# Patient Record
Sex: MALE | Race: WHITE | NOT HISPANIC OR LATINO | URBAN - METROPOLITAN AREA
[De-identification: names, ages, dates, MRNs, and addresses within clinical notes are randomized per-mention and may not be internally consistent; named-entity substitution may affect disease eponyms.]

---

## 2024-01-01 ENCOUNTER — INPATIENT (INPATIENT)
Facility: HOSPITAL | Age: 69
LOS: 22 days | DRG: 443 | End: 2024-09-04
Attending: TRANSPLANT SURGERY | Admitting: STUDENT IN AN ORGANIZED HEALTH CARE EDUCATION/TRAINING PROGRAM
Payer: MEDICARE

## 2024-01-01 VITALS
HEART RATE: 73 BPM | OXYGEN SATURATION: 98 % | SYSTOLIC BLOOD PRESSURE: 103 MMHG | RESPIRATION RATE: 18 BRPM | TEMPERATURE: 98 F | DIASTOLIC BLOOD PRESSURE: 65 MMHG

## 2024-01-01 VITALS — OXYGEN SATURATION: 77 %

## 2024-01-01 DIAGNOSIS — F10.10 ALCOHOL ABUSE, UNCOMPLICATED: ICD-10-CM

## 2024-01-01 DIAGNOSIS — K72.00 ACUTE AND SUBACUTE HEPATIC FAILURE WITHOUT COMA: ICD-10-CM

## 2024-01-01 DIAGNOSIS — K92.2 GASTROINTESTINAL HEMORRHAGE, UNSPECIFIED: ICD-10-CM

## 2024-01-01 DIAGNOSIS — N17.9 ACUTE KIDNEY FAILURE, UNSPECIFIED: ICD-10-CM

## 2024-01-01 DIAGNOSIS — Z01.818 ENCOUNTER FOR OTHER PREPROCEDURAL EXAMINATION: ICD-10-CM

## 2024-01-01 DIAGNOSIS — K74.60 UNSPECIFIED CIRRHOSIS OF LIVER: ICD-10-CM

## 2024-01-01 DIAGNOSIS — D69.6 THROMBOCYTOPENIA, UNSPECIFIED: ICD-10-CM

## 2024-01-01 DIAGNOSIS — E46 UNSPECIFIED PROTEIN-CALORIE MALNUTRITION: ICD-10-CM

## 2024-01-01 DIAGNOSIS — E87.8 OTHER DISORDERS OF ELECTROLYTE AND FLUID BALANCE, NOT ELSEWHERE CLASSIFIED: ICD-10-CM

## 2024-01-01 LAB
% ALBUMIN: 59.1 % — SIGNIFICANT CHANGE UP
% ALPHA 1: 2.2 % — SIGNIFICANT CHANGE UP
% ALPHA 2: 3.9 % — SIGNIFICANT CHANGE UP
% BETA: 19.1 % — SIGNIFICANT CHANGE UP
% GAMMA: 15.7 % — SIGNIFICANT CHANGE UP
% M SPIKE: SIGNIFICANT CHANGE UP
-  AMPICILLIN: SIGNIFICANT CHANGE UP
-  GENTAMICIN SYNERGY: SIGNIFICANT CHANGE UP
-  STAPHYLOCOCCUS EPIDERMIDIS, METHICILLIN RESISTANT: SIGNIFICANT CHANGE UP
-  STREPTOMYCIN SYNERGY: SIGNIFICANT CHANGE UP
-  VANCOMYCIN: SIGNIFICANT CHANGE UP
A PHAGOCYTOPH IGG TITR SER IF: SIGNIFICANT CHANGE UP
A1C WITH ESTIMATED AVERAGE GLUCOSE RESULT: 5 % — SIGNIFICANT CHANGE UP (ref 4–5.6)
A1C WITH ESTIMATED AVERAGE GLUCOSE RESULT: 5.1 % — SIGNIFICANT CHANGE UP (ref 4–5.6)
ADD ON TEST-SPECIMEN IN LAB: SIGNIFICANT CHANGE UP
ALBUMIN FLD-MCNC: 0.6 G/DL — SIGNIFICANT CHANGE UP
ALBUMIN FLD-MCNC: 0.9 G/DL — SIGNIFICANT CHANGE UP
ALBUMIN FLD-MCNC: 1.6 G/DL — SIGNIFICANT CHANGE UP
ALBUMIN SERPL ELPH-MCNC: 2.7 G/DL — LOW (ref 3.3–5)
ALBUMIN SERPL ELPH-MCNC: 2.8 G/DL — LOW (ref 3.3–5)
ALBUMIN SERPL ELPH-MCNC: 2.9 G/DL — LOW (ref 3.3–5)
ALBUMIN SERPL ELPH-MCNC: 3 G/DL — LOW (ref 3.3–5)
ALBUMIN SERPL ELPH-MCNC: 3.1 G/DL — LOW (ref 3.3–5)
ALBUMIN SERPL ELPH-MCNC: 3.2 G/DL — LOW (ref 3.3–5)
ALBUMIN SERPL ELPH-MCNC: 3.3 G/DL — SIGNIFICANT CHANGE UP (ref 3.3–5)
ALBUMIN SERPL ELPH-MCNC: 3.4 G/DL — LOW (ref 3.6–5.5)
ALBUMIN SERPL ELPH-MCNC: 3.4 G/DL — SIGNIFICANT CHANGE UP (ref 3.3–5)
ALBUMIN SERPL ELPH-MCNC: 3.5 G/DL — SIGNIFICANT CHANGE UP (ref 3.3–5)
ALBUMIN SERPL ELPH-MCNC: 3.6 G/DL — SIGNIFICANT CHANGE UP (ref 3.3–5)
ALBUMIN SERPL ELPH-MCNC: 3.7 G/DL — SIGNIFICANT CHANGE UP (ref 3.3–5)
ALBUMIN SERPL ELPH-MCNC: 3.8 G/DL — SIGNIFICANT CHANGE UP (ref 3.3–5)
ALBUMIN SERPL ELPH-MCNC: 3.9 G/DL — SIGNIFICANT CHANGE UP (ref 3.3–5)
ALBUMIN/GLOB SERPL ELPH: 1.4 RATIO — SIGNIFICANT CHANGE UP
ALP SERPL-CCNC: 104 U/L — SIGNIFICANT CHANGE UP (ref 40–120)
ALP SERPL-CCNC: 105 U/L — SIGNIFICANT CHANGE UP (ref 40–120)
ALP SERPL-CCNC: 106 U/L — SIGNIFICANT CHANGE UP (ref 40–120)
ALP SERPL-CCNC: 107 U/L — SIGNIFICANT CHANGE UP (ref 40–120)
ALP SERPL-CCNC: 109 U/L — SIGNIFICANT CHANGE UP (ref 40–120)
ALP SERPL-CCNC: 109 U/L — SIGNIFICANT CHANGE UP (ref 40–120)
ALP SERPL-CCNC: 113 U/L — SIGNIFICANT CHANGE UP (ref 40–120)
ALP SERPL-CCNC: 114 U/L — SIGNIFICANT CHANGE UP (ref 40–120)
ALP SERPL-CCNC: 116 U/L — SIGNIFICANT CHANGE UP (ref 40–120)
ALP SERPL-CCNC: 118 U/L — SIGNIFICANT CHANGE UP (ref 40–120)
ALP SERPL-CCNC: 118 U/L — SIGNIFICANT CHANGE UP (ref 40–120)
ALP SERPL-CCNC: 119 U/L — SIGNIFICANT CHANGE UP (ref 40–120)
ALP SERPL-CCNC: 123 U/L — HIGH (ref 40–120)
ALP SERPL-CCNC: 124 U/L — HIGH (ref 40–120)
ALP SERPL-CCNC: 135 U/L — HIGH (ref 40–120)
ALP SERPL-CCNC: 137 U/L — HIGH (ref 40–120)
ALP SERPL-CCNC: 141 U/L — HIGH (ref 40–120)
ALP SERPL-CCNC: 147 U/L — HIGH (ref 40–120)
ALP SERPL-CCNC: 151 U/L — HIGH (ref 40–120)
ALP SERPL-CCNC: 154 U/L — HIGH (ref 40–120)
ALP SERPL-CCNC: 155 U/L — HIGH (ref 40–120)
ALP SERPL-CCNC: 158 U/L — HIGH (ref 40–120)
ALP SERPL-CCNC: 159 U/L — HIGH (ref 40–120)
ALP SERPL-CCNC: 159 U/L — HIGH (ref 40–120)
ALP SERPL-CCNC: 160 U/L — HIGH (ref 40–120)
ALP SERPL-CCNC: 162 U/L — HIGH (ref 40–120)
ALP SERPL-CCNC: 163 U/L — HIGH (ref 40–120)
ALP SERPL-CCNC: 165 U/L — HIGH (ref 40–120)
ALP SERPL-CCNC: 165 U/L — HIGH (ref 40–120)
ALP SERPL-CCNC: 166 U/L — HIGH (ref 40–120)
ALP SERPL-CCNC: 167 U/L — HIGH (ref 40–120)
ALP SERPL-CCNC: 169 U/L — HIGH (ref 40–120)
ALP SERPL-CCNC: 169 U/L — HIGH (ref 40–120)
ALP SERPL-CCNC: 170 U/L — HIGH (ref 40–120)
ALP SERPL-CCNC: 171 U/L — HIGH (ref 40–120)
ALP SERPL-CCNC: 172 U/L — HIGH (ref 40–120)
ALP SERPL-CCNC: 173 U/L — HIGH (ref 40–120)
ALP SERPL-CCNC: 175 U/L — HIGH (ref 40–120)
ALP SERPL-CCNC: 175 U/L — HIGH (ref 40–120)
ALP SERPL-CCNC: 179 U/L — HIGH (ref 40–120)
ALP SERPL-CCNC: 180 U/L — HIGH (ref 40–120)
ALP SERPL-CCNC: 182 U/L — HIGH (ref 40–120)
ALP SERPL-CCNC: 182 U/L — HIGH (ref 40–120)
ALP SERPL-CCNC: 186 U/L — HIGH (ref 40–120)
ALP SERPL-CCNC: 186 U/L — HIGH (ref 40–120)
ALP SERPL-CCNC: 187 U/L — HIGH (ref 40–120)
ALP SERPL-CCNC: 191 U/L — HIGH (ref 40–120)
ALP SERPL-CCNC: 193 U/L — HIGH (ref 40–120)
ALP SERPL-CCNC: 193 U/L — HIGH (ref 40–120)
ALP SERPL-CCNC: 198 U/L — HIGH (ref 40–120)
ALP SERPL-CCNC: 203 U/L — HIGH (ref 40–120)
ALP SERPL-CCNC: 203 U/L — HIGH (ref 40–120)
ALP SERPL-CCNC: 205 U/L — HIGH (ref 40–120)
ALP SERPL-CCNC: 207 U/L — HIGH (ref 40–120)
ALP SERPL-CCNC: 211 U/L — HIGH (ref 40–120)
ALP SERPL-CCNC: 212 U/L — HIGH (ref 40–120)
ALP SERPL-CCNC: 212 U/L — HIGH (ref 40–120)
ALP SERPL-CCNC: 215 U/L — HIGH (ref 40–120)
ALP SERPL-CCNC: 218 U/L — HIGH (ref 40–120)
ALP SERPL-CCNC: 222 U/L — HIGH (ref 40–120)
ALP SERPL-CCNC: 222 U/L — HIGH (ref 40–120)
ALP SERPL-CCNC: 223 U/L — HIGH (ref 40–120)
ALP SERPL-CCNC: 223 U/L — HIGH (ref 40–120)
ALP SERPL-CCNC: 228 U/L — HIGH (ref 40–120)
ALP SERPL-CCNC: 231 U/L — HIGH (ref 40–120)
ALP SERPL-CCNC: 234 U/L — HIGH (ref 40–120)
ALP SERPL-CCNC: 236 U/L — HIGH (ref 40–120)
ALP SERPL-CCNC: 240 U/L — HIGH (ref 40–120)
ALP SERPL-CCNC: 243 U/L — HIGH (ref 40–120)
ALP SERPL-CCNC: 245 U/L — HIGH (ref 40–120)
ALP SERPL-CCNC: 245 U/L — HIGH (ref 40–120)
ALP SERPL-CCNC: 246 U/L — HIGH (ref 40–120)
ALP SERPL-CCNC: 248 U/L — HIGH (ref 40–120)
ALP SERPL-CCNC: 250 U/L — HIGH (ref 40–120)
ALP SERPL-CCNC: 252 U/L — HIGH (ref 40–120)
ALP SERPL-CCNC: 253 U/L — HIGH (ref 40–120)
ALP SERPL-CCNC: 253 U/L — HIGH (ref 40–120)
ALP SERPL-CCNC: 255 U/L — HIGH (ref 40–120)
ALP SERPL-CCNC: 259 U/L — HIGH (ref 40–120)
ALPHA1 GLOB SERPL ELPH-MCNC: 0.1 G/DL — SIGNIFICANT CHANGE UP (ref 0.1–0.4)
ALPHA2 GLOB SERPL ELPH-MCNC: 0.2 G/DL — LOW (ref 0.5–1)
ALT FLD-CCNC: 15 U/L — SIGNIFICANT CHANGE UP (ref 10–45)
ALT FLD-CCNC: 16 U/L — SIGNIFICANT CHANGE UP (ref 10–45)
ALT FLD-CCNC: 16 U/L — SIGNIFICANT CHANGE UP (ref 10–45)
ALT FLD-CCNC: 17 U/L — SIGNIFICANT CHANGE UP (ref 10–45)
ALT FLD-CCNC: 18 U/L — SIGNIFICANT CHANGE UP (ref 10–45)
ALT FLD-CCNC: 18 U/L — SIGNIFICANT CHANGE UP (ref 10–45)
ALT FLD-CCNC: 19 U/L — SIGNIFICANT CHANGE UP (ref 10–45)
ALT FLD-CCNC: 21 U/L — SIGNIFICANT CHANGE UP (ref 10–45)
ALT FLD-CCNC: 22 U/L — SIGNIFICANT CHANGE UP (ref 10–45)
ALT FLD-CCNC: 23 U/L — SIGNIFICANT CHANGE UP (ref 10–45)
ALT FLD-CCNC: 23 U/L — SIGNIFICANT CHANGE UP (ref 10–45)
ALT FLD-CCNC: 25 U/L — SIGNIFICANT CHANGE UP (ref 10–45)
ALT FLD-CCNC: 25 U/L — SIGNIFICANT CHANGE UP (ref 10–45)
ALT FLD-CCNC: 27 U/L — SIGNIFICANT CHANGE UP (ref 10–45)
ALT FLD-CCNC: 28 U/L — SIGNIFICANT CHANGE UP (ref 10–45)
ALT FLD-CCNC: 28 U/L — SIGNIFICANT CHANGE UP (ref 10–45)
ALT FLD-CCNC: 29 U/L — SIGNIFICANT CHANGE UP (ref 10–45)
ALT FLD-CCNC: 30 U/L — SIGNIFICANT CHANGE UP (ref 10–45)
ALT FLD-CCNC: 31 U/L — SIGNIFICANT CHANGE UP (ref 10–45)
ALT FLD-CCNC: 32 U/L — SIGNIFICANT CHANGE UP (ref 10–45)
ALT FLD-CCNC: 33 U/L — SIGNIFICANT CHANGE UP (ref 10–45)
ALT FLD-CCNC: 34 U/L — SIGNIFICANT CHANGE UP (ref 10–45)
ALT FLD-CCNC: 35 U/L — SIGNIFICANT CHANGE UP (ref 10–45)
ALT FLD-CCNC: 36 U/L — SIGNIFICANT CHANGE UP (ref 10–45)
ALT FLD-CCNC: 37 U/L — SIGNIFICANT CHANGE UP (ref 10–45)
ALT FLD-CCNC: 38 U/L — SIGNIFICANT CHANGE UP (ref 10–45)
ALT FLD-CCNC: 38 U/L — SIGNIFICANT CHANGE UP (ref 10–45)
ALT FLD-CCNC: 39 U/L — SIGNIFICANT CHANGE UP (ref 10–45)
ALT FLD-CCNC: 40 U/L — SIGNIFICANT CHANGE UP (ref 10–45)
ALT FLD-CCNC: 40 U/L — SIGNIFICANT CHANGE UP (ref 10–45)
ALT FLD-CCNC: 42 U/L — SIGNIFICANT CHANGE UP (ref 10–45)
AMMONIA BLD-MCNC: 107 UMOL/L — HIGH (ref 11–55)
AMMONIA BLD-MCNC: 115 UMOL/L — HIGH (ref 11–55)
AMMONIA BLD-MCNC: 160 UMOL/L — HIGH (ref 11–55)
AMMONIA BLD-MCNC: 34 UMOL/L — SIGNIFICANT CHANGE UP (ref 11–55)
AMMONIA BLD-MCNC: 38 UMOL/L — SIGNIFICANT CHANGE UP (ref 11–55)
AMMONIA BLD-MCNC: 46 UMOL/L — SIGNIFICANT CHANGE UP (ref 11–55)
AMMONIA BLD-MCNC: 48 UMOL/L — SIGNIFICANT CHANGE UP (ref 11–55)
AMMONIA BLD-MCNC: 49 UMOL/L — SIGNIFICANT CHANGE UP (ref 11–55)
AMMONIA BLD-MCNC: 52 UMOL/L — SIGNIFICANT CHANGE UP (ref 11–55)
AMMONIA BLD-MCNC: 57 UMOL/L — HIGH (ref 11–55)
AMMONIA BLD-MCNC: 57 UMOL/L — HIGH (ref 11–55)
AMMONIA BLD-MCNC: 59 UMOL/L — HIGH (ref 11–55)
AMMONIA BLD-MCNC: 60 UMOL/L — HIGH (ref 11–55)
AMMONIA BLD-MCNC: 60 UMOL/L — HIGH (ref 11–55)
AMMONIA BLD-MCNC: 61 UMOL/L — HIGH (ref 11–55)
AMMONIA BLD-MCNC: 63 UMOL/L — HIGH (ref 11–55)
AMMONIA BLD-MCNC: 65 UMOL/L — HIGH (ref 11–55)
AMMONIA BLD-MCNC: 66 UMOL/L — HIGH (ref 11–55)
AMMONIA BLD-MCNC: 66 UMOL/L — HIGH (ref 11–55)
AMMONIA BLD-MCNC: 74 UMOL/L — HIGH (ref 11–55)
AMMONIA BLD-MCNC: 79 UMOL/L — HIGH (ref 11–55)
AMMONIA BLD-MCNC: 81 UMOL/L — HIGH (ref 11–55)
AMMONIA BLD-MCNC: 94 UMOL/L — HIGH (ref 11–55)
ANA TITR SER: NEGATIVE — SIGNIFICANT CHANGE UP
ANION GAP SERPL CALC-SCNC: 10 MMOL/L — SIGNIFICANT CHANGE UP (ref 5–17)
ANION GAP SERPL CALC-SCNC: 11 MMOL/L — SIGNIFICANT CHANGE UP (ref 5–17)
ANION GAP SERPL CALC-SCNC: 12 MMOL/L — SIGNIFICANT CHANGE UP (ref 5–17)
ANION GAP SERPL CALC-SCNC: 13 MMOL/L — SIGNIFICANT CHANGE UP (ref 5–17)
ANION GAP SERPL CALC-SCNC: 14 MMOL/L — SIGNIFICANT CHANGE UP (ref 5–17)
ANION GAP SERPL CALC-SCNC: 15 MMOL/L — SIGNIFICANT CHANGE UP (ref 5–17)
ANION GAP SERPL CALC-SCNC: 17 MMOL/L — SIGNIFICANT CHANGE UP (ref 5–17)
ANION GAP SERPL CALC-SCNC: 25 MMOL/L — HIGH (ref 5–17)
ANION GAP SERPL CALC-SCNC: 9 MMOL/L — SIGNIFICANT CHANGE UP (ref 5–17)
ANISOCYTOSIS BLD QL: SIGNIFICANT CHANGE UP
APTT BLD: 129.3 SEC — CRITICAL HIGH (ref 24.5–35.6)
APTT BLD: 42.8 SEC — HIGH (ref 24.5–35.6)
APTT BLD: 43.6 SEC — HIGH (ref 24.5–35.6)
APTT BLD: 44.2 SEC — HIGH (ref 24.5–35.6)
APTT BLD: 44.4 SEC — HIGH (ref 24.5–35.6)
APTT BLD: 44.6 SEC — HIGH (ref 24.5–35.6)
APTT BLD: 45 SEC — HIGH (ref 24.5–35.6)
APTT BLD: 45.2 SEC — HIGH (ref 24.5–35.6)
APTT BLD: 45.7 SEC — HIGH (ref 24.5–35.6)
APTT BLD: 45.8 SEC — HIGH (ref 24.5–35.6)
APTT BLD: 46 SEC — HIGH (ref 24.5–35.6)
APTT BLD: 46.2 SEC — HIGH (ref 24.5–35.6)
APTT BLD: 46.5 SEC — HIGH (ref 24.5–35.6)
APTT BLD: 46.7 SEC — HIGH (ref 24.5–35.6)
APTT BLD: 46.8 SEC — HIGH (ref 24.5–35.6)
APTT BLD: 46.9 SEC — HIGH (ref 24.5–35.6)
APTT BLD: 46.9 SEC — HIGH (ref 24.5–35.6)
APTT BLD: 47.1 SEC — HIGH (ref 24.5–35.6)
APTT BLD: 47.3 SEC — HIGH (ref 24.5–35.6)
APTT BLD: 47.4 SEC — HIGH (ref 24.5–35.6)
APTT BLD: 47.5 SEC — HIGH (ref 24.5–35.6)
APTT BLD: 47.7 SEC — HIGH (ref 24.5–35.6)
APTT BLD: 47.7 SEC — HIGH (ref 24.5–35.6)
APTT BLD: 48 SEC — HIGH (ref 24.5–35.6)
APTT BLD: 48.3 SEC — HIGH (ref 24.5–35.6)
APTT BLD: 48.4 SEC — HIGH (ref 24.5–35.6)
APTT BLD: 48.5 SEC — HIGH (ref 24.5–35.6)
APTT BLD: 48.7 SEC — HIGH (ref 24.5–35.6)
APTT BLD: 48.7 SEC — HIGH (ref 24.5–35.6)
APTT BLD: 49 SEC — HIGH (ref 24.5–35.6)
APTT BLD: 49 SEC — HIGH (ref 24.5–35.6)
APTT BLD: 49.2 SEC — HIGH (ref 24.5–35.6)
APTT BLD: 49.2 SEC — HIGH (ref 24.5–35.6)
APTT BLD: 49.4 SEC — HIGH (ref 24.5–35.6)
APTT BLD: 49.7 SEC — HIGH (ref 24.5–35.6)
APTT BLD: 49.9 SEC — HIGH (ref 24.5–35.6)
APTT BLD: 50 SEC — HIGH (ref 24.5–35.6)
APTT BLD: 50.4 SEC — HIGH (ref 24.5–35.6)
APTT BLD: 50.4 SEC — HIGH (ref 24.5–35.6)
APTT BLD: 50.5 SEC — HIGH (ref 24.5–35.6)
APTT BLD: 51 SEC — HIGH (ref 24.5–35.6)
APTT BLD: 51.3 SEC — HIGH (ref 24.5–35.6)
APTT BLD: 51.6 SEC — HIGH (ref 24.5–35.6)
APTT BLD: 52.4 SEC — HIGH (ref 24.5–35.6)
APTT BLD: 52.7 SEC — HIGH (ref 24.5–35.6)
APTT BLD: 53.7 SEC — HIGH (ref 24.5–35.6)
APTT BLD: 54.7 SEC — HIGH (ref 24.5–35.6)
APTT BLD: 56.2 SEC — HIGH (ref 24.5–35.6)
APTT BLD: 56.9 SEC — HIGH (ref 24.5–35.6)
APTT BLD: 57.6 SEC — HIGH (ref 24.5–35.6)
APTT BLD: 57.7 SEC — HIGH (ref 24.5–35.6)
APTT BLD: 58.5 SEC — HIGH (ref 24.5–35.6)
APTT BLD: 60.7 SEC — HIGH (ref 24.5–35.6)
APTT BLD: 61.4 SEC — HIGH (ref 24.5–35.6)
APTT BLD: 63.3 SEC — HIGH (ref 24.5–35.6)
APTT BLD: 70.5 SEC — HIGH (ref 24.5–35.6)
APTT BLD: 71.9 SEC — HIGH (ref 24.5–35.6)
APTT BLD: 73.2 SEC — HIGH (ref 24.5–35.6)
APTT BLD: 73.7 SEC — HIGH (ref 24.5–35.6)
APTT BLD: 75.7 SEC — HIGH (ref 24.5–35.6)
APTT BLD: 76.1 SEC — HIGH (ref 24.5–35.6)
APTT BLD: 76.4 SEC — HIGH (ref 24.5–35.6)
APTT BLD: 83.5 SEC — HIGH (ref 24.5–35.6)
APTT BLD: 85.1 SEC — HIGH (ref 24.5–35.6)
APTT BLD: 85.8 SEC — HIGH (ref 24.5–35.6)
APTT BLD: 91 SEC — HIGH (ref 24.5–35.6)
AST SERPL-CCNC: 34 U/L — SIGNIFICANT CHANGE UP (ref 10–40)
AST SERPL-CCNC: 36 U/L — SIGNIFICANT CHANGE UP (ref 10–40)
AST SERPL-CCNC: 36 U/L — SIGNIFICANT CHANGE UP (ref 10–40)
AST SERPL-CCNC: 37 U/L — SIGNIFICANT CHANGE UP (ref 10–40)
AST SERPL-CCNC: 38 U/L — SIGNIFICANT CHANGE UP (ref 10–40)
AST SERPL-CCNC: 39 U/L — SIGNIFICANT CHANGE UP (ref 10–40)
AST SERPL-CCNC: 39 U/L — SIGNIFICANT CHANGE UP (ref 10–40)
AST SERPL-CCNC: 41 U/L — HIGH (ref 10–40)
AST SERPL-CCNC: 42 U/L — HIGH (ref 10–40)
AST SERPL-CCNC: 43 U/L — HIGH (ref 10–40)
AST SERPL-CCNC: 45 U/L — HIGH (ref 10–40)
AST SERPL-CCNC: 46 U/L — HIGH (ref 10–40)
AST SERPL-CCNC: 47 U/L — HIGH (ref 10–40)
AST SERPL-CCNC: 48 U/L — HIGH (ref 10–40)
AST SERPL-CCNC: 49 U/L — HIGH (ref 10–40)
AST SERPL-CCNC: 50 U/L — HIGH (ref 10–40)
AST SERPL-CCNC: 51 U/L — HIGH (ref 10–40)
AST SERPL-CCNC: 52 U/L — HIGH (ref 10–40)
AST SERPL-CCNC: 53 U/L — HIGH (ref 10–40)
AST SERPL-CCNC: 53 U/L — HIGH (ref 10–40)
AST SERPL-CCNC: 54 U/L — HIGH (ref 10–40)
AST SERPL-CCNC: 55 U/L — HIGH (ref 10–40)
AST SERPL-CCNC: 57 U/L — HIGH (ref 10–40)
AST SERPL-CCNC: 58 U/L — HIGH (ref 10–40)
AST SERPL-CCNC: 59 U/L — HIGH (ref 10–40)
AST SERPL-CCNC: 59 U/L — HIGH (ref 10–40)
AST SERPL-CCNC: 60 U/L — HIGH (ref 10–40)
AST SERPL-CCNC: 60 U/L — HIGH (ref 10–40)
AST SERPL-CCNC: 62 U/L — HIGH (ref 10–40)
AST SERPL-CCNC: 62 U/L — HIGH (ref 10–40)
AST SERPL-CCNC: 63 U/L — HIGH (ref 10–40)
AST SERPL-CCNC: 64 U/L — HIGH (ref 10–40)
AST SERPL-CCNC: 67 U/L — HIGH (ref 10–40)
AST SERPL-CCNC: 68 U/L — HIGH (ref 10–40)
AST SERPL-CCNC: 69 U/L — HIGH (ref 10–40)
AST SERPL-CCNC: 69 U/L — HIGH (ref 10–40)
AST SERPL-CCNC: 74 U/L — HIGH (ref 10–40)
AST SERPL-CCNC: 74 U/L — HIGH (ref 10–40)
AST SERPL-CCNC: 76 U/L — HIGH (ref 10–40)
AST SERPL-CCNC: 76 U/L — HIGH (ref 10–40)
AST SERPL-CCNC: 77 U/L — HIGH (ref 10–40)
AST SERPL-CCNC: 77 U/L — HIGH (ref 10–40)
AST SERPL-CCNC: 78 U/L — HIGH (ref 10–40)
AST SERPL-CCNC: 78 U/L — HIGH (ref 10–40)
AST SERPL-CCNC: 80 U/L — HIGH (ref 10–40)
AST SERPL-CCNC: 80 U/L — HIGH (ref 10–40)
AST SERPL-CCNC: 81 U/L — HIGH (ref 10–40)
AST SERPL-CCNC: 81 U/L — HIGH (ref 10–40)
AST SERPL-CCNC: 82 U/L — HIGH (ref 10–40)
AST SERPL-CCNC: 85 U/L — HIGH (ref 10–40)
AST SERPL-CCNC: 85 U/L — HIGH (ref 10–40)
AST SERPL-CCNC: 88 U/L — HIGH (ref 10–40)
AST SERPL-CCNC: 91 U/L — HIGH (ref 10–40)
AST SERPL-CCNC: 92 U/L — HIGH (ref 10–40)
AST SERPL-CCNC: 93 U/L — HIGH (ref 10–40)
B BURGDOR AB SER QL IA: 1.39 IV — HIGH
B BURGDOR IGG SER QL IB: NEGATIVE — SIGNIFICANT CHANGE UP
B BURGDOR IGM SER QL IB: NEGATIVE — SIGNIFICANT CHANGE UP
B MICROTI DNA BLD QL NAA+PROBE: SIGNIFICANT CHANGE UP
B MICROTI IGG TITR SER: SIGNIFICANT CHANGE UP
B PERT IGG+IGM PNL SER: ABNORMAL
B-GLOBULIN SERPL ELPH-MCNC: 1.1 G/DL — HIGH (ref 0.5–1)
BABESIA 18S RRNA BLD QL NAA+PROBE: SIGNIFICANT CHANGE UP
BASE EXCESS BLDV CALC-SCNC: -2.2 MMOL/L — LOW (ref -2–3)
BASE EXCESS BLDV CALC-SCNC: -2.5 MMOL/L — LOW (ref -2–3)
BASE EXCESS BLDV CALC-SCNC: 0.4 MMOL/L — SIGNIFICANT CHANGE UP (ref -2–3)
BASE EXCESS BLDV CALC-SCNC: 0.5 MMOL/L — SIGNIFICANT CHANGE UP (ref -2–3)
BASOPHILS # BLD AUTO: 0.22 K/UL — HIGH (ref 0–0.2)
BASOPHILS NFR BLD AUTO: 1.7 % — SIGNIFICANT CHANGE UP (ref 0–2)
BILIRUB DIRECT SERPL-MCNC: 8.6 MG/DL — HIGH (ref 0–0.3)
BILIRUB SERPL-MCNC: 10 MG/DL — HIGH (ref 0.2–1.2)
BILIRUB SERPL-MCNC: 10 MG/DL — HIGH (ref 0.2–1.2)
BILIRUB SERPL-MCNC: 10.5 MG/DL — HIGH (ref 0.2–1.2)
BILIRUB SERPL-MCNC: 10.6 MG/DL — HIGH (ref 0.2–1.2)
BILIRUB SERPL-MCNC: 10.6 MG/DL — HIGH (ref 0.2–1.2)
BILIRUB SERPL-MCNC: 10.7 MG/DL — HIGH (ref 0.2–1.2)
BILIRUB SERPL-MCNC: 10.9 MG/DL — HIGH (ref 0.2–1.2)
BILIRUB SERPL-MCNC: 11 MG/DL — HIGH (ref 0.2–1.2)
BILIRUB SERPL-MCNC: 11 MG/DL — HIGH (ref 0.2–1.2)
BILIRUB SERPL-MCNC: 11.3 MG/DL — HIGH (ref 0.2–1.2)
BILIRUB SERPL-MCNC: 11.3 MG/DL — HIGH (ref 0.2–1.2)
BILIRUB SERPL-MCNC: 11.4 MG/DL — HIGH (ref 0.2–1.2)
BILIRUB SERPL-MCNC: 11.6 MG/DL — HIGH (ref 0.2–1.2)
BILIRUB SERPL-MCNC: 11.7 MG/DL — HIGH (ref 0.2–1.2)
BILIRUB SERPL-MCNC: 11.9 MG/DL — HIGH (ref 0.2–1.2)
BILIRUB SERPL-MCNC: 12.1 MG/DL — HIGH (ref 0.2–1.2)
BILIRUB SERPL-MCNC: 12.3 MG/DL — HIGH (ref 0.2–1.2)
BILIRUB SERPL-MCNC: 12.6 MG/DL — HIGH (ref 0.2–1.2)
BILIRUB SERPL-MCNC: 12.9 MG/DL — HIGH (ref 0.2–1.2)
BILIRUB SERPL-MCNC: 12.9 MG/DL — HIGH (ref 0.2–1.2)
BILIRUB SERPL-MCNC: 13 MG/DL — HIGH (ref 0.2–1.2)
BILIRUB SERPL-MCNC: 13.2 MG/DL — HIGH (ref 0.2–1.2)
BILIRUB SERPL-MCNC: 13.2 MG/DL — HIGH (ref 0.2–1.2)
BILIRUB SERPL-MCNC: 13.9 MG/DL — HIGH (ref 0.2–1.2)
BILIRUB SERPL-MCNC: 14 MG/DL — HIGH (ref 0.2–1.2)
BILIRUB SERPL-MCNC: 14.2 MG/DL — HIGH (ref 0.2–1.2)
BILIRUB SERPL-MCNC: 14.2 MG/DL — HIGH (ref 0.2–1.2)
BILIRUB SERPL-MCNC: 14.4 MG/DL — HIGH (ref 0.2–1.2)
BILIRUB SERPL-MCNC: 14.8 MG/DL — HIGH (ref 0.2–1.2)
BILIRUB SERPL-MCNC: 14.8 MG/DL — HIGH (ref 0.2–1.2)
BILIRUB SERPL-MCNC: 14.9 MG/DL — HIGH (ref 0.2–1.2)
BILIRUB SERPL-MCNC: 15 MG/DL — HIGH (ref 0.2–1.2)
BILIRUB SERPL-MCNC: 15.1 MG/DL — HIGH (ref 0.2–1.2)
BILIRUB SERPL-MCNC: 15.1 MG/DL — HIGH (ref 0.2–1.2)
BILIRUB SERPL-MCNC: 15.2 MG/DL — HIGH (ref 0.2–1.2)
BILIRUB SERPL-MCNC: 15.3 MG/DL — HIGH (ref 0.2–1.2)
BILIRUB SERPL-MCNC: 15.5 MG/DL — HIGH (ref 0.2–1.2)
BILIRUB SERPL-MCNC: 15.8 MG/DL — HIGH (ref 0.2–1.2)
BILIRUB SERPL-MCNC: 15.9 MG/DL — HIGH (ref 0.2–1.2)
BILIRUB SERPL-MCNC: 15.9 MG/DL — HIGH (ref 0.2–1.2)
BILIRUB SERPL-MCNC: 16.4 MG/DL — HIGH (ref 0.2–1.2)
BILIRUB SERPL-MCNC: 16.4 MG/DL — HIGH (ref 0.2–1.2)
BILIRUB SERPL-MCNC: 16.7 MG/DL — HIGH (ref 0.2–1.2)
BILIRUB SERPL-MCNC: 16.9 MG/DL — HIGH (ref 0.2–1.2)
BILIRUB SERPL-MCNC: 17 MG/DL — HIGH (ref 0.2–1.2)
BILIRUB SERPL-MCNC: 17 MG/DL — HIGH (ref 0.2–1.2)
BILIRUB SERPL-MCNC: 17.1 MG/DL — HIGH (ref 0.2–1.2)
BILIRUB SERPL-MCNC: 17.4 MG/DL — HIGH (ref 0.2–1.2)
BILIRUB SERPL-MCNC: 17.5 MG/DL — HIGH (ref 0.2–1.2)
BILIRUB SERPL-MCNC: 17.5 MG/DL — HIGH (ref 0.2–1.2)
BILIRUB SERPL-MCNC: 17.6 MG/DL — HIGH (ref 0.2–1.2)
BILIRUB SERPL-MCNC: 17.6 MG/DL — HIGH (ref 0.2–1.2)
BILIRUB SERPL-MCNC: 17.8 MG/DL — HIGH (ref 0.2–1.2)
BILIRUB SERPL-MCNC: 18.5 MG/DL — HIGH (ref 0.2–1.2)
BILIRUB SERPL-MCNC: 8.8 MG/DL — HIGH (ref 0.2–1.2)
BILIRUB SERPL-MCNC: 8.9 MG/DL — HIGH (ref 0.2–1.2)
BILIRUB SERPL-MCNC: 9 MG/DL — HIGH (ref 0.2–1.2)
BILIRUB SERPL-MCNC: 9 MG/DL — HIGH (ref 0.2–1.2)
BILIRUB SERPL-MCNC: 9.1 MG/DL — HIGH (ref 0.2–1.2)
BILIRUB SERPL-MCNC: 9.2 MG/DL — HIGH (ref 0.2–1.2)
BILIRUB SERPL-MCNC: 9.3 MG/DL — HIGH (ref 0.2–1.2)
BILIRUB SERPL-MCNC: 9.4 MG/DL — HIGH (ref 0.2–1.2)
BILIRUB SERPL-MCNC: 9.7 MG/DL — HIGH (ref 0.2–1.2)
BILIRUB SERPL-MCNC: 9.8 MG/DL — HIGH (ref 0.2–1.2)
BILIRUB SERPL-MCNC: 9.8 MG/DL — HIGH (ref 0.2–1.2)
BILIRUB SERPL-MCNC: 9.9 MG/DL — HIGH (ref 0.2–1.2)
BLD GP AB SCN SERPL QL: NEGATIVE — SIGNIFICANT CHANGE UP
BUN SERPL-MCNC: 10 MG/DL — SIGNIFICANT CHANGE UP (ref 7–23)
BUN SERPL-MCNC: 11 MG/DL — SIGNIFICANT CHANGE UP (ref 7–23)
BUN SERPL-MCNC: 12 MG/DL — SIGNIFICANT CHANGE UP (ref 7–23)
BUN SERPL-MCNC: 13 MG/DL — SIGNIFICANT CHANGE UP (ref 7–23)
BUN SERPL-MCNC: 14 MG/DL — SIGNIFICANT CHANGE UP (ref 7–23)
BUN SERPL-MCNC: 15 MG/DL — SIGNIFICANT CHANGE UP (ref 7–23)
BUN SERPL-MCNC: 15 MG/DL — SIGNIFICANT CHANGE UP (ref 7–23)
BUN SERPL-MCNC: 16 MG/DL — SIGNIFICANT CHANGE UP (ref 7–23)
BUN SERPL-MCNC: 16 MG/DL — SIGNIFICANT CHANGE UP (ref 7–23)
BUN SERPL-MCNC: 17 MG/DL — SIGNIFICANT CHANGE UP (ref 7–23)
BUN SERPL-MCNC: 18 MG/DL — SIGNIFICANT CHANGE UP (ref 7–23)
BUN SERPL-MCNC: 19 MG/DL — SIGNIFICANT CHANGE UP (ref 7–23)
BUN SERPL-MCNC: 19 MG/DL — SIGNIFICANT CHANGE UP (ref 7–23)
BUN SERPL-MCNC: 22 MG/DL — SIGNIFICANT CHANGE UP (ref 7–23)
BUN SERPL-MCNC: 26 MG/DL — HIGH (ref 7–23)
BUN SERPL-MCNC: 32 MG/DL — HIGH (ref 7–23)
BUN SERPL-MCNC: 38 MG/DL — HIGH (ref 7–23)
BUN SERPL-MCNC: 39 MG/DL — HIGH (ref 7–23)
BUN SERPL-MCNC: 41 MG/DL — HIGH (ref 7–23)
BUN SERPL-MCNC: 45 MG/DL — HIGH (ref 7–23)
BUN SERPL-MCNC: 6 MG/DL — LOW (ref 7–23)
BUN SERPL-MCNC: 6 MG/DL — LOW (ref 7–23)
BUN SERPL-MCNC: 7 MG/DL — SIGNIFICANT CHANGE UP (ref 7–23)
BUN SERPL-MCNC: 8 MG/DL — SIGNIFICANT CHANGE UP (ref 7–23)
BUN SERPL-MCNC: 9 MG/DL — SIGNIFICANT CHANGE UP (ref 7–23)
BURR CELLS BLD QL SMEAR: PRESENT — SIGNIFICANT CHANGE UP
BURR CELLS BLD QL SMEAR: SIGNIFICANT CHANGE UP
CA-I SERPL-SCNC: 1.2 MMOL/L — SIGNIFICANT CHANGE UP (ref 1.15–1.33)
CA-I SERPL-SCNC: 1.2 MMOL/L — SIGNIFICANT CHANGE UP (ref 1.15–1.33)
CA-I SERPL-SCNC: 1.22 MMOL/L — SIGNIFICANT CHANGE UP (ref 1.15–1.33)
CA-I SERPL-SCNC: 1.26 MMOL/L — SIGNIFICANT CHANGE UP (ref 1.15–1.33)
CALCIUM SERPL-MCNC: 7.8 MG/DL — LOW (ref 8.4–10.5)
CALCIUM SERPL-MCNC: 7.9 MG/DL — LOW (ref 8.4–10.5)
CALCIUM SERPL-MCNC: 8 MG/DL — LOW (ref 8.4–10.5)
CALCIUM SERPL-MCNC: 8.1 MG/DL — LOW (ref 8.4–10.5)
CALCIUM SERPL-MCNC: 8.2 MG/DL — LOW (ref 8.4–10.5)
CALCIUM SERPL-MCNC: 8.3 MG/DL — LOW (ref 8.4–10.5)
CALCIUM SERPL-MCNC: 8.4 MG/DL — SIGNIFICANT CHANGE UP (ref 8.4–10.5)
CALCIUM SERPL-MCNC: 8.5 MG/DL — SIGNIFICANT CHANGE UP (ref 8.4–10.5)
CALCIUM SERPL-MCNC: 8.6 MG/DL — SIGNIFICANT CHANGE UP (ref 8.4–10.5)
CALCIUM SERPL-MCNC: 8.7 MG/DL — SIGNIFICANT CHANGE UP (ref 8.4–10.5)
CALCIUM SERPL-MCNC: 8.9 MG/DL — SIGNIFICANT CHANGE UP (ref 8.4–10.5)
CALCIUM SERPL-MCNC: 9 MG/DL — SIGNIFICANT CHANGE UP (ref 8.4–10.5)
CALCIUM SERPL-MCNC: 9.1 MG/DL — SIGNIFICANT CHANGE UP (ref 8.4–10.5)
CALCIUM SERPL-MCNC: 9.2 MG/DL — SIGNIFICANT CHANGE UP (ref 8.4–10.5)
CALCIUM SERPL-MCNC: 9.3 MG/DL — SIGNIFICANT CHANGE UP (ref 8.4–10.5)
CALCIUM SERPL-MCNC: 9.4 MG/DL — SIGNIFICANT CHANGE UP (ref 8.4–10.5)
CERULOPLASMIN SERPL-MCNC: 7 MG/DL — LOW (ref 15–30)
CHLORIDE BLDV-SCNC: 100 MMOL/L — SIGNIFICANT CHANGE UP (ref 96–108)
CHLORIDE BLDV-SCNC: 94 MMOL/L — LOW (ref 96–108)
CHLORIDE BLDV-SCNC: 96 MMOL/L — SIGNIFICANT CHANGE UP (ref 96–108)
CHLORIDE BLDV-SCNC: 99 MMOL/L — SIGNIFICANT CHANGE UP (ref 96–108)
CHLORIDE SERPL-SCNC: 100 MMOL/L — SIGNIFICANT CHANGE UP (ref 96–108)
CHLORIDE SERPL-SCNC: 101 MMOL/L — SIGNIFICANT CHANGE UP (ref 96–108)
CHLORIDE SERPL-SCNC: 102 MMOL/L — SIGNIFICANT CHANGE UP (ref 96–108)
CHLORIDE SERPL-SCNC: 103 MMOL/L — SIGNIFICANT CHANGE UP (ref 96–108)
CHLORIDE SERPL-SCNC: 104 MMOL/L — SIGNIFICANT CHANGE UP (ref 96–108)
CHLORIDE SERPL-SCNC: 105 MMOL/L — SIGNIFICANT CHANGE UP (ref 96–108)
CHLORIDE SERPL-SCNC: 106 MMOL/L — SIGNIFICANT CHANGE UP (ref 96–108)
CHLORIDE SERPL-SCNC: 94 MMOL/L — LOW (ref 96–108)
CHLORIDE SERPL-SCNC: 94 MMOL/L — LOW (ref 96–108)
CHLORIDE SERPL-SCNC: 95 MMOL/L — LOW (ref 96–108)
CHLORIDE SERPL-SCNC: 96 MMOL/L — SIGNIFICANT CHANGE UP (ref 96–108)
CHLORIDE SERPL-SCNC: 97 MMOL/L — SIGNIFICANT CHANGE UP (ref 96–108)
CHLORIDE SERPL-SCNC: 97 MMOL/L — SIGNIFICANT CHANGE UP (ref 96–108)
CHLORIDE SERPL-SCNC: 98 MMOL/L — SIGNIFICANT CHANGE UP (ref 96–108)
CHLORIDE SERPL-SCNC: 99 MMOL/L — SIGNIFICANT CHANGE UP (ref 96–108)
CHOLEST SERPL-MCNC: 49 MG/DL — SIGNIFICANT CHANGE UP
CMV DNA CSF QL NAA+PROBE: 1020 IU/ML — HIGH
CMV DNA CSF QL NAA+PROBE: 174 IU/ML — HIGH
CMV DNA CSF QL NAA+PROBE: 4730 IU/ML — HIGH
CMV DNA CSF QL NAA+PROBE: 4890 IU/ML — HIGH
CMV DNA CSF QL NAA+PROBE: 867 IU/ML — HIGH
CMV DNA SPEC NAA+PROBE-LOG#: 2.24 LOG10IU/ML — HIGH
CMV DNA SPEC NAA+PROBE-LOG#: 2.94 LOG10IU/ML — HIGH
CMV DNA SPEC NAA+PROBE-LOG#: 3.01 LOG10IU/ML — HIGH
CMV DNA SPEC NAA+PROBE-LOG#: 3.67 LOG10IU/ML — HIGH
CMV DNA SPEC NAA+PROBE-LOG#: 3.69 LOG10IU/ML — HIGH
CMV IGG FLD QL: >10 U/ML — HIGH
CMV IGG FLD QL: >10 U/ML — HIGH
CMV IGG SERPL-IMP: POSITIVE
CMV IGG SERPL-IMP: POSITIVE
CO2 BLDV-SCNC: 23 MMOL/L — SIGNIFICANT CHANGE UP (ref 22–26)
CO2 BLDV-SCNC: 24 MMOL/L — SIGNIFICANT CHANGE UP (ref 22–26)
CO2 BLDV-SCNC: 25 MMOL/L — SIGNIFICANT CHANGE UP (ref 22–26)
CO2 BLDV-SCNC: 26 MMOL/L — SIGNIFICANT CHANGE UP (ref 22–26)
CO2 SERPL-SCNC: 10 MMOL/L — CRITICAL LOW (ref 22–31)
CO2 SERPL-SCNC: 18 MMOL/L — LOW (ref 22–31)
CO2 SERPL-SCNC: 18 MMOL/L — LOW (ref 22–31)
CO2 SERPL-SCNC: 19 MMOL/L — LOW (ref 22–31)
CO2 SERPL-SCNC: 20 MMOL/L — LOW (ref 22–31)
CO2 SERPL-SCNC: 21 MMOL/L — LOW (ref 22–31)
CO2 SERPL-SCNC: 22 MMOL/L — SIGNIFICANT CHANGE UP (ref 22–31)
CO2 SERPL-SCNC: 23 MMOL/L — SIGNIFICANT CHANGE UP (ref 22–31)
COLOR FLD: ABNORMAL
CORTIS AM PEAK SERPL-MCNC: 41.1 UG/DL — HIGH (ref 6–18.4)
CREAT SERPL-MCNC: 0.81 MG/DL — SIGNIFICANT CHANGE UP (ref 0.5–1.3)
CREAT SERPL-MCNC: 0.81 MG/DL — SIGNIFICANT CHANGE UP (ref 0.5–1.3)
CREAT SERPL-MCNC: 0.83 MG/DL — SIGNIFICANT CHANGE UP (ref 0.5–1.3)
CREAT SERPL-MCNC: 0.86 MG/DL — SIGNIFICANT CHANGE UP (ref 0.5–1.3)
CREAT SERPL-MCNC: 0.87 MG/DL — SIGNIFICANT CHANGE UP (ref 0.5–1.3)
CREAT SERPL-MCNC: 0.88 MG/DL — SIGNIFICANT CHANGE UP (ref 0.5–1.3)
CREAT SERPL-MCNC: 0.89 MG/DL — SIGNIFICANT CHANGE UP (ref 0.5–1.3)
CREAT SERPL-MCNC: 0.89 MG/DL — SIGNIFICANT CHANGE UP (ref 0.5–1.3)
CREAT SERPL-MCNC: 0.9 MG/DL — SIGNIFICANT CHANGE UP (ref 0.5–1.3)
CREAT SERPL-MCNC: 0.92 MG/DL — SIGNIFICANT CHANGE UP (ref 0.5–1.3)
CREAT SERPL-MCNC: 0.93 MG/DL — SIGNIFICANT CHANGE UP (ref 0.5–1.3)
CREAT SERPL-MCNC: 0.94 MG/DL — SIGNIFICANT CHANGE UP (ref 0.5–1.3)
CREAT SERPL-MCNC: 0.95 MG/DL — SIGNIFICANT CHANGE UP (ref 0.5–1.3)
CREAT SERPL-MCNC: 0.96 MG/DL — SIGNIFICANT CHANGE UP (ref 0.5–1.3)
CREAT SERPL-MCNC: 0.96 MG/DL — SIGNIFICANT CHANGE UP (ref 0.5–1.3)
CREAT SERPL-MCNC: 0.97 MG/DL — SIGNIFICANT CHANGE UP (ref 0.5–1.3)
CREAT SERPL-MCNC: 0.98 MG/DL — SIGNIFICANT CHANGE UP (ref 0.5–1.3)
CREAT SERPL-MCNC: 0.98 MG/DL — SIGNIFICANT CHANGE UP (ref 0.5–1.3)
CREAT SERPL-MCNC: 0.99 MG/DL — SIGNIFICANT CHANGE UP (ref 0.5–1.3)
CREAT SERPL-MCNC: 1.01 MG/DL — SIGNIFICANT CHANGE UP (ref 0.5–1.3)
CREAT SERPL-MCNC: 1.01 MG/DL — SIGNIFICANT CHANGE UP (ref 0.5–1.3)
CREAT SERPL-MCNC: 1.02 MG/DL — SIGNIFICANT CHANGE UP (ref 0.5–1.3)
CREAT SERPL-MCNC: 1.03 MG/DL — SIGNIFICANT CHANGE UP (ref 0.5–1.3)
CREAT SERPL-MCNC: 1.04 MG/DL — SIGNIFICANT CHANGE UP (ref 0.5–1.3)
CREAT SERPL-MCNC: 1.05 MG/DL — SIGNIFICANT CHANGE UP (ref 0.5–1.3)
CREAT SERPL-MCNC: 1.06 MG/DL — SIGNIFICANT CHANGE UP (ref 0.5–1.3)
CREAT SERPL-MCNC: 1.06 MG/DL — SIGNIFICANT CHANGE UP (ref 0.5–1.3)
CREAT SERPL-MCNC: 1.08 MG/DL — SIGNIFICANT CHANGE UP (ref 0.5–1.3)
CREAT SERPL-MCNC: 1.09 MG/DL — SIGNIFICANT CHANGE UP (ref 0.5–1.3)
CREAT SERPL-MCNC: 1.1 MG/DL — SIGNIFICANT CHANGE UP (ref 0.5–1.3)
CREAT SERPL-MCNC: 1.12 MG/DL — SIGNIFICANT CHANGE UP (ref 0.5–1.3)
CREAT SERPL-MCNC: 1.12 MG/DL — SIGNIFICANT CHANGE UP (ref 0.5–1.3)
CREAT SERPL-MCNC: 1.13 MG/DL — SIGNIFICANT CHANGE UP (ref 0.5–1.3)
CREAT SERPL-MCNC: 1.14 MG/DL — SIGNIFICANT CHANGE UP (ref 0.5–1.3)
CREAT SERPL-MCNC: 1.15 MG/DL — SIGNIFICANT CHANGE UP (ref 0.5–1.3)
CREAT SERPL-MCNC: 1.16 MG/DL — SIGNIFICANT CHANGE UP (ref 0.5–1.3)
CREAT SERPL-MCNC: 1.17 MG/DL — SIGNIFICANT CHANGE UP (ref 0.5–1.3)
CREAT SERPL-MCNC: 1.18 MG/DL — SIGNIFICANT CHANGE UP (ref 0.5–1.3)
CREAT SERPL-MCNC: 1.19 MG/DL — SIGNIFICANT CHANGE UP (ref 0.5–1.3)
CREAT SERPL-MCNC: 1.2 MG/DL — SIGNIFICANT CHANGE UP (ref 0.5–1.3)
CREAT SERPL-MCNC: 1.21 MG/DL — SIGNIFICANT CHANGE UP (ref 0.5–1.3)
CREAT SERPL-MCNC: 1.21 MG/DL — SIGNIFICANT CHANGE UP (ref 0.5–1.3)
CREAT SERPL-MCNC: 1.23 MG/DL — SIGNIFICANT CHANGE UP (ref 0.5–1.3)
CREAT SERPL-MCNC: 1.24 MG/DL — SIGNIFICANT CHANGE UP (ref 0.5–1.3)
CREAT SERPL-MCNC: 1.25 MG/DL — SIGNIFICANT CHANGE UP (ref 0.5–1.3)
CREAT SERPL-MCNC: 1.27 MG/DL — SIGNIFICANT CHANGE UP (ref 0.5–1.3)
CREAT SERPL-MCNC: 1.29 MG/DL — SIGNIFICANT CHANGE UP (ref 0.5–1.3)
CREAT SERPL-MCNC: 1.29 MG/DL — SIGNIFICANT CHANGE UP (ref 0.5–1.3)
CREAT SERPL-MCNC: 1.41 MG/DL — HIGH (ref 0.5–1.3)
CREAT SERPL-MCNC: 1.41 MG/DL — HIGH (ref 0.5–1.3)
CREAT SERPL-MCNC: 1.52 MG/DL — HIGH (ref 0.5–1.3)
CREAT SERPL-MCNC: 1.67 MG/DL — HIGH (ref 0.5–1.3)
CREAT SERPL-MCNC: 1.83 MG/DL — HIGH (ref 0.5–1.3)
CREAT SERPL-MCNC: 2.06 MG/DL — HIGH (ref 0.5–1.3)
CREAT SERPL-MCNC: 2.43 MG/DL — HIGH (ref 0.5–1.3)
CREAT SERPL-MCNC: 2.5 MG/DL — HIGH (ref 0.5–1.3)
CREAT SERPL-MCNC: 2.94 MG/DL — HIGH (ref 0.5–1.3)
CREAT SERPL-MCNC: 3.35 MG/DL — HIGH (ref 0.5–1.3)
CREAT SERPL-MCNC: 3.88 MG/DL — HIGH (ref 0.5–1.3)
CREAT SERPL-MCNC: 4.69 MG/DL — HIGH (ref 0.5–1.3)
CREAT SERPL-MCNC: 5.54 MG/DL — HIGH (ref 0.5–1.3)
CREAT SERPL-MCNC: 5.84 MG/DL — HIGH (ref 0.5–1.3)
CREAT SERPL-MCNC: 6.21 MG/DL — HIGH (ref 0.5–1.3)
CREAT SERPL-MCNC: 6.49 MG/DL — HIGH (ref 0.5–1.3)
CREAT SERPL-MCNC: 6.85 MG/DL — HIGH (ref 0.5–1.3)
CRYPTOC AG FLD QL: NEGATIVE — SIGNIFICANT CHANGE UP
CULTURE RESULTS: ABNORMAL
CULTURE RESULTS: ABNORMAL
CULTURE RESULTS: SIGNIFICANT CHANGE UP
E CHAFFEENSIS IGG TITR SER IF: SIGNIFICANT CHANGE UP
E FAECIUM DNA BLD POS QL NAA+NON-PROBE: SIGNIFICANT CHANGE UP
EBV DNA SERPL NAA+PROBE-ACNC: SIGNIFICANT CHANGE UP IU/ML
EBV EA AB SER IA-ACNC: 15.8 U/ML — HIGH
EBV EA AB TITR SER IF: POSITIVE
EBV EA IGG SER-ACNC: POSITIVE
EBV NA IGG SER IA-ACNC: 106 U/ML — HIGH
EBV PATRN SPEC IB-IMP: SIGNIFICANT CHANGE UP
EBV VCA IGG AVIDITY SER QL IA: POSITIVE
EBV VCA IGM SER IA-ACNC: 21.1 U/ML — SIGNIFICANT CHANGE UP
EBV VCA IGM SER IA-ACNC: 99.1 U/ML — HIGH
EBV VCA IGM TITR FLD: NEGATIVE — SIGNIFICANT CHANGE UP
EBVPCR LOG: SIGNIFICANT CHANGE UP LOG10IU/ML
EGFR: 10 ML/MIN/1.73M2 — LOW
EGFR: 11 ML/MIN/1.73M2 — LOW
EGFR: 13 ML/MIN/1.73M2 — LOW
EGFR: 16 ML/MIN/1.73M2 — LOW
EGFR: 19 ML/MIN/1.73M2 — LOW
EGFR: 23 ML/MIN/1.73M2 — LOW
EGFR: 27 ML/MIN/1.73M2 — LOW
EGFR: 28 ML/MIN/1.73M2 — LOW
EGFR: 35 ML/MIN/1.73M2 — LOW
EGFR: 40 ML/MIN/1.73M2 — LOW
EGFR: 45 ML/MIN/1.73M2 — LOW
EGFR: 50 ML/MIN/1.73M2 — LOW
EGFR: 55 ML/MIN/1.73M2 — LOW
EGFR: 55 ML/MIN/1.73M2 — LOW
EGFR: 61 ML/MIN/1.73M2 — SIGNIFICANT CHANGE UP
EGFR: 61 ML/MIN/1.73M2 — SIGNIFICANT CHANGE UP
EGFR: 62 ML/MIN/1.73M2 — SIGNIFICANT CHANGE UP
EGFR: 63 ML/MIN/1.73M2 — SIGNIFICANT CHANGE UP
EGFR: 64 ML/MIN/1.73M2 — SIGNIFICANT CHANGE UP
EGFR: 64 ML/MIN/1.73M2 — SIGNIFICANT CHANGE UP
EGFR: 66 ML/MIN/1.73M2 — SIGNIFICANT CHANGE UP
EGFR: 67 ML/MIN/1.73M2 — SIGNIFICANT CHANGE UP
EGFR: 68 ML/MIN/1.73M2 — SIGNIFICANT CHANGE UP
EGFR: 68 ML/MIN/1.73M2 — SIGNIFICANT CHANGE UP
EGFR: 69 ML/MIN/1.73M2 — SIGNIFICANT CHANGE UP
EGFR: 70 ML/MIN/1.73M2 — SIGNIFICANT CHANGE UP
EGFR: 71 ML/MIN/1.73M2 — SIGNIFICANT CHANGE UP
EGFR: 72 ML/MIN/1.73M2 — SIGNIFICANT CHANGE UP
EGFR: 72 ML/MIN/1.73M2 — SIGNIFICANT CHANGE UP
EGFR: 74 ML/MIN/1.73M2 — SIGNIFICANT CHANGE UP
EGFR: 75 ML/MIN/1.73M2 — SIGNIFICANT CHANGE UP
EGFR: 77 ML/MIN/1.73M2 — SIGNIFICANT CHANGE UP
EGFR: 77 ML/MIN/1.73M2 — SIGNIFICANT CHANGE UP
EGFR: 78 ML/MIN/1.73M2 — SIGNIFICANT CHANGE UP
EGFR: 79 ML/MIN/1.73M2 — SIGNIFICANT CHANGE UP
EGFR: 8 ML/MIN/1.73M2 — LOW
EGFR: 80 ML/MIN/1.73M2 — SIGNIFICANT CHANGE UP
EGFR: 81 ML/MIN/1.73M2 — SIGNIFICANT CHANGE UP
EGFR: 82 ML/MIN/1.73M2 — SIGNIFICANT CHANGE UP
EGFR: 82 ML/MIN/1.73M2 — SIGNIFICANT CHANGE UP
EGFR: 83 ML/MIN/1.73M2 — SIGNIFICANT CHANGE UP
EGFR: 85 ML/MIN/1.73M2 — SIGNIFICANT CHANGE UP
EGFR: 85 ML/MIN/1.73M2 — SIGNIFICANT CHANGE UP
EGFR: 86 ML/MIN/1.73M2 — SIGNIFICANT CHANGE UP
EGFR: 87 ML/MIN/1.73M2 — SIGNIFICANT CHANGE UP
EGFR: 87 ML/MIN/1.73M2 — SIGNIFICANT CHANGE UP
EGFR: 88 ML/MIN/1.73M2 — SIGNIFICANT CHANGE UP
EGFR: 89 ML/MIN/1.73M2 — SIGNIFICANT CHANGE UP
EGFR: 9 ML/MIN/1.73M2 — LOW
EGFR: 9 ML/MIN/1.73M2 — LOW
EGFR: 90 ML/MIN/1.73M2 — SIGNIFICANT CHANGE UP
EGFR: 91 ML/MIN/1.73M2 — SIGNIFICANT CHANGE UP
EGFR: 94 ML/MIN/1.73M2 — SIGNIFICANT CHANGE UP
EGFR: 95 ML/MIN/1.73M2 — SIGNIFICANT CHANGE UP
EGFR: 95 ML/MIN/1.73M2 — SIGNIFICANT CHANGE UP
EGFR: 96 ML/MIN/1.73M2 — SIGNIFICANT CHANGE UP
EGFR: 97 ML/MIN/1.73M2 — SIGNIFICANT CHANGE UP
EGFR: 97 ML/MIN/1.73M2 — SIGNIFICANT CHANGE UP
ELLIPTOCYTES BLD QL SMEAR: SLIGHT — SIGNIFICANT CHANGE UP
EOSINOPHIL # BLD AUTO: 0.69 K/UL — HIGH (ref 0–0.5)
EOSINOPHIL NFR BLD AUTO: 5.2 % — SIGNIFICANT CHANGE UP (ref 0–6)
ESTIMATED AVERAGE GLUCOSE: 100 MG/DL — SIGNIFICANT CHANGE UP (ref 68–114)
ESTIMATED AVERAGE GLUCOSE: 97 MG/DL — SIGNIFICANT CHANGE UP (ref 68–114)
FERRITIN SERPL-MCNC: 788 NG/ML — HIGH (ref 30–400)
FIBRINOGEN PPP-MCNC: 60 MG/DL — CRITICAL LOW (ref 200–445)
FLUAV AG NPH QL: SIGNIFICANT CHANGE UP
FLUAV AG NPH QL: SIGNIFICANT CHANGE UP
FLUBV AG NPH QL: SIGNIFICANT CHANGE UP
FLUBV AG NPH QL: SIGNIFICANT CHANGE UP
FLUID INTAKE SUBSTANCE CLASS: SIGNIFICANT CHANGE UP
FOLATE SERPL-MCNC: 3 NG/ML — LOW
GAMMA GLOBULIN: 0.9 G/DL — SIGNIFICANT CHANGE UP (ref 0.6–1.6)
GAMMA INTERFERON BACKGROUND BLD IA-ACNC: 0.06 IU/ML — SIGNIFICANT CHANGE UP
GAS PNL BLDA: SIGNIFICANT CHANGE UP
GAS PNL BLDV: 128 MMOL/L — LOW (ref 136–145)
GAS PNL BLDV: 129 MMOL/L — LOW (ref 136–145)
GAS PNL BLDV: SIGNIFICANT CHANGE UP
GGT SERPL-CCNC: 74 U/L — HIGH (ref 9–50)
GLUCOSE BLDC GLUCOMTR-MCNC: 111 MG/DL — HIGH (ref 70–99)
GLUCOSE BLDC GLUCOMTR-MCNC: 122 MG/DL — HIGH (ref 70–99)
GLUCOSE BLDC GLUCOMTR-MCNC: 131 MG/DL — HIGH (ref 70–99)
GLUCOSE BLDC GLUCOMTR-MCNC: 132 MG/DL — HIGH (ref 70–99)
GLUCOSE BLDC GLUCOMTR-MCNC: 133 MG/DL — HIGH (ref 70–99)
GLUCOSE BLDC GLUCOMTR-MCNC: 138 MG/DL — HIGH (ref 70–99)
GLUCOSE BLDC GLUCOMTR-MCNC: 140 MG/DL — HIGH (ref 70–99)
GLUCOSE BLDC GLUCOMTR-MCNC: 142 MG/DL — HIGH (ref 70–99)
GLUCOSE BLDC GLUCOMTR-MCNC: 143 MG/DL — HIGH (ref 70–99)
GLUCOSE BLDC GLUCOMTR-MCNC: 144 MG/DL — HIGH (ref 70–99)
GLUCOSE BLDC GLUCOMTR-MCNC: 145 MG/DL — HIGH (ref 70–99)
GLUCOSE BLDC GLUCOMTR-MCNC: 147 MG/DL — HIGH (ref 70–99)
GLUCOSE BLDC GLUCOMTR-MCNC: 148 MG/DL — HIGH (ref 70–99)
GLUCOSE BLDC GLUCOMTR-MCNC: 149 MG/DL — HIGH (ref 70–99)
GLUCOSE BLDC GLUCOMTR-MCNC: 152 MG/DL — HIGH (ref 70–99)
GLUCOSE BLDC GLUCOMTR-MCNC: 152 MG/DL — HIGH (ref 70–99)
GLUCOSE BLDC GLUCOMTR-MCNC: 155 MG/DL — HIGH (ref 70–99)
GLUCOSE BLDC GLUCOMTR-MCNC: 156 MG/DL — HIGH (ref 70–99)
GLUCOSE BLDC GLUCOMTR-MCNC: 157 MG/DL — HIGH (ref 70–99)
GLUCOSE BLDC GLUCOMTR-MCNC: 160 MG/DL — HIGH (ref 70–99)
GLUCOSE BLDC GLUCOMTR-MCNC: 162 MG/DL — HIGH (ref 70–99)
GLUCOSE BLDC GLUCOMTR-MCNC: 162 MG/DL — HIGH (ref 70–99)
GLUCOSE BLDC GLUCOMTR-MCNC: 163 MG/DL — HIGH (ref 70–99)
GLUCOSE BLDC GLUCOMTR-MCNC: 163 MG/DL — HIGH (ref 70–99)
GLUCOSE BLDC GLUCOMTR-MCNC: 164 MG/DL — HIGH (ref 70–99)
GLUCOSE BLDC GLUCOMTR-MCNC: 164 MG/DL — HIGH (ref 70–99)
GLUCOSE BLDC GLUCOMTR-MCNC: 165 MG/DL — HIGH (ref 70–99)
GLUCOSE BLDC GLUCOMTR-MCNC: 165 MG/DL — HIGH (ref 70–99)
GLUCOSE BLDC GLUCOMTR-MCNC: 166 MG/DL — HIGH (ref 70–99)
GLUCOSE BLDC GLUCOMTR-MCNC: 167 MG/DL — HIGH (ref 70–99)
GLUCOSE BLDC GLUCOMTR-MCNC: 171 MG/DL — HIGH (ref 70–99)
GLUCOSE BLDC GLUCOMTR-MCNC: 173 MG/DL — HIGH (ref 70–99)
GLUCOSE BLDC GLUCOMTR-MCNC: 173 MG/DL — HIGH (ref 70–99)
GLUCOSE BLDC GLUCOMTR-MCNC: 176 MG/DL — HIGH (ref 70–99)
GLUCOSE BLDC GLUCOMTR-MCNC: 177 MG/DL — HIGH (ref 70–99)
GLUCOSE BLDC GLUCOMTR-MCNC: 180 MG/DL — HIGH (ref 70–99)
GLUCOSE BLDC GLUCOMTR-MCNC: 181 MG/DL — HIGH (ref 70–99)
GLUCOSE BLDC GLUCOMTR-MCNC: 183 MG/DL — HIGH (ref 70–99)
GLUCOSE BLDC GLUCOMTR-MCNC: 185 MG/DL — HIGH (ref 70–99)
GLUCOSE BLDC GLUCOMTR-MCNC: 188 MG/DL — HIGH (ref 70–99)
GLUCOSE BLDC GLUCOMTR-MCNC: 189 MG/DL — HIGH (ref 70–99)
GLUCOSE BLDC GLUCOMTR-MCNC: 194 MG/DL — HIGH (ref 70–99)
GLUCOSE BLDC GLUCOMTR-MCNC: 196 MG/DL — HIGH (ref 70–99)
GLUCOSE BLDC GLUCOMTR-MCNC: 196 MG/DL — HIGH (ref 70–99)
GLUCOSE BLDC GLUCOMTR-MCNC: 198 MG/DL — HIGH (ref 70–99)
GLUCOSE BLDC GLUCOMTR-MCNC: 199 MG/DL — HIGH (ref 70–99)
GLUCOSE BLDC GLUCOMTR-MCNC: 99 MG/DL — SIGNIFICANT CHANGE UP (ref 70–99)
GLUCOSE BLDV-MCNC: 102 MG/DL — HIGH (ref 70–99)
GLUCOSE BLDV-MCNC: 113 MG/DL — HIGH (ref 70–99)
GLUCOSE BLDV-MCNC: 132 MG/DL — HIGH (ref 70–99)
GLUCOSE BLDV-MCNC: 147 MG/DL — HIGH (ref 70–99)
GLUCOSE FLD-MCNC: 109 MG/DL — SIGNIFICANT CHANGE UP
GLUCOSE FLD-MCNC: 153 MG/DL — SIGNIFICANT CHANGE UP
GLUCOSE FLD-MCNC: 166 MG/DL — SIGNIFICANT CHANGE UP
GLUCOSE SERPL-MCNC: 103 MG/DL — HIGH (ref 70–99)
GLUCOSE SERPL-MCNC: 106 MG/DL — HIGH (ref 70–99)
GLUCOSE SERPL-MCNC: 111 MG/DL — HIGH (ref 70–99)
GLUCOSE SERPL-MCNC: 116 MG/DL — HIGH (ref 70–99)
GLUCOSE SERPL-MCNC: 120 MG/DL — HIGH (ref 70–99)
GLUCOSE SERPL-MCNC: 121 MG/DL — HIGH (ref 70–99)
GLUCOSE SERPL-MCNC: 124 MG/DL — HIGH (ref 70–99)
GLUCOSE SERPL-MCNC: 126 MG/DL — HIGH (ref 70–99)
GLUCOSE SERPL-MCNC: 126 MG/DL — HIGH (ref 70–99)
GLUCOSE SERPL-MCNC: 127 MG/DL — HIGH (ref 70–99)
GLUCOSE SERPL-MCNC: 128 MG/DL — HIGH (ref 70–99)
GLUCOSE SERPL-MCNC: 128 MG/DL — HIGH (ref 70–99)
GLUCOSE SERPL-MCNC: 129 MG/DL — HIGH (ref 70–99)
GLUCOSE SERPL-MCNC: 131 MG/DL — HIGH (ref 70–99)
GLUCOSE SERPL-MCNC: 134 MG/DL — HIGH (ref 70–99)
GLUCOSE SERPL-MCNC: 135 MG/DL — HIGH (ref 70–99)
GLUCOSE SERPL-MCNC: 136 MG/DL — HIGH (ref 70–99)
GLUCOSE SERPL-MCNC: 136 MG/DL — HIGH (ref 70–99)
GLUCOSE SERPL-MCNC: 138 MG/DL — HIGH (ref 70–99)
GLUCOSE SERPL-MCNC: 138 MG/DL — HIGH (ref 70–99)
GLUCOSE SERPL-MCNC: 139 MG/DL — HIGH (ref 70–99)
GLUCOSE SERPL-MCNC: 139 MG/DL — HIGH (ref 70–99)
GLUCOSE SERPL-MCNC: 140 MG/DL — HIGH (ref 70–99)
GLUCOSE SERPL-MCNC: 141 MG/DL — HIGH (ref 70–99)
GLUCOSE SERPL-MCNC: 143 MG/DL — HIGH (ref 70–99)
GLUCOSE SERPL-MCNC: 144 MG/DL — HIGH (ref 70–99)
GLUCOSE SERPL-MCNC: 145 MG/DL — HIGH (ref 70–99)
GLUCOSE SERPL-MCNC: 145 MG/DL — HIGH (ref 70–99)
GLUCOSE SERPL-MCNC: 147 MG/DL — HIGH (ref 70–99)
GLUCOSE SERPL-MCNC: 147 MG/DL — HIGH (ref 70–99)
GLUCOSE SERPL-MCNC: 148 MG/DL — HIGH (ref 70–99)
GLUCOSE SERPL-MCNC: 149 MG/DL — HIGH (ref 70–99)
GLUCOSE SERPL-MCNC: 150 MG/DL — HIGH (ref 70–99)
GLUCOSE SERPL-MCNC: 150 MG/DL — HIGH (ref 70–99)
GLUCOSE SERPL-MCNC: 153 MG/DL — HIGH (ref 70–99)
GLUCOSE SERPL-MCNC: 153 MG/DL — HIGH (ref 70–99)
GLUCOSE SERPL-MCNC: 154 MG/DL — HIGH (ref 70–99)
GLUCOSE SERPL-MCNC: 156 MG/DL — HIGH (ref 70–99)
GLUCOSE SERPL-MCNC: 156 MG/DL — HIGH (ref 70–99)
GLUCOSE SERPL-MCNC: 157 MG/DL — HIGH (ref 70–99)
GLUCOSE SERPL-MCNC: 159 MG/DL — HIGH (ref 70–99)
GLUCOSE SERPL-MCNC: 160 MG/DL — HIGH (ref 70–99)
GLUCOSE SERPL-MCNC: 162 MG/DL — HIGH (ref 70–99)
GLUCOSE SERPL-MCNC: 163 MG/DL — HIGH (ref 70–99)
GLUCOSE SERPL-MCNC: 164 MG/DL — HIGH (ref 70–99)
GLUCOSE SERPL-MCNC: 165 MG/DL — HIGH (ref 70–99)
GLUCOSE SERPL-MCNC: 165 MG/DL — HIGH (ref 70–99)
GLUCOSE SERPL-MCNC: 166 MG/DL — HIGH (ref 70–99)
GLUCOSE SERPL-MCNC: 166 MG/DL — HIGH (ref 70–99)
GLUCOSE SERPL-MCNC: 167 MG/DL — HIGH (ref 70–99)
GLUCOSE SERPL-MCNC: 168 MG/DL — HIGH (ref 70–99)
GLUCOSE SERPL-MCNC: 169 MG/DL — HIGH (ref 70–99)
GLUCOSE SERPL-MCNC: 169 MG/DL — HIGH (ref 70–99)
GLUCOSE SERPL-MCNC: 170 MG/DL — HIGH (ref 70–99)
GLUCOSE SERPL-MCNC: 171 MG/DL — HIGH (ref 70–99)
GLUCOSE SERPL-MCNC: 172 MG/DL — HIGH (ref 70–99)
GLUCOSE SERPL-MCNC: 173 MG/DL — HIGH (ref 70–99)
GLUCOSE SERPL-MCNC: 175 MG/DL — HIGH (ref 70–99)
GLUCOSE SERPL-MCNC: 175 MG/DL — HIGH (ref 70–99)
GLUCOSE SERPL-MCNC: 176 MG/DL — HIGH (ref 70–99)
GLUCOSE SERPL-MCNC: 176 MG/DL — HIGH (ref 70–99)
GLUCOSE SERPL-MCNC: 182 MG/DL — HIGH (ref 70–99)
GLUCOSE SERPL-MCNC: 182 MG/DL — HIGH (ref 70–99)
GLUCOSE SERPL-MCNC: 184 MG/DL — HIGH (ref 70–99)
GLUCOSE SERPL-MCNC: 185 MG/DL — HIGH (ref 70–99)
GLUCOSE SERPL-MCNC: 185 MG/DL — HIGH (ref 70–99)
GLUCOSE SERPL-MCNC: 187 MG/DL — HIGH (ref 70–99)
GLUCOSE SERPL-MCNC: 188 MG/DL — HIGH (ref 70–99)
GLUCOSE SERPL-MCNC: 190 MG/DL — HIGH (ref 70–99)
GLUCOSE SERPL-MCNC: 191 MG/DL — HIGH (ref 70–99)
GLUCOSE SERPL-MCNC: 192 MG/DL — HIGH (ref 70–99)
GLUCOSE SERPL-MCNC: 193 MG/DL — HIGH (ref 70–99)
GLUCOSE SERPL-MCNC: 195 MG/DL — HIGH (ref 70–99)
GLUCOSE SERPL-MCNC: 195 MG/DL — HIGH (ref 70–99)
GLUCOSE SERPL-MCNC: 196 MG/DL — HIGH (ref 70–99)
GLUCOSE SERPL-MCNC: 197 MG/DL — HIGH (ref 70–99)
GLUCOSE SERPL-MCNC: 198 MG/DL — HIGH (ref 70–99)
GLUCOSE SERPL-MCNC: 199 MG/DL — HIGH (ref 70–99)
GLUCOSE SERPL-MCNC: 199 MG/DL — HIGH (ref 70–99)
GLUCOSE SERPL-MCNC: 202 MG/DL — HIGH (ref 70–99)
GRAM STN FLD: ABNORMAL
GRAM STN FLD: ABNORMAL
GRAM STN FLD: SIGNIFICANT CHANGE UP
HAV IGG SER QL IA: REACTIVE
HBV CORE AB SER-ACNC: SIGNIFICANT CHANGE UP
HBV DNA # SERPL NAA+PROBE: SIGNIFICANT CHANGE UP
HBV DNA SERPL NAA+PROBE-LOG#: SIGNIFICANT CHANGE UP LOGIU/ML
HBV SURFACE AB SER-ACNC: REACTIVE
HBV SURFACE AG SER-ACNC: SIGNIFICANT CHANGE UP
HCO3 BLDV-SCNC: 22 MMOL/L — SIGNIFICANT CHANGE UP (ref 22–29)
HCO3 BLDV-SCNC: 22 MMOL/L — SIGNIFICANT CHANGE UP (ref 22–29)
HCO3 BLDV-SCNC: 24 MMOL/L — SIGNIFICANT CHANGE UP (ref 22–29)
HCO3 BLDV-SCNC: 25 MMOL/L — SIGNIFICANT CHANGE UP (ref 22–29)
HCT VFR BLD CALC: 19.4 % — CRITICAL LOW (ref 39–50)
HCT VFR BLD CALC: 19.9 % — CRITICAL LOW (ref 39–50)
HCT VFR BLD CALC: 20 % — CRITICAL LOW (ref 39–50)
HCT VFR BLD CALC: 20.3 % — CRITICAL LOW (ref 39–50)
HCT VFR BLD CALC: 20.4 % — CRITICAL LOW (ref 39–50)
HCT VFR BLD CALC: 20.6 % — CRITICAL LOW (ref 39–50)
HCT VFR BLD CALC: 20.8 % — CRITICAL LOW (ref 39–50)
HCT VFR BLD CALC: 20.8 % — CRITICAL LOW (ref 39–50)
HCT VFR BLD CALC: 20.9 % — CRITICAL LOW (ref 39–50)
HCT VFR BLD CALC: 20.9 % — CRITICAL LOW (ref 39–50)
HCT VFR BLD CALC: 21 % — CRITICAL LOW (ref 39–50)
HCT VFR BLD CALC: 21.1 % — LOW (ref 39–50)
HCT VFR BLD CALC: 21.2 % — LOW (ref 39–50)
HCT VFR BLD CALC: 21.3 % — LOW (ref 39–50)
HCT VFR BLD CALC: 21.4 % — LOW (ref 39–50)
HCT VFR BLD CALC: 21.4 % — LOW (ref 39–50)
HCT VFR BLD CALC: 21.5 % — LOW (ref 39–50)
HCT VFR BLD CALC: 21.6 % — LOW (ref 39–50)
HCT VFR BLD CALC: 21.6 % — LOW (ref 39–50)
HCT VFR BLD CALC: 21.7 % — LOW (ref 39–50)
HCT VFR BLD CALC: 21.8 % — LOW (ref 39–50)
HCT VFR BLD CALC: 22 % — LOW (ref 39–50)
HCT VFR BLD CALC: 22.1 % — LOW (ref 39–50)
HCT VFR BLD CALC: 22.2 % — LOW (ref 39–50)
HCT VFR BLD CALC: 22.3 % — LOW (ref 39–50)
HCT VFR BLD CALC: 22.3 % — LOW (ref 39–50)
HCT VFR BLD CALC: 22.4 % — LOW (ref 39–50)
HCT VFR BLD CALC: 22.5 % — LOW (ref 39–50)
HCT VFR BLD CALC: 22.6 % — LOW (ref 39–50)
HCT VFR BLD CALC: 22.7 % — LOW (ref 39–50)
HCT VFR BLD CALC: 22.7 % — LOW (ref 39–50)
HCT VFR BLD CALC: 22.8 % — LOW (ref 39–50)
HCT VFR BLD CALC: 22.9 % — LOW (ref 39–50)
HCT VFR BLD CALC: 23 % — LOW (ref 39–50)
HCT VFR BLD CALC: 23.2 % — LOW (ref 39–50)
HCT VFR BLD CALC: 23.3 % — LOW (ref 39–50)
HCT VFR BLD CALC: 23.5 % — LOW (ref 39–50)
HCT VFR BLD CALC: 23.6 % — LOW (ref 39–50)
HCT VFR BLD CALC: 23.6 % — LOW (ref 39–50)
HCT VFR BLD CALC: 23.7 % — LOW (ref 39–50)
HCT VFR BLD CALC: 23.7 % — LOW (ref 39–50)
HCT VFR BLD CALC: 23.9 % — LOW (ref 39–50)
HCT VFR BLD CALC: 24 % — LOW (ref 39–50)
HCT VFR BLD CALC: 24.3 % — LOW (ref 39–50)
HCT VFR BLD CALC: 24.4 % — LOW (ref 39–50)
HCT VFR BLD CALC: 24.5 % — LOW (ref 39–50)
HCT VFR BLD CALC: 24.5 % — LOW (ref 39–50)
HCT VFR BLD CALC: 24.8 % — LOW (ref 39–50)
HCT VFR BLD CALC: 25 % — LOW (ref 39–50)
HCT VFR BLD CALC: 25.2 % — LOW (ref 39–50)
HCT VFR BLD CALC: 26 % — LOW (ref 39–50)
HCT VFR BLD CALC: 26 % — LOW (ref 39–50)
HCT VFR BLD CALC: 26.5 % — LOW (ref 39–50)
HCT VFR BLD CALC: 26.8 % — LOW (ref 39–50)
HCT VFR BLD CALC: 27.2 % — LOW (ref 39–50)
HCT VFR BLD CALC: 28.5 % — LOW (ref 39–50)
HCT VFR BLDA CALC: 26 % — LOW (ref 39–51)
HCT VFR BLDA CALC: 27 % — LOW (ref 39–51)
HCT VFR BLDA CALC: 28 % — LOW (ref 39–51)
HCT VFR BLDA CALC: 29 % — LOW (ref 39–51)
HCV AB S/CO SERPL IA: 0.08 S/CO — SIGNIFICANT CHANGE UP
HCV AB SERPL-IMP: SIGNIFICANT CHANGE UP
HCV RNA FLD QL NAA+PROBE: SIGNIFICANT CHANGE UP
HCV RNA SPEC QL PROBE+SIG AMP: SIGNIFICANT CHANGE UP
HDLC SERPL-MCNC: 14 MG/DL — LOW
HDV AB SER-ACNC: NEGATIVE — SIGNIFICANT CHANGE UP
HDV IGM SER QL IA: SIGNIFICANT CHANGE UP
HEV IGM SER QL: NEGATIVE — SIGNIFICANT CHANGE UP
HGB BLD CALC-MCNC: 8.6 G/DL — LOW (ref 12.6–17.4)
HGB BLD CALC-MCNC: 9.1 G/DL — LOW (ref 12.6–17.4)
HGB BLD CALC-MCNC: 9.3 G/DL — LOW (ref 12.6–17.4)
HGB BLD CALC-MCNC: 9.5 G/DL — LOW (ref 12.6–17.4)
HGB BLD-MCNC: 6.6 G/DL — CRITICAL LOW (ref 13–17)
HGB BLD-MCNC: 6.6 G/DL — CRITICAL LOW (ref 13–17)
HGB BLD-MCNC: 6.9 G/DL — CRITICAL LOW (ref 13–17)
HGB BLD-MCNC: 7.1 G/DL — LOW (ref 13–17)
HGB BLD-MCNC: 7.2 G/DL — LOW (ref 13–17)
HGB BLD-MCNC: 7.3 G/DL — LOW (ref 13–17)
HGB BLD-MCNC: 7.4 G/DL — LOW (ref 13–17)
HGB BLD-MCNC: 7.5 G/DL — LOW (ref 13–17)
HGB BLD-MCNC: 7.6 G/DL — LOW (ref 13–17)
HGB BLD-MCNC: 7.7 G/DL — LOW (ref 13–17)
HGB BLD-MCNC: 7.8 G/DL — LOW (ref 13–17)
HGB BLD-MCNC: 7.9 G/DL — LOW (ref 13–17)
HGB BLD-MCNC: 8 G/DL — LOW (ref 13–17)
HGB BLD-MCNC: 8.1 G/DL — LOW (ref 13–17)
HGB BLD-MCNC: 8.2 G/DL — LOW (ref 13–17)
HGB BLD-MCNC: 8.4 G/DL — LOW (ref 13–17)
HGB BLD-MCNC: 8.4 G/DL — LOW (ref 13–17)
HGB BLD-MCNC: 8.5 G/DL — LOW (ref 13–17)
HGB BLD-MCNC: 8.6 G/DL — LOW (ref 13–17)
HGB BLD-MCNC: 8.8 G/DL — LOW (ref 13–17)
HGB BLD-MCNC: 8.9 G/DL — LOW (ref 13–17)
HGB BLD-MCNC: 9.1 G/DL — LOW (ref 13–17)
HGB BLD-MCNC: 9.4 G/DL — LOW (ref 13–17)
HIV 1+2 AB+HIV1 P24 AG SERPL QL IA: SIGNIFICANT CHANGE UP
HOROWITZ INDEX BLDV+IHG-RTO: 21 — SIGNIFICANT CHANGE UP
HOROWITZ INDEX BLDV+IHG-RTO: 32 — SIGNIFICANT CHANGE UP
HOWELL-JOLLY BOD BLD QL SMEAR: PRESENT — SIGNIFICANT CHANGE UP
HSV1 IGG SER-ACNC: 26.7 INDEX — HIGH
HSV1 IGG SER-ACNC: 26.7 INDEX — HIGH
HSV1 IGG SERPL QL IA: POSITIVE
HSV1 IGG SERPL QL IA: POSITIVE
HSV2 IGG FLD-ACNC: 0.07 INDEX — SIGNIFICANT CHANGE UP
HSV2 IGG FLD-ACNC: 0.07 INDEX — SIGNIFICANT CHANGE UP
HSV2 IGG SERPL QL IA: NEGATIVE — SIGNIFICANT CHANGE UP
HSV2 IGG SERPL QL IA: NEGATIVE — SIGNIFICANT CHANGE UP
HYPOCHROMIA BLD QL: SLIGHT — SIGNIFICANT CHANGE UP
IGA FLD-MCNC: 1048 MG/DL — HIGH (ref 84–499)
IGG FLD-MCNC: 917 MG/DL — SIGNIFICANT CHANGE UP (ref 610–1660)
IGM SERPL-MCNC: 234 MG/DL — SIGNIFICANT CHANGE UP (ref 35–242)
INR BLD: 2.07 RATIO — HIGH (ref 0.85–1.18)
INR BLD: 2.1 RATIO — HIGH (ref 0.85–1.18)
INR BLD: 2.17 RATIO — HIGH (ref 0.85–1.18)
INR BLD: 2.19 RATIO — HIGH (ref 0.85–1.18)
INR BLD: 2.21 RATIO — HIGH (ref 0.85–1.18)
INR BLD: 2.21 RATIO — HIGH (ref 0.85–1.18)
INR BLD: 2.24 RATIO — HIGH (ref 0.85–1.18)
INR BLD: 2.28 RATIO — HIGH (ref 0.85–1.18)
INR BLD: 2.3 RATIO — HIGH (ref 0.85–1.18)
INR BLD: 2.31 RATIO — HIGH (ref 0.85–1.18)
INR BLD: 2.32 RATIO — HIGH (ref 0.85–1.18)
INR BLD: 2.35 RATIO — HIGH (ref 0.85–1.18)
INR BLD: 2.35 RATIO — HIGH (ref 0.85–1.18)
INR BLD: 2.36 RATIO — HIGH (ref 0.85–1.18)
INR BLD: 2.37 RATIO — HIGH (ref 0.85–1.18)
INR BLD: 2.38 RATIO — HIGH (ref 0.85–1.18)
INR BLD: 2.39 RATIO — HIGH (ref 0.85–1.18)
INR BLD: 2.39 RATIO — HIGH (ref 0.85–1.18)
INR BLD: 2.41 RATIO — HIGH (ref 0.85–1.18)
INR BLD: 2.43 RATIO — HIGH (ref 0.85–1.18)
INR BLD: 2.43 RATIO — HIGH (ref 0.85–1.18)
INR BLD: 2.45 RATIO — HIGH (ref 0.85–1.18)
INR BLD: 2.46 RATIO — HIGH (ref 0.85–1.18)
INR BLD: 2.47 RATIO — HIGH (ref 0.85–1.18)
INR BLD: 2.48 RATIO — HIGH (ref 0.85–1.18)
INR BLD: 2.49 RATIO — HIGH (ref 0.85–1.18)
INR BLD: 2.5 RATIO — HIGH (ref 0.85–1.18)
INR BLD: 2.51 RATIO — HIGH (ref 0.85–1.18)
INR BLD: 2.52 RATIO — HIGH (ref 0.85–1.18)
INR BLD: 2.52 RATIO — HIGH (ref 0.85–1.18)
INR BLD: 2.53 RATIO — HIGH (ref 0.85–1.18)
INR BLD: 2.53 RATIO — HIGH (ref 0.85–1.18)
INR BLD: 2.54 RATIO — HIGH (ref 0.85–1.18)
INR BLD: 2.54 RATIO — HIGH (ref 0.85–1.18)
INR BLD: 2.55 RATIO — HIGH (ref 0.85–1.18)
INR BLD: 2.57 RATIO — HIGH (ref 0.85–1.18)
INR BLD: 2.64 RATIO — HIGH (ref 0.85–1.18)
INR BLD: 2.66 RATIO — HIGH (ref 0.85–1.18)
INR BLD: 2.68 RATIO — HIGH (ref 0.85–1.18)
INR BLD: 2.69 RATIO — HIGH (ref 0.85–1.18)
INR BLD: 2.71 RATIO — HIGH (ref 0.85–1.18)
INR BLD: 2.74 RATIO — HIGH (ref 0.85–1.18)
INR BLD: 2.8 RATIO — HIGH (ref 0.85–1.18)
INR BLD: 2.8 RATIO — HIGH (ref 0.85–1.18)
INR BLD: 2.83 RATIO — HIGH (ref 0.85–1.18)
INR BLD: 2.84 RATIO — HIGH (ref 0.85–1.18)
INR BLD: 2.86 RATIO — HIGH (ref 0.85–1.18)
INR BLD: 2.97 RATIO — HIGH (ref 0.85–1.18)
INR BLD: 2.97 RATIO — HIGH (ref 0.85–1.18)
INR BLD: 2.99 RATIO — HIGH (ref 0.85–1.18)
INR BLD: 3 RATIO — HIGH (ref 0.85–1.18)
INR BLD: 3.07 RATIO — HIGH (ref 0.85–1.18)
INR BLD: 3.08 RATIO — HIGH (ref 0.85–1.18)
INR BLD: 3.19 RATIO — HIGH (ref 0.85–1.18)
INR BLD: 3.34 RATIO — HIGH (ref 0.85–1.18)
INR BLD: 3.36 RATIO — HIGH (ref 0.85–1.18)
INR BLD: 3.89 RATIO — HIGH (ref 0.85–1.18)
INR BLD: 3.98 RATIO — HIGH (ref 0.85–1.18)
INR BLD: 4.03 RATIO — HIGH (ref 0.85–1.18)
INR BLD: 4.04 RATIO — HIGH (ref 0.85–1.18)
INR BLD: 4.07 RATIO — HIGH (ref 0.85–1.18)
INR BLD: 4.17 RATIO — HIGH (ref 0.85–1.18)
INR BLD: 4.24 RATIO — HIGH (ref 0.85–1.18)
INR BLD: 4.57 RATIO — HIGH (ref 0.85–1.18)
INTERPRETATION SERPL IFE-IMP: SIGNIFICANT CHANGE UP
IRON SATN MFR SERPL: 51 UG/DL — SIGNIFICANT CHANGE UP (ref 45–165)
IRON SATN MFR SERPL: 52 UG/DL — SIGNIFICANT CHANGE UP (ref 45–165)
IRON SATN MFR SERPL: 66 % — HIGH (ref 16–55)
IRON SATN MFR SERPL: SIGNIFICANT CHANGE UP % (ref 16–55)
LACTATE BLDV-MCNC: 2.3 MMOL/L — HIGH (ref 0.5–2)
LACTATE BLDV-MCNC: 2.5 MMOL/L — HIGH (ref 0.5–2)
LACTATE BLDV-MCNC: 3.2 MMOL/L — HIGH (ref 0.5–2)
LACTATE BLDV-MCNC: 3.2 MMOL/L — HIGH (ref 0.5–2)
LDH SERPL L TO P-CCNC: 245 U/L — HIGH (ref 50–242)
LDH SERPL L TO P-CCNC: 271 U/L — HIGH (ref 50–242)
LDH SERPL L TO P-CCNC: 71 U/L — SIGNIFICANT CHANGE UP
LDH SERPL L TO P-CCNC: 74 U/L — SIGNIFICANT CHANGE UP
LDH SERPL L TO P-CCNC: 81 U/L — SIGNIFICANT CHANGE UP
LIPID PNL WITH DIRECT LDL SERPL: 19 MG/DL — SIGNIFICANT CHANGE UP
LKM AB SER-ACNC: <20.1 UNITS — SIGNIFICANT CHANGE UP (ref 0–20)
LYMPHOCYTES # BLD AUTO: 0.57 K/UL — LOW (ref 1–3.3)
LYMPHOCYTES # BLD AUTO: 4.3 % — LOW (ref 13–44)
LYMPHOCYTES # FLD: 24 % — SIGNIFICANT CHANGE UP
LYMPHOCYTES # FLD: 32 % — SIGNIFICANT CHANGE UP
LYMPHOCYTES # FLD: 79 % — SIGNIFICANT CHANGE UP
M TB IFN-G BLD-IMP: NEGATIVE — SIGNIFICANT CHANGE UP
M TB IFN-G CD4+ BCKGRND COR BLD-ACNC: 0 IU/ML — SIGNIFICANT CHANGE UP
M TB IFN-G CD4+CD8+ BCKGRND COR BLD-ACNC: 0 IU/ML — SIGNIFICANT CHANGE UP
M-SPIKE: SIGNIFICANT CHANGE UP (ref 0–0)
MACROCYTES BLD QL: SIGNIFICANT CHANGE UP
MAGNESIUM SERPL-MCNC: 1.9 MG/DL — SIGNIFICANT CHANGE UP (ref 1.6–2.6)
MAGNESIUM SERPL-MCNC: 2.1 MG/DL — SIGNIFICANT CHANGE UP (ref 1.6–2.6)
MAGNESIUM SERPL-MCNC: 2.1 MG/DL — SIGNIFICANT CHANGE UP (ref 1.6–2.6)
MAGNESIUM SERPL-MCNC: 2.2 MG/DL — SIGNIFICANT CHANGE UP (ref 1.6–2.6)
MAGNESIUM SERPL-MCNC: 2.3 MG/DL — SIGNIFICANT CHANGE UP (ref 1.6–2.6)
MAGNESIUM SERPL-MCNC: 2.4 MG/DL — SIGNIFICANT CHANGE UP (ref 1.6–2.6)
MAGNESIUM SERPL-MCNC: 2.5 MG/DL — SIGNIFICANT CHANGE UP (ref 1.6–2.6)
MAGNESIUM SERPL-MCNC: 2.6 MG/DL — SIGNIFICANT CHANGE UP (ref 1.6–2.6)
MAGNESIUM SERPL-MCNC: 2.8 MG/DL — HIGH (ref 1.6–2.6)
MANUAL SMEAR VERIFICATION: SIGNIFICANT CHANGE UP
MCHC RBC-ENTMCNC: 30.4 PG — SIGNIFICANT CHANGE UP (ref 27–34)
MCHC RBC-ENTMCNC: 30.8 PG — SIGNIFICANT CHANGE UP (ref 27–34)
MCHC RBC-ENTMCNC: 31 PG — SIGNIFICANT CHANGE UP (ref 27–34)
MCHC RBC-ENTMCNC: 31.1 PG — SIGNIFICANT CHANGE UP (ref 27–34)
MCHC RBC-ENTMCNC: 31.2 PG — SIGNIFICANT CHANGE UP (ref 27–34)
MCHC RBC-ENTMCNC: 31.2 PG — SIGNIFICANT CHANGE UP (ref 27–34)
MCHC RBC-ENTMCNC: 31.3 PG — SIGNIFICANT CHANGE UP (ref 27–34)
MCHC RBC-ENTMCNC: 31.4 PG — SIGNIFICANT CHANGE UP (ref 27–34)
MCHC RBC-ENTMCNC: 31.5 PG — SIGNIFICANT CHANGE UP (ref 27–34)
MCHC RBC-ENTMCNC: 31.5 PG — SIGNIFICANT CHANGE UP (ref 27–34)
MCHC RBC-ENTMCNC: 31.7 GM/DL — LOW (ref 32–36)
MCHC RBC-ENTMCNC: 31.7 PG — SIGNIFICANT CHANGE UP (ref 27–34)
MCHC RBC-ENTMCNC: 31.7 PG — SIGNIFICANT CHANGE UP (ref 27–34)
MCHC RBC-ENTMCNC: 31.8 PG — SIGNIFICANT CHANGE UP (ref 27–34)
MCHC RBC-ENTMCNC: 31.9 PG — SIGNIFICANT CHANGE UP (ref 27–34)
MCHC RBC-ENTMCNC: 32 PG — SIGNIFICANT CHANGE UP (ref 27–34)
MCHC RBC-ENTMCNC: 32.1 GM/DL — SIGNIFICANT CHANGE UP (ref 32–36)
MCHC RBC-ENTMCNC: 32.1 PG — SIGNIFICANT CHANGE UP (ref 27–34)
MCHC RBC-ENTMCNC: 32.2 PG — SIGNIFICANT CHANGE UP (ref 27–34)
MCHC RBC-ENTMCNC: 32.2 PG — SIGNIFICANT CHANGE UP (ref 27–34)
MCHC RBC-ENTMCNC: 32.3 GM/DL — SIGNIFICANT CHANGE UP (ref 32–36)
MCHC RBC-ENTMCNC: 32.3 PG — SIGNIFICANT CHANGE UP (ref 27–34)
MCHC RBC-ENTMCNC: 32.4 PG — SIGNIFICANT CHANGE UP (ref 27–34)
MCHC RBC-ENTMCNC: 32.5 PG — SIGNIFICANT CHANGE UP (ref 27–34)
MCHC RBC-ENTMCNC: 32.6 GM/DL — SIGNIFICANT CHANGE UP (ref 32–36)
MCHC RBC-ENTMCNC: 32.6 PG — SIGNIFICANT CHANGE UP (ref 27–34)
MCHC RBC-ENTMCNC: 32.7 GM/DL — SIGNIFICANT CHANGE UP (ref 32–36)
MCHC RBC-ENTMCNC: 32.7 PG — SIGNIFICANT CHANGE UP (ref 27–34)
MCHC RBC-ENTMCNC: 32.7 PG — SIGNIFICANT CHANGE UP (ref 27–34)
MCHC RBC-ENTMCNC: 32.8 PG — SIGNIFICANT CHANGE UP (ref 27–34)
MCHC RBC-ENTMCNC: 32.9 GM/DL — SIGNIFICANT CHANGE UP (ref 32–36)
MCHC RBC-ENTMCNC: 32.9 PG — SIGNIFICANT CHANGE UP (ref 27–34)
MCHC RBC-ENTMCNC: 32.9 PG — SIGNIFICANT CHANGE UP (ref 27–34)
MCHC RBC-ENTMCNC: 33 GM/DL — SIGNIFICANT CHANGE UP (ref 32–36)
MCHC RBC-ENTMCNC: 33 PG — SIGNIFICANT CHANGE UP (ref 27–34)
MCHC RBC-ENTMCNC: 33.1 PG — SIGNIFICANT CHANGE UP (ref 27–34)
MCHC RBC-ENTMCNC: 33.2 PG — SIGNIFICANT CHANGE UP (ref 27–34)
MCHC RBC-ENTMCNC: 33.2 PG — SIGNIFICANT CHANGE UP (ref 27–34)
MCHC RBC-ENTMCNC: 33.3 GM/DL — SIGNIFICANT CHANGE UP (ref 32–36)
MCHC RBC-ENTMCNC: 33.3 GM/DL — SIGNIFICANT CHANGE UP (ref 32–36)
MCHC RBC-ENTMCNC: 33.3 PG — SIGNIFICANT CHANGE UP (ref 27–34)
MCHC RBC-ENTMCNC: 33.5 GM/DL — SIGNIFICANT CHANGE UP (ref 32–36)
MCHC RBC-ENTMCNC: 33.5 PG — SIGNIFICANT CHANGE UP (ref 27–34)
MCHC RBC-ENTMCNC: 33.5 PG — SIGNIFICANT CHANGE UP (ref 27–34)
MCHC RBC-ENTMCNC: 33.6 GM/DL — SIGNIFICANT CHANGE UP (ref 32–36)
MCHC RBC-ENTMCNC: 33.6 PG — SIGNIFICANT CHANGE UP (ref 27–34)
MCHC RBC-ENTMCNC: 33.8 GM/DL — SIGNIFICANT CHANGE UP (ref 32–36)
MCHC RBC-ENTMCNC: 33.8 PG — SIGNIFICANT CHANGE UP (ref 27–34)
MCHC RBC-ENTMCNC: 33.9 GM/DL — SIGNIFICANT CHANGE UP (ref 32–36)
MCHC RBC-ENTMCNC: 33.9 PG — SIGNIFICANT CHANGE UP (ref 27–34)
MCHC RBC-ENTMCNC: 34 GM/DL — SIGNIFICANT CHANGE UP (ref 32–36)
MCHC RBC-ENTMCNC: 34.1 GM/DL — SIGNIFICANT CHANGE UP (ref 32–36)
MCHC RBC-ENTMCNC: 34.2 GM/DL — SIGNIFICANT CHANGE UP (ref 32–36)
MCHC RBC-ENTMCNC: 34.3 GM/DL — SIGNIFICANT CHANGE UP (ref 32–36)
MCHC RBC-ENTMCNC: 34.4 GM/DL — SIGNIFICANT CHANGE UP (ref 32–36)
MCHC RBC-ENTMCNC: 34.5 GM/DL — SIGNIFICANT CHANGE UP (ref 32–36)
MCHC RBC-ENTMCNC: 34.6 GM/DL — SIGNIFICANT CHANGE UP (ref 32–36)
MCHC RBC-ENTMCNC: 34.7 GM/DL — SIGNIFICANT CHANGE UP (ref 32–36)
MCHC RBC-ENTMCNC: 34.7 GM/DL — SIGNIFICANT CHANGE UP (ref 32–36)
MCHC RBC-ENTMCNC: 34.8 GM/DL — SIGNIFICANT CHANGE UP (ref 32–36)
MCHC RBC-ENTMCNC: 34.9 GM/DL — SIGNIFICANT CHANGE UP (ref 32–36)
MCHC RBC-ENTMCNC: 35 GM/DL — SIGNIFICANT CHANGE UP (ref 32–36)
MCHC RBC-ENTMCNC: 35.1 GM/DL — SIGNIFICANT CHANGE UP (ref 32–36)
MCHC RBC-ENTMCNC: 35.3 GM/DL — SIGNIFICANT CHANGE UP (ref 32–36)
MCHC RBC-ENTMCNC: 35.4 GM/DL — SIGNIFICANT CHANGE UP (ref 32–36)
MCHC RBC-ENTMCNC: 35.6 GM/DL — SIGNIFICANT CHANGE UP (ref 32–36)
MCHC RBC-ENTMCNC: 35.7 GM/DL — SIGNIFICANT CHANGE UP (ref 32–36)
MCHC RBC-ENTMCNC: 35.7 GM/DL — SIGNIFICANT CHANGE UP (ref 32–36)
MCHC RBC-ENTMCNC: 35.8 GM/DL — SIGNIFICANT CHANGE UP (ref 32–36)
MCHC RBC-ENTMCNC: 36.1 GM/DL — HIGH (ref 32–36)
MCHC RBC-ENTMCNC: 36.2 GM/DL — HIGH (ref 32–36)
MCV RBC AUTO: 100 FL — SIGNIFICANT CHANGE UP (ref 80–100)
MCV RBC AUTO: 100 FL — SIGNIFICANT CHANGE UP (ref 80–100)
MCV RBC AUTO: 100.4 FL — HIGH (ref 80–100)
MCV RBC AUTO: 101.5 FL — HIGH (ref 80–100)
MCV RBC AUTO: 88.5 FL — SIGNIFICANT CHANGE UP (ref 80–100)
MCV RBC AUTO: 89.1 FL — SIGNIFICANT CHANGE UP (ref 80–100)
MCV RBC AUTO: 89.3 FL — SIGNIFICANT CHANGE UP (ref 80–100)
MCV RBC AUTO: 89.5 FL — SIGNIFICANT CHANGE UP (ref 80–100)
MCV RBC AUTO: 90.5 FL — SIGNIFICANT CHANGE UP (ref 80–100)
MCV RBC AUTO: 90.8 FL — SIGNIFICANT CHANGE UP (ref 80–100)
MCV RBC AUTO: 90.9 FL — SIGNIFICANT CHANGE UP (ref 80–100)
MCV RBC AUTO: 90.9 FL — SIGNIFICANT CHANGE UP (ref 80–100)
MCV RBC AUTO: 91 FL — SIGNIFICANT CHANGE UP (ref 80–100)
MCV RBC AUTO: 91.1 FL — SIGNIFICANT CHANGE UP (ref 80–100)
MCV RBC AUTO: 91.2 FL — SIGNIFICANT CHANGE UP (ref 80–100)
MCV RBC AUTO: 91.3 FL — SIGNIFICANT CHANGE UP (ref 80–100)
MCV RBC AUTO: 91.4 FL — SIGNIFICANT CHANGE UP (ref 80–100)
MCV RBC AUTO: 91.4 FL — SIGNIFICANT CHANGE UP (ref 80–100)
MCV RBC AUTO: 91.7 FL — SIGNIFICANT CHANGE UP (ref 80–100)
MCV RBC AUTO: 91.8 FL — SIGNIFICANT CHANGE UP (ref 80–100)
MCV RBC AUTO: 91.9 FL — SIGNIFICANT CHANGE UP (ref 80–100)
MCV RBC AUTO: 92.2 FL — SIGNIFICANT CHANGE UP (ref 80–100)
MCV RBC AUTO: 92.3 FL — SIGNIFICANT CHANGE UP (ref 80–100)
MCV RBC AUTO: 92.4 FL — SIGNIFICANT CHANGE UP (ref 80–100)
MCV RBC AUTO: 92.6 FL — SIGNIFICANT CHANGE UP (ref 80–100)
MCV RBC AUTO: 92.6 FL — SIGNIFICANT CHANGE UP (ref 80–100)
MCV RBC AUTO: 92.7 FL — SIGNIFICANT CHANGE UP (ref 80–100)
MCV RBC AUTO: 92.8 FL — SIGNIFICANT CHANGE UP (ref 80–100)
MCV RBC AUTO: 92.9 FL — SIGNIFICANT CHANGE UP (ref 80–100)
MCV RBC AUTO: 92.9 FL — SIGNIFICANT CHANGE UP (ref 80–100)
MCV RBC AUTO: 93.2 FL — SIGNIFICANT CHANGE UP (ref 80–100)
MCV RBC AUTO: 93.3 FL — SIGNIFICANT CHANGE UP (ref 80–100)
MCV RBC AUTO: 93.3 FL — SIGNIFICANT CHANGE UP (ref 80–100)
MCV RBC AUTO: 93.4 FL — SIGNIFICANT CHANGE UP (ref 80–100)
MCV RBC AUTO: 93.5 FL — SIGNIFICANT CHANGE UP (ref 80–100)
MCV RBC AUTO: 93.5 FL — SIGNIFICANT CHANGE UP (ref 80–100)
MCV RBC AUTO: 93.6 FL — SIGNIFICANT CHANGE UP (ref 80–100)
MCV RBC AUTO: 93.7 FL — SIGNIFICANT CHANGE UP (ref 80–100)
MCV RBC AUTO: 93.7 FL — SIGNIFICANT CHANGE UP (ref 80–100)
MCV RBC AUTO: 94 FL — SIGNIFICANT CHANGE UP (ref 80–100)
MCV RBC AUTO: 94.1 FL — SIGNIFICANT CHANGE UP (ref 80–100)
MCV RBC AUTO: 94.1 FL — SIGNIFICANT CHANGE UP (ref 80–100)
MCV RBC AUTO: 94.3 FL — SIGNIFICANT CHANGE UP (ref 80–100)
MCV RBC AUTO: 94.5 FL — SIGNIFICANT CHANGE UP (ref 80–100)
MCV RBC AUTO: 94.6 FL — SIGNIFICANT CHANGE UP (ref 80–100)
MCV RBC AUTO: 94.7 FL — SIGNIFICANT CHANGE UP (ref 80–100)
MCV RBC AUTO: 94.7 FL — SIGNIFICANT CHANGE UP (ref 80–100)
MCV RBC AUTO: 95.1 FL — SIGNIFICANT CHANGE UP (ref 80–100)
MCV RBC AUTO: 95.5 FL — SIGNIFICANT CHANGE UP (ref 80–100)
MCV RBC AUTO: 95.7 FL — SIGNIFICANT CHANGE UP (ref 80–100)
MCV RBC AUTO: 95.7 FL — SIGNIFICANT CHANGE UP (ref 80–100)
MCV RBC AUTO: 96.6 FL — SIGNIFICANT CHANGE UP (ref 80–100)
MCV RBC AUTO: 96.8 FL — SIGNIFICANT CHANGE UP (ref 80–100)
MCV RBC AUTO: 97.1 FL — SIGNIFICANT CHANGE UP (ref 80–100)
MCV RBC AUTO: 97.4 FL — SIGNIFICANT CHANGE UP (ref 80–100)
MCV RBC AUTO: 97.5 FL — SIGNIFICANT CHANGE UP (ref 80–100)
MCV RBC AUTO: 97.9 FL — SIGNIFICANT CHANGE UP (ref 80–100)
MCV RBC AUTO: 98.3 FL — SIGNIFICANT CHANGE UP (ref 80–100)
MCV RBC AUTO: 98.3 FL — SIGNIFICANT CHANGE UP (ref 80–100)
MCV RBC AUTO: 98.7 FL — SIGNIFICANT CHANGE UP (ref 80–100)
MCV RBC AUTO: 99.5 FL — SIGNIFICANT CHANGE UP (ref 80–100)
MELD SCORE WITH DIALYSIS: >40 POINTS — SIGNIFICANT CHANGE UP
MELD SCORE WITHOUT DIALYSIS: >40 POINTS — SIGNIFICANT CHANGE UP
MESOTHL CELL # FLD: 1 % — SIGNIFICANT CHANGE UP
MESOTHL CELL # FLD: 2 % — SIGNIFICANT CHANGE UP
METHOD TYPE: SIGNIFICANT CHANGE UP
MEV IGG SER-ACNC: >300 AU/ML — SIGNIFICANT CHANGE UP
MEV IGG+IGM SER-IMP: POSITIVE — SIGNIFICANT CHANGE UP
MITOCHONDRIA AB SER-ACNC: SIGNIFICANT CHANGE UP
MONOCYTES # BLD AUTO: 0.79 K/UL — SIGNIFICANT CHANGE UP (ref 0–0.9)
MONOCYTES NFR BLD AUTO: 6 % — SIGNIFICANT CHANGE UP (ref 2–14)
MONOS+MACROS # FLD: 29 % — SIGNIFICANT CHANGE UP
MONOS+MACROS # FLD: 38 % — SIGNIFICANT CHANGE UP
MONOS+MACROS # FLD: 5 % — SIGNIFICANT CHANGE UP
MRSA PCR RESULT.: SIGNIFICANT CHANGE UP
MUV AB SER-ACNC: POSITIVE — SIGNIFICANT CHANGE UP
MUV IGG FLD-ACNC: 94 AU/ML — SIGNIFICANT CHANGE UP
NEUTROPHILS # BLD AUTO: 10.92 K/UL — HIGH (ref 1.8–7.4)
NEUTROPHILS NFR BLD AUTO: 79.3 % — HIGH (ref 43–77)
NEUTROPHILS-BODY FLUID: 16 % — SIGNIFICANT CHANGE UP
NEUTROPHILS-BODY FLUID: 36 % — SIGNIFICANT CHANGE UP
NEUTROPHILS-BODY FLUID: 38 % — SIGNIFICANT CHANGE UP
NEUTS BAND # BLD: 3.5 % — SIGNIFICANT CHANGE UP (ref 0–8)
NON HDL CHOLESTEROL: 36 MG/DL — SIGNIFICANT CHANGE UP
NRBC # BLD: 0 /100 WBCS — SIGNIFICANT CHANGE UP (ref 0–0)
ORGANISM # SPEC MICROSCOPIC CNT: ABNORMAL
PCO2 BLDV: 35 MMHG — LOW (ref 42–55)
PCO2 BLDV: 36 MMHG — LOW (ref 42–55)
PCO2 BLDV: 37 MMHG — LOW (ref 42–55)
PCO2 BLDV: 39 MMHG — LOW (ref 42–55)
PETH 16:0/18:1: NEGATIVE NG/ML — SIGNIFICANT CHANGE UP
PETH 16:0/18:2: NEGATIVE NG/ML — SIGNIFICANT CHANGE UP
PETH COMMENTS: SIGNIFICANT CHANGE UP
PH BLDV: 7.37 — SIGNIFICANT CHANGE UP (ref 7.32–7.43)
PH BLDV: 7.4 — SIGNIFICANT CHANGE UP (ref 7.32–7.43)
PH BLDV: 7.43 — SIGNIFICANT CHANGE UP (ref 7.32–7.43)
PH BLDV: 7.45 — HIGH (ref 7.32–7.43)
PHOSPHATE SERPL-MCNC: 2.1 MG/DL — LOW (ref 2.5–4.5)
PHOSPHATE SERPL-MCNC: 2.1 MG/DL — LOW (ref 2.5–4.5)
PHOSPHATE SERPL-MCNC: 2.2 MG/DL — LOW (ref 2.5–4.5)
PHOSPHATE SERPL-MCNC: 2.3 MG/DL — LOW (ref 2.5–4.5)
PHOSPHATE SERPL-MCNC: 2.4 MG/DL — LOW (ref 2.5–4.5)
PHOSPHATE SERPL-MCNC: 2.4 MG/DL — LOW (ref 2.5–4.5)
PHOSPHATE SERPL-MCNC: 2.5 MG/DL — SIGNIFICANT CHANGE UP (ref 2.5–4.5)
PHOSPHATE SERPL-MCNC: 2.6 MG/DL — SIGNIFICANT CHANGE UP (ref 2.5–4.5)
PHOSPHATE SERPL-MCNC: 2.7 MG/DL — SIGNIFICANT CHANGE UP (ref 2.5–4.5)
PHOSPHATE SERPL-MCNC: 2.8 MG/DL — SIGNIFICANT CHANGE UP (ref 2.5–4.5)
PHOSPHATE SERPL-MCNC: 2.9 MG/DL — SIGNIFICANT CHANGE UP (ref 2.5–4.5)
PHOSPHATE SERPL-MCNC: 3 MG/DL — SIGNIFICANT CHANGE UP (ref 2.5–4.5)
PHOSPHATE SERPL-MCNC: 3.1 MG/DL — SIGNIFICANT CHANGE UP (ref 2.5–4.5)
PHOSPHATE SERPL-MCNC: 3.2 MG/DL — SIGNIFICANT CHANGE UP (ref 2.5–4.5)
PHOSPHATE SERPL-MCNC: 3.2 MG/DL — SIGNIFICANT CHANGE UP (ref 2.5–4.5)
PHOSPHATE SERPL-MCNC: 3.3 MG/DL — SIGNIFICANT CHANGE UP (ref 2.5–4.5)
PHOSPHATE SERPL-MCNC: 3.4 MG/DL — SIGNIFICANT CHANGE UP (ref 2.5–4.5)
PHOSPHATE SERPL-MCNC: 3.5 MG/DL — SIGNIFICANT CHANGE UP (ref 2.5–4.5)
PHOSPHATE SERPL-MCNC: 3.6 MG/DL — SIGNIFICANT CHANGE UP (ref 2.5–4.5)
PHOSPHATE SERPL-MCNC: 3.7 MG/DL — SIGNIFICANT CHANGE UP (ref 2.5–4.5)
PHOSPHATE SERPL-MCNC: 3.8 MG/DL — SIGNIFICANT CHANGE UP (ref 2.5–4.5)
PHOSPHATE SERPL-MCNC: 3.9 MG/DL — SIGNIFICANT CHANGE UP (ref 2.5–4.5)
PHOSPHATE SERPL-MCNC: 4 MG/DL — SIGNIFICANT CHANGE UP (ref 2.5–4.5)
PHOSPHATE SERPL-MCNC: 4.1 MG/DL — SIGNIFICANT CHANGE UP (ref 2.5–4.5)
PHOSPHATE SERPL-MCNC: 4.1 MG/DL — SIGNIFICANT CHANGE UP (ref 2.5–4.5)
PHOSPHATE SERPL-MCNC: 4.2 MG/DL — SIGNIFICANT CHANGE UP (ref 2.5–4.5)
PHOSPHATE SERPL-MCNC: 4.2 MG/DL — SIGNIFICANT CHANGE UP (ref 2.5–4.5)
PHOSPHATE SERPL-MCNC: 4.4 MG/DL — SIGNIFICANT CHANGE UP (ref 2.5–4.5)
PHOSPHATE SERPL-MCNC: 4.6 MG/DL — HIGH (ref 2.5–4.5)
PHOSPHATE SERPL-MCNC: 4.8 MG/DL — HIGH (ref 2.5–4.5)
PHOSPHATE SERPL-MCNC: 4.8 MG/DL — HIGH (ref 2.5–4.5)
PLAT MORPH BLD: NORMAL — SIGNIFICANT CHANGE UP
PLATELET # BLD AUTO: 100 K/UL — LOW (ref 150–400)
PLATELET # BLD AUTO: 100 K/UL — LOW (ref 150–400)
PLATELET # BLD AUTO: 101 K/UL — LOW (ref 150–400)
PLATELET # BLD AUTO: 102 K/UL — LOW (ref 150–400)
PLATELET # BLD AUTO: 102 K/UL — LOW (ref 150–400)
PLATELET # BLD AUTO: 103 K/UL — LOW (ref 150–400)
PLATELET # BLD AUTO: 104 K/UL — LOW (ref 150–400)
PLATELET # BLD AUTO: 104 K/UL — LOW (ref 150–400)
PLATELET # BLD AUTO: 105 K/UL — LOW (ref 150–400)
PLATELET # BLD AUTO: 106 K/UL — LOW (ref 150–400)
PLATELET # BLD AUTO: 107 K/UL — LOW (ref 150–400)
PLATELET # BLD AUTO: 108 K/UL — LOW (ref 150–400)
PLATELET # BLD AUTO: 114 K/UL — LOW (ref 150–400)
PLATELET # BLD AUTO: 118 K/UL — LOW (ref 150–400)
PLATELET # BLD AUTO: 119 K/UL — LOW (ref 150–400)
PLATELET # BLD AUTO: 119 K/UL — LOW (ref 150–400)
PLATELET # BLD AUTO: 120 K/UL — LOW (ref 150–400)
PLATELET # BLD AUTO: 121 K/UL — LOW (ref 150–400)
PLATELET # BLD AUTO: 125 K/UL — LOW (ref 150–400)
PLATELET # BLD AUTO: 126 K/UL — LOW (ref 150–400)
PLATELET # BLD AUTO: 128 K/UL — LOW (ref 150–400)
PLATELET # BLD AUTO: 128 K/UL — LOW (ref 150–400)
PLATELET # BLD AUTO: 130 K/UL — LOW (ref 150–400)
PLATELET # BLD AUTO: 139 K/UL — LOW (ref 150–400)
PLATELET # BLD AUTO: 141 K/UL — LOW (ref 150–400)
PLATELET # BLD AUTO: 143 K/UL — LOW (ref 150–400)
PLATELET # BLD AUTO: 144 K/UL — LOW (ref 150–400)
PLATELET # BLD AUTO: 144 K/UL — LOW (ref 150–400)
PLATELET # BLD AUTO: 145 K/UL — LOW (ref 150–400)
PLATELET # BLD AUTO: 149 K/UL — LOW (ref 150–400)
PLATELET # BLD AUTO: 152 K/UL — SIGNIFICANT CHANGE UP (ref 150–400)
PLATELET # BLD AUTO: 155 K/UL — SIGNIFICANT CHANGE UP (ref 150–400)
PLATELET # BLD AUTO: 161 K/UL — SIGNIFICANT CHANGE UP (ref 150–400)
PLATELET # BLD AUTO: 58 K/UL — LOW (ref 150–400)
PLATELET # BLD AUTO: 59 K/UL — LOW (ref 150–400)
PLATELET # BLD AUTO: 60 K/UL — LOW (ref 150–400)
PLATELET # BLD AUTO: 60 K/UL — LOW (ref 150–400)
PLATELET # BLD AUTO: 62 K/UL — LOW (ref 150–400)
PLATELET # BLD AUTO: 62 K/UL — LOW (ref 150–400)
PLATELET # BLD AUTO: 63 K/UL — LOW (ref 150–400)
PLATELET # BLD AUTO: 63 K/UL — LOW (ref 150–400)
PLATELET # BLD AUTO: 64 K/UL — LOW (ref 150–400)
PLATELET # BLD AUTO: 69 K/UL — LOW (ref 150–400)
PLATELET # BLD AUTO: 72 K/UL — LOW (ref 150–400)
PLATELET # BLD AUTO: 73 K/UL — LOW (ref 150–400)
PLATELET # BLD AUTO: 74 K/UL — LOW (ref 150–400)
PLATELET # BLD AUTO: 74 K/UL — LOW (ref 150–400)
PLATELET # BLD AUTO: 76 K/UL — LOW (ref 150–400)
PLATELET # BLD AUTO: 76 K/UL — LOW (ref 150–400)
PLATELET # BLD AUTO: 77 K/UL — LOW (ref 150–400)
PLATELET # BLD AUTO: 78 K/UL — LOW (ref 150–400)
PLATELET # BLD AUTO: 80 K/UL — LOW (ref 150–400)
PLATELET # BLD AUTO: 82 K/UL — LOW (ref 150–400)
PLATELET # BLD AUTO: 83 K/UL — LOW (ref 150–400)
PLATELET # BLD AUTO: 83 K/UL — LOW (ref 150–400)
PLATELET # BLD AUTO: 84 K/UL — LOW (ref 150–400)
PLATELET # BLD AUTO: 84 K/UL — LOW (ref 150–400)
PLATELET # BLD AUTO: 86 K/UL — LOW (ref 150–400)
PLATELET # BLD AUTO: 88 K/UL — LOW (ref 150–400)
PLATELET # BLD AUTO: 88 K/UL — LOW (ref 150–400)
PLATELET # BLD AUTO: 91 K/UL — LOW (ref 150–400)
PLATELET # BLD AUTO: 92 K/UL — LOW (ref 150–400)
PLATELET # BLD AUTO: 94 K/UL — LOW (ref 150–400)
PLATELET # BLD AUTO: 95 K/UL — LOW (ref 150–400)
PLATELET # BLD AUTO: 96 K/UL — LOW (ref 150–400)
PLATELET # BLD AUTO: 97 K/UL — LOW (ref 150–400)
PLATELET # BLD AUTO: 98 K/UL — LOW (ref 150–400)
PLATELET # BLD AUTO: 98 K/UL — LOW (ref 150–400)
PLATELET # BLD AUTO: 99 K/UL — LOW (ref 150–400)
PLATELET # BLD AUTO: 99 K/UL — LOW (ref 150–400)
PO2 BLDV: 41 MMHG — SIGNIFICANT CHANGE UP (ref 25–45)
PO2 BLDV: 44 MMHG — SIGNIFICANT CHANGE UP (ref 25–45)
PO2 BLDV: 47 MMHG — HIGH (ref 25–45)
PO2 BLDV: 51 MMHG — HIGH (ref 25–45)
POIKILOCYTOSIS BLD QL AUTO: SIGNIFICANT CHANGE UP
POTASSIUM BLDV-SCNC: 3.8 MMOL/L — SIGNIFICANT CHANGE UP (ref 3.5–5.1)
POTASSIUM BLDV-SCNC: 3.9 MMOL/L — SIGNIFICANT CHANGE UP (ref 3.5–5.1)
POTASSIUM SERPL-MCNC: 3.8 MMOL/L — SIGNIFICANT CHANGE UP (ref 3.5–5.3)
POTASSIUM SERPL-MCNC: 3.9 MMOL/L — SIGNIFICANT CHANGE UP (ref 3.5–5.3)
POTASSIUM SERPL-MCNC: 4 MMOL/L — SIGNIFICANT CHANGE UP (ref 3.5–5.3)
POTASSIUM SERPL-MCNC: 4.1 MMOL/L — SIGNIFICANT CHANGE UP (ref 3.5–5.3)
POTASSIUM SERPL-MCNC: 4.2 MMOL/L — SIGNIFICANT CHANGE UP (ref 3.5–5.3)
POTASSIUM SERPL-MCNC: 4.3 MMOL/L — SIGNIFICANT CHANGE UP (ref 3.5–5.3)
POTASSIUM SERPL-MCNC: 4.4 MMOL/L — SIGNIFICANT CHANGE UP (ref 3.5–5.3)
POTASSIUM SERPL-MCNC: 4.5 MMOL/L — SIGNIFICANT CHANGE UP (ref 3.5–5.3)
POTASSIUM SERPL-MCNC: 4.6 MMOL/L — SIGNIFICANT CHANGE UP (ref 3.5–5.3)
POTASSIUM SERPL-MCNC: 4.7 MMOL/L — SIGNIFICANT CHANGE UP (ref 3.5–5.3)
POTASSIUM SERPL-MCNC: 4.8 MMOL/L — SIGNIFICANT CHANGE UP (ref 3.5–5.3)
POTASSIUM SERPL-MCNC: 4.9 MMOL/L — SIGNIFICANT CHANGE UP (ref 3.5–5.3)
POTASSIUM SERPL-MCNC: 4.9 MMOL/L — SIGNIFICANT CHANGE UP (ref 3.5–5.3)
POTASSIUM SERPL-MCNC: 5.4 MMOL/L — HIGH (ref 3.5–5.3)
POTASSIUM SERPL-SCNC: 3.8 MMOL/L — SIGNIFICANT CHANGE UP (ref 3.5–5.3)
POTASSIUM SERPL-SCNC: 3.9 MMOL/L — SIGNIFICANT CHANGE UP (ref 3.5–5.3)
POTASSIUM SERPL-SCNC: 4 MMOL/L — SIGNIFICANT CHANGE UP (ref 3.5–5.3)
POTASSIUM SERPL-SCNC: 4.1 MMOL/L — SIGNIFICANT CHANGE UP (ref 3.5–5.3)
POTASSIUM SERPL-SCNC: 4.2 MMOL/L — SIGNIFICANT CHANGE UP (ref 3.5–5.3)
POTASSIUM SERPL-SCNC: 4.3 MMOL/L — SIGNIFICANT CHANGE UP (ref 3.5–5.3)
POTASSIUM SERPL-SCNC: 4.4 MMOL/L — SIGNIFICANT CHANGE UP (ref 3.5–5.3)
POTASSIUM SERPL-SCNC: 4.5 MMOL/L — SIGNIFICANT CHANGE UP (ref 3.5–5.3)
POTASSIUM SERPL-SCNC: 4.6 MMOL/L — SIGNIFICANT CHANGE UP (ref 3.5–5.3)
POTASSIUM SERPL-SCNC: 4.7 MMOL/L — SIGNIFICANT CHANGE UP (ref 3.5–5.3)
POTASSIUM SERPL-SCNC: 4.8 MMOL/L — SIGNIFICANT CHANGE UP (ref 3.5–5.3)
POTASSIUM SERPL-SCNC: 4.9 MMOL/L — SIGNIFICANT CHANGE UP (ref 3.5–5.3)
POTASSIUM SERPL-SCNC: 4.9 MMOL/L — SIGNIFICANT CHANGE UP (ref 3.5–5.3)
POTASSIUM SERPL-SCNC: 5.4 MMOL/L — HIGH (ref 3.5–5.3)
PROCALCITONIN SERPL-MCNC: 0.28 NG/ML — HIGH (ref 0.02–0.1)
PROCALCITONIN SERPL-MCNC: 0.45 NG/ML — HIGH (ref 0.02–0.1)
PROCALCITONIN SERPL-MCNC: 0.88 NG/ML — HIGH (ref 0.02–0.1)
PROT FLD-MCNC: 1.3 G/DL — SIGNIFICANT CHANGE UP
PROT FLD-MCNC: 1.5 G/DL — SIGNIFICANT CHANGE UP
PROT FLD-MCNC: 2.3 G/DL — SIGNIFICANT CHANGE UP
PROT PATTERN SERPL ELPH-IMP: SIGNIFICANT CHANGE UP
PROT SERPL-MCNC: 5.5 G/DL — LOW (ref 6–8.3)
PROT SERPL-MCNC: 5.6 G/DL — LOW (ref 6–8.3)
PROT SERPL-MCNC: 5.7 G/DL — LOW (ref 6–8.3)
PROT SERPL-MCNC: 5.8 G/DL — LOW (ref 6–8.3)
PROT SERPL-MCNC: 5.9 G/DL — LOW (ref 6–8.3)
PROT SERPL-MCNC: 6 G/DL — SIGNIFICANT CHANGE UP (ref 6–8.3)
PROT SERPL-MCNC: 6.1 G/DL — SIGNIFICANT CHANGE UP (ref 6–8.3)
PROT SERPL-MCNC: 6.2 G/DL — SIGNIFICANT CHANGE UP (ref 6–8.3)
PROT SERPL-MCNC: 6.4 G/DL — SIGNIFICANT CHANGE UP (ref 6–8.3)
PROT SERPL-MCNC: 6.8 G/DL — SIGNIFICANT CHANGE UP (ref 6–8.3)
PROTHROM AB SERPL-ACNC: 22.3 SEC — HIGH (ref 9.5–13)
PROTHROM AB SERPL-ACNC: 22.5 SEC — HIGH (ref 9.5–13)
PROTHROM AB SERPL-ACNC: 22.6 SEC — HIGH (ref 9.5–13)
PROTHROM AB SERPL-ACNC: 22.7 SEC — HIGH (ref 9.5–13)
PROTHROM AB SERPL-ACNC: 23 SEC — HIGH (ref 9.5–13)
PROTHROM AB SERPL-ACNC: 23.3 SEC — HIGH (ref 9.5–13)
PROTHROM AB SERPL-ACNC: 23.4 SEC — HIGH (ref 9.5–13)
PROTHROM AB SERPL-ACNC: 23.6 SEC — HIGH (ref 9.5–13)
PROTHROM AB SERPL-ACNC: 23.8 SEC — HIGH (ref 9.5–13)
PROTHROM AB SERPL-ACNC: 23.8 SEC — HIGH (ref 9.5–13)
PROTHROM AB SERPL-ACNC: 24.1 SEC — HIGH (ref 9.5–13)
PROTHROM AB SERPL-ACNC: 24.3 SEC — HIGH (ref 9.5–13)
PROTHROM AB SERPL-ACNC: 24.4 SEC — HIGH (ref 9.5–13)
PROTHROM AB SERPL-ACNC: 24.7 SEC — HIGH (ref 9.5–13)
PROTHROM AB SERPL-ACNC: 24.8 SEC — HIGH (ref 9.5–13)
PROTHROM AB SERPL-ACNC: 25.2 SEC — HIGH (ref 9.5–13)
PROTHROM AB SERPL-ACNC: 25.3 SEC — HIGH (ref 9.5–13)
PROTHROM AB SERPL-ACNC: 25.4 SEC — HIGH (ref 9.5–13)
PROTHROM AB SERPL-ACNC: 25.5 SEC — HIGH (ref 9.5–13)
PROTHROM AB SERPL-ACNC: 25.6 SEC — HIGH (ref 9.5–13)
PROTHROM AB SERPL-ACNC: 25.8 SEC — HIGH (ref 9.5–13)
PROTHROM AB SERPL-ACNC: 26 SEC — HIGH (ref 9.5–13)
PROTHROM AB SERPL-ACNC: 26.3 SEC — HIGH (ref 9.5–13)
PROTHROM AB SERPL-ACNC: 26.3 SEC — HIGH (ref 9.5–13)
PROTHROM AB SERPL-ACNC: 26.4 SEC — HIGH (ref 9.5–13)
PROTHROM AB SERPL-ACNC: 26.5 SEC — HIGH (ref 9.5–13)
PROTHROM AB SERPL-ACNC: 26.8 SEC — HIGH (ref 9.5–13)
PROTHROM AB SERPL-ACNC: 26.8 SEC — HIGH (ref 9.5–13)
PROTHROM AB SERPL-ACNC: 26.9 SEC — HIGH (ref 9.5–13)
PROTHROM AB SERPL-ACNC: 26.9 SEC — HIGH (ref 9.5–13)
PROTHROM AB SERPL-ACNC: 27 SEC — HIGH (ref 9.5–13)
PROTHROM AB SERPL-ACNC: 27.1 SEC — HIGH (ref 9.5–13)
PROTHROM AB SERPL-ACNC: 27.1 SEC — HIGH (ref 9.5–13)
PROTHROM AB SERPL-ACNC: 27.3 SEC — HIGH (ref 9.5–13)
PROTHROM AB SERPL-ACNC: 27.4 SEC — HIGH (ref 9.5–13)
PROTHROM AB SERPL-ACNC: 27.6 SEC — HIGH (ref 9.5–13)
PROTHROM AB SERPL-ACNC: 27.9 SEC — HIGH (ref 9.5–13)
PROTHROM AB SERPL-ACNC: 28.4 SEC — HIGH (ref 9.5–13)
PROTHROM AB SERPL-ACNC: 28.5 SEC — HIGH (ref 9.5–13)
PROTHROM AB SERPL-ACNC: 28.5 SEC — HIGH (ref 9.5–13)
PROTHROM AB SERPL-ACNC: 28.6 SEC — HIGH (ref 9.5–13)
PROTHROM AB SERPL-ACNC: 28.9 SEC — HIGH (ref 9.5–13)
PROTHROM AB SERPL-ACNC: 30.2 SEC — HIGH (ref 9.5–13)
PROTHROM AB SERPL-ACNC: 30.2 SEC — HIGH (ref 9.5–13)
PROTHROM AB SERPL-ACNC: 30.4 SEC — HIGH (ref 9.5–13)
PROTHROM AB SERPL-ACNC: 30.5 SEC — HIGH (ref 9.5–13)
PROTHROM AB SERPL-ACNC: 31.2 SEC — HIGH (ref 9.5–13)
PROTHROM AB SERPL-ACNC: 31.7 SEC — HIGH (ref 9.5–13)
PROTHROM AB SERPL-ACNC: 32 SEC — HIGH (ref 9.5–13)
PROTHROM AB SERPL-ACNC: 32.8 SEC — HIGH (ref 9.5–13)
PROTHROM AB SERPL-ACNC: 33.9 SEC — HIGH (ref 9.5–13)
PROTHROM AB SERPL-ACNC: 33.9 SEC — HIGH (ref 9.5–13)
PROTHROM AB SERPL-ACNC: 34.1 SEC — HIGH (ref 9.5–13)
PROTHROM AB SERPL-ACNC: 39.3 SEC — HIGH (ref 9.5–13)
PROTHROM AB SERPL-ACNC: 40.1 SEC — HIGH (ref 9.5–13)
PROTHROM AB SERPL-ACNC: 40.6 SEC — HIGH (ref 9.5–13)
PROTHROM AB SERPL-ACNC: 42.4 SEC — HIGH (ref 9.5–13)
PROTHROM AB SERPL-ACNC: 42.7 SEC — HIGH (ref 9.5–13)
PROTHROM AB SERPL-ACNC: 43 SEC — HIGH (ref 9.5–13)
PROTHROM AB SERPL-ACNC: 44 SEC — HIGH (ref 9.5–13)
PROTHROM AB SERPL-ACNC: 48.1 SEC — HIGH (ref 9.5–13)
PSA SERPL-MCNC: 0.09 NG/ML — SIGNIFICANT CHANGE UP (ref 0–4)
QUANT TB PLUS MITOGEN MINUS NIL: 0.66 IU/ML — SIGNIFICANT CHANGE UP
RAPID RVP RESULT: SIGNIFICANT CHANGE UP
RAPIDTEG MAXIMUM AMPLITUDE: 40.5 MM — LOW (ref 52–70)
RAPIDTEG MAXIMUM AMPLITUDE: 42.2 MM — LOW (ref 52–70)
RAPIDTEG MAXIMUM AMPLITUDE: 45.6 MM — LOW (ref 52–70)
RAPIDTEG MAXIMUM AMPLITUDE: 45.7 MM — LOW (ref 52–70)
RAPIDTEG MAXIMUM AMPLITUDE: 47.8 MM — LOW (ref 52–70)
RAPIDTEG MAXIMUM AMPLITUDE: 49.3 MM — LOW (ref 52–70)
RAPIDTEG MAXIMUM AMPLITUDE: 49.8 MM — LOW (ref 52–70)
RAPIDTEG MAXIMUM AMPLITUDE: 50 MM — LOW (ref 52–70)
RAPIDTEG MAXIMUM AMPLITUDE: 50.3 MM — LOW (ref 52–70)
RAPIDTEG MAXIMUM AMPLITUDE: 50.6 MM — LOW (ref 52–70)
RAPIDTEG MAXIMUM AMPLITUDE: 51.1 MM — LOW (ref 52–70)
RAPIDTEG MAXIMUM AMPLITUDE: 51.4 MM — LOW (ref 52–70)
RAPIDTEG MAXIMUM AMPLITUDE: 51.8 MM — LOW (ref 52–70)
RAPIDTEG MAXIMUM AMPLITUDE: 52.4 MM — SIGNIFICANT CHANGE UP (ref 52–70)
RAPIDTEG MAXIMUM AMPLITUDE: 53.7 MM — SIGNIFICANT CHANGE UP (ref 52–70)
RAPIDTEG MAXIMUM AMPLITUDE: 54.1 MM — SIGNIFICANT CHANGE UP (ref 52–70)
RAPIDTEG MAXIMUM AMPLITUDE: <40 MM — LOW (ref 52–70)
RAPIDTEG MAXIMUM AMPLITUDE: <40 MM — LOW (ref 52–70)
RBC # BLD: 2.05 M/UL — LOW (ref 4.2–5.8)
RBC # BLD: 2.07 M/UL — LOW (ref 4.2–5.8)
RBC # BLD: 2.18 M/UL — LOW (ref 4.2–5.8)
RBC # BLD: 2.18 M/UL — LOW (ref 4.2–5.8)
RBC # BLD: 2.2 M/UL — LOW (ref 4.2–5.8)
RBC # BLD: 2.21 M/UL — LOW (ref 4.2–5.8)
RBC # BLD: 2.22 M/UL — LOW (ref 4.2–5.8)
RBC # BLD: 2.22 M/UL — LOW (ref 4.2–5.8)
RBC # BLD: 2.23 M/UL — LOW (ref 4.2–5.8)
RBC # BLD: 2.23 M/UL — LOW (ref 4.2–5.8)
RBC # BLD: 2.24 M/UL — LOW (ref 4.2–5.8)
RBC # BLD: 2.26 M/UL — LOW (ref 4.2–5.8)
RBC # BLD: 2.26 M/UL — LOW (ref 4.2–5.8)
RBC # BLD: 2.29 M/UL — LOW (ref 4.2–5.8)
RBC # BLD: 2.29 M/UL — LOW (ref 4.2–5.8)
RBC # BLD: 2.3 M/UL — LOW (ref 4.2–5.8)
RBC # BLD: 2.31 M/UL — LOW (ref 4.2–5.8)
RBC # BLD: 2.32 M/UL — LOW (ref 4.2–5.8)
RBC # BLD: 2.33 M/UL — LOW (ref 4.2–5.8)
RBC # BLD: 2.33 M/UL — LOW (ref 4.2–5.8)
RBC # BLD: 2.34 M/UL — LOW (ref 4.2–5.8)
RBC # BLD: 2.35 M/UL — LOW (ref 4.2–5.8)
RBC # BLD: 2.35 M/UL — LOW (ref 4.2–5.8)
RBC # BLD: 2.36 M/UL — LOW (ref 4.2–5.8)
RBC # BLD: 2.36 M/UL — LOW (ref 4.2–5.8)
RBC # BLD: 2.37 M/UL — LOW (ref 4.2–5.8)
RBC # BLD: 2.38 M/UL — LOW (ref 4.2–5.8)
RBC # BLD: 2.39 M/UL — LOW (ref 4.2–5.8)
RBC # BLD: 2.39 M/UL — LOW (ref 4.2–5.8)
RBC # BLD: 2.4 M/UL — LOW (ref 4.2–5.8)
RBC # BLD: 2.41 M/UL — LOW (ref 4.2–5.8)
RBC # BLD: 2.42 M/UL — LOW (ref 4.2–5.8)
RBC # BLD: 2.43 M/UL — LOW (ref 4.2–5.8)
RBC # BLD: 2.44 M/UL — LOW (ref 4.2–5.8)
RBC # BLD: 2.44 M/UL — LOW (ref 4.2–5.8)
RBC # BLD: 2.46 M/UL — LOW (ref 4.2–5.8)
RBC # BLD: 2.47 M/UL — LOW (ref 4.2–5.8)
RBC # BLD: 2.48 M/UL — LOW (ref 4.2–5.8)
RBC # BLD: 2.49 M/UL — LOW (ref 4.2–5.8)
RBC # BLD: 2.49 M/UL — LOW (ref 4.2–5.8)
RBC # BLD: 2.51 M/UL — LOW (ref 4.2–5.8)
RBC # BLD: 2.52 M/UL — LOW (ref 4.2–5.8)
RBC # BLD: 2.52 M/UL — LOW (ref 4.2–5.8)
RBC # BLD: 2.53 M/UL — LOW (ref 4.2–5.8)
RBC # BLD: 2.53 M/UL — LOW (ref 4.2–5.8)
RBC # BLD: 2.54 M/UL — LOW (ref 4.2–5.8)
RBC # BLD: 2.55 M/UL — LOW (ref 4.2–5.8)
RBC # BLD: 2.56 M/UL — LOW (ref 4.2–5.8)
RBC # BLD: 2.56 M/UL — LOW (ref 4.2–5.8)
RBC # BLD: 2.59 M/UL — LOW (ref 4.2–5.8)
RBC # BLD: 2.6 M/UL — LOW (ref 4.2–5.8)
RBC # BLD: 2.64 M/UL — LOW (ref 4.2–5.8)
RBC # BLD: 2.7 M/UL — LOW (ref 4.2–5.8)
RBC # BLD: 2.72 M/UL — LOW (ref 4.2–5.8)
RBC # BLD: 2.76 M/UL — LOW (ref 4.2–5.8)
RBC # BLD: 2.76 M/UL — LOW (ref 4.2–5.8)
RBC # BLD: 2.8 M/UL — LOW (ref 4.2–5.8)
RBC # BLD: 2.86 M/UL — LOW (ref 4.2–5.8)
RBC # BLD: 2.89 M/UL — LOW (ref 4.2–5.8)
RBC # BLD: 3.05 M/UL — LOW (ref 4.2–5.8)
RBC # FLD: 17.5 % — HIGH (ref 10.3–14.5)
RBC # FLD: 17.7 % — HIGH (ref 10.3–14.5)
RBC # FLD: 18.2 % — HIGH (ref 10.3–14.5)
RBC # FLD: 18.2 % — HIGH (ref 10.3–14.5)
RBC # FLD: 18.3 % — HIGH (ref 10.3–14.5)
RBC # FLD: 18.3 % — HIGH (ref 10.3–14.5)
RBC # FLD: 18.4 % — HIGH (ref 10.3–14.5)
RBC # FLD: 18.5 % — HIGH (ref 10.3–14.5)
RBC # FLD: 18.5 % — HIGH (ref 10.3–14.5)
RBC # FLD: 18.7 % — HIGH (ref 10.3–14.5)
RBC # FLD: 18.8 % — HIGH (ref 10.3–14.5)
RBC # FLD: 18.9 % — HIGH (ref 10.3–14.5)
RBC # FLD: 19 % — HIGH (ref 10.3–14.5)
RBC # FLD: 19 % — HIGH (ref 10.3–14.5)
RBC # FLD: 19.2 % — HIGH (ref 10.3–14.5)
RBC # FLD: 19.3 % — HIGH (ref 10.3–14.5)
RBC # FLD: 19.4 % — HIGH (ref 10.3–14.5)
RBC # FLD: 19.5 % — HIGH (ref 10.3–14.5)
RBC # FLD: 19.5 % — HIGH (ref 10.3–14.5)
RBC # FLD: 19.6 % — HIGH (ref 10.3–14.5)
RBC # FLD: 19.7 % — HIGH (ref 10.3–14.5)
RBC # FLD: 19.7 % — HIGH (ref 10.3–14.5)
RBC # FLD: 19.8 % — HIGH (ref 10.3–14.5)
RBC # FLD: 19.9 % — HIGH (ref 10.3–14.5)
RBC # FLD: 20 % — HIGH (ref 10.3–14.5)
RBC # FLD: 20.1 % — HIGH (ref 10.3–14.5)
RBC # FLD: 20.1 % — HIGH (ref 10.3–14.5)
RBC # FLD: 20.2 % — HIGH (ref 10.3–14.5)
RBC # FLD: 20.3 % — HIGH (ref 10.3–14.5)
RBC # FLD: 20.4 % — HIGH (ref 10.3–14.5)
RBC # FLD: 20.4 % — HIGH (ref 10.3–14.5)
RBC # FLD: 20.5 % — HIGH (ref 10.3–14.5)
RBC # FLD: 20.7 % — HIGH (ref 10.3–14.5)
RBC # FLD: 20.8 % — HIGH (ref 10.3–14.5)
RBC # FLD: 20.9 % — HIGH (ref 10.3–14.5)
RBC # FLD: 21 % — HIGH (ref 10.3–14.5)
RBC # FLD: 21.1 % — HIGH (ref 10.3–14.5)
RBC # FLD: 21.1 % — HIGH (ref 10.3–14.5)
RBC # FLD: 21.3 % — HIGH (ref 10.3–14.5)
RBC # FLD: 21.3 % — HIGH (ref 10.3–14.5)
RBC # FLD: 21.4 % — HIGH (ref 10.3–14.5)
RBC # FLD: 21.7 % — HIGH (ref 10.3–14.5)
RBC # FLD: 21.8 % — HIGH (ref 10.3–14.5)
RBC # FLD: 21.8 % — HIGH (ref 10.3–14.5)
RBC # FLD: 22.1 % — HIGH (ref 10.3–14.5)
RBC # FLD: 22.1 % — HIGH (ref 10.3–14.5)
RBC # FLD: 22.2 % — HIGH (ref 10.3–14.5)
RBC # FLD: 22.3 % — HIGH (ref 10.3–14.5)
RBC # FLD: 22.3 % — HIGH (ref 10.3–14.5)
RBC # FLD: 22.5 % — HIGH (ref 10.3–14.5)
RBC # FLD: 22.7 % — HIGH (ref 10.3–14.5)
RBC # FLD: 22.7 % — HIGH (ref 10.3–14.5)
RBC # FLD: 22.8 % — HIGH (ref 10.3–14.5)
RBC # FLD: 22.8 % — HIGH (ref 10.3–14.5)
RBC # FLD: 23.2 % — HIGH (ref 10.3–14.5)
RBC # FLD: 23.2 % — HIGH (ref 10.3–14.5)
RBC # FLD: 23.4 % — HIGH (ref 10.3–14.5)
RBC # FLD: 23.4 % — HIGH (ref 10.3–14.5)
RBC # FLD: 23.5 % — HIGH (ref 10.3–14.5)
RBC # FLD: 23.8 % — HIGH (ref 10.3–14.5)
RBC BLD AUTO: ABNORMAL
RCV VOL RI: HIGH CELLS/UL (ref 0–5)
RH IG SCN BLD-IMP: POSITIVE — SIGNIFICANT CHANGE UP
RSV RNA NPH QL NAA+NON-PROBE: SIGNIFICANT CHANGE UP
RSV RNA NPH QL NAA+NON-PROBE: SIGNIFICANT CHANGE UP
RUBV IGG SER-ACNC: 24.6 INDEX — SIGNIFICANT CHANGE UP
RUBV IGG SER-IMP: POSITIVE — SIGNIFICANT CHANGE UP
S AUREUS DNA NOSE QL NAA+PROBE: SIGNIFICANT CHANGE UP
SAO2 % BLDV: 69.7 % — SIGNIFICANT CHANGE UP (ref 67–88)
SAO2 % BLDV: 76.5 % — SIGNIFICANT CHANGE UP (ref 67–88)
SAO2 % BLDV: 80.3 % — SIGNIFICANT CHANGE UP (ref 67–88)
SAO2 % BLDV: 85.4 % — SIGNIFICANT CHANGE UP (ref 67–88)
SARS-COV-2 IGG+IGM SERPL QL IA: >250 U/ML — HIGH
SARS-COV-2 IGG+IGM SERPL QL IA: POSITIVE
SARS-COV-2 RNA SPEC QL NAA+PROBE: SIGNIFICANT CHANGE UP
SCHISTOSOMA IGG SER-ACNC: <1 — SIGNIFICANT CHANGE UP
SMOOTH MUSCLE AB SER-ACNC: ABNORMAL
SODIUM SERPL-SCNC: 130 MMOL/L — LOW (ref 135–145)
SODIUM SERPL-SCNC: 131 MMOL/L — LOW (ref 135–145)
SODIUM SERPL-SCNC: 131 MMOL/L — LOW (ref 135–145)
SODIUM SERPL-SCNC: 133 MMOL/L — LOW (ref 135–145)
SODIUM SERPL-SCNC: 134 MMOL/L — LOW (ref 135–145)
SODIUM SERPL-SCNC: 135 MMOL/L — SIGNIFICANT CHANGE UP (ref 135–145)
SODIUM SERPL-SCNC: 136 MMOL/L — SIGNIFICANT CHANGE UP (ref 135–145)
SODIUM SERPL-SCNC: 137 MMOL/L — SIGNIFICANT CHANGE UP (ref 135–145)
SODIUM SERPL-SCNC: 138 MMOL/L — SIGNIFICANT CHANGE UP (ref 135–145)
SODIUM SERPL-SCNC: 139 MMOL/L — SIGNIFICANT CHANGE UP (ref 135–145)
SPECIMEN SOURCE: SIGNIFICANT CHANGE UP
STRONGYLOIDES AB SER-ACNC: NEGATIVE — SIGNIFICANT CHANGE UP
SURGICAL PATHOLOGY STUDY: SIGNIFICANT CHANGE UP
T GONDII IGG SER QL: 44 IU/ML — HIGH
T GONDII IGG SER QL: POSITIVE
T PALLIDUM AB TITR SER: NEGATIVE — SIGNIFICANT CHANGE UP
T4 FREE SERPL-MCNC: 0.6 NG/DL — LOW (ref 0.9–1.8)
TARGETS BLD QL SMEAR: SLIGHT — SIGNIFICANT CHANGE UP
TEG FUNCTIONAL FIBRINOGEN: 11.7 MM — LOW (ref 15–32)
TEG FUNCTIONAL FIBRINOGEN: 13.4 MM — LOW (ref 15–32)
TEG FUNCTIONAL FIBRINOGEN: 14.3 MM — LOW (ref 15–32)
TEG FUNCTIONAL FIBRINOGEN: 14.3 MM — LOW (ref 15–32)
TEG FUNCTIONAL FIBRINOGEN: 14.4 MM — LOW (ref 15–32)
TEG FUNCTIONAL FIBRINOGEN: 14.6 MM — LOW (ref 15–32)
TEG FUNCTIONAL FIBRINOGEN: 15.9 MM — SIGNIFICANT CHANGE UP (ref 15–32)
TEG FUNCTIONAL FIBRINOGEN: 15.9 MM — SIGNIFICANT CHANGE UP (ref 15–32)
TEG FUNCTIONAL FIBRINOGEN: 16.2 MM — SIGNIFICANT CHANGE UP (ref 15–32)
TEG FUNCTIONAL FIBRINOGEN: 16.3 MM — SIGNIFICANT CHANGE UP (ref 15–32)
TEG FUNCTIONAL FIBRINOGEN: 16.5 MM — SIGNIFICANT CHANGE UP (ref 15–32)
TEG FUNCTIONAL FIBRINOGEN: 16.5 MM — SIGNIFICANT CHANGE UP (ref 15–32)
TEG FUNCTIONAL FIBRINOGEN: 16.8 MM — SIGNIFICANT CHANGE UP (ref 15–32)
TEG FUNCTIONAL FIBRINOGEN: 17.6 MM — SIGNIFICANT CHANGE UP (ref 15–32)
TEG FUNCTIONAL FIBRINOGEN: 17.8 MM — SIGNIFICANT CHANGE UP (ref 15–32)
TEG FUNCTIONAL FIBRINOGEN: 17.8 MM — SIGNIFICANT CHANGE UP (ref 15–32)
TEG FUNCTIONAL FIBRINOGEN: 5 MM — LOW (ref 15–32)
TEG FUNCTIONAL FIBRINOGEN: 6.6 MM — LOW (ref 15–32)
TEG LY30 (LYSIS): 0 % — SIGNIFICANT CHANGE UP (ref 0–2.6)
TEG REACTION TIME: 4.7 MIN — SIGNIFICANT CHANGE UP (ref 4.6–9.1)
TEG REACTION TIME: 4.7 MIN — SIGNIFICANT CHANGE UP (ref 4.6–9.1)
TEG REACTION TIME: 4.8 MIN — SIGNIFICANT CHANGE UP (ref 4.6–9.1)
TEG REACTION TIME: 5.2 MIN — SIGNIFICANT CHANGE UP (ref 4.6–9.1)
TEG REACTION TIME: 5.3 MIN — SIGNIFICANT CHANGE UP (ref 4.6–9.1)
TEG REACTION TIME: 5.5 MIN — SIGNIFICANT CHANGE UP (ref 4.6–9.1)
TEG REACTION TIME: 5.7 MIN — SIGNIFICANT CHANGE UP (ref 4.6–9.1)
TEG REACTION TIME: 6.2 MIN — SIGNIFICANT CHANGE UP (ref 4.6–9.1)
TEG REACTION TIME: 6.4 MIN — SIGNIFICANT CHANGE UP (ref 4.6–9.1)
TEG REACTION TIME: 6.5 MIN — SIGNIFICANT CHANGE UP (ref 4.6–9.1)
TEG REACTION TIME: 6.6 MIN — SIGNIFICANT CHANGE UP (ref 4.6–9.1)
TEG REACTION TIME: 6.8 MIN — SIGNIFICANT CHANGE UP (ref 4.6–9.1)
TEG REACTION TIME: 7 MIN — SIGNIFICANT CHANGE UP (ref 4.6–9.1)
TEG REACTION TIME: 7.2 MIN — SIGNIFICANT CHANGE UP (ref 4.6–9.1)
TEG REACTION TIME: 7.7 MIN — SIGNIFICANT CHANGE UP (ref 4.6–9.1)
TEG REACTION TIME: 9.6 MIN — HIGH (ref 4.6–9.1)
TIBC SERPL-MCNC: 78 UG/DL — LOW (ref 220–430)
TIBC SERPL-MCNC: SIGNIFICANT CHANGE UP UG/DL (ref 220–430)
TOTAL NUCLEATED CELL COUNT, BODY FLUID: 202 CELLS/UL — HIGH (ref 0–5)
TOTAL NUCLEATED CELL COUNT, BODY FLUID: 271 CELLS/UL — HIGH (ref 0–5)
TOTAL NUCLEATED CELL COUNT, BODY FLUID: 377 CELLS/UL — HIGH (ref 0–5)
TRIGL SERPL-MCNC: 70 MG/DL — SIGNIFICANT CHANGE UP
TSH SERPL-MCNC: 3.09 UIU/ML — SIGNIFICANT CHANGE UP (ref 0.27–4.2)
TSH SERPL-MCNC: 4.09 UIU/ML — SIGNIFICANT CHANGE UP (ref 0.27–4.2)
TUBE TYPE: SIGNIFICANT CHANGE UP
UIBC SERPL-MCNC: 26 UG/DL — LOW (ref 110–370)
UIBC SERPL-MCNC: <20 UG/DL — LOW (ref 110–370)
URATE SERPL-MCNC: 5.4 MG/DL — SIGNIFICANT CHANGE UP (ref 3.4–8.8)
VANCOMYCIN FLD-MCNC: 11.2 UG/ML — SIGNIFICANT CHANGE UP
VANCOMYCIN FLD-MCNC: 12.2 UG/ML — SIGNIFICANT CHANGE UP
VANCOMYCIN FLD-MCNC: 13.3 UG/ML — SIGNIFICANT CHANGE UP
VANCOMYCIN FLD-MCNC: 15.2 UG/ML — SIGNIFICANT CHANGE UP
VANCOMYCIN FLD-MCNC: 15.2 UG/ML — SIGNIFICANT CHANGE UP
VANCOMYCIN FLD-MCNC: 19 UG/ML — SIGNIFICANT CHANGE UP
VANCOMYCIN FLD-MCNC: 19.3 UG/ML — SIGNIFICANT CHANGE UP
VANCOMYCIN TROUGH SERPL-MCNC: 18.9 UG/ML — SIGNIFICANT CHANGE UP (ref 10–20)
VANCOMYCIN TROUGH SERPL-MCNC: 27 UG/ML — CRITICAL HIGH (ref 10–20)
VIT B12 SERPL-MCNC: >2000 PG/ML — HIGH (ref 232–1245)
VZV IGG FLD QL IA: 680.9 INDEX — SIGNIFICANT CHANGE UP
VZV IGG FLD QL IA: POSITIVE — SIGNIFICANT CHANGE UP
WBC # BLD: 11.59 K/UL — HIGH (ref 3.8–10.5)
WBC # BLD: 11.64 K/UL — HIGH (ref 3.8–10.5)
WBC # BLD: 11.71 K/UL — HIGH (ref 3.8–10.5)
WBC # BLD: 11.79 K/UL — HIGH (ref 3.8–10.5)
WBC # BLD: 12.15 K/UL — HIGH (ref 3.8–10.5)
WBC # BLD: 12.44 K/UL — HIGH (ref 3.8–10.5)
WBC # BLD: 12.59 K/UL — HIGH (ref 3.8–10.5)
WBC # BLD: 12.7 K/UL — HIGH (ref 3.8–10.5)
WBC # BLD: 12.88 K/UL — HIGH (ref 3.8–10.5)
WBC # BLD: 13.19 K/UL — HIGH (ref 3.8–10.5)
WBC # BLD: 13.27 K/UL — HIGH (ref 3.8–10.5)
WBC # BLD: 13.28 K/UL — HIGH (ref 3.8–10.5)
WBC # BLD: 13.89 K/UL — HIGH (ref 3.8–10.5)
WBC # BLD: 14 K/UL — HIGH (ref 3.8–10.5)
WBC # BLD: 14.19 K/UL — HIGH (ref 3.8–10.5)
WBC # BLD: 14.27 K/UL — HIGH (ref 3.8–10.5)
WBC # BLD: 14.28 K/UL — HIGH (ref 3.8–10.5)
WBC # BLD: 14.4 K/UL — HIGH (ref 3.8–10.5)
WBC # BLD: 14.41 K/UL — HIGH (ref 3.8–10.5)
WBC # BLD: 14.45 K/UL — HIGH (ref 3.8–10.5)
WBC # BLD: 14.55 K/UL — HIGH (ref 3.8–10.5)
WBC # BLD: 14.7 K/UL — HIGH (ref 3.8–10.5)
WBC # BLD: 14.87 K/UL — HIGH (ref 3.8–10.5)
WBC # BLD: 14.89 K/UL — HIGH (ref 3.8–10.5)
WBC # BLD: 14.96 K/UL — HIGH (ref 3.8–10.5)
WBC # BLD: 14.98 K/UL — HIGH (ref 3.8–10.5)
WBC # BLD: 15.14 K/UL — HIGH (ref 3.8–10.5)
WBC # BLD: 15.28 K/UL — HIGH (ref 3.8–10.5)
WBC # BLD: 15.33 K/UL — HIGH (ref 3.8–10.5)
WBC # BLD: 15.4 K/UL — HIGH (ref 3.8–10.5)
WBC # BLD: 15.48 K/UL — HIGH (ref 3.8–10.5)
WBC # BLD: 15.53 K/UL — HIGH (ref 3.8–10.5)
WBC # BLD: 15.63 K/UL — HIGH (ref 3.8–10.5)
WBC # BLD: 15.66 K/UL — HIGH (ref 3.8–10.5)
WBC # BLD: 15.69 K/UL — HIGH (ref 3.8–10.5)
WBC # BLD: 15.8 K/UL — HIGH (ref 3.8–10.5)
WBC # BLD: 15.84 K/UL — HIGH (ref 3.8–10.5)
WBC # BLD: 15.84 K/UL — HIGH (ref 3.8–10.5)
WBC # BLD: 15.87 K/UL — HIGH (ref 3.8–10.5)
WBC # BLD: 15.98 K/UL — HIGH (ref 3.8–10.5)
WBC # BLD: 15.99 K/UL — HIGH (ref 3.8–10.5)
WBC # BLD: 16.06 K/UL — HIGH (ref 3.8–10.5)
WBC # BLD: 16.31 K/UL — HIGH (ref 3.8–10.5)
WBC # BLD: 16.5 K/UL — HIGH (ref 3.8–10.5)
WBC # BLD: 16.53 K/UL — HIGH (ref 3.8–10.5)
WBC # BLD: 16.67 K/UL — HIGH (ref 3.8–10.5)
WBC # BLD: 16.71 K/UL — HIGH (ref 3.8–10.5)
WBC # BLD: 16.75 K/UL — HIGH (ref 3.8–10.5)
WBC # BLD: 16.77 K/UL — HIGH (ref 3.8–10.5)
WBC # BLD: 16.79 K/UL — HIGH (ref 3.8–10.5)
WBC # BLD: 16.89 K/UL — HIGH (ref 3.8–10.5)
WBC # BLD: 16.92 K/UL — HIGH (ref 3.8–10.5)
WBC # BLD: 16.96 K/UL — HIGH (ref 3.8–10.5)
WBC # BLD: 16.96 K/UL — HIGH (ref 3.8–10.5)
WBC # BLD: 17.13 K/UL — HIGH (ref 3.8–10.5)
WBC # BLD: 17.29 K/UL — HIGH (ref 3.8–10.5)
WBC # BLD: 17.29 K/UL — HIGH (ref 3.8–10.5)
WBC # BLD: 17.34 K/UL — HIGH (ref 3.8–10.5)
WBC # BLD: 17.34 K/UL — HIGH (ref 3.8–10.5)
WBC # BLD: 17.43 K/UL — HIGH (ref 3.8–10.5)
WBC # BLD: 17.5 K/UL — HIGH (ref 3.8–10.5)
WBC # BLD: 17.94 K/UL — HIGH (ref 3.8–10.5)
WBC # BLD: 18.11 K/UL — HIGH (ref 3.8–10.5)
WBC # BLD: 18.14 K/UL — HIGH (ref 3.8–10.5)
WBC # BLD: 18.45 K/UL — HIGH (ref 3.8–10.5)
WBC # BLD: 18.58 K/UL — HIGH (ref 3.8–10.5)
WBC # BLD: 18.65 K/UL — HIGH (ref 3.8–10.5)
WBC # BLD: 18.77 K/UL — HIGH (ref 3.8–10.5)
WBC # BLD: 18.94 K/UL — HIGH (ref 3.8–10.5)
WBC # BLD: 19.23 K/UL — HIGH (ref 3.8–10.5)
WBC # BLD: 19.77 K/UL — HIGH (ref 3.8–10.5)
WBC # BLD: 19.92 K/UL — HIGH (ref 3.8–10.5)
WBC # BLD: 20.15 K/UL — HIGH (ref 3.8–10.5)
WBC # BLD: 20.3 K/UL — HIGH (ref 3.8–10.5)
WBC # BLD: 21.12 K/UL — HIGH (ref 3.8–10.5)
WBC # BLD: 21.16 K/UL — HIGH (ref 3.8–10.5)
WBC # BLD: 21.28 K/UL — HIGH (ref 3.8–10.5)
WBC # BLD: 21.65 K/UL — HIGH (ref 3.8–10.5)
WBC # BLD: 21.88 K/UL — HIGH (ref 3.8–10.5)
WBC # BLD: 22.34 K/UL — HIGH (ref 3.8–10.5)
WBC # BLD: 23.16 K/UL — HIGH (ref 3.8–10.5)
WBC # BLD: 23.59 K/UL — HIGH (ref 3.8–10.5)
WBC # FLD AUTO: 11.59 K/UL — HIGH (ref 3.8–10.5)
WBC # FLD AUTO: 11.64 K/UL — HIGH (ref 3.8–10.5)
WBC # FLD AUTO: 11.71 K/UL — HIGH (ref 3.8–10.5)
WBC # FLD AUTO: 11.79 K/UL — HIGH (ref 3.8–10.5)
WBC # FLD AUTO: 12.15 K/UL — HIGH (ref 3.8–10.5)
WBC # FLD AUTO: 12.44 K/UL — HIGH (ref 3.8–10.5)
WBC # FLD AUTO: 12.59 K/UL — HIGH (ref 3.8–10.5)
WBC # FLD AUTO: 12.7 K/UL — HIGH (ref 3.8–10.5)
WBC # FLD AUTO: 12.88 K/UL — HIGH (ref 3.8–10.5)
WBC # FLD AUTO: 13.19 K/UL — HIGH (ref 3.8–10.5)
WBC # FLD AUTO: 13.27 K/UL — HIGH (ref 3.8–10.5)
WBC # FLD AUTO: 13.28 K/UL — HIGH (ref 3.8–10.5)
WBC # FLD AUTO: 13.89 K/UL — HIGH (ref 3.8–10.5)
WBC # FLD AUTO: 14 K/UL — HIGH (ref 3.8–10.5)
WBC # FLD AUTO: 14.19 K/UL — HIGH (ref 3.8–10.5)
WBC # FLD AUTO: 14.27 K/UL — HIGH (ref 3.8–10.5)
WBC # FLD AUTO: 14.28 K/UL — HIGH (ref 3.8–10.5)
WBC # FLD AUTO: 14.4 K/UL — HIGH (ref 3.8–10.5)
WBC # FLD AUTO: 14.41 K/UL — HIGH (ref 3.8–10.5)
WBC # FLD AUTO: 14.45 K/UL — HIGH (ref 3.8–10.5)
WBC # FLD AUTO: 14.55 K/UL — HIGH (ref 3.8–10.5)
WBC # FLD AUTO: 14.7 K/UL — HIGH (ref 3.8–10.5)
WBC # FLD AUTO: 14.87 K/UL — HIGH (ref 3.8–10.5)
WBC # FLD AUTO: 14.89 K/UL — HIGH (ref 3.8–10.5)
WBC # FLD AUTO: 14.96 K/UL — HIGH (ref 3.8–10.5)
WBC # FLD AUTO: 14.98 K/UL — HIGH (ref 3.8–10.5)
WBC # FLD AUTO: 15.14 K/UL — HIGH (ref 3.8–10.5)
WBC # FLD AUTO: 15.28 K/UL — HIGH (ref 3.8–10.5)
WBC # FLD AUTO: 15.33 K/UL — HIGH (ref 3.8–10.5)
WBC # FLD AUTO: 15.4 K/UL — HIGH (ref 3.8–10.5)
WBC # FLD AUTO: 15.48 K/UL — HIGH (ref 3.8–10.5)
WBC # FLD AUTO: 15.53 K/UL — HIGH (ref 3.8–10.5)
WBC # FLD AUTO: 15.63 K/UL — HIGH (ref 3.8–10.5)
WBC # FLD AUTO: 15.66 K/UL — HIGH (ref 3.8–10.5)
WBC # FLD AUTO: 15.69 K/UL — HIGH (ref 3.8–10.5)
WBC # FLD AUTO: 15.8 K/UL — HIGH (ref 3.8–10.5)
WBC # FLD AUTO: 15.84 K/UL — HIGH (ref 3.8–10.5)
WBC # FLD AUTO: 15.84 K/UL — HIGH (ref 3.8–10.5)
WBC # FLD AUTO: 15.87 K/UL — HIGH (ref 3.8–10.5)
WBC # FLD AUTO: 15.98 K/UL — HIGH (ref 3.8–10.5)
WBC # FLD AUTO: 15.99 K/UL — HIGH (ref 3.8–10.5)
WBC # FLD AUTO: 16.06 K/UL — HIGH (ref 3.8–10.5)
WBC # FLD AUTO: 16.31 K/UL — HIGH (ref 3.8–10.5)
WBC # FLD AUTO: 16.5 K/UL — HIGH (ref 3.8–10.5)
WBC # FLD AUTO: 16.53 K/UL — HIGH (ref 3.8–10.5)
WBC # FLD AUTO: 16.67 K/UL — HIGH (ref 3.8–10.5)
WBC # FLD AUTO: 16.71 K/UL — HIGH (ref 3.8–10.5)
WBC # FLD AUTO: 16.75 K/UL — HIGH (ref 3.8–10.5)
WBC # FLD AUTO: 16.77 K/UL — HIGH (ref 3.8–10.5)
WBC # FLD AUTO: 16.79 K/UL — HIGH (ref 3.8–10.5)
WBC # FLD AUTO: 16.89 K/UL — HIGH (ref 3.8–10.5)
WBC # FLD AUTO: 16.92 K/UL — HIGH (ref 3.8–10.5)
WBC # FLD AUTO: 16.96 K/UL — HIGH (ref 3.8–10.5)
WBC # FLD AUTO: 16.96 K/UL — HIGH (ref 3.8–10.5)
WBC # FLD AUTO: 17.13 K/UL — HIGH (ref 3.8–10.5)
WBC # FLD AUTO: 17.29 K/UL — HIGH (ref 3.8–10.5)
WBC # FLD AUTO: 17.29 K/UL — HIGH (ref 3.8–10.5)
WBC # FLD AUTO: 17.34 K/UL — HIGH (ref 3.8–10.5)
WBC # FLD AUTO: 17.34 K/UL — HIGH (ref 3.8–10.5)
WBC # FLD AUTO: 17.43 K/UL — HIGH (ref 3.8–10.5)
WBC # FLD AUTO: 17.5 K/UL — HIGH (ref 3.8–10.5)
WBC # FLD AUTO: 17.94 K/UL — HIGH (ref 3.8–10.5)
WBC # FLD AUTO: 18.11 K/UL — HIGH (ref 3.8–10.5)
WBC # FLD AUTO: 18.14 K/UL — HIGH (ref 3.8–10.5)
WBC # FLD AUTO: 18.45 K/UL — HIGH (ref 3.8–10.5)
WBC # FLD AUTO: 18.58 K/UL — HIGH (ref 3.8–10.5)
WBC # FLD AUTO: 18.65 K/UL — HIGH (ref 3.8–10.5)
WBC # FLD AUTO: 18.77 K/UL — HIGH (ref 3.8–10.5)
WBC # FLD AUTO: 18.94 K/UL — HIGH (ref 3.8–10.5)
WBC # FLD AUTO: 19.23 K/UL — HIGH (ref 3.8–10.5)
WBC # FLD AUTO: 19.77 K/UL — HIGH (ref 3.8–10.5)
WBC # FLD AUTO: 19.92 K/UL — HIGH (ref 3.8–10.5)
WBC # FLD AUTO: 20.15 K/UL — HIGH (ref 3.8–10.5)
WBC # FLD AUTO: 20.3 K/UL — HIGH (ref 3.8–10.5)
WBC # FLD AUTO: 21.12 K/UL — HIGH (ref 3.8–10.5)
WBC # FLD AUTO: 21.16 K/UL — HIGH (ref 3.8–10.5)
WBC # FLD AUTO: 21.28 K/UL — HIGH (ref 3.8–10.5)
WBC # FLD AUTO: 21.65 K/UL — HIGH (ref 3.8–10.5)
WBC # FLD AUTO: 21.88 K/UL — HIGH (ref 3.8–10.5)
WBC # FLD AUTO: 22.34 K/UL — HIGH (ref 3.8–10.5)
WBC # FLD AUTO: 23.16 K/UL — HIGH (ref 3.8–10.5)
WBC # FLD AUTO: 23.59 K/UL — HIGH (ref 3.8–10.5)

## 2024-01-01 PROCEDURE — 36000 PLACE NEEDLE IN VEIN: CPT

## 2024-01-01 PROCEDURE — 99233 SBSQ HOSP IP/OBS HIGH 50: CPT

## 2024-01-01 PROCEDURE — 99233 SBSQ HOSP IP/OBS HIGH 50: CPT | Mod: GC

## 2024-01-01 PROCEDURE — 71045 X-RAY EXAM CHEST 1 VIEW: CPT | Mod: 26,77

## 2024-01-01 PROCEDURE — 99223 1ST HOSP IP/OBS HIGH 75: CPT

## 2024-01-01 PROCEDURE — 90945 DIALYSIS ONE EVALUATION: CPT | Mod: GC

## 2024-01-01 PROCEDURE — 93308 TTE F-UP OR LMTD: CPT | Mod: 26

## 2024-01-01 PROCEDURE — 99232 SBSQ HOSP IP/OBS MODERATE 35: CPT | Mod: GC,25

## 2024-01-01 PROCEDURE — 99232 SBSQ HOSP IP/OBS MODERATE 35: CPT

## 2024-01-01 PROCEDURE — 99232 SBSQ HOSP IP/OBS MODERATE 35: CPT | Mod: GC

## 2024-01-01 PROCEDURE — G0316 PROLONG INPT EVAL ADD15 M: CPT

## 2024-01-01 PROCEDURE — 99223 1ST HOSP IP/OBS HIGH 75: CPT | Mod: FS

## 2024-01-01 PROCEDURE — 36556 INSERT NON-TUNNEL CV CATH: CPT | Mod: GC,79

## 2024-01-01 PROCEDURE — 99291 CRITICAL CARE FIRST HOUR: CPT

## 2024-01-01 PROCEDURE — 71045 X-RAY EXAM CHEST 1 VIEW: CPT | Mod: 26

## 2024-01-01 PROCEDURE — 93970 EXTREMITY STUDY: CPT | Mod: 26

## 2024-01-01 PROCEDURE — 88305 TISSUE EXAM BY PATHOLOGIST: CPT | Mod: 26

## 2024-01-01 PROCEDURE — 99291 CRITICAL CARE FIRST HOUR: CPT | Mod: FS

## 2024-01-01 PROCEDURE — 36620 INSERTION CATHETER ARTERY: CPT

## 2024-01-01 PROCEDURE — 74176 CT ABD & PELVIS W/O CONTRAST: CPT | Mod: 26

## 2024-01-01 PROCEDURE — 45380 COLONOSCOPY AND BIOPSY: CPT

## 2024-01-01 PROCEDURE — 70450 CT HEAD/BRAIN W/O DYE: CPT | Mod: 26

## 2024-01-01 PROCEDURE — 76937 US GUIDE VASCULAR ACCESS: CPT | Mod: 26,59

## 2024-01-01 PROCEDURE — 93010 ELECTROCARDIOGRAM REPORT: CPT

## 2024-01-01 PROCEDURE — 93306 TTE W/DOPPLER COMPLETE: CPT | Mod: 26

## 2024-01-01 PROCEDURE — 51703 INSERT BLADDER CATH COMPLEX: CPT

## 2024-01-01 PROCEDURE — 71045 X-RAY EXAM CHEST 1 VIEW: CPT | Mod: 26,76

## 2024-01-01 PROCEDURE — 90945 DIALYSIS ONE EVALUATION: CPT

## 2024-01-01 PROCEDURE — 45330 DIAGNOSTIC SIGMOIDOSCOPY: CPT | Mod: GC

## 2024-01-01 PROCEDURE — 93010 ELECTROCARDIOGRAM REPORT: CPT | Mod: 76

## 2024-01-01 PROCEDURE — 93971 EXTREMITY STUDY: CPT | Mod: 26,LT

## 2024-01-01 PROCEDURE — 99223 1ST HOSP IP/OBS HIGH 75: CPT | Mod: GC

## 2024-01-01 PROCEDURE — 49082 ABD PARACENTESIS: CPT

## 2024-01-01 PROCEDURE — 99232 SBSQ HOSP IP/OBS MODERATE 35: CPT | Mod: FS

## 2024-01-01 PROCEDURE — 74177 CT ABD & PELVIS W/CONTRAST: CPT | Mod: 26

## 2024-01-01 PROCEDURE — 76937 US GUIDE VASCULAR ACCESS: CPT | Mod: 26

## 2024-01-01 PROCEDURE — 99233 SBSQ HOSP IP/OBS HIGH 50: CPT | Mod: FS

## 2024-01-01 PROCEDURE — 44360 SMALL BOWEL ENDOSCOPY: CPT | Mod: GC

## 2024-01-01 PROCEDURE — 93356 MYOCRD STRAIN IMG SPCKL TRCK: CPT

## 2024-01-01 PROCEDURE — 99233 SBSQ HOSP IP/OBS HIGH 50: CPT | Mod: GC,25

## 2024-01-01 PROCEDURE — 99291 CRITICAL CARE FIRST HOUR: CPT | Mod: 25

## 2024-01-01 PROCEDURE — 71250 CT THORAX DX C-: CPT | Mod: 26

## 2024-01-01 PROCEDURE — 71260 CT THORAX DX C+: CPT | Mod: 26

## 2024-01-01 DEVICE — NET RETRV ROT ROTH 2.5MMX230CM: Type: IMPLANTABLE DEVICE | Status: FUNCTIONAL

## 2024-01-01 RX ORDER — MEROPENEM 500 MG/10ML
INJECTION, POWDER, FOR SOLUTION INTRAVENOUS
Refills: 0 | Status: DISCONTINUED | OUTPATIENT
Start: 2024-01-01 | End: 2024-01-01

## 2024-01-01 RX ORDER — DOXYCYCLINE MONOHYDRATE 100 MG
100 TABLET ORAL EVERY 12 HOURS
Refills: 0 | Status: DISCONTINUED | OUTPATIENT
Start: 2024-01-01 | End: 2024-01-01

## 2024-01-01 RX ORDER — ALBUMIN (HUMAN) 5 G/20ML
250 SOLUTION INTRAVENOUS ONCE
Refills: 0 | Status: COMPLETED | OUTPATIENT
Start: 2024-01-01 | End: 2024-01-01

## 2024-01-01 RX ORDER — PANTOPRAZOLE SODIUM 40 MG
40 TABLET, DELAYED RELEASE (ENTERIC COATED) ORAL
Refills: 0 | DISCHARGE

## 2024-01-01 RX ORDER — CALCIUM GLUCONATE 61(648) MG
2 TABLET ORAL ONCE
Refills: 0 | Status: COMPLETED | OUTPATIENT
Start: 2024-01-01 | End: 2024-01-01

## 2024-01-01 RX ORDER — SODIUM PHOSPHATE, MONOBASIC, MONOHYDRATE AND SODIUM PHOSPHATE, DIBASIC ANHYDROUS 276; 142 MG/ML; MG/ML
15 INJECTION, SOLUTION INTRAVENOUS ONCE
Refills: 0 | Status: COMPLETED | OUTPATIENT
Start: 2024-01-01 | End: 2024-01-01

## 2024-01-01 RX ORDER — VANCOMYCIN/0.9 % SOD CHLORIDE 1.75G/25
PLASTIC BAG, INJECTION (ML) INTRAVENOUS
Refills: 0 | Status: DISCONTINUED | OUTPATIENT
Start: 2024-01-01 | End: 2024-01-01

## 2024-01-01 RX ORDER — MEROPENEM 500 MG/10ML
1000 INJECTION, POWDER, FOR SOLUTION INTRAVENOUS EVERY 8 HOURS
Refills: 0 | Status: DISCONTINUED | OUTPATIENT
Start: 2024-01-01 | End: 2024-01-01

## 2024-01-01 RX ORDER — ALBUMIN (HUMAN) 5 G/20ML
50 SOLUTION INTRAVENOUS EVERY 6 HOURS
Refills: 0 | Status: DISCONTINUED | OUTPATIENT
Start: 2024-01-01 | End: 2024-01-01

## 2024-01-01 RX ORDER — ALBUMIN (HUMAN) 5 G/20ML
100 SOLUTION INTRAVENOUS ONCE
Refills: 0 | Status: COMPLETED | OUTPATIENT
Start: 2024-01-01 | End: 2024-01-01

## 2024-01-01 RX ORDER — VANCOMYCIN/0.9 % SOD CHLORIDE 1.75G/25
750 PLASTIC BAG, INJECTION (ML) INTRAVENOUS EVERY 12 HOURS
Refills: 0 | Status: DISCONTINUED | OUTPATIENT
Start: 2024-01-01 | End: 2024-01-01

## 2024-01-01 RX ORDER — POLYETHYLENE GLYCOL 3350 17 G/17G
17 POWDER, FOR SOLUTION ORAL
Refills: 0 | Status: DISCONTINUED | OUTPATIENT
Start: 2024-01-01 | End: 2024-01-01

## 2024-01-01 RX ORDER — FLUCONAZOLE 150 MG/1
200 TABLET ORAL ONCE
Refills: 0 | Status: COMPLETED | OUTPATIENT
Start: 2024-01-01 | End: 2024-01-01

## 2024-01-01 RX ORDER — ROCURONIUM BROMIDE 50 MG/5 ML
50 SYRINGE (ML) INTRAVENOUS ONCE
Refills: 0 | Status: COMPLETED | OUTPATIENT
Start: 2024-01-01 | End: 2024-01-01

## 2024-01-01 RX ORDER — ROPIVACAINE IN 0.9% SOD CHL/PF 0.1 %
0.13 PLASTIC BAG, INJECTION (ML) EPIDURAL
Qty: 16 | Refills: 0 | Status: DISCONTINUED | OUTPATIENT
Start: 2024-01-01 | End: 2024-01-01

## 2024-01-01 RX ORDER — RIFAXIMIN 550 MG/1
1 TABLET ORAL
Refills: 0 | DISCHARGE

## 2024-01-01 RX ORDER — THIAMINE HCL 250 MG
100 TABLET ORAL DAILY
Refills: 0 | Status: DISCONTINUED | OUTPATIENT
Start: 2024-01-01 | End: 2024-01-01

## 2024-01-01 RX ORDER — ALBUMIN (HUMAN) 5 G/20ML
250 SOLUTION INTRAVENOUS ONCE
Refills: 0 | Status: DISCONTINUED | OUTPATIENT
Start: 2024-01-01 | End: 2024-01-01

## 2024-01-01 RX ORDER — HYDROCORTISONE 10 MG/1
50 TABLET ORAL EVERY 6 HOURS
Refills: 0 | Status: DISCONTINUED | OUTPATIENT
Start: 2024-01-01 | End: 2024-01-01

## 2024-01-01 RX ORDER — PIPERACILLIN SODIUM AND TAZOBACTAM SODIUM 3; .375 G/15ML; G/15ML
3.38 INJECTION, POWDER, FOR SOLUTION INTRAVENOUS EVERY 12 HOURS
Refills: 0 | Status: DISCONTINUED | OUTPATIENT
Start: 2024-01-01 | End: 2024-01-01

## 2024-01-01 RX ORDER — MIDODRINE HYDROCHLORIDE 5 MG/1
20 TABLET ORAL EVERY 8 HOURS
Refills: 0 | Status: DISCONTINUED | OUTPATIENT
Start: 2024-01-01 | End: 2024-01-01

## 2024-01-01 RX ORDER — PIPERACILLIN SODIUM AND TAZOBACTAM SODIUM 3; .375 G/15ML; G/15ML
3.38 INJECTION, POWDER, FOR SOLUTION INTRAVENOUS EVERY 8 HOURS
Refills: 0 | Status: DISCONTINUED | OUTPATIENT
Start: 2024-01-01 | End: 2024-01-01

## 2024-01-01 RX ORDER — ALBUMIN (HUMAN) 5 G/20ML
500 SOLUTION INTRAVENOUS ONCE
Refills: 0 | Status: COMPLETED | OUTPATIENT
Start: 2024-01-01 | End: 2024-01-01

## 2024-01-01 RX ORDER — PIPERACILLIN SODIUM AND TAZOBACTAM SODIUM 3; .375 G/15ML; G/15ML
3.38 INJECTION, POWDER, FOR SOLUTION INTRAVENOUS ONCE
Refills: 0 | Status: COMPLETED | OUTPATIENT
Start: 2024-01-01 | End: 2024-01-01

## 2024-01-01 RX ORDER — ALBUMIN (HUMAN) 5 G/20ML
250 SOLUTION INTRAVENOUS
Refills: 0 | Status: COMPLETED | OUTPATIENT
Start: 2024-01-01 | End: 2024-01-01

## 2024-01-01 RX ORDER — FOLIC ACID 1 MG
1 TABLET ORAL DAILY
Refills: 0 | Status: DISCONTINUED | OUTPATIENT
Start: 2024-01-01 | End: 2024-01-01

## 2024-01-01 RX ORDER — EPOETIN ALFA 3000 [IU]/ML
10000 SOLUTION INTRAVENOUS; SUBCUTANEOUS
Refills: 0 | Status: DISCONTINUED | OUTPATIENT
Start: 2024-01-01 | End: 2024-01-01

## 2024-01-01 RX ORDER — OCTREOTIDE ACETATE 100 UG/ML
10 INJECTION, SOLUTION INTRAVENOUS; SUBCUTANEOUS
Qty: 500 | Refills: 0 | Status: DISCONTINUED | OUTPATIENT
Start: 2024-01-01 | End: 2024-01-01

## 2024-01-01 RX ORDER — LACTULOSE 10 G
20 PACKET (EA) ORAL THREE TIMES A DAY
Refills: 0 | Status: DISCONTINUED | OUTPATIENT
Start: 2024-01-01 | End: 2024-01-01

## 2024-01-01 RX ORDER — LIDOCAINE/BENZALKONIUM/ALCOHOL
1 SOLUTION, NON-ORAL TOPICAL EVERY 24 HOURS
Refills: 0 | Status: DISCONTINUED | OUTPATIENT
Start: 2024-01-01 | End: 2024-01-01

## 2024-01-01 RX ORDER — POLYETHYLENE GLYCOL 3350, SODIUM SULFATE ANHYDROUS, SODIUM BICARBONATE, SODIUM CHLORIDE, POTASSIUM CHLORIDE 236; 22.74; 6.74; 5.86; 2.97 G/4L; G/4L; G/4L; G/4L; G/4L
1000 POWDER, FOR SOLUTION ORAL ONCE
Refills: 0 | Status: COMPLETED | OUTPATIENT
Start: 2024-01-01 | End: 2024-01-01

## 2024-01-01 RX ORDER — ROPIVACAINE IN 0.9% SOD CHL/PF 0.1 %
0.5 PLASTIC BAG, INJECTION (ML) EPIDURAL
Qty: 32 | Refills: 0 | Status: DISCONTINUED | OUTPATIENT
Start: 2024-01-01 | End: 2024-01-01

## 2024-01-01 RX ORDER — ROPIVACAINE IN 0.9% SOD CHL/PF 0.1 %
0.07 PLASTIC BAG, INJECTION (ML) EPIDURAL
Qty: 8 | Refills: 0 | Status: DISCONTINUED | OUTPATIENT
Start: 2024-01-01 | End: 2024-01-01

## 2024-01-01 RX ORDER — VANCOMYCIN/0.9 % SOD CHLORIDE 1.75G/25
1500 PLASTIC BAG, INJECTION (ML) INTRAVENOUS ONCE
Refills: 0 | Status: COMPLETED | OUTPATIENT
Start: 2024-01-01 | End: 2024-01-01

## 2024-01-01 RX ORDER — HYDROMORPHONE HYDROCHLORIDE 2 MG/1
0.5 TABLET ORAL ONCE
Refills: 0 | Status: DISCONTINUED | OUTPATIENT
Start: 2024-01-01 | End: 2024-01-01

## 2024-01-01 RX ORDER — KETAMINE HYDROCHLORIDE 10 MG/ML
50 INJECTION INTRAMUSCULAR; INTRAVENOUS ONCE
Refills: 0 | Status: DISCONTINUED | OUTPATIENT
Start: 2024-01-01 | End: 2024-01-01

## 2024-01-01 RX ORDER — MIDODRINE HYDROCHLORIDE 5 MG/1
2 TABLET ORAL
Refills: 0 | DISCHARGE

## 2024-01-01 RX ORDER — OXYCODONE HYDROCHLORIDE 5 MG/1
2.5 TABLET ORAL ONCE
Refills: 0 | Status: DISCONTINUED | OUTPATIENT
Start: 2024-01-01 | End: 2024-01-01

## 2024-01-01 RX ORDER — POTASSIUM PHOSPHATE 236; 224 MG/ML; MG/ML
15 INJECTION, SOLUTION INTRAVENOUS ONCE
Refills: 0 | Status: COMPLETED | OUTPATIENT
Start: 2024-01-01 | End: 2024-01-01

## 2024-01-01 RX ORDER — SODIUM PHOSPHATE, MONOBASIC, MONOHYDRATE AND SODIUM PHOSPHATE, DIBASIC ANHYDROUS 276; 142 MG/ML; MG/ML
15 INJECTION, SOLUTION INTRAVENOUS ONCE
Refills: 0 | Status: DISCONTINUED | OUTPATIENT
Start: 2024-01-01 | End: 2024-01-01

## 2024-01-01 RX ORDER — FENTANYL CITRATE 50 UG/ML
50 INJECTION INTRAMUSCULAR; INTRAVENOUS ONCE
Refills: 0 | Status: DISCONTINUED | OUTPATIENT
Start: 2024-01-01 | End: 2024-01-01

## 2024-01-01 RX ORDER — FLUCONAZOLE 150 MG/1
400 TABLET ORAL EVERY 24 HOURS
Refills: 0 | Status: DISCONTINUED | OUTPATIENT
Start: 2024-01-01 | End: 2024-01-01

## 2024-01-01 RX ORDER — METHOCARBAMOL 750 MG/1
750 TABLET, FILM COATED ORAL ONCE
Refills: 0 | Status: COMPLETED | OUTPATIENT
Start: 2024-01-01 | End: 2024-01-01

## 2024-01-01 RX ORDER — LACTULOSE 10 G
20 PACKET (EA) ORAL EVERY 12 HOURS
Refills: 0 | Status: DISCONTINUED | OUTPATIENT
Start: 2024-01-01 | End: 2024-01-01

## 2024-01-01 RX ORDER — THIAMINE HCL 250 MG
500 TABLET ORAL DAILY
Refills: 0 | Status: ACTIVE | OUTPATIENT
Start: 2024-01-01 | End: 2024-01-01

## 2024-01-01 RX ORDER — DEXMEDETOMIDINE HYDROCHLORIDE IN 0.9% SODIUM CHLORIDE 4 UG/ML
0.5 INJECTION INTRAVENOUS
Qty: 200 | Refills: 0 | Status: DISCONTINUED | OUTPATIENT
Start: 2024-01-01 | End: 2024-01-01

## 2024-01-01 RX ORDER — VANCOMYCIN/0.9 % SOD CHLORIDE 1.75G/25
1000 PLASTIC BAG, INJECTION (ML) INTRAVENOUS
Refills: 0 | Status: DISCONTINUED | OUTPATIENT
Start: 2024-01-01 | End: 2024-01-01

## 2024-01-01 RX ORDER — PIPERACILLIN SODIUM AND TAZOBACTAM SODIUM 3; .375 G/15ML; G/15ML
3.38 INJECTION, POWDER, FOR SOLUTION INTRAVENOUS ONCE
Refills: 0 | Status: DISCONTINUED | OUTPATIENT
Start: 2024-01-01 | End: 2024-01-01

## 2024-01-01 RX ORDER — DOXYCYCLINE MONOHYDRATE 100 MG
100 TABLET ORAL ONCE
Refills: 0 | Status: COMPLETED | OUTPATIENT
Start: 2024-01-01 | End: 2024-01-01

## 2024-01-01 RX ORDER — PSYLLIUM HUSK 0.4 G
15 CAPSULE ORAL DAILY
Refills: 0 | Status: DISCONTINUED | OUTPATIENT
Start: 2024-01-01 | End: 2024-01-01

## 2024-01-01 RX ORDER — VANCOMYCIN/0.9 % SOD CHLORIDE 1.75G/25
1000 PLASTIC BAG, INJECTION (ML) INTRAVENOUS ONCE
Refills: 0 | Status: COMPLETED | OUTPATIENT
Start: 2024-01-01 | End: 2024-01-01

## 2024-01-01 RX ORDER — FLUCONAZOLE 150 MG/1
TABLET ORAL
Refills: 0 | Status: DISCONTINUED | OUTPATIENT
Start: 2024-01-01 | End: 2024-01-01

## 2024-01-01 RX ORDER — FLUCONAZOLE 150 MG/1
200 TABLET ORAL EVERY 24 HOURS
Refills: 0 | Status: DISCONTINUED | OUTPATIENT
Start: 2024-01-01 | End: 2024-01-01

## 2024-01-01 RX ORDER — IPRATROPIUM BROMIDE AND ALBUTEROL SULFATE .5; 3 MG/3ML; MG/3ML
3 SOLUTION RESPIRATORY (INHALATION) ONCE
Refills: 0 | Status: COMPLETED | OUTPATIENT
Start: 2024-01-01 | End: 2024-01-01

## 2024-01-01 RX ORDER — PROPOFOL 10 MG/ML
20 INJECTION, EMULSION INTRAVENOUS
Qty: 1000 | Refills: 0 | Status: DISCONTINUED | OUTPATIENT
Start: 2024-01-01 | End: 2024-01-01

## 2024-01-01 RX ORDER — ONDANSETRON 2 MG/ML
4 INJECTION, SOLUTION INTRAMUSCULAR; INTRAVENOUS ONCE
Refills: 0 | Status: COMPLETED | OUTPATIENT
Start: 2024-01-01 | End: 2024-01-01

## 2024-01-01 RX ORDER — HYDROCORTISONE 10 MG/1
100 TABLET ORAL ONCE
Refills: 0 | Status: COMPLETED | OUTPATIENT
Start: 2024-01-01 | End: 2024-01-01

## 2024-01-01 RX ORDER — MIDODRINE HYDROCHLORIDE 5 MG/1
20 TABLET ORAL THREE TIMES A DAY
Refills: 0 | Status: DISCONTINUED | OUTPATIENT
Start: 2024-01-01 | End: 2024-01-01

## 2024-01-01 RX ORDER — BENZONATATE 100 MG
100 CAPSULE ORAL ONCE
Refills: 0 | Status: COMPLETED | OUTPATIENT
Start: 2024-01-01 | End: 2024-01-01

## 2024-01-01 RX ORDER — VASOPRESSIN 20 [USP'U]/ML
0.03 INJECTION INTRAVENOUS
Qty: 40 | Refills: 0 | Status: DISCONTINUED | OUTPATIENT
Start: 2024-01-01 | End: 2024-01-01

## 2024-01-01 RX ORDER — LINEZOLID 600 MG/300ML
600 INJECTION INTRAVENOUS EVERY 12 HOURS
Refills: 0 | Status: DISCONTINUED | OUTPATIENT
Start: 2024-01-01 | End: 2024-01-01

## 2024-01-01 RX ORDER — CASPOFUNGIN ACETATE 7 MG/ML
70 INJECTION, POWDER, LYOPHILIZED, FOR SOLUTION INTRAVENOUS ONCE
Refills: 0 | Status: COMPLETED | OUTPATIENT
Start: 2024-01-01 | End: 2024-01-01

## 2024-01-01 RX ORDER — VANCOMYCIN/0.9 % SOD CHLORIDE 1.75G/25
1250 PLASTIC BAG, INJECTION (ML) INTRAVENOUS EVERY 12 HOURS
Refills: 0 | Status: DISCONTINUED | OUTPATIENT
Start: 2024-01-01 | End: 2024-01-01

## 2024-01-01 RX ORDER — POLYETHYLENE GLYCOL 3350, SODIUM SULFATE ANHYDROUS, SODIUM BICARBONATE, SODIUM CHLORIDE, POTASSIUM CHLORIDE 236; 22.74; 6.74; 5.86; 2.97 G/4L; G/4L; G/4L; G/4L; G/4L
3000 POWDER, FOR SOLUTION ORAL ONCE
Refills: 0 | Status: COMPLETED | OUTPATIENT
Start: 2024-01-01 | End: 2024-01-01

## 2024-01-01 RX ORDER — PHENYLEPHRINE HYDROCHLORIDE 10 MG/ML
0.2 INJECTION INTRAVENOUS
Qty: 40 | Refills: 0 | Status: DISCONTINUED | OUTPATIENT
Start: 2024-01-01 | End: 2024-01-01

## 2024-01-01 RX ORDER — SODIUM CHLORIDE 9 MG/ML
10 INJECTION INTRAMUSCULAR; INTRAVENOUS; SUBCUTANEOUS
Refills: 0 | Status: DISCONTINUED | OUTPATIENT
Start: 2024-01-01 | End: 2024-01-01

## 2024-01-01 RX ORDER — CHLORHEXIDINE GLUCONATE 40 MG/ML
15 SOLUTION TOPICAL EVERY 12 HOURS
Refills: 0 | Status: DISCONTINUED | OUTPATIENT
Start: 2024-01-01 | End: 2024-01-01

## 2024-01-01 RX ORDER — HYDROMORPHONE HYDROCHLORIDE 2 MG/1
0.5 TABLET ORAL
Refills: 0 | Status: DISCONTINUED | OUTPATIENT
Start: 2024-01-01 | End: 2024-01-01

## 2024-01-01 RX ORDER — LIDOCAINE/BENZALKONIUM/ALCOHOL
1 SOLUTION, NON-ORAL TOPICAL DAILY
Refills: 0 | Status: DISCONTINUED | OUTPATIENT
Start: 2024-01-01 | End: 2024-01-01

## 2024-01-01 RX ORDER — CHLORHEXIDINE GLUCONATE 40 MG/ML
1 SOLUTION TOPICAL
Refills: 0 | Status: DISCONTINUED | OUTPATIENT
Start: 2024-01-01 | End: 2024-01-01

## 2024-01-01 RX ORDER — ROPIVACAINE IN 0.9% SOD CHL/PF 0.1 %
0.18 PLASTIC BAG, INJECTION (ML) EPIDURAL
Qty: 32 | Refills: 0 | Status: DISCONTINUED | OUTPATIENT
Start: 2024-01-01 | End: 2024-01-01

## 2024-01-01 RX ORDER — DEXMEDETOMIDINE HYDROCHLORIDE IN 0.9% SODIUM CHLORIDE 4 UG/ML
0.2 INJECTION INTRAVENOUS
Qty: 200 | Refills: 0 | Status: DISCONTINUED | OUTPATIENT
Start: 2024-01-01 | End: 2024-01-01

## 2024-01-01 RX ORDER — LACTULOSE 10 G
30 PACKET (EA) ORAL
Refills: 0 | DISCHARGE

## 2024-01-01 RX ORDER — MIDAZOLAM HYDROCHLORIDE 5 MG/ML
1 INJECTION, SOLUTION INTRAMUSCULAR; INTRAVENOUS ONCE
Refills: 0 | Status: DISCONTINUED | OUTPATIENT
Start: 2024-01-01 | End: 2024-01-01

## 2024-01-01 RX ORDER — CASPOFUNGIN ACETATE 7 MG/ML
INJECTION, POWDER, LYOPHILIZED, FOR SOLUTION INTRAVENOUS
Refills: 0 | Status: DISCONTINUED | OUTPATIENT
Start: 2024-01-01 | End: 2024-01-01

## 2024-01-01 RX ORDER — MIDAZOLAM HYDROCHLORIDE 5 MG/ML
2 INJECTION, SOLUTION INTRAMUSCULAR; INTRAVENOUS ONCE
Refills: 0 | Status: DISCONTINUED | OUTPATIENT
Start: 2024-01-01 | End: 2024-01-01

## 2024-01-01 RX ORDER — DOXYCYCLINE MONOHYDRATE 100 MG
TABLET ORAL
Refills: 0 | Status: DISCONTINUED | OUTPATIENT
Start: 2024-01-01 | End: 2024-01-01

## 2024-01-01 RX ORDER — HYDROMORPHONE HYDROCHLORIDE 2 MG/1
0.25 TABLET ORAL
Refills: 0 | Status: DISCONTINUED | OUTPATIENT
Start: 2024-01-01 | End: 2024-01-01

## 2024-01-01 RX ORDER — ROPIVACAINE IN 0.9% SOD CHL/PF 0.1 %
0.05 PLASTIC BAG, INJECTION (ML) EPIDURAL
Qty: 8 | Refills: 0 | Status: DISCONTINUED | OUTPATIENT
Start: 2024-01-01 | End: 2024-01-01

## 2024-01-01 RX ORDER — ACETAMINOPHEN 325 MG/1
650 TABLET ORAL EVERY 6 HOURS
Refills: 0 | Status: DISCONTINUED | OUTPATIENT
Start: 2024-01-01 | End: 2024-01-01

## 2024-01-01 RX ORDER — HYDROMORPHONE HYDROCHLORIDE 2 MG/1
0.25 TABLET ORAL ONCE
Refills: 0 | Status: DISCONTINUED | OUTPATIENT
Start: 2024-01-01 | End: 2024-01-01

## 2024-01-01 RX ORDER — OCTREOTIDE ACETATE 100 UG/ML
50 INJECTION, SOLUTION INTRAVENOUS; SUBCUTANEOUS
Qty: 500 | Refills: 0 | Status: DISCONTINUED | OUTPATIENT
Start: 2024-01-01 | End: 2024-01-01

## 2024-01-01 RX ORDER — ROPIVACAINE IN 0.9% SOD CHL/PF 0.1 %
0.35 PLASTIC BAG, INJECTION (ML) EPIDURAL
Qty: 16 | Refills: 0 | Status: DISCONTINUED | OUTPATIENT
Start: 2024-01-01 | End: 2024-01-01

## 2024-01-01 RX ORDER — OXYCODONE HYDROCHLORIDE 5 MG/1
5 TABLET ORAL ONCE
Refills: 0 | Status: DISCONTINUED | OUTPATIENT
Start: 2024-01-01 | End: 2024-01-01

## 2024-01-01 RX ORDER — SALIVA SUBSTITUTE COMB NO.10
5 POWDER IN PACKET (EA) MUCOUS MEMBRANE EVERY 12 HOURS
Refills: 0 | Status: DISCONTINUED | OUTPATIENT
Start: 2024-01-01 | End: 2024-01-01

## 2024-01-01 RX ORDER — FLUCONAZOLE 150 MG/1
400 TABLET ORAL ONCE
Refills: 0 | Status: COMPLETED | OUTPATIENT
Start: 2024-01-01 | End: 2024-01-01

## 2024-01-01 RX ORDER — SODIUM PHOSPHATE, DIBASIC, ANHYDROUS, POTASSIUM PHOSPHATE, MONOBASIC, AND SODIUM PHOSPHATE, MONOBASIC, MONOHYDRATE 852; 155; 130 MG/1; MG/1; MG/1
1 TABLET, COATED ORAL ONCE
Refills: 0 | Status: COMPLETED | OUTPATIENT
Start: 2024-01-01 | End: 2024-01-01

## 2024-01-01 RX ORDER — MEROPENEM 500 MG/10ML
1000 INJECTION, POWDER, FOR SOLUTION INTRAVENOUS ONCE
Refills: 0 | Status: COMPLETED | OUTPATIENT
Start: 2024-01-01 | End: 2024-01-01

## 2024-01-01 RX ORDER — PROPOFOL 10 MG/ML
100 INJECTION, EMULSION INTRAVENOUS ONCE
Refills: 0 | Status: COMPLETED | OUTPATIENT
Start: 2024-01-01 | End: 2024-01-01

## 2024-01-01 RX ORDER — DESMOPRESSIN ACETATE 4 UG/ML
30 INJECTION, SOLUTION INTRAVENOUS; SUBCUTANEOUS ONCE
Refills: 0 | Status: DISCONTINUED | OUTPATIENT
Start: 2024-01-01 | End: 2024-01-01

## 2024-01-01 RX ORDER — DESMOPRESSIN ACETATE 4 UG/ML
30 INJECTION, SOLUTION INTRAVENOUS; SUBCUTANEOUS ONCE
Refills: 0 | Status: COMPLETED | OUTPATIENT
Start: 2024-01-01 | End: 2024-01-01

## 2024-01-01 RX ORDER — PANTOPRAZOLE SODIUM 40 MG
40 TABLET, DELAYED RELEASE (ENTERIC COATED) ORAL
Refills: 0 | Status: DISCONTINUED | OUTPATIENT
Start: 2024-01-01 | End: 2024-01-01

## 2024-01-01 RX ADMIN — Medication 1 PATCH: at 23:30

## 2024-01-01 RX ADMIN — Medication 1 TABLET(S): at 11:19

## 2024-01-01 RX ADMIN — MEROPENEM 100 MILLIGRAM(S): 500 INJECTION, POWDER, FOR SOLUTION INTRAVENOUS at 05:01

## 2024-01-01 RX ADMIN — Medication 1 PATCH: at 23:09

## 2024-01-01 RX ADMIN — FLUCONAZOLE 100 MILLIGRAM(S): 150 TABLET ORAL at 11:30

## 2024-01-01 RX ADMIN — Medication 2: at 05:52

## 2024-01-01 RX ADMIN — Medication 1 PATCH: at 19:00

## 2024-01-01 RX ADMIN — MEROPENEM 100 MILLIGRAM(S): 500 INJECTION, POWDER, FOR SOLUTION INTRAVENOUS at 18:05

## 2024-01-01 RX ADMIN — VASOPRESSIN 4.5 UNIT(S)/MIN: 20 INJECTION INTRAVENOUS at 05:06

## 2024-01-01 RX ADMIN — LINEZOLID 300 MILLIGRAM(S): 600 INJECTION INTRAVENOUS at 17:51

## 2024-01-01 RX ADMIN — VASOPRESSIN 4.5 UNIT(S)/MIN: 20 INJECTION INTRAVENOUS at 18:23

## 2024-01-01 RX ADMIN — Medication 100 MILLIGRAM(S): at 05:52

## 2024-01-01 RX ADMIN — ALBUMIN (HUMAN) 250 MILLILITER(S): 5 SOLUTION INTRAVENOUS at 22:24

## 2024-01-01 RX ADMIN — Medication 20 GRAM(S): at 17:25

## 2024-01-01 RX ADMIN — FLUCONAZOLE 100 MILLIGRAM(S): 150 TABLET ORAL at 20:11

## 2024-01-01 RX ADMIN — MEROPENEM 100 MILLIGRAM(S): 500 INJECTION, POWDER, FOR SOLUTION INTRAVENOUS at 22:08

## 2024-01-01 RX ADMIN — Medication 20 GRAM(S): at 17:49

## 2024-01-01 RX ADMIN — PIPERACILLIN SODIUM AND TAZOBACTAM SODIUM 25 GRAM(S): 3; .375 INJECTION, POWDER, FOR SOLUTION INTRAVENOUS at 00:32

## 2024-01-01 RX ADMIN — CHLORHEXIDINE GLUCONATE 1 APPLICATION(S): 40 SOLUTION TOPICAL at 05:34

## 2024-01-01 RX ADMIN — Medication 40 MILLIGRAM(S): at 17:28

## 2024-01-01 RX ADMIN — MEROPENEM 100 MILLIGRAM(S): 500 INJECTION, POWDER, FOR SOLUTION INTRAVENOUS at 13:06

## 2024-01-01 RX ADMIN — Medication 100 MILLIGRAM(S): at 17:17

## 2024-01-01 RX ADMIN — Medication 13.9 MICROGRAM(S)/KG/MIN: at 01:40

## 2024-01-01 RX ADMIN — PHENYLEPHRINE HYDROCHLORIDE 8.55 MICROGRAM(S)/KG/MIN: 10 INJECTION INTRAVENOUS at 07:55

## 2024-01-01 RX ADMIN — OXYCODONE HYDROCHLORIDE 2.5 MILLIGRAM(S): 5 TABLET ORAL at 04:12

## 2024-01-01 RX ADMIN — Medication 40 MILLIGRAM(S): at 05:10

## 2024-01-01 RX ADMIN — POLYETHYLENE GLYCOL 3350 17 GRAM(S): 17 POWDER, FOR SOLUTION ORAL at 06:08

## 2024-01-01 RX ADMIN — VASOPRESSIN 4.5 UNIT(S)/MIN: 20 INJECTION INTRAVENOUS at 19:34

## 2024-01-01 RX ADMIN — Medication 53.4 MICROGRAM(S)/KG/MIN: at 14:26

## 2024-01-01 RX ADMIN — SODIUM PHOSPHATE, MONOBASIC, MONOHYDRATE AND SODIUM PHOSPHATE, DIBASIC ANHYDROUS 255 MILLIMOLE(S): 276; 142 INJECTION, SOLUTION INTRAVENOUS at 18:53

## 2024-01-01 RX ADMIN — ALBUMIN (HUMAN) 200 MILLILITER(S): 5 SOLUTION INTRAVENOUS at 12:12

## 2024-01-01 RX ADMIN — SODIUM PHOSPHATE, MONOBASIC, MONOHYDRATE AND SODIUM PHOSPHATE, DIBASIC ANHYDROUS 255 MILLIMOLE(S): 276; 142 INJECTION, SOLUTION INTRAVENOUS at 02:00

## 2024-01-01 RX ADMIN — VASOPRESSIN 4.5 UNIT(S)/MIN: 20 INJECTION INTRAVENOUS at 21:18

## 2024-01-01 RX ADMIN — PIPERACILLIN SODIUM AND TAZOBACTAM SODIUM 25 GRAM(S): 3; .375 INJECTION, POWDER, FOR SOLUTION INTRAVENOUS at 14:47

## 2024-01-01 RX ADMIN — VASOPRESSIN 4.5 UNIT(S)/MIN: 20 INJECTION INTRAVENOUS at 20:04

## 2024-01-01 RX ADMIN — CHLORHEXIDINE GLUCONATE 1 APPLICATION(S): 40 SOLUTION TOPICAL at 05:59

## 2024-01-01 RX ADMIN — Medication 200 GRAM(S): at 13:13

## 2024-01-01 RX ADMIN — VASOPRESSIN 4.5 UNIT(S)/MIN: 20 INJECTION INTRAVENOUS at 07:27

## 2024-01-01 RX ADMIN — Medication 105 MILLIGRAM(S): at 12:00

## 2024-01-01 RX ADMIN — Medication 40 MILLIGRAM(S): at 17:16

## 2024-01-01 RX ADMIN — Medication 2: at 06:12

## 2024-01-01 RX ADMIN — Medication 15 MILLILITER(S): at 11:15

## 2024-01-01 RX ADMIN — Medication 5 MILLILITER(S): at 05:43

## 2024-01-01 RX ADMIN — POLYETHYLENE GLYCOL 3350 17 GRAM(S): 17 POWDER, FOR SOLUTION ORAL at 17:40

## 2024-01-01 RX ADMIN — MIDODRINE HYDROCHLORIDE 20 MILLIGRAM(S): 5 TABLET ORAL at 21:37

## 2024-01-01 RX ADMIN — Medication 15 MICROGRAM(S)/KG/MIN: at 17:19

## 2024-01-01 RX ADMIN — OXYCODONE HYDROCHLORIDE 5 MILLIGRAM(S): 5 TABLET ORAL at 05:45

## 2024-01-01 RX ADMIN — Medication 19.2 MICROGRAM(S)/KG/MIN: at 16:16

## 2024-01-01 RX ADMIN — FLUCONAZOLE 100 MILLIGRAM(S): 150 TABLET ORAL at 09:42

## 2024-01-01 RX ADMIN — DEXMEDETOMIDINE HYDROCHLORIDE IN 0.9% SODIUM CHLORIDE 5.7 MICROGRAM(S)/KG/HR: 4 INJECTION INTRAVENOUS at 13:26

## 2024-01-01 RX ADMIN — HYDROCORTISONE 50 MILLIGRAM(S): 10 TABLET ORAL at 05:53

## 2024-01-01 RX ADMIN — Medication 5 MILLIGRAM(S): at 21:13

## 2024-01-01 RX ADMIN — OCTREOTIDE ACETATE 10 MICROGRAM(S)/HR: 100 INJECTION, SOLUTION INTRAVENOUS; SUBCUTANEOUS at 07:59

## 2024-01-01 RX ADMIN — POLYETHYLENE GLYCOL 3350 17 GRAM(S): 17 POWDER, FOR SOLUTION ORAL at 23:20

## 2024-01-01 RX ADMIN — VASOPRESSIN 4.5 UNIT(S)/MIN: 20 INJECTION INTRAVENOUS at 19:20

## 2024-01-01 RX ADMIN — CHLORHEXIDINE GLUCONATE 15 MILLILITER(S): 40 SOLUTION TOPICAL at 05:47

## 2024-01-01 RX ADMIN — Medication 40 MILLIGRAM(S): at 05:02

## 2024-01-01 RX ADMIN — Medication 2: at 12:28

## 2024-01-01 RX ADMIN — ALBUMIN (HUMAN) 250 MILLILITER(S): 5 SOLUTION INTRAVENOUS at 13:40

## 2024-01-01 RX ADMIN — Medication 15 MILLILITER(S): at 11:30

## 2024-01-01 RX ADMIN — Medication 1 PATCH: at 22:17

## 2024-01-01 RX ADMIN — Medication 2: at 06:03

## 2024-01-01 RX ADMIN — HYDROMORPHONE HYDROCHLORIDE 0.25 MILLIGRAM(S): 2 TABLET ORAL at 13:05

## 2024-01-01 RX ADMIN — Medication 40 MILLIGRAM(S): at 17:22

## 2024-01-01 RX ADMIN — Medication 40 MILLIGRAM(S): at 05:05

## 2024-01-01 RX ADMIN — MIDODRINE HYDROCHLORIDE 20 MILLIGRAM(S): 5 TABLET ORAL at 14:05

## 2024-01-01 RX ADMIN — POLYETHYLENE GLYCOL 3350, SODIUM SULFATE ANHYDROUS, SODIUM BICARBONATE, SODIUM CHLORIDE, POTASSIUM CHLORIDE 3000 MILLILITER(S): 236; 22.74; 6.74; 5.86; 2.97 POWDER, FOR SOLUTION ORAL at 23:05

## 2024-01-01 RX ADMIN — MIDODRINE HYDROCHLORIDE 20 MILLIGRAM(S): 5 TABLET ORAL at 13:43

## 2024-01-01 RX ADMIN — Medication 40 MILLIGRAM(S): at 17:17

## 2024-01-01 RX ADMIN — Medication 200 GRAM(S): at 20:07

## 2024-01-01 RX ADMIN — Medication 2: at 18:16

## 2024-01-01 RX ADMIN — MEROPENEM 100 MILLIGRAM(S): 500 INJECTION, POWDER, FOR SOLUTION INTRAVENOUS at 21:09

## 2024-01-01 RX ADMIN — Medication 40 MILLIGRAM(S): at 17:29

## 2024-01-01 RX ADMIN — HYDROCORTISONE 100 MILLIGRAM(S): 10 TABLET ORAL at 23:14

## 2024-01-01 RX ADMIN — Medication 100 MILLIGRAM(S): at 05:12

## 2024-01-01 RX ADMIN — Medication 100 MILLIGRAM(S): at 05:11

## 2024-01-01 RX ADMIN — Medication 2: at 06:42

## 2024-01-01 RX ADMIN — Medication 37.4 MICROGRAM(S)/KG/MIN: at 11:22

## 2024-01-01 RX ADMIN — Medication 2: at 06:06

## 2024-01-01 RX ADMIN — VASOPRESSIN 4.5 UNIT(S)/MIN: 20 INJECTION INTRAVENOUS at 19:35

## 2024-01-01 RX ADMIN — Medication 1 PATCH: at 20:22

## 2024-01-01 RX ADMIN — MIDODRINE HYDROCHLORIDE 20 MILLIGRAM(S): 5 TABLET ORAL at 05:02

## 2024-01-01 RX ADMIN — Medication 1 PATCH: at 18:16

## 2024-01-01 RX ADMIN — OCTREOTIDE ACETATE 2 MICROGRAM(S)/HR: 100 INJECTION, SOLUTION INTRAVENOUS; SUBCUTANEOUS at 13:06

## 2024-01-01 RX ADMIN — Medication 20 GRAM(S): at 05:43

## 2024-01-01 RX ADMIN — Medication 1 MILLIGRAM(S): at 11:31

## 2024-01-01 RX ADMIN — VASOPRESSIN 4.5 UNIT(S)/MIN: 20 INJECTION INTRAVENOUS at 07:23

## 2024-01-01 RX ADMIN — CHLORHEXIDINE GLUCONATE 15 MILLILITER(S): 40 SOLUTION TOPICAL at 06:06

## 2024-01-01 RX ADMIN — Medication 1 PATCH: at 23:56

## 2024-01-01 RX ADMIN — CHLORHEXIDINE GLUCONATE 1 APPLICATION(S): 40 SOLUTION TOPICAL at 06:52

## 2024-01-01 RX ADMIN — Medication 40 MILLIGRAM(S): at 17:12

## 2024-01-01 RX ADMIN — HYDROCORTISONE 50 MILLIGRAM(S): 10 TABLET ORAL at 23:19

## 2024-01-01 RX ADMIN — ALBUMIN (HUMAN) 500 MILLILITER(S): 5 SOLUTION INTRAVENOUS at 17:05

## 2024-01-01 RX ADMIN — Medication 20 GRAM(S): at 22:13

## 2024-01-01 RX ADMIN — Medication 100 MILLIGRAM(S): at 12:27

## 2024-01-01 RX ADMIN — Medication 2: at 00:40

## 2024-01-01 RX ADMIN — MEROPENEM 100 MILLIGRAM(S): 500 INJECTION, POWDER, FOR SOLUTION INTRAVENOUS at 13:35

## 2024-01-01 RX ADMIN — ALBUMIN (HUMAN) 125 MILLILITER(S): 5 SOLUTION INTRAVENOUS at 13:28

## 2024-01-01 RX ADMIN — SODIUM PHOSPHATE, MONOBASIC, MONOHYDRATE AND SODIUM PHOSPHATE, DIBASIC ANHYDROUS 255 MILLIMOLE(S): 276; 142 INJECTION, SOLUTION INTRAVENOUS at 16:03

## 2024-01-01 RX ADMIN — Medication 1 PATCH: at 12:05

## 2024-01-01 RX ADMIN — Medication 13.9 MICROGRAM(S)/KG/MIN: at 05:16

## 2024-01-01 RX ADMIN — Medication 40 MILLIGRAM(S): at 05:06

## 2024-01-01 RX ADMIN — Medication 200 GRAM(S): at 00:47

## 2024-01-01 RX ADMIN — Medication 1 PATCH: at 19:17

## 2024-01-01 RX ADMIN — POLYETHYLENE GLYCOL 3350 17 GRAM(S): 17 POWDER, FOR SOLUTION ORAL at 17:48

## 2024-01-01 RX ADMIN — VASOPRESSIN 4.5 UNIT(S)/MIN: 20 INJECTION INTRAVENOUS at 00:31

## 2024-01-01 RX ADMIN — HYDROCORTISONE 50 MILLIGRAM(S): 10 TABLET ORAL at 17:16

## 2024-01-01 RX ADMIN — Medication 2: at 07:23

## 2024-01-01 RX ADMIN — Medication 200 GRAM(S): at 09:58

## 2024-01-01 RX ADMIN — Medication 2: at 12:04

## 2024-01-01 RX ADMIN — Medication 2: at 18:04

## 2024-01-01 RX ADMIN — HYDROCORTISONE 50 MILLIGRAM(S): 10 TABLET ORAL at 17:29

## 2024-01-01 RX ADMIN — POLYETHYLENE GLYCOL 3350, SODIUM SULFATE ANHYDROUS, SODIUM BICARBONATE, SODIUM CHLORIDE, POTASSIUM CHLORIDE 1000 MILLILITER(S): 236; 22.74; 6.74; 5.86; 2.97 POWDER, FOR SOLUTION ORAL at 13:34

## 2024-01-01 RX ADMIN — PIPERACILLIN SODIUM AND TAZOBACTAM SODIUM 25 GRAM(S): 3; .375 INJECTION, POWDER, FOR SOLUTION INTRAVENOUS at 05:16

## 2024-01-01 RX ADMIN — Medication 2: at 18:23

## 2024-01-01 RX ADMIN — ALBUMIN (HUMAN) 125 MILLILITER(S): 5 SOLUTION INTRAVENOUS at 17:21

## 2024-01-01 RX ADMIN — MIDODRINE HYDROCHLORIDE 20 MILLIGRAM(S): 5 TABLET ORAL at 05:22

## 2024-01-01 RX ADMIN — FLUCONAZOLE 100 MILLIGRAM(S): 150 TABLET ORAL at 19:58

## 2024-01-01 RX ADMIN — Medication 1 PATCH: at 00:42

## 2024-01-01 RX ADMIN — Medication 1 MILLIGRAM(S): at 12:27

## 2024-01-01 RX ADMIN — Medication 20 GRAM(S): at 05:06

## 2024-01-01 RX ADMIN — Medication 2: at 00:06

## 2024-01-01 RX ADMIN — Medication 100 MILLIGRAM(S): at 12:05

## 2024-01-01 RX ADMIN — PIPERACILLIN SODIUM AND TAZOBACTAM SODIUM 25 GRAM(S): 3; .375 INJECTION, POWDER, FOR SOLUTION INTRAVENOUS at 23:19

## 2024-01-01 RX ADMIN — Medication 2: at 12:06

## 2024-01-01 RX ADMIN — Medication 53.4 MICROGRAM(S)/KG/MIN: at 08:53

## 2024-01-01 RX ADMIN — SODIUM PHOSPHATE, MONOBASIC, MONOHYDRATE AND SODIUM PHOSPHATE, DIBASIC ANHYDROUS 255 MILLIMOLE(S): 276; 142 INJECTION, SOLUTION INTRAVENOUS at 13:27

## 2024-01-01 RX ADMIN — HYDROMORPHONE HYDROCHLORIDE 0.5 MILLIGRAM(S): 2 TABLET ORAL at 00:55

## 2024-01-01 RX ADMIN — Medication 40 MILLIGRAM(S): at 05:43

## 2024-01-01 RX ADMIN — CHLORHEXIDINE GLUCONATE 1 APPLICATION(S): 40 SOLUTION TOPICAL at 05:04

## 2024-01-01 RX ADMIN — DEXMEDETOMIDINE HYDROCHLORIDE IN 0.9% SODIUM CHLORIDE 14.3 MICROGRAM(S)/KG/HR: 4 INJECTION INTRAVENOUS at 19:41

## 2024-01-01 RX ADMIN — CHLORHEXIDINE GLUCONATE 1 APPLICATION(S): 40 SOLUTION TOPICAL at 05:54

## 2024-01-01 RX ADMIN — POLYETHYLENE GLYCOL 3350 17 GRAM(S): 17 POWDER, FOR SOLUTION ORAL at 00:10

## 2024-01-01 RX ADMIN — Medication 40 MILLIGRAM(S): at 05:04

## 2024-01-01 RX ADMIN — PIPERACILLIN SODIUM AND TAZOBACTAM SODIUM 25 GRAM(S): 3; .375 INJECTION, POWDER, FOR SOLUTION INTRAVENOUS at 11:19

## 2024-01-01 RX ADMIN — Medication 5 MILLILITER(S): at 06:01

## 2024-01-01 RX ADMIN — Medication 15 MICROGRAM(S)/KG/MIN: at 07:23

## 2024-01-01 RX ADMIN — Medication 53.4 MICROGRAM(S)/KG/MIN: at 20:04

## 2024-01-01 RX ADMIN — Medication 37.4 MICROGRAM(S)/KG/MIN: at 19:29

## 2024-01-01 RX ADMIN — ALBUMIN (HUMAN) 250 MILLILITER(S): 5 SOLUTION INTRAVENOUS at 06:02

## 2024-01-01 RX ADMIN — ALBUMIN (HUMAN) 1000 MILLILITER(S): 5 SOLUTION INTRAVENOUS at 00:03

## 2024-01-01 RX ADMIN — MEROPENEM 100 MILLIGRAM(S): 500 INJECTION, POWDER, FOR SOLUTION INTRAVENOUS at 22:01

## 2024-01-01 RX ADMIN — Medication 1 PATCH: at 00:54

## 2024-01-01 RX ADMIN — Medication 50 GRAM(S): at 21:02

## 2024-01-01 RX ADMIN — Medication 2: at 05:38

## 2024-01-01 RX ADMIN — Medication 2: at 17:49

## 2024-01-01 RX ADMIN — Medication 100 MILLIGRAM(S): at 11:47

## 2024-01-01 RX ADMIN — Medication 20 GRAM(S): at 17:33

## 2024-01-01 RX ADMIN — SODIUM PHOSPHATE, MONOBASIC, MONOHYDRATE AND SODIUM PHOSPHATE, DIBASIC ANHYDROUS 255 MILLIMOLE(S): 276; 142 INJECTION, SOLUTION INTRAVENOUS at 23:39

## 2024-01-01 RX ADMIN — Medication 1 MILLIGRAM(S): at 12:18

## 2024-01-01 RX ADMIN — MIDODRINE HYDROCHLORIDE 20 MILLIGRAM(S): 5 TABLET ORAL at 13:00

## 2024-01-01 RX ADMIN — CHLORHEXIDINE GLUCONATE 1 APPLICATION(S): 40 SOLUTION TOPICAL at 06:07

## 2024-01-01 RX ADMIN — Medication 100 MILLIGRAM(S): at 11:15

## 2024-01-01 RX ADMIN — POLYETHYLENE GLYCOL 3350 17 GRAM(S): 17 POWDER, FOR SOLUTION ORAL at 18:25

## 2024-01-01 RX ADMIN — Medication 1 MILLIGRAM(S): at 11:11

## 2024-01-01 RX ADMIN — POLYETHYLENE GLYCOL 3350 17 GRAM(S): 17 POWDER, FOR SOLUTION ORAL at 23:38

## 2024-01-01 RX ADMIN — SODIUM PHOSPHATE, MONOBASIC, MONOHYDRATE AND SODIUM PHOSPHATE, DIBASIC ANHYDROUS 255 MILLIMOLE(S): 276; 142 INJECTION, SOLUTION INTRAVENOUS at 19:15

## 2024-01-01 RX ADMIN — Medication 100 MILLIGRAM(S): at 14:43

## 2024-01-01 RX ADMIN — Medication 1 PATCH: at 07:17

## 2024-01-01 RX ADMIN — Medication 1 PATCH: at 19:41

## 2024-01-01 RX ADMIN — Medication 13.9 MICROGRAM(S)/KG/MIN: at 18:20

## 2024-01-01 RX ADMIN — Medication 100 GRAM(S): at 22:14

## 2024-01-01 RX ADMIN — VASOPRESSIN 4.5 UNIT(S)/MIN: 20 INJECTION INTRAVENOUS at 07:05

## 2024-01-01 RX ADMIN — Medication 100 MILLIGRAM(S): at 05:33

## 2024-01-01 RX ADMIN — Medication 1 PATCH: at 11:30

## 2024-01-01 RX ADMIN — Medication 5 MILLILITER(S): at 17:49

## 2024-01-01 RX ADMIN — Medication 100 MILLIGRAM(S): at 00:30

## 2024-01-01 RX ADMIN — Medication 5 MILLIGRAM(S): at 21:06

## 2024-01-01 RX ADMIN — MIDODRINE HYDROCHLORIDE 20 MILLIGRAM(S): 5 TABLET ORAL at 05:37

## 2024-01-01 RX ADMIN — MEROPENEM 100 MILLIGRAM(S): 500 INJECTION, POWDER, FOR SOLUTION INTRAVENOUS at 05:11

## 2024-01-01 RX ADMIN — HYDROCORTISONE 50 MILLIGRAM(S): 10 TABLET ORAL at 18:06

## 2024-01-01 RX ADMIN — Medication 1 MILLIGRAM(S): at 11:20

## 2024-01-01 RX ADMIN — Medication 1 TABLET(S): at 12:02

## 2024-01-01 RX ADMIN — Medication 20 GRAM(S): at 05:22

## 2024-01-01 RX ADMIN — Medication 15 MICROGRAM(S)/KG/MIN: at 19:11

## 2024-01-01 RX ADMIN — Medication 2: at 12:08

## 2024-01-01 RX ADMIN — CASPOFUNGIN ACETATE 260 MILLIGRAM(S): 7 INJECTION, POWDER, LYOPHILIZED, FOR SOLUTION INTRAVENOUS at 10:15

## 2024-01-01 RX ADMIN — Medication 1 PATCH: at 11:08

## 2024-01-01 RX ADMIN — POLYETHYLENE GLYCOL 3350 17 GRAM(S): 17 POWDER, FOR SOLUTION ORAL at 05:05

## 2024-01-01 RX ADMIN — FLUCONAZOLE 400 MILLIGRAM(S): 150 TABLET ORAL at 09:57

## 2024-01-01 RX ADMIN — Medication 40 MILLIGRAM(S): at 05:08

## 2024-01-01 RX ADMIN — POTASSIUM PHOSPHATE 62.5 MILLIMOLE(S): 236; 224 INJECTION, SOLUTION INTRAVENOUS at 03:06

## 2024-01-01 RX ADMIN — Medication 13.9 MICROGRAM(S)/KG/MIN: at 13:53

## 2024-01-01 RX ADMIN — SODIUM PHOSPHATE, MONOBASIC, MONOHYDRATE AND SODIUM PHOSPHATE, DIBASIC ANHYDROUS 255 MILLIMOLE(S): 276; 142 INJECTION, SOLUTION INTRAVENOUS at 21:37

## 2024-01-01 RX ADMIN — VASOPRESSIN 4.5 UNIT(S)/MIN: 20 INJECTION INTRAVENOUS at 19:16

## 2024-01-01 RX ADMIN — Medication 2: at 18:38

## 2024-01-01 RX ADMIN — Medication 40 MILLIGRAM(S): at 17:06

## 2024-01-01 RX ADMIN — Medication 10.7 MICROGRAM(S)/KG/MIN: at 19:20

## 2024-01-01 RX ADMIN — Medication 40 MILLIGRAM(S): at 17:49

## 2024-01-01 RX ADMIN — VASOPRESSIN 4.5 UNIT(S)/MIN: 20 INJECTION INTRAVENOUS at 17:19

## 2024-01-01 RX ADMIN — Medication 166.67 MILLIGRAM(S): at 05:03

## 2024-01-01 RX ADMIN — VASOPRESSIN 4.5 UNIT(S)/MIN: 20 INJECTION INTRAVENOUS at 19:46

## 2024-01-01 RX ADMIN — Medication 13.9 MICROGRAM(S)/KG/MIN: at 21:18

## 2024-01-01 RX ADMIN — Medication 40 MILLIGRAM(S): at 05:53

## 2024-01-01 RX ADMIN — FLUCONAZOLE 100 MILLIGRAM(S): 150 TABLET ORAL at 09:58

## 2024-01-01 RX ADMIN — Medication 40 MILLIGRAM(S): at 18:24

## 2024-01-01 RX ADMIN — Medication 37.4 MICROGRAM(S)/KG/MIN: at 07:58

## 2024-01-01 RX ADMIN — HYDROCORTISONE 50 MILLIGRAM(S): 10 TABLET ORAL at 17:33

## 2024-01-01 RX ADMIN — Medication 2: at 11:56

## 2024-01-01 RX ADMIN — ALBUMIN (HUMAN) 1000 MILLILITER(S): 5 SOLUTION INTRAVENOUS at 23:08

## 2024-01-01 RX ADMIN — Medication 100 MILLIGRAM(S): at 13:46

## 2024-01-01 RX ADMIN — MIDODRINE HYDROCHLORIDE 20 MILLIGRAM(S): 5 TABLET ORAL at 05:06

## 2024-01-01 RX ADMIN — VASOPRESSIN 4.5 UNIT(S)/MIN: 20 INJECTION INTRAVENOUS at 04:37

## 2024-01-01 RX ADMIN — POLYETHYLENE GLYCOL 3350 17 GRAM(S): 17 POWDER, FOR SOLUTION ORAL at 00:36

## 2024-01-01 RX ADMIN — MEROPENEM 100 MILLIGRAM(S): 500 INJECTION, POWDER, FOR SOLUTION INTRAVENOUS at 00:16

## 2024-01-01 RX ADMIN — FLUCONAZOLE 100 MILLIGRAM(S): 150 TABLET ORAL at 20:07

## 2024-01-01 RX ADMIN — HYDROMORPHONE HYDROCHLORIDE 0.25 MILLIGRAM(S): 2 TABLET ORAL at 12:35

## 2024-01-01 RX ADMIN — CHLORHEXIDINE GLUCONATE 1 APPLICATION(S): 40 SOLUTION TOPICAL at 05:30

## 2024-01-01 RX ADMIN — VASOPRESSIN 4.5 UNIT(S)/MIN: 20 INJECTION INTRAVENOUS at 07:58

## 2024-01-01 RX ADMIN — Medication 40 MILLIGRAM(S): at 05:51

## 2024-01-01 RX ADMIN — VASOPRESSIN 4.5 UNIT(S)/MIN: 20 INJECTION INTRAVENOUS at 19:12

## 2024-01-01 RX ADMIN — HYDROCORTISONE 50 MILLIGRAM(S): 10 TABLET ORAL at 05:05

## 2024-01-01 RX ADMIN — EPOETIN ALFA 10000 UNIT(S): 3000 SOLUTION INTRAVENOUS; SUBCUTANEOUS at 06:50

## 2024-01-01 RX ADMIN — MIDAZOLAM HYDROCHLORIDE 1 MILLIGRAM(S): 5 INJECTION, SOLUTION INTRAMUSCULAR; INTRAVENOUS at 15:00

## 2024-01-01 RX ADMIN — POLYETHYLENE GLYCOL 3350 17 GRAM(S): 17 POWDER, FOR SOLUTION ORAL at 12:06

## 2024-01-01 RX ADMIN — Medication 13.9 MICROGRAM(S)/KG/MIN: at 07:52

## 2024-01-01 RX ADMIN — Medication 1 MILLIGRAM(S): at 12:05

## 2024-01-01 RX ADMIN — FLUCONAZOLE 100 MILLIGRAM(S): 150 TABLET ORAL at 09:02

## 2024-01-01 RX ADMIN — VASOPRESSIN 4.5 UNIT(S)/MIN: 20 INJECTION INTRAVENOUS at 19:15

## 2024-01-01 RX ADMIN — Medication 10.7 MICROGRAM(S)/KG/MIN: at 17:39

## 2024-01-01 RX ADMIN — POLYETHYLENE GLYCOL 3350 17 GRAM(S): 17 POWDER, FOR SOLUTION ORAL at 05:53

## 2024-01-01 RX ADMIN — Medication 53.4 MICROGRAM(S)/KG/MIN: at 19:15

## 2024-01-01 RX ADMIN — Medication 200 GRAM(S): at 14:47

## 2024-01-01 RX ADMIN — Medication 53.4 MICROGRAM(S)/KG/MIN: at 18:00

## 2024-01-01 RX ADMIN — PIPERACILLIN SODIUM AND TAZOBACTAM SODIUM 25 GRAM(S): 3; .375 INJECTION, POWDER, FOR SOLUTION INTRAVENOUS at 23:27

## 2024-01-01 RX ADMIN — MEROPENEM 100 MILLIGRAM(S): 500 INJECTION, POWDER, FOR SOLUTION INTRAVENOUS at 14:08

## 2024-01-01 RX ADMIN — MEROPENEM 100 MILLIGRAM(S): 500 INJECTION, POWDER, FOR SOLUTION INTRAVENOUS at 13:09

## 2024-01-01 RX ADMIN — Medication 19.2 MICROGRAM(S)/KG/MIN: at 07:27

## 2024-01-01 RX ADMIN — Medication 1 PATCH: at 00:00

## 2024-01-01 RX ADMIN — Medication 1 PATCH: at 12:34

## 2024-01-01 RX ADMIN — Medication 5 MILLILITER(S): at 05:49

## 2024-01-01 RX ADMIN — Medication 1 PATCH: at 23:05

## 2024-01-01 RX ADMIN — Medication 1 PATCH: at 11:46

## 2024-01-01 RX ADMIN — Medication 1 PATCH: at 12:02

## 2024-01-01 RX ADMIN — MEROPENEM 100 MILLIGRAM(S): 500 INJECTION, POWDER, FOR SOLUTION INTRAVENOUS at 09:16

## 2024-01-01 RX ADMIN — Medication 2: at 06:04

## 2024-01-01 RX ADMIN — Medication 40 MILLIGRAM(S): at 17:40

## 2024-01-01 RX ADMIN — MEROPENEM 100 MILLIGRAM(S): 500 INJECTION, POWDER, FOR SOLUTION INTRAVENOUS at 14:04

## 2024-01-01 RX ADMIN — Medication 250 MILLIGRAM(S): at 20:38

## 2024-01-01 RX ADMIN — Medication 1 TABLET(S): at 11:20

## 2024-01-01 RX ADMIN — PIPERACILLIN SODIUM AND TAZOBACTAM SODIUM 25 GRAM(S): 3; .375 INJECTION, POWDER, FOR SOLUTION INTRAVENOUS at 13:11

## 2024-01-01 RX ADMIN — Medication 1 TABLET(S): at 12:03

## 2024-01-01 RX ADMIN — HYDROMORPHONE HYDROCHLORIDE 0.25 MILLIGRAM(S): 2 TABLET ORAL at 19:16

## 2024-01-01 RX ADMIN — Medication 1 MILLIGRAM(S): at 11:47

## 2024-01-01 RX ADMIN — Medication 1 PATCH: at 23:01

## 2024-01-01 RX ADMIN — Medication 1 PATCH: at 19:30

## 2024-01-01 RX ADMIN — Medication 40 MILLIGRAM(S): at 06:08

## 2024-01-01 RX ADMIN — Medication 40 MILLIGRAM(S): at 05:16

## 2024-01-01 RX ADMIN — ALBUMIN (HUMAN) 500 MILLILITER(S): 5 SOLUTION INTRAVENOUS at 08:02

## 2024-01-01 RX ADMIN — Medication 1 PATCH: at 11:20

## 2024-01-01 RX ADMIN — Medication 250 MILLIGRAM(S): at 11:08

## 2024-01-01 RX ADMIN — VASOPRESSIN 4.5 UNIT(S)/MIN: 20 INJECTION INTRAVENOUS at 07:52

## 2024-01-01 RX ADMIN — Medication 2: at 06:37

## 2024-01-01 RX ADMIN — Medication 2: at 00:18

## 2024-01-01 RX ADMIN — MIDODRINE HYDROCHLORIDE 20 MILLIGRAM(S): 5 TABLET ORAL at 21:12

## 2024-01-01 RX ADMIN — MEROPENEM 100 MILLIGRAM(S): 500 INJECTION, POWDER, FOR SOLUTION INTRAVENOUS at 18:23

## 2024-01-01 RX ADMIN — MEROPENEM 100 MILLIGRAM(S): 500 INJECTION, POWDER, FOR SOLUTION INTRAVENOUS at 22:05

## 2024-01-01 RX ADMIN — SODIUM PHOSPHATE, DIBASIC, ANHYDROUS, POTASSIUM PHOSPHATE, MONOBASIC, AND SODIUM PHOSPHATE, MONOBASIC, MONOHYDRATE 1 PACKET(S): 852; 155; 130 TABLET, COATED ORAL at 22:02

## 2024-01-01 RX ADMIN — KETAMINE HYDROCHLORIDE 50 MILLIGRAM(S): 10 INJECTION INTRAMUSCULAR; INTRAVENOUS at 15:08

## 2024-01-01 RX ADMIN — HYDROCORTISONE 50 MILLIGRAM(S): 10 TABLET ORAL at 05:03

## 2024-01-01 RX ADMIN — VASOPRESSIN 4.5 UNIT(S)/MIN: 20 INJECTION INTRAVENOUS at 19:29

## 2024-01-01 RX ADMIN — EPOETIN ALFA 10000 UNIT(S): 3000 SOLUTION INTRAVENOUS; SUBCUTANEOUS at 13:41

## 2024-01-01 RX ADMIN — Medication 1 PATCH: at 19:22

## 2024-01-01 RX ADMIN — POLYETHYLENE GLYCOL 3350 17 GRAM(S): 17 POWDER, FOR SOLUTION ORAL at 07:38

## 2024-01-01 RX ADMIN — CHLORHEXIDINE GLUCONATE 1 APPLICATION(S): 40 SOLUTION TOPICAL at 05:01

## 2024-01-01 RX ADMIN — Medication 2: at 00:10

## 2024-01-01 RX ADMIN — Medication 53.4 MICROGRAM(S)/KG/MIN: at 19:34

## 2024-01-01 RX ADMIN — Medication 2: at 18:03

## 2024-01-01 RX ADMIN — HYDROMORPHONE HYDROCHLORIDE 0.5 MILLIGRAM(S): 2 TABLET ORAL at 06:23

## 2024-01-01 RX ADMIN — Medication 1 MILLIGRAM(S): at 13:46

## 2024-01-01 RX ADMIN — VASOPRESSIN 4.5 UNIT(S)/MIN: 20 INJECTION INTRAVENOUS at 11:22

## 2024-01-01 RX ADMIN — Medication 5 MILLIGRAM(S): at 23:50

## 2024-01-01 RX ADMIN — Medication 15 MICROGRAM(S)/KG/MIN: at 11:09

## 2024-01-01 RX ADMIN — Medication 100 MILLIGRAM(S): at 19:57

## 2024-01-01 RX ADMIN — EPOETIN ALFA 10000 UNIT(S): 3000 SOLUTION INTRAVENOUS; SUBCUTANEOUS at 05:58

## 2024-01-01 RX ADMIN — Medication 37.4 MICROGRAM(S)/KG/MIN: at 05:12

## 2024-01-01 RX ADMIN — CHLORHEXIDINE GLUCONATE 1 APPLICATION(S): 40 SOLUTION TOPICAL at 05:44

## 2024-01-01 RX ADMIN — Medication 166.67 MILLIGRAM(S): at 17:20

## 2024-01-01 RX ADMIN — CHLORHEXIDINE GLUCONATE 15 MILLILITER(S): 40 SOLUTION TOPICAL at 17:10

## 2024-01-01 RX ADMIN — MIDODRINE HYDROCHLORIDE 20 MILLIGRAM(S): 5 TABLET ORAL at 21:08

## 2024-01-01 RX ADMIN — HYDROCORTISONE 50 MILLIGRAM(S): 10 TABLET ORAL at 12:18

## 2024-01-01 RX ADMIN — Medication 1 MILLIGRAM(S): at 11:58

## 2024-01-01 RX ADMIN — Medication 1 PATCH: at 11:58

## 2024-01-01 RX ADMIN — Medication 1 PATCH: at 19:01

## 2024-01-01 RX ADMIN — Medication 100 MILLIGRAM(S): at 11:19

## 2024-01-01 RX ADMIN — Medication 40 MILLIGRAM(S): at 06:53

## 2024-01-01 RX ADMIN — Medication 15 MICROGRAM(S)/KG/MIN: at 07:22

## 2024-01-01 RX ADMIN — Medication 1 PATCH: at 21:29

## 2024-01-01 RX ADMIN — HYDROCORTISONE 50 MILLIGRAM(S): 10 TABLET ORAL at 23:35

## 2024-01-01 RX ADMIN — HYDROMORPHONE HYDROCHLORIDE 0.5 MILLIGRAM(S): 2 TABLET ORAL at 19:39

## 2024-01-01 RX ADMIN — VASOPRESSIN 4.5 UNIT(S)/MIN: 20 INJECTION INTRAVENOUS at 13:41

## 2024-01-01 RX ADMIN — MEROPENEM 100 MILLIGRAM(S): 500 INJECTION, POWDER, FOR SOLUTION INTRAVENOUS at 05:08

## 2024-01-01 RX ADMIN — FLUCONAZOLE 100 MILLIGRAM(S): 150 TABLET ORAL at 09:45

## 2024-01-01 RX ADMIN — PIPERACILLIN SODIUM AND TAZOBACTAM SODIUM 25 GRAM(S): 3; .375 INJECTION, POWDER, FOR SOLUTION INTRAVENOUS at 05:52

## 2024-01-01 RX ADMIN — CHLORHEXIDINE GLUCONATE 1 APPLICATION(S): 40 SOLUTION TOPICAL at 05:10

## 2024-01-01 RX ADMIN — Medication 105 MILLIGRAM(S): at 15:55

## 2024-01-01 RX ADMIN — Medication 1 MILLIGRAM(S): at 12:02

## 2024-01-01 RX ADMIN — CHLORHEXIDINE GLUCONATE 1 APPLICATION(S): 40 SOLUTION TOPICAL at 05:09

## 2024-01-01 RX ADMIN — EPOETIN ALFA 10000 UNIT(S): 3000 SOLUTION INTRAVENOUS; SUBCUTANEOUS at 06:25

## 2024-01-01 RX ADMIN — PROPOFOL 13.7 MICROGRAM(S)/KG/MIN: 10 INJECTION, EMULSION INTRAVENOUS at 18:43

## 2024-01-01 RX ADMIN — KETAMINE HYDROCHLORIDE 50 MILLIGRAM(S): 10 INJECTION INTRAMUSCULAR; INTRAVENOUS at 15:00

## 2024-01-01 RX ADMIN — Medication 40 MILLIGRAM(S): at 18:06

## 2024-01-01 RX ADMIN — HYDROCORTISONE 50 MILLIGRAM(S): 10 TABLET ORAL at 06:06

## 2024-01-01 RX ADMIN — Medication 1 PATCH: at 19:12

## 2024-01-01 RX ADMIN — Medication 1 TABLET(S): at 11:46

## 2024-01-01 RX ADMIN — HYDROMORPHONE HYDROCHLORIDE 0.25 MILLIGRAM(S): 2 TABLET ORAL at 10:10

## 2024-01-01 RX ADMIN — FLUCONAZOLE 100 MILLIGRAM(S): 150 TABLET ORAL at 22:09

## 2024-01-01 RX ADMIN — ALBUMIN (HUMAN) 250 MILLILITER(S): 5 SOLUTION INTRAVENOUS at 20:21

## 2024-01-01 RX ADMIN — Medication 40 MILLIGRAM(S): at 17:59

## 2024-01-01 RX ADMIN — POLYETHYLENE GLYCOL 3350 17 GRAM(S): 17 POWDER, FOR SOLUTION ORAL at 18:19

## 2024-01-01 RX ADMIN — EPOETIN ALFA 10000 UNIT(S): 3000 SOLUTION INTRAVENOUS; SUBCUTANEOUS at 05:43

## 2024-01-01 RX ADMIN — Medication 5 MILLILITER(S): at 17:14

## 2024-01-01 RX ADMIN — Medication 100 MILLIGRAM(S): at 12:03

## 2024-01-01 RX ADMIN — Medication 1 PATCH: at 23:11

## 2024-01-01 RX ADMIN — SODIUM PHOSPHATE, MONOBASIC, MONOHYDRATE AND SODIUM PHOSPHATE, DIBASIC ANHYDROUS 63.75 MILLIMOLE(S): 276; 142 INJECTION, SOLUTION INTRAVENOUS at 06:53

## 2024-01-01 RX ADMIN — HYDROMORPHONE HYDROCHLORIDE 0.5 MILLIGRAM(S): 2 TABLET ORAL at 05:51

## 2024-01-01 RX ADMIN — CHLORHEXIDINE GLUCONATE 1 APPLICATION(S): 40 SOLUTION TOPICAL at 06:00

## 2024-01-01 RX ADMIN — Medication 2: at 06:17

## 2024-01-01 RX ADMIN — Medication 2: at 06:05

## 2024-01-01 RX ADMIN — Medication 1 PATCH: at 12:28

## 2024-01-01 RX ADMIN — Medication 13.9 MICROGRAM(S)/KG/MIN: at 19:46

## 2024-01-01 RX ADMIN — METHOCARBAMOL 750 MILLIGRAM(S): 750 TABLET, FILM COATED ORAL at 03:05

## 2024-01-01 RX ADMIN — ALBUMIN (HUMAN) 1000 MILLILITER(S): 5 SOLUTION INTRAVENOUS at 20:30

## 2024-01-01 RX ADMIN — Medication 5 MILLIGRAM(S): at 21:25

## 2024-01-01 RX ADMIN — Medication 1 PATCH: at 18:08

## 2024-01-01 RX ADMIN — Medication 250 MILLIGRAM(S): at 09:59

## 2024-01-01 RX ADMIN — VASOPRESSIN 4.5 UNIT(S)/MIN: 20 INJECTION INTRAVENOUS at 15:31

## 2024-01-01 RX ADMIN — MEROPENEM 100 MILLIGRAM(S): 500 INJECTION, POWDER, FOR SOLUTION INTRAVENOUS at 05:10

## 2024-01-01 RX ADMIN — SODIUM PHOSPHATE, MONOBASIC, MONOHYDRATE AND SODIUM PHOSPHATE, DIBASIC ANHYDROUS 63.75 MILLIMOLE(S): 276; 142 INJECTION, SOLUTION INTRAVENOUS at 18:19

## 2024-01-01 RX ADMIN — VASOPRESSIN 4.5 UNIT(S)/MIN: 20 INJECTION INTRAVENOUS at 19:25

## 2024-01-01 RX ADMIN — POLYETHYLENE GLYCOL 3350 17 GRAM(S): 17 POWDER, FOR SOLUTION ORAL at 23:01

## 2024-01-01 RX ADMIN — Medication 100 MILLIGRAM(S): at 11:07

## 2024-01-01 RX ADMIN — CHLORHEXIDINE GLUCONATE 1 APPLICATION(S): 40 SOLUTION TOPICAL at 05:16

## 2024-01-01 RX ADMIN — MEROPENEM 100 MILLIGRAM(S): 500 INJECTION, POWDER, FOR SOLUTION INTRAVENOUS at 01:03

## 2024-01-01 RX ADMIN — POLYETHYLENE GLYCOL 3350 17 GRAM(S): 17 POWDER, FOR SOLUTION ORAL at 17:18

## 2024-01-01 RX ADMIN — Medication 1 PATCH: at 19:20

## 2024-01-01 RX ADMIN — Medication 19.2 MICROGRAM(S)/KG/MIN: at 14:03

## 2024-01-01 RX ADMIN — HYDROCORTISONE 50 MILLIGRAM(S): 10 TABLET ORAL at 12:03

## 2024-01-01 RX ADMIN — HYDROMORPHONE HYDROCHLORIDE 0.25 MILLIGRAM(S): 2 TABLET ORAL at 06:46

## 2024-01-01 RX ADMIN — FLUCONAZOLE 100 MILLIGRAM(S): 150 TABLET ORAL at 09:18

## 2024-01-01 RX ADMIN — MIDODRINE HYDROCHLORIDE 20 MILLIGRAM(S): 5 TABLET ORAL at 13:46

## 2024-01-01 RX ADMIN — VASOPRESSIN 4.5 UNIT(S)/MIN: 20 INJECTION INTRAVENOUS at 08:29

## 2024-01-01 RX ADMIN — POLYETHYLENE GLYCOL 3350 17 GRAM(S): 17 POWDER, FOR SOLUTION ORAL at 11:19

## 2024-01-01 RX ADMIN — Medication 1 TABLET(S): at 13:47

## 2024-01-01 RX ADMIN — VASOPRESSIN 4.5 UNIT(S)/MIN: 20 INJECTION INTRAVENOUS at 21:09

## 2024-01-01 RX ADMIN — OXYCODONE HYDROCHLORIDE 2.5 MILLIGRAM(S): 5 TABLET ORAL at 03:27

## 2024-01-01 RX ADMIN — POLYETHYLENE GLYCOL 3350 17 GRAM(S): 17 POWDER, FOR SOLUTION ORAL at 12:19

## 2024-01-01 RX ADMIN — SODIUM PHOSPHATE, MONOBASIC, MONOHYDRATE AND SODIUM PHOSPHATE, DIBASIC ANHYDROUS 255 MILLIMOLE(S): 276; 142 INJECTION, SOLUTION INTRAVENOUS at 09:18

## 2024-01-01 RX ADMIN — Medication 1 TABLET(S): at 11:18

## 2024-01-01 RX ADMIN — PROPOFOL 100 MILLIGRAM(S): 10 INJECTION, EMULSION INTRAVENOUS at 18:35

## 2024-01-01 RX ADMIN — VASOPRESSIN 4.5 UNIT(S)/MIN: 20 INJECTION INTRAVENOUS at 19:06

## 2024-01-01 RX ADMIN — Medication 1 PATCH: at 19:27

## 2024-01-01 RX ADMIN — Medication 100 MILLIGRAM(S): at 18:19

## 2024-01-01 RX ADMIN — Medication 1 PATCH: at 12:18

## 2024-01-01 RX ADMIN — Medication 5 MILLILITER(S): at 17:22

## 2024-01-01 RX ADMIN — Medication 15 MICROGRAM(S)/KG/MIN: at 19:20

## 2024-01-01 RX ADMIN — Medication 100 MILLIGRAM(S): at 05:16

## 2024-01-01 RX ADMIN — Medication 2: at 00:46

## 2024-01-01 RX ADMIN — Medication 15 MICROGRAM(S)/KG/MIN: at 07:10

## 2024-01-01 RX ADMIN — Medication 1 TABLET(S): at 11:26

## 2024-01-01 RX ADMIN — PIPERACILLIN SODIUM AND TAZOBACTAM SODIUM 25 GRAM(S): 3; .375 INJECTION, POWDER, FOR SOLUTION INTRAVENOUS at 13:27

## 2024-01-01 RX ADMIN — MIDODRINE HYDROCHLORIDE 20 MILLIGRAM(S): 5 TABLET ORAL at 13:06

## 2024-01-01 RX ADMIN — Medication 1 PATCH: at 23:52

## 2024-01-01 RX ADMIN — VASOPRESSIN 4.5 UNIT(S)/MIN: 20 INJECTION INTRAVENOUS at 17:39

## 2024-01-01 RX ADMIN — POLYETHYLENE GLYCOL 3350 17 GRAM(S): 17 POWDER, FOR SOLUTION ORAL at 17:06

## 2024-01-01 RX ADMIN — SODIUM PHOSPHATE, MONOBASIC, MONOHYDRATE AND SODIUM PHOSPHATE, DIBASIC ANHYDROUS 63.75 MILLIMOLE(S): 276; 142 INJECTION, SOLUTION INTRAVENOUS at 04:23

## 2024-01-01 RX ADMIN — Medication 2: at 00:32

## 2024-01-01 RX ADMIN — MIDODRINE HYDROCHLORIDE 20 MILLIGRAM(S): 5 TABLET ORAL at 21:05

## 2024-01-01 RX ADMIN — Medication 37.4 MICROGRAM(S)/KG/MIN: at 07:43

## 2024-01-01 RX ADMIN — Medication 15 MILLILITER(S): at 12:18

## 2024-01-01 RX ADMIN — VASOPRESSIN 4.5 UNIT(S)/MIN: 20 INJECTION INTRAVENOUS at 19:58

## 2024-01-01 RX ADMIN — Medication 1 PATCH: at 20:24

## 2024-01-01 RX ADMIN — VASOPRESSIN 4.5 UNIT(S)/MIN: 20 INJECTION INTRAVENOUS at 18:20

## 2024-01-01 RX ADMIN — Medication 15 MICROGRAM(S)/KG/MIN: at 13:41

## 2024-01-01 RX ADMIN — PIPERACILLIN SODIUM AND TAZOBACTAM SODIUM 25 GRAM(S): 3; .375 INJECTION, POWDER, FOR SOLUTION INTRAVENOUS at 21:09

## 2024-01-01 RX ADMIN — Medication 1 PATCH: at 11:07

## 2024-01-01 RX ADMIN — Medication 1 PATCH: at 11:15

## 2024-01-01 RX ADMIN — Medication 40 MILLIGRAM(S): at 05:23

## 2024-01-01 RX ADMIN — POLYETHYLENE GLYCOL 3350 17 GRAM(S): 17 POWDER, FOR SOLUTION ORAL at 17:17

## 2024-01-01 RX ADMIN — Medication 53.4 MICROGRAM(S)/KG/MIN: at 05:06

## 2024-01-01 RX ADMIN — Medication 40 MILLIGRAM(S): at 17:33

## 2024-01-01 RX ADMIN — MEROPENEM 100 MILLIGRAM(S): 500 INJECTION, POWDER, FOR SOLUTION INTRAVENOUS at 21:56

## 2024-01-01 RX ADMIN — POLYETHYLENE GLYCOL 3350 17 GRAM(S): 17 POWDER, FOR SOLUTION ORAL at 05:09

## 2024-01-01 RX ADMIN — POLYETHYLENE GLYCOL 3350 17 GRAM(S): 17 POWDER, FOR SOLUTION ORAL at 05:16

## 2024-01-01 RX ADMIN — Medication 100 MILLIGRAM(S): at 17:23

## 2024-01-01 RX ADMIN — OXYCODONE HYDROCHLORIDE 2.5 MILLIGRAM(S): 5 TABLET ORAL at 02:45

## 2024-01-01 RX ADMIN — Medication 100 MILLIGRAM(S): at 11:31

## 2024-01-01 RX ADMIN — SODIUM PHOSPHATE, MONOBASIC, MONOHYDRATE AND SODIUM PHOSPHATE, DIBASIC ANHYDROUS 255 MILLIMOLE(S): 276; 142 INJECTION, SOLUTION INTRAVENOUS at 12:24

## 2024-01-01 RX ADMIN — VASOPRESSIN 4.5 UNIT(S)/MIN: 20 INJECTION INTRAVENOUS at 11:09

## 2024-01-01 RX ADMIN — CHLORHEXIDINE GLUCONATE 1 APPLICATION(S): 40 SOLUTION TOPICAL at 05:05

## 2024-01-01 RX ADMIN — ALBUMIN (HUMAN) 125 MILLILITER(S): 5 SOLUTION INTRAVENOUS at 17:29

## 2024-01-01 RX ADMIN — Medication 1 TABLET(S): at 11:32

## 2024-01-01 RX ADMIN — MEROPENEM 100 MILLIGRAM(S): 500 INJECTION, POWDER, FOR SOLUTION INTRAVENOUS at 01:20

## 2024-01-01 RX ADMIN — Medication 2: at 06:34

## 2024-01-01 RX ADMIN — Medication 1 PATCH: at 20:06

## 2024-01-01 RX ADMIN — MEROPENEM 100 MILLIGRAM(S): 500 INJECTION, POWDER, FOR SOLUTION INTRAVENOUS at 10:05

## 2024-01-01 RX ADMIN — VASOPRESSIN 4.5 UNIT(S)/MIN: 20 INJECTION INTRAVENOUS at 01:39

## 2024-01-01 RX ADMIN — Medication 166.67 MILLIGRAM(S): at 05:35

## 2024-01-01 RX ADMIN — MIDODRINE HYDROCHLORIDE 20 MILLIGRAM(S): 5 TABLET ORAL at 05:28

## 2024-01-01 RX ADMIN — Medication 2: at 12:12

## 2024-01-01 RX ADMIN — Medication 100 MILLIGRAM(S): at 12:18

## 2024-01-01 RX ADMIN — Medication 1 PATCH: at 06:37

## 2024-01-01 RX ADMIN — Medication 100 MILLIGRAM(S): at 11:58

## 2024-01-01 RX ADMIN — Medication 13.9 MICROGRAM(S)/KG/MIN: at 21:10

## 2024-01-01 RX ADMIN — MEROPENEM 100 MILLIGRAM(S): 500 INJECTION, POWDER, FOR SOLUTION INTRAVENOUS at 10:10

## 2024-01-01 RX ADMIN — Medication 100 MILLIGRAM(S): at 17:20

## 2024-01-01 RX ADMIN — FLUCONAZOLE 100 MILLIGRAM(S): 150 TABLET ORAL at 10:22

## 2024-01-01 RX ADMIN — Medication 100 MILLIGRAM(S): at 14:45

## 2024-01-01 RX ADMIN — Medication 2: at 00:36

## 2024-01-01 RX ADMIN — Medication 100 MILLIGRAM(S): at 11:12

## 2024-01-01 RX ADMIN — Medication 15 MICROGRAM(S)/KG/MIN: at 07:24

## 2024-01-01 RX ADMIN — Medication 1 PATCH: at 17:53

## 2024-01-01 RX ADMIN — PIPERACILLIN SODIUM AND TAZOBACTAM SODIUM 200 GRAM(S): 3; .375 INJECTION, POWDER, FOR SOLUTION INTRAVENOUS at 10:46

## 2024-01-01 RX ADMIN — SODIUM PHOSPHATE, MONOBASIC, MONOHYDRATE AND SODIUM PHOSPHATE, DIBASIC ANHYDROUS 255 MILLIMOLE(S): 276; 142 INJECTION, SOLUTION INTRAVENOUS at 04:23

## 2024-01-01 RX ADMIN — Medication 100 MILLIGRAM(S): at 06:08

## 2024-01-01 RX ADMIN — ALBUMIN (HUMAN) 125 MILLILITER(S): 5 SOLUTION INTRAVENOUS at 02:24

## 2024-01-01 RX ADMIN — Medication 166.67 MILLIGRAM(S): at 18:05

## 2024-01-01 RX ADMIN — Medication 2: at 19:23

## 2024-01-01 RX ADMIN — VASOPRESSIN 4.5 UNIT(S)/MIN: 20 INJECTION INTRAVENOUS at 08:54

## 2024-01-01 RX ADMIN — FLUCONAZOLE 100 MILLIGRAM(S): 150 TABLET ORAL at 20:03

## 2024-01-01 RX ADMIN — HYDROCORTISONE 100 MILLIGRAM(S): 10 TABLET ORAL at 18:25

## 2024-01-01 RX ADMIN — CHLORHEXIDINE GLUCONATE 1 APPLICATION(S): 40 SOLUTION TOPICAL at 05:46

## 2024-01-01 RX ADMIN — DESMOPRESSIN ACETATE 230 MICROGRAM(S): 4 INJECTION, SOLUTION INTRAVENOUS; SUBCUTANEOUS at 12:10

## 2024-01-01 RX ADMIN — Medication 1 MILLIGRAM(S): at 12:03

## 2024-01-01 RX ADMIN — Medication 1 PATCH: at 23:32

## 2024-01-01 RX ADMIN — VASOPRESSIN 4.5 UNIT(S)/MIN: 20 INJECTION INTRAVENOUS at 17:59

## 2024-01-01 RX ADMIN — MIDODRINE HYDROCHLORIDE 20 MILLIGRAM(S): 5 TABLET ORAL at 05:04

## 2024-01-01 RX ADMIN — HYDROMORPHONE HYDROCHLORIDE 0.25 MILLIGRAM(S): 2 TABLET ORAL at 09:40

## 2024-01-01 RX ADMIN — Medication 50 MILLIGRAM(S): at 18:30

## 2024-01-01 RX ADMIN — Medication 13.9 MICROGRAM(S)/KG/MIN: at 07:42

## 2024-01-01 RX ADMIN — Medication 53.4 MICROGRAM(S)/KG/MIN: at 10:22

## 2024-01-01 RX ADMIN — Medication 100 MILLIGRAM(S): at 11:26

## 2024-01-01 RX ADMIN — MIDODRINE HYDROCHLORIDE 20 MILLIGRAM(S): 5 TABLET ORAL at 17:03

## 2024-01-01 RX ADMIN — Medication 2: at 17:17

## 2024-01-01 RX ADMIN — Medication 13.9 MICROGRAM(S)/KG/MIN: at 20:31

## 2024-01-01 RX ADMIN — Medication 1 PATCH: at 11:47

## 2024-01-01 RX ADMIN — VASOPRESSIN 4.5 UNIT(S)/MIN: 20 INJECTION INTRAVENOUS at 14:26

## 2024-01-01 RX ADMIN — Medication 40 MILLIGRAM(S): at 17:14

## 2024-01-01 RX ADMIN — Medication 10.7 MICROGRAM(S)/KG/MIN: at 10:21

## 2024-01-01 RX ADMIN — MIDODRINE HYDROCHLORIDE 20 MILLIGRAM(S): 5 TABLET ORAL at 05:43

## 2024-01-01 RX ADMIN — Medication 1 PATCH: at 11:11

## 2024-01-01 RX ADMIN — ALBUMIN (HUMAN) 500 MILLILITER(S): 5 SOLUTION INTRAVENOUS at 09:05

## 2024-01-01 RX ADMIN — Medication 1 PATCH: at 11:40

## 2024-01-01 RX ADMIN — Medication 19.2 MICROGRAM(S)/KG/MIN: at 19:25

## 2024-01-01 RX ADMIN — Medication 53.4 MICROGRAM(S)/KG/MIN: at 08:31

## 2024-01-01 RX ADMIN — Medication 1 TABLET(S): at 12:27

## 2024-01-01 RX ADMIN — HYDROMORPHONE HYDROCHLORIDE 0.25 MILLIGRAM(S): 2 TABLET ORAL at 16:45

## 2024-01-01 RX ADMIN — MEROPENEM 100 MILLIGRAM(S): 500 INJECTION, POWDER, FOR SOLUTION INTRAVENOUS at 05:05

## 2024-01-01 RX ADMIN — Medication 100 MILLIGRAM(S): at 17:48

## 2024-01-01 RX ADMIN — Medication 200 GRAM(S): at 00:42

## 2024-01-01 RX ADMIN — MIDODRINE HYDROCHLORIDE 20 MILLIGRAM(S): 5 TABLET ORAL at 22:09

## 2024-01-01 RX ADMIN — Medication 100 MILLIGRAM(S): at 12:02

## 2024-01-01 RX ADMIN — VASOPRESSIN 4.5 UNIT(S)/MIN: 20 INJECTION INTRAVENOUS at 20:31

## 2024-01-01 RX ADMIN — Medication 40 MILLIGRAM(S): at 17:03

## 2024-01-01 RX ADMIN — Medication 2: at 06:15

## 2024-01-01 RX ADMIN — Medication 1 PATCH: at 23:40

## 2024-01-01 RX ADMIN — FLUCONAZOLE 100 MILLIGRAM(S): 150 TABLET ORAL at 11:14

## 2024-01-01 RX ADMIN — POLYETHYLENE GLYCOL 3350 17 GRAM(S): 17 POWDER, FOR SOLUTION ORAL at 12:03

## 2024-01-01 RX ADMIN — PIPERACILLIN SODIUM AND TAZOBACTAM SODIUM 25 GRAM(S): 3; .375 INJECTION, POWDER, FOR SOLUTION INTRAVENOUS at 21:18

## 2024-01-01 RX ADMIN — Medication 100 MILLIGRAM(S): at 05:08

## 2024-01-01 RX ADMIN — Medication 5 MILLIGRAM(S): at 21:37

## 2024-01-01 RX ADMIN — Medication 13.9 MICROGRAM(S)/KG/MIN: at 07:21

## 2024-01-01 RX ADMIN — Medication 100 MILLIGRAM(S): at 05:05

## 2024-01-01 RX ADMIN — POLYETHYLENE GLYCOL 3350 17 GRAM(S): 17 POWDER, FOR SOLUTION ORAL at 23:03

## 2024-01-01 RX ADMIN — Medication 15 MICROGRAM(S)/KG/MIN: at 19:40

## 2024-01-01 RX ADMIN — VASOPRESSIN 4.5 UNIT(S)/MIN: 20 INJECTION INTRAVENOUS at 07:21

## 2024-01-01 RX ADMIN — HYDROMORPHONE HYDROCHLORIDE 0.25 MILLIGRAM(S): 2 TABLET ORAL at 06:31

## 2024-01-01 RX ADMIN — POLYETHYLENE GLYCOL 3350 17 GRAM(S): 17 POWDER, FOR SOLUTION ORAL at 00:23

## 2024-01-01 RX ADMIN — POLYETHYLENE GLYCOL 3350, SODIUM SULFATE ANHYDROUS, SODIUM BICARBONATE, SODIUM CHLORIDE, POTASSIUM CHLORIDE 1000 MILLILITER(S): 236; 22.74; 6.74; 5.86; 2.97 POWDER, FOR SOLUTION ORAL at 11:26

## 2024-01-01 RX ADMIN — FLUCONAZOLE 100 MILLIGRAM(S): 150 TABLET ORAL at 20:05

## 2024-01-01 RX ADMIN — Medication 1 PATCH: at 11:32

## 2024-01-01 RX ADMIN — Medication 53.4 MICROGRAM(S)/KG/MIN: at 07:27

## 2024-01-01 RX ADMIN — MIDODRINE HYDROCHLORIDE 20 MILLIGRAM(S): 5 TABLET ORAL at 21:35

## 2024-01-01 RX ADMIN — POLYETHYLENE GLYCOL 3350 17 GRAM(S): 17 POWDER, FOR SOLUTION ORAL at 05:40

## 2024-01-01 RX ADMIN — CHLORHEXIDINE GLUCONATE 1 APPLICATION(S): 40 SOLUTION TOPICAL at 05:06

## 2024-01-01 RX ADMIN — Medication 40 MILLIGRAM(S): at 17:20

## 2024-01-01 RX ADMIN — ALBUMIN (HUMAN) 250 MILLILITER(S): 5 SOLUTION INTRAVENOUS at 14:33

## 2024-01-01 RX ADMIN — PIPERACILLIN SODIUM AND TAZOBACTAM SODIUM 200 GRAM(S): 3; .375 INJECTION, POWDER, FOR SOLUTION INTRAVENOUS at 17:03

## 2024-01-01 RX ADMIN — HYDROCORTISONE 50 MILLIGRAM(S): 10 TABLET ORAL at 23:38

## 2024-01-01 RX ADMIN — Medication 2: at 02:00

## 2024-01-01 RX ADMIN — VASOPRESSIN 4.5 UNIT(S)/MIN: 20 INJECTION INTRAVENOUS at 07:42

## 2024-01-01 RX ADMIN — Medication 1 PATCH: at 19:38

## 2024-01-01 RX ADMIN — POLYETHYLENE GLYCOL 3350 17 GRAM(S): 17 POWDER, FOR SOLUTION ORAL at 05:06

## 2024-01-01 RX ADMIN — VASOPRESSIN 4.5 UNIT(S)/MIN: 20 INJECTION INTRAVENOUS at 07:28

## 2024-01-01 RX ADMIN — VASOPRESSIN 4.5 UNIT(S)/MIN: 20 INJECTION INTRAVENOUS at 07:44

## 2024-01-01 RX ADMIN — Medication 53.4 MICROGRAM(S)/KG/MIN: at 18:23

## 2024-01-01 RX ADMIN — SODIUM PHOSPHATE, MONOBASIC, MONOHYDRATE AND SODIUM PHOSPHATE, DIBASIC ANHYDROUS 63.75 MILLIMOLE(S): 276; 142 INJECTION, SOLUTION INTRAVENOUS at 01:20

## 2024-01-01 RX ADMIN — Medication 40 MILLIGRAM(S): at 06:01

## 2024-01-01 RX ADMIN — OXYCODONE HYDROCHLORIDE 5 MILLIGRAM(S): 5 TABLET ORAL at 05:29

## 2024-01-01 RX ADMIN — MIDODRINE HYDROCHLORIDE 20 MILLIGRAM(S): 5 TABLET ORAL at 21:25

## 2024-01-01 RX ADMIN — Medication 15 MILLILITER(S): at 12:05

## 2024-01-01 RX ADMIN — Medication 1 PATCH: at 00:28

## 2024-01-01 RX ADMIN — Medication 53.4 MICROGRAM(S)/KG/MIN: at 07:26

## 2024-01-01 RX ADMIN — Medication 1 PATCH: at 12:06

## 2024-01-01 RX ADMIN — OXYCODONE HYDROCHLORIDE 2.5 MILLIGRAM(S): 5 TABLET ORAL at 02:00

## 2024-01-01 RX ADMIN — Medication 37.4 MICROGRAM(S)/KG/MIN: at 00:25

## 2024-01-01 RX ADMIN — POLYETHYLENE GLYCOL 3350 17 GRAM(S): 17 POWDER, FOR SOLUTION ORAL at 23:19

## 2024-01-01 RX ADMIN — Medication 1 PATCH: at 21:17

## 2024-01-01 RX ADMIN — ONDANSETRON 4 MILLIGRAM(S): 2 INJECTION, SOLUTION INTRAMUSCULAR; INTRAVENOUS at 06:36

## 2024-01-01 RX ADMIN — Medication 300 MILLIGRAM(S): at 05:54

## 2024-01-01 RX ADMIN — MIDODRINE HYDROCHLORIDE 20 MILLIGRAM(S): 5 TABLET ORAL at 13:13

## 2024-01-01 RX ADMIN — Medication 40 MILLIGRAM(S): at 05:46

## 2024-01-01 RX ADMIN — Medication 2: at 00:41

## 2024-01-01 RX ADMIN — Medication 1 PATCH: at 23:00

## 2024-01-01 RX ADMIN — Medication 1 MILLIGRAM(S): at 11:07

## 2024-01-01 RX ADMIN — Medication 40 MILLIGRAM(S): at 05:33

## 2024-01-01 RX ADMIN — Medication 1 PATCH: at 18:40

## 2024-01-01 RX ADMIN — Medication 40 MILLIGRAM(S): at 17:19

## 2024-01-01 RX ADMIN — FLUCONAZOLE 100 MILLIGRAM(S): 150 TABLET ORAL at 09:07

## 2024-01-01 RX ADMIN — Medication 100 MILLIGRAM(S): at 12:04

## 2024-01-01 RX ADMIN — Medication 15 MILLILITER(S): at 11:11

## 2024-01-01 RX ADMIN — POLYETHYLENE GLYCOL 3350 17 GRAM(S): 17 POWDER, FOR SOLUTION ORAL at 17:16

## 2024-01-01 RX ADMIN — FLUCONAZOLE 100 MILLIGRAM(S): 150 TABLET ORAL at 10:38

## 2024-01-01 RX ADMIN — Medication 5 MILLIGRAM(S): at 22:09

## 2024-01-01 RX ADMIN — Medication 100 MILLIGRAM(S): at 06:47

## 2024-01-01 RX ADMIN — VASOPRESSIN 4.5 UNIT(S)/MIN: 20 INJECTION INTRAVENOUS at 07:25

## 2024-01-01 RX ADMIN — Medication 100 MILLIGRAM(S): at 14:02

## 2024-01-01 RX ADMIN — EPOETIN ALFA 10000 UNIT(S): 3000 SOLUTION INTRAVENOUS; SUBCUTANEOUS at 05:20

## 2024-01-01 RX ADMIN — FLUCONAZOLE 100 MILLIGRAM(S): 150 TABLET ORAL at 20:10

## 2024-01-01 RX ADMIN — Medication 1 MILLIGRAM(S): at 11:26

## 2024-01-01 RX ADMIN — Medication 1 PATCH: at 23:34

## 2024-01-01 RX ADMIN — MEROPENEM 100 MILLIGRAM(S): 500 INJECTION, POWDER, FOR SOLUTION INTRAVENOUS at 21:12

## 2024-01-01 RX ADMIN — Medication 250 MILLIGRAM(S): at 21:23

## 2024-01-01 RX ADMIN — PHENYLEPHRINE HYDROCHLORIDE 8.55 MICROGRAM(S)/KG/MIN: 10 INJECTION INTRAVENOUS at 01:51

## 2024-01-01 RX ADMIN — HYDROCORTISONE 50 MILLIGRAM(S): 10 TABLET ORAL at 05:10

## 2024-01-01 RX ADMIN — FLUCONAZOLE 100 MILLIGRAM(S): 150 TABLET ORAL at 20:59

## 2024-01-01 RX ADMIN — MIDODRINE HYDROCHLORIDE 20 MILLIGRAM(S): 5 TABLET ORAL at 05:09

## 2024-01-01 RX ADMIN — VASOPRESSIN 4.5 UNIT(S)/MIN: 20 INJECTION INTRAVENOUS at 19:41

## 2024-01-01 RX ADMIN — PIPERACILLIN SODIUM AND TAZOBACTAM SODIUM 25 GRAM(S): 3; .375 INJECTION, POWDER, FOR SOLUTION INTRAVENOUS at 22:00

## 2024-01-01 RX ADMIN — CHLORHEXIDINE GLUCONATE 1 APPLICATION(S): 40 SOLUTION TOPICAL at 05:02

## 2024-01-01 RX ADMIN — Medication 40 MILLIGRAM(S): at 18:19

## 2024-01-01 RX ADMIN — SODIUM PHOSPHATE, MONOBASIC, MONOHYDRATE AND SODIUM PHOSPHATE, DIBASIC ANHYDROUS 63.75 MILLIMOLE(S): 276; 142 INJECTION, SOLUTION INTRAVENOUS at 09:19

## 2024-01-01 RX ADMIN — DEXMEDETOMIDINE HYDROCHLORIDE IN 0.9% SODIUM CHLORIDE 5.7 MICROGRAM(S)/KG/HR: 4 INJECTION INTRAVENOUS at 05:12

## 2024-01-01 RX ADMIN — MEROPENEM 100 MILLIGRAM(S): 500 INJECTION, POWDER, FOR SOLUTION INTRAVENOUS at 00:05

## 2024-01-01 RX ADMIN — FLUCONAZOLE 100 MILLIGRAM(S): 150 TABLET ORAL at 22:12

## 2024-01-01 RX ADMIN — POLYETHYLENE GLYCOL 3350 17 GRAM(S): 17 POWDER, FOR SOLUTION ORAL at 00:05

## 2024-01-01 RX ADMIN — Medication 100 MILLIGRAM(S): at 18:22

## 2024-01-01 RX ADMIN — Medication 1 TABLET(S): at 11:58

## 2024-01-01 RX ADMIN — CHLORHEXIDINE GLUCONATE 1 APPLICATION(S): 40 SOLUTION TOPICAL at 05:21

## 2024-01-01 RX ADMIN — POLYETHYLENE GLYCOL 3350 17 GRAM(S): 17 POWDER, FOR SOLUTION ORAL at 17:20

## 2024-01-01 RX ADMIN — MEROPENEM 100 MILLIGRAM(S): 500 INJECTION, POWDER, FOR SOLUTION INTRAVENOUS at 11:18

## 2024-01-01 RX ADMIN — MIDODRINE HYDROCHLORIDE 20 MILLIGRAM(S): 5 TABLET ORAL at 14:27

## 2024-01-01 RX ADMIN — Medication 53.4 MICROGRAM(S)/KG/MIN: at 07:23

## 2024-01-01 RX ADMIN — HYDROMORPHONE HYDROCHLORIDE 0.5 MILLIGRAM(S): 2 TABLET ORAL at 19:54

## 2024-01-01 RX ADMIN — VASOPRESSIN 4.5 UNIT(S)/MIN: 20 INJECTION INTRAVENOUS at 07:22

## 2024-01-01 RX ADMIN — POLYETHYLENE GLYCOL 3350 17 GRAM(S): 17 POWDER, FOR SOLUTION ORAL at 11:59

## 2024-01-01 RX ADMIN — Medication 200 GRAM(S): at 19:35

## 2024-01-01 RX ADMIN — Medication 1 MILLIGRAM(S): at 11:15

## 2024-01-01 RX ADMIN — DEXMEDETOMIDINE HYDROCHLORIDE IN 0.9% SODIUM CHLORIDE 5.7 MICROGRAM(S)/KG/HR: 4 INJECTION INTRAVENOUS at 22:31

## 2024-01-01 RX ADMIN — Medication 53.4 MICROGRAM(S)/KG/MIN: at 04:37

## 2024-01-01 RX ADMIN — Medication 100 MILLIGRAM(S): at 17:07

## 2024-01-01 RX ADMIN — Medication 19.2 MICROGRAM(S)/KG/MIN: at 09:58

## 2024-01-01 RX ADMIN — Medication 2: at 12:15

## 2024-01-01 RX ADMIN — PIPERACILLIN SODIUM AND TAZOBACTAM SODIUM 25 GRAM(S): 3; .375 INJECTION, POWDER, FOR SOLUTION INTRAVENOUS at 05:05

## 2024-01-01 RX ADMIN — MEROPENEM 100 MILLIGRAM(S): 500 INJECTION, POWDER, FOR SOLUTION INTRAVENOUS at 05:46

## 2024-01-01 RX ADMIN — POLYETHYLENE GLYCOL 3350 17 GRAM(S): 17 POWDER, FOR SOLUTION ORAL at 12:30

## 2024-01-01 RX ADMIN — HYDROCORTISONE 50 MILLIGRAM(S): 10 TABLET ORAL at 00:58

## 2024-01-01 RX ADMIN — Medication 53.4 MICROGRAM(S)/KG/MIN: at 19:35

## 2024-01-01 RX ADMIN — HYDROCORTISONE 50 MILLIGRAM(S): 10 TABLET ORAL at 12:05

## 2024-01-01 RX ADMIN — ALBUMIN (HUMAN) 1000 MILLILITER(S): 5 SOLUTION INTRAVENOUS at 20:22

## 2024-01-01 RX ADMIN — Medication 40 MILLIGRAM(S): at 05:39

## 2024-01-01 RX ADMIN — Medication 1 TABLET(S): at 11:07

## 2024-01-01 RX ADMIN — Medication 15 MICROGRAM(S)/KG/MIN: at 19:06

## 2024-01-01 RX ADMIN — Medication 15 MICROGRAM(S)/KG/MIN: at 07:05

## 2024-01-01 RX ADMIN — Medication 2: at 11:46

## 2024-01-01 RX ADMIN — Medication 1 MILLIGRAM(S): at 11:46

## 2024-01-01 RX ADMIN — Medication 1 PATCH: at 12:03

## 2024-01-01 RX ADMIN — DEXMEDETOMIDINE HYDROCHLORIDE IN 0.9% SODIUM CHLORIDE 14.3 MICROGRAM(S)/KG/HR: 4 INJECTION INTRAVENOUS at 07:59

## 2024-01-01 RX ADMIN — Medication 1 MILLIGRAM(S): at 11:18

## 2024-01-01 RX ADMIN — VASOPRESSIN 4.5 UNIT(S)/MIN: 20 INJECTION INTRAVENOUS at 07:10

## 2024-01-01 RX ADMIN — POLYETHYLENE GLYCOL 3350 17 GRAM(S): 17 POWDER, FOR SOLUTION ORAL at 11:32

## 2024-01-01 RX ADMIN — IPRATROPIUM BROMIDE AND ALBUTEROL SULFATE 3 MILLILITER(S): .5; 3 SOLUTION RESPIRATORY (INHALATION) at 21:13

## 2024-01-01 RX ADMIN — MIDODRINE HYDROCHLORIDE 20 MILLIGRAM(S): 5 TABLET ORAL at 15:52

## 2024-01-01 RX ADMIN — HYDROMORPHONE HYDROCHLORIDE 0.25 MILLIGRAM(S): 2 TABLET ORAL at 16:15

## 2024-01-01 RX ADMIN — Medication 53.4 MICROGRAM(S)/KG/MIN: at 07:25

## 2024-01-01 RX ADMIN — Medication 20 GRAM(S): at 10:03

## 2024-01-01 RX ADMIN — POLYETHYLENE GLYCOL 3350 17 GRAM(S): 17 POWDER, FOR SOLUTION ORAL at 05:10

## 2024-01-01 RX ADMIN — Medication 40 MILLIGRAM(S): at 17:18

## 2024-01-01 RX ADMIN — Medication 40 MILLIGRAM(S): at 05:28

## 2024-01-01 RX ADMIN — PIPERACILLIN SODIUM AND TAZOBACTAM SODIUM 25 GRAM(S): 3; .375 INJECTION, POWDER, FOR SOLUTION INTRAVENOUS at 11:46

## 2024-01-01 RX ADMIN — CHLORHEXIDINE GLUCONATE 1 APPLICATION(S): 40 SOLUTION TOPICAL at 05:07

## 2024-01-01 RX ADMIN — Medication 5 MILLIGRAM(S): at 21:19

## 2024-01-01 RX ADMIN — VASOPRESSIN 4.5 UNIT(S)/MIN: 20 INJECTION INTRAVENOUS at 13:53

## 2024-01-01 RX ADMIN — Medication 2: at 12:56

## 2024-01-01 RX ADMIN — MEROPENEM 100 MILLIGRAM(S): 500 INJECTION, POWDER, FOR SOLUTION INTRAVENOUS at 17:52

## 2024-01-01 RX ADMIN — MEROPENEM 100 MILLIGRAM(S): 500 INJECTION, POWDER, FOR SOLUTION INTRAVENOUS at 06:07

## 2024-01-01 RX ADMIN — FLUCONAZOLE 100 MILLIGRAM(S): 150 TABLET ORAL at 10:03

## 2024-01-01 RX ADMIN — Medication 37.4 MICROGRAM(S)/KG/MIN: at 19:42

## 2024-01-01 RX ADMIN — HYDROMORPHONE HYDROCHLORIDE 0.5 MILLIGRAM(S): 2 TABLET ORAL at 00:25

## 2024-01-01 RX ADMIN — Medication 40 MILLIGRAM(S): at 05:11

## 2024-01-01 RX ADMIN — POLYETHYLENE GLYCOL 3350 17 GRAM(S): 17 POWDER, FOR SOLUTION ORAL at 05:13

## 2024-01-01 RX ADMIN — Medication 1 MILLIGRAM(S): at 11:32

## 2024-01-01 RX ADMIN — Medication 15 MILLILITER(S): at 11:47

## 2024-01-01 RX ADMIN — POLYETHYLENE GLYCOL 3350 17 GRAM(S): 17 POWDER, FOR SOLUTION ORAL at 11:07

## 2024-01-01 RX ADMIN — VASOPRESSIN 4.5 UNIT(S)/MIN: 20 INJECTION INTRAVENOUS at 05:17

## 2024-01-01 RX ADMIN — Medication 1 MILLIGRAM(S): at 11:19

## 2024-01-01 RX ADMIN — Medication 15 MILLILITER(S): at 12:01

## 2024-01-01 RX ADMIN — PIPERACILLIN SODIUM AND TAZOBACTAM SODIUM 25 GRAM(S): 3; .375 INJECTION, POWDER, FOR SOLUTION INTRAVENOUS at 23:52

## 2024-01-01 RX ADMIN — Medication 2: at 06:47

## 2024-01-01 RX ADMIN — Medication 2: at 12:19

## 2024-01-01 RX ADMIN — Medication 40 MILLIGRAM(S): at 17:07

## 2024-01-01 RX ADMIN — Medication 15 MICROGRAM(S)/KG/MIN: at 19:58

## 2024-01-01 RX ADMIN — VASOPRESSIN 4.5 UNIT(S)/MIN: 20 INJECTION INTRAVENOUS at 05:12

## 2024-01-01 RX ADMIN — PIPERACILLIN SODIUM AND TAZOBACTAM SODIUM 25 GRAM(S): 3; .375 INJECTION, POWDER, FOR SOLUTION INTRAVENOUS at 13:52

## 2024-08-12 NOTE — H&P ADULT - HISTORY OF PRESENT ILLNESS
78 yo m with alcohol abuse otherwise no known chronic medical issues (does not see doctors per sister) s/p 1 month stay at [facility] now transferred to [facility] for liver transplant eval.  Most of history obtained from sister (Ashlyn) at bedside and some from transfer paperwork. Per sister, pt was initially brought to the [facility] by family for back pain and jaundice for unclear amount of time. Patient was seen by GI and underwent EGD soon after admission which only showed nonbleeding ?duodenal ulcers (no intervention) however few days later pt developed active signs of bleeding and emergently underwent EGD again showing bleeding esophageal varices which were clipped.  pt was electively intubated with stay in ICU thereafter. Patient then started with HD due to worsening renal function and MS for past 2.5 weeks at times limited by low BP requiring pressors to assist. had numerous paracentesis with varying amount of fluid removal - albumin infusions. Sister not aware of any concerns for acute infection during his stay but aware that pt was on abx at some point due to bleeding issues.      Of note patient has had fluctuating levels of confusions likely in setting of hepatic encephalopathy now on standing meds/HD with improvement though not back to baseline. Other history of note more recently has had possible "dark" stools (transfer record indicate "maroon colored") with some concerns for bleeding. no BRBPR .     VS: afebrile. 103/65, 73, 18, 98% NC . easily arousable and responsive. pleasant not confused. Aware of transfer to [facility] though pt reports he came because of his "kidneys" . discuss with pt re: liver transplant eval.

## 2024-08-12 NOTE — PHYSICAL THERAPY INITIAL EVALUATION ADULT - ADDITIONAL COMMENTS
Pt resides in a house alone prior to admission. Son at bedside reporting he will live at his house when discharged. Son resides in a house with 2 steps to enter and 1 floor once inside. Pt owns no DME.

## 2024-08-12 NOTE — H&P ADULT - CONVERSATION DETAILS
discussed with sister and pt at length   Patient expressed desire to cont with treatment and liver transplant evaluation.   Would want CPR and mechanical ventilation if necessary   Pt is FULL code

## 2024-08-12 NOTE — PHYSICAL THERAPY INITIAL EVALUATION ADULT - ORIENTATION, REHAB EVAL
Gen- middle aged man in no acute distress  CV- normal S1/S2, no murmurs, rubs, gallops  Pulm- CTABL, no rales, wheezes, rhonchi  Abd- soft, nontender, nondistended  Ext- no peripheral edema  Skin- warm, pink, dry
person/place/situation

## 2024-08-12 NOTE — CONSULT NOTE ADULT - ASSESSMENT
ASSESSMENT: 68 year old male in acute liver and renal failure admitted to SICU for hemodynamic monitoring iso recent GI bleeding and potential renal replacement requirements. Under eval for SLK.     PLAN:    Neuro:  - AOx4, lethargic  - trend ammonia  - lactulose 20 TID  - rifaximin     Resp:  - Out of bed to chair, ambulate as tolerated, and incentive spirometry to prevent atelectasis    CV:  - goal MAP > 65 mmHg  - midodrine 20 TID  - trend lactate    GI:   - Diet: Full liquids, advance as tolerated  - Bowel regimen with lactulose  - Protonix BID for melanotic stools  - trend LFTs    Renal:  - Monitor I&Os and electrolytes w/ repletions as necessary  - possible HD 8/13, was receiving at [facility]  - under eval for SLK    Heme:  - Monitor CBC and coags  - hold chem VTE prophylaxis  - SCDs for VTE prophylaxis    ID:   - Monitor for clinical evidence of active infection  - Monitor WBC, temperature, and procalcitonin  - Empiric antibiotics with zosyn, fluc    Endo:   - Monitor glucose    Code Status: full    Disposition: SICU    The above case discussed with SICU attending Chino York MD.    Robert Barron PA-C  [id number]  Reachable via TEAMS

## 2024-08-12 NOTE — H&P ADULT - PROBLEM SELECTOR PLAN 2
S/p 2 EGD at [facility] showing  nonbleeding duodenal ulcer and bleeding esophageal varices s/p clip requiring intubation and ICU stay. Per transfer note has been having some darker, maroon colored store more recent.   Will cont to monitor bowel movements and h/h.   Cont with protonix 40mg IV BID for now. if any evidence of instability or more acute bleed will start gtt  Transplant liver eval pending re: any additional plans for intervention.  Would hold any VTE ppx except compression device   patient with RUE PICC from OSH.

## 2024-08-12 NOTE — H&P ADULT - PROBLEM SELECTOR PLAN 1
Newly diagnosed liver cirrhosis likely in setting of alcohol abuse for many years.   Likely with fluctuating MS in setting of hepatic encephalopathy   Cont with lactulose and rifaximin - titrate to BM 3-4 daily. (improved as per sister)   S/p multiple sessions of paracentesis at OSH most recent last week.   Will likely need another drainage this week given clinical exam.   Will check bloodwork and calculate daily MELD score   Liver transplant eval -team notified .

## 2024-08-12 NOTE — PHYSICAL THERAPY INITIAL EVALUATION ADULT - NSPTDMEREC_GEN_A_CORE
Patient will require a rolling walker due to their diagnosis of decreased strength and endurance to help complete MRADL's at home./rolling walker/bathing/wheelchair

## 2024-08-12 NOTE — PHYSICAL THERAPY INITIAL EVALUATION ADULT - GENERAL OBSERVATIONS, REHAB EVAL
pt received semi-supine in bed, A&0x3, following 100% simple step commands, +3L 02 NC, son present at bedside.,

## 2024-08-12 NOTE — H&P ADULT - NSICDXFAMILYHX_GEN_ALL_CORE_FT
FAMILY HISTORY:  Father  Still living? Unknown  Family history of diabetes mellitus (DM), Age at diagnosis: Age Unknown    Mother  Still living? Unknown  Family history of CVA, Age at diagnosis: Age Unknown

## 2024-08-12 NOTE — PHYSICAL THERAPY INITIAL EVALUATION ADULT - GAIT TRAINING, PT EVAL
GOAL: Pt will ambulate 50'x2 with RW and Renetta to improve overall endurance in 2 weeks. GOAL: Pt will ambulate 50'x2 with RW and Renetta to improve overall endurance in 4 weeks.

## 2024-08-12 NOTE — PATIENT PROFILE ADULT - FALL HARM RISK - HARM RISK INTERVENTIONS

## 2024-08-12 NOTE — H&P ADULT - PROBLEM/PLAN-4
MEDICARE WELLNESS VISIT NOTE    HISTORY OF PRESENT ILLNESS:   Cherie Aguilar presents for her Subsequent Annual Medicare Wellness Visit.   She has complaints or concerns which include hearing loss from head cold and would like to ask about melatonin,.     Patient Care Team:  Yi Marques MD as PCP - General (Internal Medicine)  Vincent Patino as Dentist- General Practice  Michelle Chowdhury OD (Optometry)        Patient Active Problem List   Diagnosis   • Osteopenia   • Personal history of peptic ulcer disease   • Pure hypercholesterolemia   • Allergy to mold spores   • Non-allergic rhinitis   • BCC (basal cell carcinoma), trunk   • History of anaphylaxis   • Family history of colon cancer         Past Medical History:   Diagnosis Date   • Herpes zoster without mention of complication 10/11/2013   • Hypercholesterolemia    • Osteopenia    • Osteoporosis, unspecified 08/06/2012   • Pneumonia          Past Surgical History:   Procedure Laterality Date   • Bunionectomy  01/01/1988    right   • Colonoscopy  10/26/2017    un-retrieved polyp. repeat in 3-5 yrs.   • Colonoscopy  11/18/2020    repeat colon in 5 yrs, adenoma polyps   • Colonoscopy diagnostic  08/03/2007    Colonoscopy, Dx, polypectomy   • Extracapsular cataract removal w insert io lens prosth wo ecp  01/01/2011    Cataract Removal Lens Implant   • Lasik surgery  04/04/2012         Social History     Tobacco Use   • Smoking status: Never   • Smokeless tobacco: Never   Vaping Use   • Vaping Use: never used   Substance Use Topics   • Alcohol use: Yes     Alcohol/week: 5.0 standard drinks     Types: 5 Glasses of wine per week   • Drug use: No     Drug use:    Drug Use:    No              Family History   Problem Relation Age of Onset   • Coronary Artery Disease Mother    • Cataracts Mother    • Other Mother         CHF   • Cancer Mother         colon   • Diabetes Mother    • Hypertension Mother    • Cancer Father    • Postmenopausal breast cancer  Paternal Grandmother 64   • Other Other         myocardial infarct       Current Outpatient Medications   Medication Sig Dispense Refill   • atorvastatin (LIPITOR) 10 MG tablet Take 1 tablet by mouth daily. 90 tablet 3   • triamterene-hydrochlorothiazide (DYAZIDE) 37.5-25 MG per capsule Take 1 capsule by mouth daily. 90 capsule 3     No current facility-administered medications for this visit.        The following items on the Medicare Health Risk Assessment were found to be positive  14.) During the past 4 weeks, was someone available to help if you needed and wanted help?: Yes, a little         Vision and Hearing screens: Hearing Screening - Comments:: Whisper screening test results:  Right - abnormal  Left - abnormal    Vision Screening - Comments:: Sees eye doctor annually.     Advance care planning documents on file - yes     Cognitive/Functional Status: no evidence of cognitive dysfunction by direct observation    Opioid Review: Cherie is not taking opioid medications.    Recent PHQ 2/9 Score:    PHQ 2:  Date Adult PHQ 2 Score Adult PHQ 2 Interpretation   1/4/2023 0 No further screening needed       PHQ 9:       DEPRESSION ASSESSMENT/PLAN:  Depression screening is negative no further plan needed.     Body mass index is 32.77 kg/m².    BMI ASSESSMENT/PLAN:  Patient is overweight.    30 minutes of physical activity a day.  Does swim laps 5-6 times a week for an hour.         Needed Screening/Treatment:   None  Needed follow up:  None     Depression Screening-2 Questions: Patient was asked \"Over the past 2 weeks, how often have you been bothered by any of the following problems? Little interest or pleasure in doing things? and Feeling down, depressed, or hopeless? Patient scored 0 points.     Ambulation/Fall Risk: Pt. walks independently without restrictions  Patient is steady on their feet and has a Low Risk for falls.     Health Maintenance Due   Topic Date Due   • Hepatitis B Vaccine (For Physician/APC  Discussion) (1 of 3 - 3-dose series) Never done   • Medicare Advantage- Medicare Wellness Visit  01/01/2023       Patient is due for the topics as listed above and wishes to proceed with them.  Appt scheduled to perform MWV (Medicare Wellness Visit).        See orders.   See Patient Instructions section.   No follow-ups on file.      DISPLAY PLAN FREE TEXT

## 2024-08-12 NOTE — PHYSICAL THERAPY INITIAL EVALUATION ADULT - PERTINENT HX OF CURRENT PROBLEM, REHAB EVAL
Pt is a 78 yo m with alcohol abuse otherwise no known chronic medical issues (does not see doctors per sister) s/p 1 month stay at [facility] now transferred to [facility] for liver transplant eval.  Most of history obtained from sister (Ashlyn) at bedside and some from transfer paperwork. Per sister, pt was initially brought to the hosp by family for back pain and jaundice for unclear amount of time. Patient was seen by GI and underwent EGD soon after admission which only showed nonbleeding, duodenal ulcers (no intervention) however few days later pt developed active signs of bleeding and emergently underwent EGD again showing bleeding esophageal varices which were clipped.  pt was electively intubated with stay in ICU thereafter. Patient then started with HD due to worsening renal function and MS for past 2.5 weeks at times limited by low BP requiring pressors to assist. had numerous paracentesis with varying amount of fluid removal - albumin infusions. Sister not aware of any concerns for acute infection during his stay but aware that pt was on abx at some point due to bleeding issues.

## 2024-08-12 NOTE — PHYSICAL THERAPY INITIAL EVALUATION ADULT - TRANSFER TRAINING, PT EVAL
GOAL: Pt will transfer sit<->stand with modA to improve overall ease with transfers in 2 weeks. GOAL: Pt will transfer sit<->stand with modA to improve overall ease with transfers in 4 weeks.

## 2024-08-12 NOTE — PHYSICAL THERAPY INITIAL EVALUATION ADULT - BED MOBILITY TRAINING, PT EVAL
GOAL: Pt will roll (I) both ways to improve ease with transfers in 2 weeks. GOAL: Pt will roll (I) both ways to improve ease with transfers in 4 weeks.

## 2024-08-12 NOTE — H&P ADULT - PATIENT'S SEXUAL ORIENTATION
Unknown

## 2024-08-12 NOTE — PHYSICAL THERAPY INITIAL EVALUATION ADULT - NSPTDISCHREC_GEN_A_CORE
RUBINA; If pt d/c home, pt would require Home PT and assist/supervision with ALL functional mobility and ADLs./Sub-acute Rehab Acute Inpatient Rehab

## 2024-08-12 NOTE — H&P ADULT - PROBLEM SELECTOR PLAN 3
Has been on HD for past 2.5 weeks while in ICU at OSH.   Suspect likely in setting of hepatorenal and fluid overload . Last HD session 8/10   OVerall fluid status improved with LE edema though still with ascites   Pt on midodrine 20mg TID for BP support - will cont for now.   Likely plans to cont with HD 3x week here. awaiting bloodwork today- Has Right Chest permacath   Long term HD plans remains unclear at this time.   Transplant renal team notified

## 2024-08-12 NOTE — CONSULT NOTE ADULT - ASSESSMENT
67 y/o M AUD complicated by cirrhosis with Hepatic encephalopathy admitted to [facility] with back pain and jaundice on 7/8/24.   Pt had dark / maroon colored stools   EGD that showed esophageal varices and duodenal ulcer with visible vessel. He got transfusions for low Hb and last transfusion was on 8/8.   Pt was intubated and was on vent support and for his renal failure he was started on HD. 1st session on 7/9/24.         FANY VS FANY on CKD:  Pt was placed on HD on 7/9/24  Obtain USG kidney and bladder. ( see below)  Its unclear if the patient is anuric - bladder scan and if positive, straight cath and get urine studies.   Electrolytes are in acceptable range - No need of urgent HD today. Can do HD tomorrow. Obtained consent from patient's son.   FANY is likely hemodynamic vs HRS - Obtain UA, UPCR Send serology work up - PLA2R Ab, C3, C4, Anti GBM antibody, Anti Ds DNA, CHETNA, ANCA, Serum free light chains, immunofixation, A1C,  Urine lytes, USG- retroperitoneum with duplex renal vessels, HIV, Hep B.  Obtain records from other hospital -( to determine if he is a candidate for SLK )      Incomplete     67 y/o M AUD complicated by cirrhosis with Hepatic encephalopathy admitted to [facility] with back pain and jaundice on 7/8/24.   Pt had dark / maroon colored stools   EGD that showed esophageal varices and duodenal ulcer with visible vessel. He got transfusions for low Hb and last transfusion was on 8/8.   Pt was intubated and was on vent support and for his renal failure he was started on HD. 1st session on 7/9/24.         FANY VS FANY on CKD:  Pt was placed on HD on 7/9/24  Obtain USG kidney and bladder. ( see below)  Its unclear if the patient is anuric - bladder scan and if positive, straight cath and get urine studies.   Electrolytes are in acceptable range - No need of urgent HD today. Can do HD tomorrow. Obtained consent from patient's son.   FANY is likely hemodynamic vs HRS - Obtain UA, UPCR Send serology work up - PLA2R Ab, C3, C4, Anti GBM antibody, Anti Ds DNA, CHETNA, ANCA, Serum free light chains, immunofixation, A1C,  Urine lytes, USG- retroperitoneum with duplex renal vessels, HIV, Hep B.  Obtain records from other hospital -( to determine if he is a candidate for SLK )

## 2024-08-12 NOTE — PROCEDURE NOTE - ADDITIONAL PROCEDURE DETAILS
68yoM admitted to SICU. Trejo catheter required for strict Is and Os. Primary team attempted 3x, unsuccessful. Urology called for difficult trejo placement.   Patient was prepped and draped with sterile technique. Lidocaine jelly 2% inserted for local anesthetic. 18F coude catheter inserted down to the hub without resistance. Balloon inflated with 10cc sterile water and trejo was secured to leg. Patient tolerated procedure well. Trejo management per primary team.     Trejo catheter flushed with 60cc of NS to determine proper placement of trejo catheter, easily irrigated 60cc of tea-colored urine.   Trejo catheter in place, draining ~5cc of tea colored urine.     [facility]  [address] [place] [address], [address] [address] [phone] 68yoM admitted to SICU. Trejo catheter required for strict Is and Os. Primary team attempted 3x, unsuccessful. Urology called for difficult trejo placement.   Patient was prepped and draped with sterile technique. Lidocaine jelly 2% inserted for local anesthetic. 18F coude catheter inserted down to the hub without resistance, minimal return of urine. Balloon inflated with 10cc sterile water and trejo was secured to leg. Patient tolerated procedure well. Trejo management per primary team.     Trejo catheter flushed with 60cc of NS to determine proper placement of trejo catheter, easily irrigated 60cc of tea-colored urine.   Trejo catheter in place, draining ~5cc of tea colored urine.     [facility]  [address] [place] [address], [place] [id number]  [place] [address]  [phone]

## 2024-08-12 NOTE — OCCUPATIONAL THERAPY INITIAL EVALUATION ADULT - ADDITIONAL COMMENTS
Pt lives in a pvt house +2 IGGY with first floor set up +tub shower; Son at bedside reporting he may live at his house when discharged. Son resides in a house with 2 steps to enter and 1 floor once inside, will stay on first floor, +walk in shower. Pt owns no DME.

## 2024-08-12 NOTE — CONSULT NOTE ADULT - SUBJECTIVE AND OBJECTIVE BOX
[facility] DIVISION OF KIDNEY DISEASES AND HYPERTENSION -- INITIAL CONSULT NOTE  --------------------------------------------------------------------------------  HPI:  67 y/o M AUD complicated by cirrhosis with Hepatic encephalopathy admitted to [facility] with back pain and jaundice on 7/8/24. Pt had dark / maroon colored stools and got EGD that showed esophageal varices and duodenal ulcer with visible vessel. He got transfusions for low Hb and last transfusion was on 8/8. Pt was intubated and was on vent support and for his renal failure he was started on HD. 1st session on 7/9/24. Last HD was on Saturday. He was placed on TTS schedule but due to hemodynamic instability - missed some HD sessions during the hospital stay. Pt was transferred to [facility] for transplant eval.   Pt was lethargic so hx was obtained from pt's son and records. Son mentioned that they do not have any out pt records available. At the time of admission, pt had Scr of 5.84. Pt has Rt IJ tunnelled HD cath.   Transplant nephrology was consulted for management of HD and SLK eval.       PAST HISTORY  --------------------------------------------------------------------------------  PAST MEDICAL & SURGICAL HISTORY:  Alcohol abuse      No significant past surgical history        FAMILY HISTORY:  Family history of diabetes mellitus (DM) (Father)    Family history of CVA (Mother)      Social History:  Lives with spouse and children  retired. (12 Aug 2024 09:16)        ALLERGIES & MEDICATIONS  --------------------------------------------------------------------------------  Allergies    No Known Allergies    Intolerances      Standing Inpatient Medications  folic acid 1 milliGRAM(s) Oral daily  lactulose Syrup 20 Gram(s) Oral three times a day  lidocaine   4% Patch 1 Patch Transdermal daily  midodrine. 20 milliGRAM(s) Oral three times a day  multivitamin 1 Tablet(s) Oral daily  pantoprazole  Injectable 40 milliGRAM(s) IV Push two times a day  rifAXIMin 550 milliGRAM(s) Oral two times a day  thiamine 100 milliGRAM(s) Oral daily    PRN Inpatient Medications  acetaminophen     Tablet .. 650 milliGRAM(s) Oral every 6 hours PRN  melatonin 3 milliGRAM(s) Oral at bedtime PRN      REVIEW OF SYSTEMS  --------------------------------------------------------------------------------  unable to obtain.     VITALS/PHYSICAL EXAM  --------------------------------------------------------------------------------  T(C): 36.4 (08-12-24 @ 12:21), Max: 36.4 (08-12-24 @ 06:32)  HR: 74 (08-12-24 @ 14:43) (72 - 74)  BP: 113/70 (08-12-24 @ 14:43) (103/65 - 113/70)  RR: 18 (08-12-24 @ 12:21) (18 - 18)  SpO2: 97% (08-12-24 @ 14:43) (97% - 99%)  Wt(kg): --    Physical Exam:  Gen: Mild respiratory distress  HEENT: Supple neck, No JVD, Icterus +  Pulm: Decreased breath sounds in the lower zones.   CV:S1S2+   Abd: Non- tender, no distention, Bowel sounds + gaseous distention and lower abdomen has dullness on percussion.   : No suprapubic tenderness  Extremities: mild edema, mild asterixis. Dark discoloration of the foot upto lower leg   Skin: warm  Neuro: AAOx 1-2       LABS/STUDIES  --------------------------------------------------------------------------------              9.4    13.19 >-----------<  96       [08-12-24 @ 10:41]              28.5     133  |  95  |  39  ----------------------------<  116      [08-12-24 @ 10:41]  4.2   |  21  |  5.84        Ca     9.2     [08-12-24 @ 10:41]    TPro  5.9  /  Alb  3.5  /  TBili  15.2  /  DBili  x   /  AST  38  /  ALT  15  /  AlkPhos  106  [08-12-24 @ 10:41]    PT/INR: PT 40.1 , INR 3.98       [08-12-24 @ 10:41]  PTT: 75.7       [08-12-24 @ 10:41]      Creatinine Trend:  SCr 5.84 [08-12 @ 10:41]    HCV 0.08, Nonreact      [08-12-24 @ 14:45]

## 2024-08-12 NOTE — H&P ADULT - NSHPPHYSICALEXAM_GEN_ALL_CORE
T(C): 36.4 (08-12-24 @ 06:32), Max: 36.4 (08-12-24 @ 06:32)  HR: 73 (08-12-24 @ 06:32) (73 - 73)  BP: 103/65 (08-12-24 @ 06:32) (103/65 - 103/65)  RR: 18 (08-12-24 @ 06:32) (18 - 18)  SpO2: 98% (08-12-24 @ 06:32) (98% - 98%)    CONSTITUTIONAL: Well groomed, no apparent distress  EYES: PERRLA and symmetric, jaundice   ENMT: Oral mucosa with moist membranes. Normal dentition; no pharyngeal injection or exudates  NECK: Supple, symmetric and without tracheal deviation   RESP: No respiratory distress, no use of accessory muscles; CTA b/l, no WRR. R chest permacath  CV: RRR, +S1S2, no MRG; no JVD; trace LE edema   GI: distended. not tense no pain.  +fluid wave  MSK: Normal ROM without pain, no spinal tenderness, normal muscle strength/tone  SKIN: jaundice , no ecchymosis, wounds. right UE PICC  NEURO: sensation intact in upper and lower extremities b/l to light touch , strength grossly intact   PSYCH: Appropriate insight/judgment; A+O x 3, mood and affect appropriate,

## 2024-08-12 NOTE — CONSULT NOTE ADULT - SUBJECTIVE AND OBJECTIVE BOX
Chief Complaint:  Patient is a 68y old  Male who presents with a chief complaint of cirrhosis/transplant eval (12 Aug 2024 09:16)    HPI:  68M with AUD c/b cirrhosis with HE, does not follow with doctors s/p 1 month stay at [facility] now transferred to [facility] for liver transplant eval.  Pt initially brought to [facility] by family for back pain and jaundice for unclear amount of time; course notable for burgandy stools s/p EGD showing small EV and duodenal ulcer with visible vessel; last pRBC 8/8. Course also notable for intubation and renal failure requiring HD due to worsening renal function with the first session on 7/9 but limited by low BP requiring pressors to assist. Now transferred to [facility] for transplant evaluation.    Allergies:  No Known Allergies      Home Medications:    Hospital Medications:  acetaminophen     Tablet .. 650 milliGRAM(s) Oral every 6 hours PRN  folic acid 1 milliGRAM(s) Oral daily  lactulose Syrup 20 Gram(s) Oral three times a day  lidocaine   4% Patch 1 Patch Transdermal daily  melatonin 3 milliGRAM(s) Oral at bedtime PRN  midodrine. 20 milliGRAM(s) Oral three times a day  multivitamin 1 Tablet(s) Oral daily  pantoprazole  Injectable 40 milliGRAM(s) IV Push two times a day  rifAXIMin 550 milliGRAM(s) Oral two times a day  thiamine 100 milliGRAM(s) Oral daily      PMHX/PSHX:  Alcohol abuse    No significant past surgical history        Family history:  Family history of diabetes mellitus (DM) (Father)    Family history of CVA (Mother)    ROS:  General:  No wt loss, fevers, chills  ENT:  No dysphagia  CV:  No pain, palpitations  Pulm:  No dyspnea, cough  GI:  No pain, No nausea, No vomiting, No diarrhea, No constipation  Muscle:  No pain, weakness  Neuro:  No weakness  Heme:  No ecchymosis  Skin:  No rash    PHYSICAL EXAM:  GENERAL:  No acute distress  HEENT:  no scleral icterus  CHEST:  no accessory muscle use  HEART:  Regular rate and rhythm  ABDOMEN:  Soft, non-tender, non-distended  EXTREMITIES: No edema  SKIN:  No rash/ecchymoses  NEURO:  Alert and oriented x 3    Vital Signs:  Vital Signs Last 24 Hrs  T(C): 36.4 (12 Aug 2024 12:21), Max: 36.4 (12 Aug 2024 06:32)  T(F): 97.5 (12 Aug 2024 12:21), Max: 97.5 (12 Aug 2024 06:32)  HR: 72 (12 Aug 2024 12:21) (72 - 73)  BP: 104/56 (12 Aug 2024 12:21) (103/65 - 104/56)  BP(mean): --  RR: 18 (12 Aug 2024 12:21) (18 - 18)  SpO2: 99% (12 Aug 2024 12:21) (98% - 99%)    Parameters below as of 12 Aug 2024 12:21  Patient On (Oxygen Delivery Method): nasal cannula      Daily     Daily     LABS:                        9.4    13.19 )-----------( 96       ( 12 Aug 2024 10:41 )             28.5     Mean Cell Volume: 93.4 fl (08-12-24 @ 10:41)    08-12    133<L>  |  95<L>  |  39<H>  ----------------------------<  116<H>  4.2   |  21<L>  |  5.84<H>    Ca    9.2      12 Aug 2024 10:41    TPro  5.9<L>  /  Alb  3.5  /  TBili  15.2<H>  /  DBili  x   /  AST  38  /  ALT  15  /  AlkPhos  106  08-12    LIVER FUNCTIONS - ( 12 Aug 2024 10:41 )  Alb: 3.5 g/dL / Pro: 5.9 g/dL / ALK PHOS: 106 U/L / ALT: 15 U/L / AST: 38 U/L / GGT: x           PT/INR - ( 12 Aug 2024 10:41 )   PT: 40.1 sec;   INR: 3.98 ratio         PTT - ( 12 Aug 2024 10:41 )  PTT:75.7 sec  Urinalysis Basic - ( 12 Aug 2024 10:41 )    Color: x / Appearance: x / SG: x / pH: x  Gluc: 116 mg/dL / Ketone: x  / Bili: x / Urobili: x   Blood: x / Protein: x / Nitrite: x   Leuk Esterase: x / RBC: x / WBC x   Sq Epi: x / Non Sq Epi: x / Bacteria: x                              9.4    13.19 )-----------( 96       ( 12 Aug 2024 10:41 )             28.5     Chief Complaint:  Patient is a 68y old  Male who presents with a chief complaint of cirrhosis/transplant eval (12 Aug 2024 09:16)    HPI:  68M with AUD c/b cirrhosis with HE, does not follow with doctors s/p 1 month stay at [facility] now transferred to [facility] for liver transplant eval.  Pt initially brought to [facility] by family for back pain and jaundice for unclear amount of time; course notable for burgandy stools s/p EGD showing small EV and duodenal ulcer with visible vessel; last pRBC 8/8. Course also notable for intubation and renal failure requiring HD due to worsening renal function with the first session on 7/9 but limited by low BP requiring pressors to assist. Now transferred to [facility] for transplant evaluation.    Allergies:  No Known Allergies      Home Medications:    Hospital Medications:  acetaminophen     Tablet .. 650 milliGRAM(s) Oral every 6 hours PRN  folic acid 1 milliGRAM(s) Oral daily  lactulose Syrup 20 Gram(s) Oral three times a day  lidocaine   4% Patch 1 Patch Transdermal daily  melatonin 3 milliGRAM(s) Oral at bedtime PRN  midodrine. 20 milliGRAM(s) Oral three times a day  multivitamin 1 Tablet(s) Oral daily  pantoprazole  Injectable 40 milliGRAM(s) IV Push two times a day  rifAXIMin 550 milliGRAM(s) Oral two times a day  thiamine 100 milliGRAM(s) Oral daily      PMHX/PSHX:  Alcohol abuse    No significant past surgical history        Family history:  Family history of diabetes mellitus (DM) (Father)    Family history of CVA (Mother)    ROS:  General:  No wt loss, fevers, chills  ENT:  No dysphagia  CV:  No pain, palpitations  Pulm:  No dyspnea, cough  GI:  No pain, No nausea, No vomiting, No diarrhea, No constipation  Muscle:  No pain, weakness  Neuro:  No weakness  Heme:  No ecchymosis  Skin:  No rash    PHYSICAL EXAM:  GENERAL:  No acute distress  HEENT:  scleral icterus  CHEST:  no accessory muscle use  HEART:  Regular rate and rhythm  ABDOMEN:  Soft, non-tender, distended  EXTREMITIES: No edema  SKIN:  No rash/ecchymoses  NEURO:  Alert and oriented x 3    Vital Signs:  Vital Signs Last 24 Hrs  T(C): 36.4 (12 Aug 2024 12:21), Max: 36.4 (12 Aug 2024 06:32)  T(F): 97.5 (12 Aug 2024 12:21), Max: 97.5 (12 Aug 2024 06:32)  HR: 72 (12 Aug 2024 12:21) (72 - 73)  BP: 104/56 (12 Aug 2024 12:21) (103/65 - 104/56)  BP(mean): --  RR: 18 (12 Aug 2024 12:21) (18 - 18)  SpO2: 99% (12 Aug 2024 12:21) (98% - 99%)    Parameters below as of 12 Aug 2024 12:21  Patient On (Oxygen Delivery Method): nasal cannula      Daily     Daily     LABS:                        9.4    13.19 )-----------( 96       ( 12 Aug 2024 10:41 )             28.5     Mean Cell Volume: 93.4 fl (08-12-24 @ 10:41)    08-12    133<L>  |  95<L>  |  39<H>  ----------------------------<  116<H>  4.2   |  21<L>  |  5.84<H>    Ca    9.2      12 Aug 2024 10:41    TPro  5.9<L>  /  Alb  3.5  /  TBili  15.2<H>  /  DBili  x   /  AST  38  /  ALT  15  /  AlkPhos  106  08-12    LIVER FUNCTIONS - ( 12 Aug 2024 10:41 )  Alb: 3.5 g/dL / Pro: 5.9 g/dL / ALK PHOS: 106 U/L / ALT: 15 U/L / AST: 38 U/L / GGT: x           PT/INR - ( 12 Aug 2024 10:41 )   PT: 40.1 sec;   INR: 3.98 ratio         PTT - ( 12 Aug 2024 10:41 )  PTT:75.7 sec  Urinalysis Basic - ( 12 Aug 2024 10:41 )    Color: x / Appearance: x / SG: x / pH: x  Gluc: 116 mg/dL / Ketone: x  / Bili: x / Urobili: x   Blood: x / Protein: x / Nitrite: x   Leuk Esterase: x / RBC: x / WBC x   Sq Epi: x / Non Sq Epi: x / Bacteria: x                              9.4    13.19 )-----------( 96       ( 12 Aug 2024 10:41 )             28.5     Chief Complaint:  Patient is a 68y old  Male who presents with a chief complaint of cirrhosis/transplant eval (12 Aug 2024 09:16)    HPI:  68M with AUD c/b cirrhosis with HE, does not follow with doctors s/p 1 month stay at [facility] now transferred to [facility] for liver transplant eval.  Pt initially brought to [facility] by family for back pain and jaundice for unclear amount of time; course notable for burgandy stools s/p EGD showing small EV and duodenal ulcer with visible vessel; last pRBC 8/8. Course also notable for intubation and renal failure requiring HD due to worsening renal function with the first session on 7/9 but limited by low BP requiring pressors to assist. Now transferred to [facility] for transplant evaluation.    Allergies:  No Known Allergies      Home Medications:    Hospital Medications:  acetaminophen     Tablet .. 650 milliGRAM(s) Oral every 6 hours PRN  folic acid 1 milliGRAM(s) Oral daily  lactulose Syrup 20 Gram(s) Oral three times a day  lidocaine   4% Patch 1 Patch Transdermal daily  melatonin 3 milliGRAM(s) Oral at bedtime PRN  midodrine. 20 milliGRAM(s) Oral three times a day  multivitamin 1 Tablet(s) Oral daily  pantoprazole  Injectable 40 milliGRAM(s) IV Push two times a day  rifAXIMin 550 milliGRAM(s) Oral two times a day  thiamine 100 milliGRAM(s) Oral daily      PMHX/PSHX:  Alcohol abuse    No significant past surgical history        Family history:  Family history of diabetes mellitus (DM) (Father)    Family history of CVA (Mother)    ROS:  General:  No wt loss, fevers, chills  ENT:  No dysphagia  CV:  No pain, palpitations  Pulm:  No dyspnea, cough  GI:  No pain, No nausea, No vomiting, No diarrhea, No constipation  Muscle:  No pain, weakness  Neuro:  No weakness  Heme:  No ecchymosis  Skin:  No rash    PHYSICAL EXAM:  GENERAL:  No acute distress  HEENT:  scleral icterus  CHEST:  no accessory muscle use, decreased BS R base  HEART:  Regular rate and rhythm  ABDOMEN:  Soft, non-tender, distended, +anasarca  EXTREMITIES: ++Edema  SKIN:  +Jaundiced, no rash/ecchymoses  NEURO:  Alert and oriented x 3, no asterixis, +psychomotor slowness    Vital Signs:  Vital Signs Last 24 Hrs  T(C): 36.4 (12 Aug 2024 12:21), Max: 36.4 (12 Aug 2024 06:32)  T(F): 97.5 (12 Aug 2024 12:21), Max: 97.5 (12 Aug 2024 06:32)  HR: 72 (12 Aug 2024 12:21) (72 - 73)  BP: 104/56 (12 Aug 2024 12:21) (103/65 - 104/56)  BP(mean): --  RR: 18 (12 Aug 2024 12:21) (18 - 18)  SpO2: 99% (12 Aug 2024 12:21) (98% - 99%)    Parameters below as of 12 Aug 2024 12:21  Patient On (Oxygen Delivery Method): nasal cannula      Daily     Daily     LABS:                        9.4    13.19 )-----------( 96       ( 12 Aug 2024 10:41 )             28.5     Mean Cell Volume: 93.4 fl (08-12-24 @ 10:41)    08-12    133<L>  |  95<L>  |  39<H>  ----------------------------<  116<H>  4.2   |  21<L>  |  5.84<H>    Ca    9.2      12 Aug 2024 10:41    TPro  5.9<L>  /  Alb  3.5  /  TBili  15.2<H>  /  DBili  x   /  AST  38  /  ALT  15  /  AlkPhos  106  08-12    LIVER FUNCTIONS - ( 12 Aug 2024 10:41 )  Alb: 3.5 g/dL / Pro: 5.9 g/dL / ALK PHOS: 106 U/L / ALT: 15 U/L / AST: 38 U/L / GGT: x           PT/INR - ( 12 Aug 2024 10:41 )   PT: 40.1 sec;   INR: 3.98 ratio         PTT - ( 12 Aug 2024 10:41 )  PTT:75.7 sec  Urinalysis Basic - ( 12 Aug 2024 10:41 )    Color: x / Appearance: x / SG: x / pH: x  Gluc: 116 mg/dL / Ketone: x  / Bili: x / Urobili: x   Blood: x / Protein: x / Nitrite: x   Leuk Esterase: x / RBC: x / WBC x   Sq Epi: x / Non Sq Epi: x / Bacteria: x                              9.4    13.19 )-----------( 96       ( 12 Aug 2024 10:41 )             28.5

## 2024-08-12 NOTE — H&P ADULT - PROBLEM SELECTOR PLAN 6
Pt with long history of etoh abuse. no previous known withdrawal or rehab history   Pt reports "a bottle" of alcohol everyday for many years more recently increased.   s/p ICU stay at OSH now 1 month later . no further concerns for withdrawal   Encourage alcohol cessation and eventual rehab post discahrge from hosp.   start on MVI, thiamine, FA

## 2024-08-12 NOTE — CONSULT NOTE ADULT - ASSESSMENT
68M with AUD c/b cirrhosis with HE, does not follow with doctors s/p 1 month stay at [facility] now transferred to [facility] for liver transplant eval.  Pt initially brought to [facility] by family for back pain and jaundice for unclear amount of time; course notable for burgandy stools s/p EGD showing small EV and duodenal ulcer with visible vessel; last pRBC 8/8. Course also notable for intubation and renal failure requiring HD due to worsening renal function with the first session on 7/9 but limited by low BP requiring pressors to assist. Now transferred to [facility] for transplant evaluation.    Impression  #acute on chronic liver failure  #decompensated alcoholic cirrhosis  MELD 8/12: 44  - HE: awake, alert; on lactulose/rifaximin  - ascites: pending imaging; abd distended  - EV: small EV on EGD at OSH  - HCC: pending imaging    #leukocytosis w/bandemia  - not on abx; would start infectious workup    Recommendations  - will launch SLK evaluation  - UA, UCx, BCx, RVP/COVID, US Abd +/- diagnostic paracentesis  - empiric zosyn/fluc pending further infectious workup  - CT C/A/P WIC; please obtain A/P with triple phase  - hold lactulose; continue rifaximin; start miralax  - transplant nephro eval  - 2g salt restriction    Note and recommendations are incomplete until reviewed and attested by attending.    Cassie Dixon MD  GI/Hepatology Fellow, PGY5  [employer] Pager [phone] or [facility] Pager [phone]  Teams preferred (7AM to 5PM); after 5PM, call GI fellow on call    On Weekends/Holidays (All Day) and Weekdays after 5PM to 8AM  For non-urgent consults, please email [email or web address] and [email or web address]  For urgent consults, please contact on call GI team. See Amion schedule ([facility]), [employer] paging system ([facility]), or call hospital  ([facility]) 68M with AUD c/b cirrhosis with HE, does not follow with doctors s/p 1 month stay at [facility] now transferred to [facility] for liver transplant eval.  Pt initially brought to [facility] by family for back pain and jaundice for unclear amount of time; course notable for burgandy stools s/p EGD showing small EV and duodenal ulcer with visible vessel; last pRBC 8/8. Course also notable for intubation and renal failure requiring HD due to worsening renal function with the first session on 7/9 but limited by low BP requiring pressors to assist. Now transferred to [facility] for transplant evaluation.    Impression  #acute on chronic liver failure  #decompensated alcoholic cirrhosis  MELD 8/12: 44  - HE: awake, alert; on lactulose/rifaximin  - ascites: pending imaging; abd distended  - EV: small EV on EGD at OSH  - HCC: pending imaging    #leukocytosis w/bandemia  - not on abx; would start infectious workup    Recommendations  - will launch SLK evaluation  - UA, UCx, BCx, RVP/COVID, US Abd +/- diagnostic paracentesis  - empiric zosyn/fluc pending further infectious workup  - CT C/A/P WIC; please obtain A/P with triple phase  - hold lactulose; continue rifaximin; start miralax  - transplant nephro eval  - S&S evaluation  - 2g salt restriction    Note and recommendations are incomplete until reviewed and attested by attending.    Cassie Dixon MD  GI/Hepatology Fellow, PGY5  [employer] Pager [phone] or [facility] Pager [phone]  Teams preferred (7AM to 5PM); after 5PM, call GI fellow on call    On Weekends/Holidays (All Day) and Weekdays after 5PM to 8AM  For non-urgent consults, please email [email or web address] and [email or web address]  For urgent consults, please contact on call GI team. See Amion schedule ([facility]), [employer] paging system ([facility]), or call hospital  ([facility])

## 2024-08-12 NOTE — H&P ADULT - PROBLEM SELECTOR PLAN 5
Per family has been mainly on liquid diet during entire hosp stay 1 month   Will likely need to restart more solid diet if able . will reassess after further liver eval . cont full liquid for now.   need PT and OT eval for ambulation and conditioning.

## 2024-08-12 NOTE — OCCUPATIONAL THERAPY INITIAL EVALUATION ADULT - PERTINENT HX OF CURRENT PROBLEM, REHAB EVAL
Pt is a 78 yo m with alcohol abuse otherwise no known chronic medical issues (does not see doctors per sister) s/p 1 month stay at [facility] now transferred to [facility] for liver transplant eval.  Most of history obtained from sister (Ashlyn) at bedside and some from transfer paperwork. Per sister, pt was initially brought to the hosp by family for back pain and jaundice for unclear amount of time. Patient was seen by GI and underwent EGD soon after admission which only showed nonbleeding, duodenal ulcers (no intervention) however few days later pt developed active signs of bleeding and emergently underwent EGD again showing bleeding esophageal varices which were clipped.  pt was electively intubated with stay in ICU thereafter. Patient then started with HD due to worsening renal function and MS for past 2.5 weeks at times limited by low BP requiring pressors to assist. had numerous paracentesis with varying amount of fluid removal - albumin infusions. Sister not aware of any concerns for acute infection during his stay but aware that pt was on abx at some point due to bleeding issues.

## 2024-08-12 NOTE — CONSULT NOTE ADULT - SUBJECTIVE AND OBJECTIVE BOX
HISTORY OF PRESENT ILLNESS:  OMKAR SPARKS is a 68y with obesity and risky alcohol consumption (with decades' history of daily consumption of homemade alcohol), admitted to [facility] 1 month ago due to recent onset of jaundice and with a prolonged hospital and ICU course there due to newly diagnosed decompensated cirrhosis with acute on chronic liver failure (ACLF) with shock (resolved, but still on midodrine); FANY (on intermittent HD since 7/9); recurrent maroon stools and acute on chronic anemia necessitating intermittent PRBC transfusions (last on 8/8) and hypofibrinoginemia, with EGD (7/12) with small non-bleeding EV and a duodenal ulcer with a visible vessel; and waxing and waning hepatic encephalopathy. He was transferred to [facility] on 8/12/24 for evaluation for combined liver/kidney transplants (SLK). SICU c/s for hemodynamic monitoring in setting of potential renal replacement therapy.    PAST MEDICAL HISTORY: Alcohol abuse        PAST SURGICAL HISTORY: No significant past surgical history        FAMILY HISTORY: Family history of diabetes mellitus (DM) (Father)    Family history of CVA (Mother)        SOCIAL HISTORY:    CODE STATUS:     HOME MEDICATIONS:    ALLERGIES: No Known Allergies      VITAL SIGNS:  ICU Vital Signs Last 24 Hrs  T(C): 36.4 (12 Aug 2024 19:00), Max: 36.5 (12 Aug 2024 18:56)  T(F): 97.5 (12 Aug 2024 19:00), Max: 97.7 (12 Aug 2024 18:56)  HR: 71 (12 Aug 2024 20:00) (71 - 78)  BP: 95/54 (12 Aug 2024 20:00) (95/54 - 113/70)  BP(mean): 72 (12 Aug 2024 20:00) (72 - 80)  ABP: --  ABP(mean): --  RR: 20 (12 Aug 2024 19:00) (18 - 20)  SpO2: 100% (12 Aug 2024 20:00) (97% - 100%)    O2 Parameters below as of 12 Aug 2024 19:00  Patient On (Oxygen Delivery Method): nasal cannula  O2 Flow (L/min): 3          NEURO  Exam: AOx4, mildly lethargic  acetaminophen     Tablet .. 650 milliGRAM(s) Oral every 6 hours PRN Temp greater or equal to 38C (100.4F), Mild Pain (1 - 3)  melatonin 3 milliGRAM(s) Oral at bedtime PRN Insomnia      RESPIRATORY  Mechanical Ventilation:     Exam: lungs CTA b/l      CARDIOVASCULAR    Exam: normal S1/S2 w/o murmurs or rubs  Cardiac Rhythm: sinus  midodrine. 20 milliGRAM(s) Oral three times a day      GI/NUTRITION  Exam: abd distended, non TTP, pos fluid wave  Diet: full liquid  lactulose Syrup 20 Gram(s) Oral three times a day  pantoprazole  Injectable 40 milliGRAM(s) IV Push two times a day      GENITOURINARY/RENAL  folic acid 1 milliGRAM(s) Oral daily  multivitamin 1 Tablet(s) Oral daily  thiamine 100 milliGRAM(s) Oral daily      Weight (kg): 114 (08-12 @ 16:46)  08-12    133<L>  |  95<L>  |  39<H>  ----------------------------<  116<H>  4.2   |  21<L>  |  5.84<H>    Ca    9.2      12 Aug 2024 10:41    TPro  5.9<L>  /  Alb  3.5  /  TBili  15.2<H>  /  DBili  x   /  AST  38  /  ALT  15  /  AlkPhos  106  08-12    [ ] Elizabeth catheter, indication: urine output monitoring in critically ill patient    HEMATOLOGIC  [ ] VTE Prophylaxis:                          9.4    13.19 )-----------( 96       ( 12 Aug 2024 10:41 )             28.5     PT/INR - ( 12 Aug 2024 10:41 )   PT: 40.1 sec;   INR: 3.98 ratio         PTT - ( 12 Aug 2024 10:41 )  PTT:75.7 sec  Transfusion: [ ] PRBC	[ ] Platelets	[ ] FFP	[ ] Cryoprecipitate      INFECTIOUS DISEASES  rifAXIMin 550 milliGRAM(s) Oral two times a day    RECENT CULTURES:      ENDOCRINE    CAPILLARY BLOOD GLUCOSE          PATIENT CARE ACCESS DEVICES:  [ ] Peripheral IV  [ ] Central Venous Line	[ ] R	[ ] L	[ ] IJ	[ ] Fem	[ ] SC	Placed:   [ ] Arterial Line		[ ] R	[ ] L	[ ] Fem	[ ] Rad	[ ] Ax	Placed:   [ ] PICC:					[ ] Mediport  [ ] Urinary Catheter, Date Placed:   [x] Necessity of urinary, arterial, and venous catheters discussed    OTHER MEDICATIONS: chlorhexidine 2% Cloths 1 Application(s) Topical <User Schedule>  lidocaine   4% Patch 1 Patch Transdermal daily      IMAGING STUDIES:

## 2024-08-12 NOTE — H&P ADULT - ASSESSMENT
78 yo m with h/o alcohol abuse without known chronic medical history s/p 1 month stay in [facility] now transferred to [facility] for liver transplant eval.

## 2024-08-12 NOTE — CONSULT NOTE ADULT - ATTENDING COMMENTS
67 yo M with obesity and risky alcohol consumption (with decades' history of daily consumption of homemade alcohol), admitted to [facility] 1 month ago due to recent onset of jaundice and with a prolonged hospital and ICU course there due to newly diagnosed decompensated cirrhosis with acute on chronic liver failure (ACLF) with shock (resolved, but still on midodrine); FANY (on intermittent HD since 7/9); recurrent maroon stools and acute on chronic anemia necessitating intermittent PRBC transfusions (last on 8/8) and hypofibrinoginemia, with EGD (7/12) with small non-bleeding EV and a duodenal ulcer with a visible vessel; and waxing and waning hepatic encephalopathy. He was transferred to [facility] on 8/12/24 for evaluation for combined liver/kidney transplants (SLK), started today. ABO pending. MELD 3.0 score of 44. Given concern that he may require pressor support during HD as well as ACLF with very high MELD 3.0 score, recommend transfer to SICU for a higher level of care and hemodynamic monitoring. Okay to advance diet as tolerated. He also needs PT and OT as he was last ambulatory prior to his admission but has been bedbound for the past 35 days. Recommend non-contrast CT head, non-contrast CT chest, and multiphase CT abdomen/pelvis as part of his pre-transplant evaluation. Recommend complete infectious work-up including diagnostic paracentesis if sufficient ascites. Recommend empiric antibiotics with Zosyn and fluconazole pending the infectious studies and further input from Transplant ID.     Please don't hesitate to call with any questions or concerns.    Charo Garcia M.D., Ph.D.  Transplant Hepatology

## 2024-08-13 PROBLEM — Z00.00 ENCOUNTER FOR PREVENTIVE HEALTH EXAMINATION: Status: ACTIVE | Noted: 2024-08-13

## 2024-08-13 NOTE — BH CONSULTATION LIAISON ASSESSMENT NOTE - RISK ASSESSMENT
Risk factors: hx of presumed alcohol use     Protective factors: no known history of suicide attempts, no known prior inpatient psych hospitalizations

## 2024-08-13 NOTE — DIETITIAN INITIAL EVALUATION ADULT - ADD RECOMMEND
1) Continue regular, low sodium diet   2) Ensure Max BID   3) multivitamin, folic acid, thiamine, vitamin C   4) Monitor PO intake, diet tolerance, weight trends, labs, GI function, and skin integrity    Luci Calzada MS, RDN, CDN ([employer])

## 2024-08-13 NOTE — BH CONSULTATION LIAISON ASSESSMENT NOTE - NSBHSAALC_PSY_A_CORE FT
X Size Of Lesion In Cm (Optional): 0 Introduction Text (Please End With A Colon): The following procedure was deferred: Patient told he will need 30 minute appointment for removal. Detail Level: Detailed according to chart hx of etoh use

## 2024-08-13 NOTE — CONSULT NOTE ADULT - SUBJECTIVE AND OBJECTIVE BOX
Patient is a 68y old  Male who presents with a chief complaint of cirrhosis/transplant eval (13 Aug 2024 11:30)      HPI:  78 yo m with alcohol abuse otherwise no known chronic medical issues (does not see doctors per sister) s/p 1 month stay at [facility] now transferred to [facility] for liver transplant eval.  Most of history obtained from sister (Ashlyn) at bedside and some from transfer paperwork. Per sister, pt was initially brought to the [facility] by family for back pain and jaundice for unclear amount of time. Patient was seen by GI and underwent EGD soon after admission which only showed nonbleeding ?duodenal ulcers (no intervention) however few days later pt developed active signs of bleeding and emergently underwent EGD again showing bleeding esophageal varices which were clipped.  pt was electively intubated with stay in ICU thereafter. Patient then started with HD due to worsening renal function and MS for past 2.5 weeks at times limited by low BP requiring pressors to assist. had numerous paracentesis with varying amount of fluid removal - albumin infusions. Sister not aware of any concerns for acute infection during his stay but aware that pt was on abx at some point due to bleeding issues.      Of note patient has had fluctuating levels of confusions likely in setting of hepatic encephalopathy now on standing meds/HD with improvement though not back to baseline. Other history of note more recently has had possible "dark" stools (transfer record indicate "maroon colored") with some concerns for bleeding. no BRBPR .     VS: afebrile. 103/65, 73, 18, 98% NC . easily arousable and responsive. pleasant not confused. Aware of transfer to [facility] hospital though pt reports he came because of his "kidneys" . discuss with pt re: liver transplant eval.  (12 Aug 2024 09:16)     prior hospital charts reviewed [  ]  primary team notes reviewed [  ]  other consultant notes reviewed [  ]    PAST MEDICAL & SURGICAL HISTORY:  Alcohol abuse      No significant past surgical history          Allergies  No Known Allergies    ANTIMICROBIALS (past 90 days)  MEDICATIONS  (STANDING):    fluconAZOLE IVPB   100 mL/Hr IV Intermittent (08-12-24 @ 22:12)    piperacillin/tazobactam IVPB.   200 mL/Hr IV Intermittent (08-12-24 @ 17:03)    piperacillin/tazobactam IVPB.-   25 mL/Hr IV Intermittent (08-12-24 @ 23:52)    piperacillin/tazobactam IVPB..   25 mL/Hr IV Intermittent (08-13-24 @ 11:46)    rifAXIMin   550 milliGRAM(s) Oral (08-13-24 @ 05:22)   550 milliGRAM(s) Oral (08-12-24 @ 17:03)      ANTIMICROBIALS:    fluconAZOLE IVPB    fluconAZOLE IVPB 200 every 24 hours  piperacillin/tazobactam IVPB.. 3.375 every 12 hours  rifAXIMin 550 two times a day    OTHER MEDS: MEDICATIONS  (STANDING):  melatonin 5 at bedtime  midodrine. 20 every 8 hours  pantoprazole  Injectable 40 two times a day  polyethylene glycol 3350 17 <User Schedule>    SOCIAL HISTORY:   hx smoking  non-smoker    FAMILY HISTORY:  Family history of diabetes mellitus (DM) (Father)    Family history of CVA (Mother)      REVIEW OF SYSTEMS  [  ] ROS unobtainable because:    [  ] All other systems negative except as noted below:	    Constitutional:  [ ] fever [ ] chills  [ ] weight loss  [ ] weakness  Skin:  [ ] rash [ ] phlebitis	  Eyes: [ ] icterus [ ] pain  [ ] discharge	  ENMT: [ ] sore throat  [ ] thrush [ ] ulcers [ ] exudates  Respiratory: [ ] dyspnea [ ] hemoptysis [ ] cough [ ] sputum	  Cardiovascular:  [ ] chest pain [ ] palpitations [ ] edema	  Gastrointestinal:  [ ] nausea [ ] vomiting [ ] diarrhea [ ] constipation [ ] pain	  Genitourinary:  [ ] dysuria [ ] frequency [ ] hematuria [ ] discharge [ ] flank pain  [ ] incontinence  Musculoskeletal:  [ ] myalgias [ ] arthralgias [ ] arthritis  [ ] back pain  Neurological:  [ ] headache [ ] seizures  [ ] confusion/altered mental status  Psychiatric:  [ ] anxiety [ ] depression	  Hematology/Lymphatics:  [ ] lymphadenopathy  Endocrine:  [ ] adrenal [ ] thyroid  Allergic/Immunologic:	 [ ] transplant [ ] seasonal    Vital Signs Last 24 Hrs  T(F): 97.9 (08-13-24 @ 11:00), Max: 97.9 (08-13-24 @ 07:00)  Vital Signs Last 24 Hrs  HR: 66 (08-13-24 @ 12:00) (56 - 78)  BP: 101/51 (08-13-24 @ 12:00) (88/51 - 127/55)  RR: 18 (08-13-24 @ 12:00)  SpO2: 100% (08-13-24 @ 12:00) (97% - 100%)  Wt(kg): --    PHYSICAL EXAMINATION:  General: Alert and Awake, NAD  HEENT: PERRL, EOMI, No subconjunctival hemorrhages, Oropharynx Clear, MMM  Neck: Supple, No MASSIEL  Cardiac: RRR, No M/R/G  Resp: CTAB, No Wh/Rh/Ra  Abdomen: NBS, NT/ND, No HSM, No rigidity or guarding  MSK: No LE edema. No stigmata of IE. No evidence of phlebitis. No evidence of synovitis.  : No trejo  Skin: No rashes or lesions. Skin is warm and dry to the touch.   Neuro: Alert and Awake. CN 2-12 Grossly intact. Moves all four extremities spontaneously.  Psych: Calm, Pleasant, Cooperative                          8.9    14.41 )-----------( 104      ( 12 Aug 2024 21:12 )             27.2     08-13    135  |  x   |  x   ----------------------------<  x   x    |  x   |  6.49<H>    Ca    9.4      12 Aug 2024 21:12  Phos  3.4     08-12  Mg     1.9     08-12    TPro  x   /  Alb  x   /  TBili  15.3<H>  /  DBili  x   /  AST  x   /  ALT  x   /  AlkPhos  x   08-13    Urinalysis Basic - ( 12 Aug 2024 21:12 )    Color: x / Appearance: x / SG: x / pH: x  Gluc: 153 mg/dL / Ketone: x  / Bili: x / Urobili: x   Blood: x / Protein: x / Nitrite: x   Leuk Esterase: x / RBC: x / WBC x   Sq Epi: x / Non Sq Epi: x / Bacteria: x    MICROBIOLOGY:                RADIOLOGY:    <The imaging below has been reviewed and visualized by me independently. Findings as detailed in report below>     Patient is a 68y old  Male who presents with a chief complaint of cirrhosis/transplant eval (13 Aug 2024 11:30)      HPI:  78 yo m with alcohol abuse otherwise no known chronic medical issues (does not see doctors per sister) s/p 1 month stay at [facility] now transferred to [facility] for liver transplant eval.  Most of history obtained from sister (Ashlyn) at bedside and some from transfer paperwork. Per sister, pt was initially brought to the hospital by family for back pain and jaundice for unclear amount of time. Patient was seen by GI and underwent EGD soon after admission which only showed nonbleeding ?duodenal ulcers (no intervention) however few days later pt developed active signs of bleeding and emergently underwent EGD again showing bleeding esophageal varices which were clipped.  pt was electively intubated with stay in ICU thereafter. Patient then started with HD due to worsening renal function and MS for past 2.5 weeks at times limited by low BP requiring pressors to assist. Had numerous paracentesis with varying amount of fluid removal - albumin infusions. Sister not aware of any concerns for acute infection during his stay but aware that pt was on abx at some point due to bleeding issues.      Of note patient has had fluctuating levels of confusions likely in setting of hepatic encephalopathy now on standing meds/HD with improvement though not back to baseline. Other history of note more recently has had possible "dark" stools (transfer record indicate "maroon colored") with some concerns for bleeding. no BRBPR . VS: afebrile. 103/65, 73, 18, 98% NC . Easily arousable and responsive, pleasant & confused. Aware of transfer to [facility] hospital though pt reports he came because of his "kidneys" . Discussed with pt re: liver transplant eval.    On assessment patient is encephalopathic with collateral information obtained from son via phone.     prior hospital charts reviewed [ x ]  primary team notes reviewed [x  ]  other consultant notes reviewed [ x ]    PAST MEDICAL & SURGICAL HISTORY:  Alcohol abuse      No significant past surgical history          Allergies  No Known Allergies    ANTIMICROBIALS (past 90 days)  MEDICATIONS  (STANDING):    fluconAZOLE IVPB   100 mL/Hr IV Intermittent (08-12-24 @ 22:12)    piperacillin/tazobactam IVPB.   200 mL/Hr IV Intermittent (08-12-24 @ 17:03)    piperacillin/tazobactam IVPB.-   25 mL/Hr IV Intermittent (08-12-24 @ 23:52)    piperacillin/tazobactam IVPB..   25 mL/Hr IV Intermittent (08-13-24 @ 11:46)    rifAXIMin   550 milliGRAM(s) Oral (08-13-24 @ 05:22)   550 milliGRAM(s) Oral (08-12-24 @ 17:03)      ANTIMICROBIALS:    fluconAZOLE IVPB    fluconAZOLE IVPB 200 every 24 hours  piperacillin/tazobactam IVPB.. 3.375 every 12 hours  rifAXIMin 550 two times a day    OTHER MEDS: MEDICATIONS  (STANDING):  melatonin 5 at bedtime  midodrine. 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       FAMILY HISTORY:  ESRD with HD (Father)  Family history of diabetes mellitus (DM) (Father)    Family history of CVA (Mother)      REVIEW OF SYSTEMS  [  ] ROS unobtainable because:    [  x] All other systems negative except as noted below:	    Constitutional:  [ ] fever [ ] chills  [ ] weight loss  [ ] weakness  Skin:  [ ] rash [ ] phlebitis	  Eyes: [ ] icterus [ ] pain  [ ] discharge	  ENMT: [ ] sore throat  [ ] thrush [ ] ulcers [ ] exudates  Respiratory: [ ] dyspnea [ ] hemoptysis [ ] cough [ ] sputum	  Cardiovascular:  [ ] chest pain [ ] palpitations [ ] edema	  Gastrointestinal:  [ ] nausea [ ] vomiting [ ] diarrhea [ ] constipation [ ] pain	  Genitourinary:  [ ] dysuria [ ] frequency [ ] hematuria [ ] discharge [ ] flank pain  [ ] incontinence  Musculoskeletal:  [ ] myalgias [ ] arthralgias [ ] arthritis  [ ] back pain  Neurological:  [ ] headache [ ] seizures  [x ] confusion/altered mental status  Psychiatric:  [ ] anxiety [ ] depression	  Hematology/Lymphatics:  [ ] lymphadenopathy  Endocrine:  [ ] adrenal [ ] thyroid  Allergic/Immunologic:	 [ ] transplant [ ] seasonal    Vital Signs Last 24 Hrs  T(F): 97.9 (08-13-24 @ 11:00), Max: 97.9 (08-13-24 @ 07:00)  Vital Signs Last 24 Hrs  HR: 66 (08-13-24 @ 12:00) (56 - 78)  BP: 101/51 (08-13-24 @ 12:00) (88/51 - 127/55)  RR: 18 (08-13-24 @ 12:00)  SpO2: 100% (08-13-24 @ 12:00) (97% - 100%)  Wt(kg): --    PHYSICAL EXAMINATION:  General: Alert and Awake, NAD, Encephalopathic  HEENT: PERRL, EOMI, No subconjunctival hemorrhages, Oropharynx Clear, MMM, icteric  Neck: Supple, No MASSIEL  Cardiac: RRR, No M/R/G  Resp: CTAB, No Wh/Rh/Ra  Abdomen: NBS, NT/ND, No HSM, No rigidity or guarding  MSK: ++LE edema. No stigmata of IE. No evidence of phlebitis. No evidence of synovitis.  : + Elizabeth  Skin: No rashes or lesions. Skin is warm and dry to the touch. Jaundice  Neuro: Alert and Awake. CN 2-12 Grossly intact. Moves all four extremities spontaneously.  Psych: Calm, Pleasant, Cooperative                          8.9    14.41 )-----------( 104      ( 12 Aug 2024 21:12 )             27.2     08-13    135  |  x   |  x   ----------------------------<  x   x    |  x   |  6.49<H>    Ca    9.4      12 Aug 2024 21:12  Phos  3.4     08-12  Mg     1.9     08-12    TPro  x   /  Alb  x   /  TBili  15.3<H>  /  DBili  x   /  AST  x   /  ALT  x   /  AlkPhos  x   08-13    Urinalysis Basic - ( 12 Aug 2024 21:12 )    Color: x / Appearance: x / SG: x / pH: x  Gluc: 153 mg/dL / Ketone: x  / Bili: x / Urobili: x   Blood: x / Protein: x / Nitrite: x   Leuk Esterase: x / RBC: x / WBC x   Sq Epi: x / Non Sq Epi: x / Bacteria: x    MICROBIOLOGY:                RADIOLOGY:    <The imaging below has been reviewed and visualized by me independently. Findings as detailed in report below>    < from: CT Chest w/ IV Cont (08.13.24 @ 14:13) >  INTERPRETATION:  CLINICAL INFORMATION: Liver transplant evaluation.    COMPARISON: Chest radiographs 8/12/2024.    CONTRAST/COMPLICATIONS:  IV Contrast: Omnipaque 350  90 cc administered   10 cc discarded  Oral Contrast: Water  Complications: None reported at time of study completion    PROCEDURE:  CT of the Chest, Abdomen and Pelvis was performed.  Arterial, portal venous and delayed phase imaging was performed through   the upper abdomen.  Sagittal and coronal reformats were performed.    FINDINGS:  Streak artifact related to patient's upper extremities at their sidesmay   limit evaluation.    CHEST:  LUNGS AND LARGE AIRWAYS: Patent central airways. Patchy groundglass   opacities in the bilateral upper lobes. Segmental atelectasis in the   right lower lobe and additional bilateral subsegmental atelectasis.  PLEURA: No pleural effusion.  VESSELS: Right upper extremity approach catheter with tip in the right   atrium. Right IJ catheter with tip in the SVC. Atherosclerotic changes.   Coronary artery calcifications.  HEART: Cardiomegaly. No pericardial effusion.  MEDIASTINUM AND KALIN: No lymphadenopathy.  CHEST WALL AND LOWER NECK: Within normal limits.    ABDOMEN AND PELVIS:  LIVER: Cirrhosis. Hypodense lesion in the periphery of the left lobe with   somewhat wedge-shaped morphology measuring 3.3 cm (series 7 image 81).   Question faint peripheral nodular enhancement (series 7 image 78).   Subcentimeter hypodense focus in the right lobe that is too small to   characterize. Punctate calcification in the right lobe.  BILE DUCTS: Normal caliber.  GALLBLADDER: Intraluminal hyperdensity suggestive of stones or sludge.  SPLEEN: Splenomegaly. Wedge-shaped region of hypoattenuation in the   posterior aspect of the spleen, which may reflect infarct (series 9 image   45).  PANCREAS: Within normal limits.  ADRENALS: Within normal limits.  KIDNEYS/URETERS: No hydronephrosis. Subcentimeter hypodense foci in the   left kidney that are too small to characterize.    BLADDER: Collapsed around a Elizabeth catheter.  REPRODUCTIVE ORGANS: The prostate is within normal limits.    BOWEL: Linear hyperdensity in the periampullary duodenum, which may   reflect an endoscopically placed clip; correlate with the patient's   history. Focal eccentric wall thickening in the low rectum, which may be   due to a mural fold (series 13 image 96, series 7 image 207). No bowel   obstruction.  PERITONEUM/RETROPERITONEUM: Large volume ascites.  VESSELS: Atherosclerotic changes. There is likely a replaced or accessory   left hepatic artery arising from the left gastric artery. Patent hepatic   and portal veins. Recanalized paraumbilical vein. Perisplenic and   perigastric varices.  LYMPH NODES: No lymphadenopathy.  ABDOMINAL WALL: Fat-containing left inguinal hernia. Small umbilical   hernia containing ascites fluid and varices.Subcutaneous edema.  BONES: Degenerative changes.    IMPRESSION:  *  Patchy groundglass opacities in the bilateral upper lobes.  *  Cirrhosis with evidence of portal hypertension.  *  Hypodense lesion in the periphery of the left hepatic lobe of   uncertain etiology. Somewhat wedge-shaped morphology raises the   possibility for a small infarct. Question subtle peripheral nodular   enhancement, and a hemangioma is also considered. Contrast enhanced   abdominal MRI can be performed for further characterization and to assess   for other possibilities.  *  A wedge-shaped region of hypoattenuation in the spleen may reflect a   small infarct.  *  Focal eccentric wall thickening in the low rectum, possibly due to a   mural fold. Consider colonoscopy.  *  Large volume ascites.        --- End of Report ---        < end of copied text >   Patient is a 68y old  Male who presents with a chief complaint of cirrhosis/transplant eval (13 Aug 2024 11:30)    HPI:    78 yo m with alcohol abuse otherwise no known chronic medical issues (does not see doctors per sister) s/p 1 month stay at [facility] now transferred to [facility] for liver transplant eval.  Most of history obtained from sister (Ashlyn) at bedside and some from transfer paperwork. Per sister, pt was initially brought to the hospital by family for back pain and jaundice for unclear amount of time. Patient was seen by GI and underwent EGD soon after admission which only showed nonbleeding ?duodenal ulcers (no intervention) however few days later pt developed active signs of bleeding and emergently underwent EGD again showing bleeding esophageal varices which were clipped.  pt was electively intubated with stay in ICU thereafter. Patient then started with HD due to worsening renal function and MS for past 2.5 weeks at times limited by low BP requiring pressors to assist. Had numerous paracentesis with varying amount of fluid removal - albumin infusions. Sister not aware of any concerns for acute infection during his stay but aware that pt was on abx at some point due to bleeding issues.      Of note patient has had fluctuating levels of confusions likely in setting of hepatic encephalopathy now on standing meds/HD with improvement though not back to baseline. Other history of note more recently has had possible "dark" stools (transfer record indicate "maroon colored") with some concerns for bleeding. no BRBPR . VS: afebrile. 103/65, 73, 18, 98% NC . Easily arousable and responsive, pleasant & confused. Aware of transfer to [facility] hospital though pt reports he came because of his "kidneys" . Discussed with pt re: liver transplant eval.    On assessment patient is encephalopathic with collateral information obtained from son via phone.     prior hospital charts reviewed [ x ]  primary team notes reviewed [x  ]  other consultant notes reviewed [ x ]    PAST MEDICAL & SURGICAL HISTORY:  Alcohol abuse      No significant past surgical history          Allergies  No Known Allergies    ANTIMICROBIALS (past 90 days)  MEDICATIONS  (STANDING):    fluconAZOLE IVPB   100 mL/Hr IV Intermittent (08-12-24 @ 22:12)    piperacillin/tazobactam IVPB.   200 mL/Hr IV Intermittent (08-12-24 @ 17:03)    piperacillin/tazobactam IVPB.-   25 mL/Hr IV Intermittent (08-12-24 @ 23:52)    piperacillin/tazobactam IVPB..   25 mL/Hr IV Intermittent (08-13-24 @ 11:46)    rifAXIMin   550 milliGRAM(s) Oral (08-13-24 @ 05:22)   550 milliGRAM(s) Oral (08-12-24 @ 17:03)      ANTIMICROBIALS:    fluconAZOLE IVPB    fluconAZOLE IVPB 200 every 24 hours  piperacillin/tazobactam IVPB.. 3.375 every 12 hours  rifAXIMin 550 two times a day    OTHER MEDS: MEDICATIONS  (STANDING):  melatonin 5 at bedtime  midodrine. 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       FAMILY HISTORY:  ESRD with HD (Father)  Family history of diabetes mellitus (DM) (Father)    Family history of CVA (Mother)      REVIEW OF SYSTEMS  [  ] ROS unobtainable because:    [  x] All other systems negative except as noted below:	    Constitutional:  [ ] fever [ ] chills  [ ] weight loss  [ ] weakness  Skin:  [ ] rash [ ] phlebitis	  Eyes: [ ] icterus [ ] pain  [ ] discharge	  ENMT: [ ] sore throat  [ ] thrush [ ] ulcers [ ] exudates  Respiratory: [ ] dyspnea [ ] hemoptysis [ ] cough [ ] sputum	  Cardiovascular:  [ ] chest pain [ ] palpitations [ ] edema	  Gastrointestinal:  [ ] nausea [ ] vomiting [ ] diarrhea [ ] constipation [ ] pain	  Genitourinary:  [ ] dysuria [ ] frequency [ ] hematuria [ ] discharge [ ] flank pain  [ ] incontinence  Musculoskeletal:  [ ] myalgias [ ] arthralgias [ ] arthritis  [ ] back pain  Neurological:  [ ] headache [ ] seizures  [x ] confusion/altered mental status  Psychiatric:  [ ] anxiety [ ] depression	  Hematology/Lymphatics:  [ ] lymphadenopathy  Endocrine:  [ ] adrenal [ ] thyroid  Allergic/Immunologic:	 [ ] transplant [ ] seasonal    Vital Signs Last 24 Hrs  T(F): 97.9 (08-13-24 @ 11:00), Max: 97.9 (08-13-24 @ 07:00)  Vital Signs Last 24 Hrs  HR: 66 (08-13-24 @ 12:00) (56 - 78)  BP: 101/51 (08-13-24 @ 12:00) (88/51 - 127/55)  RR: 18 (08-13-24 @ 12:00)  SpO2: 100% (08-13-24 @ 12:00) (97% - 100%)  Wt(kg): --    PHYSICAL EXAMINATION:  General: Alert and Awake, NAD, Encephalopathic  HEENT: PERRL, EOMI, No subconjunctival hemorrhages, Oropharynx Clear, MMM, icteric  Neck: Supple, No MASSIEL  Cardiac: RRR, No M/R/G  Resp: CTAB, No Wh/Rh/Ra  Abdomen: NBS, NT/ND, No HSM, No rigidity or guarding  MSK: ++LE edema. No stigmata of IE. No evidence of phlebitis. No evidence of synovitis.  : + Elizabeth  Skin: No rashes or lesions. Skin is warm and dry to the touch. Jaundice  Neuro: Alert and Awake. CN 2-12 Grossly intact. Moves all four extremities spontaneously.  Psych: Calm, Pleasant, Cooperative                          8.9    14.41 )-----------( 104      ( 12 Aug 2024 21:12 )             27.2     08-13    135  |  x   |  x   ----------------------------<  x   x    |  x   |  6.49<H>    Ca    9.4      12 Aug 2024 21:12  Phos  3.4     08-12  Mg     1.9     08-12    TPro  x   /  Alb  x   /  TBili  15.3<H>  /  DBili  x   /  AST  x   /  ALT  x   /  AlkPhos  x   08-13    Urinalysis Basic - ( 12 Aug 2024 21:12 )    Color: x / Appearance: x / SG: x / pH: x  Gluc: 153 mg/dL / Ketone: x  / Bili: x / Urobili: x   Blood: x / Protein: x / Nitrite: x   Leuk Esterase: x / RBC: x / WBC x   Sq Epi: x / Non Sq Epi: x / Bacteria: x    RADIOLOGY:    <The imaging below has been reviewed and visualized by me independently. Findings as detailed in report below>    < from: CT Chest w/ IV Cont (08.13.24 @ 14:13) >  INTERPRETATION:  CLINICAL INFORMATION: Liver transplant evaluation.    COMPARISON: Chest radiographs 8/12/2024.    CONTRAST/COMPLICATIONS:  IV Contrast: Omnipaque 350  90 cc administered   10 cc discarded  Oral Contrast: Water  Complications: None reported at time of study completion    PROCEDURE:  CT of the Chest, Abdomen and Pelvis was performed.  Arterial, portal venous and delayed phase imaging was performed through   the upper abdomen.  Sagittal and coronal reformats were performed.    FINDINGS:  Streak artifact related to patient's upper extremities at their sidesmay   limit evaluation.    CHEST:  LUNGS AND LARGE AIRWAYS: Patent central airways. Patchy groundglass   opacities in the bilateral upper lobes. Segmental atelectasis in the   right lower lobe and additional bilateral subsegmental atelectasis.  PLEURA: No pleural effusion.  VESSELS: Right upper extremity approach catheter with tip in the right   atrium. Right IJ catheter with tip in the SVC. Atherosclerotic changes.   Coronary artery calcifications.  HEART: Cardiomegaly. No pericardial effusion.  MEDIASTINUM AND KALIN: No lymphadenopathy.  CHEST WALL AND LOWER NECK: Within normal limits.    ABDOMEN AND PELVIS:  LIVER: Cirrhosis. Hypodense lesion in the periphery of the left lobe with   somewhat wedge-shaped morphology measuring 3.3 cm (series 7 image 81).   Question faint peripheral nodular enhancement (series 7 image 78).   Subcentimeter hypodense focus in the right lobe that is too small to   characterize. Punctate calcification in the right lobe.  BILE DUCTS: Normal caliber.  GALLBLADDER: Intraluminal hyperdensity suggestive of stones or sludge.  SPLEEN: Splenomegaly. Wedge-shaped region of hypoattenuation in the   posterior aspect of the spleen, which may reflect infarct (series 9 image   45).  PANCREAS: Within normal limits.  ADRENALS: Within normal limits.  KIDNEYS/URETERS: No hydronephrosis. Subcentimeter hypodense foci in the   left kidney that are too small to characterize.    BLADDER: Collapsed around a Elizabeth catheter.  REPRODUCTIVE ORGANS: The prostate is within normal limits.    BOWEL: Linear hyperdensity in the periampullary duodenum, which may   reflect an endoscopically placed clip; correlate with the patient's   history. Focal eccentric wall thickening in the low rectum, which may be   due to a mural fold (series 13 image 96, series 7 image 207). No bowel   obstruction.  PERITONEUM/RETROPERITONEUM: Large volume ascites.  VESSELS: Atherosclerotic changes. There is likely a replaced or accessory   left hepatic artery arising from the left gastric artery. Patent hepatic   and portal veins. Recanalized paraumbilical vein. Perisplenic and   perigastric varices.  LYMPH NODES: No lymphadenopathy.  ABDOMINAL WALL: Fat-containing left inguinal hernia. Small umbilical   hernia containing ascites fluid and varices.Subcutaneous edema.  BONES: Degenerative changes.    IMPRESSION:  *  Patchy groundglass opacities in the bilateral upper lobes.  *  Cirrhosis with evidence of portal hypertension.  *  Hypodense lesion in the periphery of the left hepatic lobe of   uncertain etiology. Somewhat wedge-shaped morphology raises the   possibility for a small infarct. Question subtle peripheral nodular   enhancement, and a hemangioma is also considered. Contrast enhanced   abdominal MRI can be performed for further characterization and to assess   for other possibilities.  *  A wedge-shaped region of hypoattenuation in the spleen may reflect a   small infarct.  *  Focal eccentric wall thickening in the low rectum, possibly due to a   mural fold. Consider colonoscopy.  *  Large volume ascites.        --- End of Report ---        < end of copied text >

## 2024-08-13 NOTE — PROGRESS NOTE ADULT - ASSESSMENT
69 yo M with obesity and risky alcohol consumption (with decades' history of daily consumption of homemade alcohol), admitted to [facility] 1 month ago due to recent onset of jaundice and with a prolonged hospital and ICU course there due to newly diagnosed decompensated cirrhosis with acute on chronic liver failure. Transferred to [facility] on 8/12/24 for evaluation for combined liver/kidney transplants (SLK). Given concern that he may require pressor support during HD as well as ACLF with very high MELD 3.0 score, recommend transfer to SICU for a higher level of care and hemodynamic monitoring.     # Distributive shock    - Resolved, but still on midodrine)    # FANY/CKD    - On intermittent HD since 7/9    # Active UGIB   - Recurrent maroon stools and acute on chronic anemia necessitating intermittent PRBC transfusions, and management of hypofibrinoginemia.   - EGD (7/12) with small non-bleeding EV and a duodenal ulcer with a visible vessel.     # newly diagnosed decompensated cirrhosis with acute on chronic liver failure (ACLF)   - On empiric antibiotics with Zosyn and fluconazole pending infectious studies and further input from Transplant ID.   - Waxing and waning hepatic encephalopathy managed with Rifaximin and lactulose. Due to abdominal distention recommended to discontinue lactulose and start Miralax. CTH with no acute events reported.   - Large volume ascites and anasarca. Patient is oliguric and has been dependent of intermittent HD since 7/9. No history of SBP, might benefit from diagnostic and therapeutic paracentesis.   - CT chest w/IV contrast (8/13) reports cardiomegaly. Patchy groundglass   opacities in the bilateral upper lobes and subsegmental atelectasis in lower lobes.   - CT abdomen/pelvis w/IV contrast (8/13) reported patent portohepatic veins with recanalized paraumbilical vein. Perisplenic and perigastric varices. Hypodense lesion in the periphery of the left hepatic lobe of uncertain etiology (possible small infarct or hemangioma). Also a wedge-shaped region of hypoattenuation in the spleen (possible small infarct). Large volume ascites. Recommended contrast MRI of abdomen.   - Focal eccentric wall thickening in the low rectum, possibly due to a   mural fold. Consider colonoscopy.  - Okay to advance diet as tolerated.   - Will need daily PT and OT as he was last ambulatory prior to his admission but has been bedbound for the past 36 days.     # SLK transplan evaluation   - ABO O. MELD 3.0 score of 43.   - OLT evaluation started on 8/12            [ ] Psychosocial            [ ] Social work evaluation            [ ] Infectious            [ ] Transplant ID: CMV PCR: [id number] IU/mL (8/12). Repeat PCR next week.             [ ] Transplant cardiology: Cardiomegaly seen on recent CT. TTE pending.             [ ] Colonoscopy             [ ] Prostate:             [ ] Dental            [ ] PT/Frailty            [ ] PETH level and serum/urine drug screening            [ ] RUQ US with dopplers            [ ] Contrast enhanced cross-sectional imaging with either CT or MRI abdomen/pelvis            [ ] Financial clearance  - Transplant nephrology following assessment for kidney transplantation.     Note incomplete until finalized by attending signature/attestation.    Myra Maloney   Transplant Hepatology Fellow

## 2024-08-13 NOTE — DIETITIAN INITIAL EVALUATION ADULT - PROBLEM SELECTOR PLAN 4
Last plt 8/11 : 85   likely in setting of cirrhosis with GIB on admission   Will cont monitor and H/H, BM   Will hold off any chemical VTE ppx given above  start on compression device for now.

## 2024-08-13 NOTE — PROGRESS NOTE ADULT - ASSESSMENT
ASSESSMENT: 68 year old male in acute liver and renal failure admitted to SICU for hemodynamic monitoring iso recent GI bleeding and potential renal replacement requirements. Under eval for SLK.     PLAN:    Neuro:  - AOx1, increasingly lethargic  - trend ammonia, last 115  - lactulose 20 TID  - rifaximin     Resp:  - Out of bed to chair, ambulate as tolerated, and incentive spirometry to prevent atelectasis    CV:  - goal MAP > 65 mmHg  - midodrine 20 TID  - trend lactate    GI:   - Diet: Full liquids, advance as tolerated iso mental status  - Bowel regimen with lactulose  - Protonix BID for melanotic stools  - trend LFTs  - CT CAP w/ cont, A/P triple phase when able    Renal:  - Monitor I&Os and electrolytes w/ repletions as necessary  - possible HD 8/13, was receiving at [facility]  - under eval for SLK    Heme:  - Monitor CBC and coags  - hold chem VTE prophylaxis  - SCDs for VTE prophylaxis    ID:   - Monitor for clinical evidence of active infection  - Monitor WBC, temperature, and procalcitonin  - Empiric antibiotics with zosyn, fluc    Endo:   - Monitor glucose    Code Status: full    Disposition: SICU    Robert Barron PA-C  [id number]  Reachable via TEAMS

## 2024-08-13 NOTE — PROGRESS NOTE ADULT - ASSESSMENT
68y with obesity and risky alcohol consumption (with decades' history of daily consumption of homemade alcohol), admitted to [facility] 1 month ago due to recent onset of jaundice and with a prolonged hospital and ICU course there due to newly diagnosed decompensated cirrhosis with acute on chronic liver failure (ACLF) with shock (resolved, but still on midodrine); FANY (on intermittent HD since 7/9); recurrent maroon stools and acute on chronic anemia necessitating intermittent PRBC transfusions (last on 8/8) and hypofibrinoginemia, with EGD (7/12) with small non-bleeding EV and a duodenal ulcer with a visible vessel; and waxing and waning hepatic encephalopathy. He was transferred to [facility] on 8/12/24 for evaluation for combined liver/kidney transplants (SLK). SICU c/s for hemodynamic monitoring in setting of potential renal replacement therapy.  68y with obesity and risky alcohol consumption (with decades' history of daily consumption of homemade alcohol), admitted to [facility] 1 month ago due to recent onset of jaundice and with a prolonged hospital and ICU course there due to newly diagnosed decompensated cirrhosis with acute on chronic liver failure (ACLF) with shock (resolved, but still on midodrine); FANY (on intermittent HD since 7/9); recurrent maroon stools and acute on chronic anemia necessitating intermittent PRBC transfusions (last on 8/8) and hypofibrinoginemia, with EGD (7/12) with small non-bleeding EV and a duodenal ulcer with a visible vessel; and waxing and waning hepatic encephalopathy. He was transferred to [facility] on 8/12/24 for evaluation for combined liver/kidney transplants (SLK). SICU c/s for hemodynamic monitoring in setting of potential renal replacement therapy.      68y with obesity and risky alcohol consumption (with decades' history of daily consumption of homemade alcohol), admitted to [facility] 1 month ago due to recent onset of jaundice and with a prolonged hospital and ICU course there due to newly diagnosed decompensated cirrhosis with acute on chronic liver failure (ACLF) with shock (resolved, but still on midodrine); FANY (on intermittent HD since 7/9); recurrent maroon stools and acute on chronic anemia necessitating intermittent PRBC transfusions (last on 8/8) and hypofibrinoginemia, with EGD (7/12) with small non-bleeding EV and a duodenal ulcer with a visible vessel; and waxing and waning hepatic encephalopathy. He was transferred to [facility] on 8/12/24 for evaluation for combined liver/kidney transplants (SLK).      Decompensated ETOH Cirrhosis  -MELD 44O  -open SLK eval, consultants aware  -TTE  -transferred to SICU o/n.  CRRT to start today, Transplant Nephrology following  -PT/OT  -pan scan, liver doppler to start  -pan culture  -LVP as able  -PPI  -Thiamine/MVI/FOLIC ACID  -Miralax/Rifaximin, increase prn if HE  -Zosyn/Fluc empiric, Transplant ID on board  -Transplant Hepatology following  -continue SICU care

## 2024-08-13 NOTE — CONSULT NOTE ADULT - ASSESSMENT
78 yo m with alcohol abuse otherwise no known chronic medical issues (does not see doctors per sister) s/p 1 month stay at [facility] now transferred to [facility] for liver transplant eval.  Most of history obtained from sister (Ashlyn) at bedside and some from transfer paperwork. Per sister, pt was initially brought to the hospital by family for back pain and jaundice for unclear amount of time. Patient was seen by GI and underwent EGD soon after admission which only showed nonbleeding ?duodenal ulcers (no intervention) however few days later pt developed active signs of bleeding and emergently underwent EGD again showing bleeding esophageal varices which were clipped.  pt was electively intubated with stay in ICU thereafter. Patient then started with HD due to worsening renal function and MS for past 2.5 weeks at times limited by low BP requiring pressors to assist. Had numerous paracentesis with varying amount of fluid removal - albumin infusions. Sister not aware of any concerns for acute infection during his stay but aware that pt was on abx at some point due to bleeding issues.      Of note patient has had fluctuating levels of confusions likely in setting of hepatic encephalopathy now on standing meds/HD with improvement though not back to baseline. Other history of note more recently has had possible "dark" stools (transfer record indicate "maroon colored") with some concerns for bleeding. no BRBPR . VS: afebrile. 103/65, 73, 18, 98% NC . Easily arousable and responsive, pleasant & confused. Aware of transfer to [facility] hospital though pt reports he came because of his "kidneys" . Discussed with pt re: liver transplant eval.    On assessment patient is encephalopathic with collateral information obtained from son via phone.      PERTINENT RADIOLOGY:  CXR: Tubes and lines as above. No focal consolidations  CT Chest, A&P:  Patchy ground-glass opacities in the bilateral upper lobes. *  Cirrhosis with evidence of portal hypertension. *  Hypodense lesion in the periphery of the left hepatic lobe of uncertain etiology. Somewhat wedge-shaped morphology raises the possibility for a small infarct. Question subtle peripheral nodular enhancement, and a hemangioma is also considered. Contrast enhanced abdominal MRI can be performed for further characterization and to assess for other possibilities. *  A wedge-shaped region of hypoattenuation in the spleen may reflect a small infarct. *  Focal eccentric wall thickening in the low rectum, possibly due to a mural fold. Consider colonoscopy. *  Large volume ascites.  CT Head: No evidence of acute intracranial pathology. If clinical symptoms persist or worsen, more sensitive evaluation with brain MRI may be obtained, if no contraindications exist.        #Acute liver disease  #Decompensated liver cirrhosis  #Leukocytosis  #CMV Viremia  -Afebrile  -WBC 14  -MELD Score : >40 points  -Cytomegalovirus By PCR: 4890 IU/mL (8/12)  -Diana Barr Virus DNA by PCR - Blood: Not Detected      RECOMMENDATIONS:  Trend CBC & chemistry  Continue temperature curves  Would repeat CMV PCR next week  Continue empiric Zosyn  Continue empiric fluconazole  Follow up blood cultures  Follow TTE        #Encounter to Vaccinate Patient  Son states they are amendable to all recommended vaccines.  COVID19: No primary series. Would benefit from COVID19 [id number] dose  Influenza: Would benefit from dose next season  Pneumococcal: Would benefit from PCV20  HAV: Immune, no additional vaccines indicated  HBV:Immune, no additional vaccines indicated  MMR: Check MMR IgG   Varicella: Check Varicella IgG   Shingles: Would benefit from Shingrix  Tdap: Would benefit from Tdap        #Pre Transplant Evaluation   HIV-1/2 Combo Result: Nonreactive  EBVPCR Log: NotDetec Abr63LS/mL   Hepatitis A IgG Ab Result: Reactive   Hepatitis B Core Antibody, Total: Nonreactive  Hepatitis B Surface Antibody: Reactive   Hepatitis B DNA PCR Log: Not Detected  Hepatitis B Surface Antigen: Nonreactive  Hepatitis C Virus Interpretation: Nonreactive  COVID-19 De Domain Antibody: Positive    --Recommend COVID19 Nucleocapsid Antibody  --Recommend HSV 1/2 IgG  --Recommend EBV Serology  --Recommend CMV IgG  --Recommend VZV IgG  --Recommend Measles, Mumps and Rubella IgG serologies  --Recommend QuantiFeron Gold  --Recommend Syphilis Screen  --Recommend Toxoplasma IgG  --Recommend Strongyloides Ab

## 2024-08-13 NOTE — PROGRESS NOTE ADULT - SUBJECTIVE AND OBJECTIVE BOX
Transplant Surgery - Multidisciplinary Progress Note  --------------------------------------------------------------      Present:   Patient seen and examined with multidisciplinary Transplant team including  (Surgery: Dr. Dagher  Hepatologist: Dr. Garcia .   Pharmacist:Beckie  ACP: Darek, and RNs in AM rounds.   Disciplines not in attendance will be notified of the plan.       HPI:    68y with obesity and risky alcohol consumption (with decades' history of daily consumption of homemade alcohol), admitted to [facility] 1 month ago due to recent onset of jaundice and with a prolonged hospital and ICU course there due to newly diagnosed decompensated cirrhosis with acute on chronic liver failure (ACLF) with shock (resolved, but still on midodrine); FANY (on intermittent HD since 7/9); recurrent maroon stools and acute on chronic anemia necessitating intermittent PRBC transfusions (last on 8/8) and hypofibrinoginemia, with EGD (7/12) with small non-bleeding EV and a duodenal ulcer with a visible vessel; and waxing and waning hepatic encephalopathy. He was transferred to [facility] on 8/12/24 for evaluation for combined liver/kidney transplants (SLK). SICU c/s for hemodynamic monitoring in setting of potential renal replacement therapy.      Interval Events:  - transferred from floor to sicu for increasing needs/CRRT  - Opened SLK eval 8/12        Potential Discharge date:    Education:  Medications    Plan of care:  See Below    MEDICATIONS  (STANDING):  chlorhexidine 2% Cloths 1 Application(s) Topical <User Schedule>  fluconAZOLE IVPB      fluconAZOLE IVPB 200 milliGRAM(s) IV Intermittent every 24 hours  folic acid 1 milliGRAM(s) Oral daily  lactulose Syrup 20 Gram(s) Oral three times a day  lidocaine   4% Patch 1 Patch Transdermal daily  melatonin 5 milliGRAM(s) Oral at bedtime  midodrine. 20 milliGRAM(s) Oral three times a day  multivitamin 1 Tablet(s) Oral daily  pantoprazole  Injectable 40 milliGRAM(s) IV Push two times a day  piperacillin/tazobactam IVPB.. 3.375 Gram(s) IV Intermittent every 12 hours  rifAXIMin 550 milliGRAM(s) Oral two times a day  thiamine 100 milliGRAM(s) Oral daily    MEDICATIONS  (PRN):      PAST MEDICAL & SURGICAL HISTORY:  Alcohol abuse      No significant past surgical history          Vital Signs Last 24 Hrs  T(C): 36.1 (13 Aug 2024 03:00), Max: 36.5 (12 Aug 2024 18:56)  T(F): 97 (13 Aug 2024 03:00), Max: 97.7 (12 Aug 2024 18:56)  HR: 66 (13 Aug 2024 07:00) (60 - 78)  BP: 94/46 (13 Aug 2024 07:00) (94/46 - 127/55)  BP(mean): 67 (13 Aug 2024 07:00) (67 - 81)  RR: 17 (13 Aug 2024 07:00) (11 - 27)  SpO2: 100% (13 Aug 2024 07:00) (97% - 100%)    Parameters below as of 13 Aug 2024 06:00  Patient On (Oxygen Delivery Method): room air        I&O's Summary    12 Aug 2024 07:01  -  13 Aug 2024 07:00  --------------------------------------------------------  IN: 300 mL / OUT: 10 mL / NET: 290 mL                              8.9    14.41 )-----------( 104      ( 12 Aug 2024 21:12 )             27.2     08-13    135  |  x   |  x   ----------------------------<  x   x    |  x   |  6.49<H>    Ca    9.4      12 Aug 2024 21:12  Phos  3.4     08-12  Mg     1.9     08-12    TPro  x   /  Alb  x   /  TBili  15.3<H>  /  DBili  x   /  AST  x   /  ALT  x   /  AlkPhos  x   08-13        Review of systems  All other systems were reviewed and are negative, except as noted.      PHYSICAL EXAM:  Constitutional: Well developed / well nourished  Eyes:  PERRLA  ENMT: nc/at, no thrush  Neck: supple, *  Respiratory: CTA B/L  Cardiovascular: RRR  Gastrointestinal: Soft abdomen, ND,   Genitourinary: anuric   Extremities: SCD's in place and working bilaterally  Vascular: Palpable dp pulses bilaterally.   Neurological: A&O x3  Skin: no rashes, ulcerations, lesions  Musculoskeletal: Moving all extremities  Psychiatric: Responsive     Transplant Surgery - Multidisciplinary Progress Note  --------------------------------------------------------------      Present:   Patient seen and examined with multidisciplinary Transplant team including  (Surgery: Dr. Dagher  Hepatologist: Dr. Garcia .   Pharmacist:Beckie  ACP: Darek, and RNs in AM rounds.   Disciplines not in attendance will be notified of the plan.       HPI:    68y with obesity and risky alcohol consumption (with decades' history of daily consumption of homemade alcohol), admitted to [facility] 1 month ago due to recent onset of jaundice and with a prolonged hospital and ICU course there due to newly diagnosed decompensated cirrhosis with acute on chronic liver failure (ACLF) with shock (resolved, but still on midodrine); FANY (on intermittent HD since 7/9); recurrent maroon stools and acute on chronic anemia necessitating intermittent PRBC transfusions (last on 8/8) and hypofibrinoginemia, with EGD (7/12) with small non-bleeding EV and a duodenal ulcer with a visible vessel; and waxing and waning hepatic encephalopathy. He was transferred to [facility] on 8/12/24 for evaluation for combined liver/kidney transplants (SLK). SICU c/s for hemodynamic monitoring in setting of potential renal replacement therapy.      Interval Events:  - transferred from floor to sicu for increasing needs/CRRT  - Opened SLK eval 8/12    Potential Discharge date:    Education:  Medications    Plan of care:  See Below    MEDICATIONS  (STANDING):  chlorhexidine 2% Cloths 1 Application(s) Topical <User Schedule>  fluconAZOLE IVPB      fluconAZOLE IVPB 200 milliGRAM(s) IV Intermittent every 24 hours  folic acid 1 milliGRAM(s) Oral daily  lactulose Syrup 20 Gram(s) Oral three times a day  lidocaine   4% Patch 1 Patch Transdermal daily  melatonin 5 milliGRAM(s) Oral at bedtime  midodrine. 20 milliGRAM(s) Oral three times a day  multivitamin 1 Tablet(s) Oral daily  pantoprazole  Injectable 40 milliGRAM(s) IV Push two times a day  piperacillin/tazobactam IVPB.. 3.375 Gram(s) IV Intermittent every 12 hours  rifAXIMin 550 milliGRAM(s) Oral two times a day  thiamine 100 milliGRAM(s) Oral daily    MEDICATIONS  (PRN):      PAST MEDICAL & SURGICAL HISTORY:  Alcohol abuse      No significant past surgical history          Vital Signs Last 24 Hrs  T(C): 36.1 (13 Aug 2024 03:00), Max: 36.5 (12 Aug 2024 18:56)  T(F): 97 (13 Aug 2024 03:00), Max: 97.7 (12 Aug 2024 18:56)  HR: 66 (13 Aug 2024 07:00) (60 - 78)  BP: 94/46 (13 Aug 2024 07:00) (94/46 - 127/55)  BP(mean): 67 (13 Aug 2024 07:00) (67 - 81)  RR: 17 (13 Aug 2024 07:00) (11 - 27)  SpO2: 100% (13 Aug 2024 07:00) (97% - 100%)    Parameters below as of 13 Aug 2024 06:00  Patient On (Oxygen Delivery Method): room air        I&O's Summary    12 Aug 2024 07:01  -  13 Aug 2024 07:00  --------------------------------------------------------  IN: 300 mL / OUT: 10 mL / NET: 290 mL                              8.9    14.41 )-----------( 104      ( 12 Aug 2024 21:12 )             27.2     08-13    135  |  x   |  x   ----------------------------<  x   x    |  x   |  6.49<H>    Ca    9.4      12 Aug 2024 21:12  Phos  3.4     08-12  Mg     1.9     08-12    TPro  x   /  Alb  x   /  TBili  15.3<H>  /  DBili  x   /  AST  x   /  ALT  x   /  AlkPhos  x   08-13        Review of systems  All other systems were reviewed and are negative, except as noted.      PHYSICAL EXAM:  Constitutional: Well developed / well nourished  Eyes:  PERRLA  ENMT: nc/at, no thrush  Neck: supple, *  Respiratory: CTA B/L  Cardiovascular: RRR  Gastrointestinal: Soft abdomen, ND,   Genitourinary: anuric   Extremities: SCD's in place and working bilaterally  Vascular: Palpable dp pulses bilaterally.   Neurological: A&O x3  Skin: no rashes, ulcerations, lesions  Musculoskeletal: Moving all extremities  Psychiatric: Responsive     Transplant Surgery - Multidisciplinary Progress Note  --------------------------------------------------------------      Present:   Patient seen and examined with multidisciplinary Transplant team including  (Surgery: Dr. Dagher  Hepatologist: Dr. Garcia .   Pharmacist:Beckie  ACP: Darek, and RNs in AM rounds.   Disciplines not in attendance will be notified of the plan.       HPI:    68y with obesity and risky alcohol consumption (with decades' history of daily consumption of homemade alcohol), admitted to [facility] 1 month ago due to recent onset of jaundice and with a prolonged hospital and ICU course there due to newly diagnosed decompensated cirrhosis with acute on chronic liver failure (ACLF) with shock (resolved, but still on midodrine); FANY (on intermittent HD since 7/9); recurrent maroon stools and acute on chronic anemia necessitating intermittent PRBC transfusions (last on 8/8) and hypofibrinoginemia, with EGD (7/12) with small non-bleeding EV and a duodenal ulcer with a visible vessel; and waxing and waning hepatic encephalopathy. He was transferred to [facility] on 8/12/24 for evaluation for combined liver/kidney transplants (SLK).       Interval Events:  - transferred from floor to sicu for increasing needs/CRRT  - Opened SLK eval 8/12    Potential Discharge date: TBD    Education:  Medications    Plan of care:  See Below          MEDICATIONS  (STANDING):  chlorhexidine 2% Cloths 1 Application(s) Topical <User Schedule>  CRRT Treatment    <Continuous>  fluconAZOLE IVPB 200 milliGRAM(s) IV Intermittent every 24 hours  fluconAZOLE IVPB      folic acid 1 milliGRAM(s) Oral daily  lidocaine   4% Patch 1 Patch Transdermal daily  melatonin 5 milliGRAM(s) Oral at bedtime  midodrine. 20 milliGRAM(s) Oral every 8 hours  multivitamin 1 Tablet(s) Oral daily  pantoprazole  Injectable 40 milliGRAM(s) IV Push two times a day  piperacillin/tazobactam IVPB.. 3.375 Gram(s) IV Intermittent every 12 hours  polyethylene glycol 3350 17 Gram(s) Oral <User Schedule>  PrismaSATE Dialysate BGK 4 / 2.5 5000 milliLiter(s) (1700 mL/Hr) CRRT <Continuous>  PrismaSOL Filtration BGK 4 / 2.5 5000 milliLiter(s) (1500 mL/Hr) CRRT <Continuous>  PrismaSOL Filtration BGK 4 / 2.5 5000 milliLiter(s) (200 mL/Hr) CRRT <Continuous>  rifAXIMin 550 milliGRAM(s) Oral two times a day  thiamine IVPB 500 milliGRAM(s) IV Intermittent daily    MEDICATIONS  (PRN):      PAST MEDICAL & SURGICAL HISTORY:  Alcohol abuse      No significant past surgical history          Vital Signs Last 24 Hrs  T(C): 35.4 (13 Aug 2024 15:00), Max: 36.6 (13 Aug 2024 07:00)  T(F): 95.7 (13 Aug 2024 15:00), Max: 97.9 (13 Aug 2024 07:00)  HR: 60 (13 Aug 2024 17:00) (56 - 78)  BP: 114/60 (13 Aug 2024 17:00) (88/51 - 127/55)  BP(mean): 79 (13 Aug 2024 17:00) (63 - 81)  RR: 18 (13 Aug 2024 17:00) (11 - 27)  SpO2: 100% (13 Aug 2024 17:00) (95% - 100%)    Parameters below as of 13 Aug 2024 07:00  Patient On (Oxygen Delivery Method): room air        I&O's Summary    12 Aug 2024 07:01  -  13 Aug 2024 07:00  --------------------------------------------------------  IN: 300 mL / OUT: 10 mL / NET: 290 mL    13 Aug 2024 07:01  -  13 Aug 2024 17:23  --------------------------------------------------------  IN: 200 mL / OUT: 0 mL / NET: 200 mL                              8.8    14.55 )-----------( 102      ( 13 Aug 2024 11:55 )             26.0     08-13    131<L>  |  94<L>  |  45<H>  ----------------------------<  162<H>  4.0   |  20<L>  |  6.85<H>    Ca    9.3      13 Aug 2024 11:55  Phos  3.9     08-13  Mg     2.2     08-13    TPro  5.6<L>  /  Alb  3.3  /  TBili  15.1<H>  /  DBili  x   /  AST  36  /  ALT  17  /  AlkPhos  109  08-13          Culture - Blood (collected 08-12-24 @ 17:36)  Source: .Blood Blood  Gram Stain (08-13-24 @ 16:15):    Growth in aerobic bottle: Gram Positive Cocci in Clusters  Preliminary Report (08-13-24 @ 16:16):    Growth in aerobic bottle: Gram Positive Cocci in Clusters    Direct identification is available within approximately 3-5    hours either by Blood Panel Multiplexed PCR or Direct    MALDI-TOF. Details: [email or web address]                  Review of systems  All other systems were reviewed and are negative, except as noted.      PHYSICAL EXAM:  Constitutional: Well developed / well nourished  Eyes:  PERRLA  ENMT: nc/at, no thrush  Neck: supple, *  Respiratory: CTA B/L  Cardiovascular: RRR  Gastrointestinal: Soft abdomen, ND,   Genitourinary: anuric   Extremities: SCD's in place and working bilaterally  Vascular: Palpable dp pulses bilaterally.   Neurological: A&O x3  Skin: no rashes, ulcerations, lesions  Musculoskeletal: Moving all extremities  Psychiatric: Responsive

## 2024-08-13 NOTE — BH CONSULTATION LIAISON ASSESSMENT NOTE - HPI (INCLUDE ILLNESS QUALITY, SEVERITY, DURATION, TIMING, CONTEXT, MODIFYING FACTORS, ASSOCIATED SIGNS AND SYMPTOMS)
Patient is a 68 year old, , male with PPHx of presumed alcohol use d/o, with no known past psychiatric admissions with a PMHx of with obesity, admitted to [facility] 1 month ago due to recent onset of jaundice and with a prolonged hospital and ICU course there due to newly diagnosed decompensated cirrhosis with acute on chronic liver failure (ACLF) with shock (resolved, but still on midodrine); FANY (on intermittent HD since 7/9); recurrent maroon stools and acute on chronic anemia necessitating intermittent PRBC transfusions (last on 8/8) and hypofibrinoginemia, with EGD (7/12) with small non-bleeding EV and a duodenal ulcer with a visible vessel; and waxing and waning hepatic encephalopathy. He was transferred to [facility] on 8/12/24 for evaluation for combined liver/kidney transplants (SLK). Patient seen by transplant psychiatry for psychosocial evaluation for transplant.    Patient on exam, was A/Ox1, somnolent, noted that he was in a hospital however stated to writer he was in the "[place]" as well as other questions related to orientation at this time. Patient at this time unable to fully participate in exam at this time to answer questions regarding alcohol use, and other psychiatric questions at this time.

## 2024-08-13 NOTE — DIETITIAN INITIAL EVALUATION ADULT - OTHER INFO
GI/Intake:  -Diet advanced today   -Last BM documented ; bowel regimen ordered (Miralax)   -Previously noted with melena   -Presenting with decompensated ETOH cirrhosis     Renal:  -Previously received iHD at outside hospital   -Evaluation for SLK  -CRRT initiated  -Hyponatremic     Weight Hx:   -Current dosin pounds   -No additional weights noted    SLK EVALUATION:  -Pt resides: unable to answer   -BMI:33 - obese   -HbA1c: 5.0% - no hx of DM   -Frailty: [pending PT evaluation]   -MELD: 44 ()     Education:  unable to provide at this time; will review when applicable  -Review post-transplant nutrition therapy and food safety guidelines for transplant recipients   -Review recommendations to avoid grapefruit, pomegranate and star fruit while taking immunosuppressant medication.    -Review recommendations for moderate intake of sodium and carbohydrates with transplant medications.      Pt with no known nutrition contraindication to transplant. Concern regarding advancement to malnutrition and suspected frailty status; will optimize nutrition via PO supplements and diet     Nutrition assessment communicated with Transplant Hepatology Team and outpatient Transplant RDs

## 2024-08-13 NOTE — CONSULT NOTE ADULT - NS ATTEND AMEND GEN_ALL_CORE FT
76 yo m with alcohol abuse otherwise no known chronic medical issues (does not see doctors per sister) s/p 1 month stay at [facility] now transferred to [facility] for liver transplant eval.     Would send pre-transplant serologies as above    can continue empiric Zosyn and Fluconazole pending prelim cultures    Low level CMV viremia is noted, doubt this is clinically relevant. Would repeat CMV PCR next week.     I will continue to follow. Please feel free to contact me with any further questions.    Anoop Delacruz M.D.  [facility] Division of Infectious Disease  8AM-5PM Monday - Friday: Available on [employer] Teams  After Hours and Holidays (or if no response on [employer] Teams): Please contact the Infectious Diseases Office at [phone]

## 2024-08-13 NOTE — DIETITIAN INITIAL EVALUATION ADULT - PERTINENT LABORATORY DATA
08-13    131<L>  |  94<L>  |  45<H>  ----------------------------<  162<H>  4.0   |  20<L>  |  6.85<H>    Ca    9.3      13 Aug 2024 11:55  Phos  3.9     08-13  Mg     2.2     08-13    TPro  5.6<L>  /  Alb  3.3  /  TBili  15.1<H>  /  DBili  x   /  AST  36  /  ALT  17  /  AlkPhos  109  08-13  POCT Blood Glucose.: 142 mg/dL (08-13-24 @ 02:59)  A1C with Estimated Average Glucose Result: 5.0 % (08-12-24 @ 21:13)  A1C with Estimated Average Glucose Result: 5.1 % (08-12-24 @ 15:01)

## 2024-08-13 NOTE — DIETITIAN INITIAL EVALUATION ADULT - NSFNSPHYEXAMSKINFT_GEN_A_CORE
Detail Level: Detailed Detail Level: Generalized Detail Level: Zone Pressure Injury: sacrum, Stage III

## 2024-08-13 NOTE — PROGRESS NOTE ADULT - SUBJECTIVE AND OBJECTIVE BOX
[facility] DIVISION OF KIDNEY DISEASES AND HYPERTENSION -- FOLLOW UP NOTE  --------------------------------------------------------------------------------  Chief Complaint:    24 hour events/subjective:  Pt was seen and examined earlier today. Pt's wife was at the bedside. BP was low but pt is not on pressors.   Pt is lethargic and was not answering the questions well but was following the commands.         PAST HISTORY  --------------------------------------------------------------------------------  No significant changes to PMH, PSH, FHx, SHx, unless otherwise noted    ALLERGIES & MEDICATIONS  --------------------------------------------------------------------------------  Allergies    No Known Allergies    Intolerances      Standing Inpatient Medications  chlorhexidine 2% Cloths 1 Application(s) Topical <User Schedule>  CRRT Treatment    <Continuous>  fluconAZOLE IVPB      fluconAZOLE IVPB 200 milliGRAM(s) IV Intermittent every 24 hours  folic acid 1 milliGRAM(s) Oral daily  lidocaine   4% Patch 1 Patch Transdermal daily  melatonin 5 milliGRAM(s) Oral at bedtime  midodrine. 20 milliGRAM(s) Oral every 8 hours  multivitamin 1 Tablet(s) Oral daily  pantoprazole  Injectable 40 milliGRAM(s) IV Push two times a day  piperacillin/tazobactam IVPB.. 3.375 Gram(s) IV Intermittent every 12 hours  polyethylene glycol 3350 17 Gram(s) Oral <User Schedule>  PrismaSATE Dialysate BGK 4 / 2.5 5000 milliLiter(s) CRRT <Continuous>  PrismaSOL Filtration BGK 4 / 2.5 5000 milliLiter(s) CRRT <Continuous>  PrismaSOL Filtration BGK 4 / 2.5 5000 milliLiter(s) CRRT <Continuous>  rifAXIMin 550 milliGRAM(s) Oral two times a day  thiamine IVPB 500 milliGRAM(s) IV Intermittent daily    PRN Inpatient Medications      REVIEW OF SYSTEMS  --------------------------------------------------------------------------------  Unable to obtain.     VITALS/PHYSICAL EXAM  --------------------------------------------------------------------------------  T(C): 36.6 (08-13-24 @ 11:00), Max: 36.6 (08-13-24 @ 07:00)  HR: 69 (08-13-24 @ 13:00) (56 - 78)  BP: 106/57 (08-13-24 @ 13:00) (88/51 - 127/55)  RR: 15 (08-13-24 @ 13:00) (11 - 27)  SpO2: 100% (08-13-24 @ 13:00) (97% - 100%)  Wt(kg): --  Height (cm): 185.4 (08-12-24 @ 16:46)  Weight (kg): 114 (08-12-24 @ 16:46)  BMI (kg/m2): 33.2 (08-12-24 @ 16:46)  BSA (m2): 2.37 (08-12-24 @ 16:46)      08-12-24 @ 07:01  -  08-13-24 @ 07:00  --------------------------------------------------------  IN: 300 mL / OUT: 10 mL / NET: 290 mL    08-13-24 @ 07:01  -  08-13-24 @ 13:54  --------------------------------------------------------  IN: 200 mL / OUT: 0 mL / NET: 200 mL      Physical Exam:  Gen: Not in distress  HEENT: Supple neck, No JVD  Pulm: lower zone decreased breath sounds.   CVS: S1 S2 +  Abd: Non - tender, but distended.   : No suprapubic tenderness  anasarca +  Neuro: AMS, no asterixis  Rt IJ tunnelled HD cath +  Elizabeth cath in place.       LABS/STUDIES  --------------------------------------------------------------------------------              8.8    14.55 >-----------<  102      [08-13-24 @ 11:55]              26.0     131  |  94  |  45  ----------------------------<  162      [08-13-24 @ 11:55]  4.0   |  20  |  6.85        Ca     9.3     [08-13-24 @ 11:55]      Mg     2.2     [08-13-24 @ 11:55]      Phos  3.9     [08-13-24 @ 11:55]    TPro  5.6  /  Alb  3.3  /  TBili  15.1  /  DBili  x   /  AST  36  /  ALT  17  /  AlkPhos  109  [08-13-24 @ 11:55]    PT/INR: PT 44.0 , INR 4.17       [08-13-24 @ 11:55]  PTT: 83.5       [08-13-24 @ 11:55]    Uric acid 5.4      [08-12-24 @ 21:13]    Creatinine Trend:  SCr 6.85 [08-13 @ 11:55]  SCr 6.49 [08-13 @ 06:16]  SCr 6.21 [08-12 @ 21:12]  SCr 5.84 [08-12 @ 10:41]    Iron 52, TIBC 78, %sat 66      [08-12-24 @ 21:13]  Ferritin 788      [08-12-24 @ 21:13]  TSH 4.09      [08-12-24 @ 21:13]  Lipid: chol 49, TG 70, HDL 14, LDL --      [08-12-24 @ 21:13]    HBsAb Reactive      [08-12-24 @ 21:16]  HBsAg Nonreact      [08-12-24 @ 21:13]  HBcAb Nonreact      [08-12-24 @ 21:16]  HCV 0.08, Nonreact      [08-12-24 @ 14:45]  HIV Nonreact      [08-12-24 @ 21:13]

## 2024-08-13 NOTE — BH CONSULTATION LIAISON ASSESSMENT NOTE - NSBHCONSULTFOLLOWAFTERCARE_PSY_A_CORE FT
Will attempt to clarify alcohol use hx, if patient meets criteria for use d/o, patient will benefit from RPP engagement

## 2024-08-13 NOTE — BH CONSULTATION LIAISON ASSESSMENT NOTE - CURRENT MEDICATION
MEDICATIONS  (STANDING):  chlorhexidine 2% Cloths 1 Application(s) Topical <User Schedule>  CRRT Treatment    <Continuous>  fluconAZOLE IVPB 200 milliGRAM(s) IV Intermittent every 24 hours  fluconAZOLE IVPB      folic acid 1 milliGRAM(s) Oral daily  lidocaine   4% Patch 1 Patch Transdermal daily  melatonin 5 milliGRAM(s) Oral at bedtime  midodrine. 20 milliGRAM(s) Oral every 8 hours  multivitamin 1 Tablet(s) Oral daily  pantoprazole  Injectable 40 milliGRAM(s) IV Push two times a day  piperacillin/tazobactam IVPB.. 3.375 Gram(s) IV Intermittent every 12 hours  polyethylene glycol 3350 17 Gram(s) Oral <User Schedule>  PrismaSATE Dialysate BGK 4 / 2.5 5000 milliLiter(s) (1700 mL/Hr) CRRT <Continuous>  PrismaSOL Filtration BGK 4 / 2.5 5000 milliLiter(s) (1500 mL/Hr) CRRT <Continuous>  PrismaSOL Filtration BGK 4 / 2.5 5000 milliLiter(s) (200 mL/Hr) CRRT <Continuous>  rifAXIMin 550 milliGRAM(s) Oral two times a day  thiamine IVPB 500 milliGRAM(s) IV Intermittent daily    MEDICATIONS  (PRN):

## 2024-08-13 NOTE — PROGRESS NOTE ADULT - ASSESSMENT
67 y/o M AUD complicated by cirrhosis with Hepatic encephalopathy admitted to [facility] with back pain and jaundice on 7/8/24.   Pt had dark / maroon colored stools   EGD that showed esophageal varices and duodenal ulcer with visible vessel. He got transfusions for low Hb and last transfusion was on 8/8.   Pt was intubated and was on vent support and for his renal failure he was started on HD. 1st session on 7/9/24.         FANY VS FANY on CKD:  Pt was placed on HD on 7/9/24  Obtain USG kidney and bladder.  UOP was 10 cc in last 24 hours.   Electrolytes are in acceptable range - No need of urgent HD today. Obtained consent from patient's son.   FANY is likely hemodynamic vs HRS - Obtain UA, UPCR Send serology work up - PLA2R Ab, C3, C4, Anti GBM antibody, Anti Ds DNA, CHETNA, ANCA, Serum free light chains, immunofixation, A1C,  Urine lytes, USG- retroperitoneum with duplex renal vessels, HIV, Hep B. ( if not already done at other hospital)  Obtain records from other hospital -( to determine if he is a candidate for SLK )  Start CRRT as the BP is low.

## 2024-08-13 NOTE — PROGRESS NOTE ADULT - SUBJECTIVE AND OBJECTIVE BOX
Oscar Hui is a 68 year old male being evaluated for a liver transplant. PMH: alcohol use disorder c/b cirrhosis with HE.    NKDA    Home Medications:  lactulose 10 g/15 mL oral syrup: 30 milliliter(s) orally 3 times a day titrate to 3-4 BM/day (12 Aug 2024 09:36)  midodrine 10 mg oral tablet: 2 tab(s) orally 3 times a day (12 Aug 2024 09:40)  pantoprazole 40 mg intravenous injection: 40 unit(s) intravenously 2 times a day (12 Aug 2024 09:40)  rifAXIMin 550 mg oral tablet: 1 tab(s) orally 2 times a day (12 Aug 2024 09:40)    MEDICATIONS  (STANDING):  chlorhexidine 2% Cloths 1 Application(s) Topical <User Schedule>  fluconAZOLE IVPB      fluconAZOLE IVPB 200 milliGRAM(s) IV Intermittent every 24 hours  folic acid 1 milliGRAM(s) Oral daily  lidocaine   4% Patch 1 Patch Transdermal daily  melatonin 5 milliGRAM(s) Oral at bedtime  midodrine. 20 milliGRAM(s) Oral every 8 hours  multivitamin 1 Tablet(s) Oral daily  pantoprazole  Injectable 40 milliGRAM(s) IV Push two times a day  piperacillin/tazobactam IVPB.. 3.375 Gram(s) IV Intermittent every 12 hours  polyethylene glycol 3350 17 Gram(s) Oral <User Schedule>  rifAXIMin 550 milliGRAM(s) Oral two times a day  thiamine IVPB 500 milliGRAM(s) IV Intermittent daily    The patient does not have any pharmacologic contraindications to transplant and is cleared by pharmacy.

## 2024-08-13 NOTE — DIETITIAN INITIAL EVALUATION ADULT - NS FNS DIET ORDER
Diet, Regular:   Low Sodium  Supplement Feeding Modality:  Oral  Ensure Max Cans or Servings Per Day:  1       Frequency:  Two Times a day (08-13-24 @ 11:54)

## 2024-08-13 NOTE — DIETITIAN INITIAL EVALUATION ADULT - ORAL INTAKE PTA/DIET HISTORY
Unable to obtain subjective information 2/2 confusion.     Per Team:   -Received x1 month of liquid diet at prior hospital  -CHARISSE

## 2024-08-13 NOTE — DIETITIAN INITIAL EVALUATION ADULT - REASON INDICATOR FOR ASSESSMENT
Consult: nutrition assessment; liver transplant eval   Sources: Electronic Medical Record, Team (Rounds), RN, Patient - confused

## 2024-08-13 NOTE — DIETITIAN INITIAL EVALUATION ADULT - REASON FOR ADMISSION
" 68y with obesity and risky alcohol consumption (with decades' history of daily consumption of homemade alcohol), admitted to [facility] 1 month ago due to recent onset of jaundice and with a prolonged hospital and ICU course there due to newly diagnosed decompensated cirrhosis with acute on chronic liver failure (ACLF) with shock (resolved, but still on midodrine); FANY (on intermittent HD since 7/9); recurrent maroon stools and acute on chronic anemia necessitating intermittent PRBC transfusions (last on 8/8) and hypofibrinoginemia, with EGD (7/12) with small non-bleeding EV and a duodenal ulcer with a visible vessel; and waxing and waning hepatic encephalopathy. He was transferred to [facility] on 8/12/24 for evaluation for combined liver/kidney transplants (SLK). SICU c/s for hemodynamic monitoring in setting of potential renal replacement therapy"

## 2024-08-13 NOTE — DIETITIAN INITIAL EVALUATION ADULT - PROBLEM SELECTOR PLAN 2
S/p 2 EGD at [place] showing  nonbleeding duodenal ulcer and bleeding esophageal varices s/p clip requiring intubation and ICU stay. Per transfer note has been having some darker, maroon colored store more recent.   Will cont to monitor bowel movements and h/h.   Cont with protonix 40mg IV BID for now. if any evidence of instability or more acute bleed will start gtt  Transplant liver eval pending re: any additional plans for intervention.  Would hold any VTE ppx except compression device   patient with RUE PICC from OSH.

## 2024-08-13 NOTE — DIETITIAN INITIAL EVALUATION ADULT - PERTINENT MEDS FT
MEDICATIONS  (STANDING):  chlorhexidine 2% Cloths 1 Application(s) Topical <User Schedule>  CRRT Treatment    <Continuous>  fluconAZOLE IVPB      fluconAZOLE IVPB 200 milliGRAM(s) IV Intermittent every 24 hours  folic acid 1 milliGRAM(s) Oral daily  lidocaine   4% Patch 1 Patch Transdermal daily  melatonin 5 milliGRAM(s) Oral at bedtime  midodrine. 20 milliGRAM(s) Oral every 8 hours  multivitamin 1 Tablet(s) Oral daily  pantoprazole  Injectable 40 milliGRAM(s) IV Push two times a day  piperacillin/tazobactam IVPB.. 3.375 Gram(s) IV Intermittent every 12 hours  polyethylene glycol 3350 17 Gram(s) Oral <User Schedule>  PrismaSATE Dialysate BGK 4 / 2.5 5000 milliLiter(s) (1700 mL/Hr) CRRT <Continuous>  PrismaSOL Filtration BGK 4 / 2.5 5000 milliLiter(s) (1500 mL/Hr) CRRT <Continuous>  PrismaSOL Filtration BGK 4 / 2.5 5000 milliLiter(s) (200 mL/Hr) CRRT <Continuous>  rifAXIMin 550 milliGRAM(s) Oral two times a day  thiamine IVPB 500 milliGRAM(s) IV Intermittent daily    MEDICATIONS  (PRN):

## 2024-08-13 NOTE — BH CONSULTATION LIAISON ASSESSMENT NOTE - NSBHATTESTCOMMENTATTENDFT_PSY_A_CORE
68 year old, , male with PPHx of presumed alcohol use d/o, with no known past psychiatric admissions with a PMHx of with obesity, admitted to [facility] 1 month ago due to recent onset of jaundice and with a prolonged hospital and ICU course there due to newly diagnosed decompensated cirrhosis with acute on chronic liver failure (ACLF) with shock (resolved, but still on midodrine); FANY (on intermittent HD since 7/9); recurrent maroon stools and acute on chronic anemia necessitating intermittent PRBC transfusions (last on 8/8) and hypofibrinoginemia, with EGD (7/12) with small non-bleeding EV and a duodenal ulcer with a visible vessel; and waxing and waning hepatic encephalopathy. He was transferred to [facility] on 8/12/24 for evaluation for combined liver/kidney transplants (SLK). Patient seen by transplant psychiatry for psychosocial evaluation for transplant. Seen w/ ACP, agree with above assessment and plan. Labs/chart reviewed, coordination of care provided.   ---    Patient perseverative on exam unable to complete psychiatric clearance evaluation for liver transplant given altered mental status, will continue to follow and re-evaluate when patient is able to cooperate with assessment.

## 2024-08-13 NOTE — BH CONSULTATION LIAISON ASSESSMENT NOTE - NSBHCHARTREVIEWVS_PSY_A_CORE FT
Vital Signs Last 24 Hrs  T(C): 36.6 (13 Aug 2024 11:00), Max: 36.6 (13 Aug 2024 07:00)  T(F): 97.9 (13 Aug 2024 11:00), Max: 97.9 (13 Aug 2024 07:00)  HR: 69 (13 Aug 2024 13:00) (56 - 78)  BP: 106/57 (13 Aug 2024 13:00) (88/51 - 127/55)  BP(mean): 75 (13 Aug 2024 13:00) (63 - 81)  RR: 15 (13 Aug 2024 13:00) (11 - 27)  SpO2: 100% (13 Aug 2024 13:00) (97% - 100%)    Parameters below as of 13 Aug 2024 07:00  Patient On (Oxygen Delivery Method): room air

## 2024-08-13 NOTE — PROGRESS NOTE ADULT - NS ATTEND AMEND GEN_ALL_CORE FT
Patient examined by me on AM rounds.     #Decompensated EtOH Cirrhosis.  #UGIB s/p EGD and clipping at OSH.   #Renal failure.    NEURO: A&Ox2. AMS secondary to hepatic eccphalolpathy. intial Ammonias 115.   -Rifaxamin and Miralax.   -High dose thimaine for 3 days.     RESP: Stable on room air.     CVS: Hypotensive requring midodrine 20mg TID. LA 2.3.     GI; Tolerating full liquid diet. Advance as tolerated.   -Deccompensated EtOH Cirhhosis with MELD >40.   -F/up CTH, Niki Gong Pelvis as part of pre-transplant evaluation.   -Clear liquid diet.     : Renal failure. Plan for HD today.  -Remains oliguric with UOP 10cc since admission.   -Elizabeth placed by urology overnight after multiple attempts.     HEME: No evdiecne of ongoing bleeding.   -Elevated INR to 4 and mild thormboytopenia to 100s.   -Hold chemical AC in the setting of coagulopathy.     ID: Aferbrile, WBC 14.   -Appreicate transplant ID recs. Comtomiue Fluconazole and Zosyn.   -F/up infectious workup including blood cx, UA, possible Para if sufficient fluid.   ENDO: Glucose controlled.     Line:   -Elizabeth 8/12  - Patient examined by me on AM rounds.     -Decompensated EtOH Cirrhosis.  -UGIB s/p EGD and clipping at OSH.   -Renal failure.    #NEURO: A&Ox2. AMS secondary to hepatic encephalopathy. Initial Ammonia 115.   -Continue Rifaximin and Miralax.   -High dose thiamine for 3 days.     #RESP: Stable on room air.     #CVS: Hypotensive requiring midodrine 20mg TID. LA 2.3.     #GI; Tolerating full liquid diet. Advance as tolerated.   -Decompensated EtOH Cirrhosis with MELD >40.   -F/up CTH, Chest Abdomen Pelvis as part of pre-transplant evaluation.     #: Renal failure. Remains oliguric with UOP 10cc overnight.   -Plan for HD today.    #HEME: No evidence of ongoing bleeding.   -Elevated INR to 4 and mild thrombocytopenia to 100s.   -Hold chemical AC in the setting of coagulopathy.     #ID: Afebrile, WBC 14.   -Appreciate transplant ID recs. Continue Fluconazole and Zosyn.   -F/up infectious workup including blood cx, UA, possible Paracentesis if significant ascites on CT.     #ENDO: Glucose controlled.     #Lines:   -Elizabeth 8/12 (placed by urology).

## 2024-08-13 NOTE — BH CONSULTATION LIAISON ASSESSMENT NOTE - NSBHATTESTAPPAMEND_PSY_A_CORE
I have personally seen and examined this patient. I fully participated in the care of this patient. I have made amendments to the documentation where appropriate and otherwise agree with the history, physical exam, and plan as documented by the TIARA

## 2024-08-13 NOTE — DIETITIAN INITIAL EVALUATION ADULT - CALCULATED FROM (G/KG)
100.08 PATIENT INFORMATION        Follow-Up  - Return for your 5 year well child visit.    4 years old Health and Safety Tips - The following hyperlinks are available to access via [employer]    Parent Education from Healthy Parent    Educación para padres sobre niños sanos    Common dosing for acetaminophen and ibuprofen:   Acetaminophen (Tylenol) can be given every 4 hours.  Infant or Children's Elixir: 160mg/5ml for  Weight 18-23 lbs 24-35 lbs 36-47 lbs   Dose 3.75 ml 5 ml 7.5 ml      Weight 48-59 lbs 60-71 lsb 72-95 lbs   Dose 10 ml 12.5 ml 15 ml     Ibuprofen (Motrin) can be given every 6 hours.  Safe for children 6 months and older.  Infant Drops: 50mg/1.25ml  Weight 12-17 lbs 18-23 lbs   Dose 1.25 ml 1.875 ml     Children's Elixir 100mg/5ml   Weight 12-17 lbs 18-23 lbs 24-35 lbs 36-47 lbs   Dose 2.5 ml 3.75 ml 5 ml 7.5 ml        Weight 48-59 lbs 60-71 lbs 72-95 lbs   Dose 10 ml 12.5 ml 15 ml     Additional Educational Resources:  For additional resources regarding your symptoms, diagnosis, or further health information, please visit the Discover a Healthier You section on [email or web address] or the Online Health Resources section in [employer]

## 2024-08-13 NOTE — BH CONSULTATION LIAISON ASSESSMENT NOTE - NSBHCHARTREVIEWLAB_PSY_A_CORE FT
8.8    14.55 )-----------( 102      ( 13 Aug 2024 11:55 )             26.0     08-13    131<L>  |  94<L>  |  45<H>  ----------------------------<  162<H>  4.0   |  20<L>  |  6.85<H>    Ca    9.3      13 Aug 2024 11:55  Phos  3.9     08-13  Mg     2.2     08-13    TPro  5.6<L>  /  Alb  3.3  /  TBili  15.1<H>  /  DBili  x   /  AST  36  /  ALT  17  /  AlkPhos  109  08-13

## 2024-08-13 NOTE — PROGRESS NOTE ADULT - SUBJECTIVE AND OBJECTIVE BOX
Interval Events:   Afebrile. VSS off pressors. On RA.   Has been on intermittent HD since July. Plan to start HD or CRRT as tolerated, with pressors as needed for HD support.   Oliguric with net positive volume 290ml  5 reported BM in last 24 h   Meld score 43 driven by Reginald>15, INR 3.8 and Cr>6     ROS:   12 point review of systems performed and negative except otherwise noted above.     Hospital Medications:  chlorhexidine 2% Cloths 1 Application(s) Topical <User Schedule>  CRRT Treatment    <Continuous>  fluconAZOLE IVPB 200 milliGRAM(s) IV Intermittent every 24 hours  fluconAZOLE IVPB      folic acid 1 milliGRAM(s) Oral daily  lidocaine   4% Patch 1 Patch Transdermal daily  melatonin 5 milliGRAM(s) Oral at bedtime  midodrine. 20 milliGRAM(s) Oral every 8 hours  multivitamin 1 Tablet(s) Oral daily  pantoprazole  Injectable 40 milliGRAM(s) IV Push two times a day  piperacillin/tazobactam IVPB.. 3.375 Gram(s) IV Intermittent every 12 hours  polyethylene glycol 3350 17 Gram(s) Oral <User Schedule>  PrismaSATE Dialysate BGK 4 / 2.5 5000 milliLiter(s) (1700 mL/Hr) CRRT <Continuous>  PrismaSOL Filtration BGK 4 / 2.5 5000 milliLiter(s) (1500 mL/Hr) CRRT <Continuous>  PrismaSOL Filtration BGK 4 / 2.5 5000 milliLiter(s) (200 mL/Hr) CRRT <Continuous>  rifAXIMin 550 milliGRAM(s) Oral two times a day  thiamine IVPB 500 milliGRAM(s) IV Intermittent daily    MAR over past 24 hours:    chlorhexidine 2% Cloths   1 Application(s) Topical (08-13-24 @ 05:21)    fluconAZOLE IVPB   100 mL/Hr IV Intermittent (08-12-24 @ 22:12)    folic acid   1 milliGRAM(s) Oral (08-13-24 @ 11:46)    lactulose Syrup   20 Gram(s) Oral (08-13-24 @ 05:22)   20 Gram(s) Oral (08-12-24 @ 22:13)    lidocaine   4% Patch   1 Patch Transdermal (08-13-24 @ 11:46)    magnesium sulfate  IVPB   100 mL/Hr IV Intermittent (08-12-24 @ 22:14)    midodrine.   20 milliGRAM(s) Oral (08-13-24 @ 13:00)    midodrine.   20 milliGRAM(s) Oral (08-13-24 @ 05:22)    multivitamin   1 Tablet(s) Oral (08-13-24 @ 11:46)    pantoprazole  Injectable   40 milliGRAM(s) IV Push (08-13-24 @ 17:28)   40 milliGRAM(s) IV Push (08-13-24 @ 05:23)    piperacillin/tazobactam IVPB.-   25 mL/Hr IV Intermittent (08-12-24 @ 23:52)    piperacillin/tazobactam IVPB..   25 mL/Hr IV Intermittent (08-13-24 @ 11:46)    PrismaSATE Dialysate BGK 4 / 2.5   1700 mL/Hr CRRT (08-13-24 @ 13:21)    PrismaSOL Filtration BGK 4 / 2.5   1500 mL/Hr CRRT (08-13-24 @ 13:21)    PrismaSOL Filtration BGK 4 / 2.5   200 mL/Hr CRRT (08-13-24 @ 13:21)    rifAXIMin   550 milliGRAM(s) Oral (08-13-24 @ 17:28)   550 milliGRAM(s) Oral (08-13-24 @ 05:22)    thiamine IVPB   105 mL/Hr IV Intermittent (08-13-24 @ 12:00)      PHYSICAL EXAM:   Vital Signs last 24 hours:  T(F): 95.7 (08-13-24 @ 15:00), Max: 97.9 (08-13-24 @ 07:00)  HR: 60 (08-13-24 @ 17:00) (56 - 78)  BP: 114/60 (08-13-24 @ 17:00) (88/51 - 127/55)  BP(mean): 79 (08-13-24 @ 17:00) (63 - 81)  ABP: --  ABP(mean): --  RR: 18 (08-13-24 @ 17:00) (11 - 27)  SpO2: 100% (08-13-24 @ 17:00) (95% - 100%)    I&Os:    08-12-24 @ 07:01  -  08-13-24 @ 07:00  --------------------------------------------------------  IN:    IV PiggyBack: 200 mL    IV PiggyBack: 100 mL  Total IN: 300 mL    OUT:    Ureteral Catheter (mL): 10 mL  Total OUT: 10 mL    Total NET: 290 mL      08-13-24 @ 07:01  -  08-13-24 @ 18:22  --------------------------------------------------------  IN:    IV PiggyBack: 100 mL    IV PiggyBack: 100 mL  Total IN: 200 mL    OUT:    Ureteral Catheter (mL): 0 mL  Total OUT: 0 mL    Total NET: 200 mL        BMI (kg/m2): 33.2 (08-12-24 @ 16:46)  GENERAL: obese man, in no acute distress.   NEURO: [place] HE grade 1-2. No asterixis.   HEENT: Scleral icterus  CHEST: Bilateral breath sounds, decreased in R base. No respiratory distress.   CARDIAC: Regular rate and rhythm  ABDOMEN: Soft, non-tender, distended, +anasarca  EXTREMITIES: warm, well perfused, BLE pitting edema   SKIN: +Jaundiced, no rash/ecchymoses    DIAGNOSTICS:  WBC      Hg       PLT      Na       K        CO2     BUN      Cr       ALT      AST      TB       ALP  14.55    8.8      102      131      4.0      20       45       6.85     17       36       15.1     109      08-13-24 @ 11:55  ------   ------   ------   135      ------   ------   ------   6.49     ------   ------   15.3     ------   08-13-24 @ 06:16  14.41    8.9      104      130      3.9      19       41       6.21     15       36       14.9     109      08-12-24 @ 21:12  13.19    9.4      96       133      4.2      21       39       5.84     15       38       15.2     106      08-12-24 @ 10:41    PT             INR            MELDwith  MELDw/o  44.0           4.17           --             --             08-13-24 @ 11:55  39.3           3.89           >40            >40            08-13-24 @ 06:16  42.4           4.04           --             --             08-12-24 @ 21:12  40.1           3.98           --             --             08-12-24 @ 10:41

## 2024-08-14 ENCOUNTER — RESULT REVIEW (OUTPATIENT)
Age: 69
End: 2024-08-14

## 2024-08-14 NOTE — PROGRESS NOTE ADULT - ASSESSMENT
68 year old male with  AUD complicated by cirrhosis with Hepatic encephalopathy admitted to [facility] with back pain and jaundice on 7/8/24. Pt had dark / maroon colored stools   EGD that showed esophageal varices and duodenal ulcer with visible vessel. He got transfusions for low Hb and last transfusion was on 8/8. 1st session of IHD was on 7/9/24.       FANY VS FANY on CKD: was started on HD since  7/9/24  Remains oliguric , started on CRRT since 8/13/24 due to low BP  been dialysis dependent for 5 weeks now. will meet YASMINE diego next week

## 2024-08-14 NOTE — PROGRESS NOTE ADULT - SUBJECTIVE AND OBJECTIVE BOX
Interval Events:   - BCx growing S. epidermitis in 1 set. Likely a contaminant, will repeat cultures.   - IV contrast CT chest, abdomen pelvis (8/13) showing bilateral upper lobe ground-glass opacities. Large ascites. Left hepatic lobe wedge shaped hypodensity (small infarct vs hemangioma) and another wedge shaped hypodensity in the spleen also suggestive of small infarct. Sigmoid-rectal wall thickening.          Hypothermic overnight (35.4C)   Started on net even CRRT (8/13- ) with Phenylephrine support (0.08)   Anuric with net positive volume of 716ml   7 reported BM in last 24h   Meld score 44 mostly driven by Reginald, INR and Cr    ROS:   12 point review of systems performed and negative except otherwise noted above.     Hospital Medications:  chlorhexidine 2% Cloths 1 Application(s) Topical <User Schedule>  CRRT Treatment    <Continuous>  fluconAZOLE IVPB 200 milliGRAM(s) IV Intermittent every 24 hours  fluconAZOLE IVPB      folic acid 1 milliGRAM(s) Oral daily  lidocaine   4% Patch 1 Patch Transdermal daily  melatonin 5 milliGRAM(s) Oral at bedtime  midodrine. 20 milliGRAM(s) Oral every 8 hours  multivitamin 1 Tablet(s) Oral daily  norepinephrine Infusion 0.05 MICROgram(s)/kG/Min (10.7 mL/Hr) IV Continuous <Continuous>  pantoprazole  Injectable 40 milliGRAM(s) IV Push two times a day  phenylephrine    Infusion 0.2 MICROgram(s)/kG/Min (8.55 mL/Hr) IV Continuous <Continuous>  piperacillin/tazobactam IVPB.. 3.375 Gram(s) IV Intermittent every 12 hours  polyethylene glycol 3350 17 Gram(s) Oral <User Schedule>  PrismaSATE Dialysate BGK 4 / 2.5 5000 milliLiter(s) (1700 mL/Hr) CRRT <Continuous>  PrismaSOL Filtration BGK 4 / 2.5 5000 milliLiter(s) (1500 mL/Hr) CRRT <Continuous>  PrismaSOL Filtration BGK 4 / 2.5 5000 milliLiter(s) (200 mL/Hr) CRRT <Continuous>  rifAXIMin 550 milliGRAM(s) Oral two times a day  thiamine IVPB 500 milliGRAM(s) IV Intermittent daily    MAR over past 24 hours:    albumin human  5% IVPB   1000 mL/Hr IV Intermittent (08-13-24 @ 20:22)    albumin human  5% IVPB   1000 mL/Hr IV Intermittent (08-14-24 @ 00:03)    chlorhexidine 2% Cloths   1 Application(s) Topical (08-14-24 @ 05:01)    fluconAZOLE IVPB   100 mL/Hr IV Intermittent (08-13-24 @ 20:11)    folic acid   1 milliGRAM(s) Oral (08-14-24 @ 11:20)    lidocaine   4% Patch   1 Patch Transdermal (08-14-24 @ 11:20)    melatonin   5 milliGRAM(s) Oral (08-13-24 @ 21:06)    midodrine.   20 milliGRAM(s) Oral (08-14-24 @ 13:06)   20 milliGRAM(s) Oral (08-14-24 @ 05:02)   20 milliGRAM(s) Oral (08-13-24 @ 21:05)    multivitamin   1 Tablet(s) Oral (08-14-24 @ 11:20)    norepinephrine Infusion   10.7 mL/Hr IV Continuous (08-14-24 @ 10:02)    pantoprazole  Injectable   40 milliGRAM(s) IV Push (08-14-24 @ 05:02)   40 milliGRAM(s) IV Push (08-13-24 @ 17:28)    phenylephrine    Infusion   8.55 mL/Hr IV Continuous (08-14-24 @ 01:52)   8.55 mL/Hr IV Continuous (08-14-24 @ 00:09)    piperacillin/tazobactam IVPB..   25 mL/Hr IV Intermittent (08-14-24 @ 11:19)   25 mL/Hr IV Intermittent (08-13-24 @ 23:27)    polyethylene glycol 3350   17 Gram(s) Oral (08-14-24 @ 11:19)    PrismaSATE Dialysate BGK 4 / 2.5   1700 mL/Hr CRRT (08-13-24 @ 19:21)   1700 mL/Hr CRRT (08-13-24 @ 17:29)    PrismaSOL Filtration BGK 4 / 2.5   1500 mL/Hr CRRT (08-13-24 @ 19:21)   1500 mL/Hr CRRT (08-13-24 @ 17:30)    PrismaSOL Filtration BGK 4 / 2.5   200 mL/Hr CRRT (08-13-24 @ 19:21)   200 mL/Hr CRRT (08-13-24 @ 17:30)    rifAXIMin   550 milliGRAM(s) Oral (08-14-24 @ 05:02)   550 milliGRAM(s) Oral (08-13-24 @ 17:28)      PHYSICAL EXAM:   Vital Signs last 24 hours:  T(F): 97.8 (08-14-24 @ 11:00), Max: 97.8 (08-14-24 @ 11:00)  HR: 74 (08-14-24 @ 14:15) (56 - 86)  BP: 98/51 (08-14-24 @ 14:15) (81/42 - 114/60)  BP(mean): 72 (08-14-24 @ 14:15) (56 - 81)  ABP: --  ABP(mean): --  RR: 12 (08-14-24 @ 14:15) (10 - 30)  SpO2: 100% (08-14-24 @ 14:15) (95% - 100%)    I&Os:    08-13-24 @ 07:01  -  08-14-24 @ 07:00  --------------------------------------------------------  IN:    IV PiggyBack: 600 mL    IV PiggyBack: 300 mL    Phenylephrine: 235.2 mL  Total IN: 1135.2 mL    OUT:    Other (mL): 419 mL    Ureteral Catheter (mL): 0 mL  Total OUT: 419 mL    Total NET: 716.2 mL      08-14-24 @ 07:01  -  08-14-24 @ 14:34  --------------------------------------------------------  IN:    IV PiggyBack: 75 mL    Norepinephrine: 17.2 mL    Oral Fluid: 200 mL    Phenylephrine: 102.6 mL  Total IN: 394.8 mL    OUT:    Other (mL): 412 mL  Total OUT: 412 mL    Total NET: -17.2 mL    BMI (kg/m2): 33.2 (08-12-24 @ 16:46)  GENERAL: obese man, in no acute distress.   NEURO: [place] HE grade 1-2. No asterixis.   HEENT: Scleral icterus  CHEST: Bilateral breath sounds, decreased in R base. No respiratory distress.   CARDIAC: Regular rate and rhythm  ABDOMEN: Soft, non-tender, distended, +anasarca  EXTREMITIES: warm, well perfused, BLE pitting edema   SKIN: +Jaundiced, no rash/ecchymoses    DIAGNOSTICS:  WBC      Hg       PLT      Na       K        CO2     BUN      Cr       ALT      AST      TB       ALP  14.87    8.4      99       134      4.2      22       26       3.88     18       47       15.0     118      08-14-24 @ 12:21  ------   ------   ------   133      4.0      21       32       4.69     16       37       15.2     116      08-14-24 @ 05:52  12.44    8.2      88       134      3.9      21       38       5.54     15       34       14.4     104      08-14-24 @ 00:05  14.55    8.8      102      131      4.0      20       45       6.85     17       36       15.1     109      08-13-24 @ 11:55  ------   ------   ------   135      ------   ------   ------   6.49     ------   ------   15.3     ------   08-13-24 @ 06:16    PT             INR            MELDwith  MELDw/o  40.6           4.03           --             --             08-14-24 @ 12:21  43.0           4.07           --             --             08-14-24 @ 00:05  44.0           4.17           --             --             08-13-24 @ 11:55  39.3           3.89           >40            >40            08-13-24 @ 06:16  42.4           4.04           --             --             08-12-24 @ 21:12

## 2024-08-14 NOTE — PROGRESS NOTE ADULT - SUBJECTIVE AND OBJECTIVE BOX
Transplant Surgery - Multidisciplinary Progress Note  --------------------------------------------------------------      Present:   Patient seen and examined with multidisciplinary Transplant team including  (Surgery: Dr. Dagher  Hepatologist: Dr. Garcia .   Pharmacist:Beckie  ACP: Darek, and RNs in AM rounds.   Disciplines not in attendance will be notified of the plan.       HPI: 68y with obesity and risky alcohol consumption (with decades' history of daily consumption of homemade alcohol), admitted to [facility] 1 month ago due to recent onset of jaundice and with a prolonged hospital and ICU course there due to newly diagnosed decompensated cirrhosis with acute on chronic liver failure (ACLF) with shock (resolved, but still on midodrine); FANY (on intermittent HD since 7/9); recurrent maroon stools and acute on chronic anemia necessitating intermittent PRBC transfusions (last on 8/8) and hypofibrinoginemia, with EGD (7/12) with small non-bleeding EV and a duodenal ulcer with a visible vessel; and waxing and waning hepatic encephalopathy. He was transferred to [facility] on 8/12/24 for evaluation for combined liver/kidney transplants (SLK).       Interval Events:            Potential Discharge date: TBD    Education:  Medications    Plan of care:  See Below                                          Review of systems  All other systems were reviewed and are negative, except as noted.      PHYSICAL EXAM:  Constitutional: Well developed / well nourished  Eyes:  PERRLA  ENMT: nc/at, no thrush  Neck: supple, *  Respiratory: CTA B/L  Cardiovascular: RRR  Gastrointestinal: Soft abdomen, ND,   Genitourinary: anuric   Extremities: SCD's in place and working bilaterally  Vascular: Palpable dp pulses bilaterally.   Neurological: A&O x3  Skin: no rashes, ulcerations, lesions  Musculoskeletal: Moving all extremities  Psychiatric: Responsive     Transplant Surgery - Multidisciplinary Progress Note  --------------------------------------------------------------      Present:   Patient seen and examined with multidisciplinary Transplant team including  (Surgery: Dr. Dagher  Hepatologist: Dr. Garcia .   Pharmacist:Beckie  ACP: Darek, and RNs in AM rounds.   Disciplines not in attendance will be notified of the plan.       HPI: 68y with obesity and risky alcohol consumption (with decades' history of daily consumption of homemade alcohol), admitted to [facility] 1 month ago due to recent onset of jaundice and with a prolonged hospital and ICU course there due to newly diagnosed decompensated cirrhosis with acute on chronic liver failure (ACLF) with shock (resolved, but still on midodrine); FANY (on intermittent HD since 7/9); recurrent maroon stools and acute on chronic anemia necessitating intermittent PRBC transfusions (last on 8/8) and hypofibrinoginemia, with EGD (7/12) with small non-bleeding EV and a duodenal ulcer with a visible vessel; and waxing and waning hepatic encephalopathy. He was transferred to [facility] on 8/12/24 for evaluation for combined liver/kidney transplants (SLK).       Interval Events:  - MELD 43  - BCx+ for MRSE, per ID continue zosyn  - CT a.p w/ small hyperdense lesion   - switch lactulose to miralax        Potential Discharge date: TBD    Education:  Medications    Plan of care:  See Below      MEDICATIONS  (STANDING):  chlorhexidine 2% Cloths 1 Application(s) Topical <User Schedule>  CRRT Treatment    <Continuous>  fluconAZOLE IVPB 200 milliGRAM(s) IV Intermittent every 24 hours  fluconAZOLE IVPB      folic acid 1 milliGRAM(s) Oral daily  lidocaine   4% Patch 1 Patch Transdermal daily  melatonin 5 milliGRAM(s) Oral at bedtime  midodrine. 20 milliGRAM(s) Oral every 8 hours  multivitamin 1 Tablet(s) Oral daily  pantoprazole  Injectable 40 milliGRAM(s) IV Push two times a day  phenylephrine    Infusion 0.2 MICROgram(s)/kG/Min (8.55 mL/Hr) IV Continuous <Continuous>  piperacillin/tazobactam IVPB.. 3.375 Gram(s) IV Intermittent every 12 hours  polyethylene glycol 3350 17 Gram(s) Oral <User Schedule>  PrismaSATE Dialysate BGK 4 / 2.5 5000 milliLiter(s) (1700 mL/Hr) CRRT <Continuous>  PrismaSOL Filtration BGK 4 / 2.5 5000 milliLiter(s) (1500 mL/Hr) CRRT <Continuous>  PrismaSOL Filtration BGK 4 / 2.5 5000 milliLiter(s) (200 mL/Hr) CRRT <Continuous>  rifAXIMin 550 milliGRAM(s) Oral two times a day  thiamine IVPB 500 milliGRAM(s) IV Intermittent daily    MEDICATIONS  (PRN):      PAST MEDICAL & SURGICAL HISTORY:  Alcohol abuse      No significant past surgical history          Vital Signs Last 24 Hrs  T(C): 36.4 (14 Aug 2024 07:00), Max: 36.6 (13 Aug 2024 11:00)  T(F): 97.6 (14 Aug 2024 07:00), Max: 97.9 (13 Aug 2024 11:00)  HR: 75 (14 Aug 2024 08:30) (56 - 81)  BP: 91/54 (14 Aug 2024 08:30) (81/42 - 114/60)  BP(mean): 69 (14 Aug 2024 08:30) (56 - 81)  RR: 14 (14 Aug 2024 08:30) (10 - 27)  SpO2: 99% (14 Aug 2024 08:30) (95% - 100%)    Parameters below as of 14 Aug 2024 07:00  Patient On (Oxygen Delivery Method): room air        I&O's Summary    13 Aug 2024 07:01  -  14 Aug 2024 07:00  --------------------------------------------------------  IN: 1135.2 mL / OUT: 419 mL / NET: 716.2 mL    14 Aug 2024 07:01  -  14 Aug 2024 08:51  --------------------------------------------------------  IN: 34.2 mL / OUT: 40 mL / NET: -5.8 mL                              8.2    12.44 )-----------( 88       ( 14 Aug 2024 00:05 )             24.4     08-14    133<L>  |  97  |  32<H>  ----------------------------<  103<H>  4.0   |  21<L>  |  4.69<H>    Ca    8.9      14 Aug 2024 05:52  Phos  2.5     08-14  Mg     2.2     08-14    TPro  5.9<L>  /  Alb  3.6  /  TBili  15.2<H>  /  DBili  x   /  AST  37  /  ALT  16  /  AlkPhos  116  08-14          Culture - Blood (collected 08-12-24 @ 17:37)  Source: .Blood Blood  Preliminary Report (08-13-24 @ 22:01):    No growth at 24 hours    Culture - Blood (collected 08-12-24 @ 17:36)  Source: .Blood Blood  Gram Stain (08-13-24 @ 16:15):    Growth in aerobic bottle: Gram Positive Cocci in Clusters  Preliminary Report (08-13-24 @ 16:16):    Growth in aerobic bottle: Gram Positive Cocci in Clusters    Direct identification is available within approximately 3-5    hours either by Blood Panel Multiplexed PCR or Direct    MALDI-TOF. Details: [email or web address]  Organism: Blood Culture PCR (08-13-24 @ 17:51)  Organism: Blood Culture PCR (08-13-24 @ 17:51)          Review of systems  All other systems were reviewed and are negative, except as noted.      PHYSICAL EXAM:  Constitutional: Well developed / well nourished  Eyes:  PERRLA  ENMT: nc/at, no thrush  Neck: supple, *  Respiratory: CTA B/L  Cardiovascular: RRR  Gastrointestinal: Soft abdomen, ND,   Genitourinary: anuric   Extremities: SCD's in place and working bilaterally  Vascular: Palpable dp pulses bilaterally.   Neurological: A&O x3  Skin: no rashes, ulcerations, lesions  Musculoskeletal: Moving all extremities  Psychiatric: Responsive

## 2024-08-14 NOTE — PROGRESS NOTE ADULT - SUBJECTIVE AND OBJECTIVE BOX
HISTORY  68y Male    24 HOUR EVENTS:    SUBJECTIVE/ROS:  [ ] A ten-point review of systems was otherwise negative except as noted.  [ ] Due to altered mental status/intubation, subjective information were not able to be obtained from the patient. History was obtained, to the extent possible, from review of the chart and collateral sources of information.      NEURO  RASS:     GCS:     CAM ICU:  Exam: awake, alert, oriented  Meds: melatonin 5 milliGRAM(s) Oral at bedtime    [x] Adequacy of sedation and pain control has been assessed and adjusted      RESPIRATORY  RR: 16 (08-14-24 @ 07:30) (10 - 27)  SpO2: 99% (08-14-24 @ 07:30) (95% - 100%)  Wt(kg): --  Exam: unlabored, clear to auscultation bilaterally  Mechanical Ventilation:     [N/A] Extubation Readiness Assessed  Meds:       CARDIOVASCULAR  HR: 72 (08-14-24 @ 07:30) (56 - 81)  BP: 96/55 (08-14-24 @ 07:15) (81/42 - 114/60)  BP(mean): 73 (08-14-24 @ 07:15) (56 - 81)  ABP: --  ABP(mean): --  Wt(kg): --  CVP(cm H2O): --  VBG - ( 14 Aug 2024 05:49 )  pH: 7.45  /  pCO2: 35    /  pO2: 44    / HCO3: 24    / Base Excess: 0.5   /  SaO2: 76.5   Lactate: 2.5                Exam: regular rate and rhythm  Cardiac Rhythm: sinus  Perfusion     [x]Adequate   [ ]Inadequate  Mentation   [x]Normal       [ ]Reduced  Extremities  [x]Warm         [ ]Cool  Volume Status [ ]Hypervolemic [x]Euvolemic [ ]Hypovolemic  Meds: midodrine. 20 milliGRAM(s) Oral every 8 hours  phenylephrine    Infusion 0.2 MICROgram(s)/kG/Min IV Continuous <Continuous>        GI/NUTRITION  Exam: soft, nontender, nondistended, incision C/D/I  Diet:  Meds: pantoprazole  Injectable 40 milliGRAM(s) IV Push two times a day  polyethylene glycol 3350 17 Gram(s) Oral <User Schedule>      GENITOURINARY  I&O's Detail    08-13 @ 07:01  -  08-14 @ 07:00  --------------------------------------------------------  IN:    IV PiggyBack: 600 mL    IV PiggyBack: 300 mL    Phenylephrine: 235.2 mL  Total IN: 1135.2 mL    OUT:    Other (mL): 419 mL    Ureteral Catheter (mL): 0 mL  Total OUT: 419 mL    Total NET: 716.2 mL          08-14    133<L>  |  97  |  32<H>  ----------------------------<  103<H>  4.0   |  21<L>  |  4.69<H>    Ca    8.9      14 Aug 2024 05:52  Phos  2.5     08-14  Mg     2.2     08-14    TPro  5.9<L>  /  Alb  3.6  /  TBili  15.2<H>  /  DBili  x   /  AST  37  /  ALT  16  /  AlkPhos  116  08-14    [ ] Elizabeth catheter, indication: N/A  Meds: folic acid 1 milliGRAM(s) Oral daily  multivitamin 1 Tablet(s) Oral daily  thiamine IVPB 500 milliGRAM(s) IV Intermittent daily        HEMATOLOGIC  Meds:   [x] VTE Prophylaxis                        8.2    12.44 )-----------( 88       ( 14 Aug 2024 00:05 )             24.4     PT/INR - ( 14 Aug 2024 00:05 )   PT: 43.0 sec;   INR: 4.07 ratio         PTT - ( 14 Aug 2024 00:05 )  PTT:85.1 sec  Transfusion     [ ] PRBC   [ ] Platelets   [ ] FFP   [ ] Cryoprecipitate      INFECTIOUS DISEASES  WBC Count: 12.44 K/uL (08-14 @ 00:05)  WBC Count: 14.55 K/uL (08-13 @ 11:55)    RECENT CULTURES:  Specimen Source: .Blood Blood  Date/Time: 08-12 @ 17:37  Culture Results:   No growth at 24 hours  Gram Stain: --  Organism: --  Specimen Source: .Blood Blood  Date/Time: 08-12 @ 17:36  Culture Results:   Growth in aerobic bottle: Gram Positive Cocci in Clusters  Direct identification is available within approximately 3-5  hours either by Blood Panel Multiplexed PCR or Direct  MALDI-TOF. Details: [email or web address]<!>  Gram Stain:   Growth in aerobic bottle: Gram Positive Cocci in Clusters<!>  Organism: Blood Culture PCR<!>    Meds: fluconAZOLE IVPB      fluconAZOLE IVPB 200 milliGRAM(s) IV Intermittent every 24 hours  piperacillin/tazobactam IVPB.. 3.375 Gram(s) IV Intermittent every 12 hours  rifAXIMin 550 milliGRAM(s) Oral two times a day        ENDOCRINE  CAPILLARY BLOOD GLUCOSE        Meds:       ACCESS DEVICES:  [ ] Peripheral IV  [ ] Central Venous Line	[ ] R	[ ] L	[ ] IJ	[ ] Fem	[ ] SC	Placed:   [ ] Arterial Line		[ ] R	[ ] L	[ ] Fem	[ ] Rad	[ ] Ax	Placed:   [ ] PICC:					[ ] Mediport  [ ] Urinary Catheter, Date Placed:   [x] Necessity of urinary, arterial, and venous catheters discussed    OTHER MEDICATIONS:  chlorhexidine 2% Cloths 1 Application(s) Topical <User Schedule>  CRRT Treatment    <Continuous>  lidocaine   4% Patch 1 Patch Transdermal daily  PrismaSATE Dialysate BGK 4 / 2.5 5000 milliLiter(s) CRRT <Continuous>  PrismaSOL Filtration BGK 4 / 2.5 5000 milliLiter(s) CRRT <Continuous>  PrismaSOL Filtration BGK 4 / 2.5 5000 milliLiter(s) CRRT <Continuous>      CODE STATUS:      IMAGING: HISTORY  68y Male    24 HOUR EVENTS:  - Hypotensive during CRRT, given 500cc albumin, on sameer 1 now    SUBJECTIVE/ROS:  [ ] A ten-point review of systems was otherwise negative except as noted.  [ ] Due to altered mental status/intubation, subjective information were not able to be obtained from the patient. History was obtained, to the extent possible, from review of the chart and collateral sources of information.      NEURO  RASS:     GCS:     CAM ICU:  Exam: awake, alert, oriented  Meds: melatonin 5 milliGRAM(s) Oral at bedtime    [x] Adequacy of sedation and pain control has been assessed and adjusted      RESPIRATORY  RR: 16 (08-14-24 @ 07:30) (10 - 27)  SpO2: 99% (08-14-24 @ 07:30) (95% - 100%)  Wt(kg): --  Exam: unlabored, clear to auscultation bilaterally  Mechanical Ventilation:     [N/A] Extubation Readiness Assessed  Meds:       CARDIOVASCULAR  HR: 72 (08-14-24 @ 07:30) (56 - 81)  BP: 96/55 (08-14-24 @ 07:15) (81/42 - 114/60)  BP(mean): 73 (08-14-24 @ 07:15) (56 - 81)  ABP: --  ABP(mean): --  Wt(kg): --  CVP(cm H2O): --  VBG - ( 14 Aug 2024 05:49 )  pH: 7.45  /  pCO2: 35    /  pO2: 44    / HCO3: 24    / Base Excess: 0.5   /  SaO2: 76.5   Lactate: 2.5                Exam: regular rate and rhythm  Cardiac Rhythm: sinus  Perfusion     [x]Adequate   [ ]Inadequate  Mentation   [x]Normal       [ ]Reduced  Extremities  [x]Warm         [ ]Cool  Volume Status [ ]Hypervolemic [x]Euvolemic [ ]Hypovolemic  Meds: midodrine. 20 milliGRAM(s) Oral every 8 hours  phenylephrine    Infusion 0.2 MICROgram(s)/kG/Min IV Continuous <Continuous>        GI/NUTRITION  Exam: soft, nontender, nondistended, incision C/D/I  Diet:  Meds: pantoprazole  Injectable 40 milliGRAM(s) IV Push two times a day  polyethylene glycol 3350 17 Gram(s) Oral <User Schedule>      GENITOURINARY  I&O's Detail    08-13 @ 07:01  -  08-14 @ 07:00  --------------------------------------------------------  IN:    IV PiggyBack: 600 mL    IV PiggyBack: 300 mL    Phenylephrine: 235.2 mL  Total IN: 1135.2 mL    OUT:    Other (mL): 419 mL    Ureteral Catheter (mL): 0 mL  Total OUT: 419 mL    Total NET: 716.2 mL          08-14    133<L>  |  97  |  32<H>  ----------------------------<  103<H>  4.0   |  21<L>  |  4.69<H>    Ca    8.9      14 Aug 2024 05:52  Phos  2.5     08-14  Mg     2.2     08-14    TPro  5.9<L>  /  Alb  3.6  /  TBili  15.2<H>  /  DBili  x   /  AST  37  /  ALT  16  /  AlkPhos  116  08-14    [ ] Elizabeth catheter, indication: N/A  Meds: folic acid 1 milliGRAM(s) Oral daily  multivitamin 1 Tablet(s) Oral daily  thiamine IVPB 500 milliGRAM(s) IV Intermittent daily        HEMATOLOGIC  Meds:   [x] VTE Prophylaxis                        8.2    12.44 )-----------( 88       ( 14 Aug 2024 00:05 )             24.4     PT/INR - ( 14 Aug 2024 00:05 )   PT: 43.0 sec;   INR: 4.07 ratio         PTT - ( 14 Aug 2024 00:05 )  PTT:85.1 sec  Transfusion     [ ] PRBC   [ ] Platelets   [ ] FFP   [ ] Cryoprecipitate      INFECTIOUS DISEASES  WBC Count: 12.44 K/uL (08-14 @ 00:05)  WBC Count: 14.55 K/uL (08-13 @ 11:55)    RECENT CULTURES:  Specimen Source: .Blood Blood  Date/Time: 08-12 @ 17:37  Culture Results:   No growth at 24 hours  Gram Stain: --  Organism: --  Specimen Source: .Blood Blood  Date/Time: 08-12 @ 17:36  Culture Results:   Growth in aerobic bottle: Gram Positive Cocci in Clusters  Direct identification is available within approximately 3-5  hours either by Blood Panel Multiplexed PCR or Direct  MALDI-TOF. Details: [email or web address]<!>  Gram Stain:   Growth in aerobic bottle: Gram Positive Cocci in Clusters<!>  Organism: Blood Culture PCR<!>    Meds: fluconAZOLE IVPB      fluconAZOLE IVPB 200 milliGRAM(s) IV Intermittent every 24 hours  piperacillin/tazobactam IVPB.. 3.375 Gram(s) IV Intermittent every 12 hours  rifAXIMin 550 milliGRAM(s) Oral two times a day        ENDOCRINE  CAPILLARY BLOOD GLUCOSE        Meds:       ACCESS DEVICES:  [ ] Peripheral IV  [ ] Central Venous Line	[ ] R	[ ] L	[ ] IJ	[ ] Fem	[ ] SC	Placed:   [ ] Arterial Line		[ ] R	[ ] L	[ ] Fem	[ ] Rad	[ ] Ax	Placed:   [ ] PICC:					[ ] Mediport  [ ] Urinary Catheter, Date Placed:   [x] Necessity of urinary, arterial, and venous catheters discussed    OTHER MEDICATIONS:  chlorhexidine 2% Cloths 1 Application(s) Topical <User Schedule>  CRRT Treatment    <Continuous>  lidocaine   4% Patch 1 Patch Transdermal daily  PrismaSATE Dialysate BGK 4 / 2.5 5000 milliLiter(s) CRRT <Continuous>  PrismaSOL Filtration BGK 4 / 2.5 5000 milliLiter(s) CRRT <Continuous>  PrismaSOL Filtration BGK 4 / 2.5 5000 milliLiter(s) CRRT <Continuous>      CODE STATUS:      IMAGING: HISTORY  68y Male    24 HOUR EVENTS:  - Hypotensive during CRRT, given 500cc albumin, on sameer 1 now    SUBJECTIVE/ROS:  [ ] A ten-point review of systems was otherwise negative except as noted.  [ ] Due to altered mental status/intubation, subjective information were not able to be obtained from the patient. History was obtained, to the extent possible, from review of the chart and collateral sources of information.      NEURO  Exam: awake, alert, oriented  Meds: melatonin 5 milliGRAM(s) Oral at bedtime    [x] Adequacy of sedation and pain control has been assessed and adjusted      RESPIRATORY  RR: 16 (08-14-24 @ 07:30) (10 - 27)  SpO2: 99% (08-14-24 @ 07:30) (95% - 100%)  Wt(kg): --  Exam: unlabored, clear to auscultation bilaterally  Mechanical Ventilation:     [N/A] Extubation Readiness Assessed  Meds:       CARDIOVASCULAR  HR: 72 (08-14-24 @ 07:30) (56 - 81)  BP: 96/55 (08-14-24 @ 07:15) (81/42 - 114/60)  BP(mean): 73 (08-14-24 @ 07:15) (56 - 81)  ABP: --  ABP(mean): --  Wt(kg): --  CVP(cm H2O): --  VBG - ( 14 Aug 2024 05:49 )  pH: 7.45  /  pCO2: 35    /  pO2: 44    / HCO3: 24    / Base Excess: 0.5   /  SaO2: 76.5   Lactate: 2.5                Exam: regular rate and rhythm  Cardiac Rhythm: sinus  Perfusion     [x]Adequate   [ ]Inadequate  Mentation   [x]Normal       [ ]Reduced  Extremities  [x]Warm         [ ]Cool  Volume Status [ ]Hypervolemic [x]Euvolemic [ ]Hypovolemic  Meds: midodrine. 20 milliGRAM(s) Oral every 8 hours  phenylephrine    Infusion 0.2 MICROgram(s)/kG/Min IV Continuous <Continuous>        GI/NUTRITION  Exam: soft, nontender, nondistended, incision C/D/I  Diet:  Meds: pantoprazole  Injectable 40 milliGRAM(s) IV Push two times a day  polyethylene glycol 3350 17 Gram(s) Oral <User Schedule>      GENITOURINARY  I&O's Detail    08-13 @ 07:01 - 08-14 @ 07:00  --------------------------------------------------------  IN:    IV PiggyBack: 600 mL    IV PiggyBack: 300 mL    Phenylephrine: 235.2 mL  Total IN: 1135.2 mL    OUT:    Other (mL): 419 mL    Ureteral Catheter (mL): 0 mL  Total OUT: 419 mL    Total NET: 716.2 mL          08-14    133<L>  |  97  |  32<H>  ----------------------------<  103<H>  4.0   |  21<L>  |  4.69<H>    Ca    8.9      14 Aug 2024 05:52  Phos  2.5     08-14  Mg     2.2     08-14    TPro  5.9<L>  /  Alb  3.6  /  TBili  15.2<H>  /  DBili  x   /  AST  37  /  ALT  16  /  AlkPhos  116  08-14    [ ] Elizabeth catheter, indication: N/A  Meds: folic acid 1 milliGRAM(s) Oral daily  multivitamin 1 Tablet(s) Oral daily  thiamine IVPB 500 milliGRAM(s) IV Intermittent daily        HEMATOLOGIC  Meds:   [x] VTE Prophylaxis                        8.2    12.44 )-----------( 88       ( 14 Aug 2024 00:05 )             24.4     PT/INR - ( 14 Aug 2024 00:05 )   PT: 43.0 sec;   INR: 4.07 ratio         PTT - ( 14 Aug 2024 00:05 )  PTT:85.1 sec  Transfusion     [ ] PRBC   [ ] Platelets   [ ] FFP   [ ] Cryoprecipitate      INFECTIOUS DISEASES  WBC Count: 12.44 K/uL (08-14 @ 00:05)  WBC Count: 14.55 K/uL (08-13 @ 11:55)    RECENT CULTURES:  Specimen Source: .Blood Blood  Date/Time: 08-12 @ 17:37  Culture Results:   No growth at 24 hours  Gram Stain: --  Organism: --  Specimen Source: .Blood Blood  Date/Time: 08-12 @ 17:36  Culture Results:   Growth in aerobic bottle: Gram Positive Cocci in Clusters  Direct identification is available within approximately 3-5  hours either by Blood Panel Multiplexed PCR or Direct  MALDI-TOF. Details: [email or web address]<!>  Gram Stain:   Growth in aerobic bottle: Gram Positive Cocci in Clusters<!>  Organism: Blood Culture PCR<!>    Meds: fluconAZOLE IVPB      fluconAZOLE IVPB 200 milliGRAM(s) IV Intermittent every 24 hours  piperacillin/tazobactam IVPB.. 3.375 Gram(s) IV Intermittent every 12 hours  rifAXIMin 550 milliGRAM(s) Oral two times a day        ENDOCRINE  CAPILLARY BLOOD GLUCOSE        Meds:       ACCESS DEVICES:  [ ] Peripheral IV  [ ] Central Venous Line	[ ] R	[ ] L	[ ] IJ	[ ] Fem	[ ] SC	Placed:   [ ] Arterial Line		[ ] R	[ ] L	[ ] Fem	[ ] Rad	[ ] Ax	Placed:   [ ] PICC:					[ ] Mediport  [ ] Urinary Catheter, Date Placed:   [x] Necessity of urinary, arterial, and venous catheters discussed    OTHER MEDICATIONS:  chlorhexidine 2% Cloths 1 Application(s) Topical <User Schedule>  CRRT Treatment    <Continuous>  lidocaine   4% Patch 1 Patch Transdermal daily  PrismaSATE Dialysate BGK 4 / 2.5 5000 milliLiter(s) CRRT <Continuous>  PrismaSOL Filtration BGK 4 / 2.5 5000 milliLiter(s) CRRT <Continuous>  PrismaSOL Filtration BGK 4 / 2.5 5000 milliLiter(s) CRRT <Continuous>      CODE STATUS: FULL CODE   24 HOUR EVENTS:  - Hypotensive during CRRT, given 500cc albumin, on sameer 1 now    NEURO  Exam: awake, alert, oriented x1-2, waxes and wanes  Meds: melatonin 5 milliGRAM(s) Oral at bedtime    [x] Adequacy of sedation and pain control has been assessed and adjusted      RESPIRATORY  RR: 16 (08-14-24 @ 07:30) (10 - 27)  SpO2: 99% (08-14-24 @ 07:30) (95% - 100%)  Wt(kg): --  Exam: unlabored, clear to auscultation bilaterally    [N/A] Extubation Readiness Assessed  Meds:       CARDIOVASCULAR  HR: 72 (08-14-24 @ 07:30) (56 - 81)  BP: 96/55 (08-14-24 @ 07:15) (81/42 - 114/60)  BP(mean): 73 (08-14-24 @ 07:15) (56 - 81)  ABP: --  ABP(mean): --  Wt(kg): --  CVP(cm H2O): --  VBG - ( 14 Aug 2024 05:49 )  pH: 7.45  /  pCO2: 35    /  pO2: 44    / HCO3: 24    / Base Excess: 0.5   /  SaO2: 76.5   Lactate: 2.5                Exam: regular rate and rhythm  Cardiac Rhythm: sinus  Perfusion     [x]Adequate   [ ]Inadequate  Mentation   [x]Normal       [ ]Reduced  Extremities  [x]Warm         [ ]Cool  Volume Status [ ]Hypervolemic [x]Euvolemic [ ]Hypovolemic  Meds: midodrine. 20 milliGRAM(s) Oral every 8 hours  phenylephrine    Infusion 0.2 MICROgram(s)/kG/Min IV Continuous <Continuous>        GI/NUTRITION  Exam: distended abd, non TTP  Diet: regular, low sodium  Meds: pantoprazole  Injectable 40 milliGRAM(s) IV Push two times a day  polyethylene glycol 3350 17 Gram(s) Oral <User Schedule>      GENITOURINARY  I&O's Detail    08-13 @ 07:01  -  08-14 @ 07:00  --------------------------------------------------------  IN:    IV PiggyBack: 600 mL    IV PiggyBack: 300 mL    Phenylephrine: 235.2 mL  Total IN: 1135.2 mL    OUT:    Other (mL): 419 mL    Ureteral Catheter (mL): 0 mL  Total OUT: 419 mL    Total NET: 716.2 mL          08-14    133<L>  |  97  |  32<H>  ----------------------------<  103<H>  4.0   |  21<L>  |  4.69<H>    Ca    8.9      14 Aug 2024 05:52  Phos  2.5     08-14  Mg     2.2     08-14    TPro  5.9<L>  /  Alb  3.6  /  TBili  15.2<H>  /  DBili  x   /  AST  37  /  ALT  16  /  AlkPhos  116  08-14    [ ] Elizabeth catheter, indication: N/A  Meds: folic acid 1 milliGRAM(s) Oral daily  multivitamin 1 Tablet(s) Oral daily  thiamine IVPB 500 milliGRAM(s) IV Intermittent daily        HEMATOLOGIC  Meds:   [x] VTE Prophylaxis                        8.2    12.44 )-----------( 88       ( 14 Aug 2024 00:05 )             24.4     PT/INR - ( 14 Aug 2024 00:05 )   PT: 43.0 sec;   INR: 4.07 ratio         PTT - ( 14 Aug 2024 00:05 )  PTT:85.1 sec  Transfusion     [ ] PRBC   [ ] Platelets   [ ] FFP   [ ] Cryoprecipitate      INFECTIOUS DISEASES  WBC Count: 12.44 K/uL (08-14 @ 00:05)  WBC Count: 14.55 K/uL (08-13 @ 11:55)    RECENT CULTURES:  Specimen Source: .Blood Blood  Date/Time: 08-12 @ 17:37  Culture Results:   No growth at 24 hours  Gram Stain: --  Organism: --  Specimen Source: .Blood Blood  Date/Time: 08-12 @ 17:36  Culture Results:   Growth in aerobic bottle: Gram Positive Cocci in Clusters  Direct identification is available within approximately 3-5  hours either by Blood Panel Multiplexed PCR or Direct  MALDI-TOF. Details: [email or web address]<!>  Gram Stain:   Growth in aerobic bottle: Gram Positive Cocci in Clusters<!>  Organism: Blood Culture PCR<!>    Meds: fluconAZOLE IVPB      fluconAZOLE IVPB 200 milliGRAM(s) IV Intermittent every 24 hours  piperacillin/tazobactam IVPB.. 3.375 Gram(s) IV Intermittent every 12 hours  rifAXIMin 550 milliGRAM(s) Oral two times a day        ENDOCRINE  CAPILLARY BLOOD GLUCOSE        Meds:       ACCESS DEVICES:  [ ] Peripheral IV  [ ] Central Venous Line	[ ] R	[ ] L	[ ] IJ	[ ] Fem	[ ] SC	Placed:   [ ] Arterial Line		[ ] R	[ ] L	[ ] Fem	[ ] Rad	[ ] Ax	Placed:   [ ] PICC:					[ ] Mediport  [ ] Urinary Catheter, Date Placed:   [x] Necessity of urinary, arterial, and venous catheters discussed    OTHER MEDICATIONS:  chlorhexidine 2% Cloths 1 Application(s) Topical <User Schedule>  CRRT Treatment    <Continuous>  lidocaine   4% Patch 1 Patch Transdermal daily  PrismaSATE Dialysate BGK 4 / 2.5 5000 milliLiter(s) CRRT <Continuous>  PrismaSOL Filtration BGK 4 / 2.5 5000 milliLiter(s) CRRT <Continuous>  PrismaSOL Filtration BGK 4 / 2.5 5000 milliLiter(s) CRRT <Continuous>      CODE STATUS: FULL CODE

## 2024-08-14 NOTE — PROGRESS NOTE ADULT - ATTENDING COMMENTS
Patient examined by me on AM rounds.     #Decompensated EtOH Cirrhosis.  #UGIB s/p EGD and clipping at OSH.   #Renal failure.  #Hypotension    #NEURO: A&Ox4. AMS secondary to hepatic encephalopathy.   -Continue Rifaximin and Miralax.     #RESP: Stable on room air.     #CVS: Worsening hypotension overnight requiring Shola. Continue Midodrine 20mg TID.    -Transition to Levo and Place A-line for closer HD monitoring.     #GI: Tolerating regular diet.   -Decompensated EtOH Cirrhosis with MELD >40.   -Plan for paracentesis today.   -Possible sigmoidoscopy today to evaluate rectal wall thickening.     #: Renal failure requirng CRRT. Continue CRRT at net even.     #HEME: No evidence of ongoing bleeding.   -INR stable at 4. Persistent thrombocytopenia to 88.   -Hold chemical AC in the setting of coagulopathy.     #ID: Afebrile, WBC 12.   -Blood cx 8/12: MRSE  -Appreciate transplant ID recs. Continue Fluconazole and Zosyn. Consider adding Vanc.   -F/up para today.      #ENDO: Glucose controlled.     #Lines:   -PICC 8/12.   -PermCath placed at OSH. Patient examined by me on AM rounds.     #Decompensated EtOH Cirrhosis.  #UGIB s/p EGD and clipping at OSH.   #Renal failure.  #Hypotension    #NEURO: A&Ox4. AMS secondary to hepatic encephalopathy.   -Continue Rifaximin and Miralax.     #RESP: Stable on room air.     #CVS: Worsening hypotension overnight requiring Shola. Continue Midodrine 20mg TID.    -Transition to Levo and Place A-line for closer HD monitoring.     #GI: Tolerating regular diet.   -Decompensated EtOH Cirrhosis with MELD >40.   -Plan for paracentesis today.   -Possible sigmoidoscopy today to evaluate rectal wall thickening.     #: Renal failure requirng CRRT. Continue CRRT at net even.     #HEME: No evidence of ongoing bleeding.   -INR stable at 4. Persistent thrombocytopenia to 88.   -Hold chemical AC in the setting of coagulopathy.     #ID: Afebrile, WBC 12.   -Blood cx 8/12: MRSE  -Appreciate transplant ID recs. Continue Fluconazole and Zosyn. Consider adding Vanc.   -F/up para today.      #ENDO: Glucose controlled.     #Lines:   -PICC 8/12.   -PermCath placed at OSH placed approximately 3 weeks ago at OSH hosuiptal as per wife. Will dsiscuss with tranplsnt ID removal considering bacteremia. Patient evaluated by me on AM rounds.     #Decompensated EtOH Cirrhosis.  #UGIB s/p EGD and clipping at OSH.   #Renal failure.  #Hypotension    #NEURO: A&Ox4. AMS secondary to hepatic encephalopathy.   -Continue Rifaximin and Miralax.     #RESP: Stable on room air.     #CVS: Worsening hypotension overnight requiring Shola. Continue Midodrine 20mg TID.    -Transition to Levo and Place A-line for closer HD monitoring.     #GI: Tolerating regular diet.   -Decompensated EtOH Cirrhosis with MELD >40.   -Plan for paracentesis today.   -Possible sigmoidoscopy today to evaluate rectal wall thickening.     #: Renal failure requiring CRRT. Continue CRRT at net even.     #HEME: No evidence of ongoing bleeding.   -INR stable at 4. Persistent thrombocytopenia to 88.   -Hold chemical AC in the setting of coagulopathy.     #ID: Afebrile, WBC 12.   -Blood cx 8/12: MRSE  -Appreciate transplant ID recs. Continue Fluconazole and Zosyn. Consider adding Vanc.   -F/up para today.      #ENDO: Glucose controlled.     #Lines:   -PICC 8/12.   -PermCath placed at OSH placed approximately 3 weeks ago according to wife. Will discuss with transplant ID removal and shiley placement considering new bacteremia. See below.

## 2024-08-14 NOTE — CONSULT NOTE ADULT - REASON FOR ADMISSION
cirrhosis/transplant eval

## 2024-08-14 NOTE — PROGRESS NOTE ADULT - NS ATTEND AMEND GEN_ALL_CORE FT
76 yo m with alcohol abuse otherwise no known chronic medical issues (does not see doctors per sister) s/p 1 month stay at [facility] now transferred to [facility] for liver transplant eval.     Would send pre-transplant serologies as above    can continue empiric Zosyn and Fluconazole pending prelim cultures    Low level CMV viremia is noted, doubt this is clinically relevant. Would repeat CMV PCR next week.     Positive Blood Culture with MRSE is noted, consistent with contaminant can monitor off of Vancomycin. Would follow repeat BCx    I will continue to follow. Please feel free to contact me with any further questions.    Anoop Delacruz M.D.  [facility] Division of Infectious Disease  8AM-5PM Monday - Friday: Available on [employer] Teams  After Hours and Holidays (or if no response on [employer] Teams): Please contact the Infectious Diseases Office at [phone]

## 2024-08-14 NOTE — PROGRESS NOTE ADULT - ASSESSMENT
ASSESSMENT: 68 year old male in acute liver and renal failure admitted to SICU for hemodynamic monitoring iso recent GI bleeding and potential renal replacement requirements. Under eval for SLK.     PLAN:    Neuro:  - AOx1, increasingly lethargic  - trend ammonia, last 115  - lactulose 20 TID  - rifaximin     Resp:  - Out of bed to chair, ambulate as tolerated, and incentive spirometry to prevent atelectasis    CV:  - goal MAP > 65 mmHg  - midodrine 20 TID  - trend lactate    GI:   - Diet: Full liquids, advance as tolerated iso mental status  - Bowel regimen with lactulose  - Protonix BID for melanotic stools  - trend LFTs  - CT CAP w/ cont, A/P triple phase when able    Renal:  - Monitor I&Os and electrolytes w/ repletions as necessary  - possible HD 8/13, was receiving at [place]  - under eval for SLK    Heme:  - Monitor CBC and coags  - hold chem VTE prophylaxis  - SCDs for VTE prophylaxis    ID:   - Monitor for clinical evidence of active infection  - Monitor WBC, temperature, and procalcitonin  - Empiric antibiotics with zosyn, fluc    Endo:   - Monitor glucose    Code Status: full ASSESSMENT: 68 year old male in acute liver and renal failure admitted to SICU for hemodynamic monitoring iso recent GI bleeding and potential renal replacement requirements. Under eval for SLK.     Neuro  - AOx4, waxing and waning  - trend ammonia  - lacutlose  - rifaximin  - CTH unremarkable    Resp:  - OOB to chair, ambulate as tolerated, incentive spirometry  - RA    CV:  - goal MAP > 65 mmHg  - midodrine 20 TID  - sameer ggt @ 1  - trend lactate    GI:   - Diet: Regular diet  - Bowel regimen with lactulose  - Protonix BID for melanotic stools  - trend LFTs  - 8/14: needs dx paracentesis    Renal:  - Monitor I&Os and lytes, replete as necessary  - possible HD 8/13, receiving at [place]  - under eval for SLK  - Elizabeth to be removed 8/14  - Urine tests pending    Heme:  - Monitor CBC and coags  - hold chem VTE prophylaxis  - SCDs for VTE prophylaxis    ID:   - Monitor for clinical evidence of active infection  - Monitor WBC, temperature, and procalcitonin  - Empiric antibiotics with zosyn, fluc    Endo:   - Monitor glucose ASSESSMENT: 68 year old male in acute liver and renal failure admitted to SICU for hemodynamic monitoring iso recent GI bleeding and potential renal replacement requirements. Under eval for SLK.     Interval Events  - Hypotensive during CRRT, given 500cc albumin, sameer 1    Neuro  - A&O x2-3, waxing and waning  - miralax, rifaximin  - c/w thiamine, MVI, folid acid  - CTH unremarkable    Resp:  - OOB to chair, ambulate as tolerated, incentive spirometry  - RA  - CT CH 8/13 w/ patchy GGOs to b/l upper lobes    CV:  - goal MAP > 65 mmHg  - midodrine 20 TID  - sameer ggt @ 0.08  - trend lactate    GI:   - Diet: Regular diet  - Bowel regimen with miralax  - Protonix BID for melanotic stools  - trend LFTs  - 8/14: CT A/P cirrhosis w/ portal HTN, hypodense lesion L hepatic lobe ?small infarct?; hypoattenuation wedge in spleen ?small infarct?; focal eccentric wall thickening of rectum, consider colonoscopy, w/ large volume ascites -- needs dx paracentesis    Renal:  - Monitor I&Os and lytes, replete as necessary  - under eval for SLK  - No UO to obtain urine tests from; trejo removed   - on CRRT     Heme:  - Monitor CBC and coags  - hold chem VTE prophylaxis  - SCDs for VTE prophylaxis    ID:   - Monitor for clinical evidence of active infection  - Monitor WBC, temperature, and procalcitonin  - Empiric antibiotics with zosyn, fluc    Endo:   - Monitor glucose    Lines: R IJ CVC; R PICC ASSESSMENT: 68 year old male in acute liver and renal failure admitted to SICU for hemodynamic monitoring iso recent GI bleeding and potential renal replacement requirements. Under eval for SLK.     Neuro  - A&O x2-3, waxing and waning  - miralax, rifaximin  - c/w thiamine, MVI, folid acid  - CTH unremarkable    Resp:  - OOB to chair, ambulate as tolerated, incentive spirometry  - RA  - CT CH 8/13 w/ patchy GGOs to b/l upper lobes    CV:  - goal MAP > 65 mmHg  - midodrine 20 TID  - sameer ggt @ 0.8 -- p/f change to levo, titrate to map goal  - trend lactate    GI:   - Diet: Regular diet  - Bowel regimen with miralax  - Protonix BID for melanotic stools  - trend LFTs  - 8/14: CT A/P cirrhosis w/ portal HTN, hypodense lesion L hepatic lobe ?small infarct?; hypoattenuation wedge in spleen ?small infarct?; focal eccentric wall thickening of rectum, consider colonoscopy, w/ large volume ascites -- diagnostic para today    Renal:  - Monitor I&Os and lytes, replete as necessary  - under eval for SLK  - No UO to obtain urine tests from; trejo removed   - on CRRT net neg    Heme:  - Monitor CBC and coags  - hold chem VTE prophylaxis  - SCDs for VTE prophylaxis    ID:   - Monitor for clinical evidence of active infection  - Monitor WBC, temperature, and procalcitonin  - Empiric antibiotics with zosyn, fluc  - 8/14: BCx +MRSE, possible permacath source from OSH, unknown date of placement, wife estimates ~3-4 weeks ago; consider removal, repeat BCx    Endo:   - Monitor glucose    Lines: R IJ CVC; R PICC, radial line 8/14

## 2024-08-14 NOTE — PROGRESS NOTE ADULT - ASSESSMENT
68y with obesity and risky alcohol consumption (with decades' history of daily consumption of homemade alcohol), admitted to [facility] 1 month ago due to recent onset of jaundice and with a prolonged hospital and ICU course there due to newly diagnosed decompensated cirrhosis with acute on chronic liver failure (ACLF) with shock (resolved, but still on midodrine); FANY (on intermittent HD since 7/9); recurrent maroon stools and acute on chronic anemia necessitating intermittent PRBC transfusions (last on 8/8) and hypofibrinoginemia, with EGD (7/12) with small non-bleeding EV and a duodenal ulcer with a visible vessel; and waxing and waning hepatic encephalopathy. He was transferred to [facility] on 8/12/24 for evaluation for combined liver/kidney transplants (SLK).      Decompensated ETOH Cirrhosis  -MELD 44O  -open SLK eval, consultants aware  -TTE  -transferred to SICU o/n.  CRRT to start today, Transplant Nephrology following  -PT/OT  -pan scan, liver doppler to start  -pan culture  -LVP as able  -PPI  -Thiamine/MVI/FOLIC ACID  -Miralax/Rifaximin, increase prn if HE  -Zosyn/Fluc empiric, Transplant ID on board  -Transplant Hepatology following  -continue SICU care  68y with obesity and risky alcohol consumption (with decades' history of daily consumption of homemade alcohol), admitted to [facility] 1 month ago due to recent onset of jaundice and with a prolonged hospital and ICU course there due to newly diagnosed decompensated cirrhosis with acute on chronic liver failure (ACLF) with shock (resolved, but still on midodrine); FANY (on intermittent HD since 7/9); recurrent maroon stools and acute on chronic anemia necessitating intermittent PRBC transfusions (last on 8/8) and hypofibrinoginemia, with EGD (7/12) with small non-bleeding EV and a duodenal ulcer with a visible vessel; and waxing and waning hepatic encephalopathy. He was transferred to [facility] on 8/12/24 for evaluation for combined liver/kidney transplants (SLK).      Decompensated ETOH Cirrhosis  -MELD 43O  -open SLK eval, consultants aware  -TTE  -CRRT running net even, Transplant Nephrology following  -PT/OT  - bcx 8/13 w/ MRSE   -LVP as able  -PPI  -Thiamine/MVI/FOLIC ACID  -Miralax/Rifaximin, increase prn if HE  -Zosyn/Fluc empiric, Transplant ID on board  -Transplant Hepatology following  - on sameer 0.8

## 2024-08-14 NOTE — PROGRESS NOTE ADULT - CRITICAL CARE ATTENDING COMMENT
Patient evaluated by me on AM rounds.     #Decompensated EtOH Cirrhosis.  #UGIB s/p EGD and clipping at OSH.   #Renal failure.  #Hypotension    #NEURO: A&Ox4. AMS secondary to hepatic encephalopathy.   -Continue Rifaximin and Miralax.     #RESP: Stable on room air.     #CVS: Worsening hypotension overnight requiring Shola. Continue Midodrine 20mg TID.    -Transition to Levo and Place A-line for closer HD monitoring.     #GI: Tolerating regular diet.   -Decompensated EtOH Cirrhosis with MELD >40.   -Plan for paracentesis today.   -Possible sigmoidoscopy today to evaluate rectal wall thickening.     #: Renal failure requiring CRRT. Continue CRRT at net even.     #HEME: No evidence of ongoing bleeding.   -INR stable at 4. Persistent thrombocytopenia to 88.   -Hold chemical AC in the setting of coagulopathy.     #ID: Afebrile, WBC 12.   -Blood cx 8/12: MRSE  -Appreciate transplant ID recs. Continue Fluconazole and Zosyn. Consider adding Vanc.   -F/up para today.      #ENDO: Glucose controlled.     #Lines:   -PICC 8/12.   -PermCath placed at OSH placed approximately 3 weeks ago according to wife. Will discuss with transplant ID removal and shiley placement considering new bacteremia.

## 2024-08-14 NOTE — PROGRESS NOTE ADULT - SUBJECTIVE AND OBJECTIVE BOX
Follow Up:      Interval History:    REVIEW OF SYSTEMS  [  ] ROS unobtainable because:    [  ] All other systems negative except as noted below    Constitutional:  [ ] fever [ ] chills  [ ] weight loss  [ ] weakness  Skin:  [ ] rash [ ] phlebitis	  Eyes: [ ] icterus [ ] pain  [ ] discharge	  ENMT: [ ] sore throat  [ ] thrush [ ] ulcers [ ] exudates  Respiratory: [ ] dyspnea [ ] hemoptysis [ ] cough [ ] sputum	  Cardiovascular:  [ ] chest pain [ ] palpitations [ ] edema	  Gastrointestinal:  [ ] nausea [ ] vomiting [ ] diarrhea [ ] constipation [ ] pain	  Genitourinary:  [ ] dysuria [ ] frequency [ ] hematuria [ ] discharge [ ] flank pain  [ ] incontinence  Musculoskeletal:  [ ] myalgias [ ] arthralgias [ ] arthritis  [ ] back pain  Neurological:  [ ] headache [ ] seizures  [ ] confusion/altered mental status    Allergies  No Known Allergies        ANTIMICROBIALS:  fluconAZOLE IVPB 200 every 24 hours  fluconAZOLE IVPB    piperacillin/tazobactam IVPB.. 3.375 every 12 hours  rifAXIMin 550 two times a day      OTHER MEDS:  MEDICATIONS  (STANDING):  melatonin 5 at bedtime  midodrine. 20 every 8 hours  norepinephrine Infusion 0.05 <Continuous>  pantoprazole  Injectable 40 two times a day  phenylephrine    Infusion 0.2 <Continuous>  polyethylene glycol 3350 17 <User Schedule>      Vital Signs Last 24 Hrs  T(C): 36.6 (14 Aug 2024 11:00), Max: 36.6 (14 Aug 2024 11:00)  T(F): 97.8 (14 Aug 2024 11:00), Max: 97.8 (14 Aug 2024 11:00)  HR: 80 (14 Aug 2024 12:00) (56 - 81)  BP: 91/51 (14 Aug 2024 12:00) (81/42 - 114/60)  BP(mean): 70 (14 Aug 2024 12:00) (56 - 81)  RR: 19 (14 Aug 2024 12:00) (10 - 30)  SpO2: 100% (14 Aug 2024 12:00) (95% - 100%)    Parameters below as of 14 Aug 2024 11:00  Patient On (Oxygen Delivery Method): room air        PHYSICAL EXAMINATION:  General: Alert and Awake, NAD  HEENT: PERRL, EOMI  Neck: Supple  Cardiac: RRR, No M/R/G  Resp: CTAB, No Wh/Rh/Ra  Abdomen: NBS, NT/ND, No HSM, No rigidity or guarding  MSK: No LE edema. No Calf tenderness  : No trejo  Skin: No rashes or lesions. Skin is warm and dry to the touch.   Neuro: Alert and Awake. CN 2-12 Grossly intact. Moves all four extremities spontaneously.  Psych: Calm, Pleasant, Cooperative                          8.2    12.44 )-----------( 88       ( 14 Aug 2024 00:05 )             24.4       08-14    133<L>  |  97  |  32<H>  ----------------------------<  103<H>  4.0   |  21<L>  |  4.69<H>    Ca    8.9      14 Aug 2024 05:52  Phos  2.5     08-14  Mg     2.2     08-14    TPro  5.9<L>  /  Alb  3.6  /  TBili  15.2<H>  /  DBili  x   /  AST  37  /  ALT  16  /  AlkPhos  116  08-14      Urinalysis Basic - ( 14 Aug 2024 05:52 )    Color: x / Appearance: x / SG: x / pH: x  Gluc: 103 mg/dL / Ketone: x  / Bili: x / Urobili: x   Blood: x / Protein: x / Nitrite: x   Leuk Esterase: x / RBC: x / WBC x   Sq Epi: x / Non Sq Epi: x / Bacteria: x        MICROBIOLOGY:  v  .Blood Blood  08-12-24   No growth at 24 hours  --  --      .Blood Blood  08-12-24   Growth in aerobic bottle: Staphylococcus epidermidis Isolation of  Coagulase negative Staphylococcus from single blood culture sets may  represent  contamination. Contact the Microbiology Department at [phone] if  susceptibility testing is  clinically indicated.  Direct identification is available within approximately 3-5  hours either by Blood Panel Multiplexed PCR or Direct  MALDI-TOF. Details: [email or web address]  --  Blood Culture PCR          CMVPCR Log: 3.69 Cks38MP/mL (08-12 @ 21:13)        RADIOLOGY:    <The imaging below has been reviewed and visualized by me independently. Findings as detailed in report below> Follow Up: Leukocytosis    Interval History:  Some hypothermia overnight, leukocytosis waxing & waning   BCX 1/4 MRSE  More alert today      REVIEW OF SYSTEMS  [  ] ROS unobtainable because:    [ x ] All other systems negative except as noted below    Constitutional:  [ ] fever [ ] chills  [ ] weight loss  [ x] weakness  Skin:  [ ] rash [ ] phlebitis	  Eyes: [ ] icterus [ ] pain  [ ] discharge	  ENMT: [ ] sore throat  [ ] thrush [ ] ulcers [ ] exudates  Respiratory: [ ] dyspnea [ ] hemoptysis [ ] cough [ ] sputum	  Cardiovascular:  [ ] chest pain [ ] palpitations [ ] edema	  Gastrointestinal:  [ ] nausea [ ] vomiting [ ] diarrhea [ ] constipation [ ] pain	  Genitourinary:  [ ] dysuria [ ] frequency [ ] hematuria [ ] discharge [ ] flank pain  [ ] incontinence  Musculoskeletal:  [ ] myalgias [ ] arthralgias [ ] arthritis  [ ] back pain  Neurological:  [ ] headache [ ] seizures  [ ] confusion/altered mental status    Allergies  No Known Allergies        ANTIMICROBIALS:  fluconAZOLE IVPB 200 every 24 hours  fluconAZOLE IVPB    piperacillin/tazobactam IVPB.. 3.375 every 12 hours  rifAXIMin 550 two times a day      OTHER MEDS:  MEDICATIONS  (STANDING):  melatonin 5 at bedtime  midodrine. 20 every 8 hours  norepinephrine Infusion 0.05 <Continuous>  pantoprazole  Injectable 40 two times a day  phenylephrine    Infusion 0.2 <Continuous>  polyethylene glycol 3350 17 <User Schedule>      Vital Signs Last 24 Hrs  T(C): 36.6 (14 Aug 2024 11:00), Max: 36.6 (14 Aug 2024 11:00)  T(F): 97.8 (14 Aug 2024 11:00), Max: 97.8 (14 Aug 2024 11:00)  HR: 80 (14 Aug 2024 12:00) (56 - 81)  BP: 91/51 (14 Aug 2024 12:00) (81/42 - 114/60)  BP(mean): 70 (14 Aug 2024 12:00) (56 - 81)  RR: 19 (14 Aug 2024 12:00) (10 - 30)  SpO2: 100% (14 Aug 2024 12:00) (95% - 100%)    Parameters below as of 14 Aug 2024 11:00  Patient On (Oxygen Delivery Method): room air        PHYSICAL EXAMINATION:  General: Alert and Awake, NAD,jaundice  HEENT: PERRL, EOMI, icteric sclera  Neck: Supple  Cardiac: RRR, No M/R/G  Resp: CTAB, No Wh/Rh/Ra  Abdomen: NBS, NT/ND, No HSM, No rigidity or guarding  MSK: ++ LE edema. No Calf tenderness  : + Elizabeth  Skin: No rashes or lesions. Skin is warm and dry to the touch.   Neuro: Alert and Awake. CN 2-12 Grossly intact. Moves all four extremities spontaneously.  Psych: Calm, Pleasant, Cooperative                          8.2    12.44 )-----------( 88       ( 14 Aug 2024 00:05 )             24.4       08-14    133<L>  |  97  |  32<H>  ----------------------------<  103<H>  4.0   |  21<L>  |  4.69<H>    Ca    8.9      14 Aug 2024 05:52  Phos  2.5     08-14  Mg     2.2     08-14    TPro  5.9<L>  /  Alb  3.6  /  TBili  15.2<H>  /  DBili  x   /  AST  37  /  ALT  16  /  AlkPhos  116  08-14      Urinalysis Basic - ( 14 Aug 2024 05:52 )    Color: x / Appearance: x / SG: x / pH: x  Gluc: 103 mg/dL / Ketone: x  / Bili: x / Urobili: x   Blood: x / Protein: x / Nitrite: x   Leuk Esterase: x / RBC: x / WBC x   Sq Epi: x / Non Sq Epi: x / Bacteria: x        MICROBIOLOGY:  v  .Blood Blood  08-12-24   No growth at 24 hours  --  --      .Blood Blood  08-12-24   Growth in aerobic bottle: Staphylococcus epidermidis Isolation of  Coagulase negative Staphylococcus from single blood culture sets may  represent  contamination. Contact the Microbiology Department at [phone] if  susceptibility testing is  clinically indicated.  Direct identification is available within approximately 3-5  hours either by Blood Panel Multiplexed PCR or Direct  MALDI-TOF. Details: [email or web address]  --  Blood Culture PCR          CMVPCR Log: 3.69 Vsy24UC/mL (08-12 @ 21:13)        RADIOLOGY:    <The imaging below has been reviewed and visualized by me independently. Findings as detailed in report below>    < from: CT Chest w/ IV Cont (08.13.24 @ 14:13) >  FINDINGS:  Streak artifact related to patient's upper extremities at their sidesmay   limit evaluation.    CHEST:  LUNGS AND LARGE AIRWAYS: Patent central airways. Patchy groundglass   opacities in the bilateral upper lobes. Segmental atelectasis in the   right lower lobe and additional bilateral subsegmental atelectasis.  PLEURA: No pleural effusion.  VESSELS: Right upper extremity approach catheter with tip in the right   atrium. Right IJ catheter with tip in the SVC. Atherosclerotic changes.   Coronary artery calcifications.  HEART: Cardiomegaly. No pericardial effusion.  MEDIASTINUM AND KALIN: No lymphadenopathy.  CHEST WALL AND LOWER NECK: Within normal limits.    ABDOMEN AND PELVIS:  LIVER: Cirrhosis. Hypodense lesion in the periphery of the left lobe with   somewhat wedge-shaped morphology measuring 3.3 cm (series 7 image 81).   Question faint peripheral nodular enhancement (series 7 image 78).   Subcentimeter hypodense focus in the right lobe that is too small to   characterize. Punctate calcification in the right lobe.  BILE DUCTS: Normal caliber.  GALLBLADDER: Intraluminal hyperdensity suggestive of stones or sludge.  SPLEEN: Splenomegaly. Wedge-shaped region of hypoattenuation in the   posterior aspect of the spleen, which may reflect infarct (series 9 image   45).  PANCREAS: Within normal limits.  ADRENALS: Within normal limits.  KIDNEYS/URETERS: No hydronephrosis. Subcentimeter hypodense foci in the   left kidney that are too small to characterize.    BLADDER: Collapsed around a Elizabeth catheter.  REPRODUCTIVE ORGANS: The prostate is within normal limits.    BOWEL: Linear hyperdensity in the periampullary duodenum, which may   reflect an endoscopically placed clip; correlate with the patient's   history. Focal eccentric wall thickening in the low rectum, which may be   due to a mural fold (series 13 image 96, series 7 image 207). No bowel   obstruction.  PERITONEUM/RETROPERITONEUM: Large volume ascites.  VESSELS: Atherosclerotic changes. There is likely a replaced or accessory   left hepatic artery arising from the left gastric artery. Patent hepatic   and portal veins. Recanalized paraumbilical vein. Perisplenic and   perigastric varices.  LYMPH NODES: No lymphadenopathy.  ABDOMINAL WALL: Fat-containing left inguinal hernia. Small umbilical   hernia containing ascites fluid and varices.Subcutaneous edema.  BONES: Degenerative changes.    IMPRESSION:  *  Patchy groundglass opacities in the bilateral upper lobes.  *  Cirrhosis with evidence of portal hypertension.  *  Hypodense lesion in the periphery of the left hepatic lobe of   uncertain etiology. Somewhat wedge-shaped morphology raises the   possibility for a small infarct. Question subtle peripheral nodular   enhancement, and a hemangioma is also considered. Contrast enhanced   abdominal MRI can be performed for further characterization and to assess   for other possibilities.  *  A wedge-shaped region of hypoattenuation in the spleen may reflect a   small infarct.  *  Focal eccentric wall thickening in the low rectum, possibly due to a   mural fold. Consider colonoscopy.  *  Large volume ascites.        --- End of Report ---          < end of copied text >

## 2024-08-14 NOTE — CONSULT NOTE ADULT - ASSESSMENT
68 year-old with EtOH cirrhosis.  Now admitted with encephalopathy and FANY requiring HD.  Hypotension requiring pressor support.    TTE with RV enlargement.  Please check ECG.    Plan for right and left heart cath - will require FFP to correct INR prior to cath.     Discussed with Transplant Team on rounds.

## 2024-08-14 NOTE — PROGRESS NOTE ADULT - ASSESSMENT
67 yo M with obesity and risky alcohol consumption (with decades' history of daily consumption of homemade alcohol), admitted to [facility] 1 month ago due to recent onset of jaundice and with a prolonged hospital and ICU course there due to newly diagnosed decompensated cirrhosis with acute on chronic liver failure. Transferred to [facility] on 8/12/24 for evaluation for combined liver/kidney transplants (SLK). Given concern that he may require pressor support during HD as well as ACLF with very high MELD 3.0 score, recommend transfer to SICU for a higher level of care and hemodynamic monitoring.     # Distributive shock    - Likely from septic shock, hypovolemia and acute on chronic liver failure.   - Infectious work up pertinent for BCx growing E. epidermitis (likely a contaminant, repeating BCx), EBV early antigen positive, and elevated CMV PCR (test to be repeated by 8/19 as per transplant ID). CT images with no focal source of infection.   - On prophylactic Zosyn and Fluconazole with Transplant ID following.     - Requiring Phenyleprine and Midodrine since started on net even CRRT, and expecting pressor requirements to increase given the goal is to keep patient on net negative volume to assist with fluid overload status.    - Would benefit from A line for close monitoring of hemodynamics.     # Anuric FANY on CKD    - On intermittent HD since 7/9   - Started on net even CRRT (8/13- ) with Phenylephrine support (0.08) and Transplant nephrology following.      # Active UGIB   - Recurrent maroon stools and acute on chronic anemia necessitating intermittent PRBC transfusions, and management of hypofibrinogenemia   - EGD (7/12) with small non-bleeding EV and a duodenal ulcer with a visible vessel.   - Hb has remained stable. Plan to keep monitoring H/H and transfuse as needed to keep Hb >7.     # newly diagnosed decompensated cirrhosis with acute on chronic liver failure (ACLF)   - Waxing and waning hepatic encephalopathy managed with Rifaximin and Miralax. Lactulose discontinued due distended and tympanic abdomen. Bowel movements at goal. CTH with no acute events reported.   - Large volume ascites and anasarca being managed with CRRT. No history of SBP.   - Plan to perform diagnostic and therapeutic paracentesis today.   - Contrast CT (8/13) with patent portohepatic vessels and no evidence of HCC.     - Okay to advance diet as tolerated.   - Will need daily PT and OT as he was last ambulatory prior to his admission but has been bedbound for the past 36 days.     # SLK transplan evaluation   - ABO O. MELD 3.0 score of 44.   - OLT evaluation started on 8/12  - Patient will meet SLK criteria in Aug 20th after 6 weeks of being in HD.               [ ] Psychosocial            [ ] Social work. Patient with good family support and plan post-transplantation.              [ ] Transplant ID: CMV PCR: 4890 IU/mL (8/12). Repeat PCR next week.             [ ] Transplant cardiology: Cardiomegaly seen on recent CT. TTE grossly unremarkable. LHC scheduled for 8/15.              [ ] Sigmoid-rectal wall thickening on recent CT. Planned for Sigmoidoscopy on Thursday or Friday to rule out malignancy.              [ ] Prostate             [ ] Dental, confirm insurance             [ ] PT/Frailty: Has been bedbound since mid-July while admitted to ICU. Before that time patient lived alone and was self sufficient.             [ ] PET level             [ ] Financial clearance    # Newly found liver and spleen hypodensities suggestive of small infarctions   - IV contrast CT abdomen pelvis (8/13) showing left hepatic lobe wedge shaped hypodensity (small infarct vs hemangioma) and another wedge shaped hypodensity in the spleen also suggestive of small infarct.   - Small infarcts could be secondary to hypovolemia/shock or possible embolization. Patient would benefit from hypercoagulable work up.       Note incomplete until finalized by attending signature/attestation.    Myra Maloney   Transplant Hepatology Fellow

## 2024-08-14 NOTE — CONSULT NOTE ADULT - CONSULT REASON
acute liver failure, renal failure
Pre transplant Eval, leukocytosis
cirrhosis/transplant eval
SLK eval and renal failure.
pretransplant cardiac evaluation prior to possible liver transplant

## 2024-08-14 NOTE — PROGRESS NOTE ADULT - ASSESSMENT
78 yo m with alcohol abuse otherwise no known chronic medical issues (does not see doctors per sister) s/p 1 month stay at [facility] now transferred to [facility] for liver transplant eval.  Most of history obtained from sister (Ashlyn) at bedside and some from transfer paperwork. Per sister, pt was initially brought to the hospital by family for back pain and jaundice for unclear amount of time. Patient was seen by GI and underwent EGD soon after admission which only showed nonbleeding ?duodenal ulcers (no intervention) however few days later pt developed active signs of bleeding and emergently underwent EGD again showing bleeding esophageal varices which were clipped.  pt was electively intubated with stay in ICU thereafter. Patient then started with HD due to worsening renal function and MS for past 2.5 weeks at times limited by low BP requiring pressors to assist. Had numerous paracentesis with varying amount of fluid removal - albumin infusions. Sister not aware of any concerns for acute infection during his stay but aware that pt was on abx at some point due to bleeding issues.      Of note patient has had fluctuating levels of confusions likely in setting of hepatic encephalopathy now on standing meds/HD with improvement though not back to baseline. Other history of note more recently has had possible "dark" stools (transfer record indicate "maroon colored") with some concerns for bleeding. no BRBPR . VS: afebrile. 103/65, 73, 18, 98% NC . Easily arousable and responsive, pleasant & confused. Aware of transfer to [facility] hospital though pt reports he came because of his "kidneys" . Discussed with pt re: liver transplant eval.    On assessment patient is encephalopathic with collateral information obtained from son via phone.      PERTINENT RADIOLOGY:  CXR: Tubes and lines as above. No focal consolidations  CT Chest, A&P:  Patchy ground-glass opacities in the bilateral upper lobes. *  Cirrhosis with evidence of portal hypertension. *  Hypodense lesion in the periphery of the left hepatic lobe of uncertain etiology. Somewhat wedge-shaped morphology raises the possibility for a small infarct. Question subtle peripheral nodular enhancement, and a hemangioma is also considered. Contrast enhanced abdominal MRI can be performed for further characterization and to assess for other possibilities. *  A wedge-shaped region of hypoattenuation in the spleen may reflect a small infarct. *  Focal eccentric wall thickening in the low rectum, possibly due to a mural fold. Consider colonoscopy. *  Large volume ascites.  CT Head: No evidence of acute intracranial pathology. If clinical symptoms persist or worsen, more sensitive evaluation with brain MRI may be obtained, if no contraindications exist.        #Acute liver disease  #Decompensated liver cirrhosis  #Leukocytosis  #CMV Viremia  -Afebrile  -WBC 14  -MELD Score : >40 points  -Cytomegalovirus By PCR: 4890 IU/mL (8/12)  -Diana Barr Virus DNA by PCR - Blood: Not Detected      RECOMMENDATIONS:  Needs lyme serologies  When able would obtain MRI of abdomen   Trend CBC & chemistry  Continue temperature curves  Would repeat CMV PCR next week  Continue empiric Zosyn  Continue empiric fluconazole  Follow up prelim blood cultures          #Encounter to Vaccinate Patient  Son states they are amendable to all recommended vaccines.  COVID19: No primary series. Would benefit from COVID19 0324-0609 dose  Influenza: Would benefit from dose next season  Pneumococcal: Would benefit from PCV20  HAV: Immune, no additional vaccines indicated  HBV:Immune, no additional vaccines indicated  MMR: Check MMR IgG   Varicella: Check Varicella IgG   Shingles: Would benefit from Shingrix  Tdap: Would benefit from Tdap        #Pre Transplant Evaluation   HIV-1/2 Combo Result: Nonreactive  EBVPCR Log: NotDetec [id number]/mL   Hepatitis A IgG Ab Result: Reactive   Hepatitis B Core Antibody, Total: Nonreactive  Hepatitis B Surface Antibody: Reactive   Hepatitis B DNA PCR Log: Not Detected  Hepatitis B Surface Antigen: Nonreactive  Hepatitis C Virus Interpretation: Nonreactive  COVID-19 De Domain Antibody: Positive  Treponema Pallidum Antibody Interpretation: Negative    --Recommend COVID19 Nucleocapsid Antibody  --Recommend HSV 1/2 IgG  --Recommend EBV Serology  --Recommend CMV IgG  --Recommend VZV IgG  --Recommend Measles, Mumps and Rubella IgG serologies  --Recommend QuantiFeron Gold  --Recommend Toxoplasma IgG  --Recommend Strongyloides Ab     78 yo m with alcohol abuse otherwise no known chronic medical issues (does not see doctors per sister) s/p 1 month stay at [facility] now transferred to [facility] for liver transplant eval.  Most of history obtained from sister (Ashlyn) at bedside and some from transfer paperwork. Per sister, pt was initially brought to the hospital by family for back pain and jaundice for unclear amount of time. Patient was seen by GI and underwent EGD soon after admission which only showed nonbleeding ?duodenal ulcers (no intervention) however few days later pt developed active signs of bleeding and emergently underwent EGD again showing bleeding esophageal varices which were clipped.  pt was electively intubated with stay in ICU thereafter. Patient then started with HD due to worsening renal function and MS for past 2.5 weeks at times limited by low BP requiring pressors to assist. Had numerous paracentesis with varying amount of fluid removal - albumin infusions. Sister not aware of any concerns for acute infection during his stay but aware that pt was on abx at some point due to bleeding issues.      Of note patient has had fluctuating levels of confusions likely in setting of hepatic encephalopathy now on standing meds/HD with improvement though not back to baseline. Other history of note more recently has had possible "dark" stools (transfer record indicate "maroon colored") with some concerns for bleeding. no BRBPR . VS: afebrile. 103/65, 73, 18, 98% NC . Easily arousable and responsive, pleasant & confused. Aware of transfer to [facility] hospital though pt reports he came because of his "kidneys" . Discussed with pt re: liver transplant eval.    On assessment patient is encephalopathic with collateral information obtained from son via phone.      PERTINENT RADIOLOGY:  CXR: Tubes and lines as above. No focal consolidations  CT Chest, A&P:  Patchy ground-glass opacities in the bilateral upper lobes. *  Cirrhosis with evidence of portal hypertension. *  Hypodense lesion in the periphery of the left hepatic lobe of uncertain etiology. Somewhat wedge-shaped morphology raises the possibility for a small infarct. Question subtle peripheral nodular enhancement, and a hemangioma is also considered. Contrast enhanced abdominal MRI can be performed for further characterization and to assess for other possibilities. *  A wedge-shaped region of hypoattenuation in the spleen may reflect a small infarct. *  Focal eccentric wall thickening in the low rectum, possibly due to a mural fold. Consider colonoscopy. *  Large volume ascites.  CT Head: No evidence of acute intracranial pathology. If clinical symptoms persist or worsen, more sensitive evaluation with brain MRI may be obtained, if no contraindications exist.    #Acute liver disease  #Decompensated liver cirrhosis  #Leukocytosis  #CMV Viremia  #Positive BCx  -Afebrile  -WBC 14  -MELD Score : >40 points  -Cytomegalovirus By PCR: 4890 IU/mL (8/12)  -Diana Barr Virus DNA by PCR - Blood: Not Detected    RECOMMENDATIONS:  Needs lyme serologies (reportedly positive on previous testing at [facility])  When able would obtain MRI of abdomen (given CT A/P findings)  Positive Blood Culture with MRSE is noted, consistent with contaminant can monitor off of Vancomycin. Would follow repeat BCx  Trend CBC & chemistry  Continue temperature curves  Would repeat CMV PCR next week on 8/19  Continue empiric Zosyn  Continue empiric fluconazole  Follow up prelim blood cultures    #Encounter to Vaccinate Patient  Son states they are amendable to all recommended vaccines.  COVID19: No primary series. Would benefit from COVID19 [id number] dose  Influenza: Would benefit from dose next season  Pneumococcal: Would benefit from PCV20  HAV: Immune, no additional vaccines indicated  HBV:Immune, no additional vaccines indicated  MMR: Check MMR IgG   Varicella: Check Varicella IgG   Shingles: Would benefit from Shingrix  Tdap: Would benefit from Tdap    #Pre Transplant Evaluation   HIV-1/2 Combo Result: Nonreactive  EBVPCR Log: NotDetec Xdj08FC/mL   Hepatitis A IgG Ab Result: Reactive   Hepatitis B Core Antibody, Total: Nonreactive  Hepatitis B Surface Antibody: Reactive   Hepatitis B DNA PCR Log: Not Detected  Hepatitis B Surface Antigen: Nonreactive  Hepatitis C Virus Interpretation: Nonreactive  COVID-19 De Domain Antibody: Positive  Treponema Pallidum Antibody Interpretation: Negative  HSV 1 IgG  Positive/HSV 2 IgG Negative  EBV Serology Positive  CMV IgG Positive  VZV IgG Positive  Measles Positive, Mumps Positive and Rubella IgG Positive  Toxoplasma IgG Positive  --Follow up on QuantiFeron Gold  --Follow up on Strongyloides Ab  --Follow up on Schistosoma IgG

## 2024-08-14 NOTE — CONSULT NOTE ADULT - SUBJECTIVE AND OBJECTIVE BOX
Patient seen and evaluated @ 9 am in SICU  Chief Complaint: encephalopathy    HPI:  78 yo m with alcohol abuse otherwise no known chronic medical issues (does not see doctors per sister) s/p 1 month stay at [facility] now transferred to [facility] for liver transplant eval.  Most of history obtained from sister (Ashlyn) at bedside and some from transfer paperwork. Per sister, pt was initially brought to [facility] by family for back pain and jaundice for unclear amount of time. Patient was seen by GI and underwent EGD soon after admission which only showed nonbleeding ?duodenal ulcers (no intervention) however few days later pt developed active signs of bleeding and emergently underwent EGD again showing bleeding esophageal varices which were clipped.  pt was electively intubated with stay in ICU thereafter. Patient then started with HD due to worsening renal function and MS for past 2.5 weeks at times limited by low BP requiring pressors to assist. had numerous paracentesis with varying amount of fluid removal - albumin infusions. Sister not aware of any concerns for acute infection during his stay but aware that pt was on abx at some point due to bleeding issues.      Of note patient has had fluctuating levels of confusions likely in setting of hepatic encephalopathy now on standing meds/HD with improvement though not back to baseline. Other history of note more recently has had possible "dark" stools (transfer record indicate "maroon colored") with some concerns for bleeding. no BRBPR .     VS: afebrile. 103/65, 73, 18, 98% NC . easily arousable and responsive. pleasant not confused. Aware of transfer to [facility] though pt reports he came because of his "kidneys" . discuss with pt re: liver transplant eval.  (12 Aug 2024 09:16)    PMH:   Alcohol abuse      PSH:   No significant past surgical history      Medications:   chlorhexidine 2% Cloths 1 Application(s) Topical <User Schedule>  fluconAZOLE IVPB 200 milliGRAM(s) IV Intermittent every 24 hours  fluconAZOLE IVPB      folic acid 1 milliGRAM(s) Oral daily  lidocaine   4% Patch 1 Patch Transdermal daily  melatonin 5 milliGRAM(s) Oral at bedtime  midodrine. 20 milliGRAM(s) Oral every 8 hours  multivitamin 1 Tablet(s) Oral daily  norepinephrine Infusion 0.07 MICROgram(s)/kG/Min IV Continuous <Continuous>  pantoprazole  Injectable 40 milliGRAM(s) IV Push two times a day  piperacillin/tazobactam IVPB.. 3.375 Gram(s) IV Intermittent every 12 hours  polyethylene glycol 3350 17 Gram(s) Oral <User Schedule>  rifAXIMin 550 milliGRAM(s) Oral two times a day  thiamine IVPB 500 milliGRAM(s) IV Intermittent daily  vasopressin Infusion 0.03 Unit(s)/Min IV Continuous <Continuous>    Allergies:  No Known Allergies    FAMILY HISTORY:  Family history of diabetes mellitus (DM) (Father)    Family history of CVA (Mother)      Social History:  Smoking:  Alcohol:  Drugs:    Review of Systems:   Constitutional: No fever, chills, fatigue, or changes in weight  HEENT: No blurry vision, eye pain, headache, runny nose, or sore throat  Respiratory: No shortness of breath, cough, or wheezing  Cardiovascular: No chest pain or palpitations  Gastrointestinal: No nausea, vomiting, diarrhea, or abdominal pain  Genitourinary: No dysuria or incontinence  Extremities: No lower extremity swelling  Neurologic: No focal findings  Lymphatic: No lymphadenopathy   Skin: No rash  Psychiatry: No anxiety or depression  10 point review of systems is otherwise negative except as mentioned above            Physical Exam:  T(C): 36.5 (08-14-24 @ 23:00), Max: 36.6 (08-14-24 @ 11:00)  HR: 56 (08-15-24 @ 00:15) (45 - 86)  BP: 97/53 (08-14-24 @ 19:00) (81/42 - 121/59)  RR: 28 (08-15-24 @ 00:15) (10 - 42)  SpO2: 97% (08-15-24 @ 00:15) (95% - 100%)  Wt(kg): --    08-13 @ 07:01  -  08-14 @ 07:00  --------------------------------------------------------  IN: 1135.2 mL / OUT: 419 mL / NET: 716.2 mL    08-14 @ 07:01  -  08-15 @ 00:54  --------------------------------------------------------  IN: 7445.8 mL / OUT: 1655 mL / NET: 5790.8 mL      Daily     Daily     Appearance: Normal, well groomed, NAD  Eyes: PERRLA, EOMI, pink conjunctiva, no scleral icterus   HENT: Normal oral mucosa  Cardiovascular: RRR, S1, S2, no murmur, rub, or gallop; no edema; no JVD  Procedural Access Site: Clean, dry, intact, without hematoma  Respiratory: Clear to auscultation bilaterally  Gastrointestinal: Soft, non-tender, non-distended, BS+  Musculoskeletal: No clubbing or joint deformity   Neurologic: No focal weakness  Lymphatic: No lymphadenopathy  Psychiatry: AAOx3 with appropriate mood and affect  Skin: No rashes, ecchymoses, or cyanosis    Cardiovascular Diagnostic Testing:  ECG:     Echo:  < from: TTE W or WO Ultrasound Enhancing Agent (08.14.24 @ 06:54) >  _______________________________________________________________________________________     CONCLUSIONS:      1. Technically difficult image quality.   2. Left ventricular cavity is normal in size. Left ventricular wall thickness is normal. Left ventricular systolic function is hyperdynamic with an ejection fraction of 73 % by Marroquin's method of disks. There are no regional wall motion abnormalities seen.   3. Moderately enlarged right ventricular cavity size and normal right ventricular systolic function.   4. Left atrium is mildly dilated.   5. No significant valvular disease.   6. No priorechocardiogram is available for comparison.    ________________________________________________________________________________________    < end of copied text >      Stress Testing:    Cath:    Interpretation of Telemetry:    Imaging:    Labs:                        7.3    11.64 )-----------( 62       ( 15 Aug 2024 00:27 )             21.6     08-14    133<L>  |  98  |  22  ----------------------------<  141<H>  4.1   |  21<L>  |  3.35<H>    Ca    8.3<L>      14 Aug 2024 18:39  Phos  2.1     08-14  Mg     2.2     08-14    TPro  5.5<L>  /  Alb  3.4  /  TBili  15.3<H>  /  DBili  x   /  AST  48<H>  /  ALT  17  /  AlkPhos  118  08-14    PT/INR - ( 14 Aug 2024 12:21 )   PT: 40.6 sec;   INR: 4.03 ratio         PTT - ( 14 Aug 2024 12:21 )  PTT:91.0 sec            Thyroid Stimulating Hormone, Serum: 4.09 uIU/mL (08-12 @ 21:13)  Thyroid Stimulating Hormone, Serum: 3.09 uIU/mL (08-12 @ 15:01)

## 2024-08-15 NOTE — PROGRESS NOTE ADULT - SUBJECTIVE AND OBJECTIVE BOX
24 HOUR EVENTS:  - Para 5L off, 1L albumin given pack  - drop in H/H, INR 4.24; transfused 1/1/0/1    NEURO  Exam: awake, alert, oriented x1-2, waxes and wanes  Meds: melatonin 5 milliGRAM(s) Oral at bedtime      RESPIRATORY  RR: 23 (08-15-24 @ 03:30) (11 - 42)  SpO2: 100% (08-15-24 @ 03:30) (95% - 100%)  Exam: unlabored    CARDIOVASCULAR  HR: 58 (08-15-24 @ 03:30) (45 - 86)  BP: 97/53 (08-14-24 @ 19:00) (86/47 - 121/59)  BP(mean): 72 (08-14-24 @ 19:00) (63 - 85)  ABP: 126/41 (08-15-24 @ 03:30) (99/37 - 143/51)  ABP(mean): 60 (08-15-24 @ 03:30) (44 - 75)  VBG - ( 15 Aug 2024 01:40 )  pH: 7.44  /  pCO2: 36    /  pO2: 40    / HCO3: 24    / Base Excess: 0.4   /  SaO2: 74.3   Lactate: 1.9        Exam: regular rate and rhythm  Cardiac Rhythm: sinus  Perfusion     [x]Adequate   [ ]Inadequate  Mentation   [x]Normal       [ ]Reduced  Extremities  [x]Warm         [ ]Cool  Volume Status [ ]Hypervolemic [x]Euvolemic [ ]Hypovolemic  Meds: midodrine. 20 milliGRAM(s) Oral every 8 hours  norepinephrine Infusion 0.07 MICROgram(s)/kG/Min IV Continuous <Continuous>      GI/NUTRITION  Exam: distended abd, non TTP  Diet: regular, low sodium  Meds: pantoprazole  Injectable 40 milliGRAM(s) IV Push two times a day  polyethylene glycol 3350 17 Gram(s) Oral <User Schedule>      GENITOURINARY  I&O's Detail    08-13 @ 07:01  -  08-14 @ 07:00  --------------------------------------------------------  IN:    IV PiggyBack: 600 mL    IV PiggyBack: 300 mL    Phenylephrine: 235.2 mL  Total IN: 1135.2 mL    OUT:    Other (mL): 419 mL    Ureteral Catheter (mL): 0 mL  Total OUT: 419 mL    Total NET: 716.2 mL      08-14 @ 07:01  -  08-15 @ 03:47  --------------------------------------------------------  IN:    IV PiggyBack: 1000 mL    IV PiggyBack: 750 mL    Norepinephrine: 143.3 mL    Norepinephrine: 70.5 mL    Oral Fluid: 450 mL    Other (mL): 5000 mL    Phenylephrine: 102.6 mL    Vasopressin: 31.5 mL    Vasopressin: 27 mL  Total IN: 7574.9 mL    OUT:    Other (mL): 1775 mL  Total OUT: 1775 mL    Total NET: 5799.9 mL          08-15    131<L>  |  98  |  19  ----------------------------<  165<H>  4.1   |  21<L>  |  2.94<H>    Ca    8.6      15 Aug 2024 00:27  Phos  2.8     08-15  Mg     2.2     08-15    TPro  5.6<L>  /  Alb  3.4  /  TBili  14.4<H>  /  DBili  x   /  AST  39  /  ALT  15  /  AlkPhos  105  08-15    Meds: folic acid 1 milliGRAM(s) Oral daily  multivitamin 1 Tablet(s) Oral daily  thiamine IVPB 500 milliGRAM(s) IV Intermittent daily      HEMATOLOGIC  Meds:                         7.1    11.71 )-----------( 74       ( 15 Aug 2024 01:48 )             21.1     PT/INR - ( 15 Aug 2024 00:27 )   PT: 42.7 sec;   INR: 4.24 ratio         PTT - ( 15 Aug 2024 00:27 )  PTT:129.3 sec    INFECTIOUS DISEASES  T(C): 36.5 (08-14-24 @ 23:00), Max: 36.6 (08-14-24 @ 11:00)  Wt(kg): --  WBC Count: 11.71 K/uL (08-15 @ 01:48)  WBC Count: 11.64 K/uL (08-15 @ 00:27)  WBC Count: 14.87 K/uL (08-14 @ 12:21)    Recent Cultures:  Specimen Source: Peritoneal Peritoneal Fluid, 08-14 @ 18:16; Results --; Gram Stain:   polymorphonuclear leukocytes seen  No organisms seen  by cytocentrifuge; Organism: --  Specimen Source: .Blood Blood, 08-12 @ 17:37; Results   No growth at 48 Hours; Gram Stain: --; Organism: --  Specimen Source: .Blood Blood, 08-12 @ 17:36; Results   Growth in aerobic bottle: Staphylococcus epidermidis Isolation of  Coagulase negative Staphylococcus from single blood culture sets may  represent  contamination. Contact the Microbiology Department at [phone] if  susceptibility testing is  clinically indicated.  Direct identification is available within approximately 3-5  hours either by Blood Panel Multiplexed PCR or Direct  MALDI-TOF. Details: [email or web address]<!>; Gram Stain:   Growth in aerobic bottle: Gram Positive Cocci in Clusters<!>; Organism: Blood Culture PCR<!>    Meds: fluconAZOLE IVPB 200 milliGRAM(s) IV Intermittent every 24 hours  fluconAZOLE IVPB      piperacillin/tazobactam IVPB.. 3.375 Gram(s) IV Intermittent every 12 hours  rifAXIMin 550 milliGRAM(s) Oral two times a day      ENDOCRINE  Capillary Blood Glucose    Meds: vasopressin Infusion 0.03 Unit(s)/Min IV Continuous <Continuous>      OTHER MEDICATIONS:  chlorhexidine 2% Cloths 1 Application(s) Topical <User Schedule>  CRRT Treatment    <Continuous>  lidocaine   4% Patch 1 Patch Transdermal daily  Phoxillum Filtration BK 4 / 2.5 5000 milliLiter(s) CRRT <Continuous>  PrismaSATE Dialysate BGK 4 / 2.5 5000 milliLiter(s) CRRT <Continuous>  PrismaSOL Filtration BGK 4 / 2.5 5000 milliLiter(s) CRRT <Continuous>      IMAGING:

## 2024-08-15 NOTE — PROGRESS NOTE ADULT - ATTENDING COMMENTS
69 yo M with obesity and risky alcohol consumption (with decades' history of daily consumption of homemade alcohol), admitted to [facility] 1 month ago due to recent onset of jaundice and with a prolonged hospital and ICU course there due to newly diagnosed decompensated cirrhosis with acute on chronic liver failure (ACLF) with shock, FANY (on intermittent HD since 7/9), acute on chronic anemia with recurrent overt GI bleeding, and hepatic encephalopathy, transferred to [facility] on 8/12/24 for evaluation for combined liver/kidney transplants (SLK) and then transferred to the SICU also on 8/12/24 for a higher level of care and initiation of CRRT (8/13- ). Currently, he has distributive shock secondary to ACLF +/- septic shock and necessitating norepinephrine and vasopressin (in addition to midodrine 20 mg po q8h). Infectious work-up has been negative apart from CMV viremia with 4,890 IU/mL (8/12), including no SBP on his most recent LVP (8/14). Blood cultures (8/12 x2) with 1/4 bottles MRSE, likely a contaminant, with repeat blood cultures (8/14 x2) with NGTD. Recommend to re-check CMV PCR with next labs and, if increasing, we will discuss antiviral therapy with Transplant ID. Currently, he is on Zosyn (8/12- ) and fluconazole (8/12- ) empirically. No further overt GI bleeding since transfer but he was transfused 1u PRBCs, 1u FFP, and 1u cryoprecipitate early this morning based on Hb/HCT trend and TEG. S/p EGD (7/12, at [facility]) small non-bleeding EV and a duodenal ulcer with a visible vessel. Continuing pantoprazole 40 mg iv q12h. He continues to have delirium despite continued rifaximin and Miralax with adequate BMs. He remains anuric and was net +6.5L in the past 24h due to inability to remove fluid via CRRT due to his hypotension, with marked anasarca on exam though on room air. ABO O with current MELD 3.0 score of 41, with expedited evaluation for SLK in progress.    Please don't hesitate to call with any questions or concerns.    Charo Garcia M.D., Ph.D.  Transplant Hepatology

## 2024-08-15 NOTE — PROGRESS NOTE ADULT - ASSESSMENT
76 yo m with alcohol abuse otherwise no known chronic medical issues (does not see doctors per sister) s/p 1 month stay at [facility] now transferred to [facility] for liver transplant eval.  Most of history obtained from sister (Ashlyn) at bedside and some from transfer paperwork. Per sister, pt was initially brought to the hospital by family for back pain and jaundice for unclear amount of time. Patient was seen by GI and underwent EGD soon after admission which only showed nonbleeding ?duodenal ulcers (no intervention) however few days later pt developed active signs of bleeding and emergently underwent EGD again showing bleeding esophageal varices which were clipped.  pt was electively intubated with stay in ICU thereafter. Patient then started with HD due to worsening renal function and MS for past 2.5 weeks at times limited by low BP requiring pressors to assist. Had numerous paracentesis with varying amount of fluid removal - albumin infusions. Sister not aware of any concerns for acute infection during his stay but aware that pt was on abx at some point due to bleeding issues.      Of note patient has had fluctuating levels of confusions likely in setting of hepatic encephalopathy now on standing meds/HD with improvement though not back to baseline. Other history of note more recently has had possible "dark" stools (transfer record indicate "maroon colored") with some concerns for bleeding. no BRBPR . VS: afebrile. 103/65, 73, 18, 98% NC . Easily arousable and responsive, pleasant & confused. Aware of transfer to [facility] hospital though pt reports he came because of his "kidneys" . Discussed with pt re: liver transplant eval.    On assessment patient is encephalopathic with collateral information obtained from son via phone.      PERTINENT RADIOLOGY:  CXR: Tubes and lines as above. No focal consolidations  CT Chest, A&P:  Patchy ground-glass opacities in the bilateral upper lobes. *  Cirrhosis with evidence of portal hypertension. *  Hypodense lesion in the periphery of the left hepatic lobe of uncertain etiology. Somewhat wedge-shaped morphology raises the possibility for a small infarct. Question subtle peripheral nodular enhancement, and a hemangioma is also considered. Contrast enhanced abdominal MRI can be performed for further characterization and to assess for other possibilities. *  A wedge-shaped region of hypoattenuation in the spleen may reflect a small infarct. *  Focal eccentric wall thickening in the low rectum, possibly due to a mural fold. Consider colonoscopy. *  Large volume ascites.  CT Head: No evidence of acute intracranial pathology. If clinical symptoms persist or worsen, more sensitive evaluation with brain MRI may be obtained, if no contraindications exist.    #Acute liver disease  #Decompensated liver cirrhosis  #Leukocytosis  #CMV Viremia  #Positive BCx  -Afebrile  -WBC 14  -MELD Score : >40 points (8/13)  -Cytomegalovirus By PCR: 4890 IU/mL (8/12)  -Diana Barr Virus DNA by PCR - Blood: Not Detected  -Babesia species PCR, Bld Result: Not Detected  -S/P LVP 8/14 5 Liters drained, cell count not suggestive of SBP. Culture- negative      RECOMMENDATIONS:  Follow peritoneal fungal culture  Follow lyme serologies (reportedly positive on previous testing at OSH)  When able would obtain MRI of abdomen (given CT A/P findings)  Positive Blood Culture with MRSE is noted, consistent with contaminant can monitor off of Vancomycin. Would follow repeat BCx  Trend CBC & chemistry  Continue temperature curves  Would repeat CMV PCR next week on 8/19  Continue empiric Zosyn  Continue empiric fluconazole  Follow up prelim blood cultures    #Encounter to Vaccinate Patient  Son states they are amendable to all recommended vaccines.  COVID19: No primary series. Would benefit from COVID19 2219-3406 dose  Influenza: Would benefit from dose next season  Pneumococcal: Would benefit from PCV20  HAV: Immune, no additional vaccines indicated  HBV:Immune, no additional vaccines indicated  MMR: Check MMR IgG   Varicella: Check Varicella IgG   Shingles: Would benefit from Shingrix  Tdap: Would benefit from Tdap    #Pre Transplant Evaluation   HIV-1/2 Combo Result: Nonreactive  EBVPCR Log: NotDetec Mng08WP/mL   Hepatitis A IgG Ab Result: Reactive   Hepatitis B Core Antibody, Total: Nonreactive  Hepatitis B Surface Antibody: Reactive   Hepatitis B DNA PCR Log: Not Detected  Hepatitis B Surface Antigen: Nonreactive  Hepatitis C Virus Interpretation: Nonreactive  COVID-19 De Domain Antibody: Positive  Treponema Pallidum Antibody Interpretation: Negative  HSV 1 IgG  Positive/HSV 2 IgG Negative  EBV Serology Positive  CMV IgG Positive  VZV IgG Positive  Measles Positive, Mumps Positive and Rubella IgG Positive  Toxoplasma IgG Positive  --Follow up on QuantiFeron Gold  --Follow up on Strongyloides Ab  --Follow up on Schistosoma IgG

## 2024-08-15 NOTE — PROGRESS NOTE ADULT - ASSESSMENT
68 year old male with  AUD complicated by cirrhosis with Hepatic encephalopathy admitted to [facility] with back pain and jaundice on 7/8/24. Pt had dark / maroon colored stools   EGD that showed esophageal varices and duodenal ulcer with visible vessel. He got transfusions for low Hb and last transfusion was on 8/8. 1st session of IHD was on 7/9/24.       FANY VS FANY on CKD: was started on HD since  7/9/24  Remains oliguric , on CRRT since 8/13/24 due to low BP  been dialysis dependent for 5 weeks now. will meet YASMINE diego next week

## 2024-08-15 NOTE — PROGRESS NOTE ADULT - SUBJECTIVE AND OBJECTIVE BOX
Transplant Surgery - Multidisciplinary Progress Note  --------------------------------------------------------------    Present: Patient seen and examined with Surgeon Dr Dagher, Hepatologist Dr Garcia, Surgical Fellow Dr Contreras, MARTIR Francis and bedside RN during AM rounds. Disciplines not in attendance will be notified of plan    HPI: 68y with obesity and risky alcohol consumption (with decades' history of daily consumption of homemade alcohol), admitted to [facility] 1 month ago due to recent onset of jaundice and with a prolonged hospital and ICU course there due to newly diagnosed decompensated cirrhosis with acute on chronic liver failure (ACLF) with shock (resolved, but still on midodrine); FANY (on intermittent HD since 7/9); recurrent maroon stools and acute on chronic anemia necessitating intermittent PRBC transfusions (last on 8/8) and hypofibrinoginemia, with EGD (7/12) with small non-bleeding EV and a duodenal ulcer with a visible vessel; and waxing and waning hepatic encephalopathy. He was transferred to [facility] on 8/12/24 for evaluation for combined liver/kidney transplants (SLK).       Interval Events:  - s/p LVP 5L removed   - currently on levo and vaso   - On CRRT net even     Potential Discharge date: TBD    Education:  Medications    Plan of care:  See Below    TX DATA HERE     Review of systems  All other systems were reviewed and are negative, except as noted.      PHYSICAL EXAM:  Constitutional: Well developed / well nourished  Eyes:  PERRLA  ENMT: nc/at, no thrush  Neck: supple,   Respiratory: CTA B/L  Cardiovascular: RRR  Gastrointestinal: Soft abdomen, ND,   Genitourinary: anuric   Extremities: SCD's in place and working bilaterally  Vascular: Palpable dp pulses bilaterally.   Neurological: A&O x3  Skin: no rashes, ulcerations, lesions  Musculoskeletal: Moving all extremities  Psychiatric: Responsive     Transplant Surgery - Multidisciplinary Progress Note  --------------------------------------------------------------    Present: Patient seen and examined with Surgeon Dr Dagher, Hepatologist Dr Garcia, Surgical Fellow Dr Contreras, MARTIR Francis and bedside RN during AM rounds. Disciplines not in attendance will be notified of plan    HPI: 68y with obesity and risky alcohol consumption (with decades' history of daily consumption of homemade alcohol), admitted to [facility] 1 month ago due to recent onset of jaundice and with a prolonged hospital and ICU course there due to newly diagnosed decompensated cirrhosis with acute on chronic liver failure (ACLF) with shock (resolved, but still on midodrine); FANY (on intermittent HD since 7/9); recurrent maroon stools and acute on chronic anemia necessitating intermittent PRBC transfusions (last on 8/8) and hypofibrinoginemia, with EGD (7/12) with small non-bleeding EV and a duodenal ulcer with a visible vessel; and waxing and waning hepatic encephalopathy. He was transferred to [facility] on 8/12/24 for evaluation for combined liver/kidney transplants (SLK).       Interval Events:  - s/p LVP 5L removed   - currently on levo and vaso   - Metolazone x 2 doses, u/o 4L  - On CRRT net even     Potential Discharge date: TBD  Education:  Medications  Plan of care:  See Below      MEDICATIONS  (STANDING):  chlorhexidine 2% Cloths 1 Application(s) Topical <User Schedule>  CRRT Treatment    <Continuous>  fluconAZOLE IVPB 200 milliGRAM(s) IV Intermittent every 24 hours  fluconAZOLE IVPB      folic acid 1 milliGRAM(s) Oral daily  lidocaine   4% Patch 1 Patch Transdermal daily  melatonin 5 milliGRAM(s) Oral at bedtime  midodrine. 20 milliGRAM(s) Oral every 8 hours  multivitamin 1 Tablet(s) Oral daily  norepinephrine Infusion 0.07 MICROgram(s)/kG/Min (15 mL/Hr) IV Continuous <Continuous>  pantoprazole  Injectable 40 milliGRAM(s) IV Push two times a day  Phoxillum Filtration BK 4 / 2.5 5000 milliLiter(s) (1700 mL/Hr) CRRT <Continuous>  Phoxillum Filtration BK 4 / 2.5 5000 milliLiter(s) (200 mL/Hr) CRRT <Continuous>  piperacillin/tazobactam IVPB.. 3.375 Gram(s) IV Intermittent every 12 hours  polyethylene glycol 3350 17 Gram(s) Oral <User Schedule>  PrismaSOL Filtration BGK 4 / 2.5 5000 milliLiter(s) (1500 mL/Hr) CRRT <Continuous>  rifAXIMin 550 milliGRAM(s) Oral two times a day  vasopressin Infusion 0.03 Unit(s)/Min (4.5 mL/Hr) IV Continuous <Continuous>    PAST MEDICAL & SURGICAL HISTORY:  Alcohol abuse    Vital Signs Last 24 Hrs  T(C): 36.4 (15 Aug 2024 11:00), Max: 36.7 (15 Aug 2024 07:00)  T(F): 97.6 (15 Aug 2024 11:00), Max: 98 (15 Aug 2024 07:00)  HR: 63 (15 Aug 2024 14:15) (45 - 81)  BP: 97/53 (14 Aug 2024 19:00) (97/53 - 121/59)  BP(mean): 72 (14 Aug 2024 19:00) (72 - 85)  RR: 19 (15 Aug 2024 14:15) (9 - 49)  SpO2: 99% (15 Aug 2024 14:15) (86% - 100%)    Parameters below as of 15 Aug 2024 11:00  Patient On (Oxygen Delivery Method): room air    I&O's Summary    14 Aug 2024 07:01  -  15 Aug 2024 07:00  --------------------------------------------------------  IN: 3342.6 mL / OUT: 6892 mL / NET: -3549.4 mL    15 Aug 2024 07:01  -  15 Aug 2024 14:46  --------------------------------------------------------  IN: 1114.7 mL / OUT: 403 mL / NET: 711.7 mL                        7.8    12.59 )-----------( 74       ( 15 Aug 2024 12:25 )             22.6     08-15    134<L>  |  98  |  14  ----------------------------<  159<H>  4.0   |  22  |  2.43<H>    Ca    8.5      15 Aug 2024 12:25  Phos  3.0     08-15  Mg     2.3     08-15    TPro  5.9<L>  /  Alb  3.7  /  TBili  16.4<H>  /  DBili  x   /  AST  43<H>  /  ALT  18  /  AlkPhos  114  08-15    Culture - Body Fluid with Gram Stain (collected 08-14-24 @ 18:16)  Source: Peritoneal Peritoneal Fluid  Gram Stain (08-15-24 @ 03:04):    polymorphonuclear leukocytes seen    No organisms seen    by cytocentrifuge    Culture - Blood (collected 08-14-24 @ 00:05)  Source: .Blood Blood-Peripheral  Preliminary Report (08-15-24 @ 04:01):    No growth at 24 hours    Culture - Blood (collected 08-14-24 @ 00:05)  Source: .Blood Blood-Peripheral  Preliminary Report (08-15-24 @ 04:01):    No growth at 24 hours    Culture - Blood (collected 08-12-24 @ 17:37)  Source: .Blood Blood  Preliminary Report (08-14-24 @ 22:01):    No growth at 48 Hours    Culture - Blood (collected 08-12-24 @ 17:36)  Source: .Blood Blood  Gram Stain (08-13-24 @ 16:15):    Growth in aerobic bottle: Gram Positive Cocci in Clusters  Final Report (08-14-24 @ 10:47):    Growth in aerobic bottle: Staphylococcus epidermidis Isolation of    Coagulase negative Staphylococcus from single blood culture sets may    represent    contamination. Contact the Microbiology Department at [phone] if    susceptibility testing is    clinically indicated.    Direct identification is available within approximately 3-5    hours either by Blood Panel Multiplexed PCR or Direct    MALDI-TOF. Details: [email or web address]  Organism: Blood Culture PCR (08-14-24 @ 10:47)  Organism: Blood Culture PCR (08-14-24 @ 10:47)    Review of systems  All other systems were reviewed and are negative, except as noted.      PHYSICAL EXAM:  Constitutional: Well developed / well nourished  Eyes:  PERRLA  ENMT: nc/at, no thrush  Neck: supple,   Respiratory: CTA B/L  Cardiovascular: RRR  Gastrointestinal: Soft abdomen, ND,   Genitourinary: anuric   Extremities: SCD's in place and working bilaterally  Vascular: Palpable dp pulses bilaterally.   Neurological: A&O x3  Skin: no rashes, ulcerations, lesions  Musculoskeletal: Moving all extremities  Psychiatric: Responsive

## 2024-08-15 NOTE — PROGRESS NOTE ADULT - SUBJECTIVE AND OBJECTIVE BOX
[facility] DIVISION OF KIDNEY DISEASES AND HYPERTENSION -- FOLLOW UP NOTE  --------------------------------------------------------------------------------  Chief Complaint:    24 hour events/subjective:  Patient was seen and examined at bedside  On CRRT     PAST HISTORY  --------------------------------------------------------------------------------  No significant changes to PMH, PSH, FHx, SHx, unless otherwise noted    ALLERGIES & MEDICATIONS  --------------------------------------------------------------------------------  Allergies    No Known Allergies    Intolerances      Standing Inpatient Medications  albumin human  5% IVPB 250 milliLiter(s) IV Intermittent every 1 hour  chlorhexidine 2% Cloths 1 Application(s) Topical <User Schedule>  CRRT Treatment    <Continuous>  fluconAZOLE IVPB      fluconAZOLE IVPB 200 milliGRAM(s) IV Intermittent every 24 hours  folic acid 1 milliGRAM(s) Oral daily  lidocaine   4% Patch 1 Patch Transdermal daily  melatonin 5 milliGRAM(s) Oral at bedtime  midodrine. 20 milliGRAM(s) Oral every 8 hours  multivitamin 1 Tablet(s) Oral daily  norepinephrine Infusion 0.07 MICROgram(s)/kG/Min IV Continuous <Continuous>  pantoprazole  Injectable 40 milliGRAM(s) IV Push two times a day  Phoxillum Filtration BK 4 / 2.5 5000 milliLiter(s) CRRT <Continuous>  piperacillin/tazobactam IVPB.. 3.375 Gram(s) IV Intermittent every 12 hours  polyethylene glycol 3350 17 Gram(s) Oral <User Schedule>  PrismaSATE Dialysate BGK 4 / 2.5 5000 milliLiter(s) CRRT <Continuous>  PrismaSOL Filtration BGK 4 / 2.5 5000 milliLiter(s) CRRT <Continuous>  rifAXIMin 550 milliGRAM(s) Oral two times a day  thiamine IVPB 500 milliGRAM(s) IV Intermittent daily  vasopressin Infusion 0.03 Unit(s)/Min IV Continuous <Continuous>    PRN Inpatient Medications      REVIEW OF SYSTEMS  --------------------------------------------------------------------------------  Gen: No fatigue, fevers/chills . +weakness  Skin: No rashes  Head/Eyes/Ears/Mouth: No headache;No sore throat  Respiratory: No dyspnea, cough,   CV: No chest pain, PND, orthopnea  GI: No abdominal pain, diarrhea, constipation, nausea, vomiting  : No increased frequency, dysuria, hematuria, nocturia  Neuro: No dizziness/lightheadedness, weakness, seizures, numbness, tingling    All other systems were reviewed and are negative, except as noted.    VITALS/PHYSICAL EXAM  --------------------------------------------------------------------------------  T(C): 36.7 (08-15-24 @ 07:00), Max: 36.7 (08-15-24 @ 07:00)  HR: 56 (08-15-24 @ 09:15) (45 - 86)  BP: 97/53 (08-14-24 @ 19:00) (91/51 - 121/59)  RR: 36 (08-15-24 @ 09:15) (9 - 42)  SpO2: 97% (08-15-24 @ 09:15) (86% - 100%)  Wt(kg): --        08-14-24 @ 07:01  -  08-15-24 @ 07:00  --------------------------------------------------------  IN: 8342.6 mL / OUT: 1892 mL / NET: 6450.6 mL    08-15-24 @ 07:01  -  08-15-24 @ 11:06  --------------------------------------------------------  IN: 406 mL / OUT: 124 mL / NET: 282 mL      Physical Exam:  General: Alert and Awake, NAD,jaundice  HEENT: PERRL, EOMI, icteric sclera  Neck: Supple  Cardiac: RRR, No M/R/G  Resp: CTAB, No Wh/Rh/Ra  Abdomen: NBS, NT/ND, No HSM, No rigidity or guarding  MSK: ++ LE edema. No Calf tenderness  : + Elizabeth  Skin: No rashes or lesions. Skin is warm and dry to the touch.   Neuro: Alert and Awake. CN 2-12 Grossly intact. Moves all four extremities spontaneously.    LABS/STUDIES  --------------------------------------------------------------------------------              7.8    11.79 >-----------<  69       [08-15-24 @ 06:00]              23.0     134  |  98  |  16  ----------------------------<  140      [08-15-24 @ 06:00]  4.1   |  21  |  2.50        Ca     8.5     [08-15-24 @ 06:00]      Mg     2.3     [08-15-24 @ 06:00]      Phos  2.5     [08-15-24 @ 06:00]    TPro  5.8  /  Alb  3.6  /  TBili  15.2  /  DBili  x   /  AST  41  /  ALT  16  /  AlkPhos  107  [08-15-24 @ 06:00]    PT/INR: PT 28.5 , INR 2.80       [08-15-24 @ 06:00]  PTT: 76.4       [08-15-24 @ 06:00]      Creatinine Trend:  SCr 2.50 [08-15 @ 06:00]  SCr 2.94 [08-15 @ 00:27]  SCr 3.35 [08-14 @ 18:39]  SCr 3.88 [08-14 @ 12:21]  SCr 4.69 [08-14 @ 05:52]            Urinalysis - [08-15-24 @ 06:00]      Color  / Appearance  / SG  / pH       Gluc 140 / Ketone   / Bili  / Urobili        Blood  / Protein  / Leuk Est  / Nitrite       RBC  / WBC  / Hyaline  / Gran  / Sq Epi  / Non Sq Epi  / Bacteria       Iron 52, TIBC 78, %sat 66      [08-12-24 @ 21:13]  Ferritin 788      [08-12-24 @ 21:13]  TSH 4.09      [08-12-24 @ 21:13]  Lipid: chol 49, TG 70, HDL 14, LDL --      [08-12-24 @ 21:13]    HBsAb Reactive      [08-12-24 @ 21:16]  HBsAg Nonreact      [08-12-24 @ 21:13]  HBcAb Nonreact      [08-12-24 @ 21:16]  HCV 0.08, Nonreact      [08-12-24 @ 14:45]  HIV Nonreact      [08-12-24 @ 21:13]    CHETNA: titer Negative, pattern --      [08-12-24 @ 21:15]  Syphilis Screen (Treponema Pallidum Ab) Negative      [08-12-24 @ 21:15]

## 2024-08-15 NOTE — PROGRESS NOTE ADULT - ASSESSMENT
69 yo M with obesity and risky alcohol consumption (with decades' history of daily consumption of homemade alcohol), admitted to [facility] 1 month ago due to recent onset of jaundice and with a prolonged hospital and ICU course there due to newly diagnosed decompensated cirrhosis with acute on chronic liver failure. Transferred to [facility] on 8/12/24 for evaluation for combined liver/kidney transplants (SLK). Given concern that he may require pressor support during HD as well as ACLF with very high MELD 3.0 score, recommend transfer to SICU for a higher level of care and hemodynamic monitoring.     # Distributive shock    - Likely from septic shock, hypovolemia and acute on chronic liver failure.   - Infectious work up has been negative thus far.   - EBV early antigen positive, and elevated CMV PCR. Test to be repeated by 8/19 as per transplant ID.   - On prophylactic Zosyn and Fluconazole with Transplant ID following.     - Requiring pressor support since started on CRRT, and expecting pressor requirements to increase given the goal is to keep patient on net negative volume to assist with fluid overload status.      # Anuric FANY on CKD    - On intermittent HD since 7/9   - Started on net even CRRT (8/13- ) with Transplant nephrology following.    - Pressor support as needed to keep MAP >65     # Active UGIB   - Recurrent maroon stools and acute on chronic anemia necessitating intermittent PRBC transfusions, and management of hypofibrinogenemia   - EGD (7/12) with small non-bleeding EV and a duodenal ulcer with a visible vessel.   - Hb has remained stable. Plan to keep monitoring H/H and transfuse as needed to keep Hb >7.     # newly diagnosed decompensated cirrhosis with acute on chronic liver failure (ACLF)   - Waxing and waning hepatic encephalopathy managed with Rifaximin and Miralax. Lactulose discontinued due distended and tympanic abdomen. Bowel movements at goal.   - Large volume ascites and anasarca being managed with CRRT. LVP (8/14) negative for SBP.   - Contrast CT (8/13) with patent portohepatic vessels and no evidence of HCC.     - Will need daily PT and OT as he was last ambulatory prior to his admission but has been bedbound for the past 36 days.     # SLK transplan evaluation   - ABO O. MELD 3.0 score of 41   - OLT evaluation started on 8/12 and will meet SLK criteria in Aug 20th after 6 weeks of being HD-dependent.               [x] Psychosocial            [x] Social work. Patient with good family support and plan post-transplantation.              [x] Transplant ID: CMV PCR: [id number] IU/mL (8/12). Repeat PCR next week.             [ ] Transplant cardiology: Cardiomegaly seen on recent CT. TTE grossly unremarkable. [facility] scheduled for 8/15.              [ ] Sigmoid-rectal wall thickening on recent CT. Planned for Sigmoidoscopy on Thursday or Friday to rule out malignancy.              [ ] Prostate             [ ] Dental, confirm insurance             [x] PT/Frailty: Has been bedbound since mid-July while admitted to ICU. Before that time patient lived alone and was self sufficient. PT rec active inpatient rehab.             [ ] PET level             [ ] Financial clearance    # Newly found liver and spleen hypodensity suggestive of small infarctions   - IV contrast CT abdomen pelvis (8/13) showing left hepatic lobe wedge shaped hypodensity (small infarct vs hemangioma) and another wedge shaped hypodensity in the spleen also suggestive of small infarct.   - Small infarcts could be secondary to hypovolemia/shock or possible embolization. Patient would benefit from hypercoagulable work up.       Note incomplete until finalized by attending signature/attestation.    Myra Maloney   Transplant Hepatology Fellow

## 2024-08-15 NOTE — PROGRESS NOTE ADULT - SUBJECTIVE AND OBJECTIVE BOX
Follow Up:      Interval History:    REVIEW OF SYSTEMS  [  ] ROS unobtainable because:    [  ] All other systems negative except as noted below    Constitutional:  [ ] fever [ ] chills  [ ] weight loss  [ ] weakness  Skin:  [ ] rash [ ] phlebitis	  Eyes: [ ] icterus [ ] pain  [ ] discharge	  ENMT: [ ] sore throat  [ ] thrush [ ] ulcers [ ] exudates  Respiratory: [ ] dyspnea [ ] hemoptysis [ ] cough [ ] sputum	  Cardiovascular:  [ ] chest pain [ ] palpitations [ ] edema	  Gastrointestinal:  [ ] nausea [ ] vomiting [ ] diarrhea [ ] constipation [ ] pain	  Genitourinary:  [ ] dysuria [ ] frequency [ ] hematuria [ ] discharge [ ] flank pain  [ ] incontinence  Musculoskeletal:  [ ] myalgias [ ] arthralgias [ ] arthritis  [ ] back pain  Neurological:  [ ] headache [ ] seizures  [ ] confusion/altered mental status    Allergies  No Known Allergies        ANTIMICROBIALS:  fluconAZOLE IVPB 200 every 24 hours  fluconAZOLE IVPB    piperacillin/tazobactam IVPB.. 3.375 every 12 hours  rifAXIMin 550 two times a day      OTHER MEDS:  MEDICATIONS  (STANDING):  melatonin 5 at bedtime  midodrine. 20 every 8 hours  norepinephrine Infusion 0.07 <Continuous>  pantoprazole  Injectable 40 two times a day  polyethylene glycol 3350 17 <User Schedule>  vasopressin Infusion 0.03 <Continuous>      Vital Signs Last 24 Hrs  T(C): 36.7 (15 Aug 2024 07:00), Max: 36.7 (15 Aug 2024 07:00)  T(F): 98 (15 Aug 2024 07:00), Max: 98 (15 Aug 2024 07:00)  HR: 56 (15 Aug 2024 09:15) (45 - 86)  BP: 97/53 (14 Aug 2024 19:00) (91/51 - 121/59)  BP(mean): 72 (14 Aug 2024 19:00) (70 - 85)  RR: 36 (15 Aug 2024 09:15) (9 - 42)  SpO2: 97% (15 Aug 2024 09:15) (86% - 100%)    Parameters below as of 15 Aug 2024 07:00  Patient On (Oxygen Delivery Method): room air        PHYSICAL EXAMINATION:  General: Alert and Awake, NAD  HEENT: PERRL, EOMI  Neck: Supple  Cardiac: RRR, No M/R/G  Resp: CTAB, No Wh/Rh/Ra  Abdomen: NBS, NT/ND, No HSM, No rigidity or guarding  MSK: No LE edema. No Calf tenderness  : No trejo  Skin: No rashes or lesions. Skin is warm and dry to the touch.   Neuro: Alert and Awake. CN 2-12 Grossly intact. Moves all four extremities spontaneously.  Psych: Calm, Pleasant, Cooperative                          7.8    11.79 )-----------( 69       ( 15 Aug 2024 06:00 )             23.0       08-15    134<L>  |  98  |  16  ----------------------------<  140<H>  4.1   |  21<L>  |  2.50<H>    Ca    8.5      15 Aug 2024 06:00  Phos  2.5     08-15  Mg     2.3     08-15    TPro  5.8<L>  /  Alb  3.6  /  TBili  15.2<H>  /  DBili  x   /  AST  41<H>  /  ALT  16  /  AlkPhos  107  08-15      Urinalysis Basic - ( 15 Aug 2024 06:00 )    Color: x / Appearance: x / SG: x / pH: x  Gluc: 140 mg/dL / Ketone: x  / Bili: x / Urobili: x   Blood: x / Protein: x / Nitrite: x   Leuk Esterase: x / RBC: x / WBC x   Sq Epi: x / Non Sq Epi: x / Bacteria: x        MICROBIOLOGY:  v  Peritoneal Peritoneal Fluid  08-14-24 --  --    polymorphonuclear leukocytes seen  No organisms seen  by cytocentrifuge      .Blood Blood-Peripheral  08-14-24   No growth at 24 hours  --  --      .Blood Blood  08-12-24   No growth at 48 Hours  --  --      .Blood Blood  08-12-24   Growth in aerobic bottle: Staphylococcus epidermidis Isolation of  Coagulase negative Staphylococcus from single blood culture sets may  represent  contamination. Contact the Microbiology Department at [phone] if  susceptibility testing is  clinically indicated.  Direct identification is available within approximately 3-5  hours either by Blood Panel Multiplexed PCR or Direct  MALDI-TOF. Details: [email or web address]  --  Blood Culture PCR        Toxoplasma IgG Screen: 44.00 IU/mL (08-12-24 @ 21:15)  CMV IgG Antibody: >10.00 U/mL (08-12-24 @ 21:15)    CMVPCR Log: 3.69 Tjn76NB/mL (08-12 @ 21:13)        RADIOLOGY:    <The imaging below has been reviewed and visualized by me independently. Findings as detailed in report below> Follow Up: Leukocytosis    Interval History:  S/P LVP 8/14 5 Liters drained, cell count not suggestive of SBP.   Afebrile, waxing/ waning leukocytosis      REVIEW OF SYSTEMS  [  ] ROS unobtainable because:    [ x ] All other systems negative except as noted below    Constitutional:  [ ] fever [ ] chills  [ ] weight loss  [ x] weakness  Skin:  [ ] rash [ ] phlebitis	  Eyes: [ ] icterus [ ] pain  [ ] discharge	  ENMT: [ ] sore throat  [ ] thrush [ ] ulcers [ ] exudates  Respiratory: [ ] dyspnea [ ] hemoptysis [ ] cough [ ] sputum	  Cardiovascular:  [ ] chest pain [ ] palpitations [ ] edema	  Gastrointestinal:  [ ] nausea [ ] vomiting [ ] diarrhea [ ] constipation [ ] pain	  Genitourinary:  [ ] dysuria [ ] frequency [ ] hematuria [ ] discharge [ ] flank pain  [ ] incontinence  Musculoskeletal:  [ ] myalgias [ ] arthralgias [ ] arthritis  [ ] back pain  Neurological:  [ ] headache [ ] seizures  [x ] confusion/altered mental status    Allergies  No Known Allergies        ANTIMICROBIALS:  fluconAZOLE IVPB 200 every 24 hours  fluconAZOLE IVPB    piperacillin/tazobactam IVPB.. 3.375 every 12 hours  rifAXIMin 550 two times a day      OTHER MEDS:  MEDICATIONS  (STANDING):  melatonin 5 at bedtime  midodrine. 20 every 8 hours  norepinephrine Infusion 0.07 <Continuous>  pantoprazole  Injectable 40 two times a day  polyethylene glycol 3350 17 <User Schedule>  vasopressin Infusion 0.03 <Continuous>      Vital Signs Last 24 Hrs  T(C): 36.7 (15 Aug 2024 07:00), Max: 36.7 (15 Aug 2024 07:00)  T(F): 98 (15 Aug 2024 07:00), Max: 98 (15 Aug 2024 07:00)  HR: 56 (15 Aug 2024 09:15) (45 - 86)  BP: 97/53 (14 Aug 2024 19:00) (91/51 - 121/59)  BP(mean): 72 (14 Aug 2024 19:00) (70 - 85)  RR: 36 (15 Aug 2024 09:15) (9 - 42)  SpO2: 97% (15 Aug 2024 09:15) (86% - 100%)    Parameters below as of 15 Aug 2024 07:00  Patient On (Oxygen Delivery Method): room air        PHYSICAL EXAMINATION:  General: Alert and Awake, NAD,jaundice  HEENT: PERRL, EOMI, icteric sclera  Neck: Supple  Cardiac: RRR, No M/R/G  Resp: CTAB, No Wh/Rh/Ra  Abdomen: NBS, NT/ND, No HSM, No rigidity or guarding  MSK: ++ LE edema. No Calf tenderness  : + Elizabeth  Skin: No rashes or lesions. Skin is warm and dry to the touch.   Neuro: Alert and Awake. CN 2-12 Grossly intact. Moves all four extremities spontaneously.  Psych: Calm, Pleasant, Cooperative                            7.8    11.79 )-----------( 69       ( 15 Aug 2024 06:00 )             23.0       08-15    134<L>  |  98  |  16  ----------------------------<  140<H>  4.1   |  21<L>  |  2.50<H>    Ca    8.5      15 Aug 2024 06:00  Phos  2.5     08-15  Mg     2.3     08-15    TPro  5.8<L>  /  Alb  3.6  /  TBili  15.2<H>  /  DBili  x   /  AST  41<H>  /  ALT  16  /  AlkPhos  107  08-15      Urinalysis Basic - ( 15 Aug 2024 06:00 )    Color: x / Appearance: x / SG: x / pH: x  Gluc: 140 mg/dL / Ketone: x  / Bili: x / Urobili: x   Blood: x / Protein: x / Nitrite: x   Leuk Esterase: x / RBC: x / WBC x   Sq Epi: x / Non Sq Epi: x / Bacteria: x        MICROBIOLOGY:  v  Peritoneal Peritoneal Fluid  08-14-24 --  --    polymorphonuclear leukocytes seen  No organisms seen  by cytocentrifuge      .Blood Blood-Peripheral  08-14-24   No growth at 24 hours  --  --      .Blood Blood  08-12-24   No growth at 48 Hours  --  --      .Blood Blood  08-12-24   Growth in aerobic bottle: Staphylococcus epidermidis Isolation of  Coagulase negative Staphylococcus from single blood culture sets may  represent  contamination. Contact the Microbiology Department at [phone] if  susceptibility testing is  clinically indicated.  Direct identification is available within approximately 3-5  hours either by Blood Panel Multiplexed PCR or Direct  MALDI-TOF. Details: [email or web address]  --  Blood Culture PCR        Toxoplasma IgG Screen: 44.00 IU/mL (08-12-24 @ 21:15)  CMV IgG Antibody: >10.00 U/mL (08-12-24 @ 21:15)    CMVPCR Log: 3.69 Tao75IG/mL (08-12 @ 21:13)        RADIOLOGY:    <The imaging below has been reviewed and visualized by me independently. Findings as detailed in report below>      < from: CT Chest w/ IV Cont (08.13.24 @ 14:13) >  FINDINGS:  Streak artifact related to patient's upper extremities at their sidesmay   limit evaluation.    CHEST:  LUNGS AND LARGE AIRWAYS: Patent central airways. Patchy groundglass   opacities in the bilateral upper lobes. Segmental atelectasis in the   right lower lobe and additional bilateral subsegmental atelectasis.  PLEURA: No pleural effusion.  VESSELS: Right upper extremity approach catheter with tip in the right   atrium. Right IJ catheter with tip in the SVC. Atherosclerotic changes.   Coronary artery calcifications.  HEART: Cardiomegaly. No pericardial effusion.  MEDIASTINUM AND KALIN: No lymphadenopathy.  CHEST WALL AND LOWER NECK: Within normal limits.    ABDOMEN AND PELVIS:  LIVER: Cirrhosis. Hypodense lesion in the periphery of the left lobe with   somewhat wedge-shaped morphology measuring 3.3 cm (series 7 image 81).   Question faint peripheral nodular enhancement (series 7 image 78).   Subcentimeter hypodense focus in the right lobe that is too small to   characterize. Punctate calcification in the right lobe.  BILE DUCTS: Normal caliber.  GALLBLADDER: Intraluminal hyperdensity suggestive of stones or sludge.  SPLEEN: Splenomegaly. Wedge-shaped region of hypoattenuation in the   posterior aspect of the spleen, which may reflect infarct (series 9 image   45).  PANCREAS: Within normal limits.  ADRENALS: Within normal limits.  KIDNEYS/URETERS: No hydronephrosis. Subcentimeter hypodense foci in the   left kidney that are too small to characterize.    BLADDER: Collapsed around a Elizabeth catheter.  REPRODUCTIVE ORGANS: The prostate is within normal limits.    BOWEL: Linear hyperdensity in the periampullary duodenum, which may   reflect an endoscopically placed clip; correlate with the patient's   history. Focal eccentric wall thickening in the low rectum, which may be   due to a mural fold (series 13 image 96, series 7 image 207). No bowel   obstruction.  PERITONEUM/RETROPERITONEUM: Large volume ascites.  VESSELS: Atherosclerotic changes. There is likely a replaced or accessory   left hepatic artery arising from the left gastric artery. Patent hepatic   and portal veins. Recanalized paraumbilical vein. Perisplenic and   perigastric varices.  LYMPH NODES: No lymphadenopathy.  ABDOMINAL WALL: Fat-containing left inguinal hernia. Small umbilical   hernia containing ascites fluid and varices.Subcutaneous edema.  BONES: Degenerative changes.    IMPRESSION:  *  Patchy groundglass opacities in the bilateral upper lobes.  *  Cirrhosis with evidence of portal hypertension.  *  Hypodense lesion in the periphery of the left hepatic lobe of   uncertain etiology. Somewhat wedge-shaped morphology raises the   possibility for a small infarct. Question subtle peripheral nodular   enhancement, and a hemangioma is also considered. Contrast enhanced   abdominal MRI can be performed for further characterization and to assess   for other possibilities.  *  A wedge-shaped region of hypoattenuation in the spleen may reflect a   small infarct.  *  Focal eccentric wall thickening in the low rectum, possibly due to a   mural fold. Consider colonoscopy.  *  Large volume ascites.        --- End of Report ---            < end of copied text >   Follow Up: Leukocytosis    Interval History:  S/P LVP 8/14 5 Liters drained, cell count not suggestive of SBP.   Afebrile, waxing/ waning leukocytosis      REVIEW OF SYSTEMS  [  ] ROS unobtainable because:    [ x ] All other systems negative except as noted below    Constitutional:  [ ] fever [ ] chills  [ ] weight loss  [ x] weakness  Skin:  [ ] rash [ ] phlebitis	  Eyes: [ ] icterus [ ] pain  [ ] discharge	  ENMT: [ ] sore throat  [ ] thrush [ ] ulcers [ ] exudates  Respiratory: [ ] dyspnea [ ] hemoptysis [ ] cough [ ] sputum	  Cardiovascular:  [ ] chest pain [ ] palpitations [ ] edema	  Gastrointestinal:  [ ] nausea [ ] vomiting [ ] diarrhea [ ] constipation [ ] pain	  Genitourinary:  [ ] dysuria [ ] frequency [ ] hematuria [ ] discharge [ ] flank pain  [ ] incontinence  Musculoskeletal:  [ ] myalgias [ ] arthralgias [ ] arthritis  [ ] back pain  Neurological:  [ ] headache [ ] seizures  [x ] confusion/altered mental status    Allergies  No Known Allergies        ANTIMICROBIALS:  fluconAZOLE IVPB 200 every 24 hours  fluconAZOLE IVPB    piperacillin/tazobactam IVPB.. 3.375 every 12 hours  rifAXIMin 550 two times a day      OTHER MEDS:  MEDICATIONS  (STANDING):  melatonin 5 at bedtime  midodrine. 20 every 8 hours  norepinephrine Infusion 0.07 <Continuous>  pantoprazole  Injectable 40 two times a day  polyethylene glycol 3350 17 <User Schedule>  vasopressin Infusion 0.03 <Continuous>      Vital Signs Last 24 Hrs  T(C): 36.7 (15 Aug 2024 07:00), Max: 36.7 (15 Aug 2024 07:00)  T(F): 98 (15 Aug 2024 07:00), Max: 98 (15 Aug 2024 07:00)  HR: 56 (15 Aug 2024 09:15) (45 - 86)  BP: 97/53 (14 Aug 2024 19:00) (91/51 - 121/59)  BP(mean): 72 (14 Aug 2024 19:00) (70 - 85)  RR: 36 (15 Aug 2024 09:15) (9 - 42)  SpO2: 97% (15 Aug 2024 09:15) (86% - 100%)    Parameters below as of 15 Aug 2024 07:00  Patient On (Oxygen Delivery Method): room air        PHYSICAL EXAMINATION:  General: Alert and Awake, NAD,jaundice  HEENT: PERRL, EOMI, icteric sclera  Neck: Supple  Cardiac: RRR, No M/R/G  Resp: CTAB, No Wh/Rh/Ra  Abdomen: NBS, NT/ND, No HSM, No rigidity or guarding  MSK: + LE edema. No Calf tenderness  : + Elizabeth  Skin: No rashes or lesions. Skin is warm and dry to the touch.   Neuro: Alert and Awake. CN 2-12 Grossly intact. Moves all four extremities spontaneously.  Psych: Calm, Pleasant, Cooperative                            7.8    11.79 )-----------( 69       ( 15 Aug 2024 06:00 )             23.0       08-15    134<L>  |  98  |  16  ----------------------------<  140<H>  4.1   |  21<L>  |  2.50<H>    Ca    8.5      15 Aug 2024 06:00  Phos  2.5     08-15  Mg     2.3     08-15    TPro  5.8<L>  /  Alb  3.6  /  TBili  15.2<H>  /  DBili  x   /  AST  41<H>  /  ALT  16  /  AlkPhos  107  08-15      Urinalysis Basic - ( 15 Aug 2024 06:00 )    Color: x / Appearance: x / SG: x / pH: x  Gluc: 140 mg/dL / Ketone: x  / Bili: x / Urobili: x   Blood: x / Protein: x / Nitrite: x   Leuk Esterase: x / RBC: x / WBC x   Sq Epi: x / Non Sq Epi: x / Bacteria: x        MICROBIOLOGY:  v  Peritoneal Peritoneal Fluid  08-14-24 --  --    polymorphonuclear leukocytes seen  No organisms seen  by cytocentrifuge      .Blood Blood-Peripheral  08-14-24   No growth at 24 hours  --  --      .Blood Blood  08-12-24   No growth at 48 Hours  --  --      .Blood Blood  08-12-24   Growth in aerobic bottle: Staphylococcus epidermidis Isolation of  Coagulase negative Staphylococcus from single blood culture sets may  represent  contamination. Contact the Microbiology Department at [phone] if  susceptibility testing is  clinically indicated.  Direct identification is available within approximately 3-5  hours either by Blood Panel Multiplexed PCR or Direct  MALDI-TOF. Details: [email or web address]  --  Blood Culture PCR        Toxoplasma IgG Screen: 44.00 IU/mL (08-12-24 @ 21:15)  CMV IgG Antibody: >10.00 U/mL (08-12-24 @ 21:15)    CMVPCR Log: 3.69 Cus11QV/mL (08-12 @ 21:13)        RADIOLOGY:    <The imaging below has been reviewed and visualized by me independently. Findings as detailed in report below>      < from: CT Chest w/ IV Cont (08.13.24 @ 14:13) >  FINDINGS:  Streak artifact related to patient's upper extremities at their sidesmay   limit evaluation.    CHEST:  LUNGS AND LARGE AIRWAYS: Patent central airways. Patchy groundglass   opacities in the bilateral upper lobes. Segmental atelectasis in the   right lower lobe and additional bilateral subsegmental atelectasis.  PLEURA: No pleural effusion.  VESSELS: Right upper extremity approach catheter with tip in the right   atrium. Right IJ catheter with tip in the SVC. Atherosclerotic changes.   Coronary artery calcifications.  HEART: Cardiomegaly. No pericardial effusion.  MEDIASTINUM AND KALIN: No lymphadenopathy.  CHEST WALL AND LOWER NECK: Within normal limits.    ABDOMEN AND PELVIS:  LIVER: Cirrhosis. Hypodense lesion in the periphery of the left lobe with   somewhat wedge-shaped morphology measuring 3.3 cm (series 7 image 81).   Question faint peripheral nodular enhancement (series 7 image 78).   Subcentimeter hypodense focus in the right lobe that is too small to   characterize. Punctate calcification in the right lobe.  BILE DUCTS: Normal caliber.  GALLBLADDER: Intraluminal hyperdensity suggestive of stones or sludge.  SPLEEN: Splenomegaly. Wedge-shaped region of hypoattenuation in the   posterior aspect of the spleen, which may reflect infarct (series 9 image   45).  PANCREAS: Within normal limits.  ADRENALS: Within normal limits.  KIDNEYS/URETERS: No hydronephrosis. Subcentimeter hypodense foci in the   left kidney that are too small to characterize.    BLADDER: Collapsed around a Elizabeth catheter.  REPRODUCTIVE ORGANS: The prostate is within normal limits.    BOWEL: Linear hyperdensity in the periampullary duodenum, which may   reflect an endoscopically placed clip; correlate with the patient's   history. Focal eccentric wall thickening in the low rectum, which may be   due to a mural fold (series 13 image 96, series 7 image 207). No bowel   obstruction.  PERITONEUM/RETROPERITONEUM: Large volume ascites.  VESSELS: Atherosclerotic changes. There is likely a replaced or accessory   left hepatic artery arising from the left gastric artery. Patent hepatic   and portal veins. Recanalized paraumbilical vein. Perisplenic and   perigastric varices.  LYMPH NODES: No lymphadenopathy.  ABDOMINAL WALL: Fat-containing left inguinal hernia. Small umbilical   hernia containing ascites fluid and varices.Subcutaneous edema.  BONES: Degenerative changes.    IMPRESSION:  *  Patchy groundglass opacities in the bilateral upper lobes.  *  Cirrhosis with evidence of portal hypertension.  *  Hypodense lesion in the periphery of the left hepatic lobe of   uncertain etiology. Somewhat wedge-shaped morphology raises the   possibility for a small infarct. Question subtle peripheral nodular   enhancement, and a hemangioma is also considered. Contrast enhanced   abdominal MRI can be performed for further characterization and to assess   for other possibilities.  *  A wedge-shaped region of hypoattenuation in the spleen may reflect a   small infarct.  *  Focal eccentric wall thickening in the low rectum, possibly due to a   mural fold. Consider colonoscopy.  *  Large volume ascites.        --- End of Report ---            < end of copied text >

## 2024-08-15 NOTE — PROGRESS NOTE ADULT - NS ATTEND AMEND GEN_ALL_CORE FT
78 yo m with alcohol abuse otherwise no known chronic medical issues (does not see doctors per sister) s/p 1 month stay at [facility] now transferred to NS for liver transplant eval.     Would send pre-transplant serologies as above    can continue empiric Zosyn and Fluconazole pending prelim cultures    Low level CMV viremia is noted, doubt this is clinically relevant. Would repeat CMV PCR next week.     Positive Blood Culture with MRSE is noted, consistent with contaminant can monitor off of Vancomycin. Would follow repeat BCx    I will continue to follow. Please feel free to contact me with any further questions.    Anoop Delacruz M.D.  [facility] Division of Infectious Disease  8AM-5PM Monday - Friday: Available on [employer] Teams  After Hours and Holidays (or if no response on [employer] Teams): Please contact the Infectious Diseases Office at [phone]    The above assessment and plan were discussed with Noelle, transplant surgery PA

## 2024-08-15 NOTE — PROGRESS NOTE ADULT - ASSESSMENT
ASSESSMENT: 68 year old male in acute liver and renal failure admitted to SICU for hemodynamic monitoring iso recent GI bleeding and potential renal replacement requirements. Under eval for SLK.       PLAN:    Neuro:  - A&O x2-3, waxing and waning  - miralax, rifaximin  - c/w thiamine, MVI, folid acid    Resp:  - Out of bed to chair, ambulate as tolerated, and incentive spirometry to prevent atelectasis  - CT CH 8/13 w/ patchy GGOs to b/l upper lobes    CV:  - Pressors: Levo & vaso  - Will wean vasopressor support w/ goal MAP > 65 mmHg  - midodrine 20 TID  - trend lactate    GI:   - Diet: Regular diet  - Bowel regimen with miralax  - Protonix BID for melanotic stools  - trend LFTs  - 8/14: CT A/P cirrhosis w/ portal HTN, hypodense lesion L hepatic lobe ?small infarct?; hypoattenuation wedge in spleen ?small infarct?; focal eccentric wall thickening of rectum, consider colonoscopy, w/ large volume ascites  - 8/14 para w/ 5L off     Renal:  - Monitor I&Os and lytes, replete as necessary  - under eval for SLK  - No UO to obtain urine tests from; trejo removed   - on CRRT net neg    Heme:  - Monitor CBC and coags  - hold chem VTE prophylaxis  - SCDs for VTE prophylaxis    ID:   - Monitor for clinical evidence of active infection  - Monitor WBC, temperature, and procalcitonin  - Empiric antibiotics with zosyn, fluc  - 8/14: BCx +MRSE, possible permacath source from OSH, unknown date of placement, wife estimates ~3-4 weeks ago; consider removal, repeat BCx    Endo:   - Monitor glucose     Code Status: Full    Disposition: SICU    Tiffanie Benitez PA-C     [id number]

## 2024-08-15 NOTE — PROGRESS NOTE ADULT - CRITICAL CARE ATTENDING COMMENT
Patient evaluated by me on AM rounds.     #Decompensated EtOH Cirrhosis.  #UGIB s/p EGD and clipping at OSH.   #Renal failure.  #Hypotension.     #NEURO: A&Ox4. Episodes of AMS secondary to hepatic encephalopathy.   -Continue Rifaximin and Miralax.     #RESP: Stable on room air.     #CVS: Levo 0.12/Vaso. LA 1.3.  -Continue Midodrine 20mg TID.      #GI: Tolerating regular diet.   -Decompensated EtOH Cirrhosis with MELD >40.   -S/p Para 8/14 with 5L removed.   -Possible sigmoidoscopy today to evaluate rectal wall thickening.     #: Renal failure requiring CRRT. Continue CRRT at net even.   -NET positive 1.4L     #HEME: Hb 7.1 --> 7.8  -1 prbc, 1 ffp, 1 cry.   -INR stable at 4 --> 2.8. Persistent thrombocytopenia to 69.   -Hold chemical AC in the setting of coagulopathy.     #ID: Afebrile, WBC 11.   -Blood cx 8/12: MRSE  -S/p Para 8/14: No evidence of SBP.   -Appreciate transplant ID recs. Continue Fluconazole and Zosyn.     #ENDO: Glucose controlled.     #Lines:   -PICC 8/12.   -PermCath placed at OSH. Patient evaluated by me on AM rounds.     #Decompensated EtOH Cirrhosis.  #UGIB s/p EGD and clipping at OSH.   #Renal failure.  #Hypotension.     #NEURO: A&Ox4. Episodes of AMS secondary to hepatic encephalopathy.   -Continue Rifaximin and Miralax.     #RESP: Stable on room air.     #CVS: Shock state in setting of decompensated cirrhosis. Levo 0.12/Vaso. LA 1.3.  -Continue Midodrine 20mg TID.      #GI: Tolerating regular diet.   -Decompensated EtOH Cirrhosis with MELD >40.   -S/p Para 8/14 with 5L removed.   -Possible sigmoidoscopy today to evaluate incidental rectal wall thickening seen on CT.     #: Renal failure requiring CRRT. NET positive 1.4L    -Continue CRRT net even.     #HEME: Worsening anemia and coagulopathy overnight. S/p 1 prbc, 1 ffp, 1 cryo.   -Hb 7.1 --> 7.8. INR stable at 4 --> 2.8. Persistent thrombocytopenia to 69.   -Hold chemical AC in the setting of coagulopathy.     #ID: Afebrile, WBC 11.   -Blood cx 8/12: MRSE  -S/p Para 8/14: No evidence of SBP.   -Appreciate transplant ID recs. Continue Fluconazole and Zosyn.     #ENDO: Glucose controlled.     #Lines:   -PICC 8/12.   -PermCath placed at OSH. Patient evaluated by me on AM rounds.     #Decompensated EtOH Cirrhosis.  #UGIB s/p EGD and clipping at OSH.   #Renal failure.  #Hypotension.     #NEURO: A&Ox4. Episodes of AMS secondary to hepatic encephalopathy.   -Continue Rifaximin and Miralax.     #RESP: Stable on room air.     #CVS: Shock state in setting of decompensated cirrhosis. Levo 0.12/Vaso. LA 1.3.  -Continue Midodrine 20mg TID.      #GI: Tolerating regular diet.   -Decompensated EtOH Cirrhosis with MELD >40.   -S/p Para 8/14 with 5L removed.   -Possible sigmoidoscopy today to evaluate incidental rectal wall thickening seen on CT.     #: Renal failure requiring CRRT. NET positive 1.4L    -Continue CRRT net even.   -Hypovolemic on POCUS. Bolus 500cc albumin.     #HEME: Worsening anemia and coagulopathy overnight. S/p 1 prbc, 1 ffp, 1 cryo.   -Hb 7.1 --> 7.8. INR stable at 4 --> 2.8. Persistent thrombocytopenia to 69.   -Hold chemical AC in the setting of coagulopathy.     #ID: Afebrile, WBC 11.   -Blood cx 8/12: MRSE  -S/p Para 8/14: No evidence of SBP.   -Appreciate transplant ID recs. Continue Fluconazole and Zosyn.     #ENDO: Glucose controlled.     #Lines:   -PICC 8/12.   -PermCath placed at OSH.

## 2024-08-15 NOTE — PROGRESS NOTE ADULT - ASSESSMENT
68y with obesity and risky alcohol consumption (with decades' history of daily consumption of homemade alcohol), admitted to [facility] 1 month ago due to recent onset of jaundice and with a prolonged hospital and ICU course there due to newly diagnosed decompensated cirrhosis with acute on chronic liver failure (ACLF) with shock (resolved, but still on midodrine); FANY (on intermittent HD since 7/9); recurrent maroon stools and acute on chronic anemia necessitating intermittent PRBC transfusions (last on 8/8) and hypofibrinoginemia, with EGD (7/12) with small non-bleeding EV and a duodenal ulcer with a visible vessel; and waxing and waning hepatic encephalopathy. He was transferred to [facility] on 8/12/24 for evaluation for combined liver/kidney transplants (SLK).      Decompensated ETOH Cirrhosis  -MELD 43O  -open SLK eval, consultants aware  -TTE  -CRRT running net even, Transplant Nephrology following  -PT/OT  -bcx 8/13 w/ MRSE   -LVP as able  -PPI  -Thiamine/MVI/FOLIC ACID  -Miralax/Rifaximin, increase prn if HE  -Zosyn/Fluc empiric, Transplant ID on board  -Transplant Hepatology following  -Rest per SICU     68y with obesity and risky alcohol consumption (with decades' history of daily consumption of homemade alcohol), admitted to [facility] 1 month ago due to recent onset of jaundice and with a prolonged hospital and ICU course there due to newly diagnosed decompensated cirrhosis with acute on chronic liver failure (ACLF) with shock (resolved, but still on midodrine); FANY (on intermittent HD since 7/9); recurrent maroon stools and acute on chronic anemia necessitating intermittent PRBC transfusions (last on 8/8) and hypofibrinoginemia, with EGD (7/12) with small non-bleeding EV and a duodenal ulcer with a visible vessel; and waxing and waning hepatic encephalopathy. He was transferred to [facility] on 8/12/24 for evaluation for combined liver/kidney transplants (SLK).      Decompensated ETOH Cirrhosis  -MELD 41O  - HE: cont rifaximin/miralax  - EV:  EGD (7/12) with small non-bleeding EV and a duodenal ulcer with a visible vessel. -open SLK eval, consultants aware  - FANY/HRS: anuric, On CRRT 8/13, Renal following  - ID: Empiric Zosyn/fluc   - bcx 8/13 w/ MRSE   - PPI  -Thiamine/MVI/FOLIC ACID  - SLK eval   - cont sicu care

## 2024-08-15 NOTE — PROGRESS NOTE ADULT - SUBJECTIVE AND OBJECTIVE BOX
Interval Events:   - Paracentesis done on 8/14 - 5L removed with albumin replacement as per protocol. SAAG 1.8 I Prot 2.3 I 377 Cells I 10k RBC.  - Received 1Cryo, 1PRBC and 1FFP.    - Repeat BCx (8/14) with NGTD. S. epidermitis (8/12) likely a contaminant.     Afebrile last 24h   Anuric with net positive volume 6.4L    Norepinephrine (0.11) and Vasopressin (0.03) while on active diuresis   CRRT (8/13- ) with 1.8L removed in last 24h (130ml/h)   3 reported BM in last 24h   Meld score 41    ROS:   12 point review of systems performed and negative except otherwise noted above.     Hospital Medications:  albumin human  5% IVPB 250 milliLiter(s) IV Intermittent every 1 hour  chlorhexidine 2% Cloths 1 Application(s) Topical <User Schedule>  CRRT Treatment    <Continuous>  fluconAZOLE IVPB 200 milliGRAM(s) IV Intermittent every 24 hours  fluconAZOLE IVPB      folic acid 1 milliGRAM(s) Oral daily  lidocaine   4% Patch 1 Patch Transdermal daily  melatonin 5 milliGRAM(s) Oral at bedtime  midodrine. 20 milliGRAM(s) Oral every 8 hours  multivitamin 1 Tablet(s) Oral daily  norepinephrine Infusion 0.07 MICROgram(s)/kG/Min (15 mL/Hr) IV Continuous <Continuous>  pantoprazole  Injectable 40 milliGRAM(s) IV Push two times a day  Phoxillum Filtration BK 4 / 2.5 5000 milliLiter(s) (200 mL/Hr) CRRT <Continuous>  Phoxillum Filtration BK 4 / 2.5 5000 milliLiter(s) (1700 mL/Hr) CRRT <Continuous>  piperacillin/tazobactam IVPB.. 3.375 Gram(s) IV Intermittent every 12 hours  polyethylene glycol 3350 17 Gram(s) Oral <User Schedule>  PrismaSOL Filtration BGK 4 / 2.5 5000 milliLiter(s) (1500 mL/Hr) CRRT <Continuous>  rifAXIMin 550 milliGRAM(s) Oral two times a day  thiamine IVPB 500 milliGRAM(s) IV Intermittent daily  vasopressin Infusion 0.03 Unit(s)/Min (4.5 mL/Hr) IV Continuous <Continuous>    MAR over past 24 hours:    albumin human  5% IVPB   250 mL/Hr IV Intermittent (08-14-24 @ 22:24)    albumin human  5% IVPB   250 mL/Hr IV Intermittent (08-14-24 @ 20:21)    calcium gluconate IVPB   200 mL/Hr IV Intermittent (08-14-24 @ 20:07)    chlorhexidine 2% Cloths   1 Application(s) Topical (08-15-24 @ 05:30)    fluconAZOLE IVPB   100 mL/Hr IV Intermittent (08-14-24 @ 20:05)    melatonin   5 milliGRAM(s) Oral (08-14-24 @ 21:37)    midodrine.   20 milliGRAM(s) Oral (08-15-24 @ 05:37)   20 milliGRAM(s) Oral (08-14-24 @ 21:37)   20 milliGRAM(s) Oral (08-14-24 @ 13:06)    norepinephrine Infusion   10.7 mL/Hr IV Continuous (08-14-24 @ 17:40)    norepinephrine Infusion   15 mL/Hr IV Continuous (08-14-24 @ 21:51)    pantoprazole  Injectable   40 milliGRAM(s) IV Push (08-15-24 @ 05:39)   40 milliGRAM(s) IV Push (08-14-24 @ 17:40)    Phoxillum Filtration BK 4 / 2.5   200 mL/Hr CRRT (08-15-24 @ 06:32)   200 mL/Hr CRRT (08-15-24 @ 01:28)    piperacillin/tazobactam IVPB..   25 mL/Hr IV Intermittent (08-14-24 @ 23:19)    polyethylene glycol 3350   17 Gram(s) Oral (08-15-24 @ 05:40)   17 Gram(s) Oral (08-14-24 @ 23:19)   17 Gram(s) Oral (08-14-24 @ 17:40)    PrismaSATE Dialysate BGK 4 / 2.5   1700 mL/Hr CRRT (08-15-24 @ 06:31)   1700 mL/Hr CRRT (08-15-24 @ 01:28)    PrismaSOL Filtration BGK 4 / 2.5   1500 mL/Hr CRRT (08-15-24 @ 06:32)   1500 mL/Hr CRRT (08-15-24 @ 01:28)    rifAXIMin   550 milliGRAM(s) Oral (08-15-24 @ 05:37)   550 milliGRAM(s) Oral (08-14-24 @ 17:40)    sodium phosphate 15 milliMole(s)/250 mL IVPB   255 mL/Hr IV Intermittent (08-14-24 @ 21:37)    sodium phosphate 15 milliMole(s)/250 mL IVPB   63.75 mL/Hr IV Intermittent (08-15-24 @ 09:19)    thiamine IVPB   105 mL/Hr IV Intermittent (08-14-24 @ 15:55)    vasopressin Infusion   4.5 mL/Hr IV Continuous (08-14-24 @ 17:40)   4.5 mL/Hr IV Continuous (08-14-24 @ 16:27)    vasopressin Infusion   4.5 mL/Hr IV Continuous (08-14-24 @ 21:51)      PHYSICAL EXAM:   Vital Signs last 24 hours:  T(F): 98 (08-15-24 @ 07:00), Max: 98 (08-15-24 @ 07:00)  HR: 54 (08-15-24 @ 11:00) (45 - 86)  BP: 97/53 (08-14-24 @ 19:00) (91/51 - 121/59)  BP(mean): 72 (08-14-24 @ 19:00) (70 - 85)  ABP: 129/34 (08-15-24 @ 11:00) (99/37 - 143/51)  ABP(mean): 53 (08-15-24 @ 11:00) (44 - 77)  RR: 20 (08-15-24 @ 11:00) (9 - 49)  SpO2: 95% (08-15-24 @ 11:00) (86% - 100%)    I&Os:    08-14-24 @ 07:01  -  08-15-24 @ 07:00  --------------------------------------------------------  IN:    Cryoprecipitate: 75 mL    IV PiggyBack: 1000 mL    IV PiggyBack: 750 mL    Norepinephrine: 143.3 mL    Norepinephrine: 145.2 mL    Oral Fluid: 450 mL    Other (mL): 5000 mL    Phenylephrine: 102.6 mL    Plasma: 300 mL    PRBCs (Packed Red Blood Cells): 300 mL    Vasopressin: 31.5 mL    Vasopressin: 45 mL  Total IN: 8342.6 mL    OUT:    Other (mL): 1892 mL  Total OUT: 1892 mL    Total NET: 6450.6 mL      08-15-24 @ 07:01  -  08-15-24 @ 11:49  --------------------------------------------------------  IN:    IV PiggyBack: 250 mL    Norepinephrine: 47 mL    Oral Fluid: 100 mL    Vasopressin: 18 mL  Total IN: 415 mL    OUT:    Other (mL): 384 mL  Total OUT: 384 mL    Total NET: 31 mL    BMI (kg/m2): 33.2 (08-12-24 @ 16:46)  GENERAL: obese man, in no acute distress.   NEURO: [place] HE grade 1-2. No asterixis.   HEENT: Scleral icterus  CHEST: Bilateral breath sounds, decreased in R base. No respiratory distress.   CARDIAC: Regular rate and rhythm  ABDOMEN: Soft, non-tender, distended. Present bowel sounds. +anasarca  EXTREMITIES: warm, well perfused, BLE pitting edema   SKIN: +Jaundiced, no rash/ecchymoses    DIAGNOSTICS:  WBC      Hg       PLT      Na       K        CO2     BUN      Cr       ALT      AST      TB       ALP  11.79    7.8      69       134      4.1      21       16       2.50     16       41       15.2     107      08-15-24 @ 06:00  11.71    7.1      74       ------   ------   ------   ------   ------   ------   ------   ------   ------   08-15-24 @ 01:48  11.64    7.3      62       131      4.1      21       19       2.94     15       39       14.4     105      08-15-24 @ 00:27  ------   ------   ------   133      4.1      21       22       3.35     17       48       15.3     118      08-14-24 @ 18:39  14.87    8.4      99       134      4.2      22       26       3.88     18       47       15.0     118      08-14-24 @ 12:21    PT             INR            MELDwith  MELDw/o  28.5           2.80           --             --             08-15-24 @ 06:00  42.7           4.24           --             --             08-15-24 @ 00:27  40.6           4.03           --             --             08-14-24 @ 12:21  43.0           4.07           --             --             08-14-24 @ 00:05  44.0           4.17           --             --             08-13-24 @ 11:55   Interval Events:   - Paracentesis done on 8/14 - 5L removed with albumin replacement as per protocol. SAAG 1.8 I Prot 2.3 I 377 Cells I 10k RBC.  - Received 1Cryo, 1PRBC and 1FFP.    - Repeat BCx (8/14) with NGTD. S. epidermitis (8/12) likely a contaminant.     Afebrile last 24h   Anuric with net positive volume 6.4L    Norepinephrine (0.11) and Vasopressin (0.03) while on active diuresis   CRRT (8/13- ) with 1.8L removed in last 24h (130ml/h)   3 reported BM in last 24h   Meld score 41    ROS: Unable to obtain due to AMS.    Hospital Medications:  albumin human  5% IVPB 250 milliLiter(s) IV Intermittent every 1 hour  chlorhexidine 2% Cloths 1 Application(s) Topical <User Schedule>  CRRT Treatment    <Continuous>  fluconAZOLE IVPB 200 milliGRAM(s) IV Intermittent every 24 hours  fluconAZOLE IVPB      folic acid 1 milliGRAM(s) Oral daily  lidocaine   4% Patch 1 Patch Transdermal daily  melatonin 5 milliGRAM(s) Oral at bedtime  midodrine. 20 milliGRAM(s) Oral every 8 hours  multivitamin 1 Tablet(s) Oral daily  norepinephrine Infusion 0.07 MICROgram(s)/kG/Min (15 mL/Hr) IV Continuous <Continuous>  pantoprazole  Injectable 40 milliGRAM(s) IV Push two times a day  Phoxillum Filtration BK 4 / 2.5 5000 milliLiter(s) (200 mL/Hr) CRRT <Continuous>  Phoxillum Filtration BK 4 / 2.5 5000 milliLiter(s) (1700 mL/Hr) CRRT <Continuous>  piperacillin/tazobactam IVPB.. 3.375 Gram(s) IV Intermittent every 12 hours  polyethylene glycol 3350 17 Gram(s) Oral <User Schedule>  PrismaSOL Filtration BGK 4 / 2.5 5000 milliLiter(s) (1500 mL/Hr) CRRT <Continuous>  rifAXIMin 550 milliGRAM(s) Oral two times a day  thiamine IVPB 500 milliGRAM(s) IV Intermittent daily  vasopressin Infusion 0.03 Unit(s)/Min (4.5 mL/Hr) IV Continuous <Continuous>    MAR over past 24 hours:    albumin human  5% IVPB   250 mL/Hr IV Intermittent (08-14-24 @ 22:24)    albumin human  5% IVPB   250 mL/Hr IV Intermittent (08-14-24 @ 20:21)    calcium gluconate IVPB   200 mL/Hr IV Intermittent (08-14-24 @ 20:07)    chlorhexidine 2% Cloths   1 Application(s) Topical (08-15-24 @ 05:30)    fluconAZOLE IVPB   100 mL/Hr IV Intermittent (08-14-24 @ 20:05)    melatonin   5 milliGRAM(s) Oral (08-14-24 @ 21:37)    midodrine.   20 milliGRAM(s) Oral (08-15-24 @ 05:37)   20 milliGRAM(s) Oral (08-14-24 @ 21:37)   20 milliGRAM(s) Oral (08-14-24 @ 13:06)    norepinephrine Infusion   10.7 mL/Hr IV Continuous (08-14-24 @ 17:40)    norepinephrine Infusion   15 mL/Hr IV Continuous (08-14-24 @ 21:51)    pantoprazole  Injectable   40 milliGRAM(s) IV Push (08-15-24 @ 05:39)   40 milliGRAM(s) IV Push (08-14-24 @ 17:40)    Phoxillum Filtration BK 4 / 2.5   200 mL/Hr CRRT (08-15-24 @ 06:32)   200 mL/Hr CRRT (08-15-24 @ 01:28)    piperacillin/tazobactam IVPB..   25 mL/Hr IV Intermittent (08-14-24 @ 23:19)    polyethylene glycol 3350   17 Gram(s) Oral (08-15-24 @ 05:40)   17 Gram(s) Oral (08-14-24 @ 23:19)   17 Gram(s) Oral (08-14-24 @ 17:40)    PrismaSATE Dialysate BGK 4 / 2.5   1700 mL/Hr CRRT (08-15-24 @ 06:31)   1700 mL/Hr CRRT (08-15-24 @ 01:28)    PrismaSOL Filtration BGK 4 / 2.5   1500 mL/Hr CRRT (08-15-24 @ 06:32)   1500 mL/Hr CRRT (08-15-24 @ 01:28)    rifAXIMin   550 milliGRAM(s) Oral (08-15-24 @ 05:37)   550 milliGRAM(s) Oral (08-14-24 @ 17:40)    sodium phosphate 15 milliMole(s)/250 mL IVPB   255 mL/Hr IV Intermittent (08-14-24 @ 21:37)    sodium phosphate 15 milliMole(s)/250 mL IVPB   63.75 mL/Hr IV Intermittent (08-15-24 @ 09:19)    thiamine IVPB   105 mL/Hr IV Intermittent (08-14-24 @ 15:55)    vasopressin Infusion   4.5 mL/Hr IV Continuous (08-14-24 @ 17:40)   4.5 mL/Hr IV Continuous (08-14-24 @ 16:27)    vasopressin Infusion   4.5 mL/Hr IV Continuous (08-14-24 @ 21:51)      PHYSICAL EXAM:   Vital Signs last 24 hours:  T(F): 98 (08-15-24 @ 07:00), Max: 98 (08-15-24 @ 07:00)  HR: 54 (08-15-24 @ 11:00) (45 - 86)  BP: 97/53 (08-14-24 @ 19:00) (91/51 - 121/59)  BP(mean): 72 (08-14-24 @ 19:00) (70 - 85)  ABP: 129/34 (08-15-24 @ 11:00) (99/37 - 143/51)  ABP(mean): 53 (08-15-24 @ 11:00) (44 - 77)  RR: 20 (08-15-24 @ 11:00) (9 - 49)  SpO2: 95% (08-15-24 @ 11:00) (86% - 100%)    I&Os:    08-14-24 @ 07:01  -  08-15-24 @ 07:00  --------------------------------------------------------  IN:    Cryoprecipitate: 75 mL    IV PiggyBack: 1000 mL    IV PiggyBack: 750 mL    Norepinephrine: 143.3 mL    Norepinephrine: 145.2 mL    Oral Fluid: 450 mL    Other (mL): 5000 mL    Phenylephrine: 102.6 mL    Plasma: 300 mL    PRBCs (Packed Red Blood Cells): 300 mL    Vasopressin: 31.5 mL    Vasopressin: 45 mL  Total IN: 8342.6 mL    OUT:    Other (mL): 1892 mL  Total OUT: 1892 mL    Total NET: 6450.6 mL      08-15-24 @ 07:01  -  08-15-24 @ 11:49  --------------------------------------------------------  IN:    IV PiggyBack: 250 mL    Norepinephrine: 47 mL    Oral Fluid: 100 mL    Vasopressin: 18 mL  Total IN: 415 mL    OUT:    Other (mL): 384 mL  Total OUT: 384 mL    Total NET: 31 mL    BMI (kg/m2): 33.2 (08-12-24 @ 16:46)  GENERAL: obese man, in no acute distress.   NEURO: [place] HE grade 1-2. No asterixis.   HEENT: Scleral icterus  CHEST: Bilateral breath sounds, decreased in R base. No respiratory distress.   CARDIAC: Regular rate and rhythm  ABDOMEN: Soft, non-tender, distended. Present bowel sounds. +anasarca  EXTREMITIES: warm, well perfused, BLE pitting edema   SKIN: +Jaundiced, no rash/ecchymoses    DIAGNOSTICS:  WBC      Hg       PLT      Na       K        CO2     BUN      Cr       ALT      AST      TB       ALP  11.79    7.8      69       134      4.1      21       16       2.50     16       41       15.2     107      08-15-24 @ 06:00  11.71    7.1      74       ------   ------   ------   ------   ------   ------   ------   ------   ------   08-15-24 @ 01:48  11.64    7.3      62       131      4.1      21       19       2.94     15       39       14.4     105      08-15-24 @ 00:27  ------   ------   ------   133      4.1      21       22       3.35     17       48       15.3     118      08-14-24 @ 18:39  14.87    8.4      99       134      4.2      22       26       3.88     18       47       15.0     118      08-14-24 @ 12:21    PT             INR            MELDwith  MELDw/o  28.5           2.80           --             --             08-15-24 @ 06:00  42.7           4.24           --             --             08-15-24 @ 00:27  40.6           4.03           --             --             08-14-24 @ 12:21  43.0           4.07           --             --             08-14-24 @ 00:05  44.0           4.17           --             --             08-13-24 @ 11:55

## 2024-08-16 NOTE — PROGRESS NOTE ADULT - ATTENDING COMMENTS
69 yo M with obesity and risky alcohol consumption (with decades' history of daily consumption of homemade alcohol), admitted to [facility] 1 month ago due to recent onset of jaundice and with a prolonged hospital and ICU course there due to newly diagnosed decompensated cirrhosis with acute on chronic liver failure (ACLF) with shock, FANY (necessitating RRT since 7/9), acute on chronic anemia with recurrent overt GI bleeding (with EGD on 7/12 with small non-bleeding EV and a duodenal ulcer with a visible vessel), and hepatic encephalopathy, transferred to [facility] on 8/12/24 for evaluation for combined liver/kidney transplants (SLK) and then transferred to the SICU also on 8/12/24 for a higher level of care and initiation of CRRT (8/13- ). Currently, he has distributive shock secondary to ACLF +/- septic shock and necessitating norepinephrine and vasopressin (in addition to midodrine 20 mg po q8h). Pressor requirements have not improved in the past 48h and therefore we broadened his antimicrobial coverage empirically and also recommend trying stress-dosed hydrocortisone. Infectious work-up has been negative apart from CMV viremia with 4,890 IU/mL (8/12) and blood cultures (8/12 x2) with 1/4 bottles MRSE (likely contaminant), with repeat blood cultures (8/14 x2) with NGTD and with repeat CMV PCR recommended and pending. S/p Zosyn (8/12-16) and currently on meropenem (8/16- ) and fluconazole (8/12- ) empirically. No further overt GI bleeding since transfer but he has received intermittent PRBC transfusions based on Hb/HCT trend and TEG-based blood product transfusions. Continuing pantoprazole 40 mg iv q12h. He continues to have waxing and waning delirium despite continued rifaximin and Miralax with adequate BMs. He remains anuric and net positive due to inability to remove fluid via CRRT due to his hypotension, with marked anasarca on exam and on 1L supplemental O2 via NC. ABO O with current MELD 3.0 score of 42, with expedited evaluation for SLK in progress but deferring invasive tests (including LHC for pre-transplant cardiac testing and flexible sigmoidoscopy to rule out rectal mass due to rectal wall thickening on CT) until he is more clinically stable.    Please don't hesitate to call with any questions or concerns.    Charo Garcia M.D., Ph.D.  Transplant Hepatology

## 2024-08-16 NOTE — PROGRESS NOTE ADULT - ASSESSMENT
76 yo m with alcohol abuse otherwise no known chronic medical issues (does not see doctors per sister) s/p 1 month stay at [facility] now transferred to [facility] for liver transplant eval.  Most of history obtained from sister (Ashlyn) at bedside and some from transfer paperwork. Per sister, pt was initially brought to the hospital by family for back pain and jaundice for unclear amount of time. Patient was seen by GI and underwent EGD soon after admission which only showed nonbleeding ?duodenal ulcers (no intervention) however few days later pt developed active signs of bleeding and emergently underwent EGD again showing bleeding esophageal varices which were clipped.  pt was electively intubated with stay in ICU thereafter. Patient then started with HD due to worsening renal function and MS for past 2.5 weeks at times limited by low BP requiring pressors to assist. Had numerous paracentesis with varying amount of fluid removal - albumin infusions. Sister not aware of any concerns for acute infection during his stay but aware that pt was on abx at some point due to bleeding issues.        PERTINENT RADIOLOGY:  CXR: Tubes and lines as above. No focal consolidations  CT Chest, A&P:  Patchy ground-glass opacities in the bilateral upper lobes. *  Cirrhosis with evidence of portal hypertension. *  Hypodense lesion in the periphery of the left hepatic lobe of uncertain etiology. Somewhat wedge-shaped morphology raises the possibility for a small infarct. Question subtle peripheral nodular enhancement, and a hemangioma is also considered. Contrast enhanced abdominal MRI can be performed for further characterization and to assess for other possibilities. *  A wedge-shaped region of hypoattenuation in the spleen may reflect a small infarct. *  Focal eccentric wall thickening in the low rectum, possibly due to a mural fold. Consider colonoscopy. *  Large volume ascites.  CT Head: No evidence of acute intracranial pathology. If clinical symptoms persist or worsen, more sensitive evaluation with brain MRI may be obtained, if no contraindications exist.    BCx (8/12) 1/4 MRSE  BCx (8/14) NGTD  Paracentesis (8/14) Cell Counts Negative for SBP  MRSA/MSSA Nasal PCR (8/16) Negative    CXR (8/16) Progression of infiltrates    #Decompensated liver cirrhosis, Leukocytosis, Shock  --Given worsening status no objection to broadening to Meropenem  --Can continue Empiric fluconazole  --Continue to follow CBC with diff  --Continue to follow transaminases  --Continue to follow temperature curve  --Follow up on preliminary blood cultures    #Positive BCx (8/12) (Staph epi)  Consistent with contaminant   --Follow up on preliminary blood cultures    #CMV PCR   --Low level CMV viremia is noted, doubt this is clinically relevant. Would repeat CMV PCR on 8/23    #Pre Transplant Evaluation   HIV-1/2 Combo Result: Nonreactive  EBVPCR Log: NotDetec Vxp32UX/mL   Hepatitis A IgG Ab Result: Reactive   Hepatitis B Core Antibody, Total: Nonreactive  Hepatitis B Surface Antibody: Reactive   Hepatitis B DNA PCR Log: Not Detected  Hepatitis B Surface Antigen: Nonreactive  Hepatitis C Virus Interpretation: Nonreactive  COVID-19 De Domain Antibody: Positive  Treponema Pallidum Antibody Interpretation: Negative  HSV 1 IgG  Positive/HSV 2 IgG Negative  EBV Serology Positive  CMV IgG Positive  VZV IgG Positive  Measles Positive, Mumps Positive and Rubella IgG Positive  Toxoplasma IgG Positive  QuantiFeron Gold Negative  Strongyloides Ab Negative  Babesia PCR  --Follow up on Schistosoma IgG  --Reportedly Lyme serology was previously positive, follow up on Tick Diseases Panel    #Encounter to Vaccinate Patient - Will defer vaccination in context of critical illness  COVID19: No primary series. Would benefit from COVID19 9112-2503 dose  Influenza: Would benefit from dose next season  Pneumococcal: Would benefit from PCV20  HAV: Immune, no additional vaccines indicated  HBV: Immune, no additional vaccines indicated  MMR: Immune, will not require further vaccination  Varicella: Immune, will not require further vaccination  Shingles: Would benefit from Shingrix  Tdap: Would benefit from Tdap    Dr. Herbie Rashid will be covering the patient starting from tomorrow. Please reach out to her for further questions and follow up.     Anoop Delacruz M.D.  [facility] Division of Infectious Disease  8AM-5PM Monday - Friday: Available on [employer] Teams  After Hours and Holidays (or if no response on [employer] Teams): Please contact the Infectious Diseases Office at [phone]     The above assessment and plan were discussed with Noelle, transplant surgery PA

## 2024-08-16 NOTE — PROGRESS NOTE ADULT - SUBJECTIVE AND OBJECTIVE BOX
24 HOUR EVENTS:  - 500 albumin x1, hypovolemic on POCUS     NEURO  Exam: Exam: awake, alert, oriented x1-2, waxes and wanes  Meds: melatonin 5 milliGRAM(s) Oral at bedtime      RESPIRATORY  RR: 18 (08-15-24 @ 23:45) (9 - 49)  SpO2: 99% (08-15-24 @ 23:45) (86% - 100%)  Exam: unlabored  ABG - ( 15 Aug 2024 17:30 )  pH: 7.49  /  pCO2: 30    /  pO2: 68    / HCO3: 23    / Base Excess: -0.2  /  SaO2: 96.9    Lactate: x        Meds:     CARDIOVASCULAR  HR: 66 (08-15-24 @ 23:45) (49 - 69)  ABP: 126/36 (08-15-24 @ 23:45) (106/28 - 138/35)  ABP(mean): 56 (08-15-24 @ 23:45) (44 - 77)  VBG - ( 15 Aug 2024 05:57 )  pH: 7.44  /  pCO2: 36    /  pO2: 60    / HCO3: 24    / Base Excess: 0.4   /  SaO2: 94.8   Lactate: 1.3      Exam: regular rate and rhythm  Cardiac Rhythm: sinus  Perfusion     [x]Adequate   [ ]Inadequate  Mentation   [x]Normal       [ ]Reduced  Extremities  [x]Warm         [ ]Cool  Volume Status [ ]Hypervolemic [x]Euvolemic [ ]Hypovolemic  Meds: midodrine. 20 milliGRAM(s) Oral every 8 hours  norepinephrine Infusion 0.07 MICROgram(s)/kG/Min IV Continuous <Continuous>      GI/NUTRITION  Exam: distended abd, non TTP  Diet: regular, low sodium  Meds: pantoprazole  Injectable 40 milliGRAM(s) IV Push two times a day  polyethylene glycol 3350 17 Gram(s) Oral <User Schedule>      GENITOURINARY  I&O's Detail    08-14 @ 07:01  -  08-15 @ 07:00  --------------------------------------------------------  IN:    Cryoprecipitate: 75 mL    IV PiggyBack: 1000 mL    IV PiggyBack: 750 mL    Norepinephrine: 143.3 mL    Norepinephrine: 145.2 mL    Oral Fluid: 450 mL    Phenylephrine: 102.6 mL    Plasma: 300 mL    PRBCs (Packed Red Blood Cells): 300 mL    Vasopressin: 31.5 mL    Vasopressin: 45 mL  Total IN: 3342.6 mL    OUT:    Other (mL): 1892 mL    Other (mL): 5000 mL  Total OUT: 6892 mL    Total NET: -3549.4 mL      08-15 @ 07:01  -  08-16 @ 00:32  --------------------------------------------------------  IN:    Albumin 5%  - 250 mL: 500 mL    IV PiggyBack: 450 mL    Norepinephrine: 343.2 mL    Oral Fluid: 350 mL    Vasopressin: 67.5 mL  Total IN: 1710.7 mL    OUT:    Other (mL): 734 mL  Total OUT: 734 mL    Total NET: 976.7 mL          08-15    134<L>  |  99  |  12  ----------------------------<  131<H>  4.0   |  21<L>  |  2.06<H>    Ca    8.7      15 Aug 2024 17:33  Phos  2.7     08-15  Mg     2.2     08-15    TPro  5.9<L>  /  Alb  3.7  /  TBili  15.8<H>  /  DBili  x   /  AST  41<H>  /  ALT  17  /  AlkPhos  113  08-15    Meds: folic acid 1 milliGRAM(s) Oral daily  multivitamin 1 Tablet(s) Oral daily  thiamine 100 milliGRAM(s) Oral daily      HEMATOLOGIC  Meds:                         7.8    12.59 )-----------( 74       ( 15 Aug 2024 12:25 )             22.6     PT/INR - ( 15 Aug 2024 12:25 )   PT: 31.7 sec;   INR: 2.97 ratio         PTT - ( 15 Aug 2024 12:25 )  PTT:71.9 sec    INFECTIOUS DISEASES  T(C): 36.6 (08-15-24 @ 15:00), Max: 36.7 (08-15-24 @ 07:00)  Wt(kg): --  WBC Count: 12.59 K/uL (08-15 @ 12:25)  WBC Count: 11.79 K/uL (08-15 @ 06:00)  WBC Count: 11.71 K/uL (08-15 @ 01:48)    Recent Cultures:  Specimen Source: Peritoneal Peritoneal Fluid, 08-14 @ 18:16; Results   No growth; Gram Stain:   polymorphonuclear leukocytes seen  No organisms seen  by cytocentrifuge; Organism: --  Specimen Source: .Blood Blood-Venous, 08-14 @ 16:10; Results   No growth at 24 hours; Gram Stain: --; Organism: --  Specimen Source: .Blood Blood-Venous, 08-14 @ 10:49; Results   No growth at 24 hours; Gram Stain: --; Organism: --  Specimen Source: .Blood Blood-Peripheral, 08-14 @ 00:05; Results   No growth at 24 hours; Gram Stain: --; Organism: --  Specimen Source: .Blood Blood, 08-12 @ 17:37; Results   No growth at 72 Hours; Gram Stain: --; Organism: --  Specimen Source: .Blood Blood, 08-12 @ 17:36; Results   Growth in aerobic bottle: Staphylococcus epidermidis Isolation of  Coagulase negative Staphylococcus from single blood culture sets may  represent  contamination. Contact the Microbiology Department at [phone] if  susceptibility testing is  clinically indicated.  Direct identification is available within approximately 3-5  hours either by Blood Panel Multiplexed PCR or Direct  MALDI-TOF. Details: [email or web address]<!>; Gram Stain:   Growth in aerobic bottle: Gram Positive Cocci in Clusters<!>; Organism: Blood Culture PCR<!>    Meds: fluconAZOLE IVPB 200 milliGRAM(s) IV Intermittent every 24 hours  fluconAZOLE IVPB      piperacillin/tazobactam IVPB.. 3.375 Gram(s) IV Intermittent every 12 hours  rifAXIMin 550 milliGRAM(s) Oral two times a day      ENDOCRINE  Capillary Blood Glucose    Meds: vasopressin Infusion 0.03 Unit(s)/Min IV Continuous <Continuous>      OTHER MEDICATIONS:  chlorhexidine 2% Cloths 1 Application(s) Topical <User Schedule>  CRRT Treatment    <Continuous>  lidocaine   4% Patch 1 Patch Transdermal daily  Phoxillum Filtration BK 4 / 2.5 5000 milliLiter(s) CRRT <Continuous>  Phoxillum Filtration BK 4 / 2.5 5000 milliLiter(s) CRRT <Continuous>  PrismaSOL Filtration BGK 4 / 2.5 5000 milliLiter(s) CRRT <Continuous>      IMAGING:

## 2024-08-16 NOTE — PROGRESS NOTE ADULT - CRITICAL CARE ATTENDING COMMENT
Patient evaluated by me on AM rounds.     #Decompensated EtOH Cirrhosis.  #UGIB s/p EGD and clipping at OSH.   #Renal failure.  #Hypotension.     #NEURO: A&Ox4.   -Continue Rifaximin and Miralax.     #RESP: Saturating well on 2L nasal canula. CXR with worsening RUE infiltrates. discuss with ID broadening coverage for PNA.     #CVS: Decreasing pressor requirements. Levo 0.09/Vaso. LA 1.7.  -Continue Midodrine 20mg TID.      #GI: Tolerating regular diet.   -Decompensated EtOH Cirrhosis with MELD >40.   -S/p Para 8/14 with 5L removed. No evidence of SBP.     #: Renal failure requiring CRRT. NET positive 646cc.   -Continue CRRT net even.     #HEME: Stable anemia. Hb 7.1. INR 3.19  -Persistent thrombocytopenia to 77.   -Hold chemical AC in the setting of coagulopathy.     #ID: Afebrile, WBC 13.   -Blood cx 8/12: MRSE in 1/2 bottles.   -Blood cx 8/14: NGTD  -S/p Para 8/14: No evidence of SBP.   -Appreciate transplant ID recs. Continue Fluconazole and Zosyn. Discuss with ID broadening coverage for PNA.    #ENDO: Glucose controlled.     #Lines:   -RUE PICC 8/10.  -PICC 8/12.   -L radial A-line 8/13.   -PermCath placed at OSH. Patient evaluated by me on AM rounds.     #Decompensated EtOH Cirrhosis.  #UGIB s/p EGD and clipping at OSH.   #Renal failure.  #Hypotension.     #NEURO: A&Ox4.   -Continue Rifaximin and Miralax.     #RESP: Saturating well on 2L nasal canula. CXR with worsening RUE infiltrates.   -Discuss with ID broadening coverage for PNA.     #CVS: Decreasing pressor requirements. Levo 0.09/Vaso. LA 1.7.  -Continue Midodrine 20mg TID.      #GI: Tolerating regular diet.   -Decompensated EtOH Cirrhosis with MELD >40.   -S/p Para 8/14 with 5L removed. No evidence of SBP.     #: Renal failure requiring CRRT. NET positive 646cc.   -Continue CRRT net even.     #HEME: Stable anemia. Hb 7.1. INR 3.19  -Persistent thrombocytopenia to 77.   -Hold chemical AC in the setting of coagulopathy.     #ID: Afebrile, WBC 13.   -Blood cx 8/12: MRSE in 1/2 bottles.   -Blood cx 8/14: NGTD  -S/p Para 8/14: No evidence of SBP.   -Appreciate transplant ID recs. Continue Fluconazole and Zosyn. Discuss with ID broadening coverage for PNA.    #ENDO: Glucose controlled.     #Lines:   -RUE PICC 8/10.  -PICC 8/12.   -L radial A-line 8/13.   -PermCath placed at OSH.

## 2024-08-16 NOTE — PROGRESS NOTE ADULT - SUBJECTIVE AND OBJECTIVE BOX
[facility] DIVISION OF KIDNEY DISEASES AND HYPERTENSION   FOLLOW UP NOTE    --------------------------------------------------------------------------------  Chief Complaint:    24 hour events/subjective: Pt. was seen and examined today.   Remain in SICU, lethargic, tolerating CRRT.       PAST HISTORY  --------------------------------------------------------------------------------  No significant changes to PMH, PSH, FHx, SHx, unless otherwise noted    ALLERGIES & MEDICATIONS  --------------------------------------------------------------------------------  Allergies    No Known Allergies    Intolerances      Standing Inpatient Medications  chlorhexidine 2% Cloths 1 Application(s) Topical <User Schedule>  CRRT Treatment    <Continuous>  fluconAZOLE IVPB 200 milliGRAM(s) IV Intermittent every 24 hours  fluconAZOLE IVPB      folic acid 1 milliGRAM(s) Oral daily  lidocaine   4% Patch 1 Patch Transdermal daily  melatonin 5 milliGRAM(s) Oral at bedtime  meropenem  IVPB      midodrine. 20 milliGRAM(s) Oral every 8 hours  multivitamin 1 Tablet(s) Oral daily  norepinephrine Infusion 0.07 MICROgram(s)/kG/Min IV Continuous <Continuous>  pantoprazole  Injectable 40 milliGRAM(s) IV Push two times a day  Phoxillum Filtration BK 4 / 2.5 5000 milliLiter(s) CRRT <Continuous>  Phoxillum Filtration BK 4 / 2.5 5000 milliLiter(s) CRRT <Continuous>  polyethylene glycol 3350 17 Gram(s) Oral <User Schedule>  PrismaSOL Filtration BGK 4 / 2.5 5000 milliLiter(s) CRRT <Continuous>  rifAXIMin 550 milliGRAM(s) Oral two times a day  thiamine 100 milliGRAM(s) Oral daily  vasopressin Infusion 0.03 Unit(s)/Min IV Continuous <Continuous>    PRN Inpatient Medications      REVIEW OF SYSTEMS  --------------------------------------------------------------------------------    All other systems were reviewed and are negative, except as noted.    VITALS/PHYSICAL EXAM  --------------------------------------------------------------------------------  T(C): 36.7 (08-16-24 @ 07:00), Max: 36.9 (08-15-24 @ 19:00)  HR: 55 (08-16-24 @ 10:45) (52 - 86)  BP: --  RR: 13 (08-16-24 @ 10:45) (13 - 40)  SpO2: 100% (08-16-24 @ 10:45) (85% - 100%)  Wt(kg): --        08-15-24 @ 07:01  -  08-16-24 @ 07:00  --------------------------------------------------------  IN: 1947 mL / OUT: 1301 mL / NET: 646 mL    08-16-24 @ 07:01  -  08-16-24 @ 11:06  --------------------------------------------------------  IN: 75.6 mL / OUT: 77 mL / NET: -1.4 mL        Physical Exam:  	Gen: NAD  	HEENT: Anicteric  	Pulm: CTA B/L  	CV: S1S2+  	Abd: Soft, +BS   	Ext: No LE edema B/L  	Neuro: Awake  	Skin: Warm and dry  	Dialysis [facility]      LABS/STUDIES  --------------------------------------------------------------------------------              7.1    13.28 >-----------<  77       [08-16-24 @ 06:30]              20.4     136  |  100  |  9   ----------------------------<  126      [08-16-24 @ 06:31]  4.2   |  22  |  1.67        Ca     8.5     [08-16-24 @ 06:31]      Mg     2.3     [08-16-24 @ 06:31]      Phos  2.8     [08-16-24 @ 06:31]    TPro  5.9  /  Alb  3.6  /  TBili  17.4  /  DBili  x   /  AST  49  /  ALT  19  /  AlkPhos  124  [08-16-24 @ 06:31]    PT/INR: PT 33.9 , INR 3.19       [08-16-24 @ 00:37]  PTT: 76.1       [08-16-24 @ 00:37]      Creatinine Trend:  SCr 1.67 [08-16 @ 06:31]  SCr 1.83 [08-16 @ 00:37]  SCr 2.06 [08-15 @ 17:33]  SCr 2.43 [08-15 @ 12:25]  SCr 2.50 [08-15 @ 06:00]    Urinalysis - [08-16-24 @ 06:31]      Color  / Appearance  / SG  / pH       Gluc 126 / Ketone   / Bili  / Urobili        Blood  / Protein  / Leuk Est  / Nitrite       RBC  / WBC  / Hyaline  / Gran  / Sq Epi  / Non Sq Epi  / Bacteria       HBsAb Reactive      [08-12-24 @ 21:16]  HBsAg Nonreact      [08-12-24 @ 21:13]  HBcAb Nonreact      [08-12-24 @ 21:16]  HCV 0.08, Nonreact      [08-12-24 @ 14:45]  HIV Nonreact      [08-12-24 @ 21:13]    CHETNA: titer Negative, pattern --      [08-12-24 @ 21:15]  Syphilis Screen (Treponema Pallidum Ab) Negative      [08-12-24 @ 21:15]  Immunofixation Serum:   Oligoclonal Pattern Consisting of One Weak IgM Kappa Band, Two Weak IgG  Kappa Bands, and One Weak IgA Lambda Band Identified      Reference Range: None Detected      [08-12-24 @ 21:13]  SPEP Interpretation: Oligoclonal Pattern Consisting of Three Weak Gamma-Migrating Paraproteins  and One Weak Beta-Migrating Paraprotein Identified      [08-12-24 @ 21:13]    Tacrolimus  Cyclosporine  Sirolimus  Mycophenolate  BK PCR  CMV PCRCMVPCR Log: 3.69 Cxm28FR/mL (08-12 @ 21:13)    Parvo PCR  EBV PCR

## 2024-08-16 NOTE — PROGRESS NOTE ADULT - SUBJECTIVE AND OBJECTIVE BOX
Follow Up:      Interval History:    REVIEW OF SYSTEMS  [  ] ROS unobtainable because:    [  ] All other systems negative except as noted below    Constitutional:  [ ] fever [ ] chills  [ ] weight loss  [ ] weakness  Skin:  [ ] rash [ ] phlebitis	  Eyes: [ ] icterus [ ] pain  [ ] discharge	  ENMT: [ ] sore throat  [ ] thrush [ ] ulcers [ ] exudates  Respiratory: [ ] dyspnea [ ] hemoptysis [ ] cough [ ] sputum	  Cardiovascular:  [ ] chest pain [ ] palpitations [ ] edema	  Gastrointestinal:  [ ] nausea [ ] vomiting [ ] diarrhea [ ] constipation [ ] pain	  Genitourinary:  [ ] dysuria [ ] frequency [ ] hematuria [ ] discharge [ ] flank pain  [ ] incontinence  Musculoskeletal:  [ ] myalgias [ ] arthralgias [ ] arthritis  [ ] back pain  Neurological:  [ ] headache [ ] seizures  [ ] confusion/altered mental status    Allergies  No Known Allergies        ANTIMICROBIALS:  fluconAZOLE IVPB 200 every 24 hours  fluconAZOLE IVPB    meropenem  IVPB 1000 every 8 hours  meropenem  IVPB    rifAXIMin 550 two times a day      OTHER MEDS:  MEDICATIONS  (STANDING):  melatonin 5 at bedtime  midodrine. 20 every 8 hours  norepinephrine Infusion 0.07 <Continuous>  pantoprazole  Injectable 40 two times a day  polyethylene glycol 3350 17 <User Schedule>  vasopressin Infusion 0.03 <Continuous>      Vital Signs Last 24 Hrs  T(C): 36.6 (16 Aug 2024 11:00), Max: 36.9 (15 Aug 2024 19:00)  T(F): 97.9 (16 Aug 2024 11:00), Max: 98.4 (15 Aug 2024 19:00)  HR: 63 (16 Aug 2024 12:00) (52 - 86)  BP: --  BP(mean): --  RR: 19 (16 Aug 2024 12:00) (13 - 40)  SpO2: 98% (16 Aug 2024 12:00) (85% - 100%)    Parameters below as of 16 Aug 2024 11:00  Patient On (Oxygen Delivery Method): nasal cannula  O2 Flow (L/min): 2      PHYSICAL EXAMINATION:  General: Alert and Awake, NAD  HEENT: PERRL, EOMI  Neck: Supple  Cardiac: RRR, No M/R/G  Resp: CTAB, No Wh/Rh/Ra  Abdomen: NBS, NT/ND, No HSM, No rigidity or guarding  MSK: No LE edema. No Calf tenderness  : No trejo  Skin: No rashes or lesions. Skin is warm and dry to the touch.   Neuro: Alert and Awake. CN 2-12 Grossly intact. Moves all four extremities spontaneously.  Psych: Calm, Pleasant, Cooperative                          7.1    13.28 )-----------( 77       ( 16 Aug 2024 06:30 )             20.4       08-16    136  |  100  |  9   ----------------------------<  126<H>  4.2   |  22  |  1.67<H>    Ca    8.5      16 Aug 2024 06:31  Phos  2.8     08-16  Mg     2.3     08-16    TPro  5.9<L>  /  Alb  3.6  /  TBili  17.4<H>  /  DBili  x   /  AST  49<H>  /  ALT  19  /  AlkPhos  124<H>  08-16      Urinalysis Basic - ( 16 Aug 2024 06:31 )    Color: x / Appearance: x / SG: x / pH: x  Gluc: 126 mg/dL / Ketone: x  / Bili: x / Urobili: x   Blood: x / Protein: x / Nitrite: x   Leuk Esterase: x / RBC: x / WBC x   Sq Epi: x / Non Sq Epi: x / Bacteria: x        MICROBIOLOGY:  v  Peritoneal Peritoneal Fluid  08-14-24   No growth  --    polymorphonuclear leukocytes seen  No organisms seen  by cytocentrifuge      .Blood Blood-Venous  08-14-24   No growth at 24 hours  --  --      .Blood Blood-Venous  08-14-24   No growth at 24 hours  --  --      .Blood Blood-Peripheral  08-14-24   No growth at 48 Hours  --  --      .Blood Blood  08-12-24   No growth at 72 Hours  --  --      .Blood Blood  08-12-24   Growth in aerobic bottle: Staphylococcus epidermidis Isolation of  Coagulase negative Staphylococcus from single blood culture sets may  represent  contamination. Contact the Microbiology Department at [phone] if  susceptibility testing is  clinically indicated.  Direct identification is available within approximately 3-5  hours either by Blood Panel Multiplexed PCR or Direct  MALDI-TOF. Details: [email or web address]  --  Blood Culture PCR        Toxoplasma IgG Screen: 44.00 IU/mL (08-12-24 @ 21:15)  CMV IgG Antibody: >10.00 U/mL (08-12-24 @ 21:15)    CMVPCR Log: 3.69 Mek56TV/mL (08-12 @ 21:13)        RADIOLOGY:    <The imaging below has been reviewed and visualized by me independently. Findings as detailed in report below> Follow Up: Leukocytosis    Interval History: remains on dual vasopressors. afebrile. remains encephalopathic.     REVIEW OF SYSTEMS  [  ] ROS unobtainable because:    [ x ] All other systems negative except as noted below    Constitutional:  [ ] fever [ ] chills  [ ] weight loss  [ ] weakness  Skin:  [ ] rash [ ] phlebitis	  Eyes: [ ] icterus [ ] pain  [ ] discharge	  ENMT: [ ] sore throat  [ ] thrush [ ] ulcers [ ] exudates  Respiratory: [ ] dyspnea [ ] hemoptysis [ ] cough [ ] sputum	  Cardiovascular:  [ ] chest pain [ ] palpitations [ ] edema	  Gastrointestinal:  [ ] nausea [ ] vomiting [ ] diarrhea [ ] constipation [ ] pain	  Genitourinary:  [ ] dysuria [ ] frequency [ ] hematuria [ ] discharge [ ] flank pain  [ ] incontinence  Musculoskeletal:  [ ] myalgias [ ] arthralgias [ ] arthritis  [ ] back pain  Neurological:  [ ] headache [ ] seizures  [ x] confusion/altered mental status    Allergies  No Known Allergies        ANTIMICROBIALS:  fluconAZOLE IVPB 200 every 24 hours  fluconAZOLE IVPB    meropenem  IVPB 1000 every 8 hours  meropenem  IVPB    rifAXIMin 550 two times a day      OTHER MEDS:  MEDICATIONS  (STANDING):  melatonin 5 at bedtime  midodrine. 20 every 8 hours  norepinephrine Infusion 0.07 <Continuous>  pantoprazole  Injectable 40 two times a day  polyethylene glycol 3350 17 <User Schedule>  vasopressin Infusion 0.03 <Continuous>      Vital Signs Last 24 Hrs  T(C): 36.6 (16 Aug 2024 11:00), Max: 36.9 (15 Aug 2024 19:00)  T(F): 97.9 (16 Aug 2024 11:00), Max: 98.4 (15 Aug 2024 19:00)  HR: 63 (16 Aug 2024 12:00) (52 - 86)  BP: --  BP(mean): --  RR: 19 (16 Aug 2024 12:00) (13 - 40)  SpO2: 98% (16 Aug 2024 12:00) (85% - 100%)    Parameters below as of 16 Aug 2024 11:00  Patient On (Oxygen Delivery Method): nasal cannula  O2 Flow (L/min): 2      PHYSICAL EXAMINATION:  General: Alert and Awake, NAD, jaundice  Cardiac: RRR, No M/R/G  Resp: CTAB, No Wh/Rh/Ra  Abdomen: NBS, Moderate abdominal distension, No HSM, No rigidity or guarding  MSK: No LE edema. No Calf tenderness  Skin: No rashes or lesions. Skin is warm and dry to the touch.   Neuro: Alert and Awake. CN 2-12 Grossly intact. Moves all four extremities spontaneously.  Psych: Calm, Pleasant, Cooperative                          7.1    13.28 )-----------( 77       ( 16 Aug 2024 06:30 )             20.4       08-16    136  |  100  |  9   ----------------------------<  126<H>  4.2   |  22  |  1.67<H>    Ca    8.5      16 Aug 2024 06:31  Phos  2.8     08-16  Mg     2.3     08-16    TPro  5.9<L>  /  Alb  3.6  /  TBili  17.4<H>  /  DBili  x   /  AST  49<H>  /  ALT  19  /  AlkPhos  124<H>  08-16      Urinalysis Basic - ( 16 Aug 2024 06:31 )    Color: x / Appearance: x / SG: x / pH: x  Gluc: 126 mg/dL / Ketone: x  / Bili: x / Urobili: x   Blood: x / Protein: x / Nitrite: x   Leuk Esterase: x / RBC: x / WBC x   Sq Epi: x / Non Sq Epi: x / Bacteria: x        MICROBIOLOGY:  v  Peritoneal Peritoneal Fluid  08-14-24   No growth  --    polymorphonuclear leukocytes seen  No organisms seen  by cytocentrifuge      .Blood Blood-Venous  08-14-24   No growth at 24 hours  --  --      .Blood Blood-Venous  08-14-24   No growth at 24 hours  --  --      .Blood Blood-Peripheral  08-14-24   No growth at 48 Hours  --  --      .Blood Blood  08-12-24   No growth at 72 Hours  --  --      .Blood Blood  08-12-24   Growth in aerobic bottle: Staphylococcus epidermidis Isolation of  Coagulase negative Staphylococcus from single blood culture sets may  represent  contamination. Contact the Microbiology Department at [phone] if  susceptibility testing is  clinically indicated.  Direct identification is available within approximately 3-5  hours either by Blood Panel Multiplexed PCR or Direct  MALDI-TOF. Details: [email or web address]  --  Blood Culture PCR        Toxoplasma IgG Screen: 44.00 IU/mL (08-12-24 @ 21:15)  CMV IgG Antibody: >10.00 U/mL (08-12-24 @ 21:15)    CMVPCR Log: 3.69 Ujs44CG/mL (08-12 @ 21:13)        RADIOLOGY:    <The imaging below has been reviewed and visualized by me independently. Findings as detailed in report below>    < from: Xray Chest 1 View- PORTABLE-Routine (Xray Chest 1 View- PORTABLE-Routine in AM.) (08.16.24 @ 07:38) >  IMPRESSION:  Progression of infiltrates.    < end of copied text >

## 2024-08-16 NOTE — PROGRESS NOTE ADULT - ASSESSMENT
ASSESSMENT: 68 year old male in acute liver and renal failure admitted to SICU for hemodynamic monitoring iso recent GI bleeding and potential renal replacement requirements. Under eval for SLK.       PLAN:    Neuro:  - A&O x2-3, waxing and waning  - miralax, rifaximin  - c/w thiamine, MVI, folid acid    Resp:  - Out of bed to chair, ambulate as tolerated, and incentive spirometry to prevent atelectasis  - CT CH 8/13 w/ patchy GGOs to b/l upper lobes    CV:  - Pressors: Levo & vaso  - Will wean vasopressor support w/ goal MAP > 65 mmHg  - midodrine 20 TID  - trend lactate    GI:   - Diet: Regular diet  - Bowel regimen with miralax  - Protonix BID for melanotic stools  - trend LFTs  - 8/14 para w/ 5L off     Renal:  - Monitor I&Os and lytes, replete as necessary  - under eval for SLK  - No UO to obtain urine tests from; trejo removed   - on CRRT net neg    Heme:  - Monitor CBC and coags  - hold chem VTE prophylaxis  - SCDs for VTE prophylaxis    ID:   - Monitor for clinical evidence of active infection  - Monitor WBC, temperature, and procalcitonin  - Empiric antibiotics with zosyn, fluc  - 8/14: BCx +MRSE, possible permacath source from OSH, unknown date of placement, wife estimates ~3-4 weeks ago; consider removal, repeat BCx    Endo:   - Monitor glucose     Code Status: Full    Disposition: SICU    Tiffanie Benitez PA-C     [id number]

## 2024-08-16 NOTE — PROGRESS NOTE ADULT - ASSESSMENT
68y with obesity and risky alcohol consumption (with decades' history of daily consumption of homemade alcohol), admitted to [facility] 1 month ago due to recent onset of jaundice and with a prolonged hospital and ICU course there due to newly diagnosed decompensated cirrhosis with acute on chronic liver failure (ACLF) with shock (resolved, but still on midodrine); FANY (on intermittent HD since 7/9); recurrent maroon stools and acute on chronic anemia necessitating intermittent PRBC transfusions (last on 8/8) and hypofibrinoginemia, with EGD (7/12) with small non-bleeding EV and a duodenal ulcer with a visible vessel; and waxing and waning hepatic encephalopathy. He was transferred to [facility] on 8/12/24 for evaluation for combined liver/kidney transplants (SLK).      Decompensated ETOH Cirrhosis  -MELD 41O  - HE: cont rifaximin/miralax  - EV:  EGD (7/12) with small non-bleeding EV and a duodenal ulcer with a visible vessel. -open SLK eval, consultants aware  - FANY/HRS: anuric, On CRRT 8/13, Renal following  - ID: Empiric Zosyn/fluc   - bcx 8/13 w/ MRSE   - PPI  -Thiamine/MVI/FOLIC ACID  - SLK eval   - cont sicu care

## 2024-08-16 NOTE — PROGRESS NOTE ADULT - ASSESSMENT
67 yo M with obesity and risky alcohol consumption (with decades' history of daily consumption of homemade alcohol). Admitted to [facility] for 1 month due to recent onset of jaundice and with a prolonged hospital/ICU course due to newly diagnosed decompensated cirrhosis with acute on chronic liver failure (ACLF) with shock, FANY (started on intermittent HD since 7/9), acute on chronic anemia with recurrent overt GI bleeding, and hepatic encephalopathy.     Transferred to [facility] on 8/12/24 for SLK evaluation and then transferred to the SICU for initiation of CRRT (8/13- ).      # Distributive shock    - Likely from ACLF and septic shock.   - On norepinephrine, vasopressin and midodrine with no improvement in the last 48h.  - Infectious work-up has been negative apart from CMV viremia (8/12), for what ID transplant recommend to re-check PCR and, if increasing, probably start on antiviral therapy.   - Zosyn (8/12-16) switched to Meropenem (8/16- ) and continued on fluconazole (8/12- ) empirically with transplant ID following.     # Anuric FANY on CKD    - On intermittent HD since 7/9   - Started on CRRT (8/13- ) with Transplant nephrology following.  - Remains anuric and with marked anasarca on net positive volumes due to inability to remove fluid via CRRT given hypotension and pressor requirements.     # Active UGIB   - No further overt GI bleeding since transfer but has received blood products as per H/H and TEGs.  - EGD (7/12, at [facility]) with small non-bleeding EV and a duodenal ulcer with a visible vessel.   - Continuing pantoprazole 40 mg iv q12h  - Plan to keep monitoring H/H and transfuse as needed to keep Hb >7.     # Newly diagnosed decompensated cirrhosis with acute on chronic liver failure   - Waxing and waning hepatic encephalopathy managed with Rifaximin and Miralax. Lactulose discontinued due distended and tympanic abdomen. Bowel movements at goal.   - Large volume ascites and anasarca being managed with CRRT. LVP (8/14) negative for SBP.   - Contrast CT (8/13) with patent portohepatic vessels and no evidence of HCC.     - Will need daily PT and OT. Last ambulatory prior to hospital admission in July 2024.    - ABO O. MELD 3.0 score of 41 with OLT evaluation started on 8/12 and will meet SLK criteria in Aug 20th after 6 weeks of being HD-dependent. Discussed at our multidisciplinary liver transplant selection meeting on 8/16 and at the time patient needs to be optimized medically before proceeding with evaluation. Pending [facility] for cardiology clearance, and sigmoidoscopy for assessment of recto-sigmoid mass.     # Newly found liver and spleen hypodensity suggestive of small infarctions   - IV contrast CT abdomen pelvis (8/13) showing left hepatic lobe wedge shaped hypodensity (small infarct vs hemangioma) and another wedge shaped hypodensity in the spleen also suggestive of small infarct.   - Small infarcts could be secondary to hypovolemia/shock or possible embolization. Patient would benefit from hypercoagulable work up.     Note incomplete until finalized by attending signature/attestation.    Myra Maloney   Transplant Hepatology Fellow

## 2024-08-16 NOTE — PROGRESS NOTE ADULT - SUBJECTIVE AND OBJECTIVE BOX
Transplant Surgery - Multidisciplinary Rounds  --------------------------------------------------------------    Present: Patient seen and examined with Surgeon Dr Dagher, Hepatologist Dr Garcia, Surgical Fellow Dr Contreras, MARTIR Francis and bedside RN during AM rounds. Disciplines not in attendance will be notified of plan    HPI: 68y with obesity and risky alcohol consumption (with decades' history of daily consumption of homemade alcohol), admitted to [facility] 1 month ago due to recent onset of jaundice and with a prolonged hospital and ICU course there due to newly diagnosed decompensated cirrhosis with acute on chronic liver failure (ACLF) with shock (resolved, but still on midodrine); FANY (on intermittent HD since 7/9); recurrent maroon stools and acute on chronic anemia necessitating intermittent PRBC transfusions (last on 8/8) and hypofibrinoginemia, with EGD (7/12) with small non-bleeding EV and a duodenal ulcer with a visible vessel; and waxing and waning hepatic encephalopathy. He was transferred to [facility] on 8/12/24 for evaluation for combined liver/kidney transplants (SLK).       Interval Events:  - POCUS: hypovolemic  - given 500 albumin  - levo (0.1) / vaso (0.03)    Potential Discharge date: TBD  Education:  Medications  Plan of care:  See Below    MEDICATIONS  (STANDING):  chlorhexidine 2% Cloths 1 Application(s) Topical <User Schedule>  CRRT Treatment    <Continuous>  fluconAZOLE IVPB 200 milliGRAM(s) IV Intermittent every 24 hours  fluconAZOLE IVPB      folic acid 1 milliGRAM(s) Oral daily  lidocaine   4% Patch 1 Patch Transdermal daily  melatonin 5 milliGRAM(s) Oral at bedtime  midodrine. 20 milliGRAM(s) Oral every 8 hours  multivitamin 1 Tablet(s) Oral daily  norepinephrine Infusion 0.07 MICROgram(s)/kG/Min (15 mL/Hr) IV Continuous <Continuous>  pantoprazole  Injectable 40 milliGRAM(s) IV Push two times a day  Phoxillum Filtration BK 4 / 2.5 5000 milliLiter(s) (200 mL/Hr) CRRT <Continuous>  Phoxillum Filtration BK 4 / 2.5 5000 milliLiter(s) (1700 mL/Hr) CRRT <Continuous>  piperacillin/tazobactam IVPB.. 3.375 Gram(s) IV Intermittent every 12 hours  polyethylene glycol 3350 17 Gram(s) Oral <User Schedule>  PrismaSOL Filtration BGK 4 / 2.5 5000 milliLiter(s) (1500 mL/Hr) CRRT <Continuous>  rifAXIMin 550 milliGRAM(s) Oral two times a day  thiamine 100 milliGRAM(s) Oral daily  vasopressin Infusion 0.03 Unit(s)/Min (4.5 mL/Hr) IV Continuous <Continuous>    MEDICATIONS  (PRN):      PAST MEDICAL & SURGICAL HISTORY:  Alcohol abuse      No significant past surgical history          Vital Signs Last 24 Hrs  T(C): 36.7 (16 Aug 2024 03:00), Max: 36.9 (15 Aug 2024 19:00)  T(F): 98.1 (16 Aug 2024 03:00), Max: 98.4 (15 Aug 2024 19:00)  HR: 75 (16 Aug 2024 05:30) (52 - 86)  BP: --  BP(mean): --  RR: 21 (16 Aug 2024 05:30) (9 - 49)  SpO2: 96% (16 Aug 2024 05:30) (85% - 100%)    Parameters below as of 15 Aug 2024 15:00  Patient On (Oxygen Delivery Method): room air        I&O's Summary    14 Aug 2024 07:01  -  15 Aug 2024 07:00  --------------------------------------------------------  IN: 3342.6 mL / OUT: 6892 mL / NET: -3549.4 mL    15 Aug 2024 07:01  -  16 Aug 2024 05:55  --------------------------------------------------------  IN: 1895.2 mL / OUT: 734 mL / NET: 1161.2 mL                              7.3    13.27 )-----------( 76       ( 16 Aug 2024 00:37 )             20.8     08-16    135  |  101  |  10  ----------------------------<  120<H>  4.1   |  22  |  1.83<H>    Ca    8.4      16 Aug 2024 00:37  Phos  2.7     08-16  Mg     2.3     08-16    TPro  5.8<L>  /  Alb  3.7  /  TBili  17.0<H>  /  DBili  x   /  AST  46<H>  /  ALT  17  /  AlkPhos  119  08-16      Review of systems  All other systems were reviewed and are negative, except as noted.      PHYSICAL EXAM:  Constitutional: Well developed / well nourished  Eyes:  PERRLA  ENMT: nc/at, no thrush  Neck: supple,   Respiratory: CTA B/L  Cardiovascular: RRR  Gastrointestinal: Soft abdomen, ND,   Genitourinary: anuric   Extremities: SCD's in place and working bilaterally  Vascular: Palpable dp pulses bilaterally.   Neurological: A&O x3  Skin: no rashes, ulcerations, lesions  Musculoskeletal: Moving all extremities  Psychiatric: Responsive

## 2024-08-16 NOTE — PROGRESS NOTE ADULT - ATTENDING COMMENTS
FANY on CKD  Cont CRRT for now.   Will meet SLK criteria on 8/20 FANY on CKD  Cont CRRT for now.   Meets fany criteria for SLK

## 2024-08-16 NOTE — PROGRESS NOTE ADULT - ASSESSMENT
68 year old male with  AUD complicated by cirrhosis with Hepatic encephalopathy admitted to [facility] with back pain and jaundice on 7/8/24. Pt had dark / maroon colored stools   EGD that showed esophageal varices and duodenal ulcer with visible vessel. He got transfusions for low Hb and last transfusion was on 8/8. 1st session of IHD was on 7/9/24.     Transplant nephrology consulted for FANY and SLK eval.       #FANY VS FANY on CKD: started on HD since 7/9/24  -No baseline Cr available. Admission Cr 5.8.   -Started on CRRT on 8/13/24 (was hypotensive)  Recs:  -Pt remains oliguric. Please obtain bladder scan. Will cw CRRT  -Pt has been dialysis dependent for 5 weeks now. Will meet SLK criteria on 8/20        Neeraj Maher  Nephrology Fellow  Feel free to contact me on TEAMS  After 5 pm please contact the on-call Fellow.   68 year old male with  AUD complicated by cirrhosis with Hepatic encephalopathy admitted to [facility] with back pain and jaundice on 7/8/24. Pt had dark / maroon colored stools   EGD that showed esophageal varices and duodenal ulcer with visible vessel. He got transfusions for low Hb and last transfusion was on 8/8. 1st session of IHD was on 7/9/24.     Transplant nephrology consulted for FANY and SLK eval.       #FANY VS FANY on CKD: started on HD since 7/9/24  -No baseline Cr available. Admission Cr 5.8.   -Started on CRRT on 8/13/24 (was hypotensive)  Recs:  -Pt remains oliguric and on pressor support. Please obtain bladder scan. Will cw CRRT  -Pt has been dialysis dependent for 5 weeks now. Will meet SLK criteria on 8/20        Neeraj Maher  Nephrology Fellow  Feel free to contact me on TEAMS  After 5 pm please contact the on-call Fellow.   68 year old male with  AUD complicated by cirrhosis with Hepatic encephalopathy admitted to [facility] with back pain and jaundice on 7/8/24. Pt had dark / maroon colored stools   EGD that showed esophageal varices and duodenal ulcer with visible vessel. He got transfusions for low Hb and last transfusion was on 8/8. 1st session of IHD was on 7/9/24.     Transplant nephrology consulted for FANY and SLK eval.       #FANY VS FANY on CKD: started on HD since 7/9/24  -No baseline Cr available. Admission Cr 5.8.   -Started on CRRT on 8/13/24 (was hypotensive)  Recs:  -Pt has been dialysis dependent for 5 weeks now. Will meet SLK criteria on 8/20  -Pt remains oliguric and on pressor support. Please obtain bladder scan. Will cw CRRT          Neeraj Maher  Nephrology Fellow  Feel free to contact me on TEAMS  After 5 pm please contact the on-call Fellow.

## 2024-08-16 NOTE — PROGRESS NOTE ADULT - SUBJECTIVE AND OBJECTIVE BOX
Interval Events:   Afebrile last 24h   Norepinephrine (0.09) and Vasopressin (0.03)  Antibiotics broaden to Meropenem and Fluc   On CRRT net positive 700ml. Anuric.   2 reported BM in last 24h   Meld score 42     ROS:   12 point review of systems performed and negative except otherwise noted above.     Hospital Medications:  chlorhexidine 2% Cloths 1 Application(s) Topical <User Schedule>  CRRT Treatment    <Continuous>  fluconAZOLE IVPB 200 milliGRAM(s) IV Intermittent every 24 hours  fluconAZOLE IVPB      folic acid 1 milliGRAM(s) Oral daily  lidocaine   4% Patch 1 Patch Transdermal daily  melatonin 5 milliGRAM(s) Oral at bedtime  meropenem  IVPB 1000 milliGRAM(s) IV Intermittent every 8 hours  meropenem  IVPB      midodrine. 20 milliGRAM(s) Oral every 8 hours  multivitamin 1 Tablet(s) Oral daily  norepinephrine Infusion 0.07 MICROgram(s)/kG/Min (15 mL/Hr) IV Continuous <Continuous>  pantoprazole  Injectable 40 milliGRAM(s) IV Push two times a day  Phoxillum Filtration BK 4 / 2.5 5000 milliLiter(s) (1700 mL/Hr) CRRT <Continuous>  Phoxillum Filtration BK 4 / 2.5 5000 milliLiter(s) (200 mL/Hr) CRRT <Continuous>  polyethylene glycol 3350 17 Gram(s) Oral <User Schedule>  PrismaSOL Filtration BGK 4 / 2.5 5000 milliLiter(s) (1500 mL/Hr) CRRT <Continuous>  rifAXIMin 550 milliGRAM(s) Oral two times a day  thiamine 100 milliGRAM(s) Oral daily  vasopressin Infusion 0.03 Unit(s)/Min (4.5 mL/Hr) IV Continuous <Continuous>    MAR over past 24 hours:    albumin human  5% IVPB   250 mL/Hr IV Intermittent (08-15-24 @ 14:33)    fluconAZOLE IVPB   100 mL/Hr IV Intermittent (08-15-24 @ 22:09)    folic acid   1 milliGRAM(s) Oral (08-16-24 @ 11:18)    lidocaine   4% Patch   1 Patch Transdermal (08-16-24 @ 12:34)    meropenem  IVPB   100 mL/Hr IV Intermittent (08-16-24 @ 11:18)    methocarbamol   750 milliGRAM(s) Oral (08-16-24 @ 03:05)    midodrine.   20 milliGRAM(s) Oral (08-16-24 @ 05:28)   20 milliGRAM(s) Oral (08-15-24 @ 22:09)    multivitamin   1 Tablet(s) Oral (08-16-24 @ 11:18)    norepinephrine Infusion   15 mL/Hr IV Continuous (08-15-24 @ 17:20)    oxyCODONE    IR   5 milliGRAM(s) Oral (08-16-24 @ 05:29)    pantoprazole  Injectable   40 milliGRAM(s) IV Push (08-16-24 @ 05:28)   40 milliGRAM(s) IV Push (08-15-24 @ 17:18)    Phoxillum Filtration BK 4 / 2.5   200 mL/Hr CRRT (08-15-24 @ 17:19)    Phoxillum Filtration BK 4 / 2.5   1700 mL/Hr CRRT (08-15-24 @ 17:19)    piperacillin/tazobactam IVPB..   25 mL/Hr IV Intermittent (08-16-24 @ 00:32)    polyethylene glycol 3350   17 Gram(s) Oral (08-16-24 @ 11:19)   17 Gram(s) Oral (08-15-24 @ 17:18)    PrismaSOL Filtration BGK 4 / 2.5   1500 mL/Hr CRRT (08-15-24 @ 17:20)    rifAXIMin   550 milliGRAM(s) Oral (08-16-24 @ 05:28)   550 milliGRAM(s) Oral (08-15-24 @ 17:18)    thiamine   100 milliGRAM(s) Oral (08-16-24 @ 11:19)    vasopressin Infusion   4.5 mL/Hr IV Continuous (08-15-24 @ 17:20)      PHYSICAL EXAM:   Vital Signs last 24 hours:  T(F): 97.9 (08-16-24 @ 11:00), Max: 98.4 (08-15-24 @ 19:00)  HR: 67 (08-16-24 @ 14:00) (52 - 86)  BP: --  BP(mean): --  ABP: 116/51 (08-16-24 @ 14:00) (106/28 - 131/43)  ABP(mean): 71 (08-16-24 @ 14:00) (44 - 71)  RR: 33 (08-16-24 @ 14:00) (13 - 37)  SpO2: 96% (08-16-24 @ 14:00) (85% - 100%)    I&Os:    08-15-24 @ 07:01  -  08-16-24 @ 07:00  --------------------------------------------------------  IN:    Albumin 5%  - 250 mL: 500 mL    IV PiggyBack: 450 mL    Norepinephrine: 539 mL    Oral Fluid: 350 mL    Vasopressin: 108 mL  Total IN: 1947 mL    OUT:    Other (mL): 1301 mL  Total OUT: 1301 mL    Total NET: 646 mL      08-16-24 @ 07:01  -  08-16-24 @ 14:10  --------------------------------------------------------  IN:    IV PiggyBack: 50 mL    Norepinephrine: 134.4 mL    Vasopressin: 31.5 mL  Total IN: 215.9 mL    OUT:    Other (mL): 177 mL  Total OUT: 177 mL    Total NET: 38.9 mL    BMI (kg/m2): 33.2 (08-12-24 @ 16:46)  GENERAL: obese man, in no acute distress.   NEURO: Mental status waxes and wanes.    HEENT: Scleral icterus  CHEST: Bilateral breath sounds, decreased in R base. No respiratory distress.   CARDIAC: Regular rate and rhythm  ABDOMEN: Soft, non-tender, distended. Present bowel sounds. +anasarca  EXTREMITIES: warm, well perfused, BLE pitting edema   SKIN: +Jaundiced, no rash/ecchymoses    DIAGNOSTICS:  WBC      Hg       PLT      Na       K        CO2     BUN      Cr       ALT      AST      TB       ALP  ------   ------   ------   135      4.3      21       9        1.52     19       47       17.6     123      08-16-24 @ 12:00  ------   ------   ------   136      4.2      22       9        1.67     19       49       17.4     124      08-16-24 @ 06:31  13.28    7.1      77       ------   ------   ------   ------   ------   ------   ------   ------   ------   08-16-24 @ 06:30  13.27    7.3      76       135      4.1      22       10       1.83     17       46       17.0     119      08-16-24 @ 00:37  ------   ------   ------   134      4.0      21       12       2.06     17       41       15.8     113      08-15-24 @ 17:33    PT             INR            MELDwith  MELDw/o  33.9           3.19           --             --             08-16-24 @ 00:37  31.7           2.97           --             --             08-15-24 @ 12:25  28.5           2.80           --             --             08-15-24 @ 06:00  42.7           4.24           --             --             08-15-24 @ 00:27  40.6           4.03           --             --             08-14-24 @ 12:21   Interval Events:   Afebrile last 24h   Norepinephrine (0.09) and Vasopressin (0.03)  Antibiotics broaden to Meropenem and Fluc   On CRRT net positive 700ml. Anuric.   2 reported BM in last 24h   Meld score 42     ROS:   Patient complains of abdominal distention and discomfort.   Otherwise assessment is limited due to encephalopathy.     Hospital Medications:  chlorhexidine 2% Cloths 1 Application(s) Topical <User Schedule>  CRRT Treatment    <Continuous>  fluconAZOLE IVPB 200 milliGRAM(s) IV Intermittent every 24 hours  fluconAZOLE IVPB      folic acid 1 milliGRAM(s) Oral daily  lidocaine   4% Patch 1 Patch Transdermal daily  melatonin 5 milliGRAM(s) Oral at bedtime  meropenem  IVPB 1000 milliGRAM(s) IV Intermittent every 8 hours  meropenem  IVPB      midodrine. 20 milliGRAM(s) Oral every 8 hours  multivitamin 1 Tablet(s) Oral daily  norepinephrine Infusion 0.07 MICROgram(s)/kG/Min (15 mL/Hr) IV Continuous <Continuous>  pantoprazole  Injectable 40 milliGRAM(s) IV Push two times a day  Phoxillum Filtration BK 4 / 2.5 5000 milliLiter(s) (1700 mL/Hr) CRRT <Continuous>  Phoxillum Filtration BK 4 / 2.5 5000 milliLiter(s) (200 mL/Hr) CRRT <Continuous>  polyethylene glycol 3350 17 Gram(s) Oral <User Schedule>  PrismaSOL Filtration BGK 4 / 2.5 5000 milliLiter(s) (1500 mL/Hr) CRRT <Continuous>  rifAXIMin 550 milliGRAM(s) Oral two times a day  thiamine 100 milliGRAM(s) Oral daily  vasopressin Infusion 0.03 Unit(s)/Min (4.5 mL/Hr) IV Continuous <Continuous>    MAR over past 24 hours:    albumin human  5% IVPB   250 mL/Hr IV Intermittent (08-15-24 @ 14:33)    fluconAZOLE IVPB   100 mL/Hr IV Intermittent (08-15-24 @ 22:09)    folic acid   1 milliGRAM(s) Oral (08-16-24 @ 11:18)    lidocaine   4% Patch   1 Patch Transdermal (08-16-24 @ 12:34)    meropenem  IVPB   100 mL/Hr IV Intermittent (08-16-24 @ 11:18)    methocarbamol   750 milliGRAM(s) Oral (08-16-24 @ 03:05)    midodrine.   20 milliGRAM(s) Oral (08-16-24 @ 05:28)   20 milliGRAM(s) Oral (08-15-24 @ 22:09)    multivitamin   1 Tablet(s) Oral (08-16-24 @ 11:18)    norepinephrine Infusion   15 mL/Hr IV Continuous (08-15-24 @ 17:20)    oxyCODONE    IR   5 milliGRAM(s) Oral (08-16-24 @ 05:29)    pantoprazole  Injectable   40 milliGRAM(s) IV Push (08-16-24 @ 05:28)   40 milliGRAM(s) IV Push (08-15-24 @ 17:18)    Phoxillum Filtration BK 4 / 2.5   200 mL/Hr CRRT (08-15-24 @ 17:19)    Phoxillum Filtration BK 4 / 2.5   1700 mL/Hr CRRT (08-15-24 @ 17:19)    piperacillin/tazobactam IVPB..   25 mL/Hr IV Intermittent (08-16-24 @ 00:32)    polyethylene glycol 3350   17 Gram(s) Oral (08-16-24 @ 11:19)   17 Gram(s) Oral (08-15-24 @ 17:18)    PrismaSOL Filtration BGK 4 / 2.5   1500 mL/Hr CRRT (08-15-24 @ 17:20)    rifAXIMin   550 milliGRAM(s) Oral (08-16-24 @ 05:28)   550 milliGRAM(s) Oral (08-15-24 @ 17:18)    thiamine   100 milliGRAM(s) Oral (08-16-24 @ 11:19)    vasopressin Infusion   4.5 mL/Hr IV Continuous (08-15-24 @ 17:20)      PHYSICAL EXAM:   Vital Signs last 24 hours:  T(F): 97.9 (08-16-24 @ 11:00), Max: 98.4 (08-15-24 @ 19:00)  HR: 67 (08-16-24 @ 14:00) (52 - 86)  BP: --  BP(mean): --  ABP: 116/51 (08-16-24 @ 14:00) (106/28 - 131/43)  ABP(mean): 71 (08-16-24 @ 14:00) (44 - 71)  RR: 33 (08-16-24 @ 14:00) (13 - 37)  SpO2: 96% (08-16-24 @ 14:00) (85% - 100%)    I&Os:    08-15-24 @ 07:01  -  08-16-24 @ 07:00  --------------------------------------------------------  IN:    Albumin 5%  - 250 mL: 500 mL    IV PiggyBack: 450 mL    Norepinephrine: 539 mL    Oral Fluid: 350 mL    Vasopressin: 108 mL  Total IN: 1947 mL    OUT:    Other (mL): 1301 mL  Total OUT: 1301 mL    Total NET: 646 mL      08-16-24 @ 07:01  -  08-16-24 @ 14:10  --------------------------------------------------------  IN:    IV PiggyBack: 50 mL    Norepinephrine: 134.4 mL    Vasopressin: 31.5 mL  Total IN: 215.9 mL    OUT:    Other (mL): 177 mL  Total OUT: 177 mL    Total NET: 38.9 mL    BMI (kg/m2): 33.2 (08-12-24 @ 16:46)  GENERAL: obese man, in no acute distress.   NEURO: Mental status waxes and wanes.    HEENT: Scleral icterus  CHEST: Bilateral breath sounds, decreased in R base. No respiratory distress.   CARDIAC: Regular rate and rhythm  ABDOMEN: Soft, non-tender, distended. Present bowel sounds. +anasarca  EXTREMITIES: warm, well perfused, BLE pitting edema   SKIN: +Jaundiced, no rash/ecchymoses    DIAGNOSTICS:  WBC      Hg       PLT      Na       K        CO2     BUN      Cr       ALT      AST      TB       ALP  ------   ------   ------   135      4.3      21       9        1.52     19       47       17.6     123      08-16-24 @ 12:00  ------   ------   ------   136      4.2      22       9        1.67     19       49       17.4     124      08-16-24 @ 06:31  13.28    7.1      77       ------   ------   ------   ------   ------   ------   ------   ------   ------   08-16-24 @ 06:30  13.27    7.3      76       135      4.1      22       10       1.83     17       46       17.0     119      08-16-24 @ 00:37  ------   ------   ------   134      4.0      21       12       2.06     17       41       15.8     113      08-15-24 @ 17:33    PT             INR            MELDwith  MELDw/o  33.9           3.19           --             --             08-16-24 @ 00:37  31.7           2.97           --             --             08-15-24 @ 12:25  28.5           2.80           --             --             08-15-24 @ 06:00  42.7           4.24           --             --             08-15-24 @ 00:27  40.6           4.03           --             --             08-14-24 @ 12:21   Interval Events:   Afebrile last 24h   Norepinephrine (0.09) and Vasopressin (0.03)  Antibiotics broaden to Meropenem and Fluc   On CRRT net positive 700ml. Anuric.   2 reported BM in last 24h   Meld score 42     ROS:   Patient complains of abdominal distention and discomfort.   Otherwise assessment is limited due to AMS.      Hospital Medications:  chlorhexidine 2% Cloths 1 Application(s) Topical <User Schedule>  CRRT Treatment    <Continuous>  fluconAZOLE IVPB 200 milliGRAM(s) IV Intermittent every 24 hours  fluconAZOLE IVPB      folic acid 1 milliGRAM(s) Oral daily  lidocaine   4% Patch 1 Patch Transdermal daily  melatonin 5 milliGRAM(s) Oral at bedtime  meropenem  IVPB 1000 milliGRAM(s) IV Intermittent every 8 hours  meropenem  IVPB      midodrine. 20 milliGRAM(s) Oral every 8 hours  multivitamin 1 Tablet(s) Oral daily  norepinephrine Infusion 0.07 MICROgram(s)/kG/Min (15 mL/Hr) IV Continuous <Continuous>  pantoprazole  Injectable 40 milliGRAM(s) IV Push two times a day  Phoxillum Filtration BK 4 / 2.5 5000 milliLiter(s) (1700 mL/Hr) CRRT <Continuous>  Phoxillum Filtration BK 4 / 2.5 5000 milliLiter(s) (200 mL/Hr) CRRT <Continuous>  polyethylene glycol 3350 17 Gram(s) Oral <User Schedule>  PrismaSOL Filtration BGK 4 / 2.5 5000 milliLiter(s) (1500 mL/Hr) CRRT <Continuous>  rifAXIMin 550 milliGRAM(s) Oral two times a day  thiamine 100 milliGRAM(s) Oral daily  vasopressin Infusion 0.03 Unit(s)/Min (4.5 mL/Hr) IV Continuous <Continuous>    MAR over past 24 hours:    albumin human  5% IVPB   250 mL/Hr IV Intermittent (08-15-24 @ 14:33)    fluconAZOLE IVPB   100 mL/Hr IV Intermittent (08-15-24 @ 22:09)    folic acid   1 milliGRAM(s) Oral (08-16-24 @ 11:18)    lidocaine   4% Patch   1 Patch Transdermal (08-16-24 @ 12:34)    meropenem  IVPB   100 mL/Hr IV Intermittent (08-16-24 @ 11:18)    methocarbamol   750 milliGRAM(s) Oral (08-16-24 @ 03:05)    midodrine.   20 milliGRAM(s) Oral (08-16-24 @ 05:28)   20 milliGRAM(s) Oral (08-15-24 @ 22:09)    multivitamin   1 Tablet(s) Oral (08-16-24 @ 11:18)    norepinephrine Infusion   15 mL/Hr IV Continuous (08-15-24 @ 17:20)    oxyCODONE    IR   5 milliGRAM(s) Oral (08-16-24 @ 05:29)    pantoprazole  Injectable   40 milliGRAM(s) IV Push (08-16-24 @ 05:28)   40 milliGRAM(s) IV Push (08-15-24 @ 17:18)    Phoxillum Filtration BK 4 / 2.5   200 mL/Hr CRRT (08-15-24 @ 17:19)    Phoxillum Filtration BK 4 / 2.5   1700 mL/Hr CRRT (08-15-24 @ 17:19)    piperacillin/tazobactam IVPB..   25 mL/Hr IV Intermittent (08-16-24 @ 00:32)    polyethylene glycol 3350   17 Gram(s) Oral (08-16-24 @ 11:19)   17 Gram(s) Oral (08-15-24 @ 17:18)    PrismaSOL Filtration BGK 4 / 2.5   1500 mL/Hr CRRT (08-15-24 @ 17:20)    rifAXIMin   550 milliGRAM(s) Oral (08-16-24 @ 05:28)   550 milliGRAM(s) Oral (08-15-24 @ 17:18)    thiamine   100 milliGRAM(s) Oral (08-16-24 @ 11:19)    vasopressin Infusion   4.5 mL/Hr IV Continuous (08-15-24 @ 17:20)      PHYSICAL EXAM:   Vital Signs last 24 hours:  T(F): 97.9 (08-16-24 @ 11:00), Max: 98.4 (08-15-24 @ 19:00)  HR: 67 (08-16-24 @ 14:00) (52 - 86)  BP: --  BP(mean): --  ABP: 116/51 (08-16-24 @ 14:00) (106/28 - 131/43)  ABP(mean): 71 (08-16-24 @ 14:00) (44 - 71)  RR: 33 (08-16-24 @ 14:00) (13 - 37)  SpO2: 96% (08-16-24 @ 14:00) (85% - 100%)    I&Os:    08-15-24 @ 07:01  -  08-16-24 @ 07:00  --------------------------------------------------------  IN:    Albumin 5%  - 250 mL: 500 mL    IV PiggyBack: 450 mL    Norepinephrine: 539 mL    Oral Fluid: 350 mL    Vasopressin: 108 mL  Total IN: 1947 mL    OUT:    Other (mL): 1301 mL  Total OUT: 1301 mL    Total NET: 646 mL      08-16-24 @ 07:01  -  08-16-24 @ 14:10  --------------------------------------------------------  IN:    IV PiggyBack: 50 mL    Norepinephrine: 134.4 mL    Vasopressin: 31.5 mL  Total IN: 215.9 mL    OUT:    Other (mL): 177 mL  Total OUT: 177 mL    Total NET: 38.9 mL    BMI (kg/m2): 33.2 (08-12-24 @ 16:46)  GENERAL: obese man, in no acute distress.   NEURO: Mental status waxes and wanes.    HEENT: Scleral icterus  CHEST: Bilateral breath sounds, decreased in R base. No respiratory distress.   CARDIAC: Regular rate and rhythm  ABDOMEN: Soft, non-tender, distended. Present bowel sounds. +anasarca  EXTREMITIES: warm, well perfused, BLE pitting edema   SKIN: +Jaundiced, no rash/ecchymoses    DIAGNOSTICS:  WBC      Hg       PLT      Na       K        CO2     BUN      Cr       ALT      AST      TB       ALP  ------   ------   ------   135      4.3      21       9        1.52     19       47       17.6     123      08-16-24 @ 12:00  ------   ------   ------   136      4.2      22       9        1.67     19       49       17.4     124      08-16-24 @ 06:31  13.28    7.1      77       ------   ------   ------   ------   ------   ------   ------   ------   ------   08-16-24 @ 06:30  13.27    7.3      76       135      4.1      22       10       1.83     17       46       17.0     119      08-16-24 @ 00:37  ------   ------   ------   134      4.0      21       12       2.06     17       41       15.8     113      08-15-24 @ 17:33    PT             INR            MELDwith  MELDw/o  33.9           3.19           --             --             08-16-24 @ 00:37  31.7           2.97           --             --             08-15-24 @ 12:25  28.5           2.80           --             --             08-15-24 @ 06:00  42.7           4.24           --             --             08-15-24 @ 00:27  40.6           4.03           --             --             08-14-24 @ 12:21

## 2024-08-17 NOTE — PROGRESS NOTE ADULT - ASSESSMENT
76 yo m with alcohol abuse otherwise no known chronic medical issues (does not see doctors per sister) s/p 1 month stay at [facility] now transferred to [facility] for liver transplant eval.  Most of history obtained from sister (Ashlyn) at bedside and some from transfer paperwork. Per sister, pt was initially brought to the hospital by family for back pain and jaundice for unclear amount of time. Patient was seen by GI and underwent EGD soon after admission which only showed nonbleeding ?duodenal ulcers (no intervention) however few days later pt developed active signs of bleeding and emergently underwent EGD again showing bleeding esophageal varices which were clipped.  pt was electively intubated with stay in ICU thereafter. Patient then started with HD due to worsening renal function and MS for past 2.5 weeks at times limited by low BP requiring pressors to assist. Had numerous paracentesis with varying amount of fluid removal - albumin infusions. Sister not aware of any concerns for acute infection during his stay but aware that pt was on abx at some point due to bleeding issues.        PERTINENT RADIOLOGY:  CXR: Tubes and lines as above. No focal consolidations  CT Chest, A&P:  Patchy ground-glass opacities in the bilateral upper lobes. *  Cirrhosis with evidence of portal hypertension. *  Hypodense lesion in the periphery of the left hepatic lobe of uncertain etiology. Somewhat wedge-shaped morphology raises the possibility for a small infarct. Question subtle peripheral nodular enhancement, and a hemangioma is also considered. Contrast enhanced abdominal MRI can be performed for further characterization and to assess for other possibilities. *  A wedge-shaped region of hypoattenuation in the spleen may reflect a small infarct. *  Focal eccentric wall thickening in the low rectum, possibly due to a mural fold. Consider colonoscopy. *  Large volume ascites.  CT Head: No evidence of acute intracranial pathology. If clinical symptoms persist or worsen, more sensitive evaluation with brain MRI may be obtained, if no contraindications exist.    BCx (8/12) 1/4 MRSE  BCx (8/14) NGTD  Paracentesis (8/14) Cell Counts Negative for SBP  MRSA/MSSA Nasal PCR (8/16) Negative    CXR (8/16) Progression of infiltrates    #Decompensated liver cirrhosis, Leukocytosis, Shock  --Given worsening status no objection to broadening to Meropenem  --Can continue Empiric fluconazole  --Continue to follow CBC with diff  --Continue to follow transaminases  --Continue to follow temperature curve  --Follow up on preliminary blood cultures    #Positive BCx (8/12) (Staph epi)  Consistent with contaminant   --Follow up on preliminary blood cultures    #CMV PCR   --Low level CMV viremia is noted, doubt this is clinically relevant. Would repeat CMV PCR on 8/23    #Pre Transplant Evaluation   HIV-1/2 Combo Result: Nonreactive  EBVPCR Log: NotDetec [id number]/mL   Hepatitis A IgG Ab Result: Reactive   Hepatitis B Core Antibody, Total: Nonreactive  Hepatitis B Surface Antibody: Reactive   Hepatitis B DNA PCR Log: Not Detected  Hepatitis B Surface Antigen: Nonreactive  Hepatitis C Virus Interpretation: Nonreactive  COVID-19 De Domain Antibody: Positive  Treponema Pallidum Antibody Interpretation: Negative  HSV 1 IgG  Positive/HSV 2 IgG Negative  EBV Serology Positive  CMV IgG Positive  VZV IgG Positive  Measles Positive, Mumps Positive and Rubella IgG Positive  Toxoplasma IgG Positive  QuantiFeron Gold Negative  Strongyloides Ab Negative  Babesia PCR  --Follow up on Schistosoma IgG  --Reportedly Lyme serology was previously positive, follow up on Tick Diseases Panel    #Encounter to Vaccinate Patient - Will defer vaccination in context of critical illness  COVID19: No primary series. Would benefit from COVID19 3462-0093 dose  Influenza: Would benefit from dose next season  Pneumococcal: Would benefit from PCV20  HAV: Immune, no additional vaccines indicated  HBV: Immune, no additional vaccines indicated  MMR: Immune, will not require further vaccination  Varicella: Immune, will not require further vaccination  Shingles: Would benefit from Shingrix  Tdap: Would benefit from Tdap      Herbie Rashid MD  Can be called via Teams  After 5pm/weekends [phone]   76 yo m with alcohol abuse otherwise no known chronic medical issues (does not see doctors per sister) s/p 1 month stay at [facility] now transferred to [facility] for liver transplant eval.  Most of history obtained from sister (Ashlyn) at bedside and some from transfer paperwork. Per sister, pt was initially brought to the hospital by family for back pain and jaundice for unclear amount of time. Patient was seen by GI and underwent EGD soon after admission which only showed nonbleeding ?duodenal ulcers (no intervention) however few days later pt developed active signs of bleeding and emergently underwent EGD again showing bleeding esophageal varices which were clipped.  pt was electively intubated with stay in ICU thereafter. Patient then started with HD due to worsening renal function and MS for past 2.5 weeks at times limited by low BP requiring pressors to assist. Had numerous paracentesis with varying amount of fluid removal - albumin infusions. Sister not aware of any concerns for acute infection during his stay but aware that pt was on abx at some point due to bleeding issues.        PERTINENT RADIOLOGY:  CXR: Tubes and lines as above. No focal consolidations  CT Chest, A&P:  Patchy ground-glass opacities in the bilateral upper lobes. *  Cirrhosis with evidence of portal hypertension. *  Hypodense lesion in the periphery of the left hepatic lobe of uncertain etiology. Somewhat wedge-shaped morphology raises the possibility for a small infarct. Question subtle peripheral nodular enhancement, and a hemangioma is also considered. Contrast enhanced abdominal MRI can be performed for further characterization and to assess for other possibilities. *  A wedge-shaped region of hypoattenuation in the spleen may reflect a small infarct. *  Focal eccentric wall thickening in the low rectum, possibly due to a mural fold. Consider colonoscopy. *  Large volume ascites.  CT Head: No evidence of acute intracranial pathology. If clinical symptoms persist or worsen, more sensitive evaluation with brain MRI may be obtained, if no contraindications exist.    BCx (8/12) 1/4 MRSE  BCx (8/14) NGTD  Paracentesis (8/14) Cell Counts Negative for SBP  MRSA/MSSA Nasal PCR (8/16) Negative    CXR (8/16) Progression of infiltrates    #Decompensated liver cirrhosis, Leukocytosis, Shock  --Given worsening status no objection to broadening to Meropenem  --Can continue Empiric fluconazole  --Continue to follow CBC with diff  --Continue to follow transaminases  --Continue to follow temperature curve  --Follow up on preliminary blood cultures    #Positive BCx (8/12) (Staph epi)  Consistent with contaminant   --Follow up on preliminary blood cultures    #CMV PCR   --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 Would repeat CMV PCR   -- may need to treat if increasing  Cytomegalovirus By PCR: 4730 IU/mL (08.16.24 @ 00:37)        #Pre Transplant Evaluation   HIV-1/2 Combo Result: Nonreactive  EBVPCR Log: NotDetec [id number]/mL   Hepatitis A IgG Ab Result: Reactive   Hepatitis B Core Antibody, Total: Nonreactive  Hepatitis B Surface Antibody: Reactive   Hepatitis B DNA PCR Log: Not Detected  Hepatitis B Surface Antigen: Nonreactive  Hepatitis C Virus Interpretation: Nonreactive  COVID-19 De Domain Antibody: Positive  Treponema Pallidum Antibody Interpretation: Negative  HSV 1 IgG  Positive/HSV 2 IgG Negative  EBV Serology Positive  CMV IgG Positive  VZV IgG Positive  Measles Positive, Mumps Positive and Rubella IgG Positive  Toxoplasma IgG Positive  QuantiFeron Gold Negative  Strongyloides Ab Negative  Babesia PCR  --Follow up on Schistosoma IgG  --Reportedly Lyme serology was previously positive, follow up on Tick Diseases Panel    #Encounter to Vaccinate Patient - Will defer vaccination in context of critical illness  COVID19: No primary series. Would benefit from COVID19 6360-2752 dose  Influenza: Would benefit from dose next season  Pneumococcal: Would benefit from PCV20  HAV: Immune, no additional vaccines indicated  HBV: Immune, no additional vaccines indicated  MMR: Immune, will not require further vaccination  Varicella: Immune, will not require further vaccination  Shingles: Would benefit from Shingrix  Tdap: Would benefit from Tdap      Herbie Rashid MD  Can be called via Teams  After 5pm/weekends [phone]

## 2024-08-17 NOTE — PROGRESS NOTE ADULT - ASSESSMENT
69 yo M with obesity and risky alcohol consumption (with decades' history of daily consumption of homemade alcohol). Admitted to [facility] for 1 month due to recent onset of jaundice and with a prolonged hospital/ICU course due to newly diagnosed decompensated cirrhosis with acute on chronic liver failure (ACLF) with shock, FANY (started on intermittent HD since 7/9), acute on chronic anemia with recurrent overt GI bleeding, and hepatic encephalopathy.   Transferred to [facility] on 8/12/24 for SLK evaluation and then transferred to the SICU for initiation of CRRT on 8/13.     # Distributive shock  - resolved.   - Likely from ACLF and septic shock. Previously on norepinephrine, vasopressin and midodrine. Now off pressors but on midodrine, blood pressures 90s-100s/50s with stable heart rates.   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 Hold off on Valcyte at this time.   - Zosyn (8/12-16) switched to Meropenem (8/16- ) and continued on fluconazole (8/12- ) empirically with transplant ID following.     # Anuric FANY on CKD    - On intermittent HD since 7/9   - Started on CRRT (8/13- ) with Transplant nephrology following.  - Remains anuric and with marked anasarca on net positive volumes due to inability to remove fluid via CRRT given hypotension and pressor requirements.   - Patient currently has 50 cc/hour, not on diuretics. Cr normalized now.     # Active UGIB - resolved.   - No further overt GI bleeding since transfer but has received blood products as per H/H and TEGs.  - EGD (7/12, at [facility]) with small non-bleeding EV and a duodenal ulcer with a visible vessel.   - Continuing pantoprazole 40 mg iv q12h  - Plan to keep monitoring H/H and transfuse as needed to keep Hb >7.     # Newly diagnosed decompensated cirrhosis with acute on chronic liver failure   - Calculated MELD 3 score on 817 - 42.   - Waxing and waning hepatic encephalopathy managed with Rifaximin and Miralax. Lactulose discontinued due distended and tympanic abdomen. Bowel movements at goal.   - Large volume ascites and anasarca on admission. LVP (8/14) negative for SBP.   - Contrast CT (8/13) with patent portohepatic vessels and no evidence of HCC.     - Will need daily PT and OT. Last ambulatory prior to hospital admission in July 2024.    - ABO O. MELD 3.0 score of 41 with OLT evaluation started on 8/12 and will meet SLK criteria in Aug 20th after 6 weeks of being HD-dependent. However, patient is currently not on dialysis due to hypotension, has urine o/p with Cr improved. Discussed at our multidisciplinary liver transplant selection meeting on 8/16 and at the time patient needs to be optimized medically before proceeding with evaluation.   - Pending [facility] for cardiology clearance, and sigmoidoscopy for assessment of recto-sigmoid mass.     # Newly found liver and spleen hypodensity suggestive of small infarctions   - IV contrast CT abdomen pelvis (8/13) showing left hepatic lobe wedge shaped hypodensity (small infarct vs hemangioma) and another wedge shaped hypodensity in the spleen also suggestive of small infarct.   - Small infarcts could be secondary to hypovolemia/shock or possible embolization. Patient would benefit from hypercoagulable work up.     Discussed the case with Dr. Jacobs.     All recommendations are tentative until note is attested by an attending.     Loan Alexander, PGY-6  Gastroenterology/Hepatology Fellow  Available on Microsoft Teams  [phone] (Short Range Pager)  [phone] (Long Range Pager)    After 5pm, please contact the on-call GI fellow. 67 yo M with obesity and risky alcohol consumption (with decades' history of daily consumption of homemade alcohol). Admitted to [facility] for 1 month due to recent onset of jaundice and with a prolonged hospital/ICU course due to newly diagnosed decompensated cirrhosis with acute on chronic liver failure (ACLF) with shock, FANY (started on intermittent HD since 7/9), acute on chronic anemia with recurrent overt GI bleeding, and hepatic encephalopathy.   Transferred to [facility] on 8/12/24 for SLK evaluation and then transferred to the SICU for initiation of CRRT on 8/13.     # Distributive shock  - resolved.   - Likely from ACLF and septic shock. Previously on norepinephrine, vasopressin and midodrine. Now off pressors but on midodrine, blood pressures 90s-100s/50s with stable heart rates.   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 Hold off on Valcyte at this time.   - Zosyn (8/12-16) switched to Meropenem (8/16- ) and continued on fluconazole (8/12- ) empirically with transplant ID following.     # Anuric FANY on CKD    - On intermittent HD since 7/9   - Started on CRRT (8/13- ) with Transplant nephrology following.  - Remains anuric and with marked anasarca on net positive volumes due to inability to remove fluid via CRRT given hypotension and pressor requirements.     # Active UGIB - resolved.   - No further overt GI bleeding since transfer but has received blood products as per H/H and TEGs.  - EGD (7/12, at [facility]) with small non-bleeding EV and a duodenal ulcer with a visible vessel.   - Continuing pantoprazole 40 mg iv q12h  - Plan to keep monitoring H/H and transfuse as needed to keep Hb >7.     # Newly diagnosed decompensated cirrhosis with acute on chronic liver failure   - Calculated MELD 3 score on 8/17 - 42.   - Waxing and waning hepatic encephalopathy managed with Rifaximin and Miralax. Lactulose discontinued due distended and tympanic abdomen. Bowel movements at goal.   - Large volume ascites and anasarca on admission. LVP (8/14) negative for SBP.   - Contrast CT (8/13) with patent portohepatic vessels and no evidence of HCC.     - Will need daily PT and OT. Last ambulatory prior to hospital admission in July 2024.    - ABO O. MELD 3.0 score of 41 with OLT evaluation started on 8/12 and will meet SLK criteria in Aug 20th after 6 weeks of being HD-dependent. However, patient is currently not on dialysis due to hypotension, has urine o/p with Cr improved. Discussed at our multidisciplinary liver transplant selection meeting on 8/16 and at the time patient needs to be optimized medically before proceeding with evaluation.   - Pending [facility] for cardiology clearance, and sigmoidoscopy for assessment of recto-sigmoid mass.     # Newly found liver and spleen hypodensity suggestive of small infarctions   - IV contrast CT abdomen pelvis (8/13) showing left hepatic lobe wedge shaped hypodensity (small infarct vs hemangioma) and another wedge shaped hypodensity in the spleen also suggestive of small infarct.   - Small infarcts could be secondary to hypovolemia/shock or possible embolization. Patient would benefit from hypercoagulable work up.     Discussed the case with Dr. Jacobs.     All recommendations are tentative until note is attested by an attending.     Loan Alexander, PGY-6  Gastroenterology/Hepatology Fellow  Available on [employer] Teams  [phone] (Short Range Pager)  [phone] (Long Range Pager)    After 5pm, please contact the on-call GI fellow.

## 2024-08-17 NOTE — PROGRESS NOTE ADULT - SUBJECTIVE AND OBJECTIVE BOX
INFECTIOUS DISEASES FOLLOW UP-- Omayra Rashid  [phone]    This is a follow up note for this  68yMale with  Acute or subacute hepatic failure without coma        ROS:  CONSTITUTIONAL:  No fever, good appetite  CARDIOVASCULAR:  No chest pain or palpitations  RESPIRATORY:  No dyspnea  GASTROINTESTINAL:  No nausea, vomiting, diarrhea, or abdominal pain  GENITOURINARY:  No dysuria  NEUROLOGIC:  No headache,     Allergies    No Known Allergies    Intolerances        ANTIBIOTICS/RELEVANT:  antimicrobials  fluconAZOLE IVPB 200 milliGRAM(s) IV Intermittent every 24 hours  fluconAZOLE IVPB      meropenem  IVPB 1000 milliGRAM(s) IV Intermittent every 8 hours  meropenem  IVPB      rifAXIMin 550 milliGRAM(s) Oral two times a day    immunologic:    OTHER:  chlorhexidine 2% Cloths 1 Application(s) Topical <User Schedule>  CRRT Treatment    <Continuous>  folic acid 1 milliGRAM(s) Oral daily  hydrocortisone sodium succinate Injectable 50 milliGRAM(s) IV Push every 6 hours  lidocaine   4% Patch 1 Patch Transdermal daily  melatonin 5 milliGRAM(s) Oral at bedtime  midodrine. 20 milliGRAM(s) Oral every 8 hours  multivitamin 1 Tablet(s) Oral daily  norepinephrine Infusion 0.07 MICROgram(s)/kG/Min IV Continuous <Continuous>  pantoprazole  Injectable 40 milliGRAM(s) IV Push two times a day  Phoxillum Filtration BK 4 / 2.5 5000 milliLiter(s) CRRT <Continuous>  Phoxillum Filtration BK 4 / 2.5 5000 milliLiter(s) CRRT <Continuous>  polyethylene glycol 3350 17 Gram(s) Oral <User Schedule>  PrismaSOL Filtration BGK 4 / 2.5 5000 milliLiter(s) CRRT <Continuous>  thiamine 100 milliGRAM(s) Oral daily  vasopressin Infusion 0.03 Unit(s)/Min IV Continuous <Continuous>      Objective:  Vital Signs Last 24 Hrs  T(C): 36.5 (17 Aug 2024 11:00), Max: 37.1 (16 Aug 2024 15:00)  T(F): 97.7 (17 Aug 2024 11:00), Max: 98.7 (16 Aug 2024 15:00)  HR: 76 (17 Aug 2024 11:15) (49 - 77)  BP: 110/56 (17 Aug 2024 07:15) (110/56 - 115/53)  BP(mean): 80 (17 Aug 2024 07:15) (75 - 80)  RR: 19 (17 Aug 2024 11:15) (13 - 34)  SpO2: 99% (17 Aug 2024 11:15) (91% - 100%)    Parameters below as of 17 Aug 2024 07:30  Patient On (Oxygen Delivery Method): room air        PHYSICAL EXAM:  Constitutional:no acute distress  Eyes:CAT, EOMI  Ear/Nose/Throat: no oral lesions, 	  Respiratory: clear BL  Cardiovascular: S1S2  Gastrointestinal:soft, (+) BS, no tenderness  Extremities:no e/e/c  No Lymphadenopathy  IV sites not inflammed.    LABS:                        7.5    12.88 )-----------( 94       ( 17 Aug 2024 10:30 )             22.4     08-17    135  |  101  |  9   ----------------------------<  157<H>  4.6   |  20<L>  |  1.21    Ca    8.4      17 Aug 2024 10:30  Phos  3.4     08-17  Mg     2.4     08-17    TPro  6.2  /  Alb  3.6  /  TBili  18.5<H>  /  DBili  x   /  AST  51<H>  /  ALT  23  /  AlkPhos  147<H>  08-17    PT/INR - ( 17 Aug 2024 04:17 )   PT: 34.1 sec;   INR: 3.36 ratio         PTT - ( 17 Aug 2024 04:17 )  PTT:73.7 sec  Urinalysis Basic - ( 17 Aug 2024 10:30 )    Color: x / Appearance: x / SG: x / pH: x  Gluc: 157 mg/dL / Ketone: x  / Bili: x / Urobili: x   Blood: x / Protein: x / Nitrite: x   Leuk Esterase: x / RBC: x / WBC x   Sq Epi: x / Non Sq Epi: x / Bacteria: x        MICROBIOLOGY:            RECENT CULTURES:  08-14 @ 18:16  Peritoneal Peritoneal Fluid  --  --  --    No growth  --  08-14 @ 16:10  .Blood Blood-Venous  --  --  --    No growth at 48 Hours  --  08-14 @ 10:49  .Blood Blood-Venous  --  --  --    No growth at 48 Hours  --  08-14 @ 00:05  .Blood Blood-Peripheral  --  --  --    No growth at 72 Hours  --  08-12 @ 17:37  .Blood Blood  --  --  --    No growth at 4 days  --  08-12 @ 17:36  .Blood Blood  Blood Culture PCR  Blood Culture PCR  PCR    Growth in aerobic bottle: Staphylococcus epidermidis Isolation of  Coagulase negative Staphylococcus from single blood culture sets may  represent  contamination. Contact the Microbiology Department at [phone] if  susceptibility testing is  clinically indicated.  Direct identification is available within approximately 3-5  hours either by Blood Panel Multiplexed PCR or Direct  MALDI-TOF. Details: [email or web address]  --      RADIOLOGY & ADDITIONAL STUDIES:    < from: Xray Chest 1 View- PORTABLE-Routine (Xray Chest 1 View- PORTABLE-Routine in AM.) (08.16.24 @ 07:38) >  COMPARISON STUDY: 8/12/2024    Frontal expiratory view of the chest shows the heart to be similar in   size. Right permacatheter and right PICC are in similar positions.    The lungs are clear and there is no evidence of pneumothorax nor pleural   effusion.    Chest one view 8/15/2024 6:35 AM  Compared to the prior study, there is developing right upper lobe   infiltrate.    Chest one view 8/16/2024 6:40 AM  Compared to the prior study, there is progression of right upper lobe   infiltrate with patchy left lung infiltrates as well.    IMPRESSION:  Progression of infiltrates.    < end of copied text >   INFECTIOUS DISEASES FOLLOW UP-- Omayra Rashid  [phone]    This is a follow up note for this  68yMale with  Acute or subacute hepatic failure without coma        ROS:  CONSTITUTIONAL:  No fever, good appetite  CARDIOVASCULAR:  No chest pain or palpitations  RESPIRATORY:  No dyspnea  GASTROINTESTINAL:  No nausea, vomiting, diarrhea, or abdominal pain  GENITOURINARY:  No dysuria  NEUROLOGIC:  No headache,     Allergies    No Known Allergies    Intolerances        ANTIBIOTICS/RELEVANT:  antimicrobials  fluconAZOLE IVPB 200 milliGRAM(s) IV Intermittent every 24 hours  fluconAZOLE IVPB      meropenem  IVPB 1000 milliGRAM(s) IV Intermittent every 8 hours  meropenem  IVPB      rifAXIMin 550 milliGRAM(s) Oral two times a day    immunologic:    OTHER:  chlorhexidine 2% Cloths 1 Application(s) Topical <User Schedule>  CRRT Treatment    <Continuous>  folic acid 1 milliGRAM(s) Oral daily  hydrocortisone sodium succinate Injectable 50 milliGRAM(s) IV Push every 6 hours  lidocaine   4% Patch 1 Patch Transdermal daily  melatonin 5 milliGRAM(s) Oral at bedtime  midodrine. 20 milliGRAM(s) Oral every 8 hours  multivitamin 1 Tablet(s) Oral daily  norepinephrine Infusion 0.07 MICROgram(s)/kG/Min IV Continuous <Continuous>  pantoprazole  Injectable 40 milliGRAM(s) IV Push two times a day  Phoxillum Filtration BK 4 / 2.5 5000 milliLiter(s) CRRT <Continuous>  Phoxillum Filtration BK 4 / 2.5 5000 milliLiter(s) CRRT <Continuous>  polyethylene glycol 3350 17 Gram(s) Oral <User Schedule>  PrismaSOL Filtration BGK 4 / 2.5 5000 milliLiter(s) CRRT <Continuous>  thiamine 100 milliGRAM(s) Oral daily  vasopressin Infusion 0.03 Unit(s)/Min IV Continuous <Continuous>      Objective:  Vital Signs Last 24 Hrs  T(C): 36.5 (17 Aug 2024 11:00), Max: 37.1 (16 Aug 2024 15:00)  T(F): 97.7 (17 Aug 2024 11:00), Max: 98.7 (16 Aug 2024 15:00)  HR: 76 (17 Aug 2024 11:15) (49 - 77)  BP: 110/56 (17 Aug 2024 07:15) (110/56 - 115/53)  BP(mean): 80 (17 Aug 2024 07:15) (75 - 80)  RR: 19 (17 Aug 2024 11:15) (13 - 34)  SpO2: 99% (17 Aug 2024 11:15) (91% - 100%)    Parameters below as of 17 Aug 2024 07:30  Patient On (Oxygen Delivery Method): room air        PHYSICAL EXAM:  Constitutional:no acute distress  Eyes:CAT, EOMI  Ear/Nose/Throat: no oral lesions, 	  Respiratory: clear BL  Cardiovascular: S1S2  Gastrointestinal:soft, (+) BS, no tenderness, increased abdominal girth  Extremities:no e/e/c  No Lymphadenopathy  IV sites not inflammed.    LABS:                        7.5    12.88 )-----------( 94       ( 17 Aug 2024 10:30 )             22.4     08-17    135  |  101  |  9   ----------------------------<  157<H>  4.6   |  20<L>  |  1.21    Ca    8.4      17 Aug 2024 10:30  Phos  3.4     08-17  Mg     2.4     08-17    TPro  6.2  /  Alb  3.6  /  TBili  18.5<H>  /  DBili  x   /  AST  51<H>  /  ALT  23  /  AlkPhos  147<H>  08-17    PT/INR - ( 17 Aug 2024 04:17 )   PT: 34.1 sec;   INR: 3.36 ratio         PTT - ( 17 Aug 2024 04:17 )  PTT:73.7 sec  Urinalysis Basic - ( 17 Aug 2024 10:30 )    Color: x / Appearance: x / SG: x / pH: x  Gluc: 157 mg/dL / Ketone: x  / Bili: x / Urobili: x   Blood: x / Protein: x / Nitrite: x   Leuk Esterase: x / RBC: x / WBC x   Sq Epi: x / Non Sq Epi: x / Bacteria: x        MICROBIOLOGY:            RECENT CULTURES:  08-14 @ 18:16  Peritoneal Peritoneal Fluid  --  --  --    No growth  --  08-14 @ 16:10  .Blood Blood-Venous  --  --  --    No growth at 48 Hours  --  08-14 @ 10:49  .Blood Blood-Venous  --  --  --    No growth at 48 Hours  --  08-14 @ 00:05  .Blood Blood-Peripheral  --  --  --    No growth at 72 Hours  --  08-12 @ 17:37  .Blood Blood  --  --  --    No growth at 4 days  --  08-12 @ 17:36  .Blood Blood  Blood Culture PCR  Blood Culture PCR  PCR    Growth in aerobic bottle: Staphylococcus epidermidis Isolation of  Coagulase negative Staphylococcus from single blood culture sets may  represent  contamination. Contact the Microbiology Department at [phone] if  susceptibility testing is  clinically indicated.  Direct identification is available within approximately 3-5  hours either by Blood Panel Multiplexed PCR or Direct  MALDI-TOF. Details: [email or web address]  --      RADIOLOGY & ADDITIONAL STUDIES:    < from: Xray Chest 1 View- PORTABLE-Routine (Xray Chest 1 View- PORTABLE-Routine in AM.) (08.16.24 @ 07:38) >  COMPARISON STUDY: 8/12/2024    Frontal expiratory view of the chest shows the heart to be similar in   size. Right permacatheter and right PICC are in similar positions.    The lungs are clear and there is no evidence of pneumothorax nor pleural   effusion.    Chest one view 8/15/2024 6:35 AM  Compared to the prior study, there is developing right upper lobe   infiltrate.    Chest one view 8/16/2024 6:40 AM  Compared to the prior study, there is progression of right upper lobe   infiltrate with patchy left lung infiltrates as well.    IMPRESSION:  Progression of infiltrates.    < end of copied text >

## 2024-08-17 NOTE — PROGRESS NOTE ADULT - ASSESSMENT
68 year old male with  AUD complicated by cirrhosis with Hepatic encephalopathy admitted to [facility] with back pain and jaundice on 7/8/24. Pt had dark / maroon colored stools   EGD that showed esophageal varices and duodenal ulcer with visible vessel. He got transfusions for low Hb and last transfusion was on 8/8. 1st session of IHD was on 7/9/24.     Transplant nephrology consulted for FANY and SLK eval.       1.  FANY VS FANY on CKD: started on HD since 7/9/24  Meets FANY criteria for SLK   -Started on CRRT on 8/13/24  -Pt remains oliguric and on pressor support. Will cw CRRT at current dose.  Electrolytes stable.

## 2024-08-17 NOTE — PROGRESS NOTE ADULT - ASSESSMENT
78 y/o male w/ no known PMHx (does not see doctors per sister) w/ newly diagnosed cirrhosis c/b vasoplegia requiring vasopressor support, small EVs, melena 2/2 a duodenal ulcer s/p clipping, HRS requiring CRRT, hepatic encephalopathy, and ascites s/p multiple LVPs presenting for liver transplant evaluation    Neuro:  - Miralax & rifaximin for prevention of hepatic encephalopathy  - Monitoring ammonia  - Thiamine, folic acid, & multivitamin for h/o risky EtOH use  - Melatonin PRN insomnia    Respiratory  - Out of bed to chair, ambulate as tolerated, and incentive spirometry to prevent atelectasis  - Assess need for diuresis daily as patient w/ pulmonary vascular congestion on CXR    Cardiovascular  - Will wean vasopressor support as tolerated w/ goal MAP > 65  - Midodrine 20 mg q8hrs to help wean off IV vasopressors  - Monitoring lactate    Gastrointestinal and Nutrition  - Regular diet as tolerated  - Miralax & rifaximin for prevention of hepatic encephalopathy  - Protonix BID for h/o duodenal ulcer  - Monitor LFTs  - Undergoing evaluation for SLK transplantation  - 8/14 CT A/P: cirrhosis w/ portal HTN, small infarct in left hepatic lobe, small splenic infarct, focal eccentric wall thickening of rectum, and large volume ascites  - 8/14 paracentesis w/ 5 L drained    Renal:  - CRRT for acute renal failure likely 2/2 HRS  - Appreciate nephrology recommendations  - Undergoing evaluation for SLK transplantation    Heme:  - Venodynes for mechanical VTE prophylaxis; holding chemical VTE prophylaxis as patient is coagulopathic 2/2 cirrhosis    ID:   - Empiric coverage w/ meropenem & fluconazole as per transplant given vasopressor requirements  - Blood cultures w/ MRSE in 1 bottle on 8/12 but repeat cultures sent 8/14 w/ no growth to date    Endo:   - Stress dose steroids for possible adrenal insufficiency given persistent vasopressor requirement

## 2024-08-17 NOTE — PROGRESS NOTE ADULT - SUBJECTIVE AND OBJECTIVE BOX
Transplant Surgery - Multidisciplinary Progress Note  --------------------------------------------------------------    Present:  Patient seen and examined with multidisciplinary Transplant team including  (Surgery: Dr. Dagher  Hepatologist: Dr. Jacobs. RNs in AM rounds. Disciplines not in attendance will be notified of the plan.       HPI  68y with obesity and risky alcohol consumption (with decades' history of daily consumption of homemade alcohol), admitted to [facility] 1 month ago due to recent onset of jaundice and with a prolonged hospital and ICU course there due to newly diagnosed decompensated cirrhosis with acute on chronic liver failure (ACLF) with shock (resolved, but still on midodrine); FANY (on intermittent HD since 7/9); recurrent maroon stools and acute on chronic anemia necessitating intermittent PRBC transfusions (last on 8/8) and hypofibrinoginemia, with EGD (7/12) with small non-bleeding EV and a duodenal ulcer with a visible vessel; and waxing and waning hepatic encephalopathy. He was transferred to [facility] on 8/12/24 for evaluation for combined liver/kidney transplants (SLK).       Interval Events:  - Started stress dose steroids and 1u PRBCs for Hgb 7.1 for persistent vasopressor requirements  - Adjusted fluid removal on CRRT from net even to -50 mL/hr  - Upgraded antibiotics from Zosyn to meropenem as per transplant surgery        Potential Discharge date: Pending clinical course    Education:  Medications    Plan of care:  See Below    MEDICATIONS  (STANDING):  chlorhexidine 2% Cloths 1 Application(s) Topical <User Schedule>  CRRT Treatment    <Continuous>  fluconAZOLE IVPB 200 milliGRAM(s) IV Intermittent every 24 hours  fluconAZOLE IVPB      folic acid 1 milliGRAM(s) Oral daily  hydrocortisone sodium succinate Injectable 50 milliGRAM(s) IV Push every 6 hours  lidocaine   4% Patch 1 Patch Transdermal daily  melatonin 5 milliGRAM(s) Oral at bedtime  meropenem  IVPB 1000 milliGRAM(s) IV Intermittent every 8 hours  meropenem  IVPB      midodrine. 20 milliGRAM(s) Oral every 8 hours  multivitamin 1 Tablet(s) Oral daily  norepinephrine Infusion 0.07 MICROgram(s)/kG/Min (15 mL/Hr) IV Continuous <Continuous>  pantoprazole  Injectable 40 milliGRAM(s) IV Push two times a day  Phoxillum Filtration BK 4 / 2.5 5000 milliLiter(s) (200 mL/Hr) CRRT <Continuous>  Phoxillum Filtration BK 4 / 2.5 5000 milliLiter(s) (1700 mL/Hr) CRRT <Continuous>  polyethylene glycol 3350 17 Gram(s) Oral <User Schedule>  PrismaSOL Filtration BGK 4 / 2.5 5000 milliLiter(s) (1500 mL/Hr) CRRT <Continuous>  rifAXIMin 550 milliGRAM(s) Oral two times a day  sodium phosphate 15 milliMole(s)/250 mL IVPB 15 milliMole(s) IV Intermittent once  thiamine 100 milliGRAM(s) Oral daily  vasopressin Infusion 0.03 Unit(s)/Min (4.5 mL/Hr) IV Continuous <Continuous>    MEDICATIONS  (PRN):      PAST MEDICAL & SURGICAL HISTORY:  Alcohol abuse      No significant past surgical history          Vital Signs Last 24 Hrs  T(C): 36.2 (17 Aug 2024 02:30), Max: 37.1 (16 Aug 2024 15:00)  T(F): 97.1 (17 Aug 2024 02:30), Max: 98.7 (16 Aug 2024 15:00)  HR: 65 (17 Aug 2024 03:00) (49 - 80)  BP: 115/53 (16 Aug 2024 20:00) (115/53 - 115/53)  BP(mean): 75 (16 Aug 2024 20:00) (75 - 75)  RR: 20 (17 Aug 2024 03:00) (13 - 37)  SpO2: 99% (17 Aug 2024 03:00) (85% - 100%)    Parameters below as of 16 Aug 2024 23:00  Patient On (Oxygen Delivery Method): nasal cannula  O2 Flow (L/min): 1      I&O's Summary    15 Aug 2024 07:01  -  16 Aug 2024 07:00  --------------------------------------------------------  IN: 1947 mL / OUT: 1301 mL / NET: 646 mL    16 Aug 2024 07:01  -  17 Aug 2024 03:19  --------------------------------------------------------  IN: 1068.8 mL / OUT: 1013 mL / NET: 55.8 mL                              7.1    14.19 )-----------( 92       ( 16 Aug 2024 18:06 )             20.6     08-16    135  |  101  |  8   ----------------------------<  127<H>  4.4   |  21<L>  |  1.41<H>    Ca    8.5      16 Aug 2024 23:36  Phos  2.8     08-16  Mg     2.3     08-16    TPro  5.8<L>  /  Alb  3.5  /  TBili  16.9<H>  /  DBili  x   /  AST  48<H>  /  ALT  19  /  AlkPhos  137<H>  08-16        Review of systems  Gen: No weight changes, fatigue, fevers/chills, weakness  Skin: No rashes  Head/Eyes/Ears/Mouth: No headache; Normal hearing; Normal vision w/o blurriness; No sinus pain/discomfort, sore throat  Respiratory: No dyspnea, cough, wheezing, hemoptysis  CV: No chest pain, PND, orthopnea  GI: C/O mild abdominal pain at surgical site. no diarrhea, constipation, nausea, vomiting, melena, hematochezia  : No increased frequency, dysuria, hematuria, nocturia  MSK: No joint pain/swelling; no back pain; no edema  Neuro: No dizziness/lightheadedness, weakness, seizures, numbness, tingling  Heme: No easy bruising or bleeding  Endo: No heat/cold intolerance  Psych: No significant nervousness, anxiety, stress, depression  All other systems were reviewed and are negative, except as noted.        PHYSICAL EXAM:  Constitutional: Well developed / well nourished  Eyes:  PERRLA  ENMT: nc/at, no thrush  Neck: supple,   Respiratory: CTA B/L  Cardiovascular: RRR  Gastrointestinal: Soft abdomen, ND,   Genitourinary: anuric   Extremities: SCD's in place and working bilaterally  Vascular: Palpable dp pulses bilaterally.   Neurological: A&O x3  Skin: no rashes, ulcerations, lesions  Musculoskeletal: Moving all extremities  Psychiatric: Responsive   Transplant Surgery - Multidisciplinary Progress Note  --------------------------------------------------------------    Present:  Patient seen and examined with multidisciplinary Transplant team including  (Surgery: Dr. Dagher  Hepatologist: Dr. Jacobs. RNs in AM rounds. Disciplines not in attendance will be notified of the plan.     HPI  68y with obesity and risky alcohol consumption (with decades' history of daily consumption of homemade alcohol), admitted to [facility] 1 month ago due to recent onset of jaundice and with a prolonged hospital and ICU course there due to newly diagnosed decompensated cirrhosis with acute on chronic liver failure (ACLF) with shock (resolved, but still on midodrine); FANY (on intermittent HD since 7/9); recurrent maroon stools and acute on chronic anemia necessitating intermittent PRBC transfusions (last on 8/8) and hypofibrinoginemia, with EGD (7/12) with small non-bleeding EV and a duodenal ulcer with a visible vessel; and waxing and waning hepatic encephalopathy. He was transferred to [facility] on 8/12/24 for evaluation for combined liver/kidney transplants (SLK).     Interval Events:  - Started stress dose steroids and 1u PRBCs for Hgb 7.1 for persistent vasopressor requirements  - Adjusted fluid removal on CRRT from net even to -50 mL/hr  - Upgraded antibiotics from Zosyn to meropenem as per transplant surgery    Potential Discharge date: Pending clinical course    Education:  Medications    Plan of care:  See Below    MEDICATIONS  (STANDING):  chlorhexidine 2% Cloths 1 Application(s) Topical <User Schedule>  CRRT Treatment    <Continuous>  fluconAZOLE IVPB 200 milliGRAM(s) IV Intermittent every 24 hours  fluconAZOLE IVPB      folic acid 1 milliGRAM(s) Oral daily  hydrocortisone sodium succinate Injectable 50 milliGRAM(s) IV Push every 6 hours  lidocaine   4% Patch 1 Patch Transdermal daily  melatonin 5 milliGRAM(s) Oral at bedtime  meropenem  IVPB 1000 milliGRAM(s) IV Intermittent every 8 hours  meropenem  IVPB      midodrine. 20 milliGRAM(s) Oral every 8 hours  multivitamin 1 Tablet(s) Oral daily  norepinephrine Infusion 0.07 MICROgram(s)/kG/Min (15 mL/Hr) IV Continuous <Continuous>  pantoprazole  Injectable 40 milliGRAM(s) IV Push two times a day  Phoxillum Filtration BK 4 / 2.5 5000 milliLiter(s) (200 mL/Hr) CRRT <Continuous>  Phoxillum Filtration BK 4 / 2.5 5000 milliLiter(s) (1700 mL/Hr) CRRT <Continuous>  polyethylene glycol 3350 17 Gram(s) Oral <User Schedule>  PrismaSOL Filtration BGK 4 / 2.5 5000 milliLiter(s) (1500 mL/Hr) CRRT <Continuous>  rifAXIMin 550 milliGRAM(s) Oral two times a day  sodium phosphate 15 milliMole(s)/250 mL IVPB 15 milliMole(s) IV Intermittent once  thiamine 100 milliGRAM(s) Oral daily  vasopressin Infusion 0.03 Unit(s)/Min (4.5 mL/Hr) IV Continuous <Continuous>    MEDICATIONS  (PRN):    PAST MEDICAL & SURGICAL HISTORY:  Alcohol abuse    No significant past surgical history    MEDICATIONS  (STANDING):  chlorhexidine 2% Cloths 1 Application(s) Topical <User Schedule>  CRRT Treatment    <Continuous>  fluconAZOLE IVPB 200 milliGRAM(s) IV Intermittent every 24 hours  fluconAZOLE IVPB      folic acid 1 milliGRAM(s) Oral daily  hydrocortisone sodium succinate Injectable 50 milliGRAM(s) IV Push every 6 hours  lidocaine   4% Patch 1 Patch Transdermal daily  melatonin 5 milliGRAM(s) Oral at bedtime  meropenem  IVPB 1000 milliGRAM(s) IV Intermittent every 8 hours  meropenem  IVPB      midodrine. 20 milliGRAM(s) Oral every 8 hours  multivitamin 1 Tablet(s) Oral daily  norepinephrine Infusion 0.07 MICROgram(s)/kG/Min (15 mL/Hr) IV Continuous <Continuous>  pantoprazole  Injectable 40 milliGRAM(s) IV Push two times a day  Phoxillum Filtration BK 4 / 2.5 5000 milliLiter(s) (200 mL/Hr) CRRT <Continuous>  Phoxillum Filtration BK 4 / 2.5 5000 milliLiter(s) (1700 mL/Hr) CRRT <Continuous>  polyethylene glycol 3350 17 Gram(s) Oral <User Schedule>  PrismaSOL Filtration BGK 4 / 2.5 5000 milliLiter(s) (1500 mL/Hr) CRRT <Continuous>  rifAXIMin 550 milliGRAM(s) Oral two times a day  thiamine 100 milliGRAM(s) Oral daily  vasopressin Infusion 0.03 Unit(s)/Min (4.5 mL/Hr) IV Continuous <Continuous>    MEDICATIONS  (PRN):      PAST MEDICAL & SURGICAL HISTORY:  Alcohol abuse      No significant past surgical history    Vital Signs Last 24 Hrs  T(C): 36.5 (17 Aug 2024 11:00), Max: 37.1 (16 Aug 2024 15:00)  T(F): 97.7 (17 Aug 2024 11:00), Max: 98.7 (16 Aug 2024 15:00)  HR: 76 (17 Aug 2024 11:15) (49 - 77)  BP: 110/56 (17 Aug 2024 07:15) (110/56 - 115/53)  BP(mean): 80 (17 Aug 2024 07:15) (75 - 80)  RR: 19 (17 Aug 2024 11:15) (13 - 34)  SpO2: 99% (17 Aug 2024 11:15) (91% - 100%)    Parameters below as of 17 Aug 2024 07:30  Patient On (Oxygen Delivery Method): room air    I&O's Summary    16 Aug 2024 07:01  -  17 Aug 2024 07:00  --------------------------------------------------------  IN: 1591.2 mL / OUT: 1794 mL / NET: -202.8 mL    17 Aug 2024 07:01  -  17 Aug 2024 12:18  --------------------------------------------------------  IN: 189.1 mL / OUT: 322 mL / NET: -132.9 mL                        7.5    12.88 )-----------( 94       ( 17 Aug 2024 10:30 )             22.4     08-17    135  |  101  |  9   ----------------------------<  157<H>  4.6   |  20<L>  |  1.21    Ca    8.4      17 Aug 2024 10:30  Phos  3.4     08-17  Mg     2.4     08-17    TPro  6.2  /  Alb  3.6  /  TBili  18.5<H>  /  DBili  x   /  AST  51<H>  /  ALT  23  /  AlkPhos  147<H>  08-17    Culture - Body Fluid with Gram Stain (collected 08-14-24 @ 18:16)  Source: Peritoneal Peritoneal Fluid  Gram Stain (08-15-24 @ 03:04):    polymorphonuclear leukocytes seen    No organisms seen    by cytocentrifuge  Preliminary Report (08-15-24 @ 21:41):    No growth    Culture - Fungal, Body Fluid (collected 08-14-24 @ 18:16)  Source: Peritoneal Peritoneal Fluid  Preliminary Report (08-17-24 @ 07:33):    Testing in progress    Culture - Blood (collected 08-14-24 @ 16:10)  Source: .Blood Blood-Venous  Preliminary Report (08-16-24 @ 20:01):    No growth at 48 Hours    Culture - Blood (collected 08-14-24 @ 10:49)  Source: .Blood Blood-Venous  Preliminary Report (08-16-24 @ 15:01):    No growth at 48 Hours    Culture - Blood (collected 08-14-24 @ 00:05)  Source: .Blood Blood-Peripheral  Preliminary Report (08-17-24 @ 04:00):    No growth at 72 Hours    Culture - Blood (collected 08-14-24 @ 00:05)  Source: .Blood Blood-Peripheral  Preliminary Report (08-17-24 @ 04:00):    No growth at 72 Hours    Culture - Blood (collected 08-12-24 @ 17:37)  Source: .Blood Blood  Preliminary Report (08-16-24 @ 22:01):    No growth at 4 days    Culture - Blood (collected 08-12-24 @ 17:36)  Source: .Blood Blood  Gram Stain (08-13-24 @ 16:15):    Growth in aerobic bottle: Gram Positive Cocci in Clusters  Final Report (08-14-24 @ 10:47):    Growth in aerobic bottle: Staphylococcus epidermidis Isolation of    Coagulase negative Staphylococcus from single blood culture sets may    represent    contamination. Contact the Microbiology Department at [phone] if    susceptibility testing is    clinically indicated.    Direct identification is available within approximately 3-5    hours either by Blood Panel Multiplexed PCR or Direct    MALDI-TOF. Details: [email or web address]  Organism: Blood Culture PCR (08-14-24 @ 10:47)  Organism: Blood Culture PCR (08-14-24 @ 10:47)      Review of systems  Gen: No weight changes, fatigue, fevers/chills, weakness  Skin: No rashes  Head/Eyes/Ears/Mouth: No headache; Normal hearing; Normal vision w/o blurriness; No sinus pain/discomfort, sore throat  Respiratory: No dyspnea, cough, wheezing, hemoptysis  CV: No chest pain, PND, orthopnea  GI: C/O mild abdominal pain at surgical site. no diarrhea, constipation, nausea, vomiting, melena, hematochezia  : No increased frequency, dysuria, hematuria, nocturia  MSK: No joint pain/swelling; no back pain; no edema  Neuro: No dizziness/lightheadedness, weakness, seizures, numbness, tingling  Heme: No easy bruising or bleeding  Endo: No heat/cold intolerance  Psych: No significant nervousness, anxiety, stress, depression  All other systems were reviewed and are negative, except as noted.        PHYSICAL EXAM:  Constitutional: Well developed / well nourished  Eyes:  PERRLA  ENMT: nc/at, no thrush  Neck: supple,   Respiratory: CTA B/L  Cardiovascular: RRR  Gastrointestinal: Soft abdomen, ND,   Genitourinary: anuric   Extremities: SCD's in place and working bilaterally  Vascular: Palpable dp pulses bilaterally.   Neurological: A&O x3  Skin: no rashes, ulcerations, lesions  Musculoskeletal: Moving all extremities  Psychiatric: Responsive   Transplant Surgery - Multidisciplinary Progress Note  --------------------------------------------------------------    Present:  Patient seen and examined with multidisciplinary Transplant team including  (Surgery: Dr. Dagher  Hepatologist: Dr. Jacobs. RNs in AM rounds. Disciplines not in attendance will be notified of the plan.     HPI  68y with obesity and risky alcohol consumption (with decades' history of daily consumption of homemade alcohol), admitted to [facility] 1 month ago due to recent onset of jaundice and with a prolonged hospital and ICU course there due to newly diagnosed decompensated cirrhosis with acute on chronic liver failure (ACLF) with shock (resolved, but still on midodrine); FANY (on intermittent HD since 7/9); recurrent maroon stools and acute on chronic anemia necessitating intermittent PRBC transfusions (last on 8/8) and hypofibrinoginemia, with EGD (7/12) with small non-bleeding EV and a duodenal ulcer with a visible vessel; and waxing and waning hepatic encephalopathy. He was transferred to [facility] on 8/12/24 for evaluation for combined liver/kidney transplants (SLK).     Interval Events:  - Started stress dose steroids and 1u PRBCs for Hgb 7.1 for persistent vasopressor requirements  - Adjusted fluid removal on CRRT from net even to -50 mL/hr  - Upgraded antibiotics from Zosyn to meropenem as per transplant surgery    Potential Discharge date: Pending clinical course    Education:  Medications    Plan of care:  See Below    MEDICATIONS  (STANDING):  chlorhexidine 2% Cloths 1 Application(s) Topical <User Schedule>  CRRT Treatment    <Continuous>  fluconAZOLE IVPB 200 milliGRAM(s) IV Intermittent every 24 hours  fluconAZOLE IVPB      folic acid 1 milliGRAM(s) Oral daily  hydrocortisone sodium succinate Injectable 50 milliGRAM(s) IV Push every 6 hours  lidocaine   4% Patch 1 Patch Transdermal daily  melatonin 5 milliGRAM(s) Oral at bedtime  meropenem  IVPB 1000 milliGRAM(s) IV Intermittent every 8 hours  meropenem  IVPB      midodrine. 20 milliGRAM(s) Oral every 8 hours  multivitamin 1 Tablet(s) Oral daily  norepinephrine Infusion 0.07 MICROgram(s)/kG/Min (15 mL/Hr) IV Continuous <Continuous>  pantoprazole  Injectable 40 milliGRAM(s) IV Push two times a day  Phoxillum Filtration BK 4 / 2.5 5000 milliLiter(s) (200 mL/Hr) CRRT <Continuous>  Phoxillum Filtration BK 4 / 2.5 5000 milliLiter(s) (1700 mL/Hr) CRRT <Continuous>  polyethylene glycol 3350 17 Gram(s) Oral <User Schedule>  PrismaSOL Filtration BGK 4 / 2.5 5000 milliLiter(s) (1500 mL/Hr) CRRT <Continuous>  rifAXIMin 550 milliGRAM(s) Oral two times a day  sodium phosphate 15 milliMole(s)/250 mL IVPB 15 milliMole(s) IV Intermittent once  thiamine 100 milliGRAM(s) Oral daily  vasopressin Infusion 0.03 Unit(s)/Min (4.5 mL/Hr) IV Continuous <Continuous>    MEDICATIONS  (PRN):    PAST MEDICAL & SURGICAL HISTORY:  Alcohol abuse    No significant past surgical history    MEDICATIONS  (STANDING):  chlorhexidine 2% Cloths 1 Application(s) Topical <User Schedule>  CRRT Treatment    <Continuous>  fluconAZOLE IVPB 200 milliGRAM(s) IV Intermittent every 24 hours  fluconAZOLE IVPB      folic acid 1 milliGRAM(s) Oral daily  hydrocortisone sodium succinate Injectable 50 milliGRAM(s) IV Push every 6 hours  lidocaine   4% Patch 1 Patch Transdermal daily  melatonin 5 milliGRAM(s) Oral at bedtime  meropenem  IVPB 1000 milliGRAM(s) IV Intermittent every 8 hours  meropenem  IVPB      midodrine. 20 milliGRAM(s) Oral every 8 hours  multivitamin 1 Tablet(s) Oral daily  norepinephrine Infusion 0.07 MICROgram(s)/kG/Min (15 mL/Hr) IV Continuous <Continuous>  pantoprazole  Injectable 40 milliGRAM(s) IV Push two times a day  Phoxillum Filtration BK 4 / 2.5 5000 milliLiter(s) (200 mL/Hr) CRRT <Continuous>  Phoxillum Filtration BK 4 / 2.5 5000 milliLiter(s) (1700 mL/Hr) CRRT <Continuous>  polyethylene glycol 3350 17 Gram(s) Oral <User Schedule>  PrismaSOL Filtration BGK 4 / 2.5 5000 milliLiter(s) (1500 mL/Hr) CRRT <Continuous>  rifAXIMin 550 milliGRAM(s) Oral two times a day  thiamine 100 milliGRAM(s) Oral daily  vasopressin Infusion 0.03 Unit(s)/Min (4.5 mL/Hr) IV Continuous <Continuous>    MEDICATIONS  (PRN):      PAST MEDICAL & SURGICAL HISTORY:  Alcohol abuse      No significant past surgical history    Vital Signs Last 24 Hrs  T(C): 36.5 (17 Aug 2024 11:00), Max: 37.1 (16 Aug 2024 15:00)  T(F): 97.7 (17 Aug 2024 11:00), Max: 98.7 (16 Aug 2024 15:00)  HR: 76 (17 Aug 2024 11:15) (49 - 77)  BP: 110/56 (17 Aug 2024 07:15) (110/56 - 115/53)  BP(mean): 80 (17 Aug 2024 07:15) (75 - 80)  RR: 19 (17 Aug 2024 11:15) (13 - 34)  SpO2: 99% (17 Aug 2024 11:15) (91% - 100%)    Parameters below as of 17 Aug 2024 07:30  Patient On (Oxygen Delivery Method): room air    I&O's Summary    16 Aug 2024 07:01  -  17 Aug 2024 07:00  --------------------------------------------------------  IN: 1591.2 mL / OUT: 1794 mL / NET: -202.8 mL    17 Aug 2024 07:01  -  17 Aug 2024 12:18  --------------------------------------------------------  IN: 189.1 mL / OUT: 322 mL / NET: -132.9 mL                        7.5    12.88 )-----------( 94       ( 17 Aug 2024 10:30 )             22.4     08-17    135  |  101  |  9   ----------------------------<  157<H>  4.6   |  20<L>  |  1.21    Ca    8.4      17 Aug 2024 10:30  Phos  3.4     08-17  Mg     2.4     08-17    TPro  6.2  /  Alb  3.6  /  TBili  18.5<H>  /  DBili  x   /  AST  51<H>  /  ALT  23  /  AlkPhos  147<H>  08-17    Culture - Body Fluid with Gram Stain (collected 08-14-24 @ 18:16)  Source: Peritoneal Peritoneal Fluid  Gram Stain (08-15-24 @ 03:04):    polymorphonuclear leukocytes seen    No organisms seen    by cytocentrifuge  Preliminary Report (08-15-24 @ 21:41):    No growth    Culture - Fungal, Body Fluid (collected 08-14-24 @ 18:16)  Source: Peritoneal Peritoneal Fluid  Preliminary Report (08-17-24 @ 07:33):    Testing in progress    Culture - Blood (collected 08-14-24 @ 16:10)  Source: .Blood Blood-Venous  Preliminary Report (08-16-24 @ 20:01):    No growth at 48 Hours    Culture - Blood (collected 08-14-24 @ 10:49)  Source: .Blood Blood-Venous  Preliminary Report (08-16-24 @ 15:01):    No growth at 48 Hours    Culture - Blood (collected 08-14-24 @ 00:05)  Source: .Blood Blood-Peripheral  Preliminary Report (08-17-24 @ 04:00):    No growth at 72 Hours    Culture - Blood (collected 08-14-24 @ 00:05)  Source: .Blood Blood-Peripheral  Preliminary Report (08-17-24 @ 04:00):    No growth at 72 Hours    Culture - Blood (collected 08-12-24 @ 17:37)  Source: .Blood Blood  Preliminary Report (08-16-24 @ 22:01):    No growth at 4 days    Culture - Blood (collected 08-12-24 @ 17:36)  Source: .Blood Blood  Gram Stain (08-13-24 @ 16:15):    Growth in aerobic bottle: Gram Positive Cocci in Clusters  Final Report (08-14-24 @ 10:47):    Growth in aerobic bottle: Staphylococcus epidermidis Isolation of    Coagulase negative Staphylococcus from single blood culture sets may    represent    contamination. Contact the Microbiology Department at [phone] if    susceptibility testing is    clinically indicated.    Direct identification is available within approximately 3-5    hours either by Blood Panel Multiplexed PCR or Direct    MALDI-TOF. Details: [email or web address]  Organism: Blood Culture PCR (08-14-24 @ 10:47)  Organism: Blood Culture PCR (08-14-24 @ 10:47)    Review of systems  Gen: No weight changes, fatigue, fevers/chills, weakness  Skin: No rashes  Head/Eyes/Ears/Mouth: No headache; Normal hearing; Normal vision w/o blurriness; No sinus pain/discomfort, sore throat  Respiratory: No dyspnea, cough, wheezing, hemoptysis  CV: No chest pain, PND, orthopnea  GI: C/O mild abdominal pain at surgical site. no diarrhea, constipation, nausea, vomiting, melena, hematochezia  : No increased frequency, dysuria, hematuria, nocturia  MSK: No joint pain/swelling; no back pain; no edema  Neuro: No dizziness/lightheadedness, weakness, seizures, numbness, tingling  Heme: No easy bruising or bleeding  Endo: No heat/cold intolerance  Psych: No significant nervousness, anxiety, stress, depression  All other systems were reviewed and are negative, except as noted.    PHYSICAL EXAM:  Constitutional: Well developed / well nourished  Eyes:  PERRLA  ENMT: nc/at, no thrush  Neck: supple,   Respiratory: CTA B/L  Cardiovascular: RRR  Gastrointestinal: Soft abdomen, ND  Genitourinary: anuric   Extremities: SCD's in place and working bilaterally  Vascular: Palpable dp pulses bilaterally.   Neurological: A&O x3  Skin: no rashes, ulcerations, lesions  Musculoskeletal: Moving all extremities  Psychiatric: Responsive

## 2024-08-17 NOTE — PROGRESS NOTE ADULT - CRITICAL CARE ATTENDING COMMENT
Patient evaluated by me on AM rounds.     #Decompensated EtOH Cirrhosis.  #UGIB s/p EGD and clipping at OSH.   #Renal failure.  #Hypotension.     #NEURO: A&Ox1. Ammonia 61.   -Continue Rifaximin and Miralax.     #RESP: Stable on room air. RUE lobe infiltrate.     #CVS: Decreasing pressor requirements. Levo 0.05/Vaso. LA 1.3.  -Continue Midodrine 20mg TID.      #GI: Tolerating regular diet.   -Decompensated EtOH Cirrhosis with MELD >40.   -S/p Para 8/14 with 5L removed. No evidence of SBP.     #: Renal failure requiring CRRT. NET negative 105cc.   -Continue CRRT negative 50.    #HEME: Stable anemia. Hb 7.1 --> 7.4 after 1 unit. INR 3.36.  -Persistent thrombocytopenia. Plt 77 --> 80.   -Hold chemical AC in the setting of coagulopathy.     #ID: Afebrile, WBC 13 --> 11.   -Blood cx 8/12: MRSE in 1/2 bottles.   -Blood cx 8/14: NGTD  -S/p Para 8/14: No evidence of SBP.   -MRSA swab negative.   -Appreciate transplant ID recs. Continue Fluconazole and meropenem.     #ENDO: Glucose controlled.     #Lines:   -RUE PICC placed at OSH.   -L radial A-line 8/13.   -PermCath placed at OSH. Patient evaluated by me on AM rounds.     *Decompensated EtOH Cirrhosis.  *UGIB s/p EGD and clipping at OSH.   *Renal failure.  *Hypotension.     #NEURO: A&Ox1. Ammonia 61. Continue Rifaximin and Miralax.   #RESP: Stable on room air. RUL infiltrate on CXR.    #CVS: Decreasing pressor requirements. Levo 0.05/Vaso. LA 1.3.  ---Continue Midodrine 20mg TID.    #GI: Tolerating regular diet.   ---Decompensated EtOH Cirrhosis with MELD >40.   #: Renal failure requiring CRRT. NET negative 105cc.   ---Continue CRRT @ negative 50.  #HEME: Stable anemia. Hb 7.1 --> 7.4 after 1 unit. INR 3.36.  ---Persistent thrombocytopenia. Plt 77 --> 80.   ---Hold chemical AC in the setting of coagulopathy.   #ID: Afebrile, WBC 13 --> 11.   ---Blood cx 8/12: MRSE in 1/2 bottles.   ---Blood cx 8/14: NGTD  ---S/p Para 8/14: No evidence of SBP.   ---MRSA swab negative.   ---Appreciate transplant ID recs. Continue Fluconazole and meropenem.   #ENDO: Glucose controlled.     #Lines:   -RUE PICC placed at OSH.   -L radial A-line 8/13.   -PermCath placed at OSH. Patient evaluated by me on AM rounds.     *Decompensated EtOH Cirrhosis.  *UGIB s/p EGD and clipping at OSH.   *Renal failure.  *Hypotension.     #NEURO: A&Ox1. Ammonia 61. Continue Rifaximin and Miralax.   #RESP: Stable on room air. RUL infiltrate on CXR.    #CVS: Decreasing pressor requirements. Levo 0.05/Vaso. LA 1.3.  ---Continue Midodrine 20mg TID.    #GI: Tolerating regular diet.   ---Decompensated EtOH Cirrhosis. F/up transplant eval.   #: Renal failure requiring CRRT. NET negative 105cc.   ---Continue CRRT @ negative 50.  #HEME: Stable anemia. Hb 7.1 --> 7.4 after 1 unit. INR 3.36.  ---Persistent thrombocytopenia. Plt 77 --> 80.   ---Hold chemical AC in the setting of coagulopathy.   #ID: Afebrile, WBC 13 --> 11.   ---Blood cx 8/12: MRSE in 1/2 bottles.   ---Blood cx 8/14: NGTD  ---S/p Para 8/14: No evidence of SBP.   ---MRSA swab negative.   ---Appreciate transplant ID recs. Continue Fluconazole and meropenem.   #ENDO: Glucose controlled.     #Lines:   -RUE PICC placed at OSH.   -L radial A-line 8/13.   -PermCath placed at OSH.

## 2024-08-17 NOTE — PROGRESS NOTE ADULT - SUBJECTIVE AND OBJECTIVE BOX
[facility] DIVISION OF KIDNEY DISEASES AND HYPERTENSION   FOLLOW UP NOTE    --------------------------------------------------------------------------------  Chief Complaint:    24 hour events/subjective: Pt. was seen and examined today.   Remain in SICU, tolerating CRRT.  Eating and more awake this morning.       PAST HISTORY  --------------------------------------------------------------------------------  No significant changes to PMH, PSH, FHx, SHx, unless otherwise noted    ALLERGIES & MEDICATIONS  --------------------------------------------------------------------------------  Allergies    No Known Allergies    Intolerances          REVIEW OF SYSTEMS  --------------------------------------------------------------------------------    All other systems were reviewed and are negative, except as noted.      MEDICATIONS  (STANDING):  chlorhexidine 2% Cloths 1 Application(s) Topical <User Schedule>  CRRT Treatment    <Continuous>  fluconAZOLE IVPB      fluconAZOLE IVPB 200 milliGRAM(s) IV Intermittent every 24 hours  folic acid 1 milliGRAM(s) Oral daily  hydrocortisone sodium succinate Injectable 50 milliGRAM(s) IV Push every 6 hours  lidocaine   4% Patch 1 Patch Transdermal daily  melatonin 5 milliGRAM(s) Oral at bedtime  meropenem  IVPB      meropenem  IVPB 1000 milliGRAM(s) IV Intermittent every 8 hours  midodrine. 20 milliGRAM(s) Oral every 8 hours  multivitamin 1 Tablet(s) Oral daily  norepinephrine Infusion 0.07 MICROgram(s)/kG/Min (15 mL/Hr) IV Continuous <Continuous>  pantoprazole  Injectable 40 milliGRAM(s) IV Push two times a day  Phoxillum Filtration BK 4 / 2.5 5000 milliLiter(s) (1700 mL/Hr) CRRT <Continuous>  Phoxillum Filtration BK 4 / 2.5 5000 milliLiter(s) (200 mL/Hr) CRRT <Continuous>  polyethylene glycol 3350 17 Gram(s) Oral <User Schedule>  PrismaSOL Filtration BGK 4 / 2.5 5000 milliLiter(s) (1500 mL/Hr) CRRT <Continuous>  rifAXIMin 550 milliGRAM(s) Oral two times a day  thiamine 100 milliGRAM(s) Oral daily  vasopressin Infusion 0.03 Unit(s)/Min (4.5 mL/Hr) IV Continuous <Continuous>    MEDICATIONS  (PRN):      Vital Signs Last 24 Hrs  T(C): 36.5 (17 Aug 2024 07:00), Max: 37.1 (16 Aug 2024 15:00)  T(F): 97.7 (17 Aug 2024 07:00), Max: 98.7 (16 Aug 2024 15:00)  HR: 73 (17 Aug 2024 08:00) (49 - 77)  BP: 110/56 (17 Aug 2024 07:15) (110/56 - 115/53)  BP(mean): 80 (17 Aug 2024 07:15) (75 - 80)  RR: 17 (17 Aug 2024 08:00) (13 - 34)  SpO2: 99% (17 Aug 2024 08:00) (91% - 100%)    Parameters below as of 17 Aug 2024 07:30  Patient On (Oxygen Delivery Method): room air        I&O's Summary    16 Aug 2024 07:01  -  17 Aug 2024 07:00  --------------------------------------------------------  IN: 1591.2 mL / OUT: 1794 mL / NET: -202.8 mL    17 Aug 2024 07:01  -  17 Aug 2024 08:26  --------------------------------------------------------  IN: 132.9 mL / OUT: 63 mL / NET: 69.9 mL          Physical Exam:  	Gen: NAD  	HEENT: Anicteric  	Pulm: CTA B/L  	CV: S1S2+  	Abd: Soft, +BS   	Ext: No LE edema B/L  	Neuro: Awake  	Skin: Warm and dry  	Dialysis access: [facility]      LABS/STUDIES  --------------------------------------------------------------------------------                              7.4    11.59 )-----------( 80       ( 17 Aug 2024 04:17 )             21.3     08-17    135  |  101  |  8   ----------------------------<  124<H>  4.5   |  21<L>  |  1.29    Ca    8.6      17 Aug 2024 04:17  Phos  3.5     08-17  Mg     2.4     08-17    TPro  5.8<L>  /  Alb  3.6  /  TBili  17.0<H>  /  DBili  x   /  AST  47<H>  /  ALT  19  /  AlkPhos  141<H>  08-17          Culture - Body Fluid with Gram Stain (collected 08-14-24 @ 18:16)  Source: Peritoneal Peritoneal Fluid  Gram Stain (08-15-24 @ 03:04):    polymorphonuclear leukocytes seen    No organisms seen    by cytocentrifuge  Preliminary Report (08-15-24 @ 21:41):    No growth    Culture - Fungal, Body Fluid (collected 08-14-24 @ 18:16)  Source: Peritoneal Peritoneal Fluid  Preliminary Report (08-17-24 @ 07:33):    Testing in progress    Culture - Blood (collected 08-14-24 @ 16:10)  Source: .Blood Blood-Venous  Preliminary Report (08-16-24 @ 20:01):    No growth at 48 Hours    Culture - Blood (collected 08-14-24 @ 10:49)  Source: .Blood Blood-Venous  Preliminary Report (08-16-24 @ 15:01):    No growth at 48 Hours    Culture - Blood (collected 08-14-24 @ 00:05)  Source: .Blood Blood-Peripheral  Preliminary Report (08-17-24 @ 04:00):    No growth at 72 Hours    Culture - Blood (collected 08-14-24 @ 00:05)  Source: .Blood Blood-Peripheral  Preliminary Report (08-17-24 @ 04:00):    No growth at 72 Hours    Culture - Blood (collected 08-12-24 @ 17:37)  Source: .Blood Blood  Preliminary Report (08-16-24 @ 22:01):    No growth at 4 days    Culture - Blood (collected 08-12-24 @ 17:36)  Source: .Blood Blood  Gram Stain (08-13-24 @ 16:15):    Growth in aerobic bottle: Gram Positive Cocci in Clusters  Final Report (08-14-24 @ 10:47):    Growth in aerobic bottle: Staphylococcus epidermidis Isolation of    Coagulase negative Staphylococcus from single blood culture sets may    represent    contamination. Contact the Microbiology Department at [phone] if    susceptibility testing is    clinically indicated.    Direct identification is available within approximately 3-5    hours either by Blood Panel Multiplexed PCR or Direct    MALDI-TOF. Details: [email or web address]  Organism: Blood Culture PCR (08-14-24 @ 10:47)  Organism: Blood Culture PCR (08-14-24 @ 10:47)

## 2024-08-17 NOTE — PROGRESS NOTE ADULT - SUBJECTIVE AND OBJECTIVE BOX
HISTORY:  78 y/o male w/ no known PMHx (does not see doctors per sister) who presented as a transfer from [facility] for SLK evaluation. Patient was initially admitted to [facility] for back pain & jaundice and was diagnosed w/ cirrhosis. Hospital course there was c/b small EVs, melena 2/2 a duodenal ulcer s/p clipping, HRS requiring hemodialysis, hepatic encephalopathy, ascites s/p multiple LVPs, and deconditioning. Patient was admitted to SICU on 8/12 for initiation of CRRT as his BPs were too low to attempt intermittent HD.    24 HOUR EVENTS:  - Started stress dose steroids and 1u PRBCs for Hgb 7.1 for persistent vasopressor requirements  - Adjusted fluid removal on CRRT from net even to -50 mL/hr  - Upgraded antibiotics from Zosyn to meropenem as per transplant surgery    NEURO  Exam: awake, alert, oriented x1  Meds: melatonin 5 milliGRAM(s) Oral at bedtime    RESPIRATORY  RR: 20 (08-17-24 @ 02:15) (13 - 37)  SpO2: 96% (08-17-24 @ 02:15) (85% - 100%)  Exam: mild crackles in all lung fields  ABG - ( 16 Aug 2024 17:53 )  pH: 7.46  /  pCO2: 32    /  pO2: 85    / HCO3: 23    / Base Excess: -0.8  /  SaO2: 98.8  /  Lactate: 1.8    CARDIOVASCULAR  HR: 51 (08-17-24 @ 02:15) (49 - 80)  BP: 115/53 (08-16-24 @ 20:00) (115/53 - 115/53)  BP(mean): 75 (08-16-24 @ 20:00) (75 - 75)  ABP: 127/42 (08-17-24 @ 02:15) (92/46 - 134/49)  ABP(mean): 64 (08-17-24 @ 02:15) (51 - 75)  Exam: regular rhythm, bradycardia, S1S2  Cardiac Rhythm: sinus bradycardia  Perfusion    [x]Adequate    [ ]Inadequate  Mentation   [ ]Normal       [x]Reduced  Extremities  [x]Warm         [ ]Cool  Volume Status [ ]Hypervolemic [x]Euvolemic [ ]Hypovolemic  Meds:  - midodrine. 20 milliGRAM(s) Oral every 8 hours  - norepinephrine Infusion 0.07 MICROgram(s)/kG/Min IV Continuous <Continuous>  - vasopressin Infusion 0.03 Unit(s)/Min IV Continuous <Continuous>    GI/NUTRITION  Exam: softly distended, nontender  Diet: regular  Meds:  - pantoprazole  Injectable 40 milliGRAM(s) IV Push two times a day  - polyethylene glycol 3350 17 Gram(s) Oral <User Schedule>  - rifAXIMin 550 milliGRAM(s) Oral two times a day    GENITOURINARY  I&O's Detail    08-15 @ 07:01 - 08-16 @ 07:00  --------------------------------------------------------  IN:    Albumin 5%  - 250 mL: 500 mL    IV PiggyBack: 450 mL    Norepinephrine: 539 mL    Oral Fluid: 350 mL    Vasopressin: 108 mL  Total IN: 1947 mL    OUT:    Other (mL): 1301 mL  Total OUT: 1301 mL    Total NET: 646 mL      08-16 @ 07:01 - 08-17 @ 02:39  --------------------------------------------------------  IN:    IV PiggyBack: 450 mL    Norepinephrine: 333.3 mL    Oral Fluid: 200 mL    Vasopressin: 85.5 mL  Total IN: 1068.8 mL    OUT:    Other (mL): 1013 mL  Total OUT: 1013 mL    Total NET: 55.8 mL    135  |  101  |  8   ----------------------------<  127<H>  4.4   |  21<L>  |  1.41<H>    Ca    8.5      16 Aug 2024 23:36  Phos  2.8  Mg     2.3  TPro  5.8<L>  /  Alb  3.5  /  TBili  16.9<H>  /  DBili  x   /  AST  48<H>  /  ALT  19  /  AlkPhos  137<H>    Meds: CRRT Treatment    <Continuous>    HEMATOLOGIC                        7.1    14.19 )-----------( 92       ( 16 Aug 2024 18:06 )             20.6     PT/INR - ( 16 Aug 2024 18:06 )   PT: 33.9 sec;   INR: 3.34 ratio    PTT - ( 16 Aug 2024 18:06 )  PTT:70.5 sec    INFECTIOUS DISEASES  T(C): 36.7 (08-16-24 @ 23:00), Max: 37.1 (08-16-24 @ 15:00)  WBC Count:  - 14.19 K/uL (08-16 @ 18:06)  - 13.28 K/uL (08-16 @ 06:30)    Recent Cultures:    - Specimen Source: Peritoneal Peritoneal Fluid, 08-14 @ 18:16    Results: No growth  Gram Stain: Polymorphonuclear leukocytes seen. No organisms seen by cytocentrifuge.  Organism: --    Specimen Source: .Blood Blood-Venous, 08-14 @ 16:10  Results: No growth at 48 Hours  Gram Stain: --  Organism: --    Specimen Source: .Blood Blood-Venous, 08-14 @ 10:49  Results: No growth at 48 Hours  Gram Stain: --  Organism: --    Specimen Source: .Blood Blood-Peripheral, 08-14 @ 00:05  Results: No growth at 48 Hours  Gram Stain: --  Organism: --    Meds:  - fluconAZOLE IVPB 200 milliGRAM(s) IV Intermittent every 24 hours   - meropenem  IVPB 1000 milliGRAM(s) IV Intermittent every 8 hours    ENDOCRINE  Capillary Blood Glucose: 125 mg/dL (16 Aug 2024 23:36)  Meds: hydrocortisone sodium succinate Injectable 50 milliGRAM(s) IV Push every 6 hours    ACCESS DEVICES:  [x] Peripheral IV  [ ] Dialysis Catheter		[x] R	[ ] L	[x] IJ	[ ] Fem	[ ] SC	Placed: Permacath placed at [facility]  [x] Arterial Line			[ ] R	[x] L	[ ] Fem	[x] Rad	[ ] Ax	Placed: 8/14  [ ] PICC: MATTHEWE PICC placed at [facility]			[ ] [facility]  [ ] Urinary Catheter, Date Placed:   [x] Necessity of urinary, arterial, and venous catheters discussed    OTHER MEDICATIONS:  - chlorhexidine 2% Cloths 1 Application(s) Topical <User Schedule>  - folic acid 1 milliGRAM(s) Oral daily  - lidocaine   4% Patch 1 Patch Transdermal daily  - multivitamin 1 Tablet(s) Oral daily  - thiamine 100 milliGRAM(s) Oral daily

## 2024-08-17 NOTE — PROGRESS NOTE ADULT - SUBJECTIVE AND OBJECTIVE BOX
Gastroenterology/Hepatology Progress Note      Interval Events:     - No acute events overnight.   - Patient lethargic this morning but oriented to person, place and time.   - Complained of abd pain, no nausea or vomiting.   - Has Keofeed in place but no tube feeds started.   - makes about 50 cc urine o/p per hour.   - VSS    Allergies:  No Known Allergies      Hospital Medications:  chlorhexidine 2% Cloths 1 Application(s) Topical <User Schedule>  CRRT Treatment    <Continuous>  fluconAZOLE IVPB 200 milliGRAM(s) IV Intermittent every 24 hours  fluconAZOLE IVPB      folic acid 1 milliGRAM(s) Oral daily  hydrocortisone sodium succinate Injectable 50 milliGRAM(s) IV Push every 6 hours  lidocaine   4% Patch 1 Patch Transdermal daily  melatonin 5 milliGRAM(s) Oral at bedtime  meropenem  IVPB 1000 milliGRAM(s) IV Intermittent every 8 hours  meropenem  IVPB      midodrine. 20 milliGRAM(s) Oral every 8 hours  multivitamin 1 Tablet(s) Oral daily  norepinephrine Infusion 0.07 MICROgram(s)/kG/Min IV Continuous <Continuous>  pantoprazole  Injectable 40 milliGRAM(s) IV Push two times a day  Phoxillum Filtration BK 4 / 2.5 5000 milliLiter(s) CRRT <Continuous>  Phoxillum Filtration BK 4 / 2.5 5000 milliLiter(s) CRRT <Continuous>  polyethylene glycol 3350 17 Gram(s) Oral <User Schedule>  PrismaSOL Filtration BGK 4 / 2.5 5000 milliLiter(s) CRRT <Continuous>  rifAXIMin 550 milliGRAM(s) Oral two times a day  thiamine 100 milliGRAM(s) Oral daily  vasopressin Infusion 0.03 Unit(s)/Min IV Continuous <Continuous>      ROS: 14 point ROS negative unless otherwise state in subjective    PHYSICAL EXAM:   Vital Signs:  Vital Signs Last 24 Hrs  T(C): 36.5 (17 Aug 2024 11:00), Max: 37.1 (16 Aug 2024 15:00)  T(F): 97.7 (17 Aug 2024 11:00), Max: 98.7 (16 Aug 2024 15:00)  HR: 52 (17 Aug 2024 13:45) (49 - 81)  BP: 110/56 (17 Aug 2024 07:15) (110/56 - 115/53)  BP(mean): 80 (17 Aug 2024 07:15) (75 - 80)  RR: 12 (17 Aug 2024 13:45) (12 - 34)  SpO2: 93% (17 Aug 2024 13:45) (92% - 100%)    Parameters below as of 17 Aug 2024 07:30  Patient On (Oxygen Delivery Method): room air      Daily     Daily Weight in k.3 (17 Aug 2024 03:00)    GENERAL: obese man, in no acute distress.   NEURO: Mental status waxes and wanes.    HEENT: Scleral icterus, keofeed in place  CHEST: Bilateral breath sounds, decreased in R base. No respiratory distress.   CARDIAC: Regular rate and rhythm  ABDOMEN: Soft, non-tender, distended. Present bowel sounds. +anasarca  EXTREMITIES: warm, well perfused, BLE pitting edema   SKIN: +Jaundiced, no rash/ecchymoses    LABS:                        7.5    12.88 )-----------( 94       ( 17 Aug 2024 10:30 )             22.4     Mean Cell Volume: 92.9 fl (- @ 10:30)        135  |  101  |  9   ----------------------------<  157<H>  4.6   |  20<L>  |  1.21    Ca    8.4      17 Aug 2024 10:30  Phos  3.4       Mg     2.4         TPro  6.2  /  Alb  3.6  /  TBili  18.5<H>  /  DBili  x   /  AST  51<H>  /  ALT  23  /  AlkPhos  147<H>      LIVER FUNCTIONS - ( 17 Aug 2024 10:30 )  Alb: 3.6 g/dL / Pro: 6.2 g/dL / ALK PHOS: 147 U/L / ALT: 23 U/L / AST: 51 U/L / GGT: x           PT/INR - ( 17 Aug 2024 04:17 )   PT: 34.1 sec;   INR: 3.36 ratio         PTT - ( 17 Aug 2024 04:17 )  PTT:73.7 sec  Urinalysis Basic - ( 17 Aug 2024 10:30 )    Color: x / Appearance: x / SG: x / pH: x  Gluc: 157 mg/dL / Ketone: x  / Bili: x / Urobili: x   Blood: x / Protein: x / Nitrite: x   Leuk Esterase: x / RBC: x / WBC x   Sq Epi: x / Non Sq Epi: x / Bacteria: x      Amylase Serum--      Lipase serum--       [id number]             Gastroenterology/Hepatology Progress Note      Interval Events:     - No acute events overnight.   - Started stress dose steroids and 1u PRBCs for Hgb 7.1 for persistent vasopressor requirements  - Upgraded antibiotics from Zosyn to meropenem as per transplant surgery  - Adjusted fluid removal on CRRT from net even to -50 mL/hr    - VSS    Allergies:  No Known Allergies      Hospital Medications:  chlorhexidine 2% Cloths 1 Application(s) Topical <User Schedule>  CRRT Treatment    <Continuous>  fluconAZOLE IVPB 200 milliGRAM(s) IV Intermittent every 24 hours  fluconAZOLE IVPB      folic acid 1 milliGRAM(s) Oral daily  hydrocortisone sodium succinate Injectable 50 milliGRAM(s) IV Push every 6 hours  lidocaine   4% Patch 1 Patch Transdermal daily  melatonin 5 milliGRAM(s) Oral at bedtime  meropenem  IVPB 1000 milliGRAM(s) IV Intermittent every 8 hours  meropenem  IVPB      midodrine. 20 milliGRAM(s) Oral every 8 hours  multivitamin 1 Tablet(s) Oral daily  norepinephrine Infusion 0.07 MICROgram(s)/kG/Min IV Continuous <Continuous>  pantoprazole  Injectable 40 milliGRAM(s) IV Push two times a day  Phoxillum Filtration BK 4 / 2.5 5000 milliLiter(s) CRRT <Continuous>  Phoxillum Filtration BK 4 / 2.5 5000 milliLiter(s) CRRT <Continuous>  polyethylene glycol 3350 17 Gram(s) Oral <User Schedule>  PrismaSOL Filtration BGK 4 / 2.5 5000 milliLiter(s) CRRT <Continuous>  rifAXIMin 550 milliGRAM(s) Oral two times a day  thiamine 100 milliGRAM(s) Oral daily  vasopressin Infusion 0.03 Unit(s)/Min IV Continuous <Continuous>      ROS: 14 point ROS negative unless otherwise state in subjective    PHYSICAL EXAM:   Vital Signs:  Vital Signs Last 24 Hrs  T(C): 36.5 (17 Aug 2024 11:00), Max: 37.1 (16 Aug 2024 15:00)  T(F): 97.7 (17 Aug 2024 11:00), Max: 98.7 (16 Aug 2024 15:00)  HR: 52 (17 Aug 2024 13:45) (49 - 81)  BP: 110/56 (17 Aug 2024 07:15) (110/56 - 115/53)  BP(mean): 80 (17 Aug 2024 07:15) (75 - 80)  RR: 12 (17 Aug 2024 13:45) (12 - 34)  SpO2: 93% (17 Aug 2024 13:45) (92% - 100%)    Parameters below as of 17 Aug 2024 07:30  Patient On (Oxygen Delivery Method): room air      Daily     Daily Weight in k.3 (17 Aug 2024 03:00)    GENERAL: obese man, in no acute distress.   NEURO: Mental status waxes and wanes.    HEENT: Scleral icterus, keofeed in place  CHEST: Bilateral breath sounds, decreased in R base. No respiratory distress.   CARDIAC: Regular rate and rhythm  ABDOMEN: Soft, non-tender, distended. Present bowel sounds. +anasarca  EXTREMITIES: warm, well perfused, BLE pitting edema   SKIN: +Jaundiced, no rash/ecchymoses    LABS:                        7.5    12.88 )-----------( 94       ( 17 Aug 2024 10:30 )             22.4     Mean Cell Volume: 92.9 fl (- @ 10:30)        135  |  101  |  9   ----------------------------<  157<H>  4.6   |  20<L>  |  1.21    Ca    8.4      17 Aug 2024 10:30  Phos  3.4       Mg     2.4         TPro  6.2  /  Alb  3.6  /  TBili  18.5<H>  /  DBili  x   /  AST  51<H>  /  ALT  23  /  AlkPhos  147<H>      LIVER FUNCTIONS - ( 17 Aug 2024 10:30 )  Alb: 3.6 g/dL / Pro: 6.2 g/dL / ALK PHOS: 147 U/L / ALT: 23 U/L / AST: 51 U/L / GGT: x           PT/INR - ( 17 Aug 2024 04:17 )   PT: 34.1 sec;   INR: 3.36 ratio         PTT - ( 17 Aug 2024 04:17 )  PTT:73.7 sec  Urinalysis Basic - ( 17 Aug 2024 10:30 )    Color: x / Appearance: x / SG: x / pH: x  Gluc: 157 mg/dL / Ketone: x  / Bili: x / Urobili: x   Blood: x / Protein: x / Nitrite: x   Leuk Esterase: x / RBC: x / WBC x   Sq Epi: x / Non Sq Epi: x / Bacteria: x      Amylase Serum--      Lipase serum--       [id number]

## 2024-08-17 NOTE — PROGRESS NOTE ADULT - ASSESSMENT
68y with obesity and risky alcohol consumption (with decades' history of daily consumption of homemade alcohol), admitted to [facility] 1 month ago due to recent onset of jaundice and with a prolonged hospital and ICU course there due to newly diagnosed decompensated cirrhosis with acute on chronic liver failure (ACLF) with shock (resolved, but still on midodrine); FANY (on intermittent HD since 7/9); recurrent maroon stools and acute on chronic anemia necessitating intermittent PRBC transfusions (last on 8/8) and hypofibrinoginemia, with EGD (7/12) with small non-bleeding EV and a duodenal ulcer with a visible vessel; and waxing and waning hepatic encephalopathy. He was transferred to [facility] on 8/12/24 for evaluation for combined liver/kidney transplants (SLK).      Decompensated ETOH Cirrhosis  -MELD 41O  - HE: cont rifaximin/miralax  - EV:  EGD (7/12) with small non-bleeding EV and a duodenal ulcer with a visible vessel. -open SLK eval, consultants aware  - FANY/HRS: anuric, On CRRT 8/13, Renal following  - ID: Empiric Zosyn/fluc   - bcx 8/13 w/ MRSE   - PPI  -Thiamine/MVI/FOLIC ACID  - SLK eval   - cont sicu care   68y with obesity and risky alcohol consumption (with decades' history of daily consumption of homemade alcohol), admitted to [facility] 1 month ago due to recent onset of jaundice and with a prolonged hospital and ICU course there due to newly diagnosed decompensated cirrhosis with acute on chronic liver failure (ACLF) with shock (resolved, but still on midodrine); FANY (on intermittent HD since 7/9); recurrent maroon stools and acute on chronic anemia necessitating intermittent PRBC transfusions (last on 8/8) and hypofibrinoginemia, with EGD (7/12) with small non-bleeding EV and a duodenal ulcer with a visible vessel; and waxing and waning hepatic encephalopathy. He was transferred to [facility] on 8/12/24 for evaluation for combined liver/kidney transplants (SLK).      Decompensated ETOH Cirrhosis  - SLK eval   - HE: cont rifaximin/miralax  - EV:  EGD (7/12) with small non-bleeding EV and a duodenal ulcer with a visible vessel. -open SLK eval, consultants aware  - FANY/HRS: anuric, On CRRT 8/13, Renal following  - ID: Empiric Zosyn/fluc   - bcx 8/13 w/ MRSE   - PPI  -Thiamine/MVI/FOLIC ACID   - cont sicu care   68y with obesity and risky alcohol consumption (with decades' history of daily consumption of homemade alcohol), admitted to [facility] 1 month ago due to recent onset of jaundice and with a prolonged hospital and ICU course there due to newly diagnosed decompensated cirrhosis with acute on chronic liver failure (ACLF) with shock (resolved, but still on midodrine); FANY (on intermittent HD since 7/9); recurrent maroon stools and acute on chronic anemia necessitating intermittent PRBC transfusions (last on 8/8) and hypofibrinoginemia, with EGD (7/12) with small non-bleeding EV and a duodenal ulcer with a visible vessel; and waxing and waning hepatic encephalopathy. He was transferred to [facility] on 8/12/24 for evaluation for combined liver/kidney transplants (SLK).      [] Decompensated ETOH Cirrhosis  - SLK eval   - HE: cont rifaximin/miralax  - EV:  EGD (7/12) with small non-bleeding EV and a duodenal ulcer with a visible vessel. -open SLK eval, consultants aware  - FANY/HRS: anuric, On CRRT 8/13, Renal following  - ID: Empiric Zosyn/fluc   - bcx 8/13 w/ MRSE   - PPI  -Thiamine/MVI/FOLIC ACID   - cont sicu care

## 2024-08-18 NOTE — ADVANCED PRACTICE NURSE CONSULT - REASON FOR CONSULT
Vascular Access Team    Evaluation for: Bedside DL PICC placement  Requested by name: Mona Adame GRETEL  Date/Time: 8/18@15:33    Indication: access  Allergy: nka   Anticoagulants/ antiplatelets: no    Platelets(>20): 118  INR(<3): 3.0  eGFR(>40): 70  Blood cultures sent: 8/14  Blood culture negative in 48hrs: yes    Arm restrictions: no    Consent obtained: no    Plan: Bedside picc order evaluated. Please obtain PICC placement consent and then call [phone] for the VAT RN.

## 2024-08-18 NOTE — PROGRESS NOTE ADULT - SUBJECTIVE AND OBJECTIVE BOX
INFECTIOUS DISEASES FOLLOW UP-- Omayra Rashid  [phone]    This is a follow up note for this  68yMale with  Acute or subacute hepatic failure without coma        ROS:  CONSTITUTIONAL:  No fever, good appetite  CARDIOVASCULAR:  No chest pain or palpitations  RESPIRATORY:  No dyspnea  GASTROINTESTINAL:  No nausea, vomiting, diarrhea, or abdominal pain  GENITOURINARY:  No dysuria  NEUROLOGIC:  No headache,     Allergies    No Known Allergies    Intolerances        ANTIBIOTICS/RELEVANT:  antimicrobials  fluconAZOLE IVPB 200 milliGRAM(s) IV Intermittent every 24 hours  fluconAZOLE IVPB      meropenem  IVPB 1000 milliGRAM(s) IV Intermittent every 8 hours  meropenem  IVPB      rifAXIMin 550 milliGRAM(s) Oral two times a day    immunologic:    OTHER:  chlorhexidine 2% Cloths 1 Application(s) Topical <User Schedule>  CRRT Treatment    <Continuous>  folic acid 1 milliGRAM(s) Oral daily  lidocaine   4% Patch 1 Patch Transdermal daily  melatonin 5 milliGRAM(s) Oral at bedtime  midodrine. 20 milliGRAM(s) Oral every 8 hours  multivitamin 1 Tablet(s) Oral daily  norepinephrine Infusion 0.07 MICROgram(s)/kG/Min IV Continuous <Continuous>  pantoprazole  Injectable 40 milliGRAM(s) IV Push two times a day  Phoxillum Filtration BK 4 / 2.5 5000 milliLiter(s) CRRT <Continuous>  Phoxillum Filtration BK 4 / 2.5 5000 milliLiter(s) CRRT <Continuous>  polyethylene glycol 3350 17 Gram(s) Oral <User Schedule>  PrismaSOL Filtration BGK 4 / 2.5 5000 milliLiter(s) CRRT <Continuous>  thiamine 100 milliGRAM(s) Oral daily  vasopressin Infusion 0.03 Unit(s)/Min IV Continuous <Continuous>      Objective:  Vital Signs Last 24 Hrs  T(C): 36.4 (18 Aug 2024 07:00), Max: 36.8 (17 Aug 2024 23:00)  T(F): 97.5 (18 Aug 2024 07:00), Max: 98.2 (17 Aug 2024 23:00)  HR: 56 (18 Aug 2024 11:30) (49 - 76)  BP: 115/59 (18 Aug 2024 07:15) (115/59 - 115/59)  BP(mean): 84 (18 Aug 2024 07:15) (84 - 84)  RR: 18 (18 Aug 2024 11:30) (12 - 26)  SpO2: 100% (18 Aug 2024 11:30) (91% - 100%)    Parameters below as of 18 Aug 2024 08:15  Patient On (Oxygen Delivery Method): room air        PHYSICAL EXAM:  Constitutional:no acute distress  Eyes:CAT, EOMI  Ear/Nose/Throat: no oral lesions, 	  Respiratory: clear BL  Cardiovascular: S1S2  Gastrointestinal:soft, (+) BS, no tenderness  Extremities:no e/e/c  No Lymphadenopathy  IV sites not inflammed.    LABS:                        8.0    14.00 )-----------( 98       ( 18 Aug 2024 00:18 )             22.9     08-18    135  |  102  |  11  ----------------------------<  145<H>  4.5   |  21<L>  |  1.25    Ca    8.7      18 Aug 2024 05:54  Phos  3.3     08-18  Mg     2.5     08-18    TPro  6.0  /  Alb  3.3  /  TBili  16.7<H>  /  DBili  x   /  AST  50<H>  /  ALT  23  /  AlkPhos  154<H>  08-18    PT/INR - ( 18 Aug 2024 00:18 )   PT: 32.0 sec;   INR: 3.00 ratio         PTT - ( 18 Aug 2024 00:18 )  PTT:85.8 sec  Urinalysis Basic - ( 18 Aug 2024 05:54 )    Color: x / Appearance: x / SG: x / pH: x  Gluc: 145 mg/dL / Ketone: x  / Bili: x / Urobili: x   Blood: x / Protein: x / Nitrite: x   Leuk Esterase: x / RBC: x / WBC x   Sq Epi: x / Non Sq Epi: x / Bacteria: x        MICROBIOLOGY:            RECENT CULTURES:  08-14 @ 18:16  Peritoneal Peritoneal Fluid  --  --  --    No growth  --  08-14 @ 16:10  .Blood Blood-Venous  --  --  --    No growth at 72 Hours  --  08-14 @ 10:49  .Blood Blood-Venous  --  --  --    No growth at 72 Hours  --  08-14 @ 00:05  .Blood Blood-Peripheral  --  --  --    No growth at 4 days  --  08-12 @ 17:37  .Blood Blood  --  --  --    No growth at 5 days  --  08-12 @ 17:36  .Blood Blood  Blood Culture PCR  Blood Culture PCR  PCR    Growth in aerobic bottle: Staphylococcus epidermidis Isolation of  Coagulase negative Staphylococcus from single blood culture sets may  represent  contamination. Contact the Microbiology Department at [phone] if  susceptibility testing is  clinically indicated.  Direct identification is available within approximately 3-5  hours either by Blood Panel Multiplexed PCR or Direct  MALDI-TOF. Details: [email or web address]  --      RADIOLOGY & ADDITIONAL STUDIES:    < from: Xray Chest 1 View- PORTABLE-Routine (Xray Chest 1 View- PORTABLE-Routine in AM.) (08.17.24 @ 07:06) >  IMPRESSION:    Progression of pulmonary edema changes.  New trace left pleural effusion.    --- End of Report ---    < end of copied text >   INFECTIOUS DISEASES FOLLOW UP-- Omayra Rashid  [phone]    This is a follow up note for this  68yMale with  Acute or subacute hepatic failure without coma        ROS:  CONSTITUTIONAL:  No fever,   CARDIOVASCULAR:  No chest pain or palpitations  RESPIRATORY:  No dyspnea  GASTROINTESTINAL:  No nausea, vomiting, diarrhea, or abdominal pain  GENITOURINARY:  No dysuria  NEUROLOGIC:  No headache,     Allergies    No Known Allergies    Intolerances        ANTIBIOTICS/RELEVANT:  antimicrobials  fluconAZOLE IVPB 200 milliGRAM(s) IV Intermittent every 24 hours  fluconAZOLE IVPB      meropenem  IVPB 1000 milliGRAM(s) IV Intermittent every 8 hours  meropenem  IVPB      rifAXIMin 550 milliGRAM(s) Oral two times a day    immunologic:    OTHER:  chlorhexidine 2% Cloths 1 Application(s) Topical <User Schedule>  CRRT Treatment    <Continuous>  folic acid 1 milliGRAM(s) Oral daily  lidocaine   4% Patch 1 Patch Transdermal daily  melatonin 5 milliGRAM(s) Oral at bedtime  midodrine. 20 milliGRAM(s) Oral every 8 hours  multivitamin 1 Tablet(s) Oral daily  norepinephrine Infusion 0.07 MICROgram(s)/kG/Min IV Continuous <Continuous>  pantoprazole  Injectable 40 milliGRAM(s) IV Push two times a day  Phoxillum Filtration BK 4 / 2.5 5000 milliLiter(s) CRRT <Continuous>  Phoxillum Filtration BK 4 / 2.5 5000 milliLiter(s) CRRT <Continuous>  polyethylene glycol 3350 17 Gram(s) Oral <User Schedule>  PrismaSOL Filtration BGK 4 / 2.5 5000 milliLiter(s) CRRT <Continuous>  thiamine 100 milliGRAM(s) Oral daily  vasopressin Infusion 0.03 Unit(s)/Min IV Continuous <Continuous>      Objective:  Vital Signs Last 24 Hrs  T(C): 36.4 (18 Aug 2024 07:00), Max: 36.8 (17 Aug 2024 23:00)  T(F): 97.5 (18 Aug 2024 07:00), Max: 98.2 (17 Aug 2024 23:00)  HR: 56 (18 Aug 2024 11:30) (49 - 76)  BP: 115/59 (18 Aug 2024 07:15) (115/59 - 115/59)  BP(mean): 84 (18 Aug 2024 07:15) (84 - 84)  RR: 18 (18 Aug 2024 11:30) (12 - 26)  SpO2: 100% (18 Aug 2024 11:30) (91% - 100%)    Parameters below as of 18 Aug 2024 08:15  Patient On (Oxygen Delivery Method): room air        PHYSICAL EXAM:  Constitutional:no acute distress  Eyes:CAT, EOMI, icteric  Ear/Nose/Throat: no oral lesions, 	  Respiratory: clear BL  Cardiovascular: S1S2  Gastrointestinal:soft, (+) BS, no tenderness, ascites  Extremities:no e/e/c  No Lymphadenopathy  IV sites not inflammed.    LABS:                        8.0    14.00 )-----------( 98       ( 18 Aug 2024 00:18 )             22.9     08-18    135  |  102  |  11  ----------------------------<  145<H>  4.5   |  21<L>  |  1.25    Ca    8.7      18 Aug 2024 05:54  Phos  3.3     08-18  Mg     2.5     08-18    TPro  6.0  /  Alb  3.3  /  TBili  16.7<H>  /  DBili  x   /  AST  50<H>  /  ALT  23  /  AlkPhos  154<H>  08-18    PT/INR - ( 18 Aug 2024 00:18 )   PT: 32.0 sec;   INR: 3.00 ratio         PTT - ( 18 Aug 2024 00:18 )  PTT:85.8 sec  Urinalysis Basic - ( 18 Aug 2024 05:54 )    Color: x / Appearance: x / SG: x / pH: x  Gluc: 145 mg/dL / Ketone: x  / Bili: x / Urobili: x   Blood: x / Protein: x / Nitrite: x   Leuk Esterase: x / RBC: x / WBC x   Sq Epi: x / Non Sq Epi: x / Bacteria: x        MICROBIOLOGY:            RECENT CULTURES:  08-14 @ 18:16  Peritoneal Peritoneal Fluid  --  --  --    No growth  --  08-14 @ 16:10  .Blood Blood-Venous  --  --  --    No growth at 72 Hours  --  08-14 @ 10:49  .Blood Blood-Venous  --  --  --    No growth at 72 Hours  --  08-14 @ 00:05  .Blood Blood-Peripheral  --  --  --    No growth at 4 days  --  08-12 @ 17:37  .Blood Blood  --  --  --    No growth at 5 days  --  08-12 @ 17:36  .Blood Blood  Blood Culture PCR  Blood Culture PCR  PCR    Growth in aerobic bottle: Staphylococcus epidermidis Isolation of  Coagulase negative Staphylococcus from single blood culture sets may  represent  contamination. Contact the Microbiology Department at [phone] if  susceptibility testing is  clinically indicated.  Direct identification is available within approximately 3-5  hours either by Blood Panel Multiplexed PCR or Direct  MALDI-TOF. Details: [email or web address]  --      RADIOLOGY & ADDITIONAL STUDIES:    < from: Xray Chest 1 View- PORTABLE-Routine (Xray Chest 1 View- PORTABLE-Routine in AM.) (08.17.24 @ 07:06) >  IMPRESSION:    Progression of pulmonary edema changes.  New trace left pleural effusion.    --- End of Report ---    < end of copied text >

## 2024-08-18 NOTE — PROGRESS NOTE ADULT - ASSESSMENT
69 yo M with obesity and risky alcohol consumption (with decades' history of daily consumption of homemade alcohol). Admitted to [facility] for 1 month due to recent onset of jaundice and with a prolonged hospital/ICU course due to newly diagnosed decompensated cirrhosis with acute on chronic liver failure (ACLF) with shock, FANY (started on intermittent HD since 7/9), acute on chronic anemia with recurrent overt GI bleeding, and hepatic encephalopathy.   Transferred to [facility] on 8/12/24 for SLK evaluation and then transferred to the SICU for initiation of CRRT on 8/13.     # Distributive shock , currently on pressor support  - Likely from ACLF and septic shock. Previously on norepinephrine, vasopressin and midodrine. Now off pressors but on midodrine, blood pressures 90s-100s/50s with stable heart rates.   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 Hold off on Valcyte at this time.   - Zosyn (8/12-16) switched to Meropenem (8/16- ) and continued on fluconazole (8/12- ) empirically with transplant ID following.     # Anuric FANY on CKD    - On intermittent HD since 7/9   - Started on CRRT (8/13- ) with Transplant nephrology following.  - Remains anuric and with marked anasarca on net positive volumes due to inability to remove fluid via CRRT given hypotension and pressor requirements.     # Active UGIB - resolved.   - No further overt GI bleeding since transfer but has received blood products as per H/H and TEGs.  - EGD (7/12, at [facility]) with small non-bleeding EV and a duodenal ulcer with a visible vessel.   - Continuing pantoprazole 40 mg iv q12h  - Plan to keep monitoring H/H and transfuse as needed to keep Hb >7.     # Newly diagnosed decompensated cirrhosis with acute on chronic liver failure   - Calculated MELD 3 score on 8/18 - 41  - Waxing and waning hepatic encephalopathy managed with Rifaximin and Miralax. Lactulose discontinued due distended and tympanic abdomen. Bowel movements at goal.   - Large volume ascites and anasarca on admission. LVP (8/14) negative for SBP.   - Contrast CT (8/13) with patent portohepatic vessels and no evidence of HCC.     - Will need daily PT and OT. Last ambulatory prior to hospital admission in July 2024.    - ABO O. MELD 3.0 score of 41 with OLT evaluation started on 8/12 and will meet SLK criteria in Aug 20th after 6 weeks of being HD-dependent. However, patient is currently not on dialysis due to hypotension, has urine o/p with Cr improved. Discussed at our multidisciplinary liver transplant selection meeting on 8/16 and at the time patient needs to be optimized medically before proceeding with evaluation.   - Pending LHC for cardiology clearance, and sigmoidoscopy for assessment of recto-sigmoid mass. Will plan for it tomorrow.     # Newly found liver and spleen hypodensity suggestive of small infarctions   - IV contrast CT abdomen pelvis (8/13) showing left hepatic lobe wedge shaped hypodensity (small infarct vs hemangioma) and another wedge shaped hypodensity in the spleen also suggestive of small infarct.   - Small infarcts could be secondary to hypovolemia/shock or possible embolization. Patient would benefit from hypercoagulable work up.     Recommendations:   - will plan for Flex sig tomorrow, will order tap water enemas for tomorrow morning.   - Follow up with cardiology, possible LHC this week.   - Place him on protein supplements, has poor PO intake.   - After above workup, will plan to discuss the patient during transplant meeting next week.   - CMP and CBC daily.     Discussed the case with Dr. Jacobs.     All recommendations are tentative until note is attested by an attending.     Loan Alexander, PGY-6  Gastroenterology/Hepatology Fellow  Available on Microsoft Teams  [phone] (Short Range Pager)  [phone] (Long Range Pager)    After 5pm, please contact the on-call GI fellow.

## 2024-08-18 NOTE — PROGRESS NOTE ADULT - ASSESSMENT
68y with obesity and risky alcohol consumption (with decades' history of daily consumption of homemade alcohol), admitted to [facility] 1 month ago due to recent onset of jaundice and with a prolonged hospital and ICU course there due to newly diagnosed decompensated cirrhosis with acute on chronic liver failure (ACLF) with shock (resolved, but still on midodrine); FANY (on intermittent HD since 7/9); recurrent maroon stools and acute on chronic anemia necessitating intermittent PRBC transfusions (last on 8/8) and hypofibrinoginemia, with EGD (7/12) with small non-bleeding EV and a duodenal ulcer with a visible vessel; and waxing and waning hepatic encephalopathy. He was transferred to [facility] on 8/12/24 for evaluation for combined liver/kidney transplants (SLK).      [] Decompensated ETOH Cirrhosis  - SLK eval   - HE: cont rifaximin/miralax  - EV:  EGD (7/12) with small non-bleeding EV and a duodenal ulcer with a visible vessel. -open SLK eval, consultants aware  - FANY/HRS: anuric, On CRRT 8/13, Renal following  - ID: Empiric Zosyn/fluc   - bcx 8/13 w/ MRSE   - PPI  -Thiamine/MVI/FOLIC ACID   - cont sicu care  68y with obesity and risky alcohol consumption (with decades' history of daily consumption of homemade alcohol), admitted to [facility] 1 month ago due to recent onset of jaundice and with a prolonged hospital and ICU course there due to newly diagnosed decompensated cirrhosis with acute on chronic liver failure (ACLF) with shock (resolved, but still on midodrine); FANY (on intermittent HD since 7/9); recurrent maroon stools and acute on chronic anemia necessitating intermittent PRBC transfusions (last on 8/8) and hypofibrinoginemia, with EGD (7/12) with small non-bleeding EV and a duodenal ulcer with a visible vessel; and waxing and waning hepatic encephalopathy. He was transferred to [facility] on 8/12/24 for evaluation for combined liver/kidney transplants (SLK).      [ ] Decompensated ETOH Cirrhosis  - SLK eval   - HE: cont rifaximin/miralax  - EV:  EGD (7/12) with small non-bleeding EV and a duodenal ulcer with a visible vessel. -open SLK eval, consultants aware  - FANY/HRS: anuric, On CRRT 8/13, Renal following  - ID: Empiric Zosyn/fluc   - bcx 8/13 w/ MRSE   - PPI  -Thiamine/MVI/FOLIC ACID   - cont sicu care  68y with obesity and risky alcohol consumption (with decades' history of daily consumption of homemade alcohol), admitted to [facility] 1 month ago due to recent onset of jaundice and with a prolonged hospital and ICU course there due to newly diagnosed decompensated cirrhosis with acute on chronic liver failure (ACLF) with shock (resolved, but still on midodrine); FANY (on intermittent HD since 7/9); recurrent maroon stools and acute on chronic anemia necessitating intermittent PRBC transfusions (last on 8/8) and hypofibrinoginemia, with EGD (7/12) with small non-bleeding EV and a duodenal ulcer with a visible vessel; and waxing and waning hepatic encephalopathy. He was transferred to [facility] on 8/12/24 for evaluation for combined liver/kidney transplants (SLK).      [ ] Decompensated ETOH Cirrhosis  - SLK eval   - HE: cont rifaximin/miralax  - EV:  EGD (7/12) with small non-bleeding EV and a duodenal ulcer with a visible vessel. -open SLK eval, consultants aware  - FANY/HRS: anuric, On CRRT 8/13, Renal following  - ID: Empiric Zosyn/fluc   - bcx 8/13 w/ MRSE   - PPI  -Thiamine/MVI/FOLIC ACID   - cont sicu care   - flex sig tomorrow for wall thickening seen on CT

## 2024-08-18 NOTE — PROGRESS NOTE ADULT - SUBJECTIVE AND OBJECTIVE BOX
Transplant Surgery - Multidisciplinary Progress Note  --------------------------------------------------------------    Present:  Patient seen and examined with multidisciplinary Transplant team including  (Surgery: Dr. Dagher  Hepatologist: Dr. Jacobs. LAWRENCE Oswald RNs in AM rounds. Disciplines not in attendance will be notified of the plan.     HPI  68y with obesity and risky alcohol consumption (with decades' history of daily consumption of homemade alcohol), admitted to [facility] 1 month ago due to recent onset of jaundice and with a prolonged hospital and ICU course there due to newly diagnosed decompensated cirrhosis with acute on chronic liver failure (ACLF) with shock (resolved, but still on midodrine); FANY (on intermittent HD since 7/9); recurrent maroon stools and acute on chronic anemia necessitating intermittent PRBC transfusions (last on 8/8) and hypofibrinoginemia, with EGD (7/12) with small non-bleeding EV and a duodenal ulcer with a visible vessel; and waxing and waning hepatic encephalopathy. He was transferred to [facility] on 8/12/24 for evaluation for combined liver/kidney transplants (SLK).     Interval Events:            Potential Discharge date: Pending clinical course    Education:  Medications    Plan of care:  See Below                                    Review of systems  Gen: No weight changes, fatigue, fevers/chills, weakness  Skin: No rashes  Head/Eyes/Ears/Mouth: No headache; Normal hearing; Normal vision w/o blurriness; No sinus pain/discomfort, sore throat  Respiratory: No dyspnea, cough, wheezing, hemoptysis  CV: No chest pain, PND, orthopnea  GI: C/O mild abdominal pain at surgical site. no diarrhea, constipation, nausea, vomiting, melena, hematochezia  : No increased frequency, dysuria, hematuria, nocturia  MSK: No joint pain/swelling; no back pain; no edema  Neuro: No dizziness/lightheadedness, weakness, seizures, numbness, tingling  Heme: No easy bruising or bleeding  Endo: No heat/cold intolerance  Psych: No significant nervousness, anxiety, stress, depression  All other systems were reviewed and are negative, except as noted.    PHYSICAL EXAM:  Constitutional: Well developed / well nourished  Eyes:  PERRLA  ENMT: nc/at, no thrush  Neck: supple,   Respiratory: CTA B/L  Cardiovascular: RRR  Gastrointestinal: Soft abdomen, ND  Genitourinary: anuric   Extremities: SCD's in place and working bilaterally  Vascular: Palpable dp pulses bilaterally.   Neurological: A&O x3  Skin: no rashes, ulcerations, lesions  Musculoskeletal: Moving all extremities  Psychiatric: Responsive   Transplant Surgery - Multidisciplinary Progress Note  --------------------------------------------------------------    Present:  Patient seen and examined with multidisciplinary Transplant team including  (Surgery: Dr. Dagher  Hepatologist: Dr. Jacobs. LAWRENCE Oswald RNs in AM rounds. Disciplines not in attendance will be notified of the plan.     HPI  68y with obesity and risky alcohol consumption (with decades' history of daily consumption of homemade alcohol), admitted to [facility] 1 month ago due to recent onset of jaundice and with a prolonged hospital and ICU course there due to newly diagnosed decompensated cirrhosis with acute on chronic liver failure (ACLF) with shock (resolved, but still on midodrine); FANY (on intermittent HD since 7/9); recurrent maroon stools and acute on chronic anemia necessitating intermittent PRBC transfusions (last on 8/8) and hypofibrinoginemia, with EGD (7/12) with small non-bleeding EV and a duodenal ulcer with a visible vessel; and waxing and waning hepatic encephalopathy. He was transferred to [facility] on 8/12/24 for evaluation for combined liver/kidney transplants (SLK).     Interval Events:              Potential Discharge date: Pending clinical course  Education:  Medications  Plan of care:  See Below                                  Review of systems  Gen: No weight changes, fatigue, fevers/chills, weakness  Skin: No rashes  Head/Eyes/Ears/Mouth: No headache; Normal hearing; Normal vision w/o blurriness; No sinus pain/discomfort, sore throat  Respiratory: No dyspnea, cough, wheezing, hemoptysis  CV: No chest pain, PND, orthopnea  GI: C/O mild abdominal pain at surgical site. no diarrhea, constipation, nausea, vomiting, melena, hematochezia  : No increased frequency, dysuria, hematuria, nocturia  MSK: No joint pain/swelling; no back pain; no edema  Neuro: No dizziness/lightheadedness, weakness, seizures, numbness, tingling  Heme: No easy bruising or bleeding  Endo: No heat/cold intolerance  Psych: No significant nervousness, anxiety, stress, depression  All other systems were reviewed and are negative, except as noted.    PHYSICAL EXAM:  Constitutional: Well developed / well nourished  Eyes:  PERRLA  ENMT: nc/at, no thrush  Neck: supple,   Respiratory: CTA B/L  Cardiovascular: RRR  Gastrointestinal: Soft abdomen, ND  Genitourinary: anuric   Extremities: SCD's in place and working bilaterally  Vascular: Palpable dp pulses bilaterally.   Neurological: A&O x3  Skin: no rashes, ulcerations, lesions  Musculoskeletal: Moving all extremities  Psychiatric: Responsive Transplant Surgery - Multidisciplinary Progress Note  --------------------------------------------------------------    Present:  Patient seen and examined with multidisciplinary Transplant team including  (Surgery: Dr. Dagher  Hepatologist: Dr. Jacobs. LAWRENCE Oswald RNs in AM rounds. Disciplines not in attendance will be notified of the plan.     HPI  68y with obesity and risky alcohol consumption (with decades' history of daily consumption of homemade alcohol), admitted to [facility] 1 month ago due to recent onset of jaundice and with a prolonged hospital and ICU course there due to newly diagnosed decompensated cirrhosis with acute on chronic liver failure (ACLF) with shock (resolved, but still on midodrine); FANY (on intermittent HD since 7/9); recurrent maroon stools and acute on chronic anemia necessitating intermittent PRBC transfusions (last on 8/8) and hypofibrinoginemia, with EGD (7/12) with small non-bleeding EV and a duodenal ulcer with a visible vessel; and waxing and waning hepatic encephalopathy. He was transferred to [facility] on 8/12/24 for evaluation for combined liver/kidney transplants (SLK).     Interval Events:  afebrile, meld 42  - remains on levo/vaso  - CRRT-50, ON CRRT clotted, gave 1 u prbc            Potential Discharge date: Pending clinical course  Education:  Medications  Plan of care:  See Below      MEDICATIONS  (STANDING):  chlorhexidine 2% Cloths 1 Application(s) Topical <User Schedule>  CRRT Treatment    <Continuous>  fluconAZOLE IVPB 200 milliGRAM(s) IV Intermittent every 24 hours  fluconAZOLE IVPB      folic acid 1 milliGRAM(s) Oral daily  lidocaine   4% Patch 1 Patch Transdermal daily  melatonin 5 milliGRAM(s) Oral at bedtime  meropenem  IVPB 1000 milliGRAM(s) IV Intermittent every 8 hours  meropenem  IVPB      midodrine. 20 milliGRAM(s) Oral every 8 hours  multivitamin 1 Tablet(s) Oral daily  norepinephrine Infusion 0.07 MICROgram(s)/kG/Min (15 mL/Hr) IV Continuous <Continuous>  pantoprazole  Injectable 40 milliGRAM(s) IV Push two times a day  Phoxillum Filtration BK 4 / 2.5 5000 milliLiter(s) (200 mL/Hr) CRRT <Continuous>  Phoxillum Filtration BK 4 / 2.5 5000 milliLiter(s) (1700 mL/Hr) CRRT <Continuous>  polyethylene glycol 3350 17 Gram(s) Oral <User Schedule>  PrismaSOL Filtration BGK 4 / 2.5 5000 milliLiter(s) (1500 mL/Hr) CRRT <Continuous>  rifAXIMin 550 milliGRAM(s) Oral two times a day  thiamine 100 milliGRAM(s) Oral daily  vasopressin Infusion 0.03 Unit(s)/Min (4.5 mL/Hr) IV Continuous <Continuous>    MEDICATIONS  (PRN):      PAST MEDICAL & SURGICAL HISTORY:  Alcohol abuse      No significant past surgical history          Vital Signs Last 24 Hrs  T(C): 36.4 (18 Aug 2024 07:00), Max: 36.8 (17 Aug 2024 23:00)  T(F): 97.5 (18 Aug 2024 07:00), Max: 98.2 (17 Aug 2024 23:00)  HR: 56 (18 Aug 2024 11:30) (49 - 76)  BP: 115/59 (18 Aug 2024 07:15) (115/59 - 115/59)  BP(mean): 84 (18 Aug 2024 07:15) (84 - 84)  RR: 18 (18 Aug 2024 11:30) (12 - 26)  SpO2: 100% (18 Aug 2024 11:30) (91% - 100%)    Parameters below as of 18 Aug 2024 08:15  Patient On (Oxygen Delivery Method): room air        I&O's Summary    17 Aug 2024 07:01  -  18 Aug 2024 07:00  --------------------------------------------------------  IN: 1471.4 mL / OUT: 2156 mL / NET: -684.6 mL    18 Aug 2024 07:01  -  18 Aug 2024 11:51  --------------------------------------------------------  IN: 318 mL / OUT: 533 mL / NET: -215 mL                              8.0    14.00 )-----------( 98       ( 18 Aug 2024 00:18 )             22.9     08-18    135  |  102  |  11  ----------------------------<  145<H>  4.5   |  21<L>  |  1.25    Ca    8.7      18 Aug 2024 05:54  Phos  3.3     08-18  Mg     2.5     08-18    TPro  6.0  /  Alb  3.3  /  TBili  16.7<H>  /  DBili  x   /  AST  50<H>  /  ALT  23  /  AlkPhos  154<H>  08-18          Culture - Body Fluid with Gram Stain (collected 08-14-24 @ 18:16)  Source: Peritoneal Peritoneal Fluid  Gram Stain (08-15-24 @ 03:04):    polymorphonuclear leukocytes seen    No organisms seen    by cytocentrifuge  Preliminary Report (08-15-24 @ 21:41):    No growth    Culture - Fungal, Body Fluid (collected 08-14-24 @ 18:16)  Source: Peritoneal Peritoneal Fluid  Preliminary Report (08-17-24 @ 23:03):    Culture is being performed. Fungal cultures are held for 4 weeks.    Culture - Blood (collected 08-14-24 @ 16:10)  Source: .Blood Blood-Venous  Preliminary Report (08-17-24 @ 20:01):    No growth at 72 Hours    Culture - Blood (collected 08-14-24 @ 10:49)  Source: .Blood Blood-Venous  Preliminary Report (08-17-24 @ 15:01):    No growth at 72 Hours    Culture - Blood (collected 08-14-24 @ 00:05)  Source: .Blood Blood-Peripheral  Preliminary Report (08-18-24 @ 04:01):    No growth at 4 days    Culture - Blood (collected 08-14-24 @ 00:05)  Source: .Blood Blood-Peripheral  Preliminary Report (08-18-24 @ 04:01):    No growth at 4 days    Culture - Blood (collected 08-12-24 @ 17:37)  Source: .Blood Blood  Final Report (08-17-24 @ 22:00):    No growth at 5 days    Culture - Blood (collected 08-12-24 @ 17:36)  Source: .Blood Blood  Gram Stain (08-13-24 @ 16:15):    Growth in aerobic bottle: Gram Positive Cocci in Clusters  Final Report (08-14-24 @ 10:47):    Growth in aerobic bottle: Staphylococcus epidermidis Isolation of    Coagulase negative Staphylococcus from single blood culture sets may    represent    contamination. Contact the Microbiology Department at [phone] if    susceptibility testing is    clinically indicated.    Direct identification is available within approximately 3-5    hours either by Blood Panel Multiplexed PCR or Direct    MALDI-TOF. Details: [email or web address]  Organism: Blood Culture PCR (08-14-24 @ 10:47)  Organism: Blood Culture PCR (08-14-24 @ 10:47)        Review of systems  Gen: No weight changes, fatigue, fevers/chills, weakness  Skin: No rashes  Head/Eyes/Ears/Mouth: No headache; Normal hearing; Normal vision w/o blurriness; No sinus pain/discomfort, sore throat  Respiratory: No dyspnea, cough, wheezing, hemoptysis  CV: No chest pain, PND, orthopnea  GI: C/O mild abdominal pain at surgical site. no diarrhea, constipation, nausea, vomiting, melena, hematochezia  : No increased frequency, dysuria, hematuria, nocturia  MSK: No joint pain/swelling; no back pain; no edema  Neuro: No dizziness/lightheadedness, weakness, seizures, numbness, tingling  Heme: No easy bruising or bleeding  Endo: No heat/cold intolerance  Psych: No significant nervousness, anxiety, stress, depression  All other systems were reviewed and are negative, except as noted.    PHYSICAL EXAM:  Constitutional: Well developed / well nourished  Eyes:  PERRLA  ENMT: nc/at, no thrush  Neck: supple,   Respiratory: CTA B/L  Cardiovascular: RRR  Gastrointestinal: Soft abdomen, ND  Genitourinary: anuric   Extremities: SCD's in place and working bilaterally  Vascular: Palpable dp pulses bilaterally.   Neurological: A&O x3  Skin: no rashes, ulcerations, lesions  Musculoskeletal: Moving all extremities  Psychiatric: Responsive

## 2024-08-18 NOTE — PROGRESS NOTE ADULT - SUBJECTIVE AND OBJECTIVE BOX
24 HOUR EVENTS:  - 1 prbc for lost CRRT circuit     NEURO  RASS (if intubated): 		CAM ICU (if concern for delirium):  Exam: AOx1-2, lethargic  Meds: melatonin 5 milliGRAM(s) Oral at bedtime      RESPIRATORY  RR: 17 (08-18-24 @ 01:00) (12 - 26)  SpO2: 95% (08-18-24 @ 01:00) (91% - 100%)  Wt(kg): --  Exam: Lungs CTA b/l  Mechanical Ventilation:   ABG - ( 18 Aug 2024 00:00 )  pH: 7.45  /  pCO2: 33    /  pO2: 95    / HCO3: 23    / Base Excess: -0.8  /  SaO2: 99.1    Lactate: x                Meds:     CARDIOVASCULAR  HR: 53 (08-18-24 @ 01:00) (49 - 81)  BP: 110/56 (08-17-24 @ 07:15) (110/56 - 110/56)  BP(mean): 80 (08-17-24 @ 07:15) (80 - 80)  ABP: 119/45 (08-18-24 @ 01:00) (98/40 - 142/67)  ABP(mean): 65 (08-18-24 @ 01:00) (55 - 92)  Wt(kg): --  CVP(cm H2O): --      Exam: Normal S1/S2 w/o murmurs or rubs  Cardiac Rhythm: sinus  Perfusion     [x ]Adequate   [ ]Inadequate  Mentation   [ ]Normal       [ x]Reduced  Extremities  [x ]Warm         [ ]Cool  Volume Status [ ]Hypervolemic [x ]Euvolemic [ ]Hypovolemic  Meds: midodrine. 20 milliGRAM(s) Oral every 8 hours  norepinephrine Infusion 0.07 MICROgram(s)/kG/Min IV Continuous <Continuous>      GI/NUTRITION  Exam: abd mildly distended  Diet: regular  Last Bowel Movement: 17-Aug-2024 (08-17-24 @ 19:00)  Last Bowel Movement: 17-Aug-2024 (08-17-24 @ 07:00)  Last Bowel Movement: 16-Aug-2024 (08-16-24 @ 19:00)  Last Bowel Movement: 15-Aug-2024 (08-15-24 @ 07:00)  Last Bowel Movement: 14-Aug-2024 (08-14-24 @ 19:00)    Last Bowel Movement: 12-Aug-2024 (08-12-24 @ 07:55)    Meds: pantoprazole  Injectable 40 milliGRAM(s) IV Push two times a day  polyethylene glycol 3350 17 Gram(s) Oral <User Schedule>      GENITOURINARY  I&O's Detail    08-16 @ 07:01  -  08-17 @ 07:00  --------------------------------------------------------  IN:    IV PiggyBack: 500 mL    Norepinephrine: 363.2 mL    Oral Fluid: 320 mL    PRBCs (Packed Red Blood Cells): 300 mL    Vasopressin: 108 mL  Total IN: 1591.2 mL    OUT:    Other (mL): 1794 mL  Total OUT: 1794 mL    Total NET: -202.8 mL      08-17 @ 07:01  -  08-18 @ 01:09  --------------------------------------------------------  IN:    IV PiggyBack: 50 mL    IV PiggyBack: 150 mL    Norepinephrine: 247.6 mL    Oral Fluid: 410 mL    PRBCs (Packed Red Blood Cells): 300 mL    Vasopressin: 81 mL  Total IN: 1238.6 mL    OUT:    Blood Loss (mL): 3 mL    Other (mL): 1798 mL  Total OUT: 1801 mL    Total NET: -562.4 mL          08-18    136  |  102  |  10  ----------------------------<  150<H>  4.5   |  21<L>  |  1.19    Ca    8.4      18 Aug 2024 00:18  Phos  3.2     08-18  Mg     2.4     08-18    TPro  6.0  /  Alb  3.6  /  TBili  17.5<H>  /  DBili  x   /  AST  50<H>  /  ALT  22  /  AlkPhos  154<H>  08-18    Meds: folic acid 1 milliGRAM(s) Oral daily  multivitamin 1 Tablet(s) Oral daily  thiamine 100 milliGRAM(s) Oral daily      HEMATOLOGIC  Meds:                         8.0    14.00 )-----------( 98       ( 18 Aug 2024 00:18 )             22.9     PT/INR - ( 18 Aug 2024 00:18 )   PT: 32.0 sec;   INR: 3.00 ratio         PTT - ( 18 Aug 2024 00:18 )  PTT:85.8 sec    INFECTIOUS DISEASES  T(C): 36.8 (08-17-24 @ 23:00), Max: 36.8 (08-17-24 @ 23:00)  Wt(kg): --  WBC Count: 14.00 K/uL (08-18 @ 00:18)  WBC Count: 12.15 K/uL (08-17 @ 17:16)  WBC Count: 12.88 K/uL (08-17 @ 10:30)  WBC Count: 11.59 K/uL (08-17 @ 04:17)    Recent Cultures:  Specimen Source: Peritoneal Peritoneal Fluid, 08-14 @ 18:16; Results   No growth; Gram Stain:   polymorphonuclear leukocytes seen  No organisms seen  by cytocentrifuge; Organism: --  Specimen Source: .Blood Blood-Venous, 08-14 @ 16:10; Results   No growth at 72 Hours; Gram Stain: --; Organism: --  Specimen Source: .Blood Blood-Venous, 08-14 @ 10:49; Results   No growth at 72 Hours; Gram Stain: --; Organism: --  Specimen Source: .Blood Blood-Peripheral, 08-14 @ 00:05; Results   No growth at 72 Hours; Gram Stain: --; Organism: --  Specimen Source: .Blood Blood, 08-12 @ 17:37; Results   No growth at 5 days; Gram Stain: --; Organism: --  Specimen Source: .Blood Blood, 08-12 @ 17:36; Results   Growth in aerobic bottle: Staphylococcus epidermidis Isolation of  Coagulase negative Staphylococcus from single blood culture sets may  represent  contamination. Contact the Microbiology Department at [phone] if  susceptibility testing is  clinically indicated.  Direct identification is available within approximately 3-5  hours either by Blood Panel Multiplexed PCR or Direct  MALDI-TOF. Details: [email or web address]<!>; Gram Stain:   Growth in aerobic bottle: Gram Positive Cocci in Clusters<!>; Organism: Blood Culture PCR<!>    Meds: fluconAZOLE IVPB 200 milliGRAM(s) IV Intermittent every 24 hours  fluconAZOLE IVPB      meropenem  IVPB      meropenem  IVPB 1000 milliGRAM(s) IV Intermittent every 8 hours  rifAXIMin 550 milliGRAM(s) Oral two times a day      ENDOCRINE  Capillary Blood Glucose    Meds: hydrocortisone sodium succinate Injectable 50 milliGRAM(s) IV Push every 6 hours  vasopressin Infusion 0.03 Unit(s)/Min IV Continuous <Continuous>      ACCESS DEVICES:  [ ] Peripheral IV  [ ] Central Venous Line		[ ] R	[ ] L	[ ] IJ	[ ] Fem	[ ] SC	Placed:   [ ] Arterial Line			[ ] R	[ ] L	[ ] Fem	[ ] Rad	[ ] Ax	Placed:   [ ] PICC:					[ ] Mediport  [ ] Urinary Catheter, Date Placed:   [ ] Necessity of urinary, arterial, and venous catheters discussed    OTHER MEDICATIONS:  chlorhexidine 2% Cloths 1 Application(s) Topical <User Schedule>  CRRT Treatment    <Continuous>  lidocaine   4% Patch 1 Patch Transdermal daily  Phoxillum Filtration BK 4 / 2.5 5000 milliLiter(s) CRRT <Continuous>  Phoxillum Filtration BK 4 / 2.5 5000 milliLiter(s) CRRT <Continuous>  PrismaSOL Filtration BGK 4 / 2.5 5000 milliLiter(s) CRRT <Continuous>      IMAGING:

## 2024-08-18 NOTE — PROGRESS NOTE ADULT - ASSESSMENT
76 yo m with alcohol abuse otherwise no known chronic medical issues (does not see doctors per sister) s/p 1 month stay at [facility] now transferred to [facility] for liver transplant eval.  Most of history obtained from sister (Ashlyn) at bedside and some from transfer paperwork. Per sister, pt was initially brought to the hospital by family for back pain and jaundice for unclear amount of time. Patient was seen by GI and underwent EGD soon after admission which only showed nonbleeding ?duodenal ulcers (no intervention) however few days later pt developed active signs of bleeding and emergently underwent EGD again showing bleeding esophageal varices which were clipped.  pt was electively intubated with stay in ICU thereafter. Patient then started with HD due to worsening renal function and MS for past 2.5 weeks at times limited by low BP requiring pressors to assist. Had numerous paracentesis with varying amount of fluid removal - albumin infusions. Sister not aware of any concerns for acute infection during his stay but aware that pt was on abx at some point due to bleeding issues.        PERTINENT RADIOLOGY:  CXR: Tubes and lines as above. No focal consolidations  CT Chest, A&P:  Patchy ground-glass opacities in the bilateral upper lobes. *  Cirrhosis with evidence of portal hypertension. *  Hypodense lesion in the periphery of the left hepatic lobe of uncertain etiology. Somewhat wedge-shaped morphology raises the possibility for a small infarct. Question subtle peripheral nodular enhancement, and a hemangioma is also considered. Contrast enhanced abdominal MRI can be performed for further characterization and to assess for other possibilities. *  A wedge-shaped region of hypoattenuation in the spleen may reflect a small infarct. *  Focal eccentric wall thickening in the low rectum, possibly due to a mural fold. Consider colonoscopy. *  Large volume ascites.  CT Head: No evidence of acute intracranial pathology. If clinical symptoms persist or worsen, more sensitive evaluation with brain MRI may be obtained, if no contraindications exist.    BCx (8/12) 1/4 MRSE  BCx (8/14) NGTD  Paracentesis (8/14) Cell Counts Negative for SBP  MRSA/MSSA Nasal PCR (8/16) Negative    CXR (8/16) Progression of infiltrates    #Decompensated liver cirrhosis, Leukocytosis, Shock  --Given worsening status no objection to broadening to Meropenem  --Can continue Empiric fluconazole  --Continue to follow CBC with diff  --Continue to follow transaminases  --Continue to follow temperature curve  --Follow up on  blood cultures 8/14 x3 sets are no growth    #Positive BCx (8/12) (Staph epi)  Consistent with contaminant   --Follow up on preliminary blood cultures    #CMV PCR   --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 Would repeat CMV PCR on 8/19   -- may need to treat if increasing  Cytomegalovirus By PCR: 4730 IU/mL (08.16.24 @ 00:37)        #Pre Transplant Evaluation   HIV-1/2 Combo Result: Nonreactive  EBVPCR Log: NotDetec Nlw64OC/mL   Hepatitis A IgG Ab Result: Reactive   Hepatitis B Core Antibody, Total: Nonreactive  Hepatitis B Surface Antibody: Reactive   Hepatitis B DNA PCR Log: Not Detected  Hepatitis B Surface Antigen: Nonreactive  Hepatitis C Virus Interpretation: Nonreactive  COVID-19 De Domain Antibody: Positive  Treponema Pallidum Antibody Interpretation: Negative  HSV 1 IgG  Positive/HSV 2 IgG Negative  EBV Serology Positive  CMV IgG Positive  VZV IgG Positive  Measles Positive, Mumps Positive and Rubella IgG Positive  Toxoplasma IgG Positive  QuantiFeron Gold Negative  Strongyloides Ab Negative  Babesia PCR negative  --Follow up on Schistosoma IgG  --Reportedly Lyme serology was previously positive and remains positive, follow up on Tick Diseases Panel is negative for additional tick born exposures    #Encounter to Vaccinate Patient - Will defer vaccination in context of critical illness  COVID19: No primary series. Would benefit from COVID19 4766-8486 dose  Influenza: Would benefit from dose next season  Pneumococcal: Would benefit from PCV20  HAV: Immune, no additional vaccines indicated  HBV: Immune, no additional vaccines indicated  MMR: Immune, will not require further vaccination  Varicella: Immune, will not require further vaccination  Shingles: Would benefit from Shingrix  Tdap: Would benefit from Tdap      Herbie Rashid MD  Can be called via Teams  After 5pm/weekends [phone]   78 yo m with alcohol abuse otherwise no known chronic medical issues (does not see doctors per sister) s/p 1 month stay at [facility] now transferred to [facility] for liver transplant eval.  Most of history obtained from sister (Ashlyn) at bedside and some from transfer paperwork. Per sister, pt was initially brought to the hospital by family for back pain and jaundice for unclear amount of time. Patient was seen by GI and underwent EGD soon after admission which only showed nonbleeding ?duodenal ulcers (no intervention) however few days later pt developed active signs of bleeding and emergently underwent EGD again showing bleeding esophageal varices which were clipped.  pt was electively intubated with stay in ICU thereafter. Patient then started with HD due to worsening renal function and MS for past 2.5 weeks at times limited by low BP requiring pressors to assist. Had numerous paracentesis with varying amount of fluid removal - albumin infusions. Sister not aware of any concerns for acute infection during his stay but aware that pt was on abx at some point due to bleeding issues.        PERTINENT RADIOLOGY:  CXR: Tubes and lines as above. No focal consolidations  CT Chest, A&P:  Patchy ground-glass opacities in the bilateral upper lobes. *  Cirrhosis with evidence of portal hypertension. *  Hypodense lesion in the periphery of the left hepatic lobe of uncertain etiology. Somewhat wedge-shaped morphology raises the possibility for a small infarct. Question subtle peripheral nodular enhancement, and a hemangioma is also considered. Contrast enhanced abdominal MRI can be performed for further characterization and to assess for other possibilities. *  A wedge-shaped region of hypoattenuation in the spleen may reflect a small infarct. *  Focal eccentric wall thickening in the low rectum, possibly due to a mural fold. Consider colonoscopy. *  Large volume ascites.  CT Head: No evidence of acute intracranial pathology. If clinical symptoms persist or worsen, more sensitive evaluation with brain MRI may be obtained, if no contraindications exist.    BCx (8/12) 1/4 MRSE  BCx (8/14) NGTD  Paracentesis (8/14) Cell Counts Negative for SBP  MRSA/MSSA Nasal PCR (8/16) Negative    CXR (8/16) Progression of infiltrates    #Decompensated liver cirrhosis, Leukocytosis, Shock  --Given worsening status no objection to broadening to Meropenem  --Can continue Empiric fluconazole  --Continue to follow CBC with diff  --Continue to follow transaminases  --Continue to follow temperature curve  --Follow up on  blood cultures 8/14 x3 sets are no growth    #Positive BCx (8/12) (Staph epi)  Consistent with contaminant   --Follow up on preliminary blood cultures    #CMV PCR   --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 Would repeat CMV PCR on 8/19   -- may need to treat if increasing  Cytomegalovirus By PCR: 4730 IU/mL (08.16.24 @ 00:37)        #Pre Transplant Evaluation   HIV-1/2 Combo Result: Nonreactive  EBVPCR Log: NotDetec Lvl59EA/mL   Hepatitis A IgG Ab Result: Reactive   Hepatitis B Core Antibody, Total: Nonreactive  Hepatitis B Surface Antibody: Reactive   Hepatitis B DNA PCR Log: Not Detected  Hepatitis B Surface Antigen: Nonreactive  Hepatitis C Virus Interpretation: Nonreactive  COVID-19 De Domain Antibody: Positive  Treponema Pallidum Antibody Interpretation: Negative  HSV 1 IgG  Positive/HSV 2 IgG Negative  EBV Serology Positive  CMV IgG Positive  VZV IgG Positive  Measles Positive, Mumps Positive and Rubella IgG Positive  Toxoplasma IgG Positive  QuantiFeron Gold Negative  Strongyloides Ab Negative  Babesia PCR negative  --Follow up on Schistosoma IgG  --Reportedly Lyme serology was previously positive and remains positive, follow up on Tick Diseases Panel is negative for additional tick born exposures  -- will treat with doxycycline 100mg bid as prior therapy was not given      #Encounter to Vaccinate Patient - Will defer vaccination in context of critical illness  COVID19: No primary series. Would benefit from COVID19 7110-3145 dose  Influenza: Would benefit from dose next season  Pneumococcal: Would benefit from PCV20  HAV: Immune, no additional vaccines indicated  HBV: Immune, no additional vaccines indicated  MMR: Immune, will not require further vaccination  Varicella: Immune, will not require further vaccination  Shingles: Would benefit from Shingrix  Tdap: Would benefit from Tdap      Herbie Rashid MD  Can be called via Teams  After 5pm/weekends [phone]

## 2024-08-18 NOTE — PROGRESS NOTE ADULT - CRITICAL CARE ATTENDING COMMENT
Patient evaluated by me on AM rounds.     *Decompensated EtOH Cirrhosis.  *UGIB s/p EGD and clipping at OSH.   *Renal failure.  *Hypotension.     #NEURO: A&Ox1. Ammonia 60s. Continue Rifaximin and Miralax.   #RESP: Stable on room air. RUL infiltrate on CXR.    #CVS: Decreasing pressor requirements. Levo 0.07/Vaso. LA 1.3.  ---Continue Midodrine 20mg TID.    #GI: Tolerating regular diet.   ---Decompensated EtOH Cirrhosis. Bili stable at 16-17. F/up transplant eval.   #: Renal failure requiring CRRT. Tolerating net negative 50. NET negative 684cc.   ---Continue CRRT @ negative 50.  #HEME: Stable anemia. Required 1 unit RBC after CRRT clotted yesterday. INR 3.  ---Persistent thrombocytopenia. Plt 98.   ---Hold chemical AC in the setting of coagulopathy.   #ID: Afebrile, WBC 11 --> 14.   ---Blood cx 8/12: MRSE in 1/2 bottles.   ---Blood cx 8/14: Negative.   ---S/p Para 8/14: No evidence of SBP.   ---MRSA swab 8/16: negative.   ---Appreciate transplant ID recs. Continue Fluconazole and meropenem.   #ENDO: DC steroids. Glucose controlled.     #Lines:   -RUE PICC placed at OSH. DC and Place TLC.   -L radial A-line 8/13.   -PermCath placed at OSH. Patient evaluated by me on AM rounds.     *Decompensated EtOH Cirrhosis.  *UGIB s/p EGD and clipping at OSH.   *Renal failure.  *Hypotension.     #NEURO: A&Ox1. Ammonia 60s. Continue Rifaximin and Miralax.   #RESP: Stable on room air. Improving RUL infiltrate on CXR.    #CVS: Shock in setting of decompensated cirrhosis requiring Levo 0.07/Vaso. LA 1.3.  ---Continue Midodrine 20mg TID.    #GI: Tolerating regular diet.   ---Decompensated EtOH Cirrhosis. Bili stable at 16-17. F/up transplant eval.   #: Renal failure requiring CRRT. NET negative 684cc.   ---Continue CRRT at negative 50.  #HEME: Stable anemia. Required 1 unit RBC after CRRT clotted yesterday. INR 3.  ---Persistent thrombocytopenia. Plt 98.   ---Hold chemical AC in the setting of coagulopathy.   #ID: Afebrile, WBC 11 --> 14.   ---Blood cx 8/12: MRSE in 1/2 bottles.   ---Blood cx 8/14: Negative.   ---S/p Para 8/14: No evidence of SBP.   ---MRSA swab 8/16: negative.   ---Appreciate transplant ID recs. Continue Fluconazole and meropenem.   #ENDO: DC steroids. Glucose controlled.     #Lines:   -RUE PICC placed at OSH. DC and Place TLC.   -L radial A-line 8/13.   -PermCath placed at OSH.

## 2024-08-18 NOTE — PROGRESS NOTE ADULT - ASSESSMENT
68 year old male with  AUD complicated by cirrhosis with Hepatic encephalopathy admitted to [facility] with back pain and jaundice on 7/8/24. Pt had dark / maroon colored stools   EGD that showed esophageal varices and duodenal ulcer with visible vessel. He got transfusions for low Hb and last transfusion was on 8/8. 1st session of IHD was on 7/9/24.     Transplant nephrology consulted for FANY and SLK eval.       1.  FANY VS FANY on CKD: started on HD since 7/9/24  Meets FANY criteria for SLK   -Started on CRRT on 8/13/24  -Pt remains oliguric and on pressor support. Will cw CRRT at current dose.  Electrolytes stable.

## 2024-08-18 NOTE — PROGRESS NOTE ADULT - SUBJECTIVE AND OBJECTIVE BOX
[facility] DIVISION OF KIDNEY DISEASES AND HYPERTENSION   FOLLOW UP NOTE    --------------------------------------------------------------------------------  Chief Complaint:    24 hour events/subjective: Pt. was seen and examined today.   Remain in SICU, tolerating CRRT.  Still confused      PAST HISTORY  --------------------------------------------------------------------------------  No significant changes to PMH, PSH, FHx, SHx, unless otherwise noted    ALLERGIES & MEDICATIONS  --------------------------------------------------------------------------------  Allergies    No Known Allergies    Intolerances          REVIEW OF SYSTEMS  --------------------------------------------------------------------------------    All other systems were reviewed and are negative, except as noted.      MEDICATIONS  (STANDING):  chlorhexidine 2% Cloths 1 Application(s) Topical <User Schedule>  CRRT Treatment    <Continuous>  CRRT Treatment    <Continuous>  fluconAZOLE IVPB 200 milliGRAM(s) IV Intermittent every 24 hours  fluconAZOLE IVPB      folic acid 1 milliGRAM(s) Oral daily  lidocaine   4% Patch 1 Patch Transdermal daily  melatonin 5 milliGRAM(s) Oral at bedtime  meropenem  IVPB 1000 milliGRAM(s) IV Intermittent every 8 hours  meropenem  IVPB      midodrine. 20 milliGRAM(s) Oral every 8 hours  multivitamin 1 Tablet(s) Oral daily  norepinephrine Infusion 0.07 MICROgram(s)/kG/Min (15 mL/Hr) IV Continuous <Continuous>  pantoprazole  Injectable 40 milliGRAM(s) IV Push two times a day  Phoxillum Filtration BK 4 / 2.5 5000 milliLiter(s) (200 mL/Hr) CRRT <Continuous>  Phoxillum Filtration BK 4 / 2.5 5000 milliLiter(s) (1700 mL/Hr) CRRT <Continuous>  Phoxillum Filtration BK 4 / 2.5 5000 milliLiter(s) (1700 mL/Hr) CRRT <Continuous>  Phoxillum Filtration BK 4 / 2.5 5000 milliLiter(s) (200 mL/Hr) CRRT <Continuous>  polyethylene glycol 3350 17 Gram(s) Oral <User Schedule>  PrismaSOL Filtration BGK 4 / 2.5 5000 milliLiter(s) (1500 mL/Hr) CRRT <Continuous>  PrismaSOL Filtration BGK 4 / 2.5 5000 milliLiter(s) (1500 mL/Hr) CRRT <Continuous>  rifAXIMin 550 milliGRAM(s) Oral two times a day  thiamine 100 milliGRAM(s) Oral daily  vasopressin Infusion 0.03 Unit(s)/Min (4.5 mL/Hr) IV Continuous <Continuous>    MEDICATIONS  (PRN):      Vital Signs Last 24 Hrs  T(C): 36.4 (18 Aug 2024 07:00), Max: 36.8 (17 Aug 2024 23:00)  T(F): 97.5 (18 Aug 2024 07:00), Max: 98.2 (17 Aug 2024 23:00)  HR: 55 (18 Aug 2024 09:45) (49 - 81)  BP: 115/59 (18 Aug 2024 07:15) (115/59 - 115/59)  BP(mean): 84 (18 Aug 2024 07:15) (84 - 84)  RR: 16 (18 Aug 2024 09:45) (12 - 26)  SpO2: 100% (18 Aug 2024 09:45) (91% - 100%)    Parameters below as of 18 Aug 2024 08:15  Patient On (Oxygen Delivery Method): room air        I&O's Summary    17 Aug 2024 07:01  -  18 Aug 2024 07:00  --------------------------------------------------------  IN: 1471.4 mL / OUT: 2156 mL / NET: -684.6 mL    18 Aug 2024 07:01  -  18 Aug 2024 10:29  --------------------------------------------------------  IN: 298.5 mL / OUT: 235 mL / NET: 63.5 mL      Physical Exam:  	Gen: NAD  	HEENT: Anicteric  	Pulm: CTA B/L  	CV: S1S2+  	Abd: Soft, +BS   	Ext: No LE edema B/L  	Neuro: Awake  	Skin: Warm and dry  	Dialysis access[facility]      LABS/STUDIES  --------------------------------------------------------------------------------                                          8.0    14.00 )-----------( 98       ( 18 Aug 2024 00:18 )             22.9     08-18    135  |  102  |  11  ----------------------------<  145<H>  4.5   |  21<L>  |  1.25    Ca    8.7      18 Aug 2024 05:54  Phos  3.3     08-18  Mg     2.5     08-18    TPro  6.0  /  Alb  3.3  /  TBili  16.7<H>  /  DBili  x   /  AST  50<H>  /  ALT  23  /  AlkPhos  154<H>  08-18          Culture - Body Fluid with Gram Stain (collected 08-14-24 @ 18:16)  Source: Peritoneal Peritoneal Fluid  Gram Stain (08-15-24 @ 03:04):    polymorphonuclear leukocytes seen    No organisms seen    by cytocentrifuge  Preliminary Report (08-15-24 @ 21:41):    No growth    Culture - Fungal, Body Fluid (collected 08-14-24 @ 18:16)  Source: Peritoneal Peritoneal Fluid  Preliminary Report (08-17-24 @ 23:03):    Culture is being performed. Fungal cultures are held for 4 weeks.    Culture - Blood (collected 08-14-24 @ 16:10)  Source: .Blood Blood-Venous  Preliminary Report (08-17-24 @ 20:01):    No growth at 72 Hours    Culture - Blood (collected 08-14-24 @ 10:49)  Source: .Blood Blood-Venous  Preliminary Report (08-17-24 @ 15:01):    No growth at 72 Hours    Culture - Blood (collected 08-14-24 @ 00:05)  Source: .Blood Blood-Peripheral  Preliminary Report (08-18-24 @ 04:01):    No growth at 4 days    Culture - Blood (collected 08-14-24 @ 00:05)  Source: .Blood Blood-Peripheral  Preliminary Report (08-18-24 @ 04:01):    No growth at 4 days    Culture - Blood (collected 08-12-24 @ 17:37)  Source: .Blood Blood  Final Report (08-17-24 @ 22:00):    No growth at 5 days    Culture - Blood (collected 08-12-24 @ 17:36)  Source: .Blood Blood  Gram Stain (08-13-24 @ 16:15):    Growth in aerobic bottle: Gram Positive Cocci in Clusters  Final Report (08-14-24 @ 10:47):    Growth in aerobic bottle: Staphylococcus epidermidis Isolation of    Coagulase negative Staphylococcus from single blood culture sets may    represent    contamination. Contact the Microbiology Department at [phone] if    susceptibility testing is    clinically indicated.    Direct identification is available within approximately 3-5    hours either by Blood Panel Multiplexed PCR or Direct    MALDI-TOF. Details: [email or web address]  Organism: Blood Culture PCR (08-14-24 @ 10:47)  Organism: Blood Culture PCR (08-14-24 @ 10:47)

## 2024-08-18 NOTE — PROGRESS NOTE ADULT - ASSESSMENT
76 y/o male w/ no known PMHx (does not see doctors per sister) w/ newly diagnosed cirrhosis c/b vasoplegia requiring vasopressor support, small EVs, melena 2/2 a duodenal ulcer s/p clipping, HRS requiring CRRT, hepatic encephalopathy, and ascites s/p multiple LVPs presenting for liver transplant evaluation    Neuro:  - Miralax & rifaximin for prevention of hepatic encephalopathy  - Monitoring ammonia  - Thiamine, folic acid, & multivitamin for h/o risky EtOH use  - Melatonin PRN insomnia    Respiratory  - Out of bed to chair, ambulate as tolerated, and incentive spirometry to prevent atelectasis  - Assess need for diuresis daily as patient w/ pulmonary vascular congestion on CXR    Cardiovascular  - Will wean levo as tolerated w/ goal MAP > 65  - Midodrine 20 mg q8hrs to help wean off IV vasopressors  - Monitoring lactate    Gastrointestinal and Nutrition  - Regular diet as tolerated  - Miralax & rifaximin for prevention of hepatic encephalopathy  - Protonix BID for h/o duodenal ulcer  - Monitor LFTs  - Undergoing evaluation for SLK transplantation  - 8/14 CT A/P: cirrhosis w/ portal HTN, small infarct in left hepatic lobe, small splenic infarct, focal eccentric wall thickening of rectum, and large volume ascites  - 8/14 paracentesis w/ 5 L drained    Renal:  - CRRT -50 for acute renal failure likely 2/2 HRS  - Appreciate nephrology recommendations  - Undergoing evaluation for SLK transplantation    Heme:  - Venodynes for mechanical VTE prophylaxis; holding chemical VTE prophylaxis as patient is coagulopathic 2/2 cirrhosis  - s/p 1 PRBC for lost CRRT circuit    ID:   - Empiric coverage w/ meropenem & fluconazole as per transplant given vasopressor requirements  - Blood cultures w/ MRSE in 1 bottle on 8/12 but repeat cultures sent 8/14 w/ no growth to date    Endo:   - Stress dose steroids for possible adrenal insufficiency given persistent vasopressor requirement    MARTIR Hameed-C  [id number]

## 2024-08-18 NOTE — PROGRESS NOTE ADULT - SUBJECTIVE AND OBJECTIVE BOX
Gastroenterology/Hepatology Progress Note      Interval Events:   - No acute events overnight.   - Currently AAOx1  - On levo and vaso.   - on CRRT,     Allergies:  No Known Allergies      Hospital Medications:  chlorhexidine 2% Cloths 1 Application(s) Topical <User Schedule>  CRRT Treatment    <Continuous>  fluconAZOLE IVPB 200 milliGRAM(s) IV Intermittent every 24 hours  fluconAZOLE IVPB      folic acid 1 milliGRAM(s) Oral daily  lidocaine   4% Patch 1 Patch Transdermal daily  melatonin 5 milliGRAM(s) Oral at bedtime  meropenem  IVPB      meropenem  IVPB 1000 milliGRAM(s) IV Intermittent every 8 hours  midodrine. 20 milliGRAM(s) Oral every 8 hours  multivitamin 1 Tablet(s) Oral daily  norepinephrine Infusion 0.07 MICROgram(s)/kG/Min IV Continuous <Continuous>  pantoprazole  Injectable 40 milliGRAM(s) IV Push two times a day  Phoxillum Filtration BK 4 / 2.5 5000 milliLiter(s) CRRT <Continuous>  Phoxillum Filtration BK 4 / 2.5 5000 milliLiter(s) CRRT <Continuous>  polyethylene glycol 3350 17 Gram(s) Oral <User Schedule>  PrismaSOL Filtration BGK 4 / 2.5 5000 milliLiter(s) CRRT <Continuous>  rifAXIMin 550 milliGRAM(s) Oral two times a day  thiamine 100 milliGRAM(s) Oral daily  vasopressin Infusion 0.03 Unit(s)/Min IV Continuous <Continuous>      ROS: 14 point ROS negative unless otherwise state in subjective    PHYSICAL EXAM:   Vital Signs:  Vital Signs Last 24 Hrs  T(C): 36.4 (18 Aug 2024 07:00), Max: 36.8 (17 Aug 2024 23:00)  T(F): 97.5 (18 Aug 2024 07:00), Max: 98.2 (17 Aug 2024 23:00)  HR: 57 (18 Aug 2024 12:30) (49 - 76)  BP: 115/59 (18 Aug 2024 07:15) (115/59 - 115/59)  BP(mean): 84 (18 Aug 2024 07:15) (84 - 84)  RR: 18 (18 Aug 2024 12:30) (12 - 26)  SpO2: 100% (18 Aug 2024 12:30) (91% - 100%)    Parameters below as of 18 Aug 2024 08:15  Patient On (Oxygen Delivery Method): room air      Daily     Daily Weight in k.7 (18 Aug 2024 06:45)    GENERAL: obese man, in no acute distress.   NEURO: Mental status waxes and wanes.    HEENT: Scleral icterus, keofeed in place  CHEST: Bilateral breath sounds, decreased in R base. No respiratory distress.   CARDIAC: Regular rate and rhythm  ABDOMEN: Soft, non-tender, distended. Present bowel sounds. +anasarca  EXTREMITIES: warm, well perfused, BLE pitting edema   SKIN: +Jaundiced, no rash/ecchymoses    LABS:                        8.0    14.00 )-----------( 98       ( 18 Aug 2024 00:18 )             22.9     Mean Cell Volume: 92.7 fl (-24 @ 00:18)        135  |  102  |  11  ----------------------------<  145<H>  4.5   |  21<L>  |  1.25    Ca    8.7      18 Aug 2024 05:54  Phos  3.3       Mg     2.5         TPro  6.0  /  Alb  3.3  /  TBili  16.7<H>  /  DBili  x   /  AST  50<H>  /  ALT  23  /  AlkPhos  154<H>      LIVER FUNCTIONS - ( 18 Aug 2024 05:54 )  Alb: 3.3 g/dL / Pro: 6.0 g/dL / ALK PHOS: 154 U/L / ALT: 23 U/L / AST: 50 U/L / GGT: x           PT/INR - ( 18 Aug 2024 00:18 )   PT: 32.0 sec;   INR: 3.00 ratio         PTT - ( 18 Aug 2024 00:18 )  PTT:85.8 sec  Urinalysis Basic - ( 18 Aug 2024 05:54 )    Color: x / Appearance: x / SG: x / pH: x  Gluc: 145 mg/dL / Ketone: x  / Bili: x / Urobili: x   Blood: x / Protein: x / Nitrite: x   Leuk Esterase: x / RBC: x / WBC x   Sq Epi: x / Non Sq Epi: x / Bacteria: x      Amylase Serum--      Lipase serum--       [id number]

## 2024-08-19 NOTE — PROGRESS NOTE ADULT - SUBJECTIVE AND OBJECTIVE BOX
Interval Events:   - Nursing expressing patient has been more delirious over the weekend and having no sleep/rest at nights.   - Afebrile, with decreasing pressor requirements (Levo 0.03, Vaso 0.03 and Midodrine), on room air.  - Anuric on CRRT net negative 600ml. CRRT has had to be interrupted due to clogging, at least 3 times in the last 24h.    - 6 reported BM     ROS: Assessment is limited due to AMS.      Hospital Medications:  chlorhexidine 2% Cloths 1 Application(s) Topical <User Schedule>  CRRT Treatment    <Continuous>  doxycycline IVPB      doxycycline IVPB 100 milliGRAM(s) IV Intermittent every 12 hours  fluconAZOLE IVPB      fluconAZOLE IVPB 200 milliGRAM(s) IV Intermittent every 24 hours  folic acid 1 milliGRAM(s) Oral daily  lidocaine   4% Patch 1 Patch Transdermal daily  melatonin 5 milliGRAM(s) Oral at bedtime  meropenem  IVPB      meropenem  IVPB 1000 milliGRAM(s) IV Intermittent every 8 hours  multivitamin 1 Tablet(s) Oral daily  norepinephrine Infusion 0.07 MICROgram(s)/kG/Min (15 mL/Hr) IV Continuous <Continuous>  pantoprazole  Injectable 40 milliGRAM(s) IV Push two times a day  Phoxillum Filtration BK 4 / 2.5 5000 milliLiter(s) (200 mL/Hr) CRRT <Continuous>  Phoxillum Filtration BK 4 / 2.5 5000 milliLiter(s) (1700 mL/Hr) CRRT <Continuous>  polyethylene glycol 3350 17 Gram(s) Oral <User Schedule>  PrismaSOL Filtration BGK 4 / 2.5 5000 milliLiter(s) (1500 mL/Hr) CRRT <Continuous>  rifAXIMin 550 milliGRAM(s) Oral two times a day  thiamine 100 milliGRAM(s) Oral daily  vasopressin Infusion 0.03 Unit(s)/Min (4.5 mL/Hr) IV Continuous <Continuous>    MAR over past 24 hours:    chlorhexidine 2% Cloths   1 Application(s) Topical (08-19-24 @ 05:02)    doxycycline IVPB   100 mL/Hr IV Intermittent (08-18-24 @ 14:45)    doxycycline IVPB   100 mL/Hr IV Intermittent (08-19-24 @ 05:33)    fluconAZOLE IVPB   100 mL/Hr IV Intermittent (08-18-24 @ 20:10)    lidocaine   4% Patch   1 Patch Transdermal (08-19-24 @ 11:08)    melatonin   5 milliGRAM(s) Oral (08-18-24 @ 21:19)    meropenem  IVPB   100 mL/Hr IV Intermittent (08-19-24 @ 13:09)   100 mL/Hr IV Intermittent (08-19-24 @ 05:01)   100 mL/Hr IV Intermittent (08-18-24 @ 21:09)    midodrine.   20 milliGRAM(s) Oral (08-19-24 @ 05:02)   20 milliGRAM(s) Oral (08-18-24 @ 21:08)    norepinephrine Infusion   15 mL/Hr IV Continuous (08-18-24 @ 19:12)    pantoprazole  Injectable   40 milliGRAM(s) IV Push (08-19-24 @ 05:02)   40 milliGRAM(s) IV Push (08-18-24 @ 17:06)    Phoxillum Filtration BK 4 / 2.5   200 mL/Hr CRRT (08-18-24 @ 19:11)    Phoxillum Filtration BK 4 / 2.5   1700 mL/Hr CRRT (08-18-24 @ 19:11)    polyethylene glycol 3350   17 Gram(s) Oral (08-18-24 @ 23:03)   17 Gram(s) Oral (08-18-24 @ 17:06)    PrismaSOL Filtration BGK 4 / 2.5   1500 mL/Hr CRRT (08-18-24 @ 19:12)    rifAXIMin   550 milliGRAM(s) Oral (08-19-24 @ 05:02)   550 milliGRAM(s) Oral (08-18-24 @ 17:06)    sodium phosphate 15 milliMole(s)/250 mL IVPB   255 mL/Hr IV Intermittent (08-19-24 @ 12:24)    vasopressin Infusion   4.5 mL/Hr IV Continuous (08-18-24 @ 19:13)      PHYSICAL EXAM:   Vital Signs last 24 hours:  T(F): 97.6 (08-19-24 @ 03:00), Max: 97.8 (08-18-24 @ 19:00)  HR: 62 (08-19-24 @ 12:45) (56 - 63)  BP: --  BP(mean): --  ABP: 120/42 (08-19-24 @ 12:45) (106/38 - 134/55)  ABP(mean): 66 (08-19-24 @ 12:45) (56 - 79)  RR: 16 (08-19-24 @ 12:45) (13 - 26)  SpO2: 95% (08-19-24 @ 12:45) (93% - 100%)    I&Os:    08-18-24 @ 07:01  -  08-19-24 @ 07:00  --------------------------------------------------------  IN:    IV PiggyBack: 450 mL    Norepinephrine: 333.6 mL    Oral Fluid: 900 mL    Vasopressin: 108 mL  Total IN: 1791.6 mL    OUT:    Other (mL): 2411 mL  Total OUT: 2411 mL    Total NET: -619.4 mL      08-19-24 @ 07:01  -  08-19-24 @ 13:15  --------------------------------------------------------  IN:    Norepinephrine: 42.7 mL    Vasopressin: 22.5 mL  Total IN: 65.2 mL    OUT:    Other (mL): 162 mL  Total OUT: 162 mL    Total NET: -96.8 mL        BMI (kg/m2): 33.2 (08-12-24 @ 16:46)  GENERAL: obese man, in no acute distress.   NEURO: Mental status waxes and wanes. Mostly AOx1 (person)   HEENT: Scleral icterus  CHEST: Bilateral breath sounds, decreased in R base. No respiratory distress.   CARDIAC: Regular rate and rhythm  ABDOMEN: Soft, non-tender, distended. Present bowel sounds. +anasarca  EXTREMITIES: warm, well perfused, BLE pitting edema   SKIN: +Jaundiced, no rash/ecchymoses    DIAGNOSTICS:  WBC      Hg       PLT      Na       K        CO2     BUN      Cr       ALT      AST      TB       ALP  15.80    7.7      102      134      4.1      20       11       1.14     30       64       15.9     166      08-19-24 @ 11:28  14.27    7.4      100      138      4.1      21       11       1.10     27       58       15.1     162      08-19-24 @ 05:28  15.48    7.5      103      134      4.2      21       12       1.19     25       62       16.4     165      08-19-24 @ 00:58  ------   ------   ------   136      4.5      20       12       1.16     29       63       17.5     169      08-18-24 @ 18:15  14.96    8.1      118      ------   ------   ------   ------   ------   ------   ------   ------   ------   08-18-24 @ 15:23    PT             INR            MELDwith  MELDw/o  31.2           3.07           --             --             08-19-24 @ 00:58  32.0           3.00           --             --             08-18-24 @ 00:18  34.1           3.36           --             --             08-17-24 @ 04:17  33.9           3.34           --             --             08-16-24 @ 18:06  33.9           3.19           --             --             08-16-24 @ 00:37

## 2024-08-19 NOTE — BH CONSULTATION LIAISON PROGRESS NOTE - NSBHATTESTTYPEVISIT_PSY_A_CORE
Attending evaluating patient with TIARA ([id number] code)
Attending evaluating patient with TIARA ([id number] code)

## 2024-08-19 NOTE — PROGRESS NOTE ADULT - ASSESSMENT
69 yo M with obesity and risky alcohol consumption (with decades' history of daily consumption of homemade alcohol). Admitted to [facility] for 1 month due to recent onset of jaundice and with a prolonged hospital/ICU course due to newly diagnosed decompensated cirrhosis with acute on chronic liver failure (ACLF) with shock, FANY (started on intermittent HD since 7/9), acute on chronic anemia with recurrent overt GI bleeding, and hepatic encephalopathy.     Transferred to [facility] on 8/12/24 for SLK evaluation and then transferred to the SICU for initiation of CRRT (8/13- ).      # Distributive shock    - Likely from ACLF and septic shock.   - Afebrile, with decreasing pressor requirements (Levo 0.03, Vaso 0.03 and Midodrine).   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 Hold off on Valcyte at this time. And unequivocal lyme PCR, started on Doxy.    - S/p Zosyn (8/12-16)   - On Meropenem (8/16- ), Doxycycline (8/18- ) and empiric fluconazole (8/12- ) with transplant ID following.     # Anuric FNAY on CKD    - Started intermittent HD (7/9) - On CRRT now (8/13- )   - Remains anuric and with marked anasarca, otherwise now able to tolerate fluid removal via CRRT. Net negative today.    - Transplant nephrology following.    # Active UGIB   - No further overt GI bleeding since transfer but has received blood products as per H/H and TEGs.  - EGD (7/12, at [facility]) with small non-bleeding EV and a duodenal ulcer with a visible vessel.   - Continuing pantoprazole 40 mg iv q12h  - Plan to keep monitoring H/H and transfuse as needed to keep Hb >7.     # Newly diagnosed decompensated cirrhosis with ACLF   - Waxing and waning hepatic encephalopathy managed with Rifaximin and Miralax. Lactulose discontinued due distended and tympanic abdomen. Bowel movements at goal.   - Can start Seroquel 25mg or Haldol 2.5mg at night for delirium/insomnia, as per transplant psych.   - Large volume ascites and anasarca being managed with CRRT. LVP (8/14) negative for SBP.   - Contrast CT (8/13) with patent portohepatic vessels and no evidence of HCC.     - ABO O. MELD 3.0 score of 41 with OLT evaluation started on 8/12. Discussed at our multidisciplinary liver transplant selection meeting on 8/16, pending      [] Sigmoidoscopy for assessment of recto-sigmoid mass. s/p 2 water enemas. Scheduled for today.      [] [facility] for cardiology clearance, to be performed this week.   - Will meet SLK criteria in Aug 20th after 6 weeks of being HD-dependent. Tissue typing completed.   - Having poor diet. Discussed with transplant nutritionist, possible NG feeds supplementation.   - Will need daily PT and OT. Last ambulatory prior to hospital admission in July 2024.        # Newly found liver and spleen hypodensity suggestive of small infarctions   - IV contrast CT abdomen pelvis (8/13) showing left hepatic lobe wedge shaped hypodensity (small infarct vs hemangioma) and another wedge shaped hypodensity in the spleen also suggestive of small infarct.   - Small infarcts could be secondary to hypovolemia/shock or possible embolization. Patient would benefit from hypercoagulable work up.     Note incomplete until finalized by attending signature/attestation.    Myra Wilkes-Zuleika   Transplant Hepatology Fellow

## 2024-08-19 NOTE — PROGRESS NOTE ADULT - SUBJECTIVE AND OBJECTIVE BOX
INFECTIOUS DISEASES FOLLOW UP-- Omayra Rashid  [phone]    This is a follow up note for this  68yMale with  Acute or subacute hepatic failure without coma        ROS:  CONSTITUTIONAL:  No fever, good appetite  CARDIOVASCULAR:  No chest pain or palpitations  RESPIRATORY:  No dyspnea  GASTROINTESTINAL:  No nausea, vomiting, diarrhea, or abdominal pain  GENITOURINARY:  No dysuria  NEUROLOGIC:  No headache,     Allergies    No Known Allergies    Intolerances        ANTIBIOTICS/RELEVANT:  antimicrobials  doxycycline IVPB      doxycycline IVPB 100 milliGRAM(s) IV Intermittent every 12 hours  fluconAZOLE IVPB 200 milliGRAM(s) IV Intermittent every 24 hours  fluconAZOLE IVPB      meropenem  IVPB 1000 milliGRAM(s) IV Intermittent every 8 hours  meropenem  IVPB      rifAXIMin 550 milliGRAM(s) Oral two times a day    immunologic:    OTHER:  chlorhexidine 2% Cloths 1 Application(s) Topical <User Schedule>  CRRT Treatment    <Continuous>  folic acid 1 milliGRAM(s) Oral daily  lidocaine   4% Patch 1 Patch Transdermal daily  melatonin 5 milliGRAM(s) Oral at bedtime  multivitamin 1 Tablet(s) Oral daily  norepinephrine Infusion 0.07 MICROgram(s)/kG/Min IV Continuous <Continuous>  pantoprazole  Injectable 40 milliGRAM(s) IV Push two times a day  Phoxillum Filtration BK 4 / 2.5 5000 milliLiter(s) CRRT <Continuous>  Phoxillum Filtration BK 4 / 2.5 5000 milliLiter(s) CRRT <Continuous>  polyethylene glycol 3350 17 Gram(s) Oral <User Schedule>  PrismaSOL Filtration BGK 4 / 2.5 5000 milliLiter(s) CRRT <Continuous>  thiamine 100 milliGRAM(s) Oral daily  vasopressin Infusion 0.03 Unit(s)/Min IV Continuous <Continuous>      Objective:  Vital Signs Last 24 Hrs  T(C): 36.4 (19 Aug 2024 03:00), Max: 36.6 (18 Aug 2024 19:00)  T(F): 97.6 (19 Aug 2024 03:00), Max: 97.8 (18 Aug 2024 19:00)  HR: 65 (19 Aug 2024 17:00) (57 - 74)  BP: --  BP(mean): --  RR: 13 (19 Aug 2024 17:00) (11 - 26)  SpO2: 95% (19 Aug 2024 17:00) (92% - 100%)    Parameters below as of 19 Aug 2024 07:00  Patient On (Oxygen Delivery Method): room air        PHYSICAL EXAM:  Constitutional:no acute distress  Eyes:CAT, EOMI  Ear/Nose/Throat: no oral lesions, 	  Respiratory: clear BL  Cardiovascular: S1S2  Gastrointestinal:soft, (+) BS, no tenderness  Extremities:no e/e/c  No Lymphadenopathy  IV sites not inflammed.    LABS:                        7.7    15.80 )-----------( 102      ( 19 Aug 2024 11:28 )             21.8     08-19    134<L>  |  102  |  11  ----------------------------<  126<H>  4.1   |  20<L>  |  1.14    Ca    8.6      19 Aug 2024 11:28  Phos  2.7     08-19  Mg     2.4     08-19    TPro  6.1  /  Alb  3.3  /  TBili  15.9<H>  /  DBili  x   /  AST  64<H>  /  ALT  30  /  AlkPhos  166<H>  08-19    PT/INR - ( 19 Aug 2024 00:58 )   PT: 31.2 sec;   INR: 3.07 ratio         PTT - ( 19 Aug 2024 00:58 )  PTT:63.3 sec  Urinalysis Basic - ( 19 Aug 2024 11:28 )    Color: x / Appearance: x / SG: x / pH: x  Gluc: 126 mg/dL / Ketone: x  / Bili: x / Urobili: x   Blood: x / Protein: x / Nitrite: x   Leuk Esterase: x / RBC: x / WBC x   Sq Epi: x / Non Sq Epi: x / Bacteria: x        MICROBIOLOGY:            RECENT CULTURES:  08-14 @ 18:16  Peritoneal Peritoneal Fluid  --  --  --    No growth  --  08-14 @ 16:10  .Blood Blood-Venous  --  --  --    No growth at 4 days  --  08-14 @ 10:49  .Blood Blood-Venous  --  --  --    No growth at 5 days  --  08-14 @ 00:05  .Blood Blood-Peripheral  --  --  --    No growth at 5 days  --  08-12 @ 17:37  .Blood Blood  --  --  --    No growth at 5 days  --  08-12 @ 17:36  .Blood Blood  Blood Culture PCR  Blood Culture PCR  PCR    Growth in aerobic bottle: Staphylococcus epidermidis Isolation of  Coagulase negative Staphylococcus from single blood culture sets may  represent  contamination. Contact the Microbiology Department at [phone] if  susceptibility testing is  clinically indicated.  Direct identification is available within approximately 3-5  hours either by Blood Panel Multiplexed PCR or Direct  MALDI-TOF. Details: [email or web address]  --  MRSA screen negative  serum crypt ag negative  Quantiferon gold negative    RADIOLOGY & ADDITIONAL STUDIES:    < from: Xray Chest 1 View- PORTABLE-Routine (Xray Chest 1 View- PORTABLE-Routine in AM.) (08.19.24 @ 08:07) >    IMPRESSION:    Low lung lines with bilateral patchy opacities, unchanged.  Tubes and lines as above.    < end of copied text >   INFECTIOUS DISEASES FOLLOW UP-- Omayra Rashid  [phone]    This is a follow up note for this  68yMale with  Acute or subacute hepatic failure without coma  more somnolent today        ROS:  CONSTITUTIONAL: less alert and interactive    Allergies    No Known Allergies    Intolerances        ANTIBIOTICS/RELEVANT:  antimicrobials  doxycycline IVPB      doxycycline IVPB 100 milliGRAM(s) IV Intermittent every 12 hours  fluconAZOLE IVPB 200 milliGRAM(s) IV Intermittent every 24 hours  fluconAZOLE IVPB      meropenem  IVPB 1000 milliGRAM(s) IV Intermittent every 8 hours  meropenem  IVPB      rifAXIMin 550 milliGRAM(s) Oral two times a day    immunologic:    OTHER:  chlorhexidine 2% Cloths 1 Application(s) Topical <User Schedule>  CRRT Treatment    <Continuous>  folic acid 1 milliGRAM(s) Oral daily  lidocaine   4% Patch 1 Patch Transdermal daily  melatonin 5 milliGRAM(s) Oral at bedtime  multivitamin 1 Tablet(s) Oral daily  norepinephrine Infusion 0.07 MICROgram(s)/kG/Min IV Continuous <Continuous>  pantoprazole  Injectable 40 milliGRAM(s) IV Push two times a day  Phoxillum Filtration BK 4 / 2.5 5000 milliLiter(s) CRRT <Continuous>  Phoxillum Filtration BK 4 / 2.5 5000 milliLiter(s) CRRT <Continuous>  polyethylene glycol 3350 17 Gram(s) Oral <User Schedule>  PrismaSOL Filtration BGK 4 / 2.5 5000 milliLiter(s) CRRT <Continuous>  thiamine 100 milliGRAM(s) Oral daily  vasopressin Infusion 0.03 Unit(s)/Min IV Continuous <Continuous>      Objective:  Vital Signs Last 24 Hrs  T(C): 36.4 (19 Aug 2024 03:00), Max: 36.6 (18 Aug 2024 19:00)  T(F): 97.6 (19 Aug 2024 03:00), Max: 97.8 (18 Aug 2024 19:00)  HR: 65 (19 Aug 2024 17:00) (57 - 74)  BP: --  BP(mean): --  RR: 13 (19 Aug 2024 17:00) (11 - 26)  SpO2: 95% (19 Aug 2024 17:00) (92% - 100%)    Parameters below as of 19 Aug 2024 07:00  Patient On (Oxygen Delivery Method): room air        PHYSICAL EXAM:  Constitutional:no acute distress  Eyes:CAT, EOMI  Ear/Nose/Throat: no oral lesions, 	  Respiratory: clear BL  Cardiovascular: S1S2  Gastrointestinal:soft, (+) BS, no tenderness, ascites  Extremities:no e/e/c  No Lymphadenopathy  IV sites not inflammed.    LABS:                        7.7    15.80 )-----------( 102      ( 19 Aug 2024 11:28 )             21.8     08-19    134<L>  |  102  |  11  ----------------------------<  126<H>  4.1   |  20<L>  |  1.14    Ca    8.6      19 Aug 2024 11:28  Phos  2.7     08-19  Mg     2.4     08-19    TPro  6.1  /  Alb  3.3  /  TBili  15.9<H>  /  DBili  x   /  AST  64<H>  /  ALT  30  /  AlkPhos  166<H>  08-19    PT/INR - ( 19 Aug 2024 00:58 )   PT: 31.2 sec;   INR: 3.07 ratio         PTT - ( 19 Aug 2024 00:58 )  PTT:63.3 sec  Urinalysis Basic - ( 19 Aug 2024 11:28 )    Color: x / Appearance: x / SG: x / pH: x  Gluc: 126 mg/dL / Ketone: x  / Bili: x / Urobili: x   Blood: x / Protein: x / Nitrite: x   Leuk Esterase: x / RBC: x / WBC x   Sq Epi: x / Non Sq Epi: x / Bacteria: x        MICROBIOLOGY:            RECENT CULTURES:  08-14 @ 18:16  Peritoneal Peritoneal Fluid  --  --  --    No growth  --  08-14 @ 16:10  .Blood Blood-Venous  --  --  --    No growth at 4 days  --  08-14 @ 10:49  .Blood Blood-Venous  --  --  --    No growth at 5 days  --  08-14 @ 00:05  .Blood Blood-Peripheral  --  --  --    No growth at 5 days  --  08-12 @ 17:37  .Blood Blood  --  --  --    No growth at 5 days  --  08-12 @ 17:36  .Blood Blood  Blood Culture PCR  Blood Culture PCR  PCR    Growth in aerobic bottle: Staphylococcus epidermidis Isolation of  Coagulase negative Staphylococcus from single blood culture sets may  represent  contamination. Contact the Microbiology Department at [phone] if  susceptibility testing is  clinically indicated.  Direct identification is available within approximately 3-5  hours either by Blood Panel Multiplexed PCR or Direct  MALDI-TOF. Details: [email or web address]  --  MRSA screen negative  serum crypt ag negative  Quantiferon gold negative    RADIOLOGY & ADDITIONAL STUDIES:    < from: Xray Chest 1 View- PORTABLE-Routine (Xray Chest 1 View- PORTABLE-Routine in AM.) (08.19.24 @ 08:07) >    IMPRESSION:    Low lung lines with bilateral patchy opacities, unchanged.  Tubes and lines as above.    < end of copied text >

## 2024-08-19 NOTE — BH CONSULTATION LIAISON PROGRESS NOTE - NSICDXBHSECONDARYDX_PSY_ALL_CORE
Alcohol abuse   F10.10  Pre-transplant evaluation for kidney transplant   Z01.818  
[FreeTextEntry1] : Ms. Sullivan is a 66yo with with HTN, HL, hypothyroidism and RA (inactive), CAC (32 in 2016, 140 in , all in LAD), who is referred for HL management. \par \par Cardiologist: Dr. Frias, last seen 2022\par \par HL:\par -was on atorvastatin 10 in past - then increased to 20mg but couldn't tolerate due to SE (rash)\par -switched to rosuvastatin, tolerated 5mg and then increased to 10mg -- rash again. Ezetimibe was also added at the same time -- so unsure if also had rash to it, was also stopped\par -has been off cholesterol meds since mid-, about 2 months\par \par HTN:\par -on ramipril\par -usually well controlled\par \par No chest pain\par No dyspnea\par No syncope\par \par PMH/PSH:\par as above\par elbow surgery c/b infection, s/p hardware removal\par \par FH:\par parents  of MI, mother at age 66 (several MIs before), father at age 78\par sister: open heart surgery for arrhythmia\par brother: stents in his 60s\par \par SH:\par former tob - light smoker off/on, quit 6 years ago\par etoh: 2-3 glasses/day (mostly white wine)\par wine teacher  \par \par Home Meds:\par ramipril\par Lexapro\par Synthroid\par B12\par \par ALL:\par hydroxychloroquine\par plaquenil\par sulfa\par atorvastatin\par rosuvastatin\par \par ROS:\par no fever/chills\par no nausea/vomiting\par \par Lifestyle History:\par Diet: eat lots of vegetables, limits red meat, mainly fish, some shellfish, lean poultry\par Mediterranean Diet Score (9 question survey) was 8\par (8-9: optimal, 6-7: near-optimal, 4-5: suboptimal, 0-3: markedly suboptimal)\par Exercise: Patient reports exercising at a moderate level for  minutes per week. Walks 5 miles/day. Does aggressive yoga. \par No sleep apnea symptoms that she knows about\par \par No PCOS\par No pregnancies\par Menopause age 48\par no HRT \par \par 2022 Labs:\par Chol 288\par \par TG 88\par \par TSH 1.59\par 2022 Labs:\par K 5.1\par Cr 0.68\par AST/ALT wnl\par 2021 Labs:\par A1c 5.2%
Alcohol abuse   F10.10  Pre-transplant evaluation for kidney transplant   Z01.818

## 2024-08-19 NOTE — PRE PROCEDURE NOTE - PRE PROCEDURE EVALUATION
Pre-Endoscopy Evaluation    Attending Physician:  Dr. Nix    Procedure: flexible sigmoidoscopy    Indication for Procedure & Pertinent History: See Allscripts chart    PAST MEDICAL & SURGICAL HISTORY:  Alcohol abuse      No significant past surgical history          Allergies    No Known Allergies    Intolerances        Medications: MEDICATIONS  (STANDING):  chlorhexidine 2% Cloths 1 Application(s) Topical <User Schedule>  CRRT Treatment    <Continuous>  doxycycline IVPB      doxycycline IVPB 100 milliGRAM(s) IV Intermittent every 12 hours  fluconAZOLE IVPB 200 milliGRAM(s) IV Intermittent every 24 hours  fluconAZOLE IVPB      folic acid 1 milliGRAM(s) Oral daily  lidocaine   4% Patch 1 Patch Transdermal daily  melatonin 5 milliGRAM(s) Oral at bedtime  meropenem  IVPB 1000 milliGRAM(s) IV Intermittent every 8 hours  meropenem  IVPB      multivitamin 1 Tablet(s) Oral daily  norepinephrine Infusion 0.07 MICROgram(s)/kG/Min (15 mL/Hr) IV Continuous <Continuous>  pantoprazole  Injectable 40 milliGRAM(s) IV Push two times a day  Phoxillum Filtration BK 4 / 2.5 5000 milliLiter(s) (200 mL/Hr) CRRT <Continuous>  Phoxillum Filtration BK 4 / 2.5 5000 milliLiter(s) (1700 mL/Hr) CRRT <Continuous>  polyethylene glycol 3350 17 Gram(s) Oral <User Schedule>  PrismaSOL Filtration BGK 4 / 2.5 5000 milliLiter(s) (1500 mL/Hr) CRRT <Continuous>  rifAXIMin 550 milliGRAM(s) Oral two times a day  thiamine 100 milliGRAM(s) Oral daily  vasopressin Infusion 0.03 Unit(s)/Min (4.5 mL/Hr) IV Continuous <Continuous>    MEDICATIONS  (PRN):      Pertinent lab data:                        7.7    15.80 )-----------( 102      ( 19 Aug 2024 11:28 )             21.8     08-19    134<L>  |  102  |  11  ----------------------------<  126<H>  4.1   |  20<L>  |  1.14    Ca    8.6      19 Aug 2024 11:28  Phos  2.7     08-19  Mg     2.4     08-19    TPro  6.1  /  Alb  3.3  /  TBili  15.9<H>  /  DBili  x   /  AST  64<H>  /  ALT  30  /  AlkPhos  166<H>  08-19    PT/INR - ( 19 Aug 2024 00:58 )   PT: 31.2 sec;   INR: 3.07 ratio         PTT - ( 19 Aug 2024 00:58 )  PTT:63.3 sec            Physical Examination:  See Physical Exam in H&P and/or Progress notes.    Comments:    ASA Class: I []  II []  III [X]  IV []    The patient is a suitable candidate for the planned procedure unless box checked [ ]  No, explain:

## 2024-08-19 NOTE — BH CONSULTATION LIAISON PROGRESS NOTE - CURRENT MEDICATION
MEDICATIONS  (STANDING):  albumin human  5% IVPB 250 milliLiter(s) IV Intermittent every 1 hour  chlorhexidine 2% Cloths 1 Application(s) Topical <User Schedule>  CRRT Treatment    <Continuous>  fluconAZOLE IVPB 200 milliGRAM(s) IV Intermittent every 24 hours  fluconAZOLE IVPB      folic acid 1 milliGRAM(s) Oral daily  lidocaine   4% Patch 1 Patch Transdermal daily  melatonin 5 milliGRAM(s) Oral at bedtime  midodrine. 20 milliGRAM(s) Oral every 8 hours  multivitamin 1 Tablet(s) Oral daily  norepinephrine Infusion 0.07 MICROgram(s)/kG/Min (15 mL/Hr) IV Continuous <Continuous>  pantoprazole  Injectable 40 milliGRAM(s) IV Push two times a day  Phoxillum Filtration BK 4 / 2.5 5000 milliLiter(s) (1700 mL/Hr) CRRT <Continuous>  Phoxillum Filtration BK 4 / 2.5 5000 milliLiter(s) (200 mL/Hr) CRRT <Continuous>  piperacillin/tazobactam IVPB.. 3.375 Gram(s) IV Intermittent every 12 hours  polyethylene glycol 3350 17 Gram(s) Oral <User Schedule>  PrismaSOL Filtration BGK 4 / 2.5 5000 milliLiter(s) (1500 mL/Hr) CRRT <Continuous>  rifAXIMin 550 milliGRAM(s) Oral two times a day  vasopressin Infusion 0.03 Unit(s)/Min (4.5 mL/Hr) IV Continuous <Continuous>    MEDICATIONS  (PRN):  
MEDICATIONS  (STANDING):  chlorhexidine 2% Cloths 1 Application(s) Topical <User Schedule>  CRRT Treatment    <Continuous>  doxycycline IVPB      doxycycline IVPB 100 milliGRAM(s) IV Intermittent every 12 hours  fluconAZOLE IVPB 200 milliGRAM(s) IV Intermittent every 24 hours  fluconAZOLE IVPB      folic acid 1 milliGRAM(s) Oral daily  lidocaine   4% Patch 1 Patch Transdermal daily  melatonin 5 milliGRAM(s) Oral at bedtime  meropenem  IVPB 1000 milliGRAM(s) IV Intermittent every 8 hours  meropenem  IVPB      multivitamin 1 Tablet(s) Oral daily  norepinephrine Infusion 0.07 MICROgram(s)/kG/Min (15 mL/Hr) IV Continuous <Continuous>  pantoprazole  Injectable 40 milliGRAM(s) IV Push two times a day  Phoxillum Filtration BK 4 / 2.5 5000 milliLiter(s) (200 mL/Hr) CRRT <Continuous>  Phoxillum Filtration BK 4 / 2.5 5000 milliLiter(s) (1700 mL/Hr) CRRT <Continuous>  polyethylene glycol 3350 17 Gram(s) Oral <User Schedule>  PrismaSOL Filtration BGK 4 / 2.5 5000 milliLiter(s) (1500 mL/Hr) CRRT <Continuous>  rifAXIMin 550 milliGRAM(s) Oral two times a day  thiamine 100 milliGRAM(s) Oral daily  vasopressin Infusion 0.03 Unit(s)/Min (4.5 mL/Hr) IV Continuous <Continuous>    MEDICATIONS  (PRN):

## 2024-08-19 NOTE — CHART NOTE - NSCHARTNOTEFT_GEN_A_CORE
NUTRITION FOLLOW UP NOTE    PATIENT SEEN FOR: sicu follow up; verbal consult - enteral nutrition assessment     SOURCE: [x] Patient: confused  [x] Current Medical Record  [x] RN  [x] Family/support person at bedside  [] Patient unavailable/inappropriate  [x] Other: Team    CHART REVIEWED/EVENTS NOTED.  [x] Nutrition Status:  -5L removed via para ()  -CRRT   -SLK eval opened   -MELD 41 ()     DIET ORDER:   Diet, NPO after Midnight:      NPO Start Date: 18-Aug-2024,   NPO Start Time: 23:59  Except Medications (24)  Diet, Regular:   Low Sodium  Supplement Feeding Modality:  Oral  Ensure Max Cans or Servings Per Day:  1       Frequency:  Two Times a day (24)      NUTRITION INTAKE/PROVISION:  [x] PO: per RN, poor PO intake with worsening mental status; Team requesting supplemental nocturnal feeds     ANTHROPOMETRICS:  Drug Dosing Weight  Height (cm): 185.4 (12 Aug 2024 16:46)  Weight (kg): 114 (12 Aug 2024 16:46)  BMI (kg/m2): 33.2 (12 Aug 2024 16:46)  Daily Weight in k.7 (-), Weight in k.3 ()     NUTRITIONALLY PERTINENT MEDICATIONS:  MEDICATIONS  (STANDING):  doxycycline IVPB  doxycycline IVPB  fluconAZOLE IVPB  fluconAZOLE IVPB  folic acid  meropenem  IVPB  meropenem  IVPB  multivitamin  norepinephrine Infusion  pantoprazole  Injectable  polyethylene glycol 3350  rifAXIMin  thiamine  vasopressin Infusion       NUTRITIONALLY PERTINENT LABS:   Na138 mmol/L Glu 131 mg/dL<H> K+ 4.1 mmol/L Cr  1.10 mg/dL BUN 11 mg/dL  Phos 2.8 mg/dL  Alb 3.2 g/dL<L>  Chol 49 mg/dL LDL --    HDL 14 mg/dL<L> Trig 70 mg/dL ALT 27 U/L AST 58 U/L<H> Alkaline Phosphatase 162 U/L<H>  24 @ 21:13 a1c 5.0  24 @ 15:01 a1c 5.1    A1C with Estimated Average Glucose Result: 5.0 % (24 @ 21:13)  A1C with Estimated Average Glucose Result: 5.1 % (24 @ 15:01)    NUTRITIONALLY PERTINENT MEDICATIONS/LABS:  [x] Reviewed  [x] Relevant notes on medications/labs:  -Levo/Vaso -->pressor support   -CRRT     EDEMA:  [x] Reviewed  [x] Relevant notes:  -2+ generalized   -3+ L arm     GI/ I&O:  [x] Reviewed    SKIN:   [x] Change in pressure injury documentation:  -Sacral Spine: stage 3     ESTIMATED NEEDS:  [x] No change:  Energy:   2251-2668kcal/day (27-32 kcal/kg)  Protein:  100-125g/day (1.2-1.5 g/kg)  Fluid:  [x] defer to team  Based on: 83.4kg    NUTRITION DIAGNOSIS:  [x] New Dx:  P: Increased protein-energy needs   E: related to increased physiological demand   S: as evidenced by decompensated etoh cirrhosis; CRRT; pressure injury   Goal: Pt to receive >80% of estimated needs       NUTRITION CARE PLAN:  1. Diet:  PO: regular, low sodium   EN: Nocturnal feeds --> [employer] Peptide 1.5 @ 65mL x 12hrs providing 780mL of formula, 1200kcal/day (14kcal/kg), 58g protein/day (0.7g/kg), 546mL free water - base don 83.4kg   2. Supplements: Ensure Max x1/day   3. Multivitamin/mineral supplementation: multivitamin, folic acid, thiamine   4. Monitor for malnutrition       MONITORING AND EVALUATION:   RD remains available upon request and will follow up per protocol.    Luci Calzada, MS, RDN, CDN (Teams)   Available on [place] TEAMS

## 2024-08-19 NOTE — CHART NOTE - NSCHARTNOTEFT_GEN_A_CORE
I have seen the patient and reviewed CRRT prescription and flowsheet. Vascular access is functioning well. CRRT prescription has been adjusted for optimized control of volume status, uremia and electrolytes. Management of additional metabolic abnormalities/anemia will continue to be addressed on follow up.  Will decrease dialysate flow rate to 1000 ml/h.  D/w house staff and SICU team  I was present during and reviewed clinical and lab data as well as assessment and plan as documented . Please contact if any additional questions with any change in clinical condition or on availability of any additional information or reports.

## 2024-08-19 NOTE — BH CONSULTATION LIAISON PROGRESS NOTE - NSBHMSEGAIT_PSY_A_CORE
patient seen sitting in hospital bed/Unable to assess
patient seen sitting in hospital bed/Unable to assess

## 2024-08-19 NOTE — BH CONSULTATION LIAISON PROGRESS NOTE - NSBHASSESSMENTFT_PSY_ALL_CORE
Patient is a 68 year old, , male with PPHx of presumed alcohol use d/o, with no known past psychiatric admissions with a PMHx of with obesity, admitted to [facility] 1 month ago due to recent onset of jaundice and with a prolonged hospital and ICU course there due to newly diagnosed decompensated cirrhosis with acute on chronic liver failure (ACLF) with shock (resolved, but still on midodrine); FANY (on intermittent HD since 7/9); recurrent maroon stools and acute on chronic anemia necessitating intermittent PRBC transfusions (last on 8/8) and hypofibrinoginemia, with EGD (7/12) with small non-bleeding EV and a duodenal ulcer with a visible vessel; and waxing and waning hepatic encephalopathy. He was transferred to [facility] on 8/12/24 for evaluation for combined liver/kidney transplants (SLK). Patient seen by transplant psychiatry for psychosocial evaluation for transplant.    8/15: Patient on exam, was A/Ox2, noted drinking home made alcohol on exam, collateral obtained from son at bedside, patient denied any Ah/VH/Si on exam.   SIPAT pending improvement in mental status.    8/19:Patient on exam A/Ox1, noted increasing periods of encephalopathy, with difficulty sleeping at night, unable to fully participate in exam at this time.     Plan  -Can consider starting Seroquel 25mg PO HS for sleep architecture if QTC < 500  -Can consider Haldol 1mg PO/IV Q 6 for severe agitation if his QTC< 500  -SIPAT pending improvement in mental status   -C/W Melatonin 5mg PO HS for sleep architecture   -Serial PETH monitoring   -Frequent day/night reorientation recommended; limit use of sedative hypnotics which may worsen mental status.  -Will attempt to gauge extent of alcohol use, If patient meets criteria for AUD, he will benefit from engagement in program with MAT for AUD    -Transplant Psychiatry will continue to follow patient  
Patient is a 68 year old, , male with PPHx of presumed alcohol use d/o, with no known past psychiatric admissions with a PMHx of with obesity, admitted to [facility] 1 month ago due to recent onset of jaundice and with a prolonged hospital and ICU course there due to newly diagnosed decompensated cirrhosis with acute on chronic liver failure (ACLF) with shock (resolved, but still on midodrine); FANY (on intermittent HD since 7/9); recurrent maroon stools and acute on chronic anemia necessitating intermittent PRBC transfusions (last on 8/8) and hypofibrinoginemia, with EGD (7/12) with small non-bleeding EV and a duodenal ulcer with a visible vessel; and waxing and waning hepatic encephalopathy. He was transferred to [facility] on 8/12/24 for evaluation for combined liver/kidney transplants (SLK). Patient seen by transplant psychiatry for psychosocial evaluation for transplant.    8/15: Patient on exam, was A/Ox2, noted drinking home made alcohol on exam, collateral obtained from son at bedside, patient denied any Ah/VH/Si on exam.   SIPAT pending improvement in mental status.      Plan  -SIPAT pending improvement in mental status   -C/W Melatonin 5mg PO HS for sleep architecture   -Serial PETH monitoring   -Frequent day/night reorientation recommended; limit use of sedative hypnotics which may worsen mental status.  -Will attempt to gauge extent of alcohol use, If patient meets criteria for AUD, he will benefit from engagement in program with MAT for AUD    -Transplant Psychiatry will continue to follow patient

## 2024-08-19 NOTE — PROGRESS NOTE ADULT - SUBJECTIVE AND OBJECTIVE BOX
[facility] DIVISION OF KIDNEY DISEASES AND HYPERTENSION   FOLLOW UP NOTE    --------------------------------------------------------------------------------  Chief Complaint:    24 hour events/subjective: Pt. was seen and examined today.   Remains in SICU, tolerating CRRT.   Feels weak, otherwise ok      PAST HISTORY  --------------------------------------------------------------------------------  No significant changes to PMH, PSH, FHx, SHx, unless otherwise noted    ALLERGIES & MEDICATIONS  --------------------------------------------------------------------------------  Allergies    No Known Allergies    Intolerances      Standing Inpatient Medications  chlorhexidine 2% Cloths 1 Application(s) Topical <User Schedule>  CRRT Treatment    <Continuous>  doxycycline IVPB      doxycycline IVPB 100 milliGRAM(s) IV Intermittent every 12 hours  fluconAZOLE IVPB 200 milliGRAM(s) IV Intermittent every 24 hours  fluconAZOLE IVPB      folic acid 1 milliGRAM(s) Oral daily  lidocaine   4% Patch 1 Patch Transdermal daily  melatonin 5 milliGRAM(s) Oral at bedtime  meropenem  IVPB      meropenem  IVPB 1000 milliGRAM(s) IV Intermittent every 8 hours  multivitamin 1 Tablet(s) Oral daily  norepinephrine Infusion 0.07 MICROgram(s)/kG/Min IV Continuous <Continuous>  pantoprazole  Injectable 40 milliGRAM(s) IV Push two times a day  Phoxillum Filtration BK 4 / 2.5 5000 milliLiter(s) CRRT <Continuous>  Phoxillum Filtration BK 4 / 2.5 5000 milliLiter(s) CRRT <Continuous>  polyethylene glycol 3350 17 Gram(s) Oral <User Schedule>  PrismaSOL Filtration BGK 4 / 2.5 5000 milliLiter(s) CRRT <Continuous>  rifAXIMin 550 milliGRAM(s) Oral two times a day  thiamine 100 milliGRAM(s) Oral daily  vasopressin Infusion 0.03 Unit(s)/Min IV Continuous <Continuous>    PRN Inpatient Medications      REVIEW OF SYSTEMS  --------------------------------------------------------------------------------  All other systems were reviewed and are negative, except as noted.    VITALS/PHYSICAL EXAM  --------------------------------------------------------------------------------  T(C): 36.4 (08-19-24 @ 03:00), Max: 36.6 (08-18-24 @ 19:00)  HR: 63 (08-19-24 @ 11:15) (55 - 63)  BP: --  RR: 19 (08-19-24 @ 11:15) (13 - 26)  SpO2: 98% (08-19-24 @ 11:15) (93% - 100%)  Wt(kg): --        08-18-24 @ 07:01  -  08-19-24 @ 07:00  --------------------------------------------------------  IN: 1791.6 mL / OUT: 2411 mL / NET: -619.4 mL    08-19-24 @ 07:01  -  08-19-24 @ 11:27  --------------------------------------------------------  IN: 47.9 mL / OUT: 162 mL / NET: -114.1 mL        Physical Exam:  	Gen: NAD  	HEENT: Anicteric  	Pulm: CTA B/L  	CV: S1S2+  	Abd: Soft, +BS   	Ext: No LE edema B/L  	Neuro: Awake  	Skin: Warm and dry  	Dialysis access: [facility]      LABS/STUDIES  --------------------------------------------------------------------------------              7.4    14.27 >-----------<  100      [08-19-24 @ 05:28]              21.2     138  |  104  |  11  ----------------------------<  131      [08-19-24 @ 05:28]  4.1   |  21  |  1.10        Ca     8.5     [08-19-24 @ 05:28]      Mg     2.4     [08-19-24 @ 05:28]      Phos  2.8     [08-19-24 @ 05:28]    TPro  5.8  /  Alb  3.2  /  TBili  15.1  /  DBili  x   /  AST  58  /  ALT  27  /  AlkPhos  162  [08-19-24 @ 05:28]    PT/INR: PT 31.2 , INR 3.07       [08-19-24 @ 00:58]  PTT: 63.3       [08-19-24 @ 00:58]      Creatinine Trend:  SCr 1.10 [08-19 @ 05:28]  SCr 1.19 [08-19 @ 00:58]  SCr 1.16 [08-18 @ 18:15]  SCr 1.14 [08-18 @ 12:10]  SCr 1.25 [08-18 @ 05:54]    Urinalysis - [08-19-24 @ 05:28]      Color  / Appearance  / SG  / pH       Gluc 131 / Ketone   / Bili  / Urobili        Blood  / Protein  / Leuk Est  / Nitrite       RBC  / WBC  / Hyaline  / Gran  / Sq Epi  / Non Sq Epi  / Bacteria       HBsAb Reactive      [08-12-24 @ 21:16]  HBsAg Nonreact      [08-12-24 @ 21:13]  HBcAb Nonreact      [08-12-24 @ 21:16]  HCV 0.08, Nonreact      [08-12-24 @ 14:45]  HIV Nonreact      [08-12-24 @ 21:13]    CHETAN: titer Negative, pattern --      [08-12-24 @ 21:15]  Syphilis Screen (Treponema Pallidum Ab) Negative      [08-12-24 @ 21:15]  Immunofixation Serum:   Oligoclonal Pattern Consisting of One Weak IgM Kappa Band, Two Weak IgG  Kappa Bands, and One Weak IgA Lambda Band Identified      Reference Range: None Detected      [08-12-24 @ 21:13]  SPEP Interpretation: Oligoclonal Pattern Consisting of Three Weak Gamma-Migrating Paraproteins  and One Weak Beta-Migrating Paraprotein Identified      [08-12-24 @ 21:13]    Tacrolimus  Cyclosporine  Sirolimus  Mycophenolate  BK PCR  CMV PCRCMVPCR Log: 3.67 [id number]/mL (08-16 @ 00:37)  CMVPCR Log: 3.69 [id number]/mL (08-12 @ 21:13)    Parvo PCR  EBV PCR

## 2024-08-19 NOTE — PROGRESS NOTE ADULT - SUBJECTIVE AND OBJECTIVE BOX
Transplant Surgery - Multidisciplinary Progress Note  --------------------------------------------------------------  Present:   Patient seen and examined with multidisciplinary Transplant team including  (Surgeon: , Hepatologist: Dr. Boyd Munoz and   RNs in AM rounds.   Disciplines not in attendance will be notified of the plan.     HPI: 68y with obesity and risky alcohol consumption (with decades' history of daily consumption of homemade alcohol), admitted to [facility] 1 month ago due to recent onset of jaundice and with a prolonged hospital and ICU course there due to newly diagnosed decompensated cirrhosis with acute on chronic liver failure (ACLF) with shock (resolved, but still on midodrine); FANY (on intermittent HD since 7/9); recurrent maroon stools and acute on chronic anemia necessitating intermittent PRBC transfusions (last on 8/8) and hypofibrinoginemia, with EGD (7/12) with small non-bleeding EV and a duodenal ulcer with a visible vessel; and waxing and waning hepatic encephalopathy. He was transferred to [facility] on 8/12/24 for evaluation for combined liver/kidney transplants (SLK).     Interval Events:              Potential Discharge date: Pending clinical course  Education:  Medications  Plan of care:  See Below    MEDICATIONS  (STANDING):  chlorhexidine 2% Cloths 1 Application(s) Topical <User Schedule>  CRRT Treatment    <Continuous>  doxycycline IVPB      doxycycline IVPB 100 milliGRAM(s) IV Intermittent every 12 hours  fluconAZOLE IVPB      fluconAZOLE IVPB 200 milliGRAM(s) IV Intermittent every 24 hours  folic acid 1 milliGRAM(s) Oral daily  lidocaine   4% Patch 1 Patch Transdermal daily  melatonin 5 milliGRAM(s) Oral at bedtime  meropenem  IVPB 1000 milliGRAM(s) IV Intermittent every 8 hours  meropenem  IVPB      midodrine. 20 milliGRAM(s) Oral every 8 hours  multivitamin 1 Tablet(s) Oral daily  norepinephrine Infusion 0.07 MICROgram(s)/kG/Min (15 mL/Hr) IV Continuous <Continuous>  pantoprazole  Injectable 40 milliGRAM(s) IV Push two times a day  Phoxillum Filtration BK 4 / 2.5 5000 milliLiter(s) (200 mL/Hr) CRRT <Continuous>  Phoxillum Filtration BK 4 / 2.5 5000 milliLiter(s) (1700 mL/Hr) CRRT <Continuous>  polyethylene glycol 3350 17 Gram(s) Oral <User Schedule>  PrismaSOL Filtration BGK 4 / 2.5 5000 milliLiter(s) (1500 mL/Hr) CRRT <Continuous>  rifAXIMin 550 milliGRAM(s) Oral two times a day  thiamine 100 milliGRAM(s) Oral daily  vasopressin Infusion 0.03 Unit(s)/Min (4.5 mL/Hr) IV Continuous <Continuous>    MEDICATIONS  (PRN):      PAST MEDICAL & SURGICAL HISTORY:  Alcohol abuse  No significant past surgical history    Vital Signs Last 24 Hrs  T(C): 36.4 (18 Aug 2024 23:00), Max: 36.6 (18 Aug 2024 03:00)  T(F): 97.6 (18 Aug 2024 23:00), Max: 97.8 (18 Aug 2024 03:00)  HR: 61 (19 Aug 2024 01:00) (52 - 63)  BP: 115/59 (18 Aug 2024 07:15) (115/59 - 115/59)  BP(mean): 84 (18 Aug 2024 07:15) (84 - 84)  RR: 23 (19 Aug 2024 01:00) (12 - 26)  SpO2: 99% (19 Aug 2024 01:00) (93% - 100%)    Parameters below as of 18 Aug 2024 23:00  Patient On (Oxygen Delivery Method): nasal cannula  2 Flow (L/min): 1      I&O's Summary    17 Aug 2024 07:01  -  18 Aug 2024 07:00  --------------------------------------------------------  IN: 1471.4 mL / OUT: 2156 mL / NET: -684.6 mL    18 Aug 2024 07:01  -  19 Aug 2024 01:16  --------------------------------------------------------  IN: 1544 mL / OUT: 1976 mL / NET: -432 mL                          7.5    15.48 )-----------( 103      ( 19 Aug 2024 00:58 )             21.5     08-18    136  |  103  |  12  ----------------------------<  167<H>  4.5   |  20<L>  |  1.16    Ca    8.4      18 Aug 2024 18:15  Phos  3.3     08-18  Mg     2.4     08-18  TPro  6.1  /  Alb  3.5  /  TBili  17.5<H>  /  DBili  x   /  AST  63<H>  /  ALT  29  /  AlkPhos  169<H>  08-18    Review of systems  Gen: No weight changes, fatigue, fevers/chills, weakness  Skin: No rashes  Head/Eyes/Ears/Mouth: No headache; Normal hearing; Normal vision w/o blurriness; No sinus pain/discomfort, sore throat  Respiratory: No dyspnea, cough, wheezing, hemoptysis  CV: No chest pain, PND, orthopnea  GI: C/O mild abdominal pain at surgical site. no diarrhea, constipation, nausea, vomiting, melena, hematochezia  : No increased frequency, dysuria, hematuria, nocturia  MSK: No joint pain/swelling; no back pain; no edema  Neuro: No dizziness/lightheadedness, weakness, seizures, numbness, tingling  Heme: No easy bruising or bleeding  Endo: No heat/cold intolerance  Psych: No significant nervousness, anxiety, stress, depression  All other systems were reviewed and are negative, except as noted.    PHYSICAL EXAM:  Constitutional: Well developed / well nourished  Eyes:  PERRLA  ENMT: nc/at, no thrush  Neck: supple,   Respiratory: CTA B/L  Cardiovascular: RRR  Gastrointestinal: Soft abdomen, ND  Genitourinary: anuric   Extremities: SCD's in place and working bilaterally  Vascular: Palpable dp pulses bilaterally.   Neurological: A&O x3  Skin: no rashes, ulcerations, lesions  Musculoskeletal: Moving all extremities  Psychiatric: Responsive Transplant Surgery - Multidisciplinary Progress Note  --------------------------------------------------------------  Present:   Patient seen and examined with multidisciplinary Transplant team including Surgeon: /Dagher, Hepatologist: Dr. Boyd Francis and bedside RN during AM rounds. Disciplines not in attendance will be notified of the plan.     HPI: 68y with obesity and risky alcohol consumption (with decades' history of daily consumption of homemade alcohol), admitted to [facility] 1 month ago due to recent onset of jaundice and with a prolonged hospital and ICU course there due to newly diagnosed decompensated cirrhosis with acute on chronic liver failure (ACLF) with shock (resolved, but still on midodrine); FANY (on intermittent HD since 7/9); recurrent maroon stools and acute on chronic anemia necessitating intermittent PRBC transfusions (last on 8/8) and hypofibrinoginemia, with EGD (7/12) with small non-bleeding EV and a duodenal ulcer with a visible vessel; and waxing and waning hepatic encephalopathy. He was transferred to [facility] on 8/12/24 for evaluation for combined liver/kidney transplants (SLK).     Interval Events:  - remains on levo/vaso  - bloody BM overnight   - plan for flex sig today     Potential Discharge date: Pending clinical course  Education:  Medications  Plan of care:  See Below    MEDICATIONS  (STANDING):  chlorhexidine 2% Cloths 1 Application(s) Topical <User Schedule>  CRRT Treatment    <Continuous>  doxycycline IVPB      doxycycline IVPB 100 milliGRAM(s) IV Intermittent every 12 hours  fluconAZOLE IVPB      fluconAZOLE IVPB 200 milliGRAM(s) IV Intermittent every 24 hours  folic acid 1 milliGRAM(s) Oral daily  lidocaine   4% Patch 1 Patch Transdermal daily  melatonin 5 milliGRAM(s) Oral at bedtime  meropenem  IVPB      meropenem  IVPB 1000 milliGRAM(s) IV Intermittent every 8 hours  multivitamin 1 Tablet(s) Oral daily  norepinephrine Infusion 0.07 MICROgram(s)/kG/Min (15 mL/Hr) IV Continuous <Continuous>  pantoprazole  Injectable 40 milliGRAM(s) IV Push two times a day  Phoxillum Filtration BK 4 / 2.5 5000 milliLiter(s) (200 mL/Hr) CRRT <Continuous>  Phoxillum Filtration BK 4 / 2.5 5000 milliLiter(s) (1000 mL/Hr) CRRT <Continuous>  polyethylene glycol 3350 17 Gram(s) Oral <User Schedule>  PrismaSOL Filtration BGK 4 / 2.5 5000 milliLiter(s) (1500 mL/Hr) CRRT <Continuous>  rifAXIMin 550 milliGRAM(s) Oral two times a day  thiamine 100 milliGRAM(s) Oral daily  vasopressin Infusion 0.03 Unit(s)/Min (4.5 mL/Hr) IV Continuous <Continuous>    PAST MEDICAL & SURGICAL HISTORY:  Alcohol abuse  No significant past surgical history    Vital Signs Last 24 Hrs  T(C): 36.4 (19 Aug 2024 03:00), Max: 36.6 (18 Aug 2024 19:00)  T(F): 97.6 (19 Aug 2024 03:00), Max: 97.8 (18 Aug 2024 19:00)  HR: 66 (19 Aug 2024 16:00) (57 - 74)  BP: --  BP(mean): --  RR: 12 (19 Aug 2024 16:00) (12 - 26)  SpO2: 96% (19 Aug 2024 16:00) (92% - 100%)    Parameters below as of 19 Aug 2024 07:00  Patient On (Oxygen Delivery Method): room air    I&O's Summary    18 Aug 2024 07:01  -  19 Aug 2024 07:00  --------------------------------------------------------  IN: 1791.6 mL / OUT: 2411 mL / NET: -619.4 mL    19 Aug 2024 07:01  -  19 Aug 2024 16:25  --------------------------------------------------------  IN: 436.6 mL / OUT: 668 mL / NET: -231.4 mL                         7.7    15.80 )-----------( 102      ( 19 Aug 2024 11:28 )             21.8     08-19    134<L>  |  102  |  11  ----------------------------<  126<H>  4.1   |  20<L>  |  1.14    Ca    8.6      19 Aug 2024 11:28  Phos  2.7     08-19  Mg     2.4     08-19    TPro  6.1  /  Alb  3.3  /  TBili  15.9<H>  /  DBili  x   /  AST  64<H>  /  ALT  30  /  AlkPhos  166<H>  08-19    Culture - Body Fluid with Gram Stain (collected 08-14-24 @ 18:16)  Source: Peritoneal Peritoneal Fluid  Gram Stain (08-15-24 @ 03:04):    polymorphonuclear leukocytes seen    No organisms seen    by cytocentrifuge  Preliminary Report (08-15-24 @ 21:41):    No growth    Culture - Fungal, Body Fluid (collected 08-14-24 @ 18:16)  Source: Peritoneal Peritoneal Fluid  Preliminary Report (08-17-24 @ 23:03):    Culture is being performed. Fungal cultures are held for 4 weeks.    Culture - Blood (collected 08-14-24 @ 16:10)  Source: .Blood Blood-Venous  Preliminary Report (08-18-24 @ 20:00):    No growth at 4 days    Culture - Blood (collected 08-14-24 @ 10:49)  Source: .Blood Blood-Venous  Final Report (08-19-24 @ 15:00):    No growth at 5 days    Culture - Blood (collected 08-14-24 @ 00:05)  Source: .Blood Blood-Peripheral  Final Report (08-19-24 @ 04:00):    No growth at 5 days    Culture - Blood (collected 08-14-24 @ 00:05)  Source: .Blood Blood-Peripheral  Final Report (08-19-24 @ 04:00):    No growth at 5 days    Culture - Blood (collected 08-12-24 @ 17:37)  Source: .Blood Blood  Final Report (08-17-24 @ 22:00):    No growth at 5 days    Culture - Blood (collected 08-12-24 @ 17:36)  Source: .Blood Blood  Gram Stain (08-13-24 @ 16:15):    Growth in aerobic bottle: Gram Positive Cocci in Clusters  Final Report (08-14-24 @ 10:47):    Growth in aerobic bottle: Staphylococcus epidermidis Isolation of    Coagulase negative Staphylococcus from single blood culture sets may    represent    contamination. Contact the Microbiology Department at [phone] if    susceptibility testing is    clinically indicated.    Direct identification is available within approximately 3-5    hours either by Blood Panel Multiplexed PCR or Direct    MALDI-TOF. Details: [email or web address]  Organism: Blood Culture PCR (08-14-24 @ 10:47)  Organism: Blood Culture PCR (08-14-24 @ 10:47)    Review of systems  Gen: No weight changes, fatigue, fevers/chills, weakness  Skin: No rashes  Head/Eyes/Ears/Mouth: No headache; Normal hearing; Normal vision w/o blurriness; No sinus pain/discomfort, sore throat  Respiratory: No dyspnea, cough, wheezing, hemoptysis  CV: No chest pain, PND, orthopnea  GI: C/O mild abdominal pain at surgical site. no diarrhea, constipation, nausea, vomiting, melena, hematochezia  : No increased frequency, dysuria, hematuria, nocturia  MSK: No joint pain/swelling; no back pain; no edema  Neuro: No dizziness/lightheadedness, weakness, seizures, numbness, tingling  Heme: No easy bruising or bleeding  Endo: No heat/cold intolerance  Psych: No significant nervousness, anxiety, stress, depression  All other systems were reviewed and are negative, except as noted.    PHYSICAL EXAM:  Constitutional: Well developed / well nourished  Eyes:  PERRLA  ENMT: nc/at, no thrush  Neck: supple,   Respiratory: CTA B/L  Cardiovascular: RRR  Gastrointestinal: Soft abdomen, ND  Genitourinary: anuric   Extremities: SCD's in place and working bilaterally  Vascular: Palpable dp pulses bilaterally.   Neurological: A&O x3  Skin: no rashes, ulcerations, lesions  Musculoskeletal: Moving all extremities  Psychiatric: Responsive

## 2024-08-19 NOTE — BH CONSULTATION LIAISON PROGRESS NOTE - NSBHFUPINTERVALHXFT_PSY_A_CORE
Patient seen at bedside on exam, patient was A/Ox1, patient at this time having increasing encephalopathy on exam, unable to answer questions regarding orientation on exam. Noted having difficulty sleeping at night on interview, as having his sleep wake cycle disrupted at this time. Patient family at bedside noted that patient has been becoming increasing confused throughout the weekend.        
Patient seen at bedside on exam, patient was A/Ox1-2, noted to writer that he has never seen a psychiatrist or therapist in the past. Patient endorsed he has been drinking home made alcohol "all my life. He denied any past rehab or detox attempts on exam. Patient stated that he has felt his days and nights are mixed up currently, and stated to writer he has difficulty with sleep during his hospital stay. He denied any safety concerns or AH/VH on exam      Collateral obtained from son at bedside: Patient has been drinking home made alcohol since he was 9 years old, and normally would drink 3 drinks a day daily. He stated that patient alcohol use may have increased over the last 2 years secondary to increasing back pain of unknown etiology. Patient son noted his father last drank prior to his current admission. He noted that patient has not been following any PCP, with general fear of hospitals or doctors since patient father passed away from kidney disease when the patient was young. He denied knowing of any past psychiatric hx for patient.

## 2024-08-19 NOTE — PROGRESS NOTE ADULT - ASSESSMENT
68y with obesity and risky alcohol consumption (with decades' history of daily consumption of homemade alcohol), admitted to [facility] 1 month ago due to recent onset of jaundice and with a prolonged hospital and ICU course there due to newly diagnosed decompensated cirrhosis with acute on chronic liver failure (ACLF) with shock (resolved, but still on midodrine); FANY (on intermittent HD since 7/9); recurrent maroon stools and acute on chronic anemia necessitating intermittent PRBC transfusions (last on 8/8) and hypofibrinoginemia, with EGD (7/12) with small non-bleeding EV and a duodenal ulcer with a visible vessel; and waxing and waning hepatic encephalopathy. He was transferred to [facility] on 8/12/24 for evaluation for combined liver/kidney transplants (SLK).      [ ] Decompensated ETOH Cirrhosis  - SLK eval   - HE: cont rifaximin/miralax  - EV:  EGD (7/12) with small non-bleeding EV and a duodenal ulcer with a visible vessel. -open SLK eval, consultants aware  - FANY/HRS: anuric, On CRRT 8/13, Renal following  - ID: Empiric Zosyn/fluc   - bcx 8/13 w/ MRSE   - PPI  -Thiamine/MVI/FOLIC ACID   - cont sicu care   - flex sig tomorrow for wall thickening seen on CT  - [facility] 8/19.     68y with obesity and risky alcohol consumption (with decades' history of daily consumption of homemade alcohol), admitted to [facility] 1 month ago due to recent onset of jaundice and with a prolonged hospital and ICU course there due to newly diagnosed decompensated cirrhosis with acute on chronic liver failure (ACLF) with shock (resolved, but still on midodrine); FANY (on intermittent HD since 7/9); recurrent maroon stools and acute on chronic anemia necessitating intermittent PRBC transfusions (last on 8/8) and hypofibrinoginemia, with EGD (7/12) with small non-bleeding EV and a duodenal ulcer with a visible vessel; and waxing and waning hepatic encephalopathy. He was transferred to [facility] on 8/12/24 for evaluation for combined liver/kidney transplants (SLK).      [ ] Decompensated ETOH Cirrhosis  - SLK eval   - HE: cont rifaximin/miralax  - EV:  EGD (7/12) with small non-bleeding EV and a duodenal ulcer with a visible vessel.   - FAYN/HRS: anuric, On CRRT 8/13, Renal following  - ID: Empiric Zosyn/fluc   - bcx 8/13 w/ MRSE , repeat 8/14 with NGTD  -Thiamine/MVI/FOLIC ACID/PPI  - Flex sig/EGD today  - [facility] this week   - cont sicu care

## 2024-08-19 NOTE — PROGRESS NOTE ADULT - ASSESSMENT
76 yo m with alcohol abuse otherwise no known chronic medical issues (does not see doctors per sister) s/p 1 month stay at [facility] now transferred to [facility] for liver transplant eval.  Most of history obtained from sister (Ashlyn) at bedside and some from transfer paperwork. Per sister, pt was initially brought to the hospital by family for back pain and jaundice for unclear amount of time. Patient was seen by GI and underwent EGD soon after admission which only showed nonbleeding ?duodenal ulcers (no intervention) however few days later pt developed active signs of bleeding and emergently underwent EGD again showing bleeding esophageal varices which were clipped.  pt was electively intubated with stay in ICU thereafter. Patient then started with HD due to worsening renal function and MS for past 2.5 weeks at times limited by low BP requiring pressors to assist. Had numerous paracentesis with varying amount of fluid removal - albumin infusions. Sister not aware of any concerns for acute infection during his stay but aware that pt was on abx at some point due to bleeding issues.        PERTINENT RADIOLOGY:  CXR: Tubes and lines as above. No focal consolidations  CT Chest, A&P:  Patchy ground-glass opacities in the bilateral upper lobes. *  Cirrhosis with evidence of portal hypertension. *  Hypodense lesion in the periphery of the left hepatic lobe of uncertain etiology. Somewhat wedge-shaped morphology raises the possibility for a small infarct. Question subtle peripheral nodular enhancement, and a hemangioma is also considered. Contrast enhanced abdominal MRI can be performed for further characterization and to assess for other possibilities. *  A wedge-shaped region of hypoattenuation in the spleen may reflect a small infarct. *  Focal eccentric wall thickening in the low rectum, possibly due to a mural fold. Consider colonoscopy. *  Large volume ascites.  CT Head: No evidence of acute intracranial pathology. If clinical symptoms persist or worsen, more sensitive evaluation with brain MRI may be obtained, if no contraindications exist.    BCx (8/12) 1/4 MRSE  BCx (8/14) NGTD  Paracentesis (8/14) Cell Counts Negative for SBP  MRSA/MSSA Nasal PCR (8/16) Negative    CXR (8/19) Ground Glass infiltrates persist  CT from 8//13 with bilateral Ground glass infiltrates    # Persistent Ground glass infiltrates of unclear etiology  please send urine legionella ag  please send deep sputum for testing for gram stain/cx, fungal stain/cx    #Decompensated liver cirrhosis, Leukocytosis, Shock  --Given worsening status no objection to broadening to Meropenem  --Can continue Empiric fluconazole  --Continue to follow CBC with diff  --Continue to follow transaminases  --Continue to follow temperature curve  --Follow up on  blood cultures 8/14 x3 sets are no growth    #Positive BCx (8/12) (Staph epi)  Consistent with contaminant       #CMV PCR   --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 Would repeat CMV PCR on 8/19   -- may need to treat if increasing  Cytomegalovirus By PCR: 4730 IU/mL (08.16.24 @ 00:37)        #Pre Transplant Evaluation   HIV-1/2 Combo Result: Nonreactive  EBVPCR Log: NotDetec Zvm97SO/mL   Hepatitis A IgG Ab Result: Reactive   Hepatitis B Core Antibody, Total: Nonreactive  Hepatitis B Surface Antibody: Reactive   Hepatitis B DNA PCR Log: Not Detected  Hepatitis B Surface Antigen: Nonreactive  Hepatitis C Virus Interpretation: Nonreactive  COVID-19 De Domain Antibody: Positive  Treponema Pallidum Antibody Interpretation: Negative  HSV 1 IgG  Positive/HSV 2 IgG Negative  EBV Serology Positive  CMV IgG Positive  VZV IgG Positive  Measles Positive, Mumps Positive and Rubella IgG Positive  Toxoplasma IgG Positive  QuantiFeron Gold Negative  Strongyloides Ab Negative  Babesia PCR negative  --Follow up on Schistosoma IgG  --Reportedly Lyme serology was previously positive and remains positive, follow up on Tick Diseases Panel is negative for additional tick born exposures  -- will treat with doxycycline 100mg bid as prior therapy was not given      #Encounter to Vaccinate Patient - Will defer vaccination in context of critical illness  COVID19: No primary series. Would benefit from COVID19 9890-4400 dose  Influenza: Would benefit from dose next season  Pneumococcal: Would benefit from PCV20  HAV: Immune, no additional vaccines indicated  HBV: Immune, no additional vaccines indicated  MMR: Immune, will not require further vaccination  Varicella: Immune, will not require further vaccination  Shingles: Would benefit from Shingrix  Tdap: Would benefit from Tdap      Herbie Rashid MD  Can be called via Teams  After 5pm/weekends [phone]

## 2024-08-19 NOTE — PROGRESS NOTE ADULT - SUBJECTIVE AND OBJECTIVE BOX
24 HOUR EVENTS:  - blood-tinged BM -> flex sigmoidoscopy scheduled today, to receive tap water enema  - f/u repeat CMV labs    NEURO  Exam: awake, alert, oriented  Meds: melatonin 5 milliGRAM(s) Oral at bedtime    [x] Adequacy of sedation and pain control has been assessed and adjusted      RESPIRATORY  RR: 23 (08-19-24 @ 01:00) (12 - 26)  SpO2: 99% (08-19-24 @ 01:00) (93% - 100%)  Wt(kg): --  Exam: unlabored, clear to auscultation bilaterally  Mechanical Ventilation:   ABG - ( 19 Aug 2024 00:27 )  pH: 7.44  /  pCO2: 33    /  pO2: 94    / HCO3: 22    / Base Excess: -1.5  /  SaO2: 99.5    Lactate: x          [N/A] Extubation Readiness Assessed  Meds:       CARDIOVASCULAR  HR: 61 (08-19-24 @ 01:00) (52 - 63)  BP: 115/59 (08-18-24 @ 07:15) (115/59 - 115/59)  BP(mean): 84 (08-18-24 @ 07:15) (84 - 84)  ABP: 129/52 (08-19-24 @ 01:00) (105/40 - 137/37)  ABP(mean): 75 (08-19-24 @ 01:00) (56 - 79)  Wt(kg): --  CVP(cm H2O): --      Exam: regular rate and rhythm  Cardiac Rhythm: sinus  Perfusion     [x]Adequate   [ ]Inadequate  Mentation   [x]Normal       [ ]Reduced  Extremities  [x]Warm         [ ]Cool  Volume Status [ ]Hypervolemic [x]Euvolemic [ ]Hypovolemic  Meds: midodrine. 20 milliGRAM(s) Oral every 8 hours  norepinephrine Infusion 0.07 MICROgram(s)/kG/Min IV Continuous <Continuous>        GI/NUTRITION  Exam: soft, nontender, nondistended, incision C/D/I  Diet: regular  Meds: pantoprazole  Injectable 40 milliGRAM(s) IV Push two times a day  polyethylene glycol 3350 17 Gram(s) Oral <User Schedule>      GENITOURINARY  I&O's Detail    08-17 @ 07:01  -  08-18 @ 07:00  --------------------------------------------------------  IN:    IV PiggyBack: 50 mL    IV PiggyBack: 200 mL    Norepinephrine: 303.4 mL    Oral Fluid: 510 mL    PRBCs (Packed Red Blood Cells): 300 mL    Vasopressin: 108 mL  Total IN: 1471.4 mL    OUT:    Other (mL): 2156 mL  Total OUT: 2156 mL    Total NET: -684.6 mL      08-18 @ 07:01  -  08-19 @ 02:03  --------------------------------------------------------  IN:    IV PiggyBack: 300 mL    Norepinephrine: 293 mL    Oral Fluid: 870 mL    Vasopressin: 81 mL  Total IN: 1544 mL    OUT:    Other (mL): 1976 mL  Total OUT: 1976 mL    Total NET: -432 mL          08-19    134<L>  |  102  |  12  ----------------------------<  160<H>  4.2   |  21<L>  |  1.19    Ca    8.3<L>      19 Aug 2024 00:58  Phos  3.1     08-19  Mg     2.4     08-19    TPro  5.8<L>  /  Alb  3.4  /  TBili  16.4<H>  /  DBili  x   /  AST  62<H>  /  ALT  25  /  AlkPhos  165<H>  08-19    [ ] Elizabeth catheter, indication: N/A  Meds: folic acid 1 milliGRAM(s) Oral daily  multivitamin 1 Tablet(s) Oral daily  thiamine 100 milliGRAM(s) Oral daily        HEMATOLOGIC  Meds:   [x] VTE Prophylaxis                        7.5    15.48 )-----------( 103      ( 19 Aug 2024 00:58 )             21.5     PT/INR - ( 19 Aug 2024 00:58 )   PT: 31.2 sec;   INR: 3.07 ratio         PTT - ( 19 Aug 2024 00:58 )  PTT:63.3 sec  Transfusion     [ ] PRBC   [ ] Platelets   [ ] FFP   [ ] Cryoprecipitate      INFECTIOUS DISEASES  WBC Count: 15.48 K/uL (08-19 @ 00:58)  WBC Count: 14.96 K/uL (08-18 @ 15:23)    RECENT CULTURES:  Specimen Source: Peritoneal Peritoneal Fluid  Date/Time: 08-14 @ 18:16  Culture Results:   No growth  Gram Stain:   polymorphonuclear leukocytes seen  No organisms seen  by cytocentrifuge  Organism: --  Specimen Source: .Blood Blood-Venous  Date/Time: 08-14 @ 16:10  Culture Results:   No growth at 4 days  Gram Stain: --  Organism: --  Specimen Source: .Blood Blood-Venous  Date/Time: 08-14 @ 10:49  Culture Results:   No growth at 4 days  Gram Stain: --  Organism: --    Meds: doxycycline IVPB      doxycycline IVPB 100 milliGRAM(s) IV Intermittent every 12 hours  fluconAZOLE IVPB 200 milliGRAM(s) IV Intermittent every 24 hours  fluconAZOLE IVPB      meropenem  IVPB      meropenem  IVPB 1000 milliGRAM(s) IV Intermittent every 8 hours  rifAXIMin 550 milliGRAM(s) Oral two times a day        ENDOCRINE  CAPILLARY BLOOD GLUCOSE        Meds: vasopressin Infusion 0.03 Unit(s)/Min IV Continuous <Continuous>        ACCESS DEVICES:  [ ] Peripheral IV  [ ] Central Venous Line	[ ] R	[ ] L	[ ] IJ	[ ] Fem	[ ] SC	Placed:   [ ] Arterial Line		[ ] R	[ ] L	[ ] Fem	[ ] Rad	[ ] Ax	Placed:   [ ] PICC:					[ ] Mediport  [ ] Urinary Catheter, Date Placed:   [x] Necessity of urinary, arterial, and venous catheters discussed    OTHER MEDICATIONS:  chlorhexidine 2% Cloths 1 Application(s) Topical <User Schedule>  CRRT Treatment    <Continuous>  lidocaine   4% Patch 1 Patch Transdermal daily  Phoxillum Filtration BK 4 / 2.5 5000 milliLiter(s) CRRT <Continuous>  Phoxillum Filtration BK 4 / 2.5 5000 milliLiter(s) CRRT <Continuous>  PrismaSOL Filtration BGK 4 / 2.5 5000 milliLiter(s) CRRT <Continuous>      CODE STATUS: FULL CODE

## 2024-08-19 NOTE — BH CONSULTATION LIAISON PROGRESS NOTE - NSBHCONSULTFOLLOWAFTERCARE_PSY_A_CORE FT
Will attempt to clarify alcohol use hx, if patient meets criteria for use d/o, patient will benefit from RPP engagement  
Will attempt to clarify alcohol use hx, if patient meets criteria for use d/o, patient will benefit from RPP engagement

## 2024-08-19 NOTE — BH CONSULTATION LIAISON PROGRESS NOTE - NSBHCHARTREVIEWVS_PSY_A_CORE FT
Vital Signs Last 24 Hrs  T(C): 36.4 (15 Aug 2024 11:00), Max: 36.7 (15 Aug 2024 07:00)  T(F): 97.6 (15 Aug 2024 11:00), Max: 98 (15 Aug 2024 07:00)  HR: 63 (15 Aug 2024 14:15) (45 - 85)  BP: 97/53 (14 Aug 2024 19:00) (97/53 - 121/59)  BP(mean): 72 (14 Aug 2024 19:00) (72 - 85)  RR: 19 (15 Aug 2024 14:15) (9 - 49)  SpO2: 99% (15 Aug 2024 14:15) (86% - 100%)    Parameters below as of 15 Aug 2024 11:00  Patient On (Oxygen Delivery Method): room air    
Vital Signs Last 24 Hrs  T(C): 36.4 (19 Aug 2024 03:00), Max: 36.6 (18 Aug 2024 19:00)  T(F): 97.6 (19 Aug 2024 03:00), Max: 97.8 (18 Aug 2024 19:00)  HR: 62 (19 Aug 2024 12:15) (55 - 63)  BP: --  BP(mean): --  RR: 14 (19 Aug 2024 12:15) (13 - 26)  SpO2: 98% (19 Aug 2024 12:15) (93% - 100%)    Parameters below as of 19 Aug 2024 07:00  Patient On (Oxygen Delivery Method): room air

## 2024-08-19 NOTE — BH CONSULTATION LIAISON PROGRESS NOTE - NSBHATTESTCOMMENTATTENDFT_PSY_A_CORE
68 year old, , male with PPHx of presumed alcohol use d/o, with no known past psychiatric admissions with a PMHx of with obesity, admitted to [facility] 1 month ago due to recent onset of jaundice and with a prolonged hospital and ICU course there due to newly diagnosed decompensated cirrhosis with acute on chronic liver failure (ACLF) with shock (resolved, but still on midodrine); FANY (on intermittent HD since 7/9); recurrent maroon stools and acute on chronic anemia necessitating intermittent PRBC transfusions (last on 8/8) and hypofibrinoginemia, with EGD (7/12) with small non-bleeding EV and a duodenal ulcer with a visible vessel; and waxing and waning hepatic encephalopathy. He was transferred to [facility] on 8/12/24 for evaluation for combined liver/kidney transplants (SLK). Patient seen by transplant psychiatry for psychosocial evaluation for transplant.  ---    Patient AAOx1 on exam, with waxing waning mental status as corroborated by son at bedside.  Remains poor historian but alludes to consumption of moonshine. Per son patient was consuming at least 1-2 shots of moonshines daily since childhood with possible last use prior to decline in health and/or hospitalization but he is unaware of details. Of note patient consumes home made Raki which can have a range of 40-50% of EtOH volume, however, range of EtOH percentage as EtOH proof is highly variable dependant individual technique. Given chronicity of use with possible progressive increase in use, concerns of EtOH use disorder. Per son patient did not seek medical care prior to recent decline in health. SIPAT pending further improvement in mental status. 
Patient seen at bedside with ACP, agree with above assessment and plan. Labs/chart reviewed, coordination of care provided. Patient AAOx1, per family at bedside as well as nursing staff mental status has been fluctuating. Can consider Seroquel to address sleep architecture problems in setting of delirium if Qtc < 500; if patient is unable to tolerate PO or has period of severe agitation can consider use of Haldol. Please continue to monitor EKG to ensure Qtc is not prolonged while on psychotropic medications.

## 2024-08-19 NOTE — BH CONSULTATION LIAISON PROGRESS NOTE - NSICDXBHPRIMARYDX_PSY_ALL_CORE
Pre-transplant evaluation for liver transplant   Z01.818  
Pre-transplant evaluation for liver transplant   Z01.818

## 2024-08-19 NOTE — PROGRESS NOTE ADULT - ASSESSMENT
68 year old male with  AUD complicated by cirrhosis with Hepatic encephalopathy admitted to [facility] with back pain and jaundice on 7/8/24. Pt had dark / maroon colored stools   EGD that showed esophageal varices and duodenal ulcer with visible vessel. He got transfusions for low Hb and last transfusion was on 8/8. 1st session of IHD was on 7/9/24.     Transplant nephrology consulted for FANY and SLK eval.       1.  FANY VS FANY on CKD  Meets FANY criteria for SLK   -Started on CRRT on 8/13/24 (1st session of IHD was on 7/9/24 -8/20 will be 6 weeks)   -Pt remains oliguric and on pressor support. Will cw CRRT at current dose.  Electrolytes stable.          Neeraj Maher  Nephrology Fellow  Feel free to contact me on TEAMS  After 5 pm please contact the on-call Fellow.

## 2024-08-19 NOTE — BH CONSULTATION LIAISON PROGRESS NOTE - NSBHCHARTREVIEWLAB_PSY_A_CORE FT
8.8    14.55 )-----------( 102      ( 13 Aug 2024 11:55 )             26.0     08-13    131<L>  |  94<L>  |  45<H>  ----------------------------<  162<H>  4.0   |  20<L>  |  6.85<H>    Ca    9.3      13 Aug 2024 11:55  Phos  3.9     08-13  Mg     2.2     08-13    TPro  5.6<L>  /  Alb  3.3  /  TBili  15.1<H>  /  DBili  x   /  AST  36  /  ALT  17  /  AlkPhos  109  08-13  
                             7.7    15.80 )-----------( 102      ( 19 Aug 2024 11:28 )             21.8     08-19    134<L>  |  102  |  11  ----------------------------<  126<H>  4.1   |  20<L>  |  1.14    Ca    8.6      19 Aug 2024 11:28  Phos  2.7     08-19  Mg     2.4     08-19    TPro  6.1  /  Alb  3.3  /  TBili  15.9<H>  /  DBili  x   /  AST  64<H>  /  ALT  30  /  AlkPhos  166<H>  08-19

## 2024-08-19 NOTE — PROGRESS NOTE ADULT - CRITICAL CARE ATTENDING COMMENT
Dr. Price (Attending Physician)  N - hepatic encephalopathy more agitated today  P - Room air for   C - Low dose pressors, midodrine 20 mg tid  GI - Cirrhosis, Esophageal Varices, plan for Flex sig today, likely needs upper endoscopy as well for maroon stool   - -50 mL/hr  H - 1 unit RBC overnight for CVVH clotting and low h/h, maroon stool  ID - on doxycycline for lyme disease, cx negative  E - glucose controlled  PPx - ppi   Lines - PICC, permacath  Dispo - full code

## 2024-08-20 NOTE — AIRWAY PLACEMENT NOTE ADULT - INDICATIONS:
Route for mechanical ventilation
upper GIB bleed requiring endoscopy/Route for mechanical ventilation

## 2024-08-20 NOTE — PROGRESS NOTE ADULT - ASSESSMENT
78 y/o male w/ no known PMHx (does not see doctors per sister) w/ newly diagnosed cirrhosis c/b vasoplegia requiring vasopressor support, small EVs, melena 2/2 a duodenal ulcer s/p clipping, HRS requiring CRRT, hepatic encephalopathy, and ascites s/p multiple LVPs presenting for liver transplant evaluation    Neuro:  - Miralax & rifaximin for prevention of hepatic encephalopathy  - Monitoring ammonia  - Thiamine, folic acid, & multivitamin for h/o risky EtOH use  - Melatonin PRN insomnia  - 8/20: Precedex for agitation    Respiratory  - Out of bed to chair, ambulate as tolerated, and incentive spirometry to prevent atelectasis  - Assess need for diuresis daily as patient w/ pulmonary vascular congestion on CXR    Cardiovascular  - levo, vaso  - midodrine discontinued  - Will wean pressors as tolerated w/ goal MAP > 65  - 8/19: LHC this week per cardiology    Gastrointestinal and Nutrition  - Regular diet as tolerated  - Miralax & rifaximin for prevention of hepatic encephalopathy  - Protonix BID for h/o duodenal ulcer  - Monitor LFTs  - Undergoing evaluation for SLK transplantation  - 8/14 CT A/P: cirrhosis w/ portal HTN, small infarct in left hepatic lobe, small splenic infarct, focal eccentric wall thickening of rectum, and large volume ascites  - 8/14 paracentesis w/ 5 L drained  - 8/19: flex sig scheduled for today for bowel thickening on CT A/P and blood in stool; tap water enema prior -- showed diffuse oozing throughout bowel    Renal:  - CRRT -50 for acute renal failure likely 2/2 HRS  - Appreciate nephrology recommendations  - Undergoing evaluation for SLK transplantation  - 8/20 f/u nephrology as circuit keeps clotting    Heme:  - Venodynes for mechanical VTE prophylaxis; holding chemical VTE prophylaxis as patient is coagulopathic 2/2 cirrhosis  - s/p 1 PRBC for lost CRRT circuit    ID:   - Empiric coverage w/ meropenem & fluconazole as per transplant given vasopressor requirements  - Blood cultures w/ MRSE in 1 bottle on 8/12 but repeat cultures sent 8/14 w/ no growth to date  - 8/18: + doxycycline added for Lyme  - 8/20: f/u rpt CMV labs, sputum Cx, RVP -- unable to obtain urine legionella as pt anuric     Endo:   - Stress dose steroids for possible adrenal insufficiency given persistent vasopressor requirement  - 8/18: steroids discontinued    Donna Downey DO (EM PGY-2)  S[facility] Int[facility] Unit [id number]

## 2024-08-20 NOTE — PROGRESS NOTE ADULT - CRITICAL CARE ATTENDING COMMENT
Dr. Price (Attending Physician)  N - hepatic encephalopathy, ammonia 74,   P - room air 96%  C - NE 0.1, vaso for vasoplegic shock from liver failure, needs cardiac cath  GI - GI bleeding mucosal on flex sig yesterday, will start TFs today because mental status worse today   - CVVHD negative 50, still clotting circuit will dw renal  H - given 1 unit of blood for Hb 7.2, INR 2.19, will sent TEG will give plts today  ID - wbc slightly increased to 17 blood cx neg, send urine legionella ag, on doxy for lyme positive cmv yesterday  E - glucose well controlled, will add on ssi  PPx - no dvt, ppi  Lines - PICC line, permacath  Dispo - full code

## 2024-08-20 NOTE — PROGRESS NOTE ADULT - SUBJECTIVE AND OBJECTIVE BOX
[facility] DIVISION OF KIDNEY DISEASES AND HYPERTENSION   FOLLOW UP NOTE    --------------------------------------------------------------------------------  Chief Complaint:    24 hour events/subjective: Pt. was seen and examined today. Remains in SICU on CRRT.        PAST HISTORY  --------------------------------------------------------------------------------  No significant changes to PMH, PSH, FHx, SHx, unless otherwise noted    ALLERGIES & MEDICATIONS  --------------------------------------------------------------------------------  Allergies    No Known Allergies    Intolerances      Standing Inpatient Medications  chlorhexidine 2% Cloths 1 Application(s) Topical <User Schedule>  CRRT Treatment    <Continuous>  doxycycline IVPB      doxycycline IVPB 100 milliGRAM(s) IV Intermittent every 12 hours  fluconAZOLE IVPB      fluconAZOLE IVPB 200 milliGRAM(s) IV Intermittent every 24 hours  folic acid 1 milliGRAM(s) Oral daily  lidocaine   4% Patch 1 Patch Transdermal daily  melatonin 5 milliGRAM(s) Oral at bedtime  meropenem  IVPB      meropenem  IVPB 1000 milliGRAM(s) IV Intermittent every 8 hours  multivitamin 1 Tablet(s) Oral daily  norepinephrine Infusion 0.07 MICROgram(s)/kG/Min IV Continuous <Continuous>  pantoprazole  Injectable 40 milliGRAM(s) IV Push two times a day  Phoxillum Filtration BK 4 / 2.5 5000 milliLiter(s) CRRT <Continuous>  Phoxillum Filtration BK 4 / 2.5 5000 milliLiter(s) CRRT <Continuous>  polyethylene glycol 3350 17 Gram(s) Oral <User Schedule>  PrismaSOL Filtration BGK 4 / 2.5 5000 milliLiter(s) CRRT <Continuous>  rifAXIMin 550 milliGRAM(s) Oral two times a day  thiamine 100 milliGRAM(s) Oral daily  vasopressin Infusion 0.03 Unit(s)/Min IV Continuous <Continuous>    PRN Inpatient Medications      REVIEW OF SYSTEMS  --------------------------------------------------------------------------------    All other systems were reviewed and are negative, except as noted.    VITALS/PHYSICAL EXAM  --------------------------------------------------------------------------------  T(C): 36.2 (08-20-24 @ 07:00), Max: 36.9 (08-20-24 @ 03:00)  HR: 66 (08-20-24 @ 10:15) (61 - 87)  BP: 119/56 (08-20-24 @ 07:00) (114/56 - 119/56)  RR: 15 (08-20-24 @ 10:15) (10 - 28)  SpO2: 95% (08-20-24 @ 10:15) (88% - 100%)  Wt(kg): --        08-19-24 @ 07:01  -  08-20-24 @ 07:00  --------------------------------------------------------  IN: 2804.8 mL / OUT: 3146 mL / NET: -341.2 mL    08-20-24 @ 07:01  -  08-20-24 @ 10:36  --------------------------------------------------------  IN: 103.2 mL / OUT: 176 mL / NET: -72.8 mL        Physical Exam:  	Gen: NAD  	HEENT: Anicteric  	Pulm: CTA B/L  	CV: S1S2+  	Abd: Soft, +BS   	Ext: No LE edema B/L  	Neuro: Awake  	Skin: Warm and dry  	Dialysis access: TD    LABS/STUDIES  --------------------------------------------------------------------------------              7.2    17.43 >-----------<  139      [08-20-24 @ 06:07]              21.2     136  |  102  |  10  ----------------------------<  195      [08-20-24 @ 06:07]  4.0   |  20  |  1.19        Ca     8.4     [08-20-24 @ 06:07]      Mg     2.6     [08-20-24 @ 06:07]      Phos  3.4     [08-20-24 @ 06:07]    TPro  6.1  /  Alb  3.3  /  TBili  15.3  /  DBili  x   /  AST  82  /  ALT  33  /  AlkPhos  180  [08-20-24 @ 06:07]    PT/INR: PT 22.5 , INR 2.19       [08-20-24 @ 06:07]  PTT: 45.8       [08-20-24 @ 06:07]      Creatinine Trend:  SCr 1.19 [08-20 @ 06:07]  SCr 1.14 [08-20 @ 00:08]  SCr 1.17 [08-19 @ 17:41]  SCr 1.14 [08-19 @ 11:28]  SCr 1.10 [08-19 @ 05:28]    Urinalysis - [08-20-24 @ 06:07]      Color  / Appearance  / SG  / pH       Gluc 195 / Ketone   / Bili  / Urobili        Blood  / Protein  / Leuk Est  / Nitrite       RBC  / WBC  / Hyaline  / Gran  / Sq Epi  / Non Sq Epi  / Bacteria       HBsAb Reactive      [08-12-24 @ 21:16]  HBsAg Nonreact      [08-12-24 @ 21:13]  HBcAb Nonreact      [08-12-24 @ 21:16]  HCV 0.08, Nonreact      [08-12-24 @ 14:45]  HIV Nonreact      [08-12-24 @ 21:13]    CHETNA: titer Negative, pattern --      [08-12-24 @ 21:15]  Syphilis Screen (Treponema Pallidum Ab) Negative      [08-12-24 @ 21:15]  Immunofixation Serum:   Oligoclonal Pattern Consisting of One Weak IgM Kappa Band, Two Weak IgG  Kappa Bands, and One Weak IgA Lambda Band Identified      Reference Range: None Detected      [08-12-24 @ 21:13]  SPEP Interpretation: Oligoclonal Pattern Consisting of Three Weak Gamma-Migrating Paraproteins  and One Weak Beta-Migrating Paraprotein Identified      [08-12-24 @ 21:13]    Tacrolimus  Cyclosporine  Sirolimus  Mycophenolate  BK PCR  CMV PCRCMVPCR Log: 3.67 Mwn50PH/mL (08-16 @ 00:37)  CMVPCR Log: 3.69 [id number]/mL (08-12 @ 21:13)    Parvo PCR  EBV PCR

## 2024-08-20 NOTE — PROGRESS NOTE ADULT - SUBJECTIVE AND OBJECTIVE BOX
Transplant Surgery - Multidisciplinary Progress Note  --------------------------------------------------------------  Present:   Patient seen and examined with multidisciplinary Transplant team including Surgeon: , Hepatologist: Dr. Nix, MARTIR Martin, Pharmacist: Beckie and bedside RN during AM rounds. Disciplines not in attendance will be notified of the plan.     Interval Events:  - remains on levo/vaso  - On CRRT, SLK eval  - flex sig: friable mucosa, internal and external hemorrhoids     Potential Discharge date: Pending clinical course  Education:  Medications  Plan of care:  See Below    TX DATA HERE    Review of systems  Gen: No weight changes, fatigue, fevers/chills, weakness  Skin: No rashes  Head/Eyes/Ears/Mouth: No headache; Normal hearing; Normal vision w/o blurriness; No sinus pain/discomfort, sore throat  Respiratory: No dyspnea, cough, wheezing, hemoptysis  CV: No chest pain, PND, orthopnea  GI: C/O mild abdominal pain at surgical site. no diarrhea, constipation, nausea, vomiting, melena, hematochezia  : No increased frequency, dysuria, hematuria, nocturia  MSK: No joint pain/swelling; no back pain; no edema  Neuro: No dizziness/lightheadedness, weakness, seizures, numbness, tingling  Heme: No easy bruising or bleeding  Endo: No heat/cold intolerance  Psych: No significant nervousness, anxiety, stress, depression  All other systems were reviewed and are negative, except as noted.    PHYSICAL EXAM:  Constitutional: Well developed / well nourished  Eyes:  PERRLA  ENMT: nc/at, no thrush  Neck: supple,   Respiratory: CTA B/L  Cardiovascular: RRR  Gastrointestinal: Soft abdomen, ND  Genitourinary: anuric   Extremities: SCD's in place and working bilaterally  Vascular: Palpable dp pulses bilaterally.   Neurological: A&O x3  Skin: no rashes, ulcerations, lesions  Musculoskeletal: Moving all extremities  Psychiatric: Responsive Transplant Surgery - Multidisciplinary Progress Note  --------------------------------------------------------------  Present:   Patient seen and examined with multidisciplinary Transplant team including Surgeon: , Hepatologist: MARTIR Tracy, Pharmacist: Beckie and bedside RN during AM rounds. Disciplines not in attendance will be notified of the plan.     Interval Events:  - remains on levo/vaso  - On CRRT, SLK eval  - flex sig: friable mucosa, internal and external hemorrhoids     Potential Discharge date: Pending clinical course  Education:  Medications  Plan of care:  See Below      MEDICATIONS  (STANDING):  chlorhexidine 0.12% Liquid 15 milliLiter(s) Oral Mucosa every 12 hours  chlorhexidine 2% Cloths 1 Application(s) Topical <User Schedule>  CRRT Treatment    <Continuous>  dexMEDEtomidine Infusion 0.5 MICROgram(s)/kG/Hr (14.3 mL/Hr) IV Continuous <Continuous>  doxycycline IVPB      doxycycline IVPB 100 milliGRAM(s) IV Intermittent every 12 hours  epoetin kareem (EPOGEN) Injectable 19261 Unit(s) SubCutaneous every 7 days  fluconAZOLE IVPB      fluconAZOLE IVPB 200 milliGRAM(s) IV Intermittent every 24 hours  folic acid 1 milliGRAM(s) Oral daily  hydrocortisone sodium succinate Injectable 50 milliGRAM(s) IV Push every 6 hours  insulin lispro (ADMELOG) corrective regimen sliding scale   SubCutaneous every 6 hours  lidocaine   4% Patch 1 Patch Transdermal daily  meropenem  IVPB      meropenem  IVPB 1000 milliGRAM(s) IV Intermittent every 8 hours  multivitamin 1 Tablet(s) Oral daily  norepinephrine Infusion 0.35 MICROgram(s)/kG/Min (37.4 mL/Hr) IV Continuous <Continuous>  octreotide  Infusion 50 MICROgram(s)/Hr (10 mL/Hr) IV Continuous <Continuous>  pantoprazole  Injectable 40 milliGRAM(s) IV Push two times a day  Phoxillum Filtration BK 4 / 2.5 5000 milliLiter(s) (1000 mL/Hr) CRRT <Continuous>  Phoxillum Filtration BK 4 / 2.5 5000 milliLiter(s) (200 mL/Hr) CRRT <Continuous>  polyethylene glycol 3350 17 Gram(s) Oral <User Schedule>  PrismaSOL Filtration BGK 4 / 2.5 5000 milliLiter(s) (1500 mL/Hr) CRRT <Continuous>  rifAXIMin 550 milliGRAM(s) Oral two times a day  thiamine 100 milliGRAM(s) Oral daily  vasopressin Infusion 0.03 Unit(s)/Min (4.5 mL/Hr) IV Continuous <Continuous>    MEDICATIONS  (PRN):  HYDROmorphone  Injectable 0.5 milliGRAM(s) IV Push every 3 hours PRN Moderate Pain (4 - 6)      PAST MEDICAL & SURGICAL HISTORY:  Alcohol abuse      No significant past surgical history          Vital Signs Last 24 Hrs  T(C): 36.2 (20 Aug 2024 15:00), Max: 36.9 (20 Aug 2024 03:00)  T(F): 97.1 (20 Aug 2024 15:00), Max: 98.4 (20 Aug 2024 03:00)  HR: 64 (21 Aug 2024 01:00) (64 - 133)  BP: 112/56 (20 Aug 2024 21:00) (112/56 - 119/56)  BP(mean): 78 (20 Aug 2024 21:00) (78 - 81)  RR: 21 (21 Aug 2024 01:00) (10 - 26)  SpO2: 100% (21 Aug 2024 01:00) (88% - 100%)    Parameters below as of 20 Aug 2024 19:00  Patient On (Oxygen Delivery Method): ventilator        I&O's Summary    19 Aug 2024 07:01  -  20 Aug 2024 07:00  --------------------------------------------------------  IN: 2804.8 mL / OUT: 3146 mL / NET: -341.2 mL    20 Aug 2024 07:01  -  21 Aug 2024 01:39  --------------------------------------------------------  IN: 3176.5 mL / OUT: 2542 mL / NET: 634.5 mL                              8.6    23.59 )-----------( 161      ( 20 Aug 2024 20:41 )             26.8     08-20    137  |  103  |  8   ----------------------------<  134<H>  4.2   |  22  |  1.12    Ca    8.5      20 Aug 2024 20:41  Phos  3.8     08-20  Mg     2.5     08-20    TPro  6.8  /  Alb  3.6  /  TBili  15.9<H>  /  DBili  x   /  AST  88<H>  /  ALT  38  /  AlkPhos  203<H>  08-20          Culture - Body Fluid with Gram Stain (collected 08-14-24 @ 18:16)  Source: Peritoneal Peritoneal Fluid  Gram Stain (08-15-24 @ 03:04):    polymorphonuclear leukocytes seen    No organisms seen    by cytocentrifuge  Final Report (08-19-24 @ 18:29):    No growth at 5 days    Culture - Fungal, Body Fluid (collected 08-14-24 @ 18:16)  Source: Peritoneal Peritoneal Fluid  Preliminary Report (08-17-24 @ 23:03):    Culture is being performed. Fungal cultures are held for 4 weeks.    Culture - Blood (collected 08-14-24 @ 16:10)  Source: .Blood Blood-Venous  Final Report (08-19-24 @ 20:00):    No growth at 5 days    Culture - Blood (collected 08-14-24 @ 10:49)  Source: .Blood Blood-Venous  Final Report (08-19-24 @ 15:00):    No growth at 5 days          Review of systems  Gen: No weight changes, fatigue, fevers/chills, weakness  Skin: No rashes  Head/Eyes/Ears/Mouth: No headache; Normal hearing; Normal vision w/o blurriness; No sinus pain/discomfort, sore throat  Respiratory: No dyspnea, cough, wheezing, hemoptysis  CV: No chest pain, PND, orthopnea  GI: C/O mild abdominal pain at surgical site. no diarrhea, constipation, nausea, vomiting, melena, hematochezia  : No increased frequency, dysuria, hematuria, nocturia  MSK: No joint pain/swelling; no back pain; no edema  Neuro: No dizziness/lightheadedness, weakness, seizures, numbness, tingling  Heme: No easy bruising or bleeding  Endo: No heat/cold intolerance  Psych: No significant nervousness, anxiety, stress, depression  All other systems were reviewed and are negative, except as noted.    PHYSICAL EXAM:  Constitutional: Well developed / well nourished  Eyes:  PERRLA  ENMT: nc/at, no thrush  Neck: supple,   Respiratory: CTA B/L  Cardiovascular: RRR  Gastrointestinal: Soft abdomen, ND  Genitourinary: anuric   Extremities: SCD's in place and working bilaterally  Vascular: Palpable dp pulses bilaterally.   Neurological: A&O x3  Skin: no rashes, ulcerations, lesions  Musculoskeletal: Moving all extremities  Psychiatric: Responsive

## 2024-08-20 NOTE — PROGRESS NOTE ADULT - ASSESSMENT
68 year old male with  AUD complicated by cirrhosis with Hepatic encephalopathy admitted to [facility] with back pain and jaundice on 7/8/24. Pt had dark / maroon colored stools   EGD that showed esophageal varices and duodenal ulcer with visible vessel. He got transfusions for low Hb and last transfusion was on 8/8. 1st session of IHD was on 7/9/24.     Transplant nephrology consulted for FANY and SLK eval.       1.  FANY VS FANY on CKD  Meets FANY criteria for SLK   -Started on CRRT on 8/13/24 (1st session of IHD was on 7/9/24 completed 6 weeks on 8/20)   -Pt remains oliguric and on pressor support. Dialysate flow rate adjusted for clotting. Will cw CRRT at current dose.       Anemia -multifactorial  7/12 EGD showed esophageal varices and duodenal ulcer with visible vessel.   Iron studies from Aug 12 reviewed. Adequate iron stores. tx as needed. Will start EPO 10k weekly.            Neeraj Maher  Nephrology Fellow  Feel free to contact me on TEAMS  After 5 pm please contact the on-call Fellow.   68 year old male with  AUD complicated by cirrhosis with Hepatic encephalopathy admitted to [facility] with back pain and jaundice on 7/8/24. Pt had dark / maroon colored stools   EGD that showed esophageal varices and duodenal ulcer with visible vessel. He got transfusions for low Hb and last transfusion was on 8/8. 1st session of IHD was on 7/9/24.     Transplant nephrology consulted for FANY and SLK eval.       1.  FANY VS FANY on CKD  Meets FANY criteria for SLK   -Started on CRRT on 8/13/24 (1st session of IHD was on 7/9/24 completed 6 weeks on 8/20)   -Pt remains oliguric and on pressor support. Dialysate flow rate adjusted for clotting (8/19). Will cw CRRT at current dose.       Anemia -multifactorial  7/12 EGD showed esophageal varices and duodenal ulcer with visible vessel.   Iron studies from Aug 12 reviewed. Adequate iron stores. tx as needed. Will start EPO 10k weekly.            Neeraj Maher  Nephrology Fellow  Feel free to contact me on TEAMS  After 5 pm please contact the on-call Fellow.

## 2024-08-20 NOTE — AIRWAY PLACEMENT NOTE ADULT - AIRWAY COMMENTS:
Patient was intubated for airway protection (Bronchoscopy), with size 7.5ETT, secured with ET tube augustine at 23cm at lip line. ETT was confirmed with ETCO2 color change and equal bilateral breaths sounds noted upon auscultation by MD at bedside. Patient is placed on mechanical ventilation on AC/VC mode with settings as documented on flow sheet as per MD's order post intubation, tolerating well. No signs of respiratory distress noted. Will continue to monitor.
patient not able to answer questions, on norepinephrine infusion & continuous dialysis. IV access via right upper extremity PICC

## 2024-08-20 NOTE — PRE PROCEDURE NOTE - PRE PROCEDURE EVALUATION
Pre-Endoscopy Evaluation    Attending Physician:  Dr. Nix    Procedure: EGD    Indication for Procedure & Pertinent History: See [employer] chart    PAST MEDICAL & SURGICAL HISTORY:  Alcohol abuse      No significant past surgical history          Allergies    No Known Allergies    Intolerances        Medications: MEDICATIONS  (STANDING):  chlorhexidine 2% Cloths 1 Application(s) Topical <User Schedule>  CRRT Treatment    <Continuous>  doxycycline IVPB      doxycycline IVPB 100 milliGRAM(s) IV Intermittent every 12 hours  epoetin kareem (EPOGEN) Injectable 49947 Unit(s) SubCutaneous every 7 days  fluconAZOLE IVPB 200 milliGRAM(s) IV Intermittent every 24 hours  fluconAZOLE IVPB      folic acid 1 milliGRAM(s) Oral daily  insulin lispro (ADMELOG) corrective regimen sliding scale   SubCutaneous every 6 hours  lidocaine   4% Patch 1 Patch Transdermal daily  meropenem  IVPB      meropenem  IVPB 1000 milliGRAM(s) IV Intermittent every 8 hours  multivitamin 1 Tablet(s) Oral daily  norepinephrine Infusion 0.07 MICROgram(s)/kG/Min (15 mL/Hr) IV Continuous <Continuous>  octreotide  Infusion 10 MICROgram(s)/Hr (2 mL/Hr) IV Continuous <Continuous>  pantoprazole  Injectable 40 milliGRAM(s) IV Push two times a day  Phoxillum Filtration BK 4 / 2.5 5000 milliLiter(s) (1000 mL/Hr) CRRT <Continuous>  Phoxillum Filtration BK 4 / 2.5 5000 milliLiter(s) (200 mL/Hr) CRRT <Continuous>  polyethylene glycol 3350 17 Gram(s) Oral <User Schedule>  potassium phosphate / sodium phosphate Powder (PHOS-NaK) 1 Packet(s) Oral once  PrismaSOL Filtration BGK 4 / 2.5 5000 milliLiter(s) (1500 mL/Hr) CRRT <Continuous>  rifAXIMin 550 milliGRAM(s) Oral two times a day  thiamine 100 milliGRAM(s) Oral daily  vasopressin Infusion 0.03 Unit(s)/Min (4.5 mL/Hr) IV Continuous <Continuous>    MEDICATIONS  (PRN):      Pertinent lab data:                        8.5    16.77 )-----------( 141      ( 20 Aug 2024 16:47 )             25.0     08-20    138  |  103  |  10  ----------------------------<  121<H>  4.0   |  20<L>  |  1.19    Ca    8.5      20 Aug 2024 16:47  Phos  2.7     08-20  Mg     2.5     08-20    TPro  6.4  /  Alb  3.4  /  TBili  15.5<H>  /  DBili  x   /  AST  78<H>  /  ALT  36  /  AlkPhos  193<H>  08-20    PT/INR - ( 20 Aug 2024 06:07 )   PT: 22.5 sec;   INR: 2.19 ratio         PTT - ( 20 Aug 2024 06:07 )  PTT:45.8 sec            Physical Examination:  See Physical Exam in H&P and/or Progress notes.    Comments:    ASA Class: I []  II []  III []  IV [X]    The patient is a suitable candidate for the planned procedure unless box checked [ ]  No, explain:

## 2024-08-20 NOTE — AIRWAY PLACEMENT NOTE ADULT - DISPOSITION AFTER INTUBATION:
patient placed on mechanical ventilation
care transferred to respiratory therapist & SICU staff/patient placed on mechanical ventilation

## 2024-08-20 NOTE — PROGRESS NOTE ADULT - SUBJECTIVE AND OBJECTIVE BOX
HPI:  Patient seen and examined at bedside in SICU.  Encephalopathic.    Review Of Systems:   Unable to assess        Medications:  chlorhexidine 0.12% Liquid 15 milliLiter(s) Oral Mucosa every 12 hours  chlorhexidine 2% Cloths 1 Application(s) Topical <User Schedule>  CRRT Treatment    <Continuous>  dexMEDEtomidine Infusion 0.5 MICROgram(s)/kG/Hr IV Continuous <Continuous>  doxycycline IVPB      doxycycline IVPB 100 milliGRAM(s) IV Intermittent every 12 hours  epoetin kareem (EPOGEN) Injectable 34660 Unit(s) SubCutaneous every 7 days  fluconAZOLE IVPB      fluconAZOLE IVPB 200 milliGRAM(s) IV Intermittent every 24 hours  folic acid 1 milliGRAM(s) Oral daily  hydrocortisone sodium succinate Injectable 50 milliGRAM(s) IV Push every 6 hours  HYDROmorphone  Injectable 0.5 milliGRAM(s) IV Push every 3 hours PRN  insulin lispro (ADMELOG) corrective regimen sliding scale   SubCutaneous every 6 hours  lidocaine   4% Patch 1 Patch Transdermal daily  meropenem  IVPB      meropenem  IVPB 1000 milliGRAM(s) IV Intermittent every 8 hours  multivitamin 1 Tablet(s) Oral daily  norepinephrine Infusion 0.35 MICROgram(s)/kG/Min IV Continuous <Continuous>  octreotide  Infusion 50 MICROgram(s)/Hr IV Continuous <Continuous>  pantoprazole  Injectable 40 milliGRAM(s) IV Push two times a day  Phoxillum Filtration BK 4 / 2.5 5000 milliLiter(s) CRRT <Continuous>  Phoxillum Filtration BK 4 / 2.5 5000 milliLiter(s) CRRT <Continuous>  polyethylene glycol 3350 17 Gram(s) Oral <User Schedule>  PrismaSOL Filtration BGK 4 / 2.5 5000 milliLiter(s) CRRT <Continuous>  rifAXIMin 550 milliGRAM(s) Oral two times a day  thiamine 100 milliGRAM(s) Oral daily  vasopressin Infusion 0.03 Unit(s)/Min IV Continuous <Continuous>    PAST MEDICAL & SURGICAL HISTORY:  Alcohol abuse      No significant past surgical history        Vitals:  T(C): 36.2 (08-20-24 @ 15:00), Max: 36.9 (08-20-24 @ 03:00)  HR: 64 (08-21-24 @ 01:00) (64 - 133)  BP: 112/56 (08-20-24 @ 21:00) (112/56 - 119/56)  BP(mean): 78 (08-20-24 @ 21:00) (78 - 81)  RR: 21 (08-21-24 @ 01:00) (10 - 26)  SpO2: 100% (08-21-24 @ 01:00) (88% - 100%)  Wt(kg): --  Daily     Daily   I&O's Summary    19 Aug 2024 07:01  -  20 Aug 2024 07:00  --------------------------------------------------------  IN: 2804.8 mL / OUT: 3146 mL / NET: -341.2 mL    20 Aug 2024 07:01  -  21 Aug 2024 01:35  --------------------------------------------------------  IN: 3176.5 mL / OUT: 2542 mL / NET: 634.5 mL        Physical Exam:  Appearance: Jaundice, obese  Eyes: +scleral icterus  HENT: Normal oral mucosa  Cardiovascular: RRR, S1, S2, no murmurs, rubs, or gallops; no edema; no JVD  Respiratory: Clear to auscultation bilaterally  Gastrointestinal: soft, non-tender, non-distended with normal bowel sounds  Musculoskeletal: No clubbing; no joint deformity   Neurologic: Non-focal                          8.6    23.59 )-----------( 161      ( 20 Aug 2024 20:41 )             26.8     08-20    137  |  103  |  8   ----------------------------<  134<H>  4.2   |  22  |  1.12    Ca    8.5      20 Aug 2024 20:41  Phos  3.8     08-20  Mg     2.5     08-20    TPro  6.8  /  Alb  3.6  /  TBili  15.9<H>  /  DBili  x   /  AST  88<H>  /  ALT  38  /  AlkPhos  203<H>  08-20    PT/INR - ( 20 Aug 2024 06:07 )   PT: 22.5 sec;   INR: 2.19 ratio         PTT - ( 20 Aug 2024 06:07 )  PTT:45.8 sec        Cardiovascular Diagnostic Testing:  ECG:     Echo:  < from: TTE W or WO Ultrasound Enhancing Agent (08.14.24 @ 06:54) >  _______________________________________________________________________________________     CONCLUSIONS:      1. Technically difficult image quality.   2. Left ventricular cavity is normal in size. Left ventricular wall thickness is normal. Left ventricular systolic function is hyperdynamic with an ejection fraction of 73 % by Marroquin's method of disks. There are no regional wall motion abnormalities seen.   3. Moderately enlarged right ventricular cavity size and normal right ventricular systolic function.   4. Left atrium is mildly dilated.   5. No significant valvular disease.   6. No priorechocardiogram is available for comparison.    ________________________________________________________________________________________    < end of copied text >      Stress Testing:    Cath:    Interpretation of Telemetry:    Imaging:

## 2024-08-20 NOTE — PROGRESS NOTE ADULT - ASSESSMENT
68y with obesity and risky alcohol consumption (with decades' history of daily consumption of homemade alcohol), admitted to [facility] 1 month ago due to recent onset of jaundice and with a prolonged hospital and ICU course there due to newly diagnosed decompensated cirrhosis with acute on chronic liver failure (ACLF) with shock (resolved, but still on midodrine); FANY (on intermittent HD since 7/9); recurrent maroon stools and acute on chronic anemia necessitating intermittent PRBC transfusions (last on 8/8) and hypofibrinoginemia, with EGD (7/12) with small non-bleeding EV and a duodenal ulcer with a visible vessel; and waxing and waning hepatic encephalopathy. He was transferred to [facility] on 8/12/24 for evaluation for combined liver/kidney transplants (SLK).      [ ] Decompensated ETOH Cirrhosis  - SLK eval   - HE: cont rifaximin/miralax  - EV:  EGD (7/12) with small non-bleeding EV and a duodenal ulcer with a visible vessel.   - FANY/HRS: anuric, On CRRT 8/13, Renal following  - ID: Empiric Elise/fluc/ Doxy  - bcx 8/13 w/ MRSE , repeat 8/14 with NGTD  -Thiamine/MVI/FOLIC ACID/PPI  - Flex sig: friable mucosa, internal and external hemorrhoids  - LHC this week   - cont sicu care

## 2024-08-20 NOTE — PROGRESS NOTE ADULT - SUBJECTIVE AND OBJECTIVE BOX
Follow Up:  Leukocytosis    Interval History:  Afebrile, waxing & waning leukocytosis  lethargic    REVIEW OF SYSTEMS  [  ] ROS unobtainable because: confusion/altered mental status          Allergies  No Known Allergies        ANTIMICROBIALS:  doxycycline IVPB    doxycycline IVPB 100 every 12 hours  fluconAZOLE IVPB    fluconAZOLE IVPB 200 every 24 hours  meropenem  IVPB 1000 every 8 hours  meropenem  IVPB    rifAXIMin 550 two times a day      OTHER MEDS:  MEDICATIONS  (STANDING):  epoetin kareem (EPOGEN) Injectable 51324 every 7 days  insulin lispro (ADMELOG) corrective regimen sliding scale  every 6 hours  norepinephrine Infusion 0.07 <Continuous>  octreotide  Infusion 10 <Continuous>  pantoprazole  Injectable 40 two times a day  polyethylene glycol 3350 17 <User Schedule>  polyethylene glycol/electrolyte Solution. 1000 once  vasopressin Infusion 0.03 <Continuous>      Vital Signs Last 24 Hrs  T(C): 36.1 (20 Aug 2024 11:00), Max: 36.9 (20 Aug 2024 03:00)  T(F): 97 (20 Aug 2024 11:00), Max: 98.4 (20 Aug 2024 03:00)  HR: 66 (20 Aug 2024 12:30) (63 - 87)  BP: 119/56 (20 Aug 2024 07:00) (114/56 - 119/56)  BP(mean): 81 (20 Aug 2024 07:00) (80 - 81)  RR: 21 (20 Aug 2024 12:30) (10 - 28)  SpO2: 95% (20 Aug 2024 12:30) (88% - 100%)    Parameters below as of 20 Aug 2024 07:15  Patient On (Oxygen Delivery Method): room air        PHYSICAL EXAMINATION:  Constitutional: no acute distress  Eyes:CAT, EOMI  Ear/Nose/Throat: no oral lesions, 	  Respiratory: clear BL  Cardiovascular: S1S2  Gastrointestinal: soft, (+) BS, no tenderness, ascites  Extremities:no e/e/c  No Lymphadenopathy  IV sites not inflammed.                            7.5    17.34 )-----------( 144      ( 20 Aug 2024 12:26 )             22.0       08-20    136  |  105  |  10  ----------------------------<  156<H>  4.1   |  20<L>  |  1.14    Ca    8.4      20 Aug 2024 12:26  Phos  2.7     08-20  Mg     2.5     08-20    TPro  6.1  /  Alb  3.1<L>  /  TBili  14.0<H>  /  DBili  x   /  AST  80<H>  /  ALT  35  /  AlkPhos  187<H>  08-20      Urinalysis Basic - ( 20 Aug 2024 12:26 )    Color: x / Appearance: x / SG: x / pH: x  Gluc: 156 mg/dL / Ketone: x  / Bili: x / Urobili: x   Blood: x / Protein: x / Nitrite: x   Leuk Esterase: x / RBC: x / WBC x   Sq Epi: x / Non Sq Epi: x / Bacteria: x        MICROBIOLOGY:  v  Peritoneal Peritoneal Fluid  08-14-24   No growth at 5 days  --    polymorphonuclear leukocytes seen  No organisms seen  by cytocentrifuge      .Blood Blood-Venous  08-14-24   No growth at 5 days  --  --      .Blood Blood-Venous  08-14-24   No growth at 5 days  --  --      .Blood Blood-Peripheral  08-14-24   No growth at 5 days  --  --      .Blood Blood  08-12-24   No growth at 5 days  --  --      .Blood Blood  08-12-24   Growth in aerobic bottle: Staphylococcus epidermidis Isolation of  Coagulase negative Staphylococcus from single blood culture sets may  represent  contamination. Contact the Microbiology Department at [phone] if  susceptibility testing is  clinically indicated.  Direct identification is available within approximately 3-5  hours either by Blood Panel Multiplexed PCR or Direct  MALDI-TOF. Details: [email or web address]  --  Blood Culture PCR        Toxoplasma IgG Screen: 44.00 IU/mL (08-12-24 @ 21:15)  CMV IgG Antibody: >10.00 U/mL (08-12-24 @ 21:15)    Rapid RVP Result: NotDetec (08-20 @ 00:06)  CMVPCR Log: 3.67 Ggc10MO/mL (08-16 @ 00:37)        RADIOLOGY:    <The imaging below has been reviewed and visualized by me independently. Findings as detailed in report below>      < from: Xray Chest 1 View- PORTABLE-Routine (Xray Chest 1 View- PORTABLE-Routine in AM.) (08.19.24 @ 08:07) >  INTERPRETATION:  TECHNIQUE: A single AP view of the chest was obtained.   Ordered time:   8/18/2024 9:15 AM    COMPARISON: 8/17/2024    CLINICAL INFORMATION: Evaluate atelectasis. Liver failure.    FINDINGS:    8/18/2024 8:29 AM:  Right IJ catheter seen with its tip in the SVC. A right-sided PICC line   is seen with its tip at the SVC/RI junction.  The heart is not well assessed on an AP film.  There are low lung volumes.  Patchy bilateral opacities are unchanged from the prior study.  There is no definite pleural effusions.  There is no pneumothorax.    8/19/2024 5:58 AM:  There is no significant change.    IMPRESSION:    Low lung lines with bilateral patchy opacities, unchanged.  Tubes and lines as above.    --- End of Report ---        < end of copied text >

## 2024-08-20 NOTE — PROGRESS NOTE ADULT - NS ATTEND AMEND GEN_ALL_CORE FT
Patient seen and examined    Case discussed with ZAIN Raya    I agree with her interval history exam and plans as noted above    Patient with waxing and waning mental status  remains with abdominal ascites on exam  would consider repeat paracentesis    Herbie Rashid MD  Can be called via Teams  After 5pm/weekends [phone]

## 2024-08-20 NOTE — PROGRESS NOTE ADULT - ASSESSMENT
67 yo M with obesity and risky alcohol consumption (with decades' history of daily consumption of homemade alcohol). Admitted to [facility] for 1 month due to recent onset of jaundice and with a prolonged hospital/ICU course due to newly diagnosed decompensated cirrhosis with acute on chronic liver failure (ACLF) with shock, FANY (started on intermittent HD since 7/9), acute on chronic anemia with recurrent overt GI bleeding, and hepatic encephalopathy.     Transferred to [facility] on 8/12/24 for SLK evaluation and then transferred to the SICU for initiation of CRRT (8/13- ).      # Distributive shock    - From ACLF and septic shock.   - Afebrile, on room air  - Norepinephrine 0.13, Vasopressin 0.03 and Midodrine   - Infectious work-up has been negative apart from CMV viremia and unequivocal Lyme PCR (started on Doxy).   - CMV PCR low viremia (8/12 and 8/16) ID would like to repeat PCR on 8/23. Hold off on Valcyte at this time.    - S/p Zosyn (8/12-16)   - On Meropenem (8/16- ), Doxycycline (8/18- ) and empiric fluconazole (8/12- ) with transplant ID following.     # Anuric FANY on CKD    - Started intermittent HD (7/9-8/12) - Now on CRRT (8/13- )   - Marked anasarca. Fluid removal on CRRT limited due to hypotension.   - Anuric on net even CRRT. Circuit clotting multiple times within past 24hr. Dialysate flow rate adjusted for clotting (8/19)     - Transplant nephrology following.    # Active UGIB   - EGD (7/12, at [facility]) with small non-bleeding EV and a duodenal ulcer with a visible vessel.   - No further overt GI bleeding. As needed PRBC and TEG-guided blood products.   - Pantoprazole 40mg IV q12h and prophylaxis with Coreg 3.125mg.      # Newly diagnosed decompensated cirrhosis with ACLF   - Infection: as above   - HE: Waxing and waning on Rifaximin/Miralax. Lactulose held for abdominal distention.    - Agitation/Insomnia: Seroquel 25mg or Haldol 2.5mg at night. Transplant Psych following.    - Large volume ascites and anasarca: Anuric. Limited fluid removal with CRRT. LVP (8/14) negative for SBP.   - Image: Contrast CT (8/13) with patent portohepatic vessels and no evidence of HCC. Small infarcts in left hepatic lobe and spleen.       - Nutrition: Poor po diet supplemented with NGT feeds. Nutrition following.    - PT: Last ambulatory on July/2024. Daily inpatient PT/OT.     # SLK ongoing evaluation (8/12)  - ABO O. MELD 3.0 score of 38  - Pending: [facility] for cardiology clearance, to be performed this week.   - Met SLK criteria in Aug 20th. Tissue typing completed.     PLAN   - Golytely bowel prep if not having BM and worsening encephalopathy   - Avoid Precedex and sedative agents. Stop Melatonin.  - Management of agitation/insomnia as per  recommendations.    - Repeat full sepsis work up: BCx, UA/Cx, CXR and theraputic/Dg paracentesis/Cx.  - Repeat CMV PCR 8/23  - Possible LHC this week with Dr. Jang       Note incomplete until finalized by attending signature/attestation.    Myra Maloney   Transplant Hepatology Fellow

## 2024-08-20 NOTE — AIRWAY PLACEMENT NOTE ADULT - POST AIRWAY PLACEMENT ASSESSMENT:
breath sounds bilateral/breath sounds equal/positive end tidal CO2 noted/CXR pending
breath sounds bilateral/breath sounds equal/positive end tidal CO2 noted/CXR pending

## 2024-08-20 NOTE — PROGRESS NOTE ADULT - ASSESSMENT
78 yo m with alcohol abuse otherwise no known chronic medical issues (does not see doctors per sister) s/p 1 month stay at [facility] now transferred to [facility] for liver transplant eval.  Most of history obtained from sister (Ashlyn) at bedside and some from transfer paperwork. Per sister, pt was initially brought to the hospital by family for back pain and jaundice for unclear amount of time. Patient was seen by GI and underwent EGD soon after admission which only showed nonbleeding ?duodenal ulcers (no intervention) however few days later pt developed active signs of bleeding and emergently underwent EGD again showing bleeding esophageal varices which were clipped.  pt was electively intubated with stay in ICU thereafter. Patient then started with HD due to worsening renal function and MS for past 2.5 weeks at times limited by low BP requiring pressors to assist. Had numerous paracentesis with varying amount of fluid removal - albumin infusions. Sister not aware of any concerns for acute infection during his stay but aware that pt was on abx at some point due to bleeding issues.        PERTINENT RADIOLOGY:  CXR: Tubes and lines as above. No focal consolidations  CT Chest, A&P:  Patchy ground-glass opacities in the bilateral upper lobes. *  Cirrhosis with evidence of portal hypertension. *  Hypodense lesion in the periphery of the left hepatic lobe of uncertain etiology. Somewhat wedge-shaped morphology raises the possibility for a small infarct. Question subtle peripheral nodular enhancement, and a hemangioma is also considered. Contrast enhanced abdominal MRI can be performed for further characterization and to assess for other possibilities. *  A wedge-shaped region of hypoattenuation in the spleen may reflect a small infarct. *  Focal eccentric wall thickening in the low rectum, possibly due to a mural fold. Consider colonoscopy. *  Large volume ascites.  CT Head: No evidence of acute intracranial pathology. If clinical symptoms persist or worsen, more sensitive evaluation with brain MRI may be obtained, if no contraindications exist.    BCx (8/12) 1/4 MRSE  BCx (8/14) NGTD  Paracentesis (8/14) Cell Counts Negative for SBP  MRSA/MSSA Nasal PCR (8/16) Negative    CXR (8/19) Ground Glass infiltrates persist  CT from 8//13 with bilateral Ground glass infiltrates    # Persistent Ground glass infiltrates of unclear etiology  please send urine legionella ag  please send deep sputum for testing for gram stain/cx, fungal stain/cx    #Decompensated liver cirrhosis, Leukocytosis, Shock  --Continue Meropenem  --Can continue Empiric fluconazole  --Continue to follow CBC with diff  --Continue to follow transaminases  --Continue to follow temperature curve  --Follow up on  blood cultures 8/14 x3 sets are no growth    #Positive BCx (8/12) (Staph epi)  Consistent with contaminant       #CMV PCR   --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 Would repeat CMV PCR on 8/19   -- may need to treat if increasing  Cytomegalovirus By PCR: 4730 IU/mL (08.16.24 @ 00:37)        #Pre Transplant Evaluation   HIV-1/2 Combo Result: Nonreactive  EBVPCR Log: NotDetec Nnh83DX/mL   Hepatitis A IgG Ab Result: Reactive   Hepatitis B Core Antibody, Total: Nonreactive  Hepatitis B Surface Antibody: Reactive   Hepatitis B DNA PCR Log: Not Detected  Hepatitis B Surface Antigen: Nonreactive  Hepatitis C Virus Interpretation: Nonreactive  COVID-19 De Domain Antibody: Positive  Treponema Pallidum Antibody Interpretation: Negative  HSV 1 IgG  Positive/HSV 2 IgG Negative  EBV Serology Positive  CMV IgG Positive  VZV IgG Positive  Measles Positive, Mumps Positive and Rubella IgG Positive  Toxoplasma IgG Positive  QuantiFeron Gold Negative  Strongyloides Ab Negative  Babesia PCR negative  --Follow up on Schistosoma IgG  --Reportedly Lyme serology was previously positive and remains positive, follow up on Tick Diseases Panel is negative for additional tick born exposures  -- Continue doxycycline 100mg bid as prior therapy was not given      #Encounter to Vaccinate Patient - Will defer vaccination in context of critical illness  COVID19: No primary series. Would benefit from COVID19 [id number] dose  Influenza: Would benefit from dose next season  Pneumococcal: Would benefit from PCV20  HAV: Immune, no additional vaccines indicated  HBV: Immune, no additional vaccines indicated  MMR: Immune, will not require further vaccination  Varicella: Immune, will not require further vaccination  Shingles: Would benefit from Shingrix  Tdap: Would benefit from Tdap

## 2024-08-20 NOTE — PROGRESS NOTE ADULT - SUBJECTIVE AND OBJECTIVE BOX
Interval Events:   - Precedex for agitation overnight.   - Worsening encephalopathy. Non verbal today.   - Afebrile, on room air  - Norepinephrine 0.13 and Vasopressin 0.03  - Oliguric on net even CRRT  - S/p 1Cryo and 1PLT  - 1 BM in past 24h. Bowel regimen held due to NPO yesterday  - S/p flex sig: friable mucosa, internal and external hemorrhoids       ROS:   Unable to obtain due to AMS.     Hospital Medications:  chlorhexidine 2% Cloths 1 Application(s) Topical <User Schedule>  CRRT Treatment    <Continuous>  doxycycline IVPB      doxycycline IVPB 100 milliGRAM(s) IV Intermittent every 12 hours  epoetin kareem (EPOGEN) Injectable 19125 Unit(s) SubCutaneous every 7 days  fluconAZOLE IVPB      fluconAZOLE IVPB 200 milliGRAM(s) IV Intermittent every 24 hours  folic acid 1 milliGRAM(s) Oral daily  insulin lispro (ADMELOG) corrective regimen sliding scale   SubCutaneous every 6 hours  lidocaine   4% Patch 1 Patch Transdermal daily  meropenem  IVPB 1000 milliGRAM(s) IV Intermittent every 8 hours  meropenem  IVPB      multivitamin 1 Tablet(s) Oral daily  norepinephrine Infusion 0.07 MICROgram(s)/kG/Min (15 mL/Hr) IV Continuous <Continuous>  octreotide  Infusion 10 MICROgram(s)/Hr (2 mL/Hr) IV Continuous <Continuous>  pantoprazole  Injectable 40 milliGRAM(s) IV Push two times a day  Phoxillum Filtration BK 4 / 2.5 5000 milliLiter(s) (1000 mL/Hr) CRRT <Continuous>  Phoxillum Filtration BK 4 / 2.5 5000 milliLiter(s) (200 mL/Hr) CRRT <Continuous>  polyethylene glycol 3350 17 Gram(s) Oral <User Schedule>  PrismaSOL Filtration BGK 4 / 2.5 5000 milliLiter(s) (1500 mL/Hr) CRRT <Continuous>  rifAXIMin 550 milliGRAM(s) Oral two times a day  thiamine 100 milliGRAM(s) Oral daily  vasopressin Infusion 0.03 Unit(s)/Min (4.5 mL/Hr) IV Continuous <Continuous>    MAR over past 24 hours:    chlorhexidine 2% Cloths   1 Application(s) Topical (24 @ 05:09)    dexMEDEtomidine Infusion   5.7 mL/Hr IV Continuous (24 @ 22:17)    doxycycline IVPB   100 mL/Hr IV Intermittent (24 @ 05:08)   100 mL/Hr IV Intermittent (24 @ 17:23)    epoetin kareem (EPOGEN) Injectable   65395 Unit(s) SubCutaneous (24 @ 13:41)    fluconAZOLE IVPB   100 mL/Hr IV Intermittent (24 @ 19:58)    folic acid   1 milliGRAM(s) Oral (24 @ 11:58)    insulin lispro (ADMELOG) corrective regimen sliding scale   2 Unit(s) SubCutaneous (24 @ 12:19)    ketamine Injectable   50 milliGRAM(s) IV Push (24 @ 15:00)    ketamine Injectable   50 milliGRAM(s) IV Push (24 @ 15:08)    lidocaine   4% Patch   1 Patch Transdermal (24 @ 11:58)    magnesium sulfate  IVPB   50 mL/Hr IV Intermittent (24 @ 21:02)    melatonin   5 milliGRAM(s) Oral (24 @ 22:09)    meropenem  IVPB   100 mL/Hr IV Intermittent (24 @ 13:06)   100 mL/Hr IV Intermittent (24 @ 05:08)   100 mL/Hr IV Intermittent (24 @ 22:08)    multivitamin   1 Tablet(s) Oral (24 @ 11:58)    norepinephrine Infusion   15 mL/Hr IV Continuous (24 @ 19:59)    octreotide  Infusion   2 mL/Hr IV Continuous (24 @ 11:50)    pantoprazole  Injectable   40 milliGRAM(s) IV Push (24 @ 05:08)   40 milliGRAM(s) IV Push (24 @ 17:12)    Phoxillum Filtration BK 4 / 2.5   200 mL/Hr CRRT (24 @ 10:48)    Phoxillum Filtration BK 4 / 2.5   200 mL/Hr CRRT (24 @ 20:00)    Phoxillum Filtration BK 4 / 2.5   1000 mL/Hr CRRT (24 @ 20:00)    Phoxillum Filtration BK 4 / 2.5   1000 mL/Hr CRRT (24 @ 10:48)    polyethylene glycol 3350   17 Gram(s) Oral (24 @ 11:59)   17 Gram(s) Oral (24 @ 05:10)   17 Gram(s) Oral (24 @ 23:01)    polyethylene glycol/electrolyte Solution.   1000 milliLiter(s) Enteral Tube (24 @ 13:34)    PrismaSOL Filtration BGK 4 / 2.5   1500 mL/Hr CRRT (24 @ 20:00)    PrismaSOL Filtration BGK 4 / 2.5   1500 mL/Hr CRRT (24 @ 10:48)    rifAXIMin   550 milliGRAM(s) Oral (24 @ 05:08)    sodium phosphate 15 milliMole(s)/250 mL IVPB   255 mL/Hr IV Intermittent (24 @ 04:23)    thiamine   100 milliGRAM(s) Oral (24 @ 11:58)    vasopressin Infusion   4.5 mL/Hr IV Continuous (24 @ 19:59)      PHYSICAL EXAM:   Vital Signs last 24 hours:  T(F): 97 (24 @ 11:00), Max: 98.4 (24 @ 03:00)  HR: 66 (24 @ 14:45) (64 - 87)  BP: 119/56 (24 @ 07:00) (114/56 - 119/56)  BP(mean): 81 (24 @ 07:00) (80 - 81)  ABP: 120/44 (24 @ 14:45) (99/38 - 135/54)  ABP(mean): 66 (24 @ 14:45) (52 - 78)  RR: 11 (24 @ 14:45) (10 - 28)  SpO2: 97% (24 @ 14:45) (88% - 100%)    I&Os:    24 @ 07:01  -  24 @ 07:00  --------------------------------------------------------  IN:    Cryoprecipitate: 75 mL    Dexmedetomidine: 31.5 mL    Enteral Tube Flush: 280 mL    IV PiggyBack: 100 mL    IV PiggyBack: 800 mL    Miscellaneous Tube Feedin mL    Norepinephrine: 440.3 mL    Platelets - Single Donor: 450 mL    Vasopressin: 108 mL  Total IN: 2804.8 mL    OUT:    Other (mL): 3146 mL  Total OUT: 3146 mL    Total NET: -341.2 mL      24 @ 07:01  -  24 @ 14:49  --------------------------------------------------------  IN:    Enteral Tube Flush: 630 mL    IV PiggyBack: 50 mL    Norepinephrine: 188.2 mL    Octreotide: 4 mL    Platelets - Single Donor: 225 mL    PRBCs (Packed Red Blood Cells): 300 mL    Vasopressin: 31.5 mL  Total IN: 1428.7 mL    OUT:    Miscellaneous Tube Feedin mL    Other (mL): 612 mL  Total OUT: 612 mL    Total NET: 816.7 mL    BMI (kg/m2): 33.2 (24 @ 16:46)  GENERAL: obese man, physically deconditioned.   NEURO: Mental status waxes and wanes. worsening encephalopathy today. Non verbal    HEENT: Scleral icterus  CHEST: Bilateral breath sounds, decreased in R base. No respiratory distress.   CARDIAC: Regular rate and rhythm  ABDOMEN: Soft, non-tender, distended. Present bowel sounds. +anasarca  EXTREMITIES: warm, well perfused, BLE pitting edema up to thighs    SKIN: +Jaundiced, no rash/ecchymoses      DIAGNOSTICS:  WBC      Hg       PLT      Na       K        CO2     BUN      Cr       ALT      AST      TB       ALP  17.34    7.5      144      136      4.1      20       10       1.14     35       80       14.0     187      24 @ 12:26  17.43    7.2      139      136      4.0      20       10       1.19     33       82       15.3     180      24 @ 06:07  ------   ------   ------   136      4.0      21       10       1.14     31       74       15.2     166      24 @ 00:08  15.69    7.4      107      135      4.1      19       10       1.17     28       68       14.8     159      24 @ 17:41  15.80    7.7      102      134      4.1      20       11       1.14     30       64       15.9     166      24 @ 11:28    PT             INR            MELDwith  MELDw/o  22.5           2.19           --             --             24 @ 06:07  31.2           3.07           --             --             24 @ 00:58  32.0           3.00           --             --             24 @ 00:18  34.1           3.36           --             --             24 @ 04:17  33.9           3.34           --             --             24 @ 18:06

## 2024-08-20 NOTE — PROGRESS NOTE ADULT - SUBJECTIVE AND OBJECTIVE BOX
24 HOUR EVENTS:  - precedex for agitation  - NGT removed by pt, replaced, confirmed placement  - f/u repeat CMV labs, sputum cx, RVP  - f/u nephrology in AM re: circuit clotting multiple times within past 24hr    NEURO    Exam: awake, alert, disoriented  Meds: dexMEDEtomidine Infusion 0.2 MICROgram(s)/kG/Hr IV Continuous <Continuous>  melatonin 5 milliGRAM(s) Oral at bedtime    [x] Adequacy of sedation and pain control has been assessed and adjusted      RESPIRATORY  RR: 11 (24 @ 00:10) (10 - 28)  SpO2: 94% (24 @ 00:10) (91% - 100%)  Wt(kg): --  Exam: unlabored, clear to auscultation bilaterally  ABG - ( 19 Aug 2024 00:27 )  pH: 7.44  /  pCO2: 33    /  pO2: 94    / HCO3: 22    / Base Excess: -1.5  /  SaO2: 99.5    Lactate: x        [N/A] Extubation Readiness Assessed  Meds:       CARDIOVASCULAR  HR: 68 (24 @ 00:10) (60 - 74)  BP: 114/56 (24 @ 20:15) (114/56 - 114/56)  BP(mean): 80 (24 @ 20:15) (80 - 80)  ABP: 107/39 (24 @ 00:10) (100/42 - 131/51)  ABP(mean): 58 (24 @ 00:10) (58 - 78)  Wt(kg): --  CVP(cm H2O): --      Exam: regular rate and rhythm  Cardiac Rhythm: sinus  Perfusion     [x]Adequate   [ ]Inadequate  Mentation   [x]Normal       [ ]Reduced  Extremities  [x]Warm         [ ]Cool  Volume Status [ ]Hypervolemic [x]Euvolemic [ ]Hypovolemic  Meds: norepinephrine Infusion 0.07 MICROgram(s)/kG/Min IV Continuous <Continuous>        GI/NUTRITION  Exam: firm, distended, tympanic   Diet: regular diet  Meds: pantoprazole  Injectable 40 milliGRAM(s) IV Push two times a day  polyethylene glycol 3350 17 Gram(s) Oral <User Schedule>      GENITOURINARY  I&O's Detail    - @ 07:01  -  - @ 07:00  --------------------------------------------------------  IN:    IV PiggyBack: 450 mL    Norepinephrine: 333.6 mL    Oral Fluid: 900 mL    Vasopressin: 108 mL  Total IN: 1791.6 mL    OUT:    Other (mL): 2411 mL  Total OUT: 2411 mL    Total NET: -619.4 mL       @ 07:01  -   @ 00:44  --------------------------------------------------------  IN:    Cryoprecipitate: 75 mL    Dexmedetomidine: 14.3 mL    Enteral Tube Flush: 160 mL    IV PiggyBack: 50 mL    IV PiggyBack: 450 mL    Miscellaneous Tube Feedin mL    Norepinephrine: 241.5 mL    Platelets - Single Donor: 225 mL    Vasopressin: 76.5 mL  Total IN: 1487.3 mL    OUT:    Other (mL): 1260 mL  Total OUT: 1260 mL    Total NET: 227.3 mL              136  |  102  |  10  ----------------------------<  139<H>  4.0   |  21<L>  |  1.14    Ca    9.0      20 Aug 2024 00:08  Phos  2.8     08  Mg     2.8         TPro  6.1  /  Alb  3.5  /  TBili  15.2<H>  /  DBili  x   /  AST  74<H>  /  ALT  31  /  AlkPhos  166<H>      [ ] Elizabeth catheter, indication: N/A  Meds: folic acid 1 milliGRAM(s) Oral daily  multivitamin 1 Tablet(s) Oral daily  thiamine 100 milliGRAM(s) Oral daily        HEMATOLOGIC  Meds:   [x] VTE Prophylaxis                        7.4    15.69 )-----------( 107      ( 19 Aug 2024 17:41 )             21.7     PT/INR - ( 19 Aug 2024 00:58 )   PT: 31.2 sec;   INR: 3.07 ratio         PTT - ( 19 Aug 2024 00:58 )  PTT:63.3 sec  Transfusion     [ ] PRBC   [ ] Platelets   [ ] FFP   [ ] Cryoprecipitate      INFECTIOUS DISEASES  WBC Count: 15.69 K/uL ( @ 17:41)  WBC Count: 15.80 K/uL ( @ 11:28)  WBC Count: 14.27 K/uL ( @ 05:28)  WBC Count: 15.48 K/uL ( @ 00:58)    RECENT CULTURES:    Meds: doxycycline IVPB      doxycycline IVPB 100 milliGRAM(s) IV Intermittent every 12 hours  fluconAZOLE IVPB      fluconAZOLE IVPB 200 milliGRAM(s) IV Intermittent every 24 hours  meropenem  IVPB      meropenem  IVPB 1000 milliGRAM(s) IV Intermittent every 8 hours  rifAXIMin 550 milliGRAM(s) Oral two times a day        ENDOCRINE  CAPILLARY BLOOD GLUCOSE        Meds: vasopressin Infusion 0.03 Unit(s)/Min IV Continuous <Continuous>        ACCESS DEVICES:  [ ] Peripheral IV  [ ] Central Venous Line	[ ] R	[ ] L	[ ] IJ	[ ] Fem	[ ] SC	Placed:   [ ] Arterial Line		[ ] R	[ ] L	[ ] Fem	[ ] Rad	[ ] Ax	Placed:   [ ] PICC:					[ ] Mediport  [ ] Urinary Catheter, Date Placed:   [x] Necessity of urinary, arterial, and venous catheters discussed    OTHER MEDICATIONS:  chlorhexidine 2% Cloths 1 Application(s) Topical <User Schedule>  CRRT Treatment    <Continuous>  lidocaine   4% Patch 1 Patch Transdermal daily  Phoxillum Filtration BK 4 / 2.5 5000 milliLiter(s) CRRT <Continuous>  Phoxillum Filtration BK 4 / 2.5 5000 milliLiter(s) CRRT <Continuous>  PrismaSOL Filtration BGK 4 / 2.5 5000 milliLiter(s) CRRT <Continuous>      CODE STATUS: FULL CODE

## 2024-08-21 NOTE — PROGRESS NOTE ADULT - CRITICAL CARE ATTENDING COMMENT
Dr. Price (Attending Physician)  N - not sedated, follows, ammonia 91, miralax, rifaxamin, will need sedation for colonoscopy  P - intubated yesterday for endoscopy  C - NE 0.13, Vaso 0.03  GI - will give another 1 liter of go-lytely    - CVVHD keeps clotting circuit, dw nephrology  H - no tx indicated today, will monitor for bloody output  ID - [id number], will change lines today, persistent leukocytosis  E - stress dose steroids overnight because of increased vasopressor requirement  PPx -   Lines - change lines   Dispo - Dr. Price (Attending Physician)  N - not sedated, follows, ammonia 91, miralax, rifaxamin, will need sedation for colonoscopy  P - intubated yesterday for endoscopy  C - NE 0.13, Vaso 0.03 for vasoplegic shock  GI - will give another 1 liter of go-lytely, can stop octreotide   - CVVHD keeps clotting circuit, dw nephrology  H - no tx indicated today, will monitor for bloody output  ID - [id number], will change lines today, persistent leukocytosis  E - stress dose steroids overnight because of increased vasopressor requirement  PPx - ppi bid  Lines - change lines   Dispo - full code needs cardiac cath before listing for liver tx

## 2024-08-21 NOTE — PROGRESS NOTE ADULT - SUBJECTIVE AND OBJECTIVE BOX
24 HOUR EVENTS:  - s/p 2U prbc, 2U plt  - EPO 10,000U weekly started  - LHC was scheduled for , ultimately deferred as pt clinically unwell, will p/f when improves  - Intubated for endoscopy iso Hgb drop, noted to be WNL; started octreotide; colonoscopy today  scheduled  - s/p straight cath, f/u urine legionella  - ? +CMV, appreciate ID recs  - stress dose steroids for inc pressor requirements    NEURO  RASS:     GCS:     CAM ICU:  Exam: A&O 0, lethargic  Meds: dexMEDEtomidine Infusion 0.5 MICROgram(s)/kG/Hr IV Continuous <Continuous>  HYDROmorphone  Injectable 0.5 milliGRAM(s) IV Push every 3 hours PRN Moderate Pain (4 - 6)    [x] Adequacy of sedation and pain control has been assessed and adjusted      RESPIRATORY  RR: 21 (24 @ 00:15) (10 - 26)  SpO2: 100% (24 @ 00:15) (88% - 100%)  Wt(kg): --  Exam: unlabored, clear to auscultation bilaterally  Mechanical Ventilation: Mode: AC/ CMV (Assist Control/ Continuous Mandatory Ventilation), RR (machine): 20, RR (patient): 20, TV (machine): 480, FiO2: 40, PEEP: 5, ITime: 1, MAP: 8.3, PIP: 17  ABG - ( 20 Aug 2024 21:51 )  pH: 7.31  /  pCO2: 45    /  pO2: 122   / HCO3: 23    / Base Excess: -3.4  /  SaO2: 100.0   Lactate: x                [N/A] Extubation Readiness Assessed  Meds:       CARDIOVASCULAR  HR: 65 (24 @ 00:15) (64 - 133)  BP: 112/56 (24 @ 21:00) (112/56 - 119/56)  BP(mean): 78 (24 @ 21:00) (78 - 81)  ABP: 111/44 (24 @ 00:15) (87/38 - 137/65)  ABP(mean): 66 (24 @ 00:15) (52 - 95)  Wt(kg): --  CVP(cm H2O): --      Exam: regular rate and rhythm  Cardiac Rhythm: sinus  Perfusion     [x]Adequate   [ ]Inadequate  Mentation   [x]Normal       [ ]Reduced  Extremities  [x]Warm         [ ]Cool  Volume Status [ ]Hypervolemic [x]Euvolemic [ ]Hypovolemic  Meds: norepinephrine Infusion 0.35 MICROgram(s)/kG/Min IV Continuous <Continuous>        GI/NUTRITION  Exam: soft, nontender, nondistended, incision C/D/I  Diet:  Meds: pantoprazole  Injectable 40 milliGRAM(s) IV Push two times a day  polyethylene glycol 3350 17 Gram(s) Oral <User Schedule>      GENITOURINARY  I&O's Detail     @ 07: @ 07:00  --------------------------------------------------------  IN:    Cryoprecipitate: 75 mL    Dexmedetomidine: 31.5 mL    Enteral Tube Flush: 280 mL    IV PiggyBack: 100 mL    IV PiggyBack: 800 mL    Miscellaneous Tube Feedin mL    Norepinephrine: 440.3 mL    Platelets - Single Donor: 450 mL    Vasopressin: 108 mL  Total IN: 2804.8 mL    OUT:    Other (mL): 3146 mL  Total OUT: 3146 mL    Total NET: -341.2 mL       @ 07: @ 00:36  --------------------------------------------------------  IN:    Dexmedetomidine: 42.8 mL    Enteral Tube Flush: 1330 mL    IV PiggyBack: 200 mL    Norepinephrine: 419.6 mL    Norepinephrine: 153.9 mL    Octreotide: 14 mL    Octreotide: 50 mL    Platelets - Single Donor: 225 mL    PRBCs (Packed Red Blood Cells): 600 mL    Vasopressin: 76.5 mL  Total IN: 3111.8 mL    OUT:    Miscellaneous Tube Feedin mL    Other (mL): 2542 mL  Total OUT: 2542 mL    Total NET: 569.8 mL              137  |  103  |  8   ----------------------------<  134<H>  4.2   |  22  |  1.12    Ca    8.5      20 Aug 2024 20:41  Phos  3.8     08  Mg     2.5         TPro  6.8  /  Alb  3.6  /  TBili  15.9<H>  /  DBili  x   /  AST  88<H>  /  ALT  38  /  AlkPhos  203<H>      [ ] Elizabeth catheter, indication: N/A  Meds: folic acid 1 milliGRAM(s) Oral daily  multivitamin 1 Tablet(s) Oral daily  thiamine 100 milliGRAM(s) Oral daily        HEMATOLOGIC  Meds:   [x] VTE Prophylaxis                        8.6    23.59 )-----------( 161      ( 20 Aug 2024 20:41 )             26.8     PT/INR - ( 20 Aug 2024 06:07 )   PT: 22.5 sec;   INR: 2.19 ratio         PTT - ( 20 Aug 2024 06:07 )  PTT:45.8 sec  Transfusion     [ ] PRBC   [ ] Platelets   [ ] FFP   [ ] Cryoprecipitate      INFECTIOUS DISEASES  WBC Count: 23.59 K/uL ( @ 20:41)  WBC Count: 16.77 K/uL ( @ 16:47)  WBC Count: 17.34 K/uL ( @ 12:26)  WBC Count: 17.43 K/uL ( @ 06:07)    RECENT CULTURES:    Meds: doxycycline IVPB      doxycycline IVPB 100 milliGRAM(s) IV Intermittent every 12 hours  epoetin kareem (EPOGEN) Injectable 45970 Unit(s) SubCutaneous every 7 days  fluconAZOLE IVPB 200 milliGRAM(s) IV Intermittent every 24 hours  fluconAZOLE IVPB      meropenem  IVPB      meropenem  IVPB 1000 milliGRAM(s) IV Intermittent every 8 hours  rifAXIMin 550 milliGRAM(s) Oral two times a day        ENDOCRINE  CAPILLARY BLOOD GLUCOSE      POCT Blood Glucose.: 152 mg/dL (20 Aug 2024 12:01)    Meds: hydrocortisone sodium succinate Injectable 50 milliGRAM(s) IV Push every 6 hours  insulin lispro (ADMELOG) corrective regimen sliding scale   SubCutaneous every 6 hours  octreotide  Infusion 50 MICROgram(s)/Hr IV Continuous <Continuous>  vasopressin Infusion 0.03 Unit(s)/Min IV Continuous <Continuous>        ACCESS DEVICES:  [ ] Peripheral IV  [ ] Central Venous Line	[ ] R	[ ] L	[ ] IJ	[ ] Fem	[ ] SC	Placed:   [ ] Arterial Line		[ ] R	[ ] L	[ ] Fem	[ ] Rad	[ ] Ax	Placed:   [ ] PICC:					[ ] Mediport  [ ] Urinary Catheter, Date Placed:   [x] Necessity of urinary, arterial, and venous catheters discussed    OTHER MEDICATIONS:  chlorhexidine 0.12% Liquid 15 milliLiter(s) Oral Mucosa every 12 hours  chlorhexidine 2% Cloths 1 Application(s) Topical <User Schedule>  CRRT Treatment    <Continuous>  lidocaine   4% Patch 1 Patch Transdermal daily  Phoxillum Filtration BK 4 / 2.5 5000 milliLiter(s) CRRT <Continuous>  Phoxillum Filtration BK 4 / 2.5 5000 milliLiter(s) CRRT <Continuous>  PrismaSOL Filtration BGK 4 / 2.5 5000 milliLiter(s) CRRT <Continuous>      CODE STATUS: FULL CODE

## 2024-08-21 NOTE — PROGRESS NOTE ADULT - ASSESSMENT
68y with obesity and risky alcohol consumption (with decades' history of daily consumption of homemade alcohol), admitted to [facility] 1 month ago due to recent onset of jaundice and with a prolonged hospital and ICU course there due to newly diagnosed decompensated cirrhosis with acute on chronic liver failure (ACLF) with shock (resolved, but still on midodrine); FANY (on intermittent HD since 7/9); recurrent maroon stools and acute on chronic anemia necessitating intermittent PRBC transfusions (last on 8/8) and hypofibrinoginemia, with EGD (7/12) with small non-bleeding EV and a duodenal ulcer with a visible vessel; and waxing and waning hepatic encephalopathy. He was transferred to [facility] on 8/12/24 for evaluation for combined liver/kidney transplants (SLK).      [ ] Decompensated ETOH Cirrhosis  - SLK eval   - Intubated  - HE: cont rifaximin/miralax  - EV:  EGD (7/12) with small non-bleeding EV and a duodenal ulcer with a visible vessel. EGD 8/20: no active bleeding  - FANY/HRS: anuric, On CRRT 8/13, Renal following  - ID: Empiric Elise/fluc/ Doxy  - bcx 8/13 w/ MRSE , repeat 8/14 with NGTD  -Thiamine/MVI/FOLIC ACID/PPI  - Flex sig: friable mucosa, internal and external hemorrhoids  - LHC deferred for now  - cont sicu care        68y with obesity and risky alcohol consumption (with decades' history of daily consumption of homemade alcohol), admitted to [facility] 1 month ago due to recent onset of jaundice and with a prolonged hospital and ICU course there due to newly diagnosed decompensated cirrhosis with acute on chronic liver failure (ACLF) with shock (resolved, but still on midodrine); FANY (on intermittent HD since 7/9); recurrent maroon stools and acute on chronic anemia necessitating intermittent PRBC transfusions (last on 8/8) and hypofibrinoginemia, with EGD (7/12) with small non-bleeding EV and a duodenal ulcer with a visible vessel; and waxing and waning hepatic encephalopathy. He was transferred to [facility] on 8/12/24 for evaluation for combined liver/kidney transplants (SLK).      [ ] Decompensated ETOH Cirrhosis  - SLK eval , MELD 38 O  - Intubated  - HE: cont rifaximin/miralax  - EV:  EGD (7/12) with small non-bleeding EV and a duodenal ulcer with a visible vessel. EGD 8/20: no active bleeding, PPI.  - plan for colonoscopy today  - stress dose steroids per SICU, come off pressors as able  - FANY/HRS: anuric, On CRRT 8/13, Renal following  - ID: Empiric Elise/fluc/ Doxy  - bcx 8/13 w/ MRSE , repeat 8/14 with NGTD  - Thiamine/MVI/FOLIC ACID  - Flex sig: friable mucosa, internal and external hemorrhoids  - LHC deferred for today, will re-address daily once stabilized  - cont sicu care        68y with obesity and risky alcohol consumption (with decades' history of daily consumption of homemade alcohol), admitted to  1 month ago due to recent onset of jaundice and with a prolonged hospital and ICU course there due to newly diagnosed decompensated cirrhosis with acute on chronic liver failure (ACLF) with shock (resolved, but still on midodrine); FANY (on intermittent HD since 7/9); recurrent maroon stools and acute on chronic anemia necessitating intermittent PRBC transfusions (last on 8/8) and hypofibrinoginemia, with EGD (7/12) with small non-bleeding EV and a duodenal ulcer with a visible vessel; and waxing and waning hepatic encephalopathy. He was transferred to [facility] on 8/12/24 for evaluation for combined liver/kidney transplants (SLK).      [ ] Decompensated ETOH Cirrhosis  - SLK eval , MELD 38 O  - Intubated  - HE: cont rifaximin/miralax  - EV:  EGD (7/12) with small non-bleeding EV and a duodenal ulcer with a visible vessel. EGD 8/20: no active bleeding, PPI.  - plan for colonoscopy today  - stress dose steroids per SICU, come off pressors as able  - FANY/HRS: anuric, On CRRT 8/13, Renal following  - ID: Empiric Elise/fluc/ Doxy, f/u surveillance cultures  - bcx 8/13 w/ MRSE , repeat 8/14 with NGTD  - Thiamine/MVI/FOLIC ACID  - Flex sig: friable mucosa, internal and external hemorrhoids  - LHC deferred for today, will re-address daily once stabilized  - cont sicu care

## 2024-08-21 NOTE — PROGRESS NOTE ADULT - SUBJECTIVE AND OBJECTIVE BOX
Follow Up:      Interval History:    REVIEW OF SYSTEMS  [  ] ROS unobtainable because:    [  ] All other systems negative except as noted below    Constitutional:  [ ] fever [ ] chills  [ ] weight loss  [ ] weakness  Skin:  [ ] rash [ ] phlebitis	  Eyes: [ ] icterus [ ] pain  [ ] discharge	  ENMT: [ ] sore throat  [ ] thrush [ ] ulcers [ ] exudates  Respiratory: [ ] dyspnea [ ] hemoptysis [ ] cough [ ] sputum	  Cardiovascular:  [ ] chest pain [ ] palpitations [ ] edema	  Gastrointestinal:  [ ] nausea [ ] vomiting [ ] diarrhea [ ] constipation [ ] pain	  Genitourinary:  [ ] dysuria [ ] frequency [ ] hematuria [ ] discharge [ ] flank pain  [ ] incontinence  Musculoskeletal:  [ ] myalgias [ ] arthralgias [ ] arthritis  [ ] back pain  Neurological:  [ ] headache [ ] seizures  [ ] confusion/altered mental status    Allergies  No Known Allergies        ANTIMICROBIALS:  doxycycline IVPB    doxycycline IVPB 100 every 12 hours  fluconAZOLE IVPB 200 every 24 hours  fluconAZOLE IVPB    meropenem  IVPB 1000 every 8 hours  meropenem  IVPB    rifAXIMin 550 two times a day      OTHER MEDS:  MEDICATIONS  (STANDING):  dexMEDEtomidine Infusion 0.5 <Continuous>  epoetin kareem (EPOGEN) Injectable 28108 every 7 days  HYDROmorphone  Injectable 0.5 every 3 hours PRN  insulin lispro (ADMELOG) corrective regimen sliding scale  every 6 hours  norepinephrine Infusion 0.35 <Continuous>  pantoprazole  Injectable 40 two times a day  polyethylene glycol 3350 17 <User Schedule>  polyethylene glycol/electrolyte Solution. 1000 once  vasopressin Infusion 0.03 <Continuous>      Vital Signs Last 24 Hrs  T(C): 36.5 (21 Aug 2024 07:00), Max: 36.9 (21 Aug 2024 03:00)  T(F): 97.7 (21 Aug 2024 07:00), Max: 98.5 (21 Aug 2024 03:00)  HR: 62 (21 Aug 2024 10:15) (52 - 133)  BP: 112/56 (20 Aug 2024 21:00) (112/56 - 112/56)  BP(mean): 78 (20 Aug 2024 21:00) (78 - 78)  RR: 19 (21 Aug 2024 10:15) (10 - 32)  SpO2: 100% (21 Aug 2024 10:15) (85% - 100%)    Parameters below as of 21 Aug 2024 07:00  Patient On (Oxygen Delivery Method): ventilator    O2 Concentration (%): 40    PHYSICAL EXAMINATION:  General: Alert and Awake, NAD  HEENT: PERRL, EOMI  Neck: Supple  Cardiac: RRR, No M/R/G  Resp: CTAB, No Wh/Rh/Ra  Abdomen: NBS, NT/ND, No HSM, No rigidity or guarding  MSK: No LE edema. No Calf tenderness  : No trejo  Skin: No rashes or lesions. Skin is warm and dry to the touch.   Neuro: Alert and Awake. CN 2-12 Grossly intact. Moves all four extremities spontaneously.  Psych: Calm, Pleasant, Cooperative                          7.1    14.45 )-----------( 119      ( 21 Aug 2024 09:38 )             20.9       08-21    137  |  101  |  9   ----------------------------<  164<H>  4.7   |  19<L>  |  1.29    Ca    8.3<L>      21 Aug 2024 09:38  Phos  4.2     08-21  Mg     2.4     08-21    TPro  6.0  /  Alb  3.3  /  TBili  14.4<H>  /  DBili  x   /  AST  88<H>  /  ALT  35  /  AlkPhos  171<H>  08-21      Urinalysis Basic - ( 21 Aug 2024 09:38 )    Color: x / Appearance: x / SG: x / pH: x  Gluc: 164 mg/dL / Ketone: x  / Bili: x / Urobili: x   Blood: x / Protein: x / Nitrite: x   Leuk Esterase: x / RBC: x / WBC x   Sq Epi: x / Non Sq Epi: x / Bacteria: x        MICROBIOLOGY:  v  Peritoneal Peritoneal Fluid  08-14-24   No growth at 5 days  --    polymorphonuclear leukocytes seen  No organisms seen  by cytocentrifuge      .Blood Blood-Venous  08-14-24   No growth at 5 days  --  --      .Blood Blood-Venous  08-14-24   No growth at 5 days  --  --      .Blood Blood-Peripheral  08-14-24   No growth at 5 days  --  --      .Blood Blood  08-12-24   No growth at 5 days  --  --      .Blood Blood  08-12-24   Growth in aerobic bottle: Staphylococcus epidermidis Isolation of  Coagulase negative Staphylococcus from single blood culture sets may  represent  contamination. Contact the Microbiology Department at [phone] if  susceptibility testing is  clinically indicated.  Direct identification is available within approximately 3-5  hours either by Blood Panel Multiplexed PCR or Direct  MALDI-TOF. Details: [email or web address]  --  Blood Culture PCR        CMV IgG Antibody: >10.00 U/mL (08-20-24 @ 00:08)  Toxoplasma IgG Screen: 44.00 IU/mL (08-12-24 @ 21:15)  CMV IgG Antibody: >10.00 U/mL (08-12-24 @ 21:15)    CMVPCR Log: 3.01 Sky08RN/mL (08-20 @ 00:08)  Rapid RVP Result: NotDetec (08-20 @ 00:06)  CMVPCR Log: 3.67 Lte38SK/mL (08-16 @ 00:37)        RADIOLOGY:    <The imaging below has been reviewed and visualized by me independently. Findings as detailed in report below> Follow Up: Leukocytosis    Interval History:  S/P EGD with intubation  Afebrile, some decrease in leukocytosis  Increase pressor need    REVIEW OF SYSTEMS  [ x ] ROS unobtainable because: Intubated       Allergies  No Known Allergies        ANTIMICROBIALS:  doxycycline IVPB    doxycycline IVPB 100 every 12 hours  fluconAZOLE IVPB 200 every 24 hours  fluconAZOLE IVPB    meropenem  IVPB 1000 every 8 hours  meropenem  IVPB    rifAXIMin 550 two times a day      OTHER MEDS:  MEDICATIONS  (STANDING):  dexMEDEtomidine Infusion 0.5 <Continuous>  epoetin kareem (EPOGEN) Injectable 06884 every 7 days  HYDROmorphone  Injectable 0.5 every 3 hours PRN  insulin lispro (ADMELOG) corrective regimen sliding scale  every 6 hours  norepinephrine Infusion 0.35 <Continuous>  pantoprazole  Injectable 40 two times a day  polyethylene glycol 3350 17 <User Schedule>  polyethylene glycol/electrolyte Solution. 1000 once  vasopressin Infusion 0.03 <Continuous>      Vital Signs Last 24 Hrs  T(C): 36.5 (21 Aug 2024 07:00), Max: 36.9 (21 Aug 2024 03:00)  T(F): 97.7 (21 Aug 2024 07:00), Max: 98.5 (21 Aug 2024 03:00)  HR: 62 (21 Aug 2024 10:15) (52 - 133)  BP: 112/56 (20 Aug 2024 21:00) (112/56 - 112/56)  BP(mean): 78 (20 Aug 2024 21:00) (78 - 78)  RR: 19 (21 Aug 2024 10:15) (10 - 32)  SpO2: 100% (21 Aug 2024 10:15) (85% - 100%)    Parameters below as of 21 Aug 2024 07:00  Patient On (Oxygen Delivery Method): ventilator    O2 Concentration (%): 40    PHYSICAL EXAMINATION:  Constitutional: no acute distress, intubated   Eyes:CAT, icteric  Ear/Nose/Throat: no oral lesions, ET Tube 	  Respiratory: clear BL  Cardiovascular: S1S2  Gastrointestinal: soft, (+) BS, no tenderness, ascites  Extremities:no e/e/c  No Lymphadenopathy  IV sites not inflammed.                            7.1    14.45 )-----------( 119      ( 21 Aug 2024 09:38 )             20.9       08-21    137  |  101  |  9   ----------------------------<  164<H>  4.7   |  19<L>  |  1.29    Ca    8.3<L>      21 Aug 2024 09:38  Phos  4.2     08-21  Mg     2.4     08-21    TPro  6.0  /  Alb  3.3  /  TBili  14.4<H>  /  DBili  x   /  AST  88<H>  /  ALT  35  /  AlkPhos  171<H>  08-21      Urinalysis Basic - ( 21 Aug 2024 09:38 )    Color: x / Appearance: x / SG: x / pH: x  Gluc: 164 mg/dL / Ketone: x  / Bili: x / Urobili: x   Blood: x / Protein: x / Nitrite: x   Leuk Esterase: x / RBC: x / WBC x   Sq Epi: x / Non Sq Epi: x / Bacteria: x        MICROBIOLOGY:  v  Peritoneal Peritoneal Fluid  08-14-24   No growth at 5 days  --    polymorphonuclear leukocytes seen  No organisms seen  by cytocentrifuge      .Blood Blood-Venous  08-14-24   No growth at 5 days  --  --      .Blood Blood-Venous  08-14-24   No growth at 5 days  --  --      .Blood Blood-Peripheral  08-14-24   No growth at 5 days  --  --      .Blood Blood  08-12-24   No growth at 5 days  --  --      .Blood Blood  08-12-24   Growth in aerobic bottle: Staphylococcus epidermidis Isolation of  Coagulase negative Staphylococcus from single blood culture sets may  represent  contamination. Contact the Microbiology Department at [phone] if  susceptibility testing is  clinically indicated.  Direct identification is available within approximately 3-5  hours either by Blood Panel Multiplexed PCR or Direct  MALDI-TOF. Details: [email or web address]  --  Blood Culture PCR        CMV IgG Antibody: >10.00 U/mL (08-20-24 @ 00:08)  Toxoplasma IgG Screen: 44.00 IU/mL (08-12-24 @ 21:15)  CMV IgG Antibody: >10.00 U/mL (08-12-24 @ 21:15)    CMVPCR Log: 3.01 Udo18NC/mL (08-20 @ 00:08)  Rapid RVP Result: NotDetec (08-20 @ 00:06)  CMVPCR Log: 3.67 Pcf38PU/mL (08-16 @ 00:37)        RADIOLOGY:    <The imaging below has been reviewed and visualized by me independently. Findings as detailed in report below>      < from: Xray Chest 1 View- PORTABLE-Routine (Xray Chest 1 View- PORTABLE-Routine in AM.) (08.20.24 @ 07:38) >  FINDINGS:    Chest radiograph 8/19/2024 6:52 PM:    The apices were omitted from the study.    The heart size cannot be accurately assessed on this projection.  Partially visualized right internal jugular hemodialysis catheter with   tip in the distal SVC. Right PICC with tip at the inferior atrial caval   junction.  Mild pulmonary edema, without significant change since the prior study.   No large pleural effusion or pneumothorax.  Enteric tube with tip terminating within the stomach.  Visualized osseous structures are unremarkable for the patient's age.    Chest radiograph 8/19/2024 9:36 PM: Interval repositioning of enteric   tube with tip within the distal stomach.    Chest radiograph 8/20/2024 6:10 AM: No significant change.    IMPRESSION:  Lines and tubes as described above.    Mild pulmonary edema without interval change.    --- End of Report ---      < end of copied text >

## 2024-08-21 NOTE — PROGRESS NOTE ADULT - ASSESSMENT
68 year old male with  AUD complicated by cirrhosis with Hepatic encephalopathy admitted to [facility] with back pain and jaundice on 7/8/24. Pt had dark / maroon colored stools   EGD that showed esophageal varices and duodenal ulcer with visible vessel. He got transfusions for low Hb and last transfusion was on 8/8. 1st session of IHD was on 7/9/24.     Transplant nephrology consulted for FANY and SLK eval.       1.  FANY VS FANY on CKD  Meets FANY criteria for SLK   -Started on CRRT on 8/13/24 (1st session of IHD was on 7/9/24 completed 6 weeks on 8/20). Dialysate flow rate adjusted for clotting (8/19).    -Pt remains anuric and on pressor support. Will cw CRRT      Anemia -multifactorial  7/12 EGD showed esophageal varices and duodenal ulcer with visible vessel.   Iron studies from Aug 12 reviewed. Adequate iron stores. tx as needed. Will start EPO 10k weekly.   Possible plan for endoscopy today           Neeraj Maher  Nephrology Fellow  Feel free to contact me on TEAMS  After 5 pm please contact the on-call Fellow

## 2024-08-21 NOTE — PROGRESS NOTE ADULT - ASSESSMENT
67 yo M with obesity and risky alcohol consumption (with decades' history of daily consumption of homemade alcohol). Admitted to [facility] for 1 month due to recent onset of jaundice and with a prolonged hospital/ICU course due to newly diagnosed decompensated cirrhosis with acute on chronic liver failure (ACLF) with shock, FANY (started on intermittent HD since 7/9), acute on chronic anemia with recurrent overt GI bleeding, and hepatic encephalopathy.     Transferred to [facility] on 8/12/24 for SLK evaluation and then transferred to the SICU for initiation of CRRT (8/13- ).      # AHRF  - Elective intubation for EGD given worsening encephalopathy.   - On 40% FiO2 and PEEP 5.   - Ventilatory managements as per SICU team     # Distributive shock    - From ACLF and septic shock.   - Afebrile, on room air  - Norepinephrine, Vasopressin, and stress-dose hydrocortisone    - Infectious work-up has been negative apart from CMV viremia (Valcyte not required) and unequivocal Lyme PCR (started on Doxy).     - S/p Zosyn (8/12-16)   - On Meropenem (8/16- ), Doxycycline (8/18- ) and empiric fluconazole (8/12- ) with transplant ID following.     # Anuric FANY on CKD    - Started intermittent HD (7/9-8/12) - Now on CRRT (8/13- )   - Marked anasarca. Fluid removal on CRRT limited due to hypotension.   - Transplant nephrology following.    # History of UGIB, resolved. Currently with hematochezia   - EGD (7/12, at [facility]) with small non-bleeding EV and a duodenal ulcer with a visible vessel. No further hematemesis.   - Pantoprazole 40BID/Coreg 3.125mg.     - Having active hematochezia.   - Flex Sig (8/19) with friable mucosa. No mass.   - EGD (8/20) with no active bleeding.   - As needed PRBC and TEG-guided blood products.       # Newly diagnosed decompensated cirrhosis with ACLF   - Infection: as above   - HE: Waxing and waning on Rifaximin/Miralax. Lactulose held for abdominal distention.    - Agitation/Insomnia: Seroquel 25mg or Haldol 2.5mg at night. Transplant Psych following.    - Large volume ascites and anasarca: Anuric. Limited fluid removal with CRRT. LVP (8/14) negative for SBP.   - Image: Contrast CT (8/13) with patent portohepatic vessels and no evidence of HCC. Small infarcts in left hepatic lobe and spleen.       - Nutrition: Poor po diet supplemented with NGT feeds. Nutrition following.    - PT: Last ambulatory on July/2024. Daily inpatient PT/OT.     # SLK ongoing evaluation (8/12)  - ABO O. MELD 3.0 score of 38  - Pending: [facility] for cardiology clearance, to be performed this week.   - Met K criteria in Aug 20th. Tissue typing completed.     PLAN   - Golytely bowel prep if not having BM and worsening encephalopathy   - Avoid Precedex and sedative agents. Stop Melatonin.  - Management of agitation/insomnia as per  recommendations.    - Repeat full sepsis work up: BCx, UA/Cx, CXR and theraputic/Dg paracentesis/Cx.  - Repeat CMV PCR 8/23  - Possible LHC this week with Dr. Jang       Note incomplete until finalized by attending signature/attestation.    Myra Wilkes-Zuleika   Transplant Hepatology Fellow     69 yo M with obesity and risky alcohol consumption (with decades' history of daily consumption of homemade alcohol). Admitted to [facility] for 1 month due to recent onset of jaundice and with a prolonged hospital/ICU course due to newly diagnosed decompensated cirrhosis with acute on chronic liver failure (ACLF) with shock, FANY (started on intermittent HD since 7/9), acute on chronic anemia with recurrent overt GI bleeding, and hepatic encephalopathy.     Transferred to [facility] on 8/12/24 for SLK evaluation and then transferred to the SICU for initiation of CRRT (8/13- ).      # AHRF   - Elective intubation for EGD given worsening encephalopathy.   - On 40% FiO2 and PEEP 5.   - Ventilatory managements as per SICU team.    # Distributive shock    - From ACLF vs septic shock.   - Afebrile, on room air  - Norepinephrine, Vasopressin, and stress-dose hydrocortisone    - Infectious work-up has been negative apart from CMV viremia (Valcyte not required) and unequivocal Lyme PCR (started on Doxy).     - S/p Zosyn (8/12-16)   - On Meropenem (8/16- ), Doxycycline (8/18- ) and empiric fluconazole (8/12- ) with transplant ID following.     # Anuric FANY on CKD    - Started intermittent HD (7/9-8/12) - Now on CRRT (8/13- )   - Marked anasarca. Fluid removal on CRRT limited due to hypotension.   - Transplant nephrology following.    # History of UGIB, resolved. Currently with hematochezia   - EGD (7/12, at [facility]) with small non-bleeding EV and a duodenal ulcer with a visible vessel. No further hematemesis.   - Pantoprazole 40BID/Coreg 3.125mg.     - Having active hematochezia.   - Flex Sig (8/19) with friable mucosa. No mass.   - EGD (8/20) with no active bleeding.   - As needed PRBC and TEG-guided blood products.     # Newly diagnosed decompensated cirrhosis with ACLF   - Infection: as above   - HE: Intubated and sedated. Rifaximin/Miralax. Lactulose held for abdominal distention.    - Agitation/Insomnia: Seroquel 25mg or Haldol 2.5mg at night. Transplant Psych following.    - Large volume ascites and anasarca: Anuric. Limited fluid removal with CRRT. LVP (8/14) negative for SBP.   - Image: Contrast CT (8/13) with patent portohepatic vessels and no evidence of HCC. Small infarcts in left hepatic lobe and spleen.       - Nutrition: Poor po diet supplemented with NGT feeds. Nutrition following.    - PT: Last ambulatory on July/2024. Daily inpatient PT/OT.     # SLK ongoing evaluation (8/12)  - ABO O. MELD 3.0 score of 38  - Pending: [facility] for cardiology clearance. Deferred until medically optimized.    - Met SLK criteria in Aug 20th. Tissue typing completed.     PLAN   - Complete sepsis work up: BCx and Procalcitonin  - Repeat flex sigmoidoscopy vs colonoscopy today, given ongoing hematochezia   - Titrate bowel regimen for goal 3-4 bowel movements daily   - Management of Ventilatory settings as per SICU team   - Management of agitation/insomnia as per  recommendations.    - LHC deferred until clinically optimized.       Note incomplete until finalized by attending signature/attestation.    Myra Maloney   Transplant Hepatology Fellow

## 2024-08-21 NOTE — PROGRESS NOTE ADULT - ASSESSMENT
78 yo m with alcohol abuse otherwise no known chronic medical issues (does not see doctors per sister) s/p 1 month stay at [facility] now transferred to [facility] for liver transplant eval.  Most of history obtained from sister (Ashlyn) at bedside and some from transfer paperwork. Per sister, pt was initially brought to the hospital by family for back pain and jaundice for unclear amount of time. Patient was seen by GI and underwent EGD soon after admission which only showed nonbleeding ?duodenal ulcers (no intervention) however few days later pt developed active signs of bleeding and emergently underwent EGD again showing bleeding esophageal varices which were clipped.  pt was electively intubated with stay in ICU thereafter. Patient then started with HD due to worsening renal function and MS for past 2.5 weeks at times limited by low BP requiring pressors to assist. Had numerous paracentesis with varying amount of fluid removal - albumin infusions. Sister not aware of any concerns for acute infection during his stay but aware that pt was on abx at some point due to bleeding issues.        PERTINENT RADIOLOGY:  CXR: Tubes and lines as above. No focal consolidations  CT Chest, A&P:  Patchy ground-glass opacities in the bilateral upper lobes. *  Cirrhosis with evidence of portal hypertension. *  Hypodense lesion in the periphery of the left hepatic lobe of uncertain etiology. Somewhat wedge-shaped morphology raises the possibility for a small infarct. Question subtle peripheral nodular enhancement, and a hemangioma is also considered. Contrast enhanced abdominal MRI can be performed for further characterization and to assess for other possibilities. *  A wedge-shaped region of hypoattenuation in the spleen may reflect a small infarct. *  Focal eccentric wall thickening in the low rectum, possibly due to a mural fold. Consider colonoscopy. *  Large volume ascites.  CT Head: No evidence of acute intracranial pathology. If clinical symptoms persist or worsen, more sensitive evaluation with brain MRI may be obtained, if no contraindications exist.    BCx (8/12) 1/4 MRSE  BCx (8/14) NGTD  Paracentesis (8/14) Cell Counts Negative for SBP  MRSA/MSSA Nasal PCR (8/16) Negative    CXR (8/19) Ground Glass infiltrates persist  CT from 8//13 with bilateral Ground glass infiltrates    # Persistent Ground glass infiltrates of unclear etiology  please send urine legionella ag  please send deep sputum for testing for gram stain/cx, fungal stain/cx    #Decompensated liver cirrhosis, Leukocytosis, Shock  --Continue Meropenem  --Can continue Empiric fluconazole  --Continue to follow CBC with diff  --Continue to follow transaminases  --Continue to follow temperature curve  --Follow up on  blood cultures 8/14 x3 sets are no growth    #Positive BCx (8/12) (Staph epi)  Consistent with contaminant       #CMV PCR   --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 Would repeat CMV PCR on (8/26)  -- may need to treat if increasing  Cytomegalovirus By PCR: 4890 --->4730 --->1020 (8/20)    #Pre Transplant Evaluation   HIV-1/2 Combo Result: Nonreactive  EBVPCR Log: NotDetec [id number]/mL   Hepatitis A IgG Ab Result: Reactive   Hepatitis B Core Antibody, Total: Nonreactive  Hepatitis B Surface Antibody: Reactive   Hepatitis B DNA PCR Log: Not Detected  Hepatitis B Surface Antigen: Nonreactive  Hepatitis C Virus Interpretation: Nonreactive  COVID-19 De Domain Antibody: Positive  Treponema Pallidum Antibody Interpretation: Negative  HSV 1 IgG  Positive/HSV 2 IgG Negative  EBV Serology Positive  CMV IgG Positive  VZV IgG Positive  Measles Positive, Mumps Positive and Rubella IgG Positive  Toxoplasma IgG Positive  QuantiFeron Gold Negative  Strongyloides Ab Negative  Babesia PCR negative  --Follow up on Schistosoma IgG  --Reportedly Lyme serology was previously positive and remains positive, follow up on Tick Diseases Panel is negative for additional tick born exposures  -- Continue doxycycline 100mg bid as prior therapy was not given      #Encounter to Vaccinate Patient - Will defer vaccination in context of critical illness  COVID19: No primary series. Would benefit from COVID19 2479-6062 dose  Influenza: Would benefit from dose next season  Pneumococcal: Would benefit from PCV20  HAV: Immune, no additional vaccines indicated  HBV: Immune, no additional vaccines indicated  MMR: Immune, will not require further vaccination  Varicella: Immune, will not require further vaccination  Shingles: Would benefit from Shingrix  Tdap: Would benefit from Tdap

## 2024-08-21 NOTE — PROGRESS NOTE ADULT - NS ATTEND AMEND GEN_ALL_CORE FT
Patient seen an dexamined    Case discussed with NP Elver    I agree with her interval history exam and plans as noted above    Waxing and Waning mental status persists  Underwent bronchoscopy with normal danielle seen on respiratory cultures  Would complete 10days of meropenem   follow up pre transplant serologies    Herbie Rashid MD  Can be called via Teams  After 5pm/weekends [phone]

## 2024-08-21 NOTE — PROGRESS NOTE ADULT - SUBJECTIVE AND OBJECTIVE BOX
[facility] DIVISION OF KIDNEY DISEASES AND HYPERTENSION   FOLLOW UP NOTE    --------------------------------------------------------------------------------  Chief Complaint:    24 hour events/subjective: Pt. was seen and examined today.   CRRT held as some issue with dialysis machine -being replaced. Pt currently intubated for possible plan for endoscopy.       PAST HISTORY  --------------------------------------------------------------------------------  No significant changes to PMH, PSH, FHx, SHx, unless otherwise noted    ALLERGIES & MEDICATIONS  --------------------------------------------------------------------------------  Allergies    No Known Allergies    Intolerances      Standing Inpatient Medications  chlorhexidine 0.12% Liquid 15 milliLiter(s) Oral Mucosa every 12 hours  chlorhexidine 2% Cloths 1 Application(s) Topical <User Schedule>  CRRT Treatment    <Continuous>  dexMEDEtomidine Infusion 0.5 MICROgram(s)/kG/Hr IV Continuous <Continuous>  doxycycline IVPB      doxycycline IVPB 100 milliGRAM(s) IV Intermittent every 12 hours  epoetin kareem (EPOGEN) Injectable 18637 Unit(s) SubCutaneous every 7 days  fluconAZOLE IVPB 200 milliGRAM(s) IV Intermittent every 24 hours  fluconAZOLE IVPB      folic acid 1 milliGRAM(s) Oral daily  hydrocortisone sodium succinate Injectable 50 milliGRAM(s) IV Push every 6 hours  insulin lispro (ADMELOG) corrective regimen sliding scale   SubCutaneous every 6 hours  lidocaine   4% Patch 1 Patch Transdermal daily  meropenem  IVPB 1000 milliGRAM(s) IV Intermittent every 8 hours  meropenem  IVPB      multivitamin 1 Tablet(s) Oral daily  norepinephrine Infusion 0.35 MICROgram(s)/kG/Min IV Continuous <Continuous>  octreotide  Infusion 50 MICROgram(s)/Hr IV Continuous <Continuous>  pantoprazole  Injectable 40 milliGRAM(s) IV Push two times a day  Phoxillum Filtration BK 4 / 2.5 5000 milliLiter(s) CRRT <Continuous>  Phoxillum Filtration BK 4 / 2.5 5000 milliLiter(s) CRRT <Continuous>  polyethylene glycol 3350 17 Gram(s) Oral <User Schedule>  PrismaSOL Filtration BGK 4 / 2.5 5000 milliLiter(s) CRRT <Continuous>  rifAXIMin 550 milliGRAM(s) Oral two times a day  thiamine 100 milliGRAM(s) Oral daily  vasopressin Infusion 0.03 Unit(s)/Min IV Continuous <Continuous>    PRN Inpatient Medications  HYDROmorphone  Injectable 0.5 milliGRAM(s) IV Push every 3 hours PRN      REVIEW OF SYSTEMS  --------------------------------------------------------------------------------  unable to obtain      VITALS/PHYSICAL EXAM  --------------------------------------------------------------------------------  T(C): 36.9 (08-21-24 @ 03:00), Max: 36.9 (08-21-24 @ 03:00)  HR: 61 (08-21-24 @ 07:00) (52 - 133)  BP: 112/56 (08-20-24 @ 21:00) (112/56 - 112/56)  RR: 14 (08-21-24 @ 07:00) (10 - 32)  SpO2: 100% (08-21-24 @ 07:00) (85% - 100%)  Wt(kg): --        08-20-24 @ 07:01  -  08-21-24 @ 07:00  --------------------------------------------------------  IN: 6787.8 mL / OUT: 2896 mL / NET: 3891.8 mL        Physical Exam:  	Gen: Intubated  	HEENT: Anicteric  	Pulm: CTA B/L  	CV: S1S2+  	Abd: Soft, +BS   	Ext: No LE edema B/L  	Neuro: Sedated  	Skin: Warm and dry  	Dialysis access: TDC    LABS/STUDIES  --------------------------------------------------------------------------------              7.9    19.92 >-----------<  155      [08-21-24 @ 03:06]              23.2     136  |  102  |  8   ----------------------------<  150      [08-21-24 @ 03:06]  4.9   |  19  |  1.10        Ca     8.4     [08-21-24 @ 03:06]      Mg     2.4     [08-21-24 @ 03:06]      Phos  3.7     [08-21-24 @ 03:06]    TPro  6.2  /  Alb  3.5  /  TBili  14.8  /  DBili  x   /  AST  92  /  ALT  36  /  AlkPhos  182  [08-21-24 @ 03:06]    PT/INR: PT 23.6 , INR 2.30       [08-21-24 @ 03:06]  PTT: 46.0       [08-21-24 @ 03:06]      Creatinine Trend:  SCr 1.10 [08-21 @ 03:06]  SCr 1.12 [08-20 @ 20:41]  SCr 1.19 [08-20 @ 16:47]  SCr 1.14 [08-20 @ 12:26]  SCr 1.19 [08-20 @ 06:07]    Urinalysis - [08-21-24 @ 03:06]      Color  / Appearance  / SG  / pH       Gluc 150 / Ketone   / Bili  / Urobili        Blood  / Protein  / Leuk Est  / Nitrite       RBC  / WBC  / Hyaline  / Gran  / Sq Epi  / Non Sq Epi  / Bacteria       HBsAb Reactive      [08-12-24 @ 21:16]  HBsAg Nonreact      [08-12-24 @ 21:13]  HBcAb Nonreact      [08-12-24 @ 21:16]  HCV 0.08, Nonreact      [08-12-24 @ 14:45]  HIV Nonreact      [08-12-24 @ 21:13]    CHETNA: titer Negative, pattern --      [08-12-24 @ 21:15]  Syphilis Screen (Treponema Pallidum Ab) Negative      [08-12-24 @ 21:15]  Immunofixation Serum:   Oligoclonal Pattern Consisting of One Weak IgM Kappa Band, Two Weak IgG  Kappa Bands, and One Weak IgA Lambda Band Identified      Reference Range: None Detected      [08-12-24 @ 21:13]  SPEP Interpretation: Oligoclonal Pattern Consisting of Three Weak Gamma-Migrating Paraproteins  and One Weak Beta-Migrating Paraprotein Identified      [08-12-24 @ 21:13]    Tacrolimus  Cyclosporine  Sirolimus  Mycophenolate  BK PCR  CMV PCRCMVPCR Log: 3.01 Hpn06KT/mL (08-20 @ 00:08)  CMVPCR Log: 3.67 Uik65UM/mL (08-16 @ 00:37)  CMVPCR Log: 3.69 Lat09WB/mL (08-12 @ 21:13)    Parvo PCR  EBV PCR

## 2024-08-21 NOTE — PROGRESS NOTE ADULT - SUBJECTIVE AND OBJECTIVE BOX
Interval Events:   - Active hematochezia yesterday. s/p 2PRBC and 2PLT as per TEG. Started on Octreotide and EPO.   - Elective intubation for EGD () with no active signs of bleeding. Possible repeat colonoscopy today if HD stable.   - Increased pressor requirements. started on stress-dose steroids.   - LHC yesterday deferred until HD stable.     - Afebrile. Intubated. 50% FiO2 and PEEP 5.   - Levo 0.13, vaso 0.03 and Hydrocortisone   - Precedex for sedation overnight   - Anuric on CRRT. Net positive 1L.   - Reported BM: 6     ROS:   12 point review of systems performed and negative except otherwise noted above.     Hospital Medications:  chlorhexidine 0.12% Liquid 15 milliLiter(s) Oral Mucosa every 12 hours  chlorhexidine 2% Cloths 1 Application(s) Topical <User Schedule>  CRRT Treatment    <Continuous>  dexMEDEtomidine Infusion 0.5 MICROgram(s)/kG/Hr (14.3 mL/Hr) IV Continuous <Continuous>  doxycycline IVPB      doxycycline IVPB 100 milliGRAM(s) IV Intermittent every 12 hours  epoetin kareem (EPOGEN) Injectable 77751 Unit(s) SubCutaneous every 7 days  fluconAZOLE IVPB 200 milliGRAM(s) IV Intermittent every 24 hours  fluconAZOLE IVPB      folic acid 1 milliGRAM(s) Oral daily  HYDROmorphone  Injectable 0.5 milliGRAM(s) IV Push every 3 hours PRN  insulin lispro (ADMELOG) corrective regimen sliding scale   SubCutaneous every 6 hours  lidocaine   4% Patch 1 Patch Transdermal daily  meropenem  IVPB      meropenem  IVPB 1000 milliGRAM(s) IV Intermittent every 8 hours  multivitamin 1 Tablet(s) Oral daily  norepinephrine Infusion 0.35 MICROgram(s)/kG/Min (37.4 mL/Hr) IV Continuous <Continuous>  pantoprazole  Injectable 40 milliGRAM(s) IV Push two times a day  Phoxillum Filtration BK 4 / 2.5 5000 milliLiter(s) (1000 mL/Hr) CRRT <Continuous>  Phoxillum Filtration BK 4 / 2.5 5000 milliLiter(s) (200 mL/Hr) CRRT <Continuous>  polyethylene glycol 3350 17 Gram(s) Oral <User Schedule>  PrismaSOL Filtration BGK 4 / 2.5 5000 milliLiter(s) (1500 mL/Hr) CRRT <Continuous>  rifAXIMin 550 milliGRAM(s) Oral two times a day  thiamine 100 milliGRAM(s) Oral daily  vasopressin Infusion 0.03 Unit(s)/Min (4.5 mL/Hr) IV Continuous <Continuous>    MAR over past 24 hours:    albumin human  5% IVPB   1000 mL/Hr IV Intermittent (24 @ 20:30)    albumin human  5% IVPB   1000 mL/Hr IV Intermittent (24 @ 23:08)    chlorhexidine 0.12% Liquid   15 milliLiter(s) Oral Mucosa (24 @ 06:06)    chlorhexidine 2% Cloths   1 Application(s) Topical (24 @ 06:07)    dexMEDEtomidine Infusion   14.3 mL/Hr IV Continuous (24 @ 19:42)   14.3 mL/Hr IV Continuous (24 @ 19:09)    doxycycline IVPB   100 mL/Hr IV Intermittent (24 @ 06:08)   100 mL/Hr IV Intermittent (24 @ 17:20)    epoetin kareem (EPOGEN) Injectable   82366 Unit(s) SubCutaneous (24 @ 13:41)    fluconAZOLE IVPB   100 mL/Hr IV Intermittent (24 @ 20:07)    folic acid   1 milliGRAM(s) Oral (24 @ 11:26)    hydrocortisone sodium succinate Injectable   50 milliGRAM(s) IV Push (24 @ 06:06)    hydrocortisone sodium succinate Injectable   100 milliGRAM(s) IV Push (24 @ 23:14)    HYDROmorphone  Injectable   0.5 milliGRAM(s) IV Push (24 @ 19:39)    insulin lispro (ADMELOG) corrective regimen sliding scale   2 Unit(s) SubCutaneous (24 @ 11:46)   2 Unit(s) SubCutaneous (24 @ 06:47)    meropenem  IVPB   100 mL/Hr IV Intermittent (24 @ 06:07)   100 mL/Hr IV Intermittent (24 @ 22:01)   100 mL/Hr IV Intermittent (24 @ 13:06)    multivitamin   1 Tablet(s) Oral (24 @ 11:26)    norepinephrine Infusion   37.4 mL/Hr IV Continuous (24 @ 20:58)    octreotide  Infusion   10 mL/Hr IV Continuous (24 @ 19:10)    pantoprazole  Injectable   40 milliGRAM(s) IV Push (24 @ 06:08)   40 milliGRAM(s) IV Push (24 @ 17:20)    Phoxillum Filtration BK 4 / 2.5   1000 mL/Hr CRRT (24 @ 19:43)   1000 mL/Hr CRRT (24 @ 13:07)    Phoxillum Filtration BK 4 / 2.5   200 mL/Hr CRRT (24 @ 19:43)   200 mL/Hr CRRT (24 @ 13:07)    polyethylene glycol 3350   17 Gram(s) Oral (24 @ 06:08)   17 Gram(s) Oral (24 @ 17:20)    polyethylene glycol/electrolyte Solution.   3000 milliLiter(s) Oral (24 @ 23:05)    polyethylene glycol/electrolyte Solution.   1000 milliLiter(s) Enteral Tube (24 @ 13:34)    polyethylene glycol/electrolyte Solution.   1000 milliLiter(s) Oral (24 @ 11:26)    potassium phosphate / sodium phosphate Powder (PHOS-NaK)   1 Packet(s) Oral (24 @ 22:02)    PrismaSOL Filtration BGK 4 / 2.5   1500 mL/Hr CRRT (24 @ 19:43)   1500 mL/Hr CRRT (24 @ 13:07)    propofol Infusion   13.7 mL/Hr IV Continuous (24 @ 18:31)    propofol Injectable   100 milliGRAM(s) IV Push (24 @ 18:35)    rifAXIMin   550 milliGRAM(s) Oral (24 @ 06:07)   550 milliGRAM(s) Oral (24 @ 17:20)    rocuronium Injectable   50 milliGRAM(s) IV Push (24 @ 18:30)    thiamine   100 milliGRAM(s) Oral (24 @ 11:26)      PHYSICAL EXAM:   Vital Signs last 24 hours:  T(F): 96.4 (24 @ 11:00), Max: 98.5 (24 @ 03:00)  HR: 60 (24 @ 12:15) (52 - 133)  BP: 112/56 (24 @ 21:00) (112/56 - 112/56)  BP(mean): 78 (24 @ 21:00) (78 - 78)  ABP: 117/55 (24 @ 12:15) (87/38 - 161/62)  ABP(mean): 79 (24 @ 12:15) (53 - 101)  RR: 10 (24 @ 12:15) (8 - 38)  SpO2: 100% (24 @ 12:15) (85% - 100%)  Mode: AC/ CMV (Assist Control/ Continuous Mandatory Ventilation), PIP: 20  I&Os:    24 @ 07:01  -  24 @ 07:00  --------------------------------------------------------  IN:    Dexmedetomidine: 98.5 mL    Enteral Tube Flush: 4450 mL    IV PiggyBack: 300 mL    IV PiggyBack: 50 mL    Norepinephrine: 419.6 mL    Norepinephrine: 402.7 mL    Octreotide: 14 mL    Octreotide: 120 mL    Platelets - Single Donor: 225 mL    PRBCs (Packed Red Blood Cells): 600 mL    Vasopressin: 108 mL  Total IN: 6787.8 mL    OUT:    Miscellaneous Tube Feedin mL    Other (mL): 2896 mL  Total OUT: 2896 mL    Total NET: 3891.8 mL      24 @ 07:01  -  24 @ 12:23  --------------------------------------------------------  IN:    Enteral Tube Flush: 60 mL    Norepinephrine: 77.1 mL    Octreotide: 20 mL    PRBCs (Packed Red Blood Cells): 300 mL    Vasopressin: 22.5 mL  Total IN: 479.6 mL    OUT:    Dexmedetomidine: 0 mL    Other (mL): 24 mL  Total OUT: 24 mL    Total NET: 455.6 mL        BMI (kg/m2): 33.2 (24 @ 16:46)  GENERAL: obese man, physically deconditioned.   NEURO: Intubated and sedated.   HEENT: Scleral icterus  CHEST: Mechanical bilateral breath sounds, decreased in bases.    CARDIAC: Regular rate and rhythm  ABDOMEN: Soft, non-tender, distended. Present bowel sounds. +anasarca  EXTREMITIES: warm, well perfused, BLE pitting edema up to thighs    SKIN: +Jaundiced, no rash/ecchymoses      DIAGNOSTICS:  WBC      Hg       PLT      Na       K        CO2     BUN      Cr       ALT      AST      TB       ALP  14.45    7.1      119      137      4.7      19       9        1.29     35       88       14.4     171      24 @ 09:38  19.92    7.9      155      136      4.9      19       8        1.10     36       92       14.8     182      24 @ 03:06  23.59    8.6      161      137      4.2      22       8        1.12     38       88       15.9     203      24 @ 20:41  16.77    8.5      141      138      4.0      20       10       1.19     36       78       15.5     193      24 @ 16:47  17.34    7.5      144      136      4.1      20       10       1.14     35       80       14.0     187      24 @ 12:26    PT             INR            MELDwith  MELDw/o  26.5           2.47           --             --             24 @ 09:38  23.6           2.30           --             --             24 @ 03:06  22.5           2.19           --             --             24 @ 06:07  31.2           3.07           --             --             24 @ 00:58  32.0           3.00           --             --             24 @ 00:18

## 2024-08-21 NOTE — PROGRESS NOTE ADULT - ASSESSMENT
78 y/o male w/ no known PMHx (does not see doctors per sister) w/ newly diagnosed cirrhosis c/b vasoplegia requiring vasopressor support, small EVs, melena 2/2 a duodenal ulcer s/p clipping, HRS requiring CRRT, hepatic encephalopathy, and ascites s/p multiple LVPs presenting for liver transplant evaluation    Neuro:  - Miralax & rifaximin for prevention of hepatic encephalopathy  - Monitoring ammonia  - Thiamine, folic acid, & multivitamin for h/o risky EtOH use  - Melatonin PRN insomnia  - 8/20: Precedex for agitation, waxing and waning mental status    Respiratory  - Intubated for endoscopy, initial settings 400/16/5/40% w/ acidosis per gas; adjusted TV to 480/16/5/40% w/ improvement  - Assess need for diuresis daily as patient w/ pulmonary vascular congestion on CXR    Cardiovascular  - levo, vaso  - midodrine discontinued  - Will wean pressors as tolerated w/ goal MAP > 65  - 8/19: LHC this week per cardiology; 8/20 now deferred until pt improves clinically    Gastrointestinal and Nutrition  - Regular diet as tolerated  - Miralax & rifaximin for prevention of hepatic encephalopathy  - Protonix BID for h/o duodenal ulcer  - Monitor LFTs  - Undergoing evaluation for SLK transplantation  - 8/14 CT A/P: cirrhosis w/ portal HTN, small infarct in left hepatic lobe, small splenic infarct, focal eccentric wall thickening of rectum, and large volume ascites  - 8/14 paracentesis w/ 5 L drained  - 8/19: flex sig scheduled for today for bowel thickening on CT A/P and blood in stool; tap water enema prior -- showed diffuse oozing throughout bowel  - 8/20: bedside endoscopy WNL, octreotide started; bowel regimen in preparation for colonoscopy tomorrow 8/21    Renal:  - CRRT -50 for acute renal failure likely 2/2 HRS  - Appreciate nephrology recommendations  - Undergoing evaluation for SLK transplantation  - 8/20 f/u nephrology as circuit keeps clotting    Heme:  - Venodynes for mechanical VTE prophylaxis; holding chemical VTE prophylaxis as patient is coagulopathic 2/2 cirrhosis  - s/p 1 PRBC for lost CRRT circuit  - 8/20: s/p 2U PRBC, 2U plts per TEG for low Hgb; EPO 10,000U weekly started    ID:   - Empiric coverage w/ meropenem & fluconazole as per transplant given vasopressor requirements  - Blood cultures w/ MRSE in 1 bottle on 8/12 but repeat cultures sent 8/14 w/ no growth to date  - 8/18: + doxycycline added for Lyme  - 8/20: f/u rpt CMV labs, sputum Cx, RVP -- unable to obtain urine legionella as pt anuric  - 8/21: straight cathed for urine legionella, f/u. ?+CMV per PCR. appreciate ID recs    Endo:   - Stress dose steroids for possible adrenal insufficiency given persistent vasopressor requirement  - 8/18: steroids discontinued  - 8/21: stress dose steroids for inc pressor requirements    Donna Downey DO (EM PGY-2)  S[facility] Int[facility] Unit [id number]

## 2024-08-21 NOTE — PROGRESS NOTE ADULT - SUBJECTIVE AND OBJECTIVE BOX
Transplant Surgery - Multidisciplinary Progress Note  --------------------------------------------------------------  Present:   Patient seen and examined with multidisciplinary Transplant team including Surgeon: , Hepatologist: MARTIR Tracy, Pharmacist: Beckie and bedside RN during AM rounds. Disciplines not in attendance will be notified of the plan.     Interval Events:  - increased pressor requirements  - intubated  - On CRRT, SLK eval  - EGD: no active bleeding    Potential Discharge date: Pending clinical course  Education:  Medications  Plan of care:  See Below    TX DATA HERE    Review of systems  All other systems were reviewed and are negative, except as noted.    PHYSICAL EXAM:  Constitutional: Well developed / well nourished  Eyes:  PERRLA  ENMT: intubated  Neck: supple,   Respiratory: CTA B/L  Cardiovascular: RRR  Gastrointestinal: Soft abdomen, ND  Genitourinary: anuric   Extremities: SCD's in place and working bilaterally  Vascular: Palpable dp pulses bilaterally.   Neurological: intubated  Skin: no rashes, ulcerations, lesions  Musculoskeletal: Moving all extremities   Transplant Surgery - Multidisciplinary Progress Note  --------------------------------------------------------------  Present:   Patient seen and examined with multidisciplinary Transplant team including Surgeon: , Hepatologist: MARTIR Tracy, Pharmacist: Karuna and bedside RN during AM rounds. Disciplines not in attendance will be notified of the plan.     Interval Events:  - increased pressor requirements, required stress dose steroids  - elective intubation after EGD given coagulopathy, with no active bleed seen. residual bloody BMs seen after  - On CRRT, anuric ,  SLK eval      Potential Discharge date: Pending clinical course  Education:  Medications  Plan of care:  See Below      MEDICATIONS  (STANDING):  chlorhexidine 0.12% Liquid 15 milliLiter(s) Oral Mucosa every 12 hours  chlorhexidine 2% Cloths 1 Application(s) Topical <User Schedule>  dexMEDEtomidine Infusion 0.5 MICROgram(s)/kG/Hr (14.3 mL/Hr) IV Continuous <Continuous>  doxycycline IVPB      doxycycline IVPB 100 milliGRAM(s) IV Intermittent every 12 hours  epoetin kareem (EPOGEN) Injectable 85153 Unit(s) SubCutaneous every 7 days  fentaNYL    Injectable 50 MICROGram(s) IV Push once  fentaNYL    Injectable 50 MICROGram(s) IV Push once  fluconAZOLE IVPB      fluconAZOLE IVPB 200 milliGRAM(s) IV Intermittent every 24 hours  folic acid 1 milliGRAM(s) Oral daily  insulin lispro (ADMELOG) corrective regimen sliding scale   SubCutaneous every 6 hours  lidocaine   4% Patch 1 Patch Transdermal daily  meropenem  IVPB      meropenem  IVPB 1000 milliGRAM(s) IV Intermittent every 8 hours  midazolam Injectable 2 milliGRAM(s) IV Push once  midazolam Injectable 2 milliGRAM(s) IV Push once  multivitamin 1 Tablet(s) Oral daily  norepinephrine Infusion 0.35 MICROgram(s)/kG/Min (37.4 mL/Hr) IV Continuous <Continuous>  pantoprazole  Injectable 40 milliGRAM(s) IV Push two times a day  polyethylene glycol 3350 17 Gram(s) Oral <User Schedule>  rifAXIMin 550 milliGRAM(s) Oral two times a day  thiamine 100 milliGRAM(s) Oral daily  vasopressin Infusion 0.03 Unit(s)/Min (4.5 mL/Hr) IV Continuous <Continuous>    MEDICATIONS  (PRN):  HYDROmorphone  Injectable 0.5 milliGRAM(s) IV Push every 3 hours PRN Moderate Pain (4 - 6)      PAST MEDICAL & SURGICAL HISTORY:  Alcohol abuse      No significant past surgical history          Vital Signs Last 24 Hrs  T(C): 35.8 (21 Aug 2024 11:00), Max: 36.9 (21 Aug 2024 03:00)  T(F): 96.4 (21 Aug 2024 11:00), Max: 98.5 (21 Aug 2024 03:00)  HR: 58 (21 Aug 2024 14:30) (52 - 133)  BP: 112/56 (20 Aug 2024 21:00) (112/56 - 112/56)  BP(mean): 78 (20 Aug 2024 21:00) (78 - 78)  RR: 17 (21 Aug 2024 14:30) (8 - 38)  SpO2: 100% (21 Aug 2024 14:30) (85% - 100%)    Parameters below as of 21 Aug 2024 11:00  Patient On (Oxygen Delivery Method): ventilator    O2 Concentration (%): 40    I&O's Summary    20 Aug 2024 07:01  -  21 Aug 2024 07:00  --------------------------------------------------------  IN: 6787.8 mL / OUT: 2896 mL / NET: 3891.8 mL    21 Aug 2024 07:01  -  21 Aug 2024 15:00  --------------------------------------------------------  IN: 514.2 mL / OUT: 43 mL / NET: 471.2 mL                              7.1    14.45 )-----------( 119      ( 21 Aug 2024 09:38 )             20.9     08-21    137  |  101  |  9   ----------------------------<  164<H>  4.7   |  19<L>  |  1.29    Ca    8.3<L>      21 Aug 2024 09:38  Phos  4.2     08-21  Mg     2.4     08-21    TPro  6.0  /  Alb  3.3  /  TBili  14.4<H>  /  DBili  x   /  AST  88<H>  /  ALT  35  /  AlkPhos  171<H>  08-21          Culture - Body Fluid with Gram Stain (collected 08-14-24 @ 18:16)  Source: Peritoneal Peritoneal Fluid  Gram Stain (08-15-24 @ 03:04):    polymorphonuclear leukocytes seen    No organisms seen    by cytocentrifuge  Final Report (08-19-24 @ 18:29):    No growth at 5 days    Culture - Fungal, Body Fluid (collected 08-14-24 @ 18:16)  Source: Peritoneal Peritoneal Fluid  Preliminary Report (08-17-24 @ 23:03):    Culture is being performed. Fungal cultures are held for 4 weeks.    Culture - Blood (collected 08-14-24 @ 16:10)  Source: .Blood Blood-Venous  Final Report (08-19-24 @ 20:00):    No growth at 5 days                Review of systems  All other systems were reviewed and are negative, except as noted.    PHYSICAL EXAM:  Constitutional: Well developed / well nourished  Eyes:  PERRLA  ENMT: intubated  Neck: supple,   Respiratory: CTA B/L  Cardiovascular: RRR  Gastrointestinal: Soft abdomen, ND  Genitourinary: anuric   Extremities: SCD's in place and working bilaterally  Vascular: Palpable dp pulses bilaterally.   Neurological: intubated  Skin: no rashes, ulcerations, lesions  Musculoskeletal: Moving all extremities

## 2024-08-22 NOTE — PROGRESS NOTE ADULT - ASSESSMENT
67 yo M with obesity and risky alcohol consumption (with decades' history of daily consumption of homemade alcohol). Admitted to [facility] for 1 month due to recent onset of jaundice and with a prolonged hospital/ICU course due to newly diagnosed decompensated cirrhosis with acute on chronic liver failure (ACLF) with shock, FANY (started on intermittent HD since 7/9), acute on chronic anemia with recurrent overt GI bleeding, and hepatic encephalopathy.     Transferred to [facility] on 8/12/24 for SLK evaluation and then transferred to the SICU for initiation of CRRT (8/13- ).      # AHRF   - Elective intubation for EGD given worsening encephalopathy.   - On 40% FiO2 and PEEP 5.   - Ventilatory managements as per SICU team. Plan for extubation today.     # Distributive shock    - ACLF vs septic shock.   - Hypothermic (8/21-22).   - Norepinephrine, Vasopressin  - Normal lactic acid, Tbil improving and stable INR.   - Repeat sepsis work up negative thus far.   - Transplat ID following: CMV viremia (Valcyte not required). Unequivocal Lyme PCR (started on Doxy).     - S/p Zosyn (8/12-16)   - On Meropenem (8/16- ), Doxycycline (8/18- ) and empiric fluconazole (8/12- ).     # Anuric FANY on CKD    - Started intermittent HD (7/9-8/12) - Now on CRRT (8/13- )   - Marked anasarca. Fluid removal on CRRT limited due to hypotension.   - Transplant nephrology following.    # History of UGIB and Hematochezia (8/18), resolved now.  # Rectal Ulcer pending biopsy read    - EGD (7/12, at [facility]) with small non-bleeding EV and a duodenal ulcer with a visible vessel. No further hematemesis.   - Pantoprazole 40BID/Coreg 3.125mg.     - Flex Sig (8/19) with friable mucosa. No mass.   - EGD (8/20) with no active bleeding.   - Colonoscopy (8/21) friable mucosa, rectal ulcer biopsied.   - As needed PRBC and TEG-guided blood products.     # Newly diagnosed decompensated cirrhosis with ACLF   - Infection: as above   - HE: Intubated. Following simple commands. Rifaximin/Miralax. Lactulose held for abdominal distention.    - Agitation/Insomnia: Seroquel 25mg or Haldol 2.5mg at night. Transplant Psych following.    - Large volume ascites and anasarca: Anuric. Limited fluid removal with CRRT. LVP (8/14) negative for SBP.   - Image: Contrast CT (8/13) with patent portohepatic vessels and no evidence of HCC. Small infarcts in left hepatic lobe and spleen.       - Nutrition: Poor po diet supplemented with NGT feeds. Nutrition following.   - PT: Last ambulatory on July/2024. Daily inpatient PT/OT.     # SLK ongoing evaluation (8/12)  - ABO O. MELD 3.0 score of 38  - Pending: [facility] for cardiology clearance. Deferred until medically optimized.    - Met SLK criteria in Aug 20th. Tissue typing completed.     PLAN   - Follow up repeat BCx  - NPO. Plans for extubation as per SICU team  - Management of agitation/insomnia as per  recommendations.    - Titrate bowel regimen for goal 3-4 bowel movements daily   - Follow up rectal ulcer biopsy before proceeding with OLT evaluation.    - LHC deferred until clinically optimized.  - Continue PRBC and TEG-guided products as needed.        Note incomplete until finalized by attending signature/attestation.    Myra Maloney   Transplant Hepatology Fellow

## 2024-08-22 NOTE — PROGRESS NOTE ADULT - SUBJECTIVE AND OBJECTIVE BOX
[facility] DIVISION OF KIDNEY DISEASES AND HYPERTENSION   FOLLOW UP NOTE    --------------------------------------------------------------------------------  Chief Complaint:    24 hour events/subjective: Pt. was seen and examined today. Remains in SICU.      PAST HISTORY  --------------------------------------------------------------------------------  No significant changes to PMH, PSH, FHx, SHx, unless otherwise noted    ALLERGIES & MEDICATIONS  --------------------------------------------------------------------------------  Allergies    No Known Allergies    Intolerances      Standing Inpatient Medications  chlorhexidine 2% Cloths 1 Application(s) Topical <User Schedule>  CRRT Treatment    <Continuous>  doxycycline IVPB      doxycycline IVPB 100 milliGRAM(s) IV Intermittent every 12 hours  epoetin kareem (EPOGEN) Injectable 19163 Unit(s) SubCutaneous every 7 days  fluconAZOLE IVPB      folic acid 1 milliGRAM(s) Oral daily  insulin lispro (ADMELOG) corrective regimen sliding scale   SubCutaneous every 6 hours  lidocaine   4% Patch 1 Patch Transdermal daily  meropenem  IVPB      meropenem  IVPB 1000 milliGRAM(s) IV Intermittent every 8 hours  multivitamin 1 Tablet(s) Oral daily  norepinephrine Infusion 0.35 MICROgram(s)/kG/Min IV Continuous <Continuous>  pantoprazole  Injectable 40 milliGRAM(s) IV Push two times a day  Phoxillum Filtration BK 4 / 2.5 5000 milliLiter(s) CRRT <Continuous>  Phoxillum Filtration BK 4 / 2.5 5000 milliLiter(s) CRRT <Continuous>  polyethylene glycol 3350 17 Gram(s) Oral <User Schedule>  PrismaSOL Filtration BGK 4 / 2.5 5000 milliLiter(s) CRRT <Continuous>  rifAXIMin 550 milliGRAM(s) Oral two times a day  thiamine 100 milliGRAM(s) Oral daily  vasopressin Infusion 0.03 Unit(s)/Min IV Continuous <Continuous>    PRN Inpatient Medications  HYDROmorphone  Injectable 0.5 milliGRAM(s) IV Push every 3 hours PRN      REVIEW OF SYSTEMS  --------------------------------------------------------------------------------  All other systems were reviewed and are negative, except as noted.    VITALS/PHYSICAL EXAM  --------------------------------------------------------------------------------  T(C): 37 (24 @ 07:00), Max: 38 (24 @ 17:00)  HR: 64 (24 @ 10:05) (55 - 67)  BP: --  RR: 18 (24 @ 10:05) (8 - 38)  SpO2: 100% (24 @ 10:05) (88% - 100%)  Wt(kg): --        24 @ 07:01  -  24 @ 07:00  --------------------------------------------------------  IN: 2274 mL / OUT: 1223 mL / NET: 1051 mL    24 @ 07:24 @ 10:54  --------------------------------------------------------  IN: 304.9 mL / OUT: 134 mL / NET: 170.9 mL        Physical Exam:  	Gen: NAD  	HEENT: Anicteric  	Pulm: CTA B/L  	CV: S1S2+  	Abd: Soft, +BS   	Ext: No LE edema B/L  	Neuro: Sedated  	Skin: Warm and dry  	Dialysis access: [facility]      LABS/STUDIES  --------------------------------------------------------------------------------              7.5    16.96 >-----------<  114      [24 09:36]              21.2     136  |  103  |  10  ----------------------------<  131      [24 @ 09:36]  4.3   |  23  |  1.02        Ca     8.4     [24 09:36]      Mg     2.3     [24 09:36]      Phos  3.1     [24 09:36]    TPro  6.1  /  Alb  3.3  /  TBili  12.6  /  DBili  x   /  AST  91  /  ALT  37  /  AlkPhos  158  [24 @ 09:36]    PT/INR: PT 25.2 , INR 2.46       [24 09:36]  PTT: 44.4       [24 09:36]      Creatinine Trend:  SCr 1.02 [ 09:36]  SCr 1.03 [ 03:50]  SCr 1.08 [ 21:43]  SCr 1.10 [ 16:05]  SCr 1.29 [08-21 @ 09:38]    Urinalysis - [24 @ 09:36]      Color  / Appearance  / SG  / pH       Gluc 131 / Ketone   / Bili  / Urobili        Blood  / Protein  / Leuk Est  / Nitrite       RBC  / WBC  / Hyaline  / Gran  / Sq Epi  / Non Sq Epi  / Bacteria       HBsAb Reactive      [24 @ 21:16]  HBsAg Nonreact      [24 @ 21:13]  HBcAb Nonreact      [24 @ 21:16]  HCV 0.08, Nonreact      [24 @ 14:45]  HIV Nonreact      [24 @ 21:13]    CHETNA: titer Negative, pattern --      [24 @ 21:15]  Syphilis Screen (Treponema Pallidum Ab) Negative      [24 @ 21:15]  Immunofixation Serum:   Oligoclonal Pattern Consisting of One Weak IgM Kappa Band, Two Weak IgG  Kappa Bands, and One Weak IgA Lambda Band Identified      Reference Range: None Detected      [24 @ 21:13]  SPEP Interpretation: Oligoclonal Pattern Consisting of Three Weak Gamma-Migrating Paraproteins  and One Weak Beta-Migrating Paraprotein Identified      [24 @ 21:13]    Tacrolimus  Cyclosporine  Sirolimus  Mycophenolate  BK PCR  CMV PCRCMVPCR Lo.94 Aky08CS/mL ( @ 12:26)  CMVPCR Log: 3.01 Tty39SC/mL ( @ 00:08)  CMVPCR Log: 3.67 Utj98TR/mL ( @ 00:37)  CMVPCR Log: 3.69 Fmu52TV/mL ( @ 21:13)    Parvo PCR  EBV PCR [facility] DIVISION OF KIDNEY DISEASES AND HYPERTENSION   FOLLOW UP NOTE    --------------------------------------------------------------------------------  Chief Complaint:    24 hour events/subjective: Pt. was seen and examined today. Remains in SICU.      PAST HISTORY  --------------------------------------------------------------------------------  No significant changes to PMH, PSH, FHx, SHx, unless otherwise noted    ALLERGIES & MEDICATIONS  --------------------------------------------------------------------------------  Allergies    No Known Allergies    Intolerances      Standing Inpatient Medications  chlorhexidine 2% Cloths 1 Application(s) Topical <User Schedule>  CRRT Treatment    <Continuous>  doxycycline IVPB      doxycycline IVPB 100 milliGRAM(s) IV Intermittent every 12 hours  epoetin kareem (EPOGEN) Injectable 73258 Unit(s) SubCutaneous every 7 days  fluconAZOLE IVPB      folic acid 1 milliGRAM(s) Oral daily  insulin lispro (ADMELOG) corrective regimen sliding scale   SubCutaneous every 6 hours  lidocaine   4% Patch 1 Patch Transdermal daily  meropenem  IVPB      meropenem  IVPB 1000 milliGRAM(s) IV Intermittent every 8 hours  multivitamin 1 Tablet(s) Oral daily  norepinephrine Infusion 0.35 MICROgram(s)/kG/Min IV Continuous <Continuous>  pantoprazole  Injectable 40 milliGRAM(s) IV Push two times a day  Phoxillum Filtration BK 4 / 2.5 5000 milliLiter(s) CRRT <Continuous>  Phoxillum Filtration BK 4 / 2.5 5000 milliLiter(s) CRRT <Continuous>  polyethylene glycol 3350 17 Gram(s) Oral <User Schedule>  PrismaSOL Filtration BGK 4 / 2.5 5000 milliLiter(s) CRRT <Continuous>  rifAXIMin 550 milliGRAM(s) Oral two times a day  thiamine 100 milliGRAM(s) Oral daily  vasopressin Infusion 0.03 Unit(s)/Min IV Continuous <Continuous>    PRN Inpatient Medications  HYDROmorphone  Injectable 0.5 milliGRAM(s) IV Push every 3 hours PRN      REVIEW OF SYSTEMS  --------------------------------------------------------------------------------  All other systems were reviewed and are negative, except as noted.    VITALS/PHYSICAL EXAM  --------------------------------------------------------------------------------  T(C): 37 (24 @ 07:00), Max: 38 (24 @ 17:00)  HR: 64 (24 @ 10:05) (55 - 67)  RR: 18 (24 @ 10:05) (8 - 38)  SpO2: 100% (24 @ 10:05) (88% - 100%)          24 @ 07:01  -  24 @ 07:00  --------------------------------------------------------  IN: 2274 mL / OUT: 1223 mL / NET: 1051 mL    24 @ 07:01  -  24 @ 10:54  --------------------------------------------------------  IN: 304.9 mL / OUT: 134 mL / NET: 170.9 mL        Physical Exam:  	Gen: NAD  	HEENT: Anicteric  	Pulm: CTA B/L  	CV: S1S2+  	Abd: Soft, +BS   	Ext: No LE edema B/L  	Neuro: Sedated  	Skin: Warm and dry  	Dialysis access: [facility]      LABS/STUDIES  --------------------------------------------------------------------------------              7.5    16.96 >-----------<  114      [24 09:36]              21.2     136  |  103  |  10  ----------------------------<  131      [24 09:36]  4.3   |  23  |  1.02        Ca     8.4     [24 09:36]      Mg     2.3     [24 09:36]      Phos  3.1     [24 09:36]    TPro  6.1  /  Alb  3.3  /  TBili  12.6  /  DBili  x   /  AST  91  /  ALT  37  /  AlkPhos  158  [24 09:36]    PT/INR: PT 25.2 , INR 2.46       [24 09:36]  PTT: 44.4       [24 09:36]      Creatinine Trend:  SCr 1.02 [:36]  SCr 1.03 [ 03:50]  SCr 1.08 [ 21:43]  SCr 1.10 [ 16:05]  SCr 1.29 [ 09:38]    Urinalysis - [08-22-24 @ 09:36]      Color  / Appearance  / SG  / pH       Gluc 131 / Ketone   / Bili  / Urobili        Blood  / Protein  / Leuk Est  / Nitrite       RBC  / WBC  / Hyaline  / Gran  / Sq Epi  / Non Sq Epi  / Bacteria       HBsAb Reactive      [24 @ 21:16]  HBsAg Nonreact      [24 @ 21:13]  HBcAb Nonreact      [24 @ 21:16]  HCV 0.08, Nonreact      [24 @ 14:45]  HIV Nonreact      [24 @ 21:13]    CHETNA: titer Negative, pattern --      [24 @ 21:15]  Syphilis Screen (Treponema Pallidum Ab) Negative      [24 @ 21:15]  Immunofixation Serum:   Oligoclonal Pattern Consisting of One Weak IgM Kappa Band, Two Weak IgG  Kappa Bands, and One Weak IgA Lambda Band Identified      Reference Range: None Detected      [24 @ 21:13]  SPEP Interpretation: Oligoclonal Pattern Consisting of Three Weak Gamma-Migrating Paraproteins  and One Weak Beta-Migrating Paraprotein Identified      [24 @ 21:13]      CMV PCRCMVPCR Lo.94 Mqh37HS/mL ( @ 12:26)  CMVPCR Log: 3.01 Akr40NL/mL ( @ 00:08)  CMVPCR Log: 3.67 Fhq13DC/mL ( @ 00:37)  CMVPCR Log: 3.69 Sta08AX/mL ( @ 21:13)

## 2024-08-22 NOTE — PROGRESS NOTE ADULT - SUBJECTIVE AND OBJECTIVE BOX
24 HOUR EVENTS:  - s/p colonoscopy, rectal ulcer seen, f/u biopsy  - d/c PICC and permacath, replaced both   - d/c octreotide, solucortef  - repeat BCx sent    NEURO  Exam: waxing and waning mental status, disoriented, lethargic  Meds: dexMEDEtomidine Infusion 0.5 MICROgram(s)/kG/Hr IV Continuous <Continuous>  HYDROmorphone  Injectable 0.5 milliGRAM(s) IV Push every 3 hours PRN Moderate Pain (4 - 6)    [x] Adequacy of sedation and pain control has been assessed and adjusted      RESPIRATORY  RR: 14 (24 @ 00:15) (8 - 38)  SpO2: 100% (24 @ 00:15) (85% - 100%)  Wt(kg): --  Exam: equal chest rise b/l  Mechanical Ventilation: Mode: AC/ CMV (Assist Control/ Continuous Mandatory Ventilation), RR (machine): 20, RR (patient): 24, TV (machine): 480, FiO2: 40, PEEP: 5, ITime: 1, MAP: 11, PIP: 20  ABG - ( 21 Aug 2024 21:38 )  pH: 7.42  /  pCO2: 31    /  pO2: 109   / HCO3: 20    / Base Excess: -3.8  /  SaO2: 99.3    Lactate: x        [N/A] Extubation Readiness Assessed  Meds:       CARDIOVASCULAR  HR: 66 (24 @ 00:15) (52 - 70)  BP: --  BP(mean): --  ABP: 121/43 (24 @ 00:15) (100/35 - 161/62)  ABP(mean): 64 (24 @ 00:15) (50 - 110)  Wt(kg): --  CVP(cm H2O): --      Exam: regular rate and rhythm  Cardiac Rhythm: sinus  Perfusion     [x]Adequate   [ ]Inadequate  Mentation   [x]Normal       [ ]Reduced  Extremities  [x]Warm         [ ]Cool  Volume Status [ ]Hypervolemic [x]Euvolemic [ ]Hypovolemic  Meds: norepinephrine Infusion 0.35 MICROgram(s)/kG/Min IV Continuous <Continuous>        GI/NUTRITION  Exam: soft, nontender, nondistended, incision C/D/I  Diet: NPO  Meds: pantoprazole  Injectable 40 milliGRAM(s) IV Push two times a day  polyethylene glycol 3350 17 Gram(s) Oral <User Schedule>      GENITOURINARY  I&O's Detail     @ 07: @ 07:00  --------------------------------------------------------  IN:    Dexmedetomidine: 98.5 mL    Enteral Tube Flush: 4450 mL    IV PiggyBack: 300 mL    IV PiggyBack: 50 mL    Norepinephrine: 419.6 mL    Norepinephrine: 402.7 mL    Octreotide: 14 mL    Octreotide: 120 mL    Platelets - Single Donor: 225 mL    PRBCs (Packed Red Blood Cells): 600 mL    Vasopressin: 108 mL  Total IN: 6787.8 mL    OUT:    Miscellaneous Tube Feedin mL    Other (mL): 2896 mL  Total OUT: 2896 mL    Total NET: 3891.8 mL       @ 07: @ 00:48  --------------------------------------------------------  IN:    Cryoprecipitate: 75 mL    Dexmedetomidine: 100 mL    Enteral Tube Flush: 170 mL    IV PiggyBack: 350 mL    Norepinephrine: 210.6 mL    Octreotide: 20 mL    PRBCs (Packed Red Blood Cells): 300 mL    Vasopressin: 117 mL  Total IN: 1342.6 mL    OUT:    Other (mL): 626 mL  Total OUT: 626 mL    Total NET: 716.6 mL              136  |  102  |  9   ----------------------------<  143<H>  4.4   |  20<L>  |  1.08    Ca    8.2<L>      21 Aug 2024 21:43  Phos  3.8       Mg     2.4         TPro  5.9<L>  /  Alb  3.3  /  TBili  14.2<H>  /  DBili  x   /  AST  88<H>  /  ALT  36  /  AlkPhos  166<H>      [ ] Elizabeth catheter, indication: N/A  Meds: folic acid 1 milliGRAM(s) Oral daily  multivitamin 1 Tablet(s) Oral daily  sodium chloride 0.9% lock flush 10 milliLiter(s) IV Push every 1 hour PRN Pre/post blood products, medications, blood draw, and to maintain line patency  thiamine 100 milliGRAM(s) Oral daily        HEMATOLOGIC  Meds:   [x] VTE Prophylaxis                        7.9    14.28 )-----------( 108      ( 21 Aug 2024 21:43 )             22.9     PT/INR - ( 21 Aug 2024 21:43 )   PT: 26.3 sec;   INR: 2.45 ratio         PTT - ( 21 Aug 2024 21:43 )  PTT:49.0 sec  Transfusion     [ ] PRBC   [ ] Platelets   [ ] FFP   [ ] Cryoprecipitate      INFECTIOUS DISEASES  WBC Count: 14.28 K/uL ( @ 21:43)  WBC Count: 12.70 K/uL ( @ 16:05)  WBC Count: 14.45 K/uL ( @ 09:38)  WBC Count: 19.92 K/uL ( @ 03:06)    RECENT CULTURES:  Specimen Source: Combi-Cath Combi-Cath  Date/Time:  @ 00:15  Culture Results:   Normal Respiratory Elo present  Gram Stain:   Moderate polymorphonuclear leukocytes per low power field  Rare Squamous epithelial cells per low power field  No organisms seen per oil power field  Organism: --  Specimen Source: .Blood Blood  Date/Time:  @ 12:27  Culture Results:   No growth at 24 hours  Gram Stain: --  Organism: --  Specimen Source: .Blood Blood  Date/Time:  @ 11:43  Culture Results:   No growth at 24 hours  Gram Stain: --  Organism: --    Meds: doxycycline IVPB      doxycycline IVPB 100 milliGRAM(s) IV Intermittent every 12 hours  epoetin kareem (EPOGEN) Injectable 03906 Unit(s) SubCutaneous every 7 days  fluconAZOLE IVPB      fluconAZOLE IVPB 200 milliGRAM(s) IV Intermittent every 24 hours  meropenem  IVPB      meropenem  IVPB 1000 milliGRAM(s) IV Intermittent every 8 hours  rifAXIMin 550 milliGRAM(s) Oral two times a day        ENDOCRINE  CAPILLARY BLOOD GLUCOSE      POCT Blood Glucose.: 122 mg/dL (21 Aug 2024 23:47)  POCT Blood Glucose.: 145 mg/dL (21 Aug 2024 17:45)  POCT Blood Glucose.: 155 mg/dL (21 Aug 2024 11:29)  POCT Blood Glucose.: 162 mg/dL (21 Aug 2024 06:29)    Meds: insulin lispro (ADMELOG) corrective regimen sliding scale   SubCutaneous every 6 hours  vasopressin Infusion 0.03 Unit(s)/Min IV Continuous <Continuous>        ACCESS DEVICES:  [ ] Peripheral IV  [ ] Central Venous Line	[ ] R	[ ] L	[ ] IJ	[ ] Fem	[ ] SC	Placed:   [ ] Arterial Line		[ ] R	[ ] L	[ ] Fem	[ ] Rad	[ ] Ax	Placed:   [ ] PICC:					[ ] Mediport  [ ] Urinary Catheter, Date Placed:   [x] Necessity of urinary, arterial, and venous catheters discussed    OTHER MEDICATIONS:  chlorhexidine 0.12% Liquid 15 milliLiter(s) Oral Mucosa every 12 hours  chlorhexidine 2% Cloths 1 Application(s) Topical <User Schedule>  CRRT Treatment    <Continuous>  lidocaine   4% Patch 1 Patch Transdermal daily  Phoxillum Filtration BK 4 / 2.5 5000 milliLiter(s) CRRT <Continuous>  Phoxillum Filtration BK 4 / 2.5 5000 milliLiter(s) CRRT <Continuous>  PrismaSOL Filtration BGK 4 / 2.5 5000 milliLiter(s) CRRT <Continuous>      CODE STATUS: FULL CODE

## 2024-08-22 NOTE — PROGRESS NOTE ADULT - ASSESSMENT
68 year-old with EtOH cirrhosis.  Now admitted with encephalopathy and FANY requiring HD.  Hypotension requiring pressor support.  Now intubated    TTE with RV enlargement.  Please check ECG.    Plan for right and left heart cath - will require FFP to correct INR prior to cath.     Discussed with Transplant Team on rounds.

## 2024-08-22 NOTE — PROGRESS NOTE ADULT - SUBJECTIVE AND OBJECTIVE BOX
Follow Up:  Leukocytosis    Interval History:  Afebrile, WBC @17  s/p colonoscopy with rectal ulcers and specimen collected   Slightly more awake today    REVIEW OF SYSTEMS  [ x ] ROS unobtainable because: confusion/altered mental status      Allergies  No Known Allergies        ANTIMICROBIALS:  doxycycline IVPB    doxycycline IVPB 100 every 12 hours  fluconAZOLE IVPB    meropenem  IVPB    meropenem  IVPB 1000 every 8 hours  rifAXIMin 550 two times a day      OTHER MEDS:  MEDICATIONS  (STANDING):  epoetin kareem (EPOGEN) Injectable 37354 every 7 days  HYDROmorphone  Injectable 0.5 every 3 hours PRN  insulin lispro (ADMELOG) corrective regimen sliding scale  every 6 hours  norepinephrine Infusion 0.35 <Continuous>  pantoprazole  Injectable 40 two times a day  polyethylene glycol 3350 17 <User Schedule>  vasopressin Infusion 0.03 <Continuous>      Vital Signs Last 24 Hrs  T(C): 37 (22 Aug 2024 07:00), Max: 38 (21 Aug 2024 17:00)  T(F): 98.6 (22 Aug 2024 07:00), Max: 100.4 (21 Aug 2024 17:00)  HR: 64 (22 Aug 2024 12:30) (55 - 67)  BP: --  BP(mean): --  RR: 15 (22 Aug 2024 12:30) (8 - 33)  SpO2: 100% (22 Aug 2024 12:30) (88% - 100%)    Parameters below as of 22 Aug 2024 11:00  Patient On (Oxygen Delivery Method): room air        PHYSICAL EXAMINATION:  Constitutional: no acute distress,   Eyes:CAT, icteric  Ear/Nose/Throat: no oral lesions	  Respiratory: clear BL  Cardiovascular: S1S2  Gastrointestinal: soft, (+) BS, no tenderness, ascites  Extremities:no e/e/c  No Lymphadenopathy  IV sites not inflammed.                            7.5    16.96 )-----------( 114      ( 22 Aug 2024 09:36 )             21.2       08-22    136  |  103  |  10  ----------------------------<  131<H>  4.3   |  23  |  1.02    Ca    8.4      22 Aug 2024 09:36  Phos  3.1     08-22  Mg     2.3     08-22    TPro  6.1  /  Alb  3.3  /  TBili  12.6<H>  /  DBili  x   /  AST  91<H>  /  ALT  37  /  AlkPhos  158<H>        Urinalysis Basic - ( 22 Aug 2024 09:36 )    Color: x / Appearance: x / SG: x / pH: x  Gluc: 131 mg/dL / Ketone: x  / Bili: x / Urobili: x   Blood: x / Protein: x / Nitrite: x   Leuk Esterase: x / RBC: x / WBC x   Sq Epi: x / Non Sq Epi: x / Bacteria: x        MICROBIOLOGY:  v  Combi-Cath Combi-Cath  24   Normal Respiratory Elo present  --    Moderate polymorphonuclear leukocytes per low power field  Rare Squamous epithelial cells per low power field  No organisms seen per oil power field      .Blood Blood  24   No growth at 24 hours  --  --      .Blood Blood  24   No growth at 24 hours  --  --      Peritoneal Peritoneal Fluid  24   No growth at 5 days  --    polymorphonuclear leukocytes seen  No organisms seen  by cytocentrifuge      .Blood Blood-Venous  24   No growth at 5 days  --  --      .Blood Blood-Venous  24   No growth at 5 days  --  --      .Blood Blood-Peripheral  24   No growth at 5 days  --  --      .Blood Blood  24   No growth at 5 days  --  --      .Blood Blood  24   Growth in aerobic bottle: Staphylococcus epidermidis Isolation of  Coagulase negative Staphylococcus from single blood culture sets may  represent  contamination. Contact the Microbiology Department at [phone] if  susceptibility testing is  clinically indicated.  Direct identification is available within approximately 3-5  hours either by Blood Panel Multiplexed PCR or Direct  MALDI-TOF. Details: [email or web address]  --  Blood Culture PCR        CMV IgG Antibody: >10.00 U/mL (24 @ 00:08)  Toxoplasma IgG Screen: 44.00 IU/mL (24 @ 21:15)  CMV IgG Antibody: >10.00 U/mL (24 @ 21:15)    CMVPCR Lo.94 Ikx43BB/mL ( @ 12:26)  CMVPCR Log: 3.01 Zqd04NI/mL (08-20 @ 00:08)  Rapid RVP Result: NotDetec ( @ 00:06)  CMVPCR Log: 3.67 Qwp47HT/mL ( @ 00:37)        RADIOLOGY:    <The imaging below has been reviewed and visualized by me independently. Findings as detailed in report below>    < from: Xray Chest 1 View- PORTABLE-Urgent (Xray Chest 1 View- PORTABLE-Urgent .) (24 @ 18:21) >  INTERPRETATION:  EXAMINATION: XR CHEST URGENT    CLINICAL INDICATION: Right IJ catheter insertion.    TECHNIQUE: Single frontal, portable view of the chest was obtained.    COMPARISON: Chest x-ray 2024 at 6:36 AM.    FINDINGS:  Interval placement of a right IJ central venous catheter with tip   terminating in the SVC. Redemonstrated tunneled right sided catheter   terminates in the SVC. Right upper extremity PICC line terminates in the   distal right atrium, unchanged.  Endotracheal tube turns mid thoracic trachea.  The enteric tube extends below the diaphragm. The tip is not included in   the image.  The heart, mediastinum and jarrod cannot be assessed due to rotation.  Low lung volumes. No focal consolidation. Mild pulmonary vascular   congestion. Trace bilateral pleural effusions.  There is no pneumothorax.  No acute bony abnormality.    IMPRESSION:  New right IJ approach catheter terminates in the SVC. Additional lines   and tubes as described above. PICC line tip again overlies the distal   right atrium.    Low lung volumes with mild pulmonary vascular congestion and trace   bilateral pleural effusions.. No focal consolidation.    --- End of Report ---    < end of copied text >

## 2024-08-22 NOTE — PROGRESS NOTE ADULT - SUBJECTIVE AND OBJECTIVE BOX
HPI:  Patient seen and examined at bedside in SICU.    Review Of Systems:           Respiratory: No shortness of breath, cough, or wheezing  Cardiovascular: No chest pain or palpitations  10 point review of systems is otherwise negative except as mentioned above        Medications:  chlorhexidine 2% Cloths 1 Application(s) Topical <User Schedule>  CRRT Treatment    <Continuous>  doxycycline IVPB 100 milliGRAM(s) IV Intermittent every 12 hours  doxycycline IVPB      epoetin kareem (EPOGEN) Injectable 42752 Unit(s) SubCutaneous every 7 days  fluconAZOLE IVPB      fluconAZOLE IVPB 400 milliGRAM(s) IV Intermittent every 24 hours  folic acid 1 milliGRAM(s) Oral daily  HYDROmorphone  Injectable 0.5 milliGRAM(s) IV Push every 3 hours PRN  insulin lispro (ADMELOG) corrective regimen sliding scale   SubCutaneous every 6 hours  lidocaine   4% Patch 1 Patch Transdermal daily  meropenem  IVPB 1000 milliGRAM(s) IV Intermittent every 8 hours  meropenem  IVPB      multivitamin 1 Tablet(s) Oral daily  norepinephrine Infusion 0.35 MICROgram(s)/kG/Min IV Continuous <Continuous>  pantoprazole  Injectable 40 milliGRAM(s) IV Push two times a day  Phoxillum Filtration BK 4 / 2.5 5000 milliLiter(s) CRRT <Continuous>  Phoxillum Filtration BK 4 / 2.5 5000 milliLiter(s) CRRT <Continuous>  polyethylene glycol 3350 17 Gram(s) Oral <User Schedule>  PrismaSOL Filtration BGK 4 / 2.5 5000 milliLiter(s) CRRT <Continuous>  rifAXIMin 550 milliGRAM(s) Oral two times a day  thiamine 100 milliGRAM(s) Oral daily  vasopressin Infusion 0.03 Unit(s)/Min IV Continuous <Continuous>    PAST MEDICAL & SURGICAL HISTORY:  Alcohol abuse      No significant past surgical history        Vitals:  T(C): 36.4 (08-22-24 @ 23:00), Max: 37 (08-22-24 @ 03:00)  HR: 66 (08-23-24 @ 00:15) (60 - 71)  BP: 104/51 (08-22-24 @ 19:30) (104/51 - 104/51)  BP(mean): 74 (08-22-24 @ 19:30) (74 - 74)  RR: 17 (08-23-24 @ 00:15) (11 - 32)  SpO2: 100% (08-23-24 @ 00:15) (84% - 100%)  Wt(kg): --  Daily     Daily   I&O's Summary    21 Aug 2024 07:01  -  22 Aug 2024 07:00  --------------------------------------------------------  IN: 2274 mL / OUT: 1223 mL / NET: 1051 mL    22 Aug 2024 07:01  -  23 Aug 2024 00:27  --------------------------------------------------------  IN: 1437.9 mL / OUT: 1145 mL / NET: 292.9 mL        Physical Exam:  Appearance: Jaundice, intubated, critically ill  Eyes: PERRL, EOMI, pink conjunctiva  HENT: +ET tube  Cardiovascular: RRR, S1, S2, no murmurs, rubs, or gallops; no edema; no JVD  Respiratory: Clear to auscultation bilaterally  Gastrointestinal: soft, non-tender, non-distended with normal bowel sounds  Musculoskeletal: No clubbing; no joint deformity   Neurologic: Non-focal  Lymphatic: No lymphadenopathy  Psychiatry: sedated  Skin: No rashes, ecchymoses, or cyanosis                          7.8    15.98 )-----------( 98       ( 23 Aug 2024 00:15 )             22.9     08-22    136  |  102  |  10  ----------------------------<  129<H>  4.4   |  19<L>  |  1.05    Ca    8.5      22 Aug 2024 17:07  Phos  2.9     08-22  Mg     2.4     08-22    TPro  5.9<L>  /  Alb  3.3  /  TBili  12.9<H>  /  DBili  x   /  AST  81<H>  /  ALT  34  /  AlkPhos  155<H>  08-22    PT/INR - ( 22 Aug 2024 17:07 )   PT: 27.6 sec;   INR: 2.71 ratio         PTT - ( 22 Aug 2024 17:07 )  PTT:46.9 sec        Cardiovascular Diagnostic Testing:  ECG:     Echo:  < from: TTE W or WO Ultrasound Enhancing Agent (08.14.24 @ 06:54) >  _______________________________________________________________________________________     CONCLUSIONS:      1. Technically difficult image quality.   2. Left ventricular cavity is normal in size. Left ventricular wall thickness is normal. Left ventricular systolic function is hyperdynamic with an ejection fraction of 73 % by Marroquin's method of disks. There are no regional wall motion abnormalities seen.   3. Moderately enlarged right ventricular cavity size and normal right ventricular systolic function.   4. Left atrium is mildly dilated.   5. No significant valvular disease.   6. No priorechocardiogram is available for comparison.    ________________________________________________________________________________________    < end of copied text >      Stress Testing:    Cath:    Interpretation of Telemetry:    Imaging:

## 2024-08-22 NOTE — PROGRESS NOTE ADULT - ASSESSMENT
76 yo m with alcohol abuse otherwise no known chronic medical issues (does not see doctors per sister) s/p 1 month stay at [facility] now transferred to [facility] for liver transplant eval.  Most of history obtained from sister (Ashlyn) at bedside and some from transfer paperwork. Per sister, pt was initially brought to the hospital by family for back pain and jaundice for unclear amount of time. Patient was seen by GI and underwent EGD soon after admission which only showed nonbleeding ?duodenal ulcers (no intervention) however few days later pt developed active signs of bleeding and emergently underwent EGD again showing bleeding esophageal varices which were clipped.  pt was electively intubated with stay in ICU thereafter. Patient then started with HD due to worsening renal function and MS for past 2.5 weeks at times limited by low BP requiring pressors to assist. Had numerous paracentesis with varying amount of fluid removal - albumin infusions. Sister not aware of any concerns for acute infection during his stay but aware that pt was on abx at some point due to bleeding issues.        PERTINENT RADIOLOGY:  CXR: Tubes and lines as above. No focal consolidations  CT Chest, A&P:  Patchy ground-glass opacities in the bilateral upper lobes. *  Cirrhosis with evidence of portal hypertension. *  Hypodense lesion in the periphery of the left hepatic lobe of uncertain etiology. Somewhat wedge-shaped morphology raises the possibility for a small infarct. Question subtle peripheral nodular enhancement, and a hemangioma is also considered. Contrast enhanced abdominal MRI can be performed for further characterization and to assess for other possibilities. *  A wedge-shaped region of hypoattenuation in the spleen may reflect a small infarct. *  Focal eccentric wall thickening in the low rectum, possibly due to a mural fold. Consider colonoscopy. *  Large volume ascites.  CT Head: No evidence of acute intracranial pathology. If clinical symptoms persist or worsen, more sensitive evaluation with brain MRI may be obtained, if no contraindications exist.    BCx (8/12) 1/4 MRSE  BCx (8/14) NGTD  Paracentesis (8/14) Cell Counts Negative for SBP  MRSA/MSSA Nasal PCR (8/16) Negative    CXR (8/19) Ground Glass infiltrates persist  CT from 8//13 with bilateral Ground glass infiltrates    # Persistent Ground glass infiltrates of unclear etiology  please send urine legionella ag  please send deep sputum for testing for gram stain/cx, fungal stain/cx    #Decompensated liver cirrhosis, Leukocytosis, Shock  --Follow up ordered repeat BCX  --Continue Meropenem  --Can continue Empiric fluconazole  --Continue to follow CBC with diff  --Continue to follow transaminases  --Continue to follow temperature curve  --Follow up on  blood cultures 8/14 x3 sets are no growth    #Positive BCx (8/12) (Staph epi)  Consistent with contaminant       #CMV PCR   --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 Would repeat CMV PCR on (8/26)  -- may need to treat if increasing  Cytomegalovirus By PCR: 4890 --->4730 --->1020 --->867 (8/20)    #Pre Transplant Evaluation   HIV-1/2 Combo Result: Nonreactive  EBVPCR Log: NotDetec Dfn46IO/mL   Hepatitis A IgG Ab Result: Reactive   Hepatitis B Core Antibody, Total: Nonreactive  Hepatitis B Surface Antibody: Reactive   Hepatitis B DNA PCR Log: Not Detected  Hepatitis B Surface Antigen: Nonreactive  Hepatitis C Virus Interpretation: Nonreactive  COVID-19 De Domain Antibody: Positive  Treponema Pallidum Antibody Interpretation: Negative  HSV 1 IgG  Positive/HSV 2 IgG Negative  EBV Serology Positive  CMV IgG Positive  VZV IgG Positive  Measles Positive, Mumps Positive and Rubella IgG Positive  Toxoplasma IgG Positive  QuantiFeron Gold Negative  Strongyloides Ab Negative  Babesia PCR negative  --Follow up on Schistosoma IgG  --Reportedly Lyme serology was previously positive and remains positive, follow up on Tick Diseases Panel is negative for additional tick born exposures  -- Continue doxycycline 100mg bid as prior therapy was not given      #Encounter to Vaccinate Patient - Will defer vaccination in context of critical illness  COVID19: No primary series. Would benefit from COVID19 0432-9459 dose  Influenza: Would benefit from dose next season  Pneumococcal: Would benefit from PCV20  HAV: Immune, no additional vaccines indicated  HBV: Immune, no additional vaccines indicated  MMR: Immune, will not require further vaccination  Varicella: Immune, will not require further vaccination  Shingles: Would benefit from Shingrix  Tdap: Would benefit from Tdap

## 2024-08-22 NOTE — PROGRESS NOTE ADULT - SUBJECTIVE AND OBJECTIVE BOX
Transplant Surgery - Multidisciplinary Progress Note  --------------------------------------------------------------  Present:   Patient seen and examined with multidisciplinary Transplant team including Surgeon: , Hepatologist: MARTIR Tracy, Pharmacist: Karuna and bedside RN during AM rounds. Disciplines not in attendance will be notified of the plan.     Interval Events:  - on pressors, hypothermic  - s/p colonoscopy: some areas w lesion biopsied  - On CRRT, anuric ,  SLK eval      Potential Discharge date: Pending clinical course  Education:  Medications  Plan of care:  See Below      TX DATA HERE      Review of systems  All other systems were reviewed and are negative, except as noted.    PHYSICAL EXAM:  Constitutional: Well developed / well nourished  Eyes:  PERRLA  ENMT: intubated  Neck: supple,   Respiratory: CTA B/L  Cardiovascular: RRR  Gastrointestinal: Soft abdomen, ND  Genitourinary: anuric   Extremities: SCD's in place and working bilaterally  Vascular: Palpable dp pulses bilaterally.   Neurological: intubated  Skin: no rashes, ulcerations, lesions  Musculoskeletal: Moving all extremities   Transplant Surgery - Multidisciplinary Progress Note  --------------------------------------------------------------  Interval Events:  - on pressors, hypothermic  - s/p colonoscopy: some areas w lesion biopsied  - On CRRT, anuric   - waking up      Potential Discharge date: Pending clinical course  Education:  Medications  Plan of care:  See Below      MEDICATIONS  (STANDING):  chlorhexidine 2% Cloths 1 Application(s) Topical <User Schedule>  CRRT Treatment    <Continuous>  doxycycline IVPB      doxycycline IVPB 100 milliGRAM(s) IV Intermittent every 12 hours  epoetin kareem (EPOGEN) Injectable 03568 Unit(s) SubCutaneous every 7 days  fluconAZOLE IVPB      folic acid 1 milliGRAM(s) Oral daily  insulin lispro (ADMELOG) corrective regimen sliding scale   SubCutaneous every 6 hours  lidocaine   4% Patch 1 Patch Transdermal daily  meropenem  IVPB      meropenem  IVPB 1000 milliGRAM(s) IV Intermittent every 8 hours  multivitamin 1 Tablet(s) Oral daily  norepinephrine Infusion 0.35 MICROgram(s)/kG/Min (37.4 mL/Hr) IV Continuous <Continuous>  pantoprazole  Injectable 40 milliGRAM(s) IV Push two times a day  Phoxillum Filtration BK 4 / 2.5 5000 milliLiter(s) (1000 mL/Hr) CRRT <Continuous>  Phoxillum Filtration BK 4 / 2.5 5000 milliLiter(s) (200 mL/Hr) CRRT <Continuous>  polyethylene glycol 3350 17 Gram(s) Oral <User Schedule>  PrismaSOL Filtration BGK 4 / 2.5 5000 milliLiter(s) (2000 mL/Hr) CRRT <Continuous>  rifAXIMin 550 milliGRAM(s) Oral two times a day  thiamine 100 milliGRAM(s) Oral daily  vasopressin Infusion 0.03 Unit(s)/Min (4.5 mL/Hr) IV Continuous <Continuous>    MEDICATIONS  (PRN):  HYDROmorphone  Injectable 0.5 milliGRAM(s) IV Push every 3 hours PRN Moderate Pain (4 - 6)      PAST MEDICAL & SURGICAL HISTORY:  Alcohol abuse      No significant past surgical history          Vital Signs Last 24 Hrs  T(C): 37 (22 Aug 2024 07:00), Max: 38 (21 Aug 2024 17:00)  T(F): 98.6 (22 Aug 2024 07:00), Max: 100.4 (21 Aug 2024 17:00)  HR: 64 (22 Aug 2024 12:30) (55 - 67)  BP: --  BP(mean): --  RR: 15 (22 Aug 2024 12:30) (8 - 33)  SpO2: 100% (22 Aug 2024 12:30) (88% - 100%)    Parameters below as of 22 Aug 2024 11:00  Patient On (Oxygen Delivery Method): room air        I&O's Summary    21 Aug 2024 07:01  -  22 Aug 2024 07:00  --------------------------------------------------------  IN: 2274 mL / OUT: 1223 mL / NET: 1051 mL    22 Aug 2024 07:01  -  22 Aug 2024 13:01  --------------------------------------------------------  IN: 565.7 mL / OUT: 227 mL / NET: 338.7 mL                              7.5    16.96 )-----------( 114      ( 22 Aug 2024 09:36 )             21.2     08-22    136  |  103  |  10  ----------------------------<  131<H>  4.3   |  23  |  1.02    Ca    8.4      22 Aug 2024 09:36  Phos  3.1     08-22  Mg     2.3     08-22    TPro  6.1  /  Alb  3.3  /  TBili  12.6<H>  /  DBili  x   /  AST  91<H>  /  ALT  37  /  AlkPhos  158<H>  08-22          Culture - Bronchial (collected 08-21-24 @ 00:15)  Source: Combi-Cath Combi-Cath  Gram Stain (08-21-24 @ 15:23):    Moderate polymorphonuclear leukocytes per low power field    Rare Squamous epithelial cells per low power field    No organisms seen per oil power field  Preliminary Report (08-21-24 @ 23:34):    Normal Respiratory Elo present    Culture - Blood (collected 08-20-24 @ 12:27)  Source: .Blood Blood  Preliminary Report (08-21-24 @ 16:02):    No growth at 24 hours    Culture - Blood (collected 08-20-24 @ 11:43)  Source: .Blood Blood  Preliminary Report (08-21-24 @ 16:02):    No growth at 24 hours                Review of systems  All other systems were reviewed and are negative, except as noted.    PHYSICAL EXAM:  Constitutional: Well developed / well nourished  Eyes:  PERRLA  ENMT: intubated  Neck: supple,   Respiratory: CTA B/L  Cardiovascular: RRR  Gastrointestinal: Soft abdomen, ND, L sided abdominal wall hernia  Genitourinary: anuric   Extremities: SCD's in place and working bilaterally  Vascular: Palpable dp pulses bilaterally.   Neurological: intubated  Skin: no rashes, ulcerations, lesions  Musculoskeletal: Moving all extremities

## 2024-08-22 NOTE — PROGRESS NOTE ADULT - ASSESSMENT
8 year old male with  AUD complicated by cirrhosis with Hepatic encephalopathy admitted to [facility] with back pain and jaundice on 7/8/24. Pt had dark / maroon colored stools   EGD that showed esophageal varices and duodenal ulcer with visible vessel. He got transfusions for low Hb and last transfusion was on 8/8. 1st session of IHD was on 7/9/24.     Transplant nephrology consulted for FANY and SLK eval.       1.  FANY VS FANY on CKD  Meets FANY criteria for SLK   -Started on CRRT on 8/13/24 (1st session of IHD was on 7/9/24 completed 6 weeks on 8/20). Dialysate flow rate adjusted for clotting (8/19).    -Pt remains anuric and on pressor support. Issue with clotting (8/21), adjusted settings further. Will cw CRRT      Neeraj Maher  Nephrology Fellow  Feel free to contact me on TEAMS  After 5 pm please contact the on-call Fellow.

## 2024-08-22 NOTE — PROGRESS NOTE ADULT - SUBJECTIVE AND OBJECTIVE BOX
Interval Events:   - Colonoscopy () with friable mucosa and rectal ulcer, biopsied.   - No further episodes of hematochezia.   - Octreotide and hydrocortisone discontinued.   - s/p 1PRBC, 1PLT and 1Cryo.     - Hypotensive. All lines replaced. Sepsis work up ordered.   - On Norepinephrine 0.18 and Vasopressin 0.03.   - Intubated 40% FiO2/PEEP 5.   - Precedex stopped at 2am. Plans for extubation today.    - Anuric on CRRT. Net positive 800ml.   - Reported BM: 4    ROS:   12 point review of systems performed and negative except otherwise noted above.     Hospital Medications:  chlorhexidine 2% Cloths 1 Application(s) Topical <User Schedule>  CRRT Treatment    <Continuous>  doxycycline IVPB      doxycycline IVPB 100 milliGRAM(s) IV Intermittent every 12 hours  epoetin kareem (EPOGEN) Injectable 93710 Unit(s) SubCutaneous every 7 days  fluconAZOLE IVPB      folic acid 1 milliGRAM(s) Oral daily  HYDROmorphone  Injectable 0.5 milliGRAM(s) IV Push every 3 hours PRN  insulin lispro (ADMELOG) corrective regimen sliding scale   SubCutaneous every 6 hours  lidocaine   4% Patch 1 Patch Transdermal daily  meropenem  IVPB      meropenem  IVPB 1000 milliGRAM(s) IV Intermittent every 8 hours  multivitamin 1 Tablet(s) Oral daily  norepinephrine Infusion 0.35 MICROgram(s)/kG/Min (37.4 mL/Hr) IV Continuous <Continuous>  pantoprazole  Injectable 40 milliGRAM(s) IV Push two times a day  Phoxillum Filtration BK 4 / 2.5 5000 milliLiter(s) (200 mL/Hr) CRRT <Continuous>  Phoxillum Filtration BK 4 / 2.5 5000 milliLiter(s) (1000 mL/Hr) CRRT <Continuous>  polyethylene glycol 3350 17 Gram(s) Oral <User Schedule>  PrismaSOL Filtration BGK 4 / 2.5 5000 milliLiter(s) (2000 mL/Hr) CRRT <Continuous>  rifAXIMin 550 milliGRAM(s) Oral two times a day  thiamine 100 milliGRAM(s) Oral daily  vasopressin Infusion 0.03 Unit(s)/Min (4.5 mL/Hr) IV Continuous <Continuous>    MAR over past 24 hours:    albumin human  5% IVPB   500 mL/Hr IV Intermittent (24 @ 08:02)    albumin human  5% IVPB   500 mL/Hr IV Intermittent (24 @ 09:05)    calcium gluconate IVPB   200 mL/Hr IV Intermittent (24 @ 00:42)    chlorhexidine 0.12% Liquid   15 milliLiter(s) Oral Mucosa (24 @ 05:47)   15 milliLiter(s) Oral Mucosa (24 @ 17:10)    chlorhexidine 2% Cloths   1 Application(s) Topical (24 @ 05:46)    doxycycline IVPB   100 mL/Hr IV Intermittent (24 @ 06:47)   100 mL/Hr IV Intermittent (24 @ 17:07)    fluconAZOLE IVPB   400 milliGRAM(s) IV Intermittent (24 @ 09:57)    fluconAZOLE IVPB   100 mL/Hr IV Intermittent (24 @ 20:59)    folic acid   1 milliGRAM(s) Oral (24 @ 11:19)    meropenem  IVPB   100 mL/Hr IV Intermittent (24 @ 05:46)   100 mL/Hr IV Intermittent (24 @ 00:16)   100 mL/Hr IV Intermittent (24 @ 13:35)    midazolam Injectable   1 milliGRAM(s) IV Push (24 @ 15:00)    multivitamin   1 Tablet(s) Oral (24 @ 11:19)    norepinephrine Infusion   37.4 mL/Hr IV Continuous (24 @ 19:30)    pantoprazole  Injectable   40 milliGRAM(s) IV Push (24 @ 05:46)   40 milliGRAM(s) IV Push (24 @ 17:07)    Phoxillum Filtration BK 4 / 2.5   1000 mL/Hr CRRT (24 @ 11:03)    Phoxillum Filtration BK 4 / 2.5   200 mL/Hr CRRT (24 @ 11:03)    PrismaSOL Filtration BGK 4 / 2.5   2000 mL/Hr CRRT (24 @ 11:03)    rifAXIMin   550 milliGRAM(s) Oral (24 @ 17:07)    thiamine   100 milliGRAM(s) Oral (24 @ 11:19)    vasopressin Infusion   4.5 mL/Hr IV Continuous (24 @ 19:30)      PHYSICAL EXAM:   Vital Signs last 24 hours:  T(F): 98.6 (24 @ 07:00), Max: 100.4 (24 @ 17:00)  HR: 64 (24 @ 12:15) (55 - 67)  BP: --  BP(mean): --  ABP: 117/42 (24 @ 12:15) (93/33 - 154/77)  ABP(mean): 59 (24 @ 12:15) (48 - 110)  RR: 14 (24 @ 12:15) (8 - 33)  SpO2: 100% (24 @ 12:15) (88% - 100%)  Mode: Vent off  I&Os:    24 @ 07:01  -  24 @ 07:00  --------------------------------------------------------  IN:    Albumin 5%  - 250 mL: 250 mL    Cryoprecipitate: 75 mL    Dexmedetomidine: 128.6 mL    Enteral Tube Flush: 170 mL    IV PiggyBack: 450 mL    IV PiggyBack: 200 mL    Norepinephrine: 347.4 mL    Octreotide: 20 mL    Platelets - Single Donor: 225 mL    PRBCs (Packed Red Blood Cells): 300 mL    Vasopressin: 108 mL  Total IN: 2274 mL    OUT:    Miscellaneous Tube Feedin mL    Other (mL): 1223 mL    Voided (mL): 0 mL  Total OUT: 1223 mL    Total NET: 1051 mL      24 @ 07:01  -  24 @ 12:28  --------------------------------------------------------  IN:    Albumin 5%  - 250 mL: 250 mL    IV PiggyBack: 200 mL    Norepinephrine: 93.2 mL    Vasopressin: 22.5 mL  Total IN: 565.7 mL    OUT:    Dexmedetomidine: 0 mL    Other (mL): 227 mL  Total OUT: 227 mL    Total NET: 338.7 mL        BMI (kg/m2): 33.2 (24 @ 16:46)  GENERAL: obese man, physically deconditioned.   NEURO: Intubated. Following simple commands.    HEENT: Scleral icterus  CHEST: Mechanical bilateral breath sounds, decreased in bases.    CARDIAC: Regular rate and rhythm  ABDOMEN: Soft, non-tender, distended. Present bowel sounds. +anasarca  EXTREMITIES: warm, well perfused, BLE pitting edema up to thighs    SKIN: +Jaundiced, no rash/ecchymoses    DIAGNOSTICS:  WBC      Hg       PLT      Na       K        CO2     BUN      Cr       ALT      AST      TB       ALP  16.96    7.5      114      136      4.3      23       10       1.02     37       91       12.6     158      24 @ 09:36  17.50    7.9      128      136      4.3      20       9        1.03     37       93       13.9     170      24 @ 03:50  14.28    7.9      108      136      4.4      20       9        1.08     36       88       14.2     166      24 @ 21:43  12.70    8.1      101      137      4.3      20       9        1.10     36       85       14.2     169      24 @ 16:05  14.45    7.1      119      137      4.7      19       9        1.29     35       88       14.4     171      24 @ 09:38    PT             INR            MELDwith  MELDw/o  25.2           2.46           --             --             24 @ 09:36  22.7           2.21           --             --             24 @ 03:50  26.3           2.45           --             --             24 @ 21:43  27.0           2.52           --             --             24 @ 16:05  26.5           2.47           --             --             24 @ 09:38

## 2024-08-22 NOTE — PROGRESS NOTE ADULT - CRITICAL CARE ATTENDING COMMENT
Dr. Price (Attending Physician)  N - following commands  P - SBT this am doing well, cxr clear  C - NE gtt 0.28, vasopressin, bedside ultrasound  GI - alcoholic cirrhosis    - cvvhd not pulling volume  H - Hb 7.9 stable  ID - blood cx with ngtd, changed lines yesterday  E - glucose well controlled  PPx - ppi bid, no dvt  Lines - new RIJ HD and TLC  Dispo - full code, awaiting liver tx decision Dr. Price (Attending Physician)  N - following commands, awake, mildly agitated, improved from yesterday  P - SBT this am doing well, cxr without pna or pulm edema  C - NE gtt 0.28, vasopressin, bedside ultrasound revealed euvolemia  GI - alcoholic cirrhosis with liver failure OLT eval ongoing, GI bleeding no clear source   - acute renal failure on cvvhd not pulling volume  H - Hb 7.9 stable  ID - blood cx with ngtd, changed lines yesterday, unable to get legionella antigen bc of lack of urine  E - glucose well controlled  PPx - ppi bid, no dvt  Lines - new RIJ HD and TLC  Dispo - full code, awaiting liver tx decision

## 2024-08-22 NOTE — PROGRESS NOTE ADULT - NS ATTEND AMEND GEN_ALL_CORE FT
Patient seen and examined  Case discussed with ZAIN lilly    I agree with her interval history exama nd plans as noted above    Patient with somewhat improved mental status  rectal ulcers noted on colonsocopy- follow up pathology r/o HSV and CMV cause of ulcers    Herbie Rashid MD  Can be called via Teams  After 5pm/weekends [phone]

## 2024-08-22 NOTE — AIRWAY REMOVAL NOTE  ADULT & PEDS - ARTIFICAL AIRWAY REMOVAL COMMENTS
Written order for extubation verified. The patient was identified by full name and birth date compared to the identification band. Present during the procedure was Luci Prince RN

## 2024-08-22 NOTE — PROGRESS NOTE ADULT - ASSESSMENT
68y with obesity and risky alcohol consumption (with decades' history of daily consumption of homemade alcohol), admitted to [facility] 1 month ago due to recent onset of jaundice and with a prolonged hospital and ICU course there due to newly diagnosed decompensated cirrhosis with acute on chronic liver failure (ACLF) with shock (resolved, but still on midodrine); FANY (on intermittent HD since 7/9); recurrent maroon stools and acute on chronic anemia necessitating intermittent PRBC transfusions (last on 8/8) and hypofibrinoginemia, with EGD (7/12) with small non-bleeding EV and a duodenal ulcer with a visible vessel; and waxing and waning hepatic encephalopathy. He was transferred to [facility] on 8/12/24 for evaluation for combined liver/kidney transplants (SLK).      [ ] Decompensated ETOH Cirrhosis  - SLK eval , MELD 38 O  - Intubated  - HE: cont rifaximin/miralax  - EV:  EGD (7/12) with small non-bleeding EV and a duodenal ulcer with a visible vessel. EGD 8/20: no active bleeding, PPI.  - s/p colonoscopy 8/21: lesion biopsied  - stress dose steroids per SICU, come off pressors as able  - FANY/HRS: anuric, On CRRT 8/13, Renal following  - ID: Empiric Elise/fluc/ Doxy, f/u surveillance cultures  - bcx 8/13 w/ MRSE , repeat 8/14 with NGTD  - Thiamine/MVI/FOLIC ACID  - Flex sig: friable mucosa, internal and external hemorrhoids  - LHC deferred for today, will re-address daily once stabilized  - cont sicu care        68y with obesity and risky alcohol consumption (with decades' history of daily consumption of homemade alcohol), admitted to [facility] 1 month ago due to recent onset of jaundice and with a prolonged hospital and ICU course there due to newly diagnosed decompensated cirrhosis with acute on chronic liver failure (ACLF) with shock (resolved, but still on midodrine); FANY (on intermittent HD since 7/9); recurrent maroon stools and acute on chronic anemia necessitating intermittent PRBC transfusions (last on 8/8) and hypofibrinoginemia, with EGD (7/12) with small non-bleeding EV and a duodenal ulcer with a visible vessel; and waxing and waning hepatic encephalopathy. He was transferred to [facility] on 8/12/24 for evaluation for combined liver/kidney transplants (SLK).      Decompensated ETOH Cirrhosis  - SLK eval , MELD 38 O  - extubate as able, pressors per SICU  - HE: cont rifaximin/miralax  - EV:  EGD (7/12) with small non-bleeding EV and a duodenal ulcer with a visible vessel. EGD 8/20: no active bleeding, PPI.  - s/p colonoscopy 8/21: lesion biopsied, f/u  - stress dose steroids per SICU completed, come off pressors as able  - FANY/HRS: anuric, On CRRT 8/13, Renal following  - ID: Empiric Elise/fluc/ Doxy, f/u surveillance cultures  - bcx 8/13 w/ MRSE , repeat 8/14 with NGTD  - Thiamine/MVI/FOLIC ACID  - [facility] deferred for today, will re-address daily once stabilized  - cont sicu care

## 2024-08-22 NOTE — PROGRESS NOTE ADULT - ASSESSMENT
78 y/o male w/ no known PMHx (does not see doctors per sister) w/ newly diagnosed cirrhosis c/b vasoplegia requiring vasopressor support, small EVs, melena 2/2 a duodenal ulcer s/p clipping, HRS requiring CRRT, hepatic encephalopathy, and ascites s/p multiple LVPs presenting for liver transplant evaluation    Neuro:  - Miralax & rifaximin for prevention of hepatic encephalopathy  - Monitoring ammonia  - Thiamine, folic acid, & multivitamin for h/o risky EtOH use  - Melatonin PRN insomnia  - 8/20: Precedex prn for agitation, waxing and waning mental status    Respiratory  - Intubated for endoscopy, initial settings 400/16/5/40% w/ acidosis per gas; adjusted TV to 480/16/5/40% --> 8/21: 480/20/5/40%  - Assess need for diuresis daily as patient w/ pulmonary vascular congestion on CXR    Cardiovascular  - levo, vaso  - midodrine discontinued  - Will wean pressors as tolerated w/ goal MAP > 65  - 8/19: LHC this week per cardiology; 8/20 now deferred until pt improves clinically    Gastrointestinal and Nutrition  - NPO  - Miralax & rifaximin for prevention of hepatic encephalopathy  - Protonix BID for h/o duodenal ulcer  - Monitor LFTs  - Undergoing evaluation for SLK transplantation  - 8/14 CT A/P: cirrhosis w/ portal HTN, small infarct in left hepatic lobe, small splenic infarct, focal eccentric wall thickening of rectum, and large volume ascites  - 8/14 paracentesis w/ 5 L drained  - 8/19: flex sig scheduled for today for bowel thickening on CT A/P and blood in stool; tap water enema prior -- showed diffuse oozing throughout bowel  - 8/20: bedside endoscopy WNL, octreotide started; bowel regimen in preparation for colonoscopy tomorrow 8/21  - 8/21: s/p colonoscopy with rectal ulcer, f/u biopsy; d/c octreotide    Renal:  - CRRT -50 for acute renal failure likely 2/2 HRS  - Appreciate nephrology recommendations  - Undergoing evaluation for SLK transplantation  - 8/20 f/u nephrology as circuit keeps clotting    Heme:  - Venodynes for mechanical VTE prophylaxis; holding chemical VTE prophylaxis as patient is coagulopathic 2/2 cirrhosis  - s/p 1 PRBC for lost CRRT circuit  - 8/20: s/p 2U PRBC, 2U plts per TEG for low Hgb; EPO 10,000U weekly started    ID:   - Empiric coverage w/ meropenem & fluconazole as per transplant given vasopressor requirements  - Blood cultures w/ MRSE in 1 bottle on 8/12 but repeat cultures sent 8/14 w/ no growth to date  - 8/18: + doxycycline added for Lyme  - 8/20: f/u rpt CMV labs, sputum Cx, RVP -- unable to obtain urine legionella as pt anuric  - 8/21: straight cathed for urine legionella, f/u. ?+CMV per PCR. appreciate ID recs; f/u schistosoma  - 8/22: f/u BCx    Endo:   - Stress dose steroids for possible adrenal insufficiency given persistent vasopressor requirement  - 8/21: steroids discontinued    Donna Downey DO (EM PGY-2)  Surgical Intensive Care Unit [id number]

## 2024-08-23 NOTE — PROGRESS NOTE ADULT - ASSESSMENT
76 y/o male w/ no known PMHx (does not see doctors per sister) w/ newly diagnosed cirrhosis c/b vasoplegia requiring vasopressor support, small EVs, melena 2/2 a duodenal ulcer s/p clipping, HRS requiring CRRT, hepatic encephalopathy, and ascites s/p multiple LVPs presenting for liver transplant evaluation    Neuro:  - Miralax & rifaximin for prevention of hepatic encephalopathy  - Monitoring ammonia  - Thiamine, folic acid, & multivitamin for h/o risky EtOH use  - Melatonin PRN insomnia  - 8/20: Precedex prn for agitation, waxing and waning mental status    Respiratory  - Intubated for endoscopy, initial settings 400/16/5/40% w/ acidosis per gas; adjusted TV to 480/16/5/40% --> 8/21: 480/20/5/40%  - Assess need for diuresis daily as patient w/ pulmonary vascular congestion on CXR    Cardiovascular  - levo, vaso  - midodrine discontinued  - Will wean pressors as tolerated w/ goal MAP > 65  - 8/19: LHC this week per cardiology; 8/20 now deferred until pt improves clinically    Gastrointestinal and Nutrition  - NPO  - Miralax & rifaximin for prevention of hepatic encephalopathy  - Protonix BID for h/o duodenal ulcer  - Monitor LFTs  - Undergoing evaluation for SLK transplantation  - 8/14 CT A/P: cirrhosis w/ portal HTN, small infarct in left hepatic lobe, small splenic infarct, focal eccentric wall thickening of rectum, and large volume ascites  - 8/14 paracentesis w/ 5 L drained  - 8/19: flex sig scheduled for today for bowel thickening on CT A/P and blood in stool; tap water enema prior -- showed diffuse oozing throughout bowel  - 8/20: bedside endoscopy WNL, octreotide started; bowel regimen in preparation for colonoscopy tomorrow 8/21  - 8/21: s/p colonoscopy with rectal ulcer, f/u biopsy; d/c octreotide    Renal:  - CRRT -50 for acute renal failure likely 2/2 HRS  - Appreciate nephrology recommendations  - Undergoing evaluation for SLK transplantation  - 8/20 f/u nephrology as circuit keeps clotting    Heme:  - Venodynes for mechanical VTE prophylaxis; holding chemical VTE prophylaxis as patient is coagulopathic 2/2 cirrhosis  - s/p 1 PRBC for lost CRRT circuit  - 8/20: s/p 2U PRBC, 2U plts per TEG for low Hgb; EPO 10,000U weekly started    ID:   - Empiric coverage w/ meropenem & fluconazole as per transplant given vasopressor requirements  - Blood cultures w/ MRSE in 1 bottle on 8/12 but repeat cultures sent 8/14 w/ no growth to date  - 8/18: + doxycycline added for Lyme  - 8/20: f/u rpt CMV labs, sputum Cx, RVP -- unable to obtain urine legionella as pt anuric  - 8/21: straight cathed for urine legionella, f/u. ?+CMV per PCR. appreciate ID recs; f/u schistosoma  - 8/22: f/u BCx  - repeat CMV 8/26 per ID    Endo:   - Stress dose steroids for possible adrenal insufficiency given persistent vasopressor requirement  - 8/21: steroids discontinued    Dispo: SICU    Donna Downey DO (EM PGY-2)  Surgical Intensive Care Unit [id number]

## 2024-08-23 NOTE — PROVIDER CONTACT NOTE (CHANGE IN STATUS NOTIFICATION) - ACTION/TREATMENT ORDERED:
EP Consulted, Transcutaneous Pacing Pads placed on patient. MD Gutiérrez assessed at bedside. MD Price aware of darrell arrythmia. EP Consulted, Transcutaneous Pacing Pads placed on patient. MD Gutiérrez assessed at bedside.

## 2024-08-23 NOTE — PROGRESS NOTE ADULT - ASSESSMENT
69 yo M with obesity and risky alcohol consumption (with decades' history of daily consumption of homemade alcohol). Admitted to [facility] for 1 month due to recent onset of jaundice and with a prolonged hospital/ICU course due to newly diagnosed decompensated cirrhosis with acute on chronic liver failure (ACLF) with shock, FANY (started on intermittent HD since 7/9), acute on chronic anemia with recurrent overt GI bleeding, and hepatic encephalopathy.     Transferred to [facility] on 8/12/24 for SLK evaluation and then transferred to the SICU for initiation of CRRT (8/13- ).      # Distributive shock    - ACLF vs septic/hypovolemic shock.   - Hypothermic (8/21-22). Now afebrile.    - Norepinephrine, Vasopressin  - Repeat sepsis work up negative thus far.   - Transplat ID following: CMV viremia (Valcyte not required). Unequivocal Lyme PCR (on Doxy).     - S/p Zosyn (8/12-16)   - On Meropenem (8/16- ), Doxycycline (8/18- ) and empiric fluconazole (8/12- ).     # History of UGIB (7'24 - resolved) and Hematochezia (8/18)   # Rectal Ulcer pending biopsy read    - EGD (7/12, at [facility]) with small non-bleeding EV and a duodenal ulcer with a visible vessel. No further hematemesis.   - Pantoprazole 40BID/Coreg 3.125mg.     - Flex Sig (8/19) with friable mucosa. No mass.   - EGD (8/20) with no active bleeding.   - Colonoscopy (8/21) friable mucosa, rectal ulcer biopsied.   - As needed PRBC and TEG-guided blood products.     # Anuric FANY on CKD    - Started intermittent HD (7/9-8/12) - Now on CRRT (8/13- )   - Marked anasarca. Fluid removal on CRRT limited due to hypotension.   - Transplant nephrology following.    # Newly diagnosed decompensated cirrhosis with ACLF   - Infection: as above   - HE: On/off Precedex. When awake, following simple commands. Rifaximin/Miralax. Lactulose held for abdominal distention.    - Agitation/Insomnia: Seroquel 25mg or Haldol 2.5mg at night. Transplant Psych following.    - Large volume ascites and anasarca: Anuric. Limited fluid removal with CRRT. LVP (8/14) negative for SBP.   - Image: Contrast CT (8/13) with patent portohepatic vessels and no evidence of HCC. Small infarcts in left hepatic lobe and spleen.       - Nutrition: Poor po diet supplemented with NGT feeds. Nutrition following.   - PT: Last ambulatory on July/2024. Daily inpatient PT/OT.     # SLK ongoing evaluation (8/12)  - ABO O. MELD 3.0 score of 39  - Pending: dry pelvic CT and LHC for cardiology clearance. Deferred until medically optimized.    - Met SLK criteria in Aug 20th. Tissue typing completed.     PLAN   - Repeat BCx on 8/24   - Continue PRBC and TEG-guided products as needed   - Avoid Precedex. Management of agitation/insomnia as per  recommendations  - Titrate bowel regimen for goal 3-4 bowel movements daily   - Follow up rectal ulcer biopsy before proceeding with OLT evaluation    - LHC and dry pelvis CT deferred until clinically optimized      Note incomplete until finalized by attending signature/attestation.    Myra Maloney   Transplant Hepatology Fellow     69 yo M with obesity and risky alcohol consumption (with decades' history of daily consumption of homemade alcohol). Admitted to [facility] for 1 month due to recent onset of jaundice and with a prolonged hospital/ICU course due to newly diagnosed decompensated cirrhosis with acute on chronic liver failure (ACLF) with shock, FANY (started on intermittent HD since 7/9), acute on chronic anemia with recurrent overt GI bleeding, and hepatic encephalopathy.     Transferred to [facility] on 8/12/24 for SLK evaluation and then transferred to the SICU for initiation of CRRT (8/13- ).      # Distributive shock    - ACLF vs septic/hypovolemic shock.   - Hypothermic (8/21-22). Now afebrile.    - Norepinephrine, Vasopressin  - Repeat sepsis work up negative thus far.   - Transplat ID following: CMV viremia (Valcyte not required). Unequivocal Lyme PCR (on Doxy).     - S/p Zosyn (8/12-16)   - On Meropenem (8/16- ), Doxycycline (8/18- ) and empiric fluconazole (8/12- ).     # History of UGIB (7'24 - resolved) and Hematochezia (8/18)   # Rectal Ulcer pending biopsy read    - EGD (7/12, at [facility]) with small non-bleeding EV and a duodenal ulcer with a visible vessel. No further hematemesis.   - Pantoprazole 40BID/Coreg 3.125mg.     - Flex Sig (8/19) with friable mucosa. No mass.   - EGD (8/20) with no active bleeding.   - Colonoscopy (8/21) friable mucosa, rectal ulcer biopsied.   - As needed PRBC and TEG-guided blood products.     # Anuric FANY on CKD    - Started intermittent HD (7/9-8/12) - Now on CRRT (8/13- )   - Marked anasarca. Fluid removal on CRRT limited due to hypotension.   - Transplant nephrology following.    # Newly diagnosed decompensated cirrhosis with ACLF   - Infection: as above   - HE: On/off Precedex. When awake, following simple commands. Rifaximin/Miralax. Lactulose held for abdominal distention.    - Agitation/Insomnia: Seroquel 25mg or Haldol 2.5mg at night. Transplant Psych following.    - Large volume ascites and anasarca: Anuric. Limited fluid removal with CRRT. LVP (8/14) negative for SBP.   - Image: Contrast CT (8/13) with patent portohepatic vessels and no evidence of HCC. Small infarcts in left hepatic lobe and spleen.       - Nutrition: Poor po diet supplemented with NGT feeds. Nutrition following.   - PT: Last ambulatory on July/2024. Daily inpatient PT/OT.     # SLK ongoing evaluation (8/12)  - ABO O. MELD 3.0 score of 39  - Pending: dry pelvic CT and LHC for cardiology clearance. Deferred until medically optimized.    - Met SLK criteria in Aug 20th. Tissue typing completed.     PLAN   - Repeat BCx on 8/24   - Continue PRBC and TEG-guided products as needed   - Avoid Precedex. Management of agitation/insomnia as per  recommendations  - Titrate bowel regimen for goal 3-4 bowel movements daily   - Follow up rectal ulcer biopsy before proceeding with OLT evaluation    - LHC and dry pelvis CT deferred until clinically optimized    - Consider early Palliative consultation in case patient does not progresses in a favorable path.   - He was fully active and independent until June 2024 and has had a steep decline in the past weeks.   - Current prognosis is guarded.       Note incomplete until finalized by attending signature/attestation.    Myra Wilkes-Zuleika   Transplant Hepatology Fellow

## 2024-08-23 NOTE — PROGRESS NOTE ADULT - ASSESSMENT
68y with obesity and risky alcohol consumption (with decades' history of daily consumption of homemade alcohol), admitted to [facility] 1 month ago due to recent onset of jaundice and with a prolonged hospital and ICU course there due to newly diagnosed decompensated cirrhosis with acute on chronic liver failure (ACLF) with shock (resolved, but still on midodrine); FANY (on intermittent HD since 7/9); recurrent maroon stools and acute on chronic anemia necessitating intermittent PRBC transfusions (last on 8/8) and hypofibrinoginemia, with EGD (7/12) with small non-bleeding EV and a duodenal ulcer with a visible vessel; and waxing and waning hepatic encephalopathy. He was transferred to [facility] on 8/12/24 for evaluation for combined liver/kidney transplants (SLK).      Decompensated ETOH Cirrhosis  - SLK eval , MELD -------- O  - start TFs, pressors per SICU  - HE: cont rifaximin/miralax  - EV:  EGD (7/12) with small non-bleeding EV and a duodenal ulcer with a visible vessel. EGD 8/20: no active bleeding, PPI.  - s/p colonoscopy 8/21: lesion biopsied, f/u  - stress dose steroids per SICU completed, come off pressors as able  - FANY/HRS: anuric, On CRRT 8/13, Renal following  - ID: Empiric Elise/fluc/ Doxy, f/u surveillance cultures  - bcx 8/13 w/ MRSE , repeat 8/14 with NGTD  - Thiamine/MVI/FOLIC ACID  - [facility] deferred for today, will re-address daily once stabilized  - cont sicu care        68y with obesity and risky alcohol consumption (with decades' history of daily consumption of homemade alcohol), admitted to [facility] 1 month ago due to recent onset of jaundice and with a prolonged hospital and ICU course there due to newly diagnosed decompensated cirrhosis with acute on chronic liver failure (ACLF) with shock (resolved, but still on midodrine); FANY (on intermittent HD since 7/9); recurrent maroon stools and acute on chronic anemia necessitating intermittent PRBC transfusions (last on 8/8) and hypofibrinoginemia, with EGD (7/12) with small non-bleeding EV and a duodenal ulcer with a visible vessel; and waxing and waning hepatic encephalopathy. He was transferred to [facility] on 8/12/24 for evaluation for combined liver/kidney transplants (SLK).      Decompensated ETOH Cirrhosis  - SLK eval , MELD 39  - TFs, pressors per SICU  - HE: cont rifaximin/miralax  - EV:  EGD (7/12) with small non-bleeding EV and a duodenal ulcer with a visible vessel. EGD 8/20: no active bleeding, PPI.  - s/p colonoscopy 8/21: lesion biopsied, f/u path   - stress dose steroids per SICU completed, wean pressors as able  - FANY/HRS: anuric, On CRRT 8/13, Renal following  - ID: Empiric Elise/fluc/ Doxy. Elise switched to Zosyn  -  8/13 w/ MRSE , repeat 8/14 with NGTD  - Thiamine/MVI/FOLIC ACID  - LHC deferred, will re-address daily once stabilized  - Needs CT a/p non contrast to eval iliacs   - cont sicu care

## 2024-08-23 NOTE — PROVIDER CONTACT NOTE (CHANGE IN STATUS NOTIFICATION) - ASSESSMENT
Pt -4 RASS, received on 0.3 precedex. Responding with grimace to sternal rub and moan. GCS 8. Localizing pain on upper extremities and no response on lower extremities. Pupils remain 3+brisk. End Tidal nasal cannula started remains WDL.

## 2024-08-23 NOTE — PROGRESS NOTE ADULT - SUBJECTIVE AND OBJECTIVE BOX
PROGRESS NOTE    Patient name Adalberto nAna and date of birth 2008 was confirmed.    Time spent with patient: 53 minutes     TREATMENT PLAN  This plan was made in collaboration with patient.     GOAL #1 and Related Objectives: learn skills to help him calm down to go to medical appointments.   Target issue: medical anxiety-   INTERVENTION (Include how patient's strengths will be used, treatment modality and frequency): Cognitive Behavioral Therapy, Mindfulness based relaxation techniques, Motivational Interviewing and Supportive Therapy will be used to utilize the patient's strengths of motivation for change and utilizing positive supports through individual psychotherapy. 3-4 times per month. he will use existing insight and motivation for change  Barriers: None identified at this time  Review date: every two months     Data/presenting issue(s) and notable behavior/thought process/ affect  Adalberto explained difficulties with anxiety since the last session and the third panic attack he has experienced in his life being yesterday and the events that followed and needing to face his medical anxiety when he was taken to the hospital via ambulance and had a full work up to assure his health was stable to go home, (nothing was reportedly found at that time).  Today he worked on learning about anxiety and the link to panic attacks and how thoughts and behaviors can escalate symptoms of panic.     Adalberto presented appropriate throughout the session. Alert, attentive, oriented to self,place and time. Speech is clear and fluent.   Memory, concentration and fund of knowledge are normal.  He participated fully. He shows fair insight into presenting problems.     Therapeutic intervention provided:   · Therapist took Person-centered approach, engaged in empathic listening and promoting positive self-regard    ·  Build therapeutic alliance/ relationship to build trust and allow for healing process to occur in a safe  environment  ·  Provided support and encouragement  ·  Cognitive Behavioral Therapy (CBT) to identify and challenge irrational thoughts and negative self talk    Interactive: Therapy complexed by Adalberto's symptoms of adhd interfering with his focus and attention in session, today behaviors included throwing cards at this writer, throwing a game board directly at this writer, taking things out and tossing them around, re engagement was tried with a focused activity, but there was a lot of acting out behaviors to redirect.     Assessment/Progress  Based on Adalberto's report and this counselors observations, Adalberto's anxiety is worse since last visit.  Adalberto continues to meet criteria for ADHD combined type and other phobia- fear of other medical care.     Risk factors:  Adalberto denied risk of harm to self or others.     Patient will go to the nearest emergency room or call 9-1-1 if patient ever reports suicidal feelings, thoughts or plans, or if they believe patient may be a threat to self or others.     Plan/ Client's planned actions toward goals:  Adalberto reports he will practice he had his flu shot and he shared it went ok, but in the future he wants to work on more confidence going into medical appointments.     Plan Related to Treatment Services:  Based upon the above information the following recommendations are made:    · Continue Individual Therapy   · Frequency of sessions: 1-2 weeks    He was made aware of these recommendations and did agree to them.  He was made aware of how to contact the undersigned in case of an emergency.        Electronically signed by: Kallie Rubin LCSW  12/4/2019               INFECTIOUS DISEASES FOLLOW UP-- Omayra Rashid  [phone]    This is a follow up note for this  68yMale with  Acute or subacute hepatic failure without coma        ROS:  CONSTITUTIONAL:  No fever, good appetite  CARDIOVASCULAR:  No chest pain or palpitations  RESPIRATORY:  No dyspnea  GASTROINTESTINAL:  No nausea, vomiting, diarrhea, or abdominal pain  GENITOURINARY:  No dysuria  NEUROLOGIC:  No headache,     Allergies    No Known Allergies    Intolerances        ANTIBIOTICS/RELEVANT:  antimicrobials  doxycycline IVPB      doxycycline IVPB 100 milliGRAM(s) IV Intermittent every 12 hours  fluconAZOLE IVPB      fluconAZOLE IVPB 400 milliGRAM(s) IV Intermittent every 24 hours  meropenem  IVPB      meropenem  IVPB 1000 milliGRAM(s) IV Intermittent every 8 hours  rifAXIMin 550 milliGRAM(s) Oral two times a day    immunologic:  epoetin kareem (EPOGEN) Injectable 05130 Unit(s) SubCutaneous every 7 days    OTHER:  chlorhexidine 2% Cloths 1 Application(s) Topical <User Schedule>  CRRT Treatment    <Continuous>  folic acid 1 milliGRAM(s) Oral daily  insulin lispro (ADMELOG) corrective regimen sliding scale   SubCutaneous every 6 hours  lidocaine   4% Patch 1 Patch Transdermal daily  multivitamin 1 Tablet(s) Oral daily  norepinephrine Infusion 0.13 MICROgram(s)/kG/Min IV Continuous <Continuous>  pantoprazole  Injectable 40 milliGRAM(s) IV Push two times a day  Phoxillum Filtration BK 4 / 2.5 5000 milliLiter(s) CRRT <Continuous>  Phoxillum Filtration BK 4 / 2.5 5000 milliLiter(s) CRRT <Continuous>  polyethylene glycol 3350 17 Gram(s) Oral <User Schedule>  PrismaSOL Filtration BGK 4 / 2.5 5000 milliLiter(s) CRRT <Continuous>  thiamine 100 milliGRAM(s) Oral daily  vasopressin Infusion 0.03 Unit(s)/Min IV Continuous <Continuous>      Objective:  Vital Signs Last 24 Hrs  T(C): 36.3 (23 Aug 2024 07:00), Max: 36.6 (22 Aug 2024 19:00)  T(F): 97.3 (23 Aug 2024 07:00), Max: 97.8 (22 Aug 2024 19:00)  HR: 65 (23 Aug 2024 11:00) (61 - 79)  BP: 104/51 (22 Aug 2024 19:30) (104/51 - 104/51)  BP(mean): 74 (22 Aug 2024 19:30) (74 - 74)  RR: 10 (23 Aug 2024 11:00) (10 - 26)  SpO2: 98% (23 Aug 2024 11:00) (84% - 100%)    Parameters below as of 23 Aug 2024 07:00  Patient On (Oxygen Delivery Method): room air        PHYSICAL EXAM:  Constitutional:no acute distress  Eyes:CAT, EOMI  Ear/Nose/Throat: no oral lesions, 	  Respiratory: clear BL  Cardiovascular: S1S2  Gastrointestinal:soft, (+) BS, no tenderness  Extremities:no e/e/c  No Lymphadenopathy  IV sites not inflammed.    LABS:                        7.6    16.50 )-----------( 94       ( 23 Aug 2024 05:41 )             21.7     08-23    136  |  101  |  11  ----------------------------<  193<H>  4.1   |  20<L>  |  1.09    Ca    8.3<L>      23 Aug 2024 05:42  Phos  3.9     08-23  Mg     2.4     08-23    TPro  5.9<L>  /  Alb  3.3  /  TBili  13.2<H>  /  DBili  x   /  AST  78<H>  /  ALT  35  /  AlkPhos  167<H>  08-23    PT/INR - ( 23 Aug 2024 05:41 )   PT: 26.0 sec;   INR: 2.54 ratio         PTT - ( 23 Aug 2024 05:41 )  PTT:48.3 sec  Urinalysis Basic - ( 23 Aug 2024 05:42 )    Color: x / Appearance: x / SG: x / pH: x  Gluc: 193 mg/dL / Ketone: x  / Bili: x / Urobili: x   Blood: x / Protein: x / Nitrite: x   Leuk Esterase: x / RBC: x / WBC x   Sq Epi: x / Non Sq Epi: x / Bacteria: x        MICROBIOLOGY:            RECENT CULTURES:  08-21 @ 20:45  .Blood Blood-Venous  --  --  --    No growth at 24 hours  --  08-21 @ 20:00  .Blood Blood-Venous  --  --  --    No growth at 24 hours  --  08-21 @ 00:15  Combi-Cath Combi-Cath  --  --  --    Normal Respiratory Elo present  --  08-20 @ 12:27  .Blood Blood  --  --  --    No growth at 48 Hours  --  08-20 @ 11:43  .Blood Blood  --  --  --    No growth at 48 Hours  --      RADIOLOGY & ADDITIONAL STUDIES:    < from: Xray Chest 1 View- PORTABLE-Urgent (Xray Chest 1 View- PORTABLE-Urgent .) (08.22.24 @ 17:16) >  IMPRESSION:  Double-lumen catheter and non-tunneled right IJ catheter terminating in   the proximal SVC.  Enteric tube courses below the level the diaphragm, appears to course   throughthe duodenum below the field-of-view, and terminate in the distal   duodenum.  Low lung volumes. No focal consolidation.    < end of copied text >   INFECTIOUS DISEASES FOLLOW UP-- Omayra Rashid  [phone]    This is a follow up note for this  68yMale with  Acute or subacute hepatic failure without coma  remains somnolent        ROS:  CONSTITUTIONAL: , somnolent    Allergies    No Known Allergies    Intolerances        ANTIBIOTICS/RELEVANT:  antimicrobials  doxycycline IVPB      doxycycline IVPB 100 milliGRAM(s) IV Intermittent every 12 hours  fluconAZOLE IVPB      fluconAZOLE IVPB 400 milliGRAM(s) IV Intermittent every 24 hours  meropenem  IVPB      meropenem  IVPB 1000 milliGRAM(s) IV Intermittent every 8 hours  rifAXIMin 550 milliGRAM(s) Oral two times a day    immunologic:  epoetin kareem (EPOGEN) Injectable 37135 Unit(s) SubCutaneous every 7 days    OTHER:  chlorhexidine 2% Cloths 1 Application(s) Topical <User Schedule>  CRRT Treatment    <Continuous>  folic acid 1 milliGRAM(s) Oral daily  insulin lispro (ADMELOG) corrective regimen sliding scale   SubCutaneous every 6 hours  lidocaine   4% Patch 1 Patch Transdermal daily  multivitamin 1 Tablet(s) Oral daily  norepinephrine Infusion 0.13 MICROgram(s)/kG/Min IV Continuous <Continuous>  pantoprazole  Injectable 40 milliGRAM(s) IV Push two times a day  Phoxillum Filtration BK 4 / 2.5 5000 milliLiter(s) CRRT <Continuous>  Phoxillum Filtration BK 4 / 2.5 5000 milliLiter(s) CRRT <Continuous>  polyethylene glycol 3350 17 Gram(s) Oral <User Schedule>  PrismaSOL Filtration BGK 4 / 2.5 5000 milliLiter(s) CRRT <Continuous>  thiamine 100 milliGRAM(s) Oral daily  vasopressin Infusion 0.03 Unit(s)/Min IV Continuous <Continuous>      Objective:  Vital Signs Last 24 Hrs  T(C): 36.3 (23 Aug 2024 07:00), Max: 36.6 (22 Aug 2024 19:00)  T(F): 97.3 (23 Aug 2024 07:00), Max: 97.8 (22 Aug 2024 19:00)  HR: 65 (23 Aug 2024 11:00) (61 - 79)  BP: 104/51 (22 Aug 2024 19:30) (104/51 - 104/51)  BP(mean): 74 (22 Aug 2024 19:30) (74 - 74)  RR: 10 (23 Aug 2024 11:00) (10 - 26)  SpO2: 98% (23 Aug 2024 11:00) (84% - 100%)    Parameters below as of 23 Aug 2024 07:00  Patient On (Oxygen Delivery Method): room air        PHYSICAL EXAM:  Constitutional:no acute distress  Eyes:CAT, EOMI  Ear/Nose/Throat: no oral lesions, CVC HD catheter RIJ	  Respiratory: clear BL  Cardiovascular: S1S2  Gastrointestinal:soft, (+) BS, no tenderness, ascites  Extremities:no e/e/cedema  No Lymphadenopathy  IV sites not inflammed.    LABS:                        7.6    16.50 )-----------( 94       ( 23 Aug 2024 05:41 )             21.7     08-23    136  |  101  |  11  ----------------------------<  193<H>  4.1   |  20<L>  |  1.09    Ca    8.3<L>      23 Aug 2024 05:42  Phos  3.9     08-23  Mg     2.4     08-23    TPro  5.9<L>  /  Alb  3.3  /  TBili  13.2<H>  /  DBili  x   /  AST  78<H>  /  ALT  35  /  AlkPhos  167<H>  08-23    PT/INR - ( 23 Aug 2024 05:41 )   PT: 26.0 sec;   INR: 2.54 ratio         PTT - ( 23 Aug 2024 05:41 )  PTT:48.3 sec  Urinalysis Basic - ( 23 Aug 2024 05:42 )    Color: x / Appearance: x / SG: x / pH: x  Gluc: 193 mg/dL / Ketone: x  / Bili: x / Urobili: x   Blood: x / Protein: x / Nitrite: x   Leuk Esterase: x / RBC: x / WBC x   Sq Epi: x / Non Sq Epi: x / Bacteria: x        MICROBIOLOGY:            RECENT CULTURES:  08-21 @ 20:45  .Blood Blood-Venous  --  --  --    No growth at 24 hours  --  08-21 @ 20:00  .Blood Blood-Venous  --  --  --    No growth at 24 hours  --  08-21 @ 00:15  Combi-Cath Combi-Cath  --  --  --    Normal Respiratory Elo present  --  08-20 @ 12:27  .Blood Blood  --  --  --    No growth at 48 Hours  --  08-20 @ 11:43  .Blood Blood  --  --  --    No growth at 48 Hours  --      RADIOLOGY & ADDITIONAL STUDIES:    < from: Xray Chest 1 View- PORTABLE-Urgent (Xray Chest 1 View- PORTABLE-Urgent .) (08.22.24 @ 17:16) >  IMPRESSION:  Double-lumen catheter and non-tunneled right IJ catheter terminating in   the proximal SVC.  Enteric tube courses below the level the diaphragm, appears to course   throughthe duodenum below the field-of-view, and terminate in the distal   duodenum.  Low lung volumes. No focal consolidation.    < end of copied text >

## 2024-08-23 NOTE — PROGRESS NOTE ADULT - SUBJECTIVE AND OBJECTIVE BOX
24 HOUR EVENTS:  - extubated  - precedex for agitation    SUBJECTIVE/ROS:  [ ] A ten-point review of systems was otherwise negative except as noted.  [ ] Due to altered mental status/intubation, subjective information were not able to be obtained from the patient. History was obtained, to the extent possible, from review of the chart and collateral sources of information.      NEURO  Exam: lethargic, disoriented  Meds: dexMEDEtomidine Infusion 0.2 MICROgram(s)/kG/Hr IV Continuous <Continuous>  HYDROmorphone  Injectable 0.5 milliGRAM(s) IV Push every 3 hours PRN Moderate Pain (4 - 6)    [x] Adequacy of sedation and pain control has been assessed and adjusted      RESPIRATORY  RR: 19 (24 @ 02:00) (11 - 32)  SpO2: 98% (24 @ 02:00) (84% - 100%)  Wt(kg): --  Exam: unlabored  Mechanical Ventilation: Mode: Vent off, RR (patient): 20, FiO2: 40  ABG - ( 23 Aug 2024 00:13 )  pH: 7.45  /  pCO2: 28    /  pO2: 117   / HCO3: 20    / Base Excess: -3.9  /  SaO2: 99.1    Lactate: x                [N/A] Extubation Readiness Assessed  Meds:       CARDIOVASCULAR  HR: 64 (24 @ 02:00) (60 - 79)  BP: 104/51 (24 @ 19:30) (104/51 - 104/51)  BP(mean): 74 (24 @ 19:30) (74 - 74)  ABP: 120/41 (24 @ 02:00) (93/33 - 131/47)  ABP(mean): 60 (24 @ 02:00) (44 - 69)  Wt(kg): --  CVP(cm H2O): --      Exam: regular rate and rhythm  Cardiac Rhythm: NSR  Perfusion     [x]Adequate   [ ]Inadequate  Mentation   [x]Normal       [ ]Reduced  Extremities  [x]Warm         [ ]Cool  Volume Status [ ]Hypervolemic [x]Euvolemic [ ]Hypovolemic  Meds: norepinephrine Infusion 0.35 MICROgram(s)/kG/Min IV Continuous <Continuous>        GI/NUTRITION  Exam: distended, nontender  Diet: npo  Meds: pantoprazole  Injectable 40 milliGRAM(s) IV Push two times a day  polyethylene glycol 3350 17 Gram(s) Oral <User Schedule>      GENITOURINARY  I&O's Detail    - @ : @ 07:00  --------------------------------------------------------  IN:    Albumin 5%  - 250 mL: 250 mL    Cryoprecipitate: 75 mL    Dexmedetomidine: 128.6 mL    Enteral Tube Flush: 170 mL    IV PiggyBack: 450 mL    IV PiggyBack: 200 mL    Norepinephrine: 347.4 mL    Octreotide: 20 mL    Platelets - Single Donor: 225 mL    PRBCs (Packed Red Blood Cells): 300 mL    Vasopressin: 108 mL  Total IN: 2274 mL    OUT:    Miscellaneous Tube Feedin mL    Other (mL): 1223 mL    Voided (mL): 0 mL  Total OUT: 1223 mL    Total NET: 1051 mL       @ 07: @ 02:41  --------------------------------------------------------  IN:    Albumin 5%  - 250 mL: 250 mL    Enteral Tube Flush: 120 mL    IV PiggyBack: 400 mL    Miscellaneous Tube Feedin mL    Norepinephrine: 302.7 mL    PRBCs (Packed Red Blood Cells): 300 mL    Vasopressin: 81 mL  Total IN: 1633.7 mL    OUT:    Dexmedetomidine: 0 mL    Other (mL): 1225 mL  Total OUT: 1225 mL    Total NET: 408.7 mL              135  |  102  |  11  ----------------------------<  154<H>  4.4   |  19<L>  |  1.05    Ca    8.5      23 Aug 2024 00:15  Phos  2.6       Mg     2.4         TPro  6.0  /  Alb  3.4  /  TBili  13.2<H>  /  DBili  x   /  AST  85<H>  /  ALT  35  /  AlkPhos  160<H>      [ ] Elizabeth catheter, indication: N/A  Meds: folic acid 1 milliGRAM(s) Oral daily  multivitamin 1 Tablet(s) Oral daily  sodium phosphate 15 milliMole(s)/250 mL IVPB 15 milliMole(s) IV Intermittent once  thiamine 100 milliGRAM(s) Oral daily        HEMATOLOGIC  Meds:   [x] VTE Prophylaxis                        7.8    15.98 )-----------( 98       ( 23 Aug 2024 00:15 )             22.9     PT/INR - ( 23 Aug 2024 00:15 )   PT: 25.2 sec;   INR: 2.46 ratio         PTT - ( 23 Aug 2024 00:15 )  PTT:47.7 sec  Transfusion     [ ] PRBC   [ ] Platelets   [ ] FFP   [ ] Cryoprecipitate      INFECTIOUS DISEASES  WBC Count: 15.98 K/uL ( @ 00:15)  WBC Count: 16.79 K/uL ( @ 21:21)  WBC Count: 16.96 K/uL ( @ 17:07)  WBC Count: 16.96 K/uL ( @ 09:36)  WBC Count: 17.50 K/uL ( @ 03:50)    RECENT CULTURES:  Specimen Source: .Blood Blood-Venous  Date/Time:  @ 20:45  Culture Results:   No growth at 24 hours  Gram Stain: --  Organism: --  Specimen Source: .Blood Blood-Venous  Date/Time:  @ 20:00  Culture Results:   No growth at 24 hours  Gram Stain: --  Organism: --  Specimen Source: Combi-Cath Combi-Cath  Date/Time:  @ 00:15  Culture Results:   Normal Respiratory Elo present  Gram Stain:   Moderate polymorphonuclear leukocytes per low power field  Rare Squamous epithelial cells per low power field  No organisms seen per oil power field  Organism: --  Specimen Source: .Blood Blood  Date/Time:  @ 12:27  Culture Results:   No growth at 48 Hours  Gram Stain: --  Organism: --  Specimen Source: .Blood Blood  Date/Time:  @ 11:43  Culture Results:   No growth at 48 Hours  Gram Stain: --  Organism: --    Meds: doxycycline IVPB 100 milliGRAM(s) IV Intermittent every 12 hours  doxycycline IVPB      epoetin kareem (EPOGEN) Injectable 93471 Unit(s) SubCutaneous every 7 days  fluconAZOLE IVPB 400 milliGRAM(s) IV Intermittent every 24 hours  fluconAZOLE IVPB      meropenem  IVPB      meropenem  IVPB 1000 milliGRAM(s) IV Intermittent every 8 hours  rifAXIMin 550 milliGRAM(s) Oral two times a day        ENDOCRINE  CAPILLARY BLOOD GLUCOSE      POCT Blood Glucose.: 111 mg/dL (22 Aug 2024 13:05)  POCT Blood Glucose.: 140 mg/dL (22 Aug 2024 06:50)    Meds: insulin lispro (ADMELOG) corrective regimen sliding scale   SubCutaneous every 6 hours  vasopressin Infusion 0.03 Unit(s)/Min IV Continuous <Continuous>        ACCESS DEVICES:  [ ] Peripheral IV  [ ] Central Venous Line	[ ] R	[ ] L	[ ] IJ	[ ] Fem	[ ] SC	Placed:   [ ] Arterial Line		[ ] R	[ ] L	[ ] Fem	[ ] Rad	[ ] Ax	Placed:   [ ] PICC:					[ ] Mediport  [ ] Urinary Catheter, Date Placed:   [x] Necessity of urinary, arterial, and venous catheters discussed    OTHER MEDICATIONS:  chlorhexidine 2% Cloths 1 Application(s) Topical <User Schedule>  CRRT Treatment    <Continuous>  lidocaine   4% Patch 1 Patch Transdermal daily  Phoxillum Filtration BK 4 / 2.5 5000 milliLiter(s) CRRT <Continuous>  Phoxillum Filtration BK 4 / 2.5 5000 milliLiter(s) CRRT <Continuous>  PrismaSOL Filtration BGK 4 / 2.5 5000 milliLiter(s) CRRT <Continuous>      CODE STATUS: FULL CODE

## 2024-08-23 NOTE — PROGRESS NOTE ADULT - SUBJECTIVE AND OBJECTIVE BOX
Transplant Surgery - Multidisciplinary Progress Note  --------------------------------------------------------------  Interval Events:  - remains on pressors  - extubated yesterday  - CRRT continues as tolerated, anuric      Potential Discharge date: Pending clinical course  Education:  Medications  Plan of care:  See Below      TX DATA HERE      Review of systems  All other systems were reviewed and are negative, except as noted.    PHYSICAL EXAM:  Constitutional: Well developed / well nourished  Eyes:  PERRLA  ENMT: NC/AT  Neck: supple,   Respiratory: CTA B/L  Cardiovascular: RRR  Gastrointestinal: Soft abdomen, ND, L sided abdominal wall hernia--stable, NT  Genitourinary: anuric   Extremities: SCD's in place and working bilaterally  Vascular: Palpable dp pulses bilaterally.   Neurological: waking up  Skin: no rashes, ulcerations, lesions  Musculoskeletal: Moving all extremities   Transplant Surgery - Multidisciplinary Progress Note  --------------------------------------------------------------  Interval Events:  - remains on pressors  - extubated yesterday  - CRRT continues as tolerated, anuric    Potential Discharge date: Pending clinical course  Education:  Medications  Plan of care:  See Below    MEDICATIONS  (STANDING):  chlorhexidine 2% Cloths 1 Application(s) Topical <User Schedule>  CRRT Treatment    <Continuous>  doxycycline IVPB      doxycycline IVPB 100 milliGRAM(s) IV Intermittent every 12 hours  epoetin kareem (EPOGEN) Injectable 24385 Unit(s) SubCutaneous <User Schedule>  fluconAZOLE IVPB      fluconAZOLE IVPB 400 milliGRAM(s) IV Intermittent every 24 hours  folic acid 1 milliGRAM(s) Oral daily  insulin lispro (ADMELOG) corrective regimen sliding scale   SubCutaneous every 6 hours  lidocaine   4% Patch 1 Patch Transdermal daily  multivitamin 1 Tablet(s) Oral daily  norepinephrine Infusion 0.13 MICROgram(s)/kG/Min (13.9 mL/Hr) IV Continuous <Continuous>  pantoprazole  Injectable 40 milliGRAM(s) IV Push two times a day  Phoxillum Filtration BK 4 / 2.5 5000 milliLiter(s) (200 mL/Hr) CRRT <Continuous>  Phoxillum Filtration BK 4 / 2.5 5000 milliLiter(s) (1000 mL/Hr) CRRT <Continuous>  piperacillin/tazobactam IVPB.. 3.375 Gram(s) IV Intermittent every 8 hours  polyethylene glycol 3350 17 Gram(s) Oral <User Schedule>  PrismaSOL Filtration BGK 4 / 2.5 5000 milliLiter(s) (2000 mL/Hr) CRRT <Continuous>  rifAXIMin 550 milliGRAM(s) Oral two times a day  thiamine 100 milliGRAM(s) Oral daily  vasopressin Infusion 0.03 Unit(s)/Min (4.5 mL/Hr) IV Continuous <Continuous>    PAST MEDICAL & SURGICAL HISTORY:  Alcohol abuse  No significant past surgical history    Vital Signs Last 24 Hrs  T(C): 36.1 (23 Aug 2024 19:00), Max: 36.4 (22 Aug 2024 23:00)  T(F): 97 (23 Aug 2024 19:00), Max: 97.5 (22 Aug 2024 23:00)  HR: 69 (23 Aug 2024 20:45) (62 - 79)  BP: --  BP(mean): --  RR: 16 (23 Aug 2024 20:45) (10 - 30)  SpO2: 100% (23 Aug 2024 20:45) (91% - 100%)    Parameters below as of 23 Aug 2024 19:00  Patient On (Oxygen Delivery Method): nasal cannula  O2 Flow (L/min): 2    I&O's Summary    22 Aug 2024 07:01  -  23 Aug 2024 07:00  --------------------------------------------------------  IN: 2493.7 mL / OUT: 1456 mL / NET: 1037.7 mL    23 Aug 2024 07:01  -  23 Aug 2024 21:28  --------------------------------------------------------  IN: 1571.3 mL / OUT: 1638 mL / NET: -66.7 mL                        7.6    16.89 )-----------( 100      ( 23 Aug 2024 16:58 )             22.2     08-23    137  |  103  |  10  ----------------------------<  164<H>  4.0   |  22  |  1.02    Ca    8.2<L>      23 Aug 2024 16:58  Phos  2.3     08-23  Mg     2.4     08-23    TPro  6.0  /  Alb  3.2<L>  /  TBili  12.6<H>  /  DBili  x   /  AST  77<H>  /  ALT  34  /  AlkPhos  191<H>  08-23    Culture - Blood (collected 08-21-24 @ 20:45)  Source: .Blood Blood-Venous  Preliminary Report (08-23-24 @ 01:02):    No growth at 24 hours    Culture - Blood (collected 08-21-24 @ 20:00)  Source: .Blood Blood-Venous  Preliminary Report (08-23-24 @ 01:02):    No growth at 24 hours    Culture - Bronchial (collected 08-21-24 @ 00:15)  Source: Combi-Cath Combi-Cath  Gram Stain (08-21-24 @ 15:23):    Moderate polymorphonuclear leukocytes per low power field    Rare Squamous epithelial cells per low power field    No organisms seen per oil power field  Final Report (08-22-24 @ 16:47):    Normal Respiratory Elo present    Culture - Blood (collected 08-20-24 @ 12:27)  Source: Blood Blood  Preliminary Report (08-23-24 @ 16:00):    No growth at 72 Hours    Culture - Blood (collected 08-20-24 @ 11:43)  Source: Blood Blood  Preliminary Report (08-23-24 @ 16:00):    No growth at 72 Hours    Review of systems  All other systems were reviewed and are negative, except as noted.    PHYSICAL EXAM:  Constitutional: Well developed / well nourished  Eyes:  PERRLA  ENMT: NC/AT  Neck: supple,   Respiratory: CTA B/L  Cardiovascular: RRR  Gastrointestinal: Soft abdomen, ND, L sided abdominal wall hernia--stable, NT  Genitourinary: anuric   Extremities: SCD's in place and working bilaterally  Vascular: Palpable dp pulses bilaterally.   Neurological: waking up  Skin: no rashes, ulcerations, lesions  Musculoskeletal: Moving all extremities

## 2024-08-23 NOTE — PROGRESS NOTE ADULT - ASSESSMENT
78 yo m with alcohol abuse otherwise no known chronic medical issues (does not see doctors per sister) s/p 1 month stay at [facility] now transferred to [facility] for liver transplant eval.  Most of history obtained from sister (Ashlyn) at bedside and some from transfer paperwork. Per sister, pt was initially brought to the hospital by family for back pain and jaundice for unclear amount of time. Patient was seen by GI and underwent EGD soon after admission which only showed nonbleeding ?duodenal ulcers (no intervention) however few days later pt developed active signs of bleeding and emergently underwent EGD again showing bleeding esophageal varices which were clipped.  pt was electively intubated with stay in ICU thereafter. Patient then started with HD due to worsening renal function and MS for past 2.5 weeks at times limited by low BP requiring pressors to assist. Had numerous paracentesis with varying amount of fluid removal - albumin infusions. Sister not aware of any concerns for acute infection during his stay but aware that pt was on abx at some point due to bleeding issues.        PERTINENT RADIOLOGY:  CXR: Tubes and lines as above. No focal consolidations  CT Chest, A&P:  Patchy ground-glass opacities in the bilateral upper lobes. *  Cirrhosis with evidence of portal hypertension. *  Hypodense lesion in the periphery of the left hepatic lobe of uncertain etiology. Somewhat wedge-shaped morphology raises the possibility for a small infarct. Question subtle peripheral nodular enhancement, and a hemangioma is also considered. Contrast enhanced abdominal MRI can be performed for further characterization and to assess for other possibilities. *  A wedge-shaped region of hypoattenuation in the spleen may reflect a small infarct. *  Focal eccentric wall thickening in the low rectum, possibly due to a mural fold. Consider colonoscopy. *  Large volume ascites.  CT Head: No evidence of acute intracranial pathology. If clinical symptoms persist or worsen, more sensitive evaluation with brain MRI may be obtained, if no contraindications exist.    BCx (8/12) 1/4 MRSE  BCx (8/14) NGTD  Paracentesis (8/14) Cell Counts Negative for SBP  MRSA/MSSA Nasal PCR (8/16) Negative    CXR (8/19) Ground Glass infiltrates persist  CT from 8//13 with bilateral Ground glass infiltrates    # Persistent Ground glass infiltrates of unclear etiology  please send urine legionella ag  please send deep sputum for testing for gram stain/cx, fungal stain/cx    #Decompensated liver cirrhosis, Leukocytosis, Shock  --Follow up ordered repeat BCX  --Can change Meropenem to Zosyn in the setting of negative cultures  --Can continue Empiric fluconazole  --Continue to follow CBC with diff  --Continue to follow transaminases  --Continue to follow temperature curve  --Follow up on  blood cultures 8/14 x3 sets are no growth    #Positive BCx (8/12) (Staph epi)  Consistent with contaminant   as repeat testing is negative x many    #CMV PCR   --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 Would repeat CMV PCR on (8/26)  -- may need to treat if increasing  Cytomegalovirus By PCR: 4890 --->4730 --->1020 --->867 (8/20)    #Pre Transplant Evaluation   HIV-1/2 Combo Result: Nonreactive  EBVPCR Log: NotDetec Hwi95TM/mL   Hepatitis A IgG Ab Result: Reactive   Hepatitis B Core Antibody, Total: Nonreactive  Hepatitis B Surface Antibody: Reactive   Hepatitis B DNA PCR Log: Not Detected  Hepatitis B Surface Antigen: Nonreactive  Hepatitis C Virus Interpretation: Nonreactive  COVID-19 De Domain Antibody: Positive  Treponema Pallidum Antibody Interpretation: Negative  HSV 1 IgG  Positive/HSV 2 IgG Negative  EBV Serology Positive  CMV IgG Positive  VZV IgG Positive  Measles Positive, Mumps Positive and Rubella IgG Positive  Toxoplasma IgG Positive  QuantiFeron Gold Negative  Strongyloides Ab Negative  Babesia PCR negative  --Follow up on Schistosoma IgG  --Reportedly Lyme serology was previously positive and remains positive, follow up on Tick Diseases Panel is negative for additional tick born exposures  -- Continue doxycycline 100mg bid as prior therapy was not given      #Encounter to Vaccinate Patient - Will defer vaccination in context of critical illness  COVID19: No primary series. Would benefit from COVID19 8442-1272 dose  Influenza: Would benefit from dose next season  Pneumococcal: Would benefit from PCV20  HAV: Immune, no additional vaccines indicated  HBV: Immune, no additional vaccines indicated  MMR: Immune, will not require further vaccination  Varicella: Immune, will not require further vaccination  Shingles: Would benefit from Shingrix  Tdap: Would benefit from Tdap      Herbie Rashid MD  Can be called via Teams  After 5pm/weekends [phone]

## 2024-08-23 NOTE — PROGRESS NOTE ADULT - SUBJECTIVE AND OBJECTIVE BOX
[facility] DIVISION OF KIDNEY DISEASES AND HYPERTENSION   FOLLOW UP NOTE    --------------------------------------------------------------------------------  Chief Complaint:    24 hour events/subjective: Pt. was seen and examined today. Remains in SICU, on CRRT.       PAST HISTORY  --------------------------------------------------------------------------------  No significant changes to PMH, PSH, FHx, SHx, unless otherwise noted    ALLERGIES & MEDICATIONS  --------------------------------------------------------------------------------  Allergies    No Known Allergies    Intolerances      Standing Inpatient Medications  chlorhexidine 2% Cloths 1 Application(s) Topical <User Schedule>  CRRT Treatment    <Continuous>  dexMEDEtomidine Infusion 0.2 MICROgram(s)/kG/Hr IV Continuous <Continuous>  doxycycline IVPB 100 milliGRAM(s) IV Intermittent every 12 hours  doxycycline IVPB      epoetin kareem (EPOGEN) Injectable 64308 Unit(s) SubCutaneous every 7 days  fluconAZOLE IVPB 400 milliGRAM(s) IV Intermittent every 24 hours  fluconAZOLE IVPB      folic acid 1 milliGRAM(s) Oral daily  insulin lispro (ADMELOG) corrective regimen sliding scale   SubCutaneous every 6 hours  lidocaine   4% Patch 1 Patch Transdermal daily  meropenem  IVPB 1000 milliGRAM(s) IV Intermittent every 8 hours  meropenem  IVPB      multivitamin 1 Tablet(s) Oral daily  norepinephrine Infusion 0.35 MICROgram(s)/kG/Min IV Continuous <Continuous>  pantoprazole  Injectable 40 milliGRAM(s) IV Push two times a day  Phoxillum Filtration BK 4 / 2.5 5000 milliLiter(s) CRRT <Continuous>  Phoxillum Filtration BK 4 / 2.5 5000 milliLiter(s) CRRT <Continuous>  polyethylene glycol 3350 17 Gram(s) Oral <User Schedule>  PrismaSOL Filtration BGK 4 / 2.5 5000 milliLiter(s) CRRT <Continuous>  rifAXIMin 550 milliGRAM(s) Oral two times a day  thiamine 100 milliGRAM(s) Oral daily  vasopressin Infusion 0.03 Unit(s)/Min IV Continuous <Continuous>    PRN Inpatient Medications  HYDROmorphone  Injectable 0.5 milliGRAM(s) IV Push every 3 hours PRN      REVIEW OF SYSTEMS  --------------------------------------------------------------------------------    unable to obtain      VITALS/PHYSICAL EXAM  --------------------------------------------------------------------------------  T(C): 36.4 (24 @ 23:00), Max: 36.8 (24 @ 11:00)  HR: 63 (24 @ 04:30) (60 - 79)  BP: 104/51 (24 @ 19:30) (104/51 - 104/51)  RR: 19 (24 @ 04:30) (11 - 32)  SpO2: 100% (24 @ 04:30) (84% - 100%)  Wt(kg): --        24 @ 07:01  -  24 @ 07:00  --------------------------------------------------------  IN: 1774.9 mL / OUT: 1328 mL / NET: 446.9 mL        Physical Exam:  	Gen: NAD  	HEENT: Anicteric  	Pulm: CTA B/L  	CV: S1S2+  	Abd: Soft, +BS   	Ext: No LE edema B/L  	Neuro: Sedated  	Skin: Warm and dry  	Dialysis access: [facility]      LABS/STUDIES  --------------------------------------------------------------------------------              7.6    16.50 >-----------<  94       [24 @ 05:41]              21.7     136  |  101  |  11  ----------------------------<  193      [24 05:42]  4.1   |  20  |  1.09        Ca     8.3     [24 05:42]      Mg     2.4     [24 05:42]      Phos  3.9     [24 05:42]    TPro  5.9  /  Alb  3.3  /  TBili  13.2  /  DBili  x   /  AST  78  /  ALT  35  /  AlkPhos  167  [24 05:42]    PT/INR: PT 26.0 , INR 2.54       [24 05:41]  PTT: 48.3       [24 05:41]      Creatinine Trend:  SCr 1.09 [ 05:42]  SCr 1.05 [ 00:15]  SCr 1.05 [ 17:07]  SCr 1.02 [ 09:36]  SCr 1.03 [ @ 03:50]    Urinalysis - [24 05:42]      Color  / Appearance  / SG  / pH       Gluc 193 / Ketone   / Bili  / Urobili        Blood  / Protein  / Leuk Est  / Nitrite       RBC  / WBC  / Hyaline  / Gran  / Sq Epi  / Non Sq Epi  / Bacteria       HBsAb Reactive      [24 @ 21:16]  HBsAg Nonreact      [24 @ 21:13]  HBcAb Nonreact      [24 @ 21:16]  HCV 0.08, Nonreact      [24 @ 14:45]  HIV Nonreact      [24 @ 21:13]    CHETNA: titer Negative, pattern --      [24 @ 21:15]  Syphilis Screen (Treponema Pallidum Ab) Negative      [24 @ 21:15]  Immunofixation Serum:   Oligoclonal Pattern Consisting of One Weak IgM Kappa Band, Two Weak IgG  Kappa Bands, and One Weak IgA Lambda Band Identified      Reference Range: None Detected      [24 @ 21:13]  SPEP Interpretation: Oligoclonal Pattern Consisting of Three Weak Gamma-Migrating Paraproteins  and One Weak Beta-Migrating Paraprotein Identified      [24 @ 21:13]    Tacrolimus  Cyclosporine  Sirolimus  Mycophenolate  BK PCR  CMV PCRCMVPCR Lo.94 Ctl31NG/mL ( @ 12:26)  CMVPCR Log: 3.01 Nye16XV/mL ( @ 00:08)  CMVPCR Log: 3.67 Sao30MA/mL ( @ 00:37)  CMVPCR Log: 3.69 Wlh78AY/mL ( @ 21:13)    Parvo PCR  EBV PCR

## 2024-08-23 NOTE — PROGRESS NOTE ADULT - SUBJECTIVE AND OBJECTIVE BOX
Interval Events:   - Episode of hematochezia and CRRT malfunctioning with some resultant blood loss. Hb dropped to 6.8. Responsive to 1PRBC and Albumin.  - Precedex for Agitation. Stopped at 8am.   - L hand/arm edema. Doppler US ordered.     - Sedated on Precedex.   - Afebrile and extubated on 2L NC.   - Norepinephrine 0.13, Vasopressin 0.03.   - Anuric on net even CRRT.  - 5 reported BM      ROS:   12 point review of systems performed and negative except otherwise noted above.     Hospital Medications:  chlorhexidine 2% Cloths 1 Application(s) Topical <User Schedule>  CRRT Treatment    <Continuous>  doxycycline IVPB      doxycycline IVPB 100 milliGRAM(s) IV Intermittent every 12 hours  epoetin kareem (EPOGEN) Injectable 57478 Unit(s) SubCutaneous every 7 days  fluconAZOLE IVPB 400 milliGRAM(s) IV Intermittent every 24 hours  fluconAZOLE IVPB      folic acid 1 milliGRAM(s) Oral daily  insulin lispro (ADMELOG) corrective regimen sliding scale   SubCutaneous every 6 hours  lidocaine   4% Patch 1 Patch Transdermal daily  meropenem  IVPB      meropenem  IVPB 1000 milliGRAM(s) IV Intermittent every 8 hours  multivitamin 1 Tablet(s) Oral daily  norepinephrine Infusion 0.13 MICROgram(s)/kG/Min (13.9 mL/Hr) IV Continuous <Continuous>  pantoprazole  Injectable 40 milliGRAM(s) IV Push two times a day  Phoxillum Filtration BK 4 / 2.5 5000 milliLiter(s) (1000 mL/Hr) CRRT <Continuous>  Phoxillum Filtration BK 4 / 2.5 5000 milliLiter(s) (200 mL/Hr) CRRT <Continuous>  polyethylene glycol 3350 17 Gram(s) Oral <User Schedule>  PrismaSOL Filtration BGK 4 / 2.5 5000 milliLiter(s) (2000 mL/Hr) CRRT <Continuous>  rifAXIMin 550 milliGRAM(s) Oral two times a day  thiamine 100 milliGRAM(s) Oral daily  vasopressin Infusion 0.03 Unit(s)/Min (4.5 mL/Hr) IV Continuous <Continuous>    MAR over past 24 hours:    chlorhexidine 2% Cloths   1 Application(s) Topical (24 @ 06:52)    dexMEDEtomidine Infusion   5.7 mL/Hr IV Continuous (24 @ 01:01)    doxycycline IVPB   100 mL/Hr IV Intermittent (24 @ 05:12)   100 mL/Hr IV Intermittent (24 @ 17:20)    fluconAZOLE IVPB   100 mL/Hr IV Intermittent (24 @ 11:30)    folic acid   1 milliGRAM(s) Oral (24 @ 11:32)    HYDROmorphone  Injectable   0.5 milliGRAM(s) IV Push (24 @ 00:25)    insulin lispro (ADMELOG) corrective regimen sliding scale   2 Unit(s) SubCutaneous (24 @ 05:38)   2 Unit(s) SubCutaneous (24 @ 00:40)    lidocaine   4% Patch   1 Patch Transdermal (24 @ 11:32)    melatonin   5 milliGRAM(s) Oral (24 @ 23:50)    meropenem  IVPB   100 mL/Hr IV Intermittent (24 @ 05:11)   100 mL/Hr IV Intermittent (24 @ 22:05)   100 mL/Hr IV Intermittent (24 @ 14:08)    multivitamin   1 Tablet(s) Oral (24 @ 11:32)    norepinephrine Infusion   37.4 mL/Hr IV Continuous (24 @ 05:13)   37.4 mL/Hr IV Continuous (24 @ 00:26)   37.4 mL/Hr IV Continuous (24 @ 19:43)    pantoprazole  Injectable   40 milliGRAM(s) IV Push (24 @ 05:11)   40 milliGRAM(s) IV Push (24 @ 17:19)    Phoxillum Filtration BK 4 / 2.5   1000 mL/Hr CRRT (24 @ 19:44)    Phoxillum Filtration BK 4 / 2.5   200 mL/Hr CRRT (24 @ 19:43)    polyethylene glycol 3350   17 Gram(s) Oral (24 @ 11:32)   17 Gram(s) Oral (24 @ 07:38)    PrismaSOL Filtration BGK 4 / 2.5   2000 mL/Hr CRRT (24 @ 19:43)    rifAXIMin   550 milliGRAM(s) Oral (24 @ 05:11)   550 milliGRAM(s) Oral (24 @ 17:20)    sodium phosphate 15 milliMole(s)/250 mL IVPB   63.75 mL/Hr IV Intermittent (24 @ 04:23)    thiamine   100 milliGRAM(s) Oral (24 @ 11:31)    vasopressin Infusion   4.5 mL/Hr IV Continuous (24 @ 05:13)   4.5 mL/Hr IV Continuous (24 @ 19:42)      PHYSICAL EXAM:   Vital Signs last 24 hours:  T(F): 97.3 (24 @ 07:00), Max: 97.8 (24 @ 19:00)  HR: 65 (24 @ 11:00) (61 - 79)  BP: 104/51 (24 @ 19:30) (104/51 - 104/51)  BP(mean): 74 (24 @ 19:30) (74 - 74)  ABP: 117/39 (24 @ 11:00) (94/31 - 132/45)  ABP(mean): 58 (24 @ 11:00) (44 - 71)  RR: 10 (24 @ 11:00) (10 - 29)  SpO2: 98% (24 @ 11:00) (84% - 100%)    I&Os:    24 @ 07:01  -  24 @ 07:00  --------------------------------------------------------  IN:    Albumin 5%  - 250 mL: 250 mL    Dexmedetomidine: 134.1 mL    Enteral Tube Flush: 220 mL    IV PiggyBack: 700 mL    IV PiggyBack: 100 mL    Miscellaneous Tube Feedin mL    Norepinephrine: 381.6 mL    PRBCs (Packed Red Blood Cells): 300 mL    Vasopressin: 108 mL  Total IN: 2493.7 mL    OUT:    Dexmedetomidine: 0 mL    Other (mL): 1456 mL  Total OUT: 1456 mL    Total NET: 1037.7 mL      24 @ 07:01  -  24 @ 11:39  --------------------------------------------------------  IN:    Miscellaneous Tube Feedin mL    Norepinephrine: 24.6 mL    Vasopressin: 13.5 mL  Total IN: 108.1 mL    OUT:    Other (mL): 69 mL  Total OUT: 69 mL    Total NET: 39.1 mL    BMI (kg/m2): 33.2 (24 @ 16:46)  GENERAL: obese man, physically deconditioned.   NEURO: Sedated with Precedex.     HEENT: Scleral icterus  CHEST: Bilateral breath sounds, decreased in bases.    CARDIAC: Regular rate and rhythm  ABDOMEN: Soft, non-tender, distended. Present bowel sounds. +anasarca  EXTREMITIES: warm, well perfused. BLE pitting edema up to thighs. LUE edema.     SKIN: +Jaundiced, no rash/ecchymoses    DIAGNOSTICS:  WBC      Hg       PLT      Na       K        CO2     BUN      Cr       ALT      AST      TB       ALP  ------   ------   ------   136      4.1      20       11       1.09     35       78       13.2     167      24 @ 05:42  16.50    7.6      94       ------   ------   ------   ------   ------   ------   ------   ------   ------   24 @ 05:41  15.98    7.8      98       135      4.4      19       11       1.05     35       85       13.2     160      24 @ 00:15  16.79    6.9      103      ------   ------   ------   ------   ------   ------   ------   ------   ------   24 @ 21:21  16.96    7.2      106      136      4.4      19       10       1.05     34       81       12.9     155      08-22-24 @ 17:07    PT             INR            MELDwith  MELDw/o  26.0           2.54           --             --             24 @ 05:41  25.2           2.46           --             --             24 @ 00:15  27.6           2.71           --             --             24 @ 17:07  25.2           2.46           --             --             24 @ 09:36  22.7           2.21           --             --             24 @ 03:50

## 2024-08-23 NOTE — PROVIDER CONTACT NOTE (CHANGE IN STATUS NOTIFICATION) - ASSESSMENT
Pt in atrial flutter with intermittent bradycardic episodes as low as 32 for increasing periods of time (5-7 seconds). Precedex held since 07:45. Remains on levo and vaso. Intermittently increasingly hypotensive while bradycardic. Vitals as shown on vital signs flowsheet.

## 2024-08-23 NOTE — PROGRESS NOTE ADULT - ASSESSMENT
68 year old male with  AUD complicated by cirrhosis with Hepatic encephalopathy admitted to [facility] with back pain and jaundice on 7/8/24. Pt had dark / maroon colored stools   EGD that showed esophageal varices and duodenal ulcer with visible vessel. He got transfusions for low Hb and last transfusion was on 8/8. 1st session of IHD was on 7/9/24.     Transplant nephrology consulted for FANY and SLK eval.       1.  FANY VS FANY on CKD 2' likely HRS  Meets FANY criteria for SLK   -Started on CRRT on 8/13/24 (1st session of IHD was on 7/9/24 completed 6 weeks on 8/20). Dialysate flow rate adjusted for clotting (8/19).    -Pt remains anuric and on pressor support. Issue with clotting (8/21), adjusted settings further. Will cw CRRT.     68 year old male with  AUD complicated by cirrhosis with Hepatic encephalopathy admitted to [facility] with back pain and jaundice on 7/8/24. Pt had dark / maroon colored stools   EGD that showed esophageal varices and duodenal ulcer with visible vessel. He got transfusions for low Hb and last transfusion was on 8/8. 1st session of IHD was on 7/9/24.     Transplant nephrology consulted for FANY and SLK eval.       1.  FANY VS FANY on CKD 2' likely HRS  Meets FANY criteria for SLK   -Started on CRRT on 8/13/24 (1st session of IHD was on 7/9/24 completed 6 weeks on 8/20). Dialysate flow rate adjusted for clotting (8/19).    -Pt remains anuric and on pressor support. Issue with clotting (8/21), >>>If pressor requirement improve >>> can attempt hemo. For now adjusted settings further. Will cw CRRT.

## 2024-08-23 NOTE — PROGRESS NOTE ADULT - CRITICAL CARE ATTENDING COMMENT
N - precedex overnight for agitation, slowly waking up this am, dc dilaudid  P - extubated yesterday, on room air, inc congestion on cxr on right  C - NE 0.12  GI - TFs Kyndedjennifer, inc to goal today, bloody BM overnight, had mucosal bleeding and rectal ulcer   - CRRT clotted overnight, even volume status  H - 1 unit RBC for 6.9, will send TEG with labs tonight, LUE duplex  ID - on broad spectrum abx, leukocytosis, recx, remains negative  E - glucose controlled  PPx - ppi bid  Lines - RIJ HD, and TLC 8/21  Dispo - full code, liver transplant eval ongoing

## 2024-08-23 NOTE — PROVIDER CONTACT NOTE (CHANGE IN STATUS NOTIFICATION) - SITUATION
Pt in atrial flutter with intermittent bradycardic episodes as low as 32 for increasing periods of time. Precedex held since 07:45. Remains on levo and vaso. Intermittently increasingly hypotensive while bradycardic. Vitals as shown on vital signs flowsheet.
Pt -4 RASS, received on 0.3 precedex. Responding with grimace to sternal rub and moan. GCS 8. Localizing pain on upper extremities and no response on lower extremities. Pupils remain 3+brisk.

## 2024-08-24 NOTE — PROGRESS NOTE ADULT - ASSESSMENT
68 year old man with alcohol use disorder admitted to [facility] for 1 month due to recent onset of jaundice and with a prolonged hospital/ICU course due to newly diagnosed decompensated cirrhosis with acute on chronic liver failure (ACLF) with shock, FANY (started on intermittent HD since 7/9), acute on chronic anemia with recurrent overt GI bleeding, and hepatic encephalopathy.   Transferred to [facility] on 8/12/24 for SLK evaluation and then transferred to the SICU for initiation of CRRT.    Meets FANY criteria for SLK - has been on dialysis since 7/9/24  He remains on Vasopressin and Levophed  On empiric antimicrobials - Fluconazole, Doxycycline, zosyn   Anuric on CRRT. Electrolytes normal on CRRT. Has generalized anasarca and ascites, tolerating minimal UF due to hypotension   Will continue CRRT, UF 50-100cc/hour as tolerated.

## 2024-08-24 NOTE — PROGRESS NOTE ADULT - ASSESSMENT
68y with obesity and risky alcohol consumption (with decades' history of daily consumption of homemade alcohol), admitted to [facility] 1 month ago due to recent onset of jaundice and with a prolonged hospital and ICU course there due to newly diagnosed decompensated cirrhosis with acute on chronic liver failure (ACLF) with shock (resolved, but still on midodrine); FANY (on intermittent HD since 7/9); recurrent maroon stools and acute on chronic anemia necessitating intermittent PRBC transfusions (last on 8/8) and hypofibrinoginemia, with EGD (7/12) with small non-bleeding EV and a duodenal ulcer with a visible vessel; and waxing and waning hepatic encephalopathy. He was transferred to [facility] on 8/12/24 for evaluation for combined liver/kidney transplants (SLK).      Decompensated ETOH Cirrhosis  - SLK eval , MELD 39  - TFs, pressors per SICU  - HE: cont rifaximin/miralax  - EV:  EGD (7/12) with small non-bleeding EV and a duodenal ulcer with a visible vessel. EGD 8/20: no active bleeding, PPI.  - s/p colonoscopy 8/21: lesion biopsied, f/u path   - stress dose steroids per SICU completed, wean pressors as able  - FANY/HRS: anuric, On CRRT 8/13, Renal following  - ID: Empiric Elise/fluc/ Doxy. Elise switched to Zosyn  -  8/13 w/ MRSE , repeat 8/14 with NGTD  - Thiamine/MVI/FOLIC ACID  - LHC deferred, will re-address daily once stabilized  - Needs CT a/p non contrast to eval iliacs   - cont sicu care

## 2024-08-24 NOTE — PROGRESS NOTE ADULT - SUBJECTIVE AND OBJECTIVE BOX
24 HOUR EVENTS:    NEURO  Exam: A&O x 4  Meds:     RESPIRATORY  RR: 25 (24 @ 00:30) (10 - 33)  SpO2: 99% (24 @ 00:30) (91% - 100%)  Exam: NC, non-labored  ABG - ( 24 Aug 2024 00:08 )  pH: 7.46  /  pCO2: 33    /  pO2: 121   / HCO3: 24    / Base Excess: -0.1  /  SaO2: 100.0   Lactate: x        CARDIOVASCULAR  HR: 68 (24 @ 00:30) (62 - 79)  BP: 122/58 (24 @ 21:45) (122/58 - 122/58)  BP(mean): 83 (24 @ 21:45) (83 - 83)  ABP: 127/40 (24 @ 00:30) (106/38 - 137/56)  ABP(mean): 62 (24 @ 00:30) (50 - 76)      Exam:   Cardiac Rhythm:   Perfusion     [x]Adequate   [ ]Inadequate  Mentation   [x]Normal       [ ]Reduced  Extremities  [x]Warm         [ ]Cool  Volume Status [ ]Hypervolemic [x]Euvolemic [ ]Hypovolemic  Meds: norepinephrine Infusion 0.13 MICROgram(s)/kG/Min IV Continuous <Continuous>      GI/NUTRITION  Exam: distended  Diet: NPO  Meds: pantoprazole  Injectable 40 milliGRAM(s) IV Push two times a day  polyethylene glycol 3350 17 Gram(s) Oral <User Schedule>      GENITOURINARY  I&O's Detail     @ 07:  -   @ 07:00  --------------------------------------------------------  IN:    Albumin 5%  - 250 mL: 250 mL    Dexmedetomidine: 134.1 mL    Enteral Tube Flush: 220 mL    IV PiggyBack: 700 mL    IV PiggyBack: 100 mL    Miscellaneous Tube Feedin mL    Norepinephrine: 381.6 mL    PRBCs (Packed Red Blood Cells): 300 mL    Vasopressin: 108 mL  Total IN: 2493.7 mL    OUT:    Dexmedetomidine: 0 mL    Other (mL): 1456 mL  Total OUT: 1456 mL    Total NET: 1037.7 mL       @ 07:01  -   @ 00:41  --------------------------------------------------------  IN:    Enteral Tube Flush: 200 mL    IV PiggyBack: 250 mL    IV PiggyBack: 450 mL    Miscellaneous Tube Feedin mL    Norepinephrine: 38.5 mL    Norepinephrine: 225.5 mL    Vasopressin: 72 mL  Total IN: 1796 mL    OUT:    Other (mL): 2040 mL  Total OUT: 2040 mL    Total NET: -244 mL              137  |  103  |  10  ----------------------------<  164<H>  4.0   |  22  |  1.02    Ca    8.2<L>      23 Aug 2024 16:58  Phos  2.3       Mg     2.4         TPro  6.0  /  Alb  3.2<L>  /  TBili  12.6<H>  /  DBili  x   /  AST  77<H>  /  ALT  34  /  AlkPhos  191<H>      Meds: folic acid 1 milliGRAM(s) Oral daily  multivitamin 1 Tablet(s) Oral daily  thiamine 100 milliGRAM(s) Oral daily      HEMATOLOGIC  Meds:                         7.9    17.29 )-----------( 95       ( 24 Aug 2024 00:15 )             22.2     PT/INR - ( 24 Aug 2024 00:15 )   PT: 25.6 sec;   INR: 2.39 ratio         PTT - ( 24 Aug 2024 00:15 )  PTT:49.7 sec    INFECTIOUS DISEASES  T(C): 36.1 (24 @ 19:00), Max: 36.3 (24 @ 07:00)  Wt(kg): --  WBC Count: 17.29 K/uL ( @ 00:15)  WBC Count: 16.89 K/uL ( @ 16:58)  WBC Count: 15.66 K/uL ( @ 11:59)  WBC Count: 16.50 K/uL ( @ 05:41)    Recent Cultures:  Specimen Source: .Blood Blood-Venous,  @ 20:45; Results   No growth at 24 hours; Gram Stain: --; Organism: --  Specimen Source: .Blood Blood-Venous,  @ 20:00; Results   No growth at 24 hours; Gram Stain: --; Organism: --  Specimen Source: Combi-Cath Combi-Cath,  @ 00:15; Results   Normal Respiratory Elo present; Gram Stain:   Moderate polymorphonuclear leukocytes per low power field  Rare Squamous epithelial cells per low power field  No organisms seen per oil power field; Organism: --  Specimen Source: Blood Blood,  @ 12:27; Results   No growth at 72 Hours; Gram Stain: --; Organism: --  Specimen Source: Blood Blood,  @ 11:43; Results   No growth at 72 Hours; Gram Stain: --; Organism: --    Meds: doxycycline IVPB      doxycycline IVPB 100 milliGRAM(s) IV Intermittent every 12 hours  epoetin kareem (EPOGEN) Injectable 20482 Unit(s) SubCutaneous <User Schedule>  fluconAZOLE IVPB      fluconAZOLE IVPB 400 milliGRAM(s) IV Intermittent every 24 hours  piperacillin/tazobactam IVPB.. 3.375 Gram(s) IV Intermittent every 8 hours  rifAXIMin 550 milliGRAM(s) Oral two times a day      ENDOCRINE  Capillary Blood Glucose    Meds: insulin lispro (ADMELOG) corrective regimen sliding scale   SubCutaneous every 6 hours  vasopressin Infusion 0.03 Unit(s)/Min IV Continuous <Continuous>      ACCESS DEVICES:  [x] Peripheral IV  [ ] Central Venous Line		[ ] R	[ ] L	[ ] IJ	[ ] Fem	[ ] SC	Placed:   [ ] Arterial Line			[ ] R	[ ] L	[ ] Fem	[ ] Rad	[ ] Ax	Placed:   [ ] PICC:					[ ] Mediport  [ ] Urinary Catheter, Date Placed:   [ ] Necessity of urinary, arterial, and venous catheters discussed    OTHER MEDICATIONS:  chlorhexidine 2% Cloths 1 Application(s) Topical <User Schedule>  CRRT Treatment    <Continuous>  lidocaine   4% Patch 1 Patch Transdermal daily  Phoxillum Filtration BK 4 / 2.5 5000 milliLiter(s) CRRT <Continuous>  Phoxillum Filtration BK 4 / 2.5 5000 milliLiter(s) CRRT <Continuous>  PrismaSOL Filtration BGK 4 / 2.5 5000 milliLiter(s) CRRT <Continuous>      IMAGING:

## 2024-08-24 NOTE — PROGRESS NOTE ADULT - ASSESSMENT
ASSESSMENT: 76 y/o male w/ no known PMHx (does not see doctors per sister) w/ newly diagnosed cirrhosis c/b vasoplegia requiring vasopressor support, small EVs, melena 2/2 a duodenal ulcer s/p clipping, HRS requiring CRRT, hepatic encephalopathy, and ascites s/p multiple LVPs presenting for liver transplant evaluation.    PLAN:    Neuro:  - Miralax & rifaximin for prevention of hepatic encephalopathy  - Monitoring ammonia  - Thiamine, folic acid, & multivitamin for h/o risky EtOH use  - Melatonin PRN insomnia    Resp:  - Out of bed to chair, ambulate as tolerated, and incentive spirometry to prevent atelectasis  - Extubated    CV:  - goal SBP >110  - Pressors: Levo , vaso  - 8/19: LHC this week per cardiology; 8/20 now deferred until pt improves clinically  - consulting cardiology for continued aflutter now with episodes darrell 30-40s  - Electrophys: nothing to do while not on AC, follow cards recs    GI:   - NPO  - Miralax & rifaximin for prevention of hepatic encephalopathy  - Protonix BID for h/o duodenal ulcer  - Monitor LFTs  - Undergoing evaluation for [facility] transplantation  - Trickle feeds up to 40    Renal:  - Monitor I&Os and electrolytes w/ repletions as necessary  - CRRT -50 for acute renal failure likely 2/2 HRS  - Appreciate nephrology recommendations  - Undergoing evaluation for [facility] transplantation  - 8/20 f/u nephrology as circuit keeps clotting    Heme:  - Monitor CBC and coags  - Hold chemical VTE prophylaxis  - Epo 3x a week    ID:   - Empiric coverage w/ meropenem & fluconazole as per transplant given vasopressor requirements  - Blood cultures w/ MRSE in 1 bottle on 8/12 but repeat cultures sent 8/14 w/ no growth to date  - 8/18: + doxycycline added for Lyme    Endo:   - Monitor glucose    Code Status: Full code    Disposition: SALINA Benitez PA-C     [id number]

## 2024-08-24 NOTE — PROGRESS NOTE ADULT - CRITICAL CARE ATTENDING COMMENT
This is a historical note converted from [employer]. Formatting and pictures may have been removed.  Please reference [employer] for original formatting and attached multimedia. Chief Complaint  New patient, referred decreased kidney function  History of Present Illness  Mr. Zhang is a 62-year-old -American male with past medical history of chronic kidney disease, diabetes, heart failure with reduced ejection fraction, treated hepatitis C, hyperlipidemia, hypertension, atrial fibrillation, and tobacco abuse.? Presents to nephrology clinic to establish care.? Denies complaints.  Review of Systems  12 point review of systems conducted, negative except as stated in the history of present illness.  Physical Exam  Vitals & Measurements  T:?36.5? ?C (Oral)? HR:?62(Peripheral)? RR:?18? BP:?110/78?  HT:?181.00?cm? WT:?93.800?kg? BMI:?28.63?  General appearance: Appears in no acute distress.?  Skin: No rashes, capillary refill <3 sec. Good turgor.?  HEENT: NC/AT. PERRLA, EOMI, no scleral icterus. No thyromegaly, JVD, neck supple.?  Chest: Equal expansion, clear to auscultation bilaterally, respirations unlabored.?  Heart: S1-S2, regular rate and rhythm, no murmurs, pulses palpable throughout.?  Abdomen: Benign.?  Genitourinary: Deferred  Extremities: No edema, no cyanosis or clubbing, pulses equal and palpable throughout.?  Neurological: No focal deficits  Assessment/Plan  1.?Acute kidney injury?N17.9  Increase in serum creatinine from baseline of ~1.2 to 1.9 was noted in March 2021.? Unfortunately, no recent labs are available for review.? We will repeat CMP today, follow-up with results by phone.? Chronic kidney disease is a result of diabetes, hypertension, and possibly obstructive uropathy.? Patient has been referred to urology clinic for left hydroureteronephrosis, has not received an appointment yet.  Ordered:  1160F- Medication reconciliation completed during visit, Acute kidney injury  CKD stage G2/A2, GFR 60-89  and albumin creatinine ratio  mg/g  Hypertension  BMI 28.0-28.9,adult  Hyperuricemia  Tobacco user, [facility], 05/17/21 9:46:00 CDT  Office/Outpatient Visit Level 4 New [id number] PC, Acute kidney injury  CKD stage G2/A2, GFR 60-89 and albumin creatinine ratio  mg/g  Hypertension  BMI 28.0-28.9,adult  Hyperuricemia  Tobacco user, [facility], 05/17/21 9:46:00 CDT  ?  2.?CKD stage G2/A2, GFR 60-89 and albumin creatinine ratio  mg/g?N18.2  Chronic kidney disease is a result of diabetes, hypertension, and possibly obstructive uropathy.? Patient has been referred to urology clinic for left hydroureteronephrosis, has not received an appointment yet. ?We will follow-up with CMP by phone, if serum creatinine is still?difficult to improve baseline, consider holding RAAS blocker.  Will obtain renal ultrasound.  Continue renal sparing activities:  ?   -Follow low sodium diet (2 grams a day)  -Control diabetes (goal A1C <7.0%)  -Control high blood pressure (?goal BP < 130/80, please record BP at home every day and bring log to next office visit)  -Exercise at least 30 minutes a day, 5 days a week.  -Maintain healthy weight.  -Decrease or stop alcohol use  -Do not smoke  -Stay well hydrated  -Receive Pneumovax, Flu, and HBV vaccines if indicated.  -Do not take NSAIDs (Ibuprofen, Naproxen, Aleve, Advil, Toradol, Mobic), may take only Tylenol as needed for pain/headaches.  -Take cholesterol-lowering medications as prescribed (LDL goal <100)  ?   Handout on treatment options for kidney disease provided.  F/U with PCP as scheduled  [facility] with routine labs in?3 months  Ordered:  1160F- Medication reconciliation completed during visit, Acute kidney injury  CKD stage G2/A2, GFR 60-89 and albumin creatinine ratio  mg/g  Hypertension  BMI 28.0-28.9,adult  Hyperuricemia  Tobacco user, [facility], 05/17/21 9:46:00 CDT  Clinic Follow up, *Est. 09/02/21 9:30:00 CDT,  Follow up with NAINA Buchanan in 3 months, Order for future visit, CKD stage G2/A2, GFR 60-89 and albumin creatinine ratio  mg/g, [facility]  Office/Outpatient Visit Level 4 New [id number] PC, Acute kidney injury  CKD stage G2/A2, GFR 60-89 and albumin creatinine ratio  mg/g  Hypertension  BMI 28.0-28.9,adult  Hyperuricemia  Tobacco user, [facility], 05/17/21 9:46:00 CDT  ?  3.?Hypertension?I10  Blood pressure is at goal. ?Continue current antihypertensive regimen  Ordered:  1160F- Medication reconciliation completed during visit, Acute kidney injury  CKD stage G2/A2, GFR 60-89 and albumin creatinine ratio  mg/g  Hypertension  BMI 28.0-28.9,adult  Hyperuricemia  Tobacco user, [facility], 05/17/21 9:46:00 CDT  Office/Outpatient Visit Level 4 New [id number] PC, Acute kidney injury  CKD stage G2/A2, GFR 60-89 and albumin creatinine ratio  mg/g  Hypertension  BMI 28.0-28.9,adult  Hyperuricemia  Tobacco user, [facility], 05/17/21 9:46:00 CDT  ?  4.?BMI 28.0-28.9,adult?Z68.28  Lifestyle/dietary interventions discussed: counseled on weight loss using portion control, non-sedentary lifestyle, low-carb, low-fat, 1800-calorie ADA diet.?  Ordered:  1160F- Medication reconciliation completed during visit, Acute kidney injury  CKD stage G2/A2, GFR 60-89 and albumin creatinine ratio  mg/g  Hypertension  BMI 28.0-28.9,adult  Hyperuricemia  Tobacco user, [facility], 05/17/21 9:46:00 CDT  Office/Outpatient Visit Level 4 New [id number] PC, Acute kidney injury  CKD stage G2/A2, GFR 60-89 and albumin creatinine ratio  mg/g  Hypertension  BMI 28.0-28.9,adult  Hyperuricemia  Tobacco user, [facility], 05/17/21 9:46:00 CDT  ?  5.?Hyperuricemia?E79.0  No symptoms of gout flare. ?Will repeat?uric acid level today, if still significantly elevated, consider starting?allopurinol  Ordered:  1160F- Medication reconciliation completed during visit, Acute kidney injury  CKD  stage G2/A2, GFR 60-89 and albumin creatinine ratio  mg/g  Hypertension  BMI 28.0-28.9,adult  Hyperuricemia  Tobacco user, [facility], 05/17/21 9:46:00 CDT  Office/Outpatient Visit Level 4 New [id number] PC, Acute kidney injury  CKD stage G2/A2, GFR 60-89 and albumin creatinine ratio  mg/g  Hypertension  BMI 28.0-28.9,adult  Hyperuricemia  Tobacco user, [facility], 05/17/21 9:46:00 CDT  Uric Acid, Routine collect, 05/17/21 9:56:00 CDT, Blood, Stop date 05/17/21 9:56:00 CDT, Lab Collect, Hyperuricemia, 05/17/21 9:56:00 CDT  ?  6.?Tobacco user?Z72.0  Counseled on importance of?cessation of?tobacco use. ?Strongly encouraged to quit,?referral to smoking cessation?program?offered.  Ordered:  1160F- Medication reconciliation completed during visit, Acute kidney injury  CKD stage G2/A2, GFR 60-89 and albumin creatinine ratio  mg/g  Hypertension  BMI 28.0-28.9,adult  Hyperuricemia  Tobacco user, [facility], 05/17/21 9:46:00 CDT  Office/Outpatient Visit Level 4 [facility] [id number] PC, Acute kidney injury  CKD stage G2/A2, GFR 60-89 and albumin creatinine ratio  mg/g  Hypertension  BMI 28.0-28.9,adult  Hyperuricemia  Tobacco user, [facility], 05/17/21 9:46:00 CDT  ?  Orders:  lisinopril, 5 mg = 1 tab(s), Oral, Daily, # 90 tab(s), 3 Refill(s), Pharmacy: [facility], 181, cm, Height/Length Dosing, 05/17/21 9:24:00 CDT, 93.8, kg, Weight Dosing, 05/17/21 9:24:00 CDT  Referrals  Clinic Follow up, *Est. 08/17/21 3:00:00 CDT, CKD, Follow up with NAINA Buchanan in 3 months, Order for future visit, CKD stage G2/A2, GFR 60-89 and albumin creatinine ratio  mg/g, [facility]   Problem List/Past Medical History  Ongoing  Afib  Allergic rhinitis  CHF (congestive heart failure)(  Confirmed  )  Chronic back pain  Chronic systolic congestive heart failure, NYHA class 1  CKD (chronic kidney disease)  COPD  Diabetes mellitus  Hx of hepatitis C  Hypertension  Recurrent  sinusitis  Rheumatoid arthritis  SOB (shortness of breath)(  Confirmed  )  Historical  Afib  Bronchitis  Emphysema  Hepatitis C  Tobacco user  Procedure/Surgical History  Immunization administration (includes percutaneous, intradermal, subcutaneous, or intramuscular injections); 1 vaccine (single or combination vaccine/toxoid) (10/15/2020)  Introduction of Influenza Vaccine into Muscle, Percutaneous Approach (10/15/2020)  Insertion of Defibrillator Generator into Chest Subcutaneous Tissue and Fascia, Open Approach (08/10/2018)  Removal of Cardiac Rhythm Related Device from Trunk Subcutaneous Tissue and Fascia, Open Approach (08/10/2018)  Removal of implantable defibrillator pulse generator with replacement of implantable defibrillator pulse generator; single lead system (08/10/2018)  Transesophageal Echo (for Surgery) (None) (02/01/2017)  Ultrasonography of Heart with Aorta, Transesophageal (02/01/2017)  CARDIOVERTER-DEFIBRILLATOR, DUAL CHAMBER (IMPLANTABLE) (03/02/2015)  Implantation or replacement of automatic cardioverter/defibrillator, total system [AICD] (03/02/2015)  Insertion of Antimicrobial Envelope (03/02/2015)  Insertion or replacement of permanent implantable defibrillator system, with transvenous lead(s), single or dual chamber (03/02/2015)  Insertion Single/Dual Jeanine Pulse Generator Leads (None) (03/02/2015)  PACEMAKER, DUAL CHAMBER, RATE-RESPONSIVE (IMPLANTABLE) (03/02/2015)  Venogram Left Extremity (None) (03/02/2015)  Cardioversion, elective, electrical conversion of arrhythmia; external. (12/22/2014)  Other electric countershock of heart (12/22/2014)  Coronary arteriography using a single catheter (09/19/2014)  Coronary Artery Angiography (None) (09/19/2014)  Left heart cardiac catheterization (09/19/2014)  Angiogram  Arm   Medications  Accucheck blood glucose meter kit, See Instructions  Accucheck blood glucose test strips and lancets, See Instructions, 11 refills  albuterol 90 mcg/inh  inhalation aerosol, 1 puff(s), INH, q6hr, PRN, 2 refills,? ?Not taking  albuterol-ipratropium 2.5 mg-0.5 mg/3 mL inhalation solution, See Instructions, 3 refills  amiodarone 200 mg oral Tab, 200 mg= 1 tab(s), Oral, Daily, 6 refills  atorvastatin 10 mg oral tablet, 10 mg= 1 tab(s), Oral, Daily, 6 refills  betamethasone-clotrimazole 0.05%-1% topical cream, TOP, BID  codeine-promethazine 10 mg-6.25 mg/5 mL oral syrup, 5 mL, Oral, q6hr, 1 refills  Dulera 200 mcg-5 mcg/inh inhalation aerosol, 2 puff(s), Oral, BID  DuoNeb 0.5 mg-2.5 mg/3 mL inhalation solution, 3 mL, INH, QID, 11 refills  Eliquis 5 mg oral tablet, 5 mg= 1 tab(s), Oral, BID, 11 refills  Flonase 50 mcg/inh nasal spray, 1 spray(s), Nasal, BID, 6 refills  fluticasone-salmeterol CFC free 115 mcg-21 mcg/inh inhalation aerosol, 2 puff(s), INH, BID, 2 refills,? ?Not taking  furosemide 20 mg oral tablet, 20 mg= 1 tab(s), Oral, Daily, 3 refills  glipiZIDE 5 mg oral tablet, 5 mg= 1 tab(s), Oral, Daily, 11 refills  Home Oxygen and Concentrator, See Instructions,? ?Not taking: never had it  hydroxychloroquine 200 mg oral tablet, 400 mg= 2 tab(s), Oral, Daily,? ?Not Taking per Prescriber  lisinopril 5 mg oral tablet, 5 mg= 1 tab(s), Oral, Daily, 3 refills  metoprolol succinate 25 mg oral tablet extended release, 25 mg= 1 tab(s), Oral, Daily, 6 refills  Nebulizer, See Instructions  nitroglycerin 0.4 mg sublingual TAB, See Instructions, PRN, 3 refills  Rolator, See Instructions  Zyrtec 10 mg oral tablet, 10 mg= 1 tab(s), Oral, Daily, 3 refills  Allergies  No Known Medication Allergies  Social History  Abuse/Neglect  No, No, Yes, 05/17/2021  Alcohol  Current, Beer, 1-2 times per week, Alcohol use interferes with work or home: No. Others hurt by drinking: No. Household alcohol concerns: No., 05/17/2021  Employment/School  Unemployed, 06/22/2020  Exercise  Exercise frequency: 1-2 times/week. Exercise type: Walking., 06/22/2020  Financial/Legal Situation  None,  09/02/2020  Home/Environment  Lives with Alone. Living situation: Home/Independent. Glucose monitoring, Walker/Cane, nebulizer machine, 06/22/2020    Never in , 09/02/2020  Nutrition/Health  Low sodium, Good, 06/22/2020  Sexual  Gender Identity Identifies as male., 01/02/2020  Spiritual/Cultural  [employer], 08/02/2019  Substance Use  Never, 06/22/2020  Tobacco  4 or less cigarettes(less than 1/4 pack)/day in last 30 days, Cigarettes, N/A, 05/17/2021  Family History  Cataract.: Mother and Brother.  Congestive heart disease.: Mother.  Glaucoma.: Mother and Brother.  Hyperglycemia.: Brother.  Hypertension.: Father.  Peripheral vascular disease.: Mother.  Immunizations  Vaccine Date Status Comments   COVID-19 mRNA, LNP-S, PF - Moderna 04/05/2021 Recorded    COVID-19 mRNA, LNP-S, PF - Moderna 03/08/2021 Recorded    influenza virus vaccine, inactivated 10/15/2020 Given Wean to Standard Admin Times   influenza virus vaccine, inactivated 12/20/2019 Given    tetanus/diphtheria/pertussis, acel(Tdap) 12/21/2016 Given    influenza virus vaccine, inactivated 12/21/2015 Given TOLERATED WELL   pneumococcal vacc 10/11/2012 Recorded    Health Maintenance  Health Maintenance  ???Pending?(in the next year)  ??? ??OverDue  ??? ? ? ?Lung Cancer Screening due??11/13/19??and every 1??year(s)  ??? ? ? ?Colorectal Screening due??12/11/20??and every 1??year(s)  ??? ? ? ?Functional Assessment due??01/02/21??and every 1??year(s)  ??? ??Due?  ??? ? ? ?COPD Maintenance-Pulmonary Rehab Education due??05/17/21??and every 1??year(s)  ??? ? ? ?HF-Ejection Fraction Past 13 Months if Hospitalized due??05/17/21??and every 1??year(s)  ??? ? ? ?HF-Fluid Restriction/Low Sodium Diet Education due??05/17/21??and every 1??year(s)  ??? ? ? ?Zoster Vaccine due??05/17/21??Unknown Frequency  ??? ??Due In Future?  ??? ? ? ?Hypertension Management-Education not due until??06/22/21??and every 1??year(s)  ??? ? ? ?Influenza Vaccine not due  Dr. Price (Attending Physician)  N - Hepatic encephalopathy on miralax, ammonia 60  C - Aflutter intermittent episode of bradycardia to 30s yesterday, better  P - cxr improved today with pulling 25 mL/hr  GI - TFs 40 will inc to goal today, failed bedside swallow 2 days ago   - on CVVHD pulling 25 mL/hr  H - h/h stable, inr   ID - fluc, zosyn, doxycycline for positive lyme, cx negative   E - glucose controlled  PPx - ppi  Lines - new RIJ and TLC  Dispo - full code until??10/01/21??and every 1??day(s)  ??? ? ? ?Aspirin Therapy for CVD Prevention not due until??10/13/21??and every 1??year(s)  ??? ? ? ?HF-LVEF not due until??10/14/21??and every 1??year(s)  ??? ? ? ?COPD Management-Oxygen Assessment not due until??10/15/21??and every 1??year(s)  ??? ? ? ?Asthma Management-Asthma Medication Prescribed not due until??12/02/21??and every 1??year(s)  ??? ? ? ?Diabetes Maintenance-Foot Exam not due until??12/02/21??and every 1??year(s)  ??? ? ? ?Obesity Screening not due until??01/01/22??and every 1??year(s)  ??? ? ? ?Smoking Cessation not due until??01/01/22??and every 1??year(s)  ??? ? ? ?Alcohol Misuse Screening not due until??01/02/22??and every 1??year(s)  ??? ? ? ?Diabetes Maintenance-Eye Exam not due until??02/11/22??and every 1??year(s)  ??? ? ? ?Diabetes Maintenance-HgbA1c not due until??03/02/22??and every 1??year(s)  ??? ? ? ?Hypertension Management-BMP not due until??03/02/22??and every 1??year(s)  ??? ? ? ?Diabetes Maintenance-Fasting Lipid Profile not due until??03/02/22??and every 1??year(s)  ??? ? ? ?Diabetes Maintenance-Serum Creatinine not due until??03/02/22??and every 1??year(s)  ??? ? ? ?Depression Screening not due until??03/04/22??and every 1??year(s)  ??? ? ? ?HF-Heart Failure Education not due until??03/04/22??and every 1??year(s)  ??? ? ? ?COPD Management-COPD Medications Prescribed not due until??03/04/22??and every 1??year(s)  ???Satisfied?(in the past 1 year)  ??? ??Satisfied?  ??? ? ? ?ADL Screening on??05/17/21.??Satisfied by Zakia Shetty  ??? ? ? ?Alcohol Misuse Screening on??03/04/21.??Satisfied by Dayami Squires NP  ??? ? ? ?Aspirin Therapy for CVD Prevention on??10/13/20.??Satisfied by Viviana Raines  ??? ? ? ?Asthma Management-Asthma Medication Prescribed on??03/04/21.??Satisfied by Dayami Squires NP  ??? ? ? ?Blood Pressure Screening on??05/17/21.??Satisfied by Zakia Shetty  ??? ? ? ?Body Mass Index Check  on??05/17/21.??Satisfied by Zakia Shetty  ??? ? ? ?COPD Maintenance-Spirometry on??09/02/20.??Satisfied by Jessie Jang  ??? ? ? ?COPD Management-COPD Medications Prescribed on??03/04/21.??Satisfied by Dayami Squires NP  ??? ? ? ?COPD Management-Oxygen Assessment on??10/15/20.??Satisfied by Francesca Reinoso  ??? ? ? ?Coronary Artery Disease Maintenance-Lipid Lowering Therapy on??10/26/20.??Satisfied by Tin Thomas MD  ??? ? ? ?Depression Screening on??03/04/21.??Satisfied by Allison Nelson LPN  ??? ? ? ?Diabetes Maintenance-Fasting Lipid Profile on??03/02/21.??Satisfied by Khari Kim  ??? ? ? ?Diabetes Maintenance-HgbA1c on??03/02/21.??Satisfied by Khari Kim  ??? ? ? ?Diabetes Maintenance-Serum Creatinine on??03/02/21.??Satisfied by Khari Kim  ??? ? ? ?Diabetes Maintenance-Foot Exam on??12/02/20.??Satisfied by Dayami Squires NP  ??? ? ? ?Diabetes Maintenance-Microalbumin on??08/31/20.??Satisfied by Stacy Bowers  ??? ? ? ?Diabetes Screening on??03/02/21.??Satisfied by Khari Kim  ??? ? ? ?Functional Assessment on??10/14/20.??Satisfied by Patrice Bryant PT  ??? ? ? ?HF-ACEI/ARB Prescribed if Clinically Indicated on??05/17/21.??Satisfied by Nadira Buchanan NP  ??? ? ? ?HF-Beta Blocker Prescribed if Clinically Indicated on??03/25/21.??Satisfied by Tiffanie Tripathi DO  ??? ? ? ?Hypertension Management-Blood Pressure on??05/17/21.??Satisfied by Zakia Shetty  ??? ? ? ?Hypertension Management-BMP on??05/10/21.??Satisfied by Karrie Quintero  ??? ? ? ?Hypertension Management-Education on??06/22/20.??Satisfied by Shaw BLACK, Dayami MARTINEZ??Reason: Expectation Satisfied Elsewhere  ??? ? ? ?Influenza Vaccine on??02/11/21.??Satisfied by Judith Early  ??? ? ? ?Lipid Screening on??03/02/21.??Satisfied by Khrai Kim  ??? ? ? ?Obesity Screening on??05/17/21.??Satisfied by Zakia Shetty  ??? ? ? ?Smoking Cessation on??03/04/21.??Satisfied by  Dayami Suqires NP??Reason: Expectation Satisfied Elsewhere  ??? ??Refused?  ??? ? ? ?Zoster Vaccine on??03/04/21.??Recorded by Dayami Squires NP??Reason: Patient Refuses  ?  Lab Results  Test Name Test Result Date/Time   Sodium Lvl 143 mmol/L 03/02/2021 07:25 CST   Potassium Lvl 4.8 mmol/L 03/02/2021 07:25 CST   Chloride 106 mmol/L 03/02/2021 07:25 CST   CO2 27 mmol/L 03/02/2021 07:25 CST   Calcium Lvl 9.4 mg/dL 03/02/2021 07:25 CST   Glucose Lvl 117 mg/dL (High) 03/02/2021 07:25 CST   .3 mg/dL 03/02/2021 07:25 CST   BUN 23.5 mg/dL 03/02/2021 07:25 CST   Creatinine 1.94 mg/dL (High) 03/02/2021 07:25 CST   BUN/Creat Ratio 12 03/02/2021 07:25 CST   eGFR-AA 45 (Low) 03/02/2021 07:25 CST   Bili Total 0.6 mg/dL 03/02/2021 07:25 CST   Bili Direct 0.3 mg/dL 03/02/2021 07:25 CST   Bili Indirect 0.30 mg/dL 03/02/2021 07:25 CST   AST 16 unit/L 03/02/2021 07:25 CST   ALT 16 unit/L 03/02/2021 07:25 CST   Alk Phos 131 unit/L 03/02/2021 07:25 CST   Total Protein 7.6 gm/dL 03/02/2021 07:25 CST   Albumin Lvl 4.4 gm/dL 03/02/2021 07:25 CST   Globulin 3.2 gm/dL 03/02/2021 07:25 CST   A/G Ratio 1.4 ratio 03/02/2021 07:25 CST   Uric Acid 9.2 mg/dL (High) 05/10/2021 07:07 CDT   Hgb A1c 5.4 % 03/02/2021 07:25 CST   Vit D 25 OH 22.6 ng/mL (Low) 05/10/2021 07:07 CDT   Chol 166 mg/dL 03/02/2021 07:25 CST   HDL 48 mg/dL 03/02/2021 07:25 CST   Trig 109 mg/dL 03/02/2021 07:25 CST   LDL 96.00 mg/dL 03/02/2021 07:25 CST   Chol/HDL 3 03/02/2021 07:25 CST   TSH 2.2742 uIU/mL 03/02/2021 07:25 CST   U Prot/Creat 42.5 mg/gm Cr 05/10/2021 07:03 CDT   WBC 7.3 x10(3)/mcL 03/02/2021 07:25 CST   RBC 4.59 x10(6)/mcL 03/02/2021 07:25 CST   Hgb 15.1 gm/dL 03/02/2021 07:25 CST   Hct 45.7 % 03/02/2021 07:25 CST   Platelet 284 x10(3)/mcL 03/02/2021 07:25 CST   UA Appear Clear 05/10/2021 07:03 CDT   UA Color YELLOW 05/10/2021 07:03 CDT   UA Spec Grav 1.020 05/10/2021 07:03 CDT   UA Bili Negative 05/10/2021 07:03 CDT   UA pH 5.5 05/10/2021 07:03 CDT    UA Urobilinogen 2 (Abnormal) 05/10/2021 07:03 CDT   UA Blood Negative 05/10/2021 07:03 CDT   UA Glucose Negative 05/10/2021 07:03 CDT   UA Ketones Negative 05/10/2021 07:03 CDT   UA Protein Negative 05/10/2021 07:03 CDT   UA Nitrite Negative 05/10/2021 07:03 CDT   UA Leuk Est Negative 05/10/2021 07:03 CDT   UA WBC Interp 0-2 05/10/2021 07:03 CDT   UA RBC Interp 0-2 05/10/2021 07:03 CDT   UA Bact Interp None Seen 05/10/2021 07:03 CDT   UA Squam Epi Interp 2-20 05/10/2021 07:03 CDT   UA Hyal Cast Interp 0-2 (Abnormal) 05/10/2021 07:03 CDT   Diagnostic Results  (03/02/2021 09:10 CST CT Abdomen and Pelvis W W/O Contrast)  ?  FINDINGS  Images were reviewed in soft tissue, lung, and bone windows.  ?  Exam quality: adequate  ?  Lines/tubes: None visualized.  ?  GENITOURINARY  ?? ? Kidneys: Precontrast images reveal no radiodense urolithiasis.  There is an approximately 1.4 cm x 1.1 cm cystic focus of the right  renal cortex (series 5, image 37), with additional scattered bilateral  cortical cysts also noted. No definite enhancement, perceptible  septation, or other evidence of cyst complexity is appreciated,  although detailed characterization is limited secondary to small size.  Parenchymal enhancement and contrast excretion are temporally  symmetric.  ?? ? Renal pelvis: There is moderate dilatation of the left central  collecting system. The right collecting system is nondilated.  ?  ?? ? Ureters: The left ureter is markedly dilated throughout its  course, with irregular contour noted at the level of the ureteral  orifice (series 5, image 74; series 605, image 92). The  excretory-phase images produce asymmetric opacification of the ureter,  with a small amount of layering contrast within the dependent left  central collecting system and proximal ureter; the remaining left  ureteral course is unopacified. The right ureter is completely  opacified throughout its visualized upper course, with no focal  abnormality  identified.  ?  ?? ? Urinary bladder: There is diffuse moderate thickening of the  urinary bladder wall. The bladder lumen is unopacified on the  excretory-phase images, limiting overall assessment. Within  limitations, no definite intraluminal abnormality.  ?  ?  Reproductive: The visualized portions are unremarkable.  ?  ?  Heart/Vasculature: No acute or focal abnormality. The thoracoabdominal  aorta and visualized branch vessels are normal caliber and contour,  with incidentally noted scattered atherosclerotic calcification.  ?  Lung bases: No acute or focal abnormality.  ?  Liver: No acute or focal abnormality.  Biliary: No calcified stone, wall thickening, or pericholecystic  inflammation. No convincing obstructive process.  ?  Pancreas: No acute or focal abnormality.  ?  Spleen: No acute or focal abnormality.  ?  Adrenal glands: No acute or focal abnormality.  ?  Stomach/Bowel: No evidence of gastric outlet or small bowel  obstruction. No acute inflammatory process or convincing focal lesion.  ?  Peritoneal cavity/Retroperitoneum: No free fluid or air. No drainable  collections.  ?  Lymph nodes: No pathologic enlargement or necrotic process.  ?  Musculoskeletal: No acute subcutaneous or muscular abnormality is  identified. Uncomplicated fat-containing umbilical hernia is  incidentally noted. No acute osseous displacement or destructive  process. There are degenerative changes of the visualized spine and  pelvis.  ?  IMPRESSION  1. ?Bilateral renal cortex cysts are overall simple in appearance,  with detailed characterization otherwise limited due to small size.  2. ?No definite expansile or otherwise aggressive appearing renal  lesion is identified.  3. ?Incidentally noted and suspected chronic moderate to severe left  hydroureteronephrosis with delayed contrast excretion. Focal contour  abnormality at the left ureteral orifice may represent congenital  versus acquired abnormality.  4. ?Diffuse thickening of  the urinary bladder wall, nonspecific and  may represent acute versus chronic sequela of cystitis.  5. ?Additional chronic secondary details discussed above.  ? [1]     [1]?CT Abdomen and Pelvis W W/O Contrast; Alyx ROSALES, Xavier Palencia 03/02/2021 09:10 CST

## 2024-08-24 NOTE — PROGRESS NOTE ADULT - SUBJECTIVE AND OBJECTIVE BOX
Transplant Surgery - Multidisciplinary Progress Note  --------------------------------------------------------------    Present: Patient seen and examined with multidisciplinary Transplant team including (Surgery: Dr. Roberts. Hepatologist: Dr. Nix. RNs in AM rounds. Disciplines not in attendance will be notified of the plan.       HPI  68y with obesity and risky alcohol consumption (with decades' history of daily consumption of homemade alcohol), admitted to [facility] 1 month ago due to recent onset of jaundice and with a prolonged hospital and ICU course there due to newly diagnosed decompensated cirrhosis with acute on chronic liver failure (ACLF) with shock (resolved, but still on midodrine); FANY (on intermittent HD since 7/9); recurrent maroon stools and acute on chronic anemia necessitating intermittent PRBC transfusions (last on 8/8) and hypofibrinoginemia, with EGD (7/12) with small non-bleeding EV and a duodenal ulcer with a visible vessel; and waxing and waning hepatic encephalopathy. He was transferred to [facility] on 8/12/24 for evaluation for combined liver/kidney transplants (SLK).      Interval Events:  MELD 39, started zosynFRANCIA doppler neg for dvt          Potential Discharge date: Pending clinical course    Education: Medications    Plan of care: See Below    MEDICATIONS  (STANDING):  chlorhexidine 2% Cloths 1 Application(s) Topical <User Schedule>  CRRT Treatment    <Continuous>  doxycycline IVPB      doxycycline IVPB 100 milliGRAM(s) IV Intermittent every 12 hours  epoetin kareem (EPOGEN) Injectable 13093 Unit(s) SubCutaneous <User Schedule>  fluconAZOLE IVPB      fluconAZOLE IVPB 400 milliGRAM(s) IV Intermittent every 24 hours  folic acid 1 milliGRAM(s) Oral daily  insulin lispro (ADMELOG) corrective regimen sliding scale   SubCutaneous every 6 hours  lidocaine   4% Patch 1 Patch Transdermal daily  multivitamin 1 Tablet(s) Oral daily  norepinephrine Infusion 0.13 MICROgram(s)/kG/Min (13.9 mL/Hr) IV Continuous <Continuous>  pantoprazole  Injectable 40 milliGRAM(s) IV Push two times a day  Phoxillum Filtration BK 4 / 2.5 5000 milliLiter(s) (200 mL/Hr) CRRT <Continuous>  Phoxillum Filtration BK 4 / 2.5 5000 milliLiter(s) (1000 mL/Hr) CRRT <Continuous>  piperacillin/tazobactam IVPB.. 3.375 Gram(s) IV Intermittent every 8 hours  polyethylene glycol 3350 17 Gram(s) Oral <User Schedule>  PrismaSOL Filtration BGK 4 / 2.5 5000 milliLiter(s) (2000 mL/Hr) CRRT <Continuous>  rifAXIMin 550 milliGRAM(s) Oral two times a day  thiamine 100 milliGRAM(s) Oral daily  vasopressin Infusion 0.03 Unit(s)/Min (4.5 mL/Hr) IV Continuous <Continuous>    MEDICATIONS  (PRN):      PAST MEDICAL & SURGICAL HISTORY:  Alcohol abuse      No significant past surgical history          Vital Signs Last 24 Hrs  T(C): 36.1 (23 Aug 2024 19:00), Max: 36.3 (23 Aug 2024 07:00)  T(F): 97 (23 Aug 2024 19:00), Max: 97.3 (23 Aug 2024 07:00)  HR: 69 (24 Aug 2024 05:45) (62 - 71)  BP: 122/58 (23 Aug 2024 21:45) (122/58 - 122/58)  BP(mean): 83 (23 Aug 2024 21:45) (83 - 83)  RR: 18 (24 Aug 2024 05:45) (10 - 33)  SpO2: 98% (24 Aug 2024 05:45) (91% - 100%)    Parameters below as of 24 Aug 2024 00:30  Patient On (Oxygen Delivery Method): room air        I&O's Summary    22 Aug 2024 07:01  -  23 Aug 2024 07:00  --------------------------------------------------------  IN: 2493.7 mL / OUT: 1456 mL / NET: 1037.7 mL    23 Aug 2024 07:01  -  24 Aug 2024 06:12  --------------------------------------------------------  IN: 2516.8 mL / OUT: 2821 mL / NET: -304.2 mL                              7.9    17.29 )-----------( 95       ( 24 Aug 2024 00:15 )             22.2     08-24    135  |  102  |  9   ----------------------------<  190<H>  3.9   |  22  |  1.02    Ca    8.4      24 Aug 2024 00:15  Phos  2.4     08-24  Mg     2.4     08-24    TPro  6.0  /  Alb  3.2<L>  /  TBili  13.0<H>  /  DBili  x   /  AST  81<H>  /  ALT  36  /  AlkPhos  212<H>  08-24        Review of systems  All other systems were reviewed and are negative, except as noted.    PHYSICAL EXAM:  Constitutional: Well developed / well nourished  Eyes:  PERRLA  ENMT: NC/AT  Neck: supple,   Respiratory: CTA B/L  Cardiovascular: RRR  Gastrointestinal: Soft abdomen, ND, L sided abdominal wall hernia--stable, NT  Genitourinary: anuric   Extremities: SCD's in place and working bilaterally  Vascular: Palpable dp pulses bilaterally.   Neurological: waking up  Skin: no rashes, ulcerations, lesions  Musculoskeletal: Moving all extremities

## 2024-08-24 NOTE — PROGRESS NOTE ADULT - SUBJECTIVE AND OBJECTIVE BOX
[facility] DIVISION OF KIDNEY DISEASES AND HYPERTENSION -- FOLLOW UP NOTE  --------------------------------------------------------------------------------  Authored by: Justin Pastor   Cell # [phone]     OMKAR SPARKS was seen and examined at bedside.   Patient is a 68y old  Male who presents with a chief complaint of cirrhosis/transplant eval (08-24-24)      24 hour events/subjective: No major events. Anuric. On Levo and Vaso. On CRRT. Getting NG tube feeds.   Fluid balance negative ~ 500, tolerating UF .       Standing Inpatient Medications  chlorhexidine 2% Cloths 1 Application(s) Topical <User Schedule>  CRRT Treatment    <Continuous>  doxycycline IVPB      doxycycline IVPB 100 milliGRAM(s) IV Intermittent every 12 hours  epoetin kareem (EPOGEN) Injectable 35408 Unit(s) SubCutaneous <User Schedule>  fluconAZOLE IVPB 400 milliGRAM(s) IV Intermittent every 24 hours  fluconAZOLE IVPB      folic acid 1 milliGRAM(s) Oral daily  insulin lispro (ADMELOG) corrective regimen sliding scale   SubCutaneous every 6 hours  lidocaine   4% Patch 1 Patch Transdermal daily  multivitamin 1 Tablet(s) Oral daily  norepinephrine Infusion 0.13 MICROgram(s)/kG/Min IV Continuous <Continuous>  pantoprazole  Injectable 40 milliGRAM(s) IV Push two times a day  Phoxillum Filtration BK 4 / 2.5 5000 milliLiter(s) CRRT <Continuous>  Phoxillum Filtration BK 4 / 2.5 5000 milliLiter(s) CRRT <Continuous>  piperacillin/tazobactam IVPB.. 3.375 Gram(s) IV Intermittent every 8 hours  polyethylene glycol 3350 17 Gram(s) Oral <User Schedule>  PrismaSOL Filtration BGK 4 / 2.5 5000 milliLiter(s) CRRT <Continuous>  rifAXIMin 550 milliGRAM(s) Oral two times a day  sodium phosphate 15 milliMole(s)/250 mL IVPB 15 milliMole(s) IV Intermittent once  thiamine 100 milliGRAM(s) Oral daily  vasopressin Infusion 0.03 Unit(s)/Min IV Continuous <Continuous>        VITALS/PHYSICAL EXAM  --------------------------------------------------------------------------------  T(C): 36.1 (08-24-24 @ 07:00), Max: 36.2 (08-23-24 @ 11:00)  HR: 82 (08-24-24 @ 08:15) (64 - 94)  BP: 122/58 (08-23-24 @ 21:45) (122/58 - 122/58)  RR: 24 (08-24-24 @ 08:15) (10 - 33)  SpO2: 100% (08-24-24 @ 08:15) (91% - 100%)      08-23-24 @ 07:01  -  08-24-24 @ 07:00  --------------------------------------------------------  IN: 2850.3 mL / OUT: 3333 mL / NET: -482.7 mL    08-24-24 @ 07:01  -  08-24-24 @ 08:24  --------------------------------------------------------  IN: 84.5 mL / OUT: 173 mL / NET: -88.5 mL        Physical Exam:  Awake and alert  Resting in bed  NGT +  Right IJ temporary dialysis catheter present, on CRRT  Lungs fair entry b/l  Regular HR  Abdomen soft, distended, non tender  2+ bilateral extremity edema present       LABS/STUDIES  --------------------------------------------------------------------------------              7.7    17.34 >-----------<  91       [08-24-24 @ 06:27]              22.1     137  |  103  |  9   ----------------------------<  191      [08-24-24 @ 06:27]  4.1   |  22  |  1.01        Ca     8.2     [08-24-24 @ 06:27]      Mg     2.4     [08-24-24 @ 06:27]      Phos  2.7     [08-24-24 @ 06:27]    TPro  6.0  /  Alb  3.1  /  TBili  12.3  /  DBili  x   /  AST  77  /  ALT  36  /  AlkPhos  223  [08-24-24 @ 06:27]    PT/INR: PT 27.1 , INR 2.53       [08-24-24 @ 06:27]  PTT: 49.0       [08-24-24 @ 06:27]      Creatinine Trend:  SCr 1.01 [08-24 @ 06:27]  SCr 1.02 [08-24 @ 00:15]  SCr 1.02 [08-23 @ 16:58]  SCr 1.05 [08-23 @ 11:59]  SCr 1.09 [08-23 @ 05:42]          CAPILLARY BLOOD GLUCOSE  POCT Blood Glucose.: 180 mg/dL (24 Aug 2024 06:09)  POCT Blood Glucose.: 180 mg/dL (24 Aug 2024 00:00)  POCT Blood Glucose.: 183 mg/dL (23 Aug 2024 18:33)  POCT Blood Glucose.: 148 mg/dL (23 Aug 2024 11:51)           [facility] DIVISION OF KIDNEY DISEASES AND HYPERTENSION -- FOLLOW UP NOTE  --------------------------------------------------------------------------------  Authored by: Justin Pastor   Cell # [phone]     OMKAR SPARKS was seen and examined at bedside.   Patient is a 68y old  Male who presents with a chief complaint of cirrhosis/transplant eval (08-24-24)      24 hour events/subjective: No major events. Anuric. On Levo and Vaso. On CRRT. Getting NG tube feeds.   Fluid balance negative ~ 500, tolerating UF .       Standing Inpatient Medications  doxycycline IVPB 100 milliGRAM(s) IV Intermittent every 12 hours  epoetin kareem (EPOGEN) Injectable 66754 Unit(s) SubCutaneous <User Schedule>  fluconAZOLE IVPB 400 milliGRAM(s) IV Intermittent every 24 hours  folic acid 1 milliGRAM(s) Oral daily  insulin lispro (ADMELOG) corrective regimen sliding scale   SubCutaneous every 6 hours  lidocaine   4% Patch 1 Patch Transdermal daily  multivitamin 1 Tablet(s) Oral daily  norepinephrine Infusion 0.13 MICROgram(s)/kG/Min IV Continuous <Continuous>  pantoprazole  Injectable 40 milliGRAM(s) IV Push two times a day  piperacillin/tazobactam IVPB.. 3.375 Gram(s) IV Intermittent every 8 hours  polyethylene glycol 3350 17 Gram(s) Oral <User Schedule>  rifAXIMin 550 milliGRAM(s) Oral two times a day  sodium phosphate 15 milliMole(s)/250 mL IVPB 15 milliMole(s) IV Intermittent once  thiamine 100 milliGRAM(s) Oral daily  vasopressin Infusion 0.03 Unit(s)/Min IV Continuous <Continuous>      VITALS/PHYSICAL EXAM  --------------------------------------------------------------------------------  LILIA): 36.1 (08-24-24 @ 07:00), Max: 36.2 (08-23-24 @ 11:00)  HR: 82 (08-24-24 @ 08:15) (64 - 94)  BP: 122/58 (08-23-24 @ 21:45) (122/58 - 122/58)  RR: 24 (08-24-24 @ 08:15) (10 - 33)  SpO2: 100% (08-24-24 @ 08:15) (91% - 100%)      08-23-24 @ 07:01  -  08-24-24 @ 07:00  --------------------------------------------------------  IN: 2850.3 mL / OUT: 3333 mL / NET: -482.7 mL    08-24-24 @ 07:01  -  08-24-24 @ 08:24  --------------------------------------------------------  IN: 84.5 mL / OUT: 173 mL / NET: -88.5 mL      Physical Exam:  Awake and alert  Resting in bed  NGT +  Right IJ temporary dialysis catheter present, on CRRT  Lungs fair entry b/l  Regular HR  Abdomen soft, distended, non tender  2+ bilateral extremity edema present       LABS/STUDIES  --------------------------------------------------------------------------------              7.7    17.34 >-----------<  91       [08-24-24 @ 06:27]              22.1     137  |  103  |  9   ----------------------------<  191      [08-24-24 @ 06:27]  4.1   |  22  |  1.01        Ca     8.2     [08-24-24 @ 06:27]      Mg     2.4     [08-24-24 @ 06:27]      Phos  2.7     [08-24-24 @ 06:27]    TPro  6.0  /  Alb  3.1  /  TBili  12.3  /  DBili  x   /  AST  77  /  ALT  36  /  AlkPhos  223  [08-24-24 @ 06:27]    PT/INR: PT 27.1 , INR 2.53       [08-24-24 @ 06:27]  PTT: 49.0       [08-24-24 @ 06:27]      Creatinine Trend:  SCr 1.01 [08-24 @ 06:27]  SCr 1.02 [08-24 @ 00:15]  SCr 1.02 [08-23 @ 16:58]  SCr 1.05 [08-23 @ 11:59]  SCr 1.09 [08-23 @ 05:42]      CAPILLARY BLOOD GLUCOSE  POCT Blood Glucose.: 180 mg/dL (24 Aug 2024 06:09)  POCT Blood Glucose.: 180 mg/dL (24 Aug 2024 00:00)  POCT Blood Glucose.: 183 mg/dL (23 Aug 2024 18:33)  POCT Blood Glucose.: 148 mg/dL (23 Aug 2024 11:51)

## 2024-08-24 NOTE — PROGRESS NOTE ADULT - ASSESSMENT
78 yo m with alcohol abuse otherwise no known chronic medical issues (does not see doctors per sister) s/p 1 month stay at [facility] now transferred to [facility] for liver transplant eval.  Most of history obtained from sister (Ashlyn) at bedside and some from transfer paperwork. Per sister, pt was initially brought to the hospital by family for back pain and jaundice for unclear amount of time. Patient was seen by GI and underwent EGD soon after admission which only showed nonbleeding ?duodenal ulcers (no intervention) however few days later pt developed active signs of bleeding and emergently underwent EGD again showing bleeding esophageal varices which were clipped.  pt was electively intubated with stay in ICU thereafter. Patient then started with HD due to worsening renal function and MS for past 2.5 weeks at times limited by low BP requiring pressors to assist. Had numerous paracentesis with varying amount of fluid removal - albumin infusions. Sister not aware of any concerns for acute infection during his stay but aware that pt was on abx at some point due to bleeding issues.        PERTINENT RADIOLOGY:  CXR: Tubes and lines as above. No focal consolidations  CT Chest, A&P:  Patchy ground-glass opacities in the bilateral upper lobes. *  Cirrhosis with evidence of portal hypertension. *  Hypodense lesion in the periphery of the left hepatic lobe of uncertain etiology. Somewhat wedge-shaped morphology raises the possibility for a small infarct. Question subtle peripheral nodular enhancement, and a hemangioma is also considered. Contrast enhanced abdominal MRI can be performed for further characterization and to assess for other possibilities. *  A wedge-shaped region of hypoattenuation in the spleen may reflect a small infarct. *  Focal eccentric wall thickening in the low rectum, possibly due to a mural fold. Consider colonoscopy. *  Large volume ascites.  CT Head: No evidence of acute intracranial pathology. If clinical symptoms persist or worsen, more sensitive evaluation with brain MRI may be obtained, if no contraindications exist.    BCx (8/12) 1/4 MRSE  BCx (8/14) NGTD  Paracentesis (8/14) Cell Counts Negative for SBP  MRSA/MSSA Nasal PCR (8/16) Negative    CXR (8/19) Ground Glass infiltrates persist  CT from 8//13 with bilateral Ground glass infiltrates    # Persistent Ground glass infiltrates of unclear etiology  please send urine legionella ag  please send deep sputum for testing for gram stain/cx, fungal stain/cx    #Decompensated liver cirrhosis, Leukocytosis, Shock  --Meropenem deescalated to Zosyn for empiric coverage in light of negative culture workup  --Favor limiting duration of empiric fluconazole (8/12 -->)  --Continue to follow CBC with diff  --Continue to follow renal function (Cr/BUN)  --Continue to follow transaminases  --Continue to follow temperature curve  --Follow up on preliminary blood cultures    #Positive BCx (8/12) (Staph epi)  Consistent with contaminant   as repeat testing is negative x many    #CMV PCR   --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 Would repeat CMV PCR on (8/26)  -- may need to treat if increasing  Cytomegalovirus By PCR: 4890 --->4730 --->1020 --->867 (8/20)    #Pre Transplant Evaluation   HIV-1/2 Combo Result: Nonreactive  EBVPCR Log: NotDetec Zva72CP/mL   Hepatitis A IgG Ab Result: Reactive   Hepatitis B Core Antibody, Total: Nonreactive  Hepatitis B Surface Antibody: Reactive   Hepatitis B DNA PCR Log: Not Detected  Hepatitis B Surface Antigen: Nonreactive  Hepatitis C Virus Interpretation: Nonreactive  COVID-19 De Domain Antibody: Positive  Treponema Pallidum Antibody Interpretation: Negative  HSV 1 IgG  Positive/HSV 2 IgG Negative  EBV Serology Positive  CMV IgG Positive  VZV IgG Positive  Measles Positive, Mumps Positive and Rubella IgG Positive  Toxoplasma IgG Positive  QuantiFeron Gold Negative  Strongyloides Ab Negative  Babesia PCR negative  --Follow up on Schistosoma IgG  --Reportedly Lyme serology was previously positive and remains positive, follow up on Tick Diseases Panel is negative for additional tick born exposures  -- Continue doxycycline 100mg bid as prior therapy was not given    #Encounter to Vaccinate Patient - Will defer vaccination in context of critical illness  COVID19: No primary series. Would benefit from COVID19 9287-1037 dose  Influenza: Would benefit from dose next season  Pneumococcal: Would benefit from PCV20  HAV: Immune, no additional vaccines indicated  HBV: Immune, no additional vaccines indicated  MMR: Immune, will not require further vaccination  Varicella: Immune, will not require further vaccination  Shingles: Would benefit from Shingrix  Tdap: Would benefit from Tdap    I will continue to follow. Please feel free to contact me with any further questions.    Anoop Delacruz M.D.  [facility] Division of Infectious Disease  8AM-5PM Monday - Friday: Available on [employer] Teams  After Hours and Holidays (or if no response on [employer] Teams): Please contact the Infectious Diseases Office at [phone]

## 2024-08-24 NOTE — PROGRESS NOTE ADULT - ASSESSMENT
69 yo M with obesity and risky alcohol consumption (with decades' history of daily consumption of homemade alcohol). Admitted to [facility] for 1 month due to recent onset of jaundice and with a prolonged hospital/ICU course due to newly diagnosed decompensated cirrhosis with acute on chronic liver failure (ACLF) with shock, FANY (started on intermittent HD since 7/9), acute on chronic anemia with recurrent overt GI bleeding, and hepatic encephalopathy.     Transferred to [facility] on 8/12/24 for SLK evaluation and then transferred to the SICU for initiation of CRRT (8/13- ).      # Distributive shock    - ACLF vs septic/hypovolemic shock.   - Hypothermic (8/21-22). Now afebrile.    - Norepinephrine, Vasopressin  - Repeat sepsis work up negative thus far.   - Transplant ID following: CMV viremia (Valcyte not required). Unequivocal Lyme PCR (on Doxy).     - Zosyn (8/12-16) (8/23 -   - Meropenem (8/16-8/23 ), Doxycycline (8/18- ) and empiric fluconazole (8/12- ).     # History of UGIB (7'24 - resolved) and Hematochezia (8/18)   # Rectal Ulcer pending biopsy read    - EGD (7/12, at [facility]) with small non-bleeding EV and a duodenal ulcer with a visible vessel. No further hematemesis.   - Pantoprazole 40BID. Start Coreg 3.125mg when able      - Flex Sig (8/19) with friable mucosa. No mass.   - EGD (8/20) with no active bleeding.   - Colonoscopy (8/21) friable mucosa, rectal ulcer biopsied.   - As needed PRBC and TEG-guided blood products.     # Anuric FANY on CKD    - Started intermittent HD (7/9-8/12) - Now on CRRT (8/13- )   - Marked anasarca. Fluid removal on CRRT limited due to hypotension.   - Transplant nephrology following.    # Newly diagnosed decompensated cirrhosis with ACLF   - Infection: as above   - HE: On/off Precedex. When awake, following simple commands. Rifaximin/Miralax. Lactulose held for abdominal distention.    - Agitation/Insomnia: Seroquel 25mg or Haldol 2.5mg at night. Transplant Psych following.    - Large volume ascites and anasarca: Anuric. Limited fluid removal with CRRT. LVP (8/14) negative for SBP.   - Image: Contrast CT (8/13) with patent portohepatic vessels and no evidence of HCC. Small infarcts in left hepatic lobe and spleen.       - Nutrition: Poor po diet supplemented with NGT feeds. Nutrition following.   - PT: Last ambulatory on July/2024. Daily inpatient PT/OT.     # SLK ongoing evaluation (8/12)  - ABO O. MELD 3.0 score of 38O on 8/24/24   - Pending: dry pelvic CT and LHC for cardiology clearance. Deferred until medically optimized.    - Met SLK criteria in Aug 20th. Tissue typing completed.     PLAN   - Repeat BCx on 8/24   - Continue PRBC and TEG-guided products as needed   - Titrate bowel regimen for goal 3-4 bowel movements daily   - Follow up rectal ulcer biopsy  - LHC and dry pelvis CT deferred until clinically optimized  - Prognosis guarded; Palliative care consult recommended        Note incomplete until finalized by attending signature/attestation.       69 yo M with obesity and risky alcohol consumption (with decades' history of daily consumption of homemade alcohol). Admitted to [facility] for 1 month due to recent onset of jaundice and with a prolonged hospital/ICU course due to newly diagnosed decompensated cirrhosis with acute on chronic liver failure (ACLF) with shock, FANY (started on intermittent HD since 7/9), acute on chronic anemia with recurrent overt GI bleeding, and hepatic encephalopathy.     Transferred to [facility] on 8/12/24 for SLK evaluation and then transferred to the SICU for initiation of CRRT (8/13- ).      # Distributive shock    - ACLF vs septic/hypovolemic shock.   - Hypothermic (8/21-22). Now afebrile.    - Norepinephrine, Vasopressin  - Repeat sepsis work up negative thus far.   - Transplant ID following: CMV viremia (Valcyte not required). Unequivocal Lyme PCR (on Doxy).     - Zosyn (8/12-16) (8/23 -   - Meropenem (8/16-8/23 ), Doxycycline (8/18- ) and empiric fluconazole (8/12- ).     # History of UGIB (7'24 - resolved) and Hematochezia (8/18)   # Rectal Ulcer pending biopsy read    - EGD (7/12, at [facility]) with small non-bleeding EV and a duodenal ulcer with a visible vessel. No further hematemesis.   - Pantoprazole 40BID. Start Coreg 3.125mg when able      - Flex Sig (8/19) with friable mucosa. No mass.   - EGD (8/20) with no active bleeding.   - Colonoscopy (8/21) friable mucosa, rectal ulcer biopsied.   - As needed PRBC and TEG-guided blood products.     # Anuric FANY on CKD    - Started intermittent HD (7/9-8/12) - Now on CRRT (8/13- )   - Marked anasarca. Fluid removal on CRRT limited due to hypotension.   - Transplant nephrology following.    # Newly diagnosed decompensated cirrhosis with ACLF   - Infection: as above   - HE: On/off Precedex. When awake, following simple commands. Rifaximin/Miralax. Lactulose held for abdominal distention.    - Agitation/Insomnia: Seroquel 25mg or Haldol 2.5mg at night. Transplant Psych following.    - Large volume ascites and anasarca: Anuric. Limited fluid removal with CRRT. LVP (8/14) negative for SBP.   - Image: Contrast CT (8/13) with patent portohepatic vessels and no evidence of HCC. Small infarcts in left hepatic lobe and spleen.       - Nutrition: Poor po diet supplemented with NGT feeds. Nutrition following.   - PT: Last ambulatory on July/2024. Daily inpatient PT/OT.     # SLK ongoing evaluation (8/12)  - ABO O. MELD 3.0 score of 38O on 8/24/24   - Pending: dry pelvic CT and LHC for cardiology clearance. Deferred until medically optimized.    - Met SLK criteria in Aug 20th. Tissue typing completed.     PLAN   - Repeat BCx on 8/24   - Continue PRBC and TEG-guided products as needed   - Titrate bowel regimen for goal 3-4 bowel movements daily   - Follow up rectal ulcer biopsy  - LHC and dry pelvis CT deferred until clinically optimized  - Prognosis guarded; Palliative care consult recommended        Note incomplete until finalized by attending signature/attestation.

## 2024-08-24 NOTE — PROVIDER CONTACT NOTE (OTHER) - ASSESSMENT
Patient noted to have increased abdominal distension from previous RN assessment. Abdomen remains taut and tender.

## 2024-08-24 NOTE — PROGRESS NOTE ADULT - SUBJECTIVE AND OBJECTIVE BOX
Interval Events:   - L hand/arm edema. Doppler US negative for DVT   - Sedated on Precedex.   - Afebrile on empiric zosyn/fluc   - Norepinephrine 0.13, Vasopressin 0.03.   - Anuric on CRRT   - 4 BM's     ROS:   12 point review of systems performed and negative except otherwise noted above.       MEDICATIONS  (STANDING):  chlorhexidine 2% Cloths 1 Application(s) Topical <User Schedule>  CRRT Treatment    <Continuous>  doxycycline IVPB      doxycycline IVPB 100 milliGRAM(s) IV Intermittent every 12 hours  epoetin kareem (EPOGEN) Injectable 42040 Unit(s) SubCutaneous <User Schedule>  fluconAZOLE IVPB      fluconAZOLE IVPB 400 milliGRAM(s) IV Intermittent every 24 hours  folic acid 1 milliGRAM(s) Oral daily  insulin lispro (ADMELOG) corrective regimen sliding scale   SubCutaneous every 6 hours  lidocaine   4% Patch 1 Patch Transdermal daily  multivitamin 1 Tablet(s) Oral daily  norepinephrine Infusion 0.13 MICROgram(s)/kG/Min (13.9 mL/Hr) IV Continuous <Continuous>  pantoprazole  Injectable 40 milliGRAM(s) IV Push two times a day  Phoxillum Filtration BK 4 / 2.5 5000 milliLiter(s) (1000 mL/Hr) CRRT <Continuous>  Phoxillum Filtration BK 4 / 2.5 5000 milliLiter(s) (200 mL/Hr) CRRT <Continuous>  piperacillin/tazobactam IVPB.. 3.375 Gram(s) IV Intermittent every 8 hours  polyethylene glycol 3350 17 Gram(s) Oral <User Schedule>  PrismaSOL Filtration BGK 4 / 2.5 5000 milliLiter(s) (2000 mL/Hr) CRRT <Continuous>  rifAXIMin 550 milliGRAM(s) Oral two times a day  sodium phosphate 15 milliMole(s)/250 mL IVPB 15 milliMole(s) IV Intermittent once  thiamine 100 milliGRAM(s) Oral daily  vasopressin Infusion 0.03 Unit(s)/Min (4.5 mL/Hr) IV Continuous <Continuous>    MEDICATIONS  (PRN):      PAST MEDICAL & SURGICAL HISTORY:  Alcohol abuse      No significant past surgical history          Vital Signs Last 24 Hrs  T(C): 36.1 (24 Aug 2024 07:00), Max: 36.2 (23 Aug 2024 11:00)  T(F): 97 (24 Aug 2024 07:00), Max: 97.2 (23 Aug 2024 11:00)  HR: 82 (24 Aug 2024 09:00) (64 - 94)  BP: 122/58 (23 Aug 2024 21:45) (122/58 - 122/58)  BP(mean): 83 (23 Aug 2024 21:45) (83 - 83)  RR: 20 (24 Aug 2024 09:00) (10 - 33)  SpO2: 100% (24 Aug 2024 09:00) (91% - 100%)    Parameters below as of 24 Aug 2024 07:00  Patient On (Oxygen Delivery Method): room air        I&O's Summary    23 Aug 2024 07:01  -  24 Aug 2024 07:00  --------------------------------------------------------  IN: 2850.3 mL / OUT: 3333 mL / NET: -482.7 mL    24 Aug 2024 07:01  -  24 Aug 2024 09:13  --------------------------------------------------------  IN: 167.9 mL / OUT: 292 mL / NET: -124.1 mL                              7.7    17.34 )-----------( 91       ( 24 Aug 2024 06:27 )             22.1     08-24    137  |  103  |  9   ----------------------------<  191<H>  4.1   |  22  |  1.01    Ca    8.2<L>      24 Aug 2024 06:27  Phos  2.7     08-24  Mg     2.4     08-24    TPro  6.0  /  Alb  3.1<L>  /  TBili  12.3<H>  /  DBili  x   /  AST  77<H>  /  ALT  36  /  AlkPhos  223<H>  08-24          Culture - Blood (collected 08-21-24 @ 20:45)  Source: Blood Blood-Venous  Preliminary Report (08-24-24 @ 01:02):    No growth at 48 Hours    Culture - Blood (collected 08-21-24 @ 20:00)  Source: Blood Blood-Venous  Preliminary Report (08-24-24 @ 01:02):    No growth at 48 Hours    Culture - Bronchial (collected 08-21-24 @ 00:15)  Source: Combi-Cath Combi-Cath  Gram Stain (08-21-24 @ 15:23):    Moderate polymorphonuclear leukocytes per low power field    Rare Squamous epithelial cells per low power field    No organisms seen per oil power field  Final Report (08-22-24 @ 16:47):    Normal Respiratory Elo present    Culture - Blood (collected 08-20-24 @ 12:27)  Source: Blood Blood  Preliminary Report (08-23-24 @ 16:00):    No growth at 72 Hours    Culture - Blood (collected 08-20-24 @ 11:43)  Source: Blood Blood  Preliminary Report (08-23-24 @ 16:00):    No growth at 72 Hours          BMI (kg/m2): 33.2 (08-12-24 @ 16:46)  GENERAL: obese man, physically deconditioned.   NEURO: Sedated with Precedex.     HEENT: Scleral icterus  CHEST: Bilateral breath sounds, decreased in bases.    CARDIAC: Regular rate and rhythm  ABDOMEN: Soft, non-tender, distended. Present bowel sounds. +anasarca  EXTREMITIES: warm, well perfused. BLE pitting edema up to thighs. LUE edema.     SKIN: +Jaundiced, no rash/ecchymoses Interval Events:   - L hand/arm edema. Doppler US negative for DVT    - Afebrile on empiric zosyn/fluc   - Norepinephrine 0.13, Vasopressin 0.03.   - Anuric on CRRT   - 4 BM's     ROS:   12 point review of systems performed and negative except otherwise noted above.       MEDICATIONS  (STANDING):  chlorhexidine 2% Cloths 1 Application(s) Topical <User Schedule>  CRRT Treatment    <Continuous>  doxycycline IVPB      doxycycline IVPB 100 milliGRAM(s) IV Intermittent every 12 hours  epoetin kareem (EPOGEN) Injectable 57494 Unit(s) SubCutaneous <User Schedule>  fluconAZOLE IVPB      fluconAZOLE IVPB 400 milliGRAM(s) IV Intermittent every 24 hours  folic acid 1 milliGRAM(s) Oral daily  insulin lispro (ADMELOG) corrective regimen sliding scale   SubCutaneous every 6 hours  lidocaine   4% Patch 1 Patch Transdermal daily  multivitamin 1 Tablet(s) Oral daily  norepinephrine Infusion 0.13 MICROgram(s)/kG/Min (13.9 mL/Hr) IV Continuous <Continuous>  pantoprazole  Injectable 40 milliGRAM(s) IV Push two times a day  Phoxillum Filtration BK 4 / 2.5 5000 milliLiter(s) (1000 mL/Hr) CRRT <Continuous>  Phoxillum Filtration BK 4 / 2.5 5000 milliLiter(s) (200 mL/Hr) CRRT <Continuous>  piperacillin/tazobactam IVPB.. 3.375 Gram(s) IV Intermittent every 8 hours  polyethylene glycol 3350 17 Gram(s) Oral <User Schedule>  PrismaSOL Filtration BGK 4 / 2.5 5000 milliLiter(s) (2000 mL/Hr) CRRT <Continuous>  rifAXIMin 550 milliGRAM(s) Oral two times a day  sodium phosphate 15 milliMole(s)/250 mL IVPB 15 milliMole(s) IV Intermittent once  thiamine 100 milliGRAM(s) Oral daily  vasopressin Infusion 0.03 Unit(s)/Min (4.5 mL/Hr) IV Continuous <Continuous>    MEDICATIONS  (PRN):      PAST MEDICAL & SURGICAL HISTORY:  Alcohol abuse      No significant past surgical history          Vital Signs Last 24 Hrs  T(C): 36.1 (24 Aug 2024 07:00), Max: 36.2 (23 Aug 2024 11:00)  T(F): 97 (24 Aug 2024 07:00), Max: 97.2 (23 Aug 2024 11:00)  HR: 82 (24 Aug 2024 09:00) (64 - 94)  BP: 122/58 (23 Aug 2024 21:45) (122/58 - 122/58)  BP(mean): 83 (23 Aug 2024 21:45) (83 - 83)  RR: 20 (24 Aug 2024 09:00) (10 - 33)  SpO2: 100% (24 Aug 2024 09:00) (91% - 100%)    Parameters below as of 24 Aug 2024 07:00  Patient On (Oxygen Delivery Method): room air        I&O's Summary    23 Aug 2024 07:01  -  24 Aug 2024 07:00  --------------------------------------------------------  IN: 2850.3 mL / OUT: 3333 mL / NET: -482.7 mL    24 Aug 2024 07:01  -  24 Aug 2024 09:13  --------------------------------------------------------  IN: 167.9 mL / OUT: 292 mL / NET: -124.1 mL                              7.7    17.34 )-----------( 91       ( 24 Aug 2024 06:27 )             22.1     08-24    137  |  103  |  9   ----------------------------<  191<H>  4.1   |  22  |  1.01    Ca    8.2<L>      24 Aug 2024 06:27  Phos  2.7     08-24  Mg     2.4     08-24    TPro  6.0  /  Alb  3.1<L>  /  TBili  12.3<H>  /  DBili  x   /  AST  77<H>  /  ALT  36  /  AlkPhos  223<H>  08-24          Culture - Blood (collected 08-21-24 @ 20:45)  Source: Blood Blood-Venous  Preliminary Report (08-24-24 @ 01:02):    No growth at 48 Hours    Culture - Blood (collected 08-21-24 @ 20:00)  Source: Blood Blood-Venous  Preliminary Report (08-24-24 @ 01:02):    No growth at 48 Hours    Culture - Bronchial (collected 08-21-24 @ 00:15)  Source: Combi-Cath Combi-Cath  Gram Stain (08-21-24 @ 15:23):    Moderate polymorphonuclear leukocytes per low power field    Rare Squamous epithelial cells per low power field    No organisms seen per oil power field  Final Report (08-22-24 @ 16:47):    Normal Respiratory Elo present    Culture - Blood (collected 08-20-24 @ 12:27)  Source: Blood Blood  Preliminary Report (08-23-24 @ 16:00):    No growth at 72 Hours    Culture - Blood (collected 08-20-24 @ 11:43)  Source: Blood Blood  Preliminary Report (08-23-24 @ 16:00):    No growth at 72 Hours          BMI (kg/m2): 33.2 (08-12-24 @ 16:46)  GENERAL: obese man, physically deconditioned.   NEURO: awake     HEENT: Scleral icterus  CHEST: Bilateral breath sounds, decreased in bases.    CARDIAC: Regular rate and rhythm  ABDOMEN: Soft, non-tender, distended. Present bowel sounds. +anasarca  EXTREMITIES: warm, well perfused. BLE pitting edema up to thighs. LUE edema.     SKIN: +Jaundiced, no rash/ecchymoses

## 2024-08-24 NOTE — PROGRESS NOTE ADULT - SUBJECTIVE AND OBJECTIVE BOX
Follow Up:  pre-liver transplant    Interval History: afebrile. remains on CRRT.     REVIEW OF SYSTEMS  [  ] ROS unobtainable because:    [ x ] All other systems negative except as noted below    Constitutional:  [ ] fever [ ] chills  [ ] weight loss  [ ] weakness  Skin:  [ ] rash [ ] phlebitis	  Eyes: [ ] icterus [ ] pain  [ ] discharge	  ENMT: [ ] sore throat  [ ] thrush [ ] ulcers [ ] exudates  Respiratory: [ ] dyspnea [ ] hemoptysis [ ] cough [ ] sputum	  Cardiovascular:  [ ] chest pain [ ] palpitations [ ] edema	  Gastrointestinal:  [ ] nausea [ ] vomiting [ ] diarrhea [ ] constipation [ ] pain	  Genitourinary:  [ ] dysuria [ ] frequency [ ] hematuria [ ] discharge [ ] flank pain  [ ] incontinence  Musculoskeletal:  [ ] myalgias [ ] arthralgias [ ] arthritis  [ ] back pain  Neurological:  [ ] headache [ ] seizures  [ ] confusion/altered mental status    Allergies  No Known Allergies        ANTIMICROBIALS:  doxycycline IVPB    doxycycline IVPB 100 every 12 hours  fluconAZOLE IVPB 400 every 24 hours  fluconAZOLE IVPB    piperacillin/tazobactam IVPB.. 3.375 every 8 hours  rifAXIMin 550 two times a day      OTHER MEDS:  MEDICATIONS  (STANDING):  epoetin kareem (EPOGEN) Injectable 22791 <User Schedule>  insulin lispro (ADMELOG) corrective regimen sliding scale  every 6 hours  norepinephrine Infusion 0.13 <Continuous>  pantoprazole  Injectable 40 two times a day  polyethylene glycol 3350 17 <User Schedule>  vasopressin Infusion 0.03 <Continuous>      Vital Signs Last 24 Hrs  T(C): 36.4 (24 Aug 2024 15:00), Max: 36.5 (24 Aug 2024 11:00)  T(F): 97.5 (24 Aug 2024 15:00), Max: 97.7 (24 Aug 2024 11:00)  HR: 74 (24 Aug 2024 15:00) (66 - 94)  BP: 122/58 (23 Aug 2024 21:45) (122/58 - 122/58)  BP(mean): 83 (23 Aug 2024 21:45) (83 - 83)  RR: 18 (24 Aug 2024 15:00) (11 - 33)  SpO2: 99% (24 Aug 2024 15:00) (91% - 100%)    Parameters below as of 24 Aug 2024 07:00  Patient On (Oxygen Delivery Method): room air        PHYSICAL EXAMINATION:  General: Alert and Awake, NAD, jaundice  HEENT: scleral icterus  Cardiac: RRR, No M/R/G  Resp: CTAB, No Wh/Rh/Ra  Abdomen: NBS, NT/ND, No HSM, No rigidity or guarding  MSK: No LE edema. No Calf tenderness  Skin: No rashes or lesions. Skin is warm and dry to the touch.   Neuro: Alert and Awake. CN 2-12 Grossly intact. Moves all four extremities spontaneously.  Psych: Calm, Pleasant, Cooperative                          7.9    18.65 )-----------( 83       ( 24 Aug 2024 12:39 )             22.7           136  |  103  |  8   ----------------------------<  202<H>  4.1   |  22  |  1.06    Ca    8.3<L>      24 Aug 2024 12:39  Phos  2.7       Mg     2.3         TPro  6.2  /  Alb  3.3  /  TBili  12.6<H>  /  DBili  x   /  AST  80<H>  /  ALT  38  /  AlkPhos  243<H>        Urinalysis Basic - ( 24 Aug 2024 12:39 )    Color: x / Appearance: x / SG: x / pH: x  Gluc: 202 mg/dL / Ketone: x  / Bili: x / Urobili: x   Blood: x / Protein: x / Nitrite: x   Leuk Esterase: x / RBC: x / WBC x   Sq Epi: x / Non Sq Epi: x / Bacteria: x        MICROBIOLOGY:  v  .Blood Blood  24   No growth at 24 hours  --  --      .Blood Blood  24   No growth at 24 hours  --  --      Blood Blood-Venous  24   No growth at 48 Hours  --  --      Blood Blood-Venous  24   No growth at 48 Hours  --  --      Combi-Cath Combi-Cath  24   Normal Respiratory Elo present  --    Moderate polymorphonuclear leukocytes per low power field  Rare Squamous epithelial cells per low power field  No organisms seen per oil power field      Blood Blood  24   No growth at 72 Hours  --  --      Blood Blood  24   No growth at 72 Hours  --  --      Peritoneal Peritoneal Fluid  24   No growth at 5 days  --    polymorphonuclear leukocytes seen  No organisms seen  by cytocentrifuge      .Blood Blood-Venous  24   No growth at 5 days  --  --      .Blood Blood-Venous  24   No growth at 5 days  --  --      .Blood Blood-Peripheral  24   No growth at 5 days  --  --      .Blood Blood  24   No growth at 5 days  --  --      .Blood Blood  24   Growth in aerobic bottle: Staphylococcus epidermidis Isolation of  Coagulase negative Staphylococcus from single blood culture sets may  represent  contamination. Contact the Microbiology Department at [phone] if  susceptibility testing is  clinically indicated.  Direct identification is available within approximately 3-5  hours either by Blood Panel Multiplexed PCR or Direct  MALDI-TOF. Details: [email or web address]  --  Blood Culture PCR        CMV IgG Antibody: >10.00 U/mL (24 @ 00:08)  Toxoplasma IgG Screen: 44.00 IU/mL (24 @ 21:15)  CMV IgG Antibody: >10.00 U/mL (24 @ 21:15)    CMVPCR Lo.94 Mdc51NB/mL ( @ 12:26)  CMVPCR Log: 3.01 Bax26NI/mL ( @ 00:08)  Rapid RVP Result: NotDetec ( @ 00:06)        RADIOLOGY:    <The imaging below has been reviewed and visualized by me independently. Findings as detailed in report below>    < from: [facility] Duplex Upper Ext Vein Scan, Left (24 @ 14:28) >  IMPRESSION:  No evidence of left upper extremity deep venous thrombosis.    < end of copied text >

## 2024-08-25 NOTE — PROGRESS NOTE ADULT - ASSESSMENT
68 year old man with alcohol use disorder admitted to [facility] for 1 month due to recent onset of jaundice and with a prolonged hospital/ICU course due to newly diagnosed decompensated cirrhosis with acute on chronic liver failure with shock, FANY (started on intermittent HD since 7/9), acute on chronic anemia with recurrent overt GI bleeding, and hepatic encephalopathy.   Transferred to [facility] on 8/12/24 for SLK evaluation and then transferred to the SICU for initiation of CRRT.    Meets FANY criteria for SLK - has been on dialysis since 7/9/24  He remains on Vasopressin and Levophed  On empiric antimicrobials - Fluconazole, Doxycycline, zosyn   Anuric on CRRT. Electrolytes normal on CRRT. Has generalized anasarca and ascites, tolerating minimal UF due to hypotension   Will continue CRRT, attempt UF 50-100cc/hour as tolerated.

## 2024-08-25 NOTE — PROGRESS NOTE ADULT - ASSESSMENT
68y with obesity and risky alcohol consumption (with decades' history of daily consumption of homemade alcohol), admitted to [facility] 1 month ago due to recent onset of jaundice and with a prolonged hospital and ICU course there due to newly diagnosed decompensated cirrhosis with acute on chronic liver failure (ACLF) with shock (resolved, but still on midodrine); FANY (on intermittent HD since 7/9); recurrent maroon stools and acute on chronic anemia necessitating intermittent PRBC transfusions (last on 8/8) and hypofibrinoginemia, with EGD (7/12) with small non-bleeding EV and a duodenal ulcer with a visible vessel; and waxing and waning hepatic encephalopathy. He was transferred to [facility] on 8/12/24 for evaluation for combined liver/kidney transplants (SLK).      Decompensated ETOH Cirrhosis  - SLK eval , MELD 38O  - TFs, pressors per SICU  - HE: cont rifaximin/miralax  - EV:  EGD (7/12) with small non-bleeding EV and a duodenal ulcer with a visible vessel. EGD 8/20: no active bleeding, PPI.  - s/p colonoscopy 8/21: lesion biopsied, f/u path   - stress dose steroids per SICU completed, wean pressors as able  - FANY/HRS: anuric, On CRRT 8/13, Renal following  - ID: Empiric Elise/fluc/ Doxy. Elise switched to Zosyn  -  8/13 w/ MRSE , repeat 8/14 with NGTD, repeat bcx today  - Thiamine/MVI/FOLIC ACID  - LHC deferred, will re-address daily once stabilized  - Needs CT a/p non contrast to eval iliacs   - cont sicu care

## 2024-08-25 NOTE — PROGRESS NOTE ADULT - SUBJECTIVE AND OBJECTIVE BOX
[facility] DIVISION OF KIDNEY DISEASES AND HYPERTENSION -- FOLLOW UP NOTE  --------------------------------------------------------------------------------  Authored by: Justin Pastor   Cell # [phone]     OMKAR SPARKS was seen and examined at bedside.   Patient is a 68y old  Male who presents with a chief complaint of cirrhosis/transplant eval (08-25-24)      24 hour events/subjective: No major events overnight. On CRRT, UF ranging from 0-25ml/hour, anuric, remains on levo and vaso. BP~ 100 systolic.       Standing Inpatient Medications  doxycycline IVPB 100 milliGRAM(s) IV Intermittent every 12 hours  epoetin kareem (EPOGEN) Injectable 06481 Unit(s) SubCutaneous <User Schedule>  fluconAZOLE IVPB 400 milliGRAM(s) IV Intermittent every 24 hours  folic acid 1 milliGRAM(s) Oral daily  insulin lispro (ADMELOG) corrective regimen sliding scale   SubCutaneous every 6 hours  lidocaine   4% Patch 1 Patch Transdermal daily  multivitamin 1 Tablet(s) Oral daily  norepinephrine Infusion 0.13 MICROgram(s)/kG/Min IV Continuous <Continuous>  pantoprazole  Injectable 40 milliGRAM(s) IV Push two times a day  piperacillin/tazobactam IVPB.. 3.375 Gram(s) IV Intermittent every 8 hours  polyethylene glycol 3350 17 Gram(s) Oral <User Schedule>  PrismaSOL Filtration BGK 4 / 2.5 5000 milliLiter(s) CRRT <Continuous>  rifAXIMin 550 milliGRAM(s) Oral two times a day  thiamine 100 milliGRAM(s) Oral daily  vasopressin Infusion 0.03 Unit(s)/Min IV Continuous <Continuous>        VITALS/PHYSICAL EXAM  --------------------------------------------------------------------------------  T(C): 36.6 (08-25-24 @ 11:00), Max: 36.9 (08-24-24 @ 22:00)  HR: 75 (08-25-24 @ 11:15) (69 - 96)  BP: 102/54 (08-24-24 @ 19:00) (102/54 - 102/54)  RR: 24 (08-25-24 @ 11:15) (15 - 35)  SpO2: 84% (08-25-24 @ 11:15) (84% - 100%)    08-24-24 @ 07:01  -  08-25-24 @ 07:00  --------------------------------------------------------  IN: 3333.3 mL / OUT: 2234 mL / NET: 1099.3 mL    08-25-24 @ 07:01  -  08-25-24 @ 11:25  --------------------------------------------------------  IN: 528.1 mL / OUT: 367 mL / NET: 161.1 mL      Physical Exam:  Awake  Scleral icterus +ve  NG tube +   Resting comfortably in bed   Right IJ dialysis catheter in place   Lungs fair entry b/l  Abdomen distended, non tender  2+ bilateral edema present     LABS/STUDIES  --------------------------------------------------------------------------------              7.4    18.14 >-----------<  83       [08-25-24 @ 06:12]              21.5     137  |  103  |  9   ----------------------------<  199      [08-25-24 @ 06:12]  4.2   |  21  |  1.15        Ca     8.0     [08-25-24 @ 06:12]      Mg     2.3     [08-25-24 @ 06:12]      Phos  2.1     [08-25-24 @ 06:12]    TPro  5.9  /  Alb  3.0  /  TBili  11.3  /  DBili  x   /  AST  69  /  ALT  35  /  AlkPhos  240  [08-25-24 @ 06:12]    PT/INR: PT 27.4 , INR 2.69       [08-25-24 @ 06:12]  PTT: 56.2       [08-25-24 @ 06:12]      Creatinine Trend:  SCr 1.15 [08-25 @ 06:12]  SCr 1.02 [08-25 @ 00:40]  SCr 1.05 [08-24 @ 17:17]  SCr 1.06 [08-24 @ 12:39]  SCr 1.01 [08-24 @ 06:27]        CAPILLARY BLOOD GLUCOSE  POCT Blood Glucose.: 188 mg/dL (25 Aug 2024 06:03)  POCT Blood Glucose.: 181 mg/dL (24 Aug 2024 17:11)  POCT Blood Glucose.: 196 mg/dL (24 Aug 2024 12:25)

## 2024-08-25 NOTE — PROGRESS NOTE ADULT - SUBJECTIVE AND OBJECTIVE BOX
Transplant Surgery - Multidisciplinary Progress Note  --------------------------------------------------------------    Present: Patient seen and examined with multidisciplinary Transplant team including (Surgery: Dr. Roberts. Hepatologist: Dr. Nix, LAWRENCE Pickard, RNs in AM rounds. Disciplines not in attendance will be notified of the plan.       Interval Events:  - On CRRT  - rectal mass bx 8/21, fu path  - NGT, ice chips        Potential Discharge date: Pending clinical course    Education: Medications    Plan of care: See Below        MEDICATIONS  (STANDING):  chlorhexidine 2% Cloths 1 Application(s) Topical <User Schedule>  CRRT Treatment    <Continuous>  doxycycline IVPB      doxycycline IVPB 100 milliGRAM(s) IV Intermittent every 12 hours  epoetin kareem (EPOGEN) Injectable 88189 Unit(s) SubCutaneous <User Schedule>  fluconAZOLE IVPB      fluconAZOLE IVPB 400 milliGRAM(s) IV Intermittent every 24 hours  folic acid 1 milliGRAM(s) Oral daily  insulin lispro (ADMELOG) corrective regimen sliding scale   SubCutaneous every 6 hours  lidocaine   4% Patch 1 Patch Transdermal daily  multivitamin 1 Tablet(s) Oral daily  norepinephrine Infusion 0.13 MICROgram(s)/kG/Min (13.9 mL/Hr) IV Continuous <Continuous>  pantoprazole  Injectable 40 milliGRAM(s) IV Push two times a day  Phoxillum Filtration BK 4 / 2.5 5000 milliLiter(s) (1000 mL/Hr) CRRT <Continuous>  Phoxillum Filtration BK 4 / 2.5 5000 milliLiter(s) (200 mL/Hr) CRRT <Continuous>  piperacillin/tazobactam IVPB.. 3.375 Gram(s) IV Intermittent every 8 hours  polyethylene glycol 3350 17 Gram(s) Oral <User Schedule>  PrismaSOL Filtration BGK 4 / 2.5 5000 milliLiter(s) (2000 mL/Hr) CRRT <Continuous>  rifAXIMin 550 milliGRAM(s) Oral two times a day  thiamine 100 milliGRAM(s) Oral daily  vasopressin Infusion 0.03 Unit(s)/Min (4.5 mL/Hr) IV Continuous <Continuous>    MEDICATIONS  (PRN):      PAST MEDICAL & SURGICAL HISTORY:  Alcohol abuse      No significant past surgical history          Vital Signs Last 24 Hrs  T(C): 36.4 (25 Aug 2024 07:00), Max: 36.9 (24 Aug 2024 22:00)  T(F): 97.6 (25 Aug 2024 07:00), Max: 98.5 (24 Aug 2024 22:00)  HR: 79 (25 Aug 2024 08:45) (69 - 96)  BP: 102/54 (24 Aug 2024 19:00) (102/54 - 102/54)  BP(mean): 73 (24 Aug 2024 19:00) (73 - 73)  RR: 35 (25 Aug 2024 08:45) (15 - 35)  SpO2: 100% (25 Aug 2024 08:45) (96% - 100%)    Parameters below as of 25 Aug 2024 07:00  Patient On (Oxygen Delivery Method): room air        I&O's Summary    24 Aug 2024 07:01  -  25 Aug 2024 07:00  --------------------------------------------------------  IN: 3333.3 mL / OUT: 2234 mL / NET: 1099.3 mL    25 Aug 2024 07:01  -  25 Aug 2024 09:03  --------------------------------------------------------  IN: 100.5 mL / OUT: 107 mL / NET: -6.5 mL                              7.4    18.14 )-----------( 83       ( 25 Aug 2024 06:12 )             21.5     08-25    137  |  103  |  9   ----------------------------<  199<H>  4.2   |  21<L>  |  1.15    Ca    8.0<L>      25 Aug 2024 06:12  Phos  2.1     08-25  Mg     2.3     08-25    TPro  5.9<L>  /  Alb  3.0<L>  /  TBili  11.3<H>  /  DBili  x   /  AST  69<H>  /  ALT  35  /  AlkPhos  240<H>  08-25          Culture - Blood (collected 08-23-24 @ 09:59)  Source: .Blood Blood  Preliminary Report (08-24-24 @ 15:01):    No growth at 24 hours    Culture - Blood (collected 08-23-24 @ 09:45)  Source: .Blood Blood  Preliminary Report (08-24-24 @ 15:01):    No growth at 24 hours    Culture - Blood (collected 08-21-24 @ 20:45)  Source: .Blood Blood-Venous  Preliminary Report (08-25-24 @ 01:02):    No growth at 72 Hours    Culture - Blood (collected 08-21-24 @ 20:00)  Source: .Blood Blood-Venous  Preliminary Report (08-25-24 @ 01:02):    No growth at 72 Hours    Culture - Bronchial (collected 08-21-24 @ 00:15)  Source: Combi-Cath Combi-Cath  Gram Stain (08-21-24 @ 15:23):    Moderate polymorphonuclear leukocytes per low power field    Rare Squamous epithelial cells per low power field    No organisms seen per oil power field  Final Report (08-22-24 @ 16:47):    Normal Respiratory Elo present    Culture - Blood (collected 08-20-24 @ 12:27)  Source: .Blood Blood  Preliminary Report (08-24-24 @ 16:01):    No growth at 4 days    Culture - Blood (collected 08-20-24 @ 11:43)  Source: .Blood Blood  Preliminary Report (08-24-24 @ 16:01):    No growth at 4 days        Review of systems  All other systems were reviewed and are negative, except as noted.    PHYSICAL EXAM:  Constitutional: Well developed / well nourished  Eyes:  PERRLA  ENMT: NC/AT  Neck: supple,   Respiratory: CTA B/L  Cardiovascular: RRR  Gastrointestinal: Soft abdomen, ND, L sided abdominal wall hernia--stable, NT  Genitourinary: anuric   Extremities: SCD's in place and working bilaterally  Vascular: Palpable dp pulses bilaterally.   Neurological: waking up  Skin: no rashes, ulcerations, lesions  Musculoskeletal: Moving all extremities

## 2024-08-25 NOTE — PROGRESS NOTE ADULT - ASSESSMENT
76 y/o male w/ no known PMHx (does not see doctors per sister) who presented as a transfer from [facility] for SLK evaluation. Patient was initially admitted to [facility] for back pain & jaundice and was diagnosed w/ cirrhosis. Hospital course there was c/b small EVs, melena 2/2 a duodenal ulcer s/p clipping, HRS requiring hemodialysis, hepatic encephalopathy, ascites s/p multiple LVPs, and deconditioning. Patient was admitted to SICU on 8/12 for initiation of CRRT as his BPs were too low to attempt intermittent HD.    Neuro:  - Miralax & rifaximin for prevention of hepatic encephalopathy  - Monitoring ammonia  - Thiamine, folic acid, & multivitamin for h/o EtOH abuse    Resp:  - Out of bed to chair, ambulate as tolerated, and incentive spirometry to prevent atelectasis  - Extubated    CV:  - Goal SBP >110, titrate pressors as able. On Levo gtt, vaso gtt   - +/- cardiac catheterization when stable to do so for transplant work-up   - Electrophys: nothing to do while not on AC, follow cards recs    GI:   - TF increased to goal 55cc/hr   - Protonix BID for h/o duodenal ulcer  - Monitor LFTs  - Undergoing evaluation for SLK transplantation    Renal:  - CRRT for concern of HRS, maintain circuit net neative 0-25cc/hr as tolerated   - Monitor I&Os and electrolytes w/ repletions as necessary  - Undergoing evaluation for SLK transplantation    Heme:  - Hold chemical VTE ppx in setting of liver failure   - Mechanical VTE ppx with SCDs  - Epogen 3x a week    ID:   - Empiric coverage w/ Zosyn, fluconazole as per transplant given vasopressor requirements  - Blood Cx 8/21, bronchial Cx 8/21 no growth   - 8/18: + doxycycline added for Lyme    Endo:   - Continue ISS [id number]   - Monitor glucose

## 2024-08-25 NOTE — PROGRESS NOTE ADULT - SUBJECTIVE AND OBJECTIVE BOX
24 HOUR EVENTS:  - off precedex  - CRRT even       SUBJECTIVE/ROS:  [ ] A ten-point review of systems was otherwise negative except as noted.  [ ] Due to altered mental status/intubation, subjective information were not able to be obtained from the patient. History was obtained, to the extent possible, from review of the chart and collateral sources of information.      NEURO  Exam: A/O x 1-2 waxes and wanes   [x] Adequacy of sedation and pain control has been assessed and adjusted    RESPIRATORY  RR: 19 (24 @ 01:30) (15 - 30)  SpO2: 99% (24 @ 01:30) (93% - 100%)  Exam: unlabored, clear to auscultation bilaterally  Mechanical Ventilation:   ABG - ( 25 Aug 2024 00:28 )  pH: 7.50  /  pCO2: 30    /  pO2: 109   / HCO3: 23    / Base Excess: 0.4   /  SaO2: 99.3    Lactate: x        [N/A] Extubation Readiness Assessed  Meds:       CARDIOVASCULAR  HR: 82 (24 @ 01:30) (69 - 96)  BP: 102/54 (24 @ 19:00) (102/54 - 102/54)  BP(mean): 73 (24 @ 19:00) (73 - 73)  ABP: 120/42 (24 @ 01:30) (94/40 - 140/51)  ABP(mean): 62 (24 @ 01:30) (52 - 72)  Exam: regular rate and rhythm  Cardiac Rhythm: sinus  Perfusion     [x]Adequate   [ ]Inadequate  Mentation   [x]Normal       [ ]Reduced  Extremities  [x]Warm         [ ]Cool  Volume Status [ ]Hypervolemic [x]Euvolemic [ ]Hypovolemic  Meds: norepinephrine Infusion 0.13 MICROgram(s)/kG/Min IV Continuous <Continuous>        GI/NUTRITION  Exam: soft, nontender, non distended   Diet: NPO with TF to goal   Meds: pantoprazole  Injectable 40 milliGRAM(s) IV Push two times a day  polyethylene glycol 3350 17 Gram(s) Oral <User Schedule>      GENITOURINARY  I&O's Detail     @ 07:01  -   @ 07:00  --------------------------------------------------------  IN:    Enteral Tube Flush: 400 mL    IV PiggyBack: 750 mL    IV PiggyBack: 300 mL    Miscellaneous Tube Feedin mL    Norepinephrine: 38.5 mL    Norepinephrine: 373.8 mL    Vasopressin: 108 mL  Total IN: 2850.3 mL    OUT:    Other (mL): 3333 mL  Total OUT: 3333 mL    Total NET: -482.7 mL       @ 07:01  -   @ 02:14  --------------------------------------------------------  IN:    Enteral Tube Flush: 100 mL    IV PiggyBack: 250 mL    IV PiggyBack: 350 mL    IV PiggyBack: 550 mL    Miscellaneous Tube Feedin mL    Norepinephrine: 304.6 mL    Vasopressin: 72 mL  Total IN: 2446.6 mL    OUT:    Other (mL): 1948 mL  Total OUT: 1948 mL    Total NET: 498.6 mL              137  |  104  |  8   ----------------------------<  188<H>  4.4   |  20<L>  |  1.02    Ca    8.0<L>      25 Aug 2024 00:40  Phos  2.4       Mg     2.2         TPro  5.8<L>  /  Alb  3.0<L>  /  TBili  11.6<H>  /  DBili  x   /  AST  76<H>  /  ALT  35  /  AlkPhos  243<H>      [ ] Elizabeth catheter, indication: N/A  Meds: folic acid 1 milliGRAM(s) Oral daily  multivitamin 1 Tablet(s) Oral daily  thiamine 100 milliGRAM(s) Oral daily        HEMATOLOGIC  Meds:   [x] VTE Prophylaxis                        7.8    18.77 )-----------( 84       ( 25 Aug 2024 00:40 )             22.2     PT/INR - ( 25 Aug 2024 00:40 )   PT: 25.6 sec;   INR: 2.50 ratio         PTT - ( 25 Aug 2024 00:40 )  PTT:50.4 sec  Transfusion     [ ] PRBC   [ ] Platelets   [ ] FFP   [ ] Cryoprecipitate      INFECTIOUS DISEASES  WBC Count: 18.77 K/uL ( @ 00:40)  WBC Count: 18.11 K/uL ( @ 17:17)  WBC Count: 18.65 K/uL ( @ 12:39)  WBC Count: 17.34 K/uL ( @ 06:27)    RECENT CULTURES:  Specimen Source: .Blood Blood  Date/Time:  @ 09:59  Culture Results:   No growth at 24 hours  Gram Stain: --  Organism: --  Specimen Source: .Blood Blood  Date/Time:  @ 09:45  Culture Results:   No growth at 24 hours  Gram Stain: --  Organism: --  Specimen Source: .Blood Blood-Venous  Date/Time:  @ 20:45  Culture Results:   No growth at 72 Hours  Gram Stain: --  Organism: --  Specimen Source: .Blood Blood-Venous  Date/Time:  @ 20:00  Culture Results:   No growth at 72 Hours  Gram Stain: --  Organism: --  Specimen Source: Combi-Cath Combi-Cath  Date/Time:  @ 00:15  Culture Results:   Normal Respiratory Elo present  Gram Stain:   Moderate polymorphonuclear leukocytes per low power field  Rare Squamous epithelial cells per low power field  No organisms seen per oil power field  Organism: --  Specimen Source: .Blood Blood  Date/Time:  @ 12:27  Culture Results:   No growth at 4 days  Gram Stain: --  Organism: --  Specimen Source: .Blood Blood  Date/Time:  @ 11:43  Culture Results:   No growth at 4 days  Gram Stain: --  Organism: --    Meds: doxycycline IVPB      doxycycline IVPB 100 milliGRAM(s) IV Intermittent every 12 hours  epoetin kareem (EPOGEN) Injectable 02380 Unit(s) SubCutaneous <User Schedule>  fluconAZOLE IVPB      fluconAZOLE IVPB 400 milliGRAM(s) IV Intermittent every 24 hours  piperacillin/tazobactam IVPB.. 3.375 Gram(s) IV Intermittent every 8 hours  rifAXIMin 550 milliGRAM(s) Oral two times a day        ENDOCRINE  CAPILLARY BLOOD GLUCOSE      POCT Blood Glucose.: 181 mg/dL (24 Aug 2024 17:11)  POCT Blood Glucose.: 196 mg/dL (24 Aug 2024 12:25)  POCT Blood Glucose.: 180 mg/dL (24 Aug 2024 06:09)    Meds: insulin lispro (ADMELOG) corrective regimen sliding scale   SubCutaneous every 6 hours  vasopressin Infusion 0.03 Unit(s)/Min IV Continuous <Continuous>        ACCESS DEVICES:  [ ] Peripheral IV  [ ] Central Venous Line	[ ] R	[ ] L	[ ] IJ	[ ] Fem	[ ] SC	Placed:   [ ] Arterial Line		[ ] R	[ ] L	[ ] Fem	[ ] Rad	[ ] Ax	Placed:   [ ] PICC:					[ ] Mediport  [ ] Urinary Catheter, Date Placed:   [x] Necessity of urinary, arterial, and venous catheters discussed    OTHER MEDICATIONS:  chlorhexidine 2% Cloths 1 Application(s) Topical <User Schedule>  CRRT Treatment    <Continuous>  lidocaine   4% Patch 1 Patch Transdermal daily  Phoxillum Filtration BK 4 / 2.5 5000 milliLiter(s) CRRT <Continuous>  Phoxillum Filtration BK 4 / 2.5 5000 milliLiter(s) CRRT <Continuous>  PrismaSOL Filtration BGK 4 / 2.5 5000 milliLiter(s) CRRT <Continuous>      CODE STATUS:      IMAGING:

## 2024-08-25 NOTE — PROGRESS NOTE ADULT - SUBJECTIVE AND OBJECTIVE BOX
Transplant Surgery - Multidisciplinary Rounds  --------------------------------------------------------------    Present: Patient seen and examined with multidisciplinary Transplant team including (Surgery: Dr. Roberts. Hepatologist: Dr. Nix. RNs in AM rounds. Disciplines not in attendance will be notified of the plan.     Interval Events:   - pressor requirements increasing slightly, CVVH changed to net even    Potential Discharge date: pending clinical course    Education:  Medications    Plan of care:  See Below    MEDICATIONS  (STANDING):  chlorhexidine 2% Cloths 1 Application(s) Topical <User Schedule>  CRRT Treatment    <Continuous>  doxycycline IVPB      doxycycline IVPB 100 milliGRAM(s) IV Intermittent every 12 hours  epoetin kareem (EPOGEN) Injectable 46026 Unit(s) SubCutaneous <User Schedule>  fluconAZOLE IVPB 400 milliGRAM(s) IV Intermittent every 24 hours  fluconAZOLE IVPB      folic acid 1 milliGRAM(s) Oral daily  insulin lispro (ADMELOG) corrective regimen sliding scale   SubCutaneous every 6 hours  lidocaine   4% Patch 1 Patch Transdermal daily  multivitamin 1 Tablet(s) Oral daily  norepinephrine Infusion 0.13 MICROgram(s)/kG/Min (13.9 mL/Hr) IV Continuous <Continuous>  pantoprazole  Injectable 40 milliGRAM(s) IV Push two times a day  Phoxillum Filtration BK 4 / 2.5 5000 milliLiter(s) (200 mL/Hr) CRRT <Continuous>  Phoxillum Filtration BK 4 / 2.5 5000 milliLiter(s) (1000 mL/Hr) CRRT <Continuous>  piperacillin/tazobactam IVPB.. 3.375 Gram(s) IV Intermittent every 8 hours  polyethylene glycol 3350 17 Gram(s) Oral <User Schedule>  PrismaSOL Filtration BGK 4 / 2.5 5000 milliLiter(s) (2000 mL/Hr) CRRT <Continuous>  rifAXIMin 550 milliGRAM(s) Oral two times a day  thiamine 100 milliGRAM(s) Oral daily  vasopressin Infusion 0.03 Unit(s)/Min (4.5 mL/Hr) IV Continuous <Continuous>    MEDICATIONS  (PRN):      PAST MEDICAL & SURGICAL HISTORY:  Alcohol abuse      No significant past surgical history          Vital Signs Last 24 Hrs  T(C): 36.9 (24 Aug 2024 22:00), Max: 36.9 (24 Aug 2024 22:00)  T(F): 98.5 (24 Aug 2024 22:00), Max: 98.5 (24 Aug 2024 22:00)  HR: 77 (24 Aug 2024 23:30) (68 - 94)  BP: 102/54 (24 Aug 2024 19:00) (102/54 - 102/54)  BP(mean): 73 (24 Aug 2024 19:00) (73 - 73)  RR: 19 (24 Aug 2024 23:30) (15 - 30)  SpO2: 100% (24 Aug 2024 23:30) (93% - 100%)    Parameters below as of 24 Aug 2024 22:00  Patient On (Oxygen Delivery Method): room air        I&O's Summary    23 Aug 2024 07:01  -  24 Aug 2024 07:00  --------------------------------------------------------  IN: 2850.3 mL / OUT: 3333 mL / NET: -482.7 mL    24 Aug 2024 07:01  -  25 Aug 2024 00:28  --------------------------------------------------------  IN: 2421.6 mL / OUT: 1729 mL / NET: 692.6 mL                              7.6    18.11 )-----------( 92       ( 24 Aug 2024 17:17 )             22.2     08-24    137  |  103  |  8   ----------------------------<  187<H>  4.0   |  21<L>  |  1.05    Ca    8.2<L>      24 Aug 2024 17:17  Phos  2.7     08-24  Mg     2.3     08-24    TPro  5.8<L>  /  Alb  3.1<L>  /  TBili  11.9<H>  /  DBili  x   /  AST  74<H>  /  ALT  36  /  AlkPhos  236<H>  08-24        Review of systems  Gen: No weight changes, fatigue, fevers/chills, weakness  Skin: No rashes  Head/Eyes/Ears/Mouth: No headache; Normal hearing; Normal vision w/o blurriness; No sinus pain/discomfort, sore throat  Respiratory: No dyspnea, cough, wheezing, hemoptysis  CV: No chest pain, PND, orthopnea  GI: No abdominal pain  : No increased frequency, dysuria, hematuria, nocturia  MSK: No joint pain/swelling; no back pain; no edema  Neuro: No dizziness/lightheadedness, weakness, seizures, numbness, tingling  Heme: No easy bruising or bleeding  Endo: No heat/cold intolerance  Psych: No significant nervousness, anxiety, stress, depression  All other systems were reviewed and are negative, except as noted.      PHYSICAL EXAM:  Constitutional: Well developed / well nourished  Eyes: PERRLA  ENMT: nc/at, no thrush  Neck: supple, central line on right side  Respiratory: CTA B/L  Cardiovascular: regular rate  Gastrointestinal: soft nontender, distended  Genitourinary: On dialysis  Extremities: SCD's in place and working bilaterally  Neurological: Awake and alert  Skin: no rashes, ulcerations, lesions  Musculoskeletal: Moving all extremities  Psychiatric: Responsive

## 2024-08-25 NOTE — PROGRESS NOTE ADULT - ASSESSMENT
68y with obesity and risky alcohol consumption (with decades' history of daily consumption of homemade alcohol), admitted to [facility] 1 month ago due to recent onset of jaundice and with a prolonged hospital and ICU course there due to newly diagnosed decompensated cirrhosis with acute on chronic liver failure (ACLF) with shock (resolved, but still on midodrine); FANY (on intermittent HD since 7/9); recurrent maroon stools and acute on chronic anemia necessitating intermittent PRBC transfusions (last on 8/8) and hypofibrinoginemia, with EGD (7/12) with small non-bleeding EV and a duodenal ulcer with a visible vessel; and waxing and waning hepatic encephalopathy. He was transferred to [facility] on 8/12/24 for evaluation for combined liver/kidney transplants (SLK).      Decompensated ETOH Cirrhosis  - SLK eval , MELD 39  - TFs, pressors per SICU  - HE: cont rifaximin/miralax  - EV:  EGD (7/12) with small non-bleeding EV and a duodenal ulcer with a visible vessel. EGD 8/20: no active bleeding, PPI.  - s/p colonoscopy 8/21: lesion biopsied, f/u path   - stress dose steroids per SICU completed, wean pressors as able  - FANY/HRS: anuric, On CRRT 8/13, Renal following  - ID: Empiric Elise/fluc/ Doxy. Elise switched to Zosyn  -  8/13 w/ MRSE , repeat 8/14 with NGTD  - Thiamine/MVI/FOLIC ACID  - LHC deferred, will re-address daily once stabilized  - Needs CT a/p non contrast to eval iliacs   - cont sicu care

## 2024-08-26 NOTE — PROCEDURE NOTE - NSSPECDEVICE_VASC_A_CORE
11/7 orthostatics + with drop in SBP to 80s on standing. RL bolus 1L and 75ml/h .SBP in 70-80s this PM on IVF. NS bolus 500ml x1. critical care consulted   11/11- /61 . May need intermittent bolus IVF due to volume loss with diarrhea  11/12- good UO. Walking to [place]. Eating 75%   monitoring device

## 2024-08-26 NOTE — PROGRESS NOTE ADULT - SUBJECTIVE AND OBJECTIVE BOX
Interval Events:   - No further episodes of hematochezia. Hb stable.   - Meropenem switched to zosyn as per ID.   - CT: new RLL pneumonia. BL pulm edema. Large vol ascites.   - Rectal ulcer biopsy ruled out malignancy.     - Afebrile, on room air.   - Levo 0.78 and Vaso 0.03.   - anuric on net even CRRT.   - 3 reported BM in 24h.     ROS:   12 point review of systems performed and negative except otherwise noted above.     Hospital Medications:  chlorhexidine 2% Cloths 1 Application(s) Topical <User Schedule>  CRRT Treatment    <Continuous>  doxycycline IVPB      doxycycline IVPB 100 milliGRAM(s) IV Intermittent every 12 hours  epoetin kareem (EPOGEN) Injectable 90217 Unit(s) SubCutaneous <User Schedule>  fluconAZOLE IVPB      fluconAZOLE IVPB 400 milliGRAM(s) IV Intermittent every 24 hours  folic acid 1 milliGRAM(s) Oral daily  insulin lispro (ADMELOG) corrective regimen sliding scale   SubCutaneous every 6 hours  lidocaine   4% Patch 1 Patch Transdermal daily  multivitamin/minerals/iron Oral Solution (CENTRUM) 15 milliLiter(s) Enteral Tube daily  norepinephrine Infusion 0.13 MICROgram(s)/kG/Min (13.9 mL/Hr) IV Continuous <Continuous>  pantoprazole  Injectable 40 milliGRAM(s) IV Push two times a day  Phoxillum Filtration BK 4 / 2.5 5000 milliLiter(s) (200 mL/Hr) CRRT <Continuous>  Phoxillum Filtration BK 4 / 2.5 5000 milliLiter(s) (1000 mL/Hr) CRRT <Continuous>  piperacillin/tazobactam IVPB.. 3.375 Gram(s) IV Intermittent every 8 hours  polyethylene glycol 3350 17 Gram(s) Oral <User Schedule>  PrismaSOL Filtration BGK 4 / 2.5 5000 milliLiter(s) (2000 mL/Hr) CRRT <Continuous>  rifAXIMin 550 milliGRAM(s) Oral two times a day  thiamine 100 milliGRAM(s) Oral daily  vasopressin Infusion 0.03 Unit(s)/Min (4.5 mL/Hr) IV Continuous <Continuous>    MAR over past 24 hours:    calcium gluconate IVPB   200 mL/Hr IV Intermittent (24 @ 14:47)    chlorhexidine 2% Cloths   1 Application(s) Topical (24 @ 05:59)    doxycycline IVPB   100 mL/Hr IV Intermittent (24 @ 05:52)   100 mL/Hr IV Intermittent (24 @ 17:48)    epoetin kareem (EPOGEN) Injectable   83427 Unit(s) SubCutaneous (24 @ 05:58)    fluconAZOLE IVPB   100 mL/Hr IV Intermittent (24 @ 10:22)    folic acid   1 milliGRAM(s) Oral (24 @ 12:05)    insulin lispro (ADMELOG) corrective regimen sliding scale   2 Unit(s) SubCutaneous (24 @ 12:08)   2 Unit(s) SubCutaneous (24 @ 06:17)   2 Unit(s) SubCutaneous (24 @ 00:36)   2 Unit(s) SubCutaneous (24 @ 17:49)    lidocaine   4% Patch   1 Patch Transdermal (24 @ 12:05)    multivitamin/minerals/iron Oral Solution (CENTRUM)   15 milliLiter(s) Enteral Tube (24 @ 12:05)    norepinephrine Infusion   13.9 mL/Hr IV Continuous (24 @ 21:19)    pantoprazole  Injectable   40 milliGRAM(s) IV Push (24 @ 05:51)   40 milliGRAM(s) IV Push (24 @ 17:49)    Phoxillum Filtration BK 4 / 2.5   200 mL/Hr CRRT (24 @ 07:44)   200 mL/Hr CRRT (24 @ 21:20)   200 mL/Hr CRRT (24 @ 13:55)    Phoxillum Filtration BK 4 / 2.5   1000 mL/Hr CRRT (24 @ 07:44)   1000 mL/Hr CRRT (24 @ 21:20)   1000 mL/Hr CRRT (24 @ 13:55)    piperacillin/tazobactam IVPB..   25 mL/Hr IV Intermittent (24 @ 05:52)   25 mL/Hr IV Intermittent (24 @ 21:18)   25 mL/Hr IV Intermittent (24 @ 14:47)    polyethylene glycol 3350   17 Gram(s) Oral (24 @ 12:06)   17 Gram(s) Oral (24 @ 05:53)   17 Gram(s) Oral (24 @ 00:36)   17 Gram(s) Oral (24 @ 17:48)    PrismaSOL Filtration BGK 4 / 2.5   2000 mL/Hr CRRT (24 @ 07:45)   2000 mL/Hr CRRT (24 @ 21:20)   2000 mL/Hr CRRT (24 @ 13:55)    rifAXIMin   550 milliGRAM(s) Oral (24 @ 05:52)   550 milliGRAM(s) Oral (24 @ 17:48)    sodium phosphate 15 milliMole(s)/250 mL IVPB   255 mL/Hr IV Intermittent (24 @ 18:53)    sodium phosphate 15 milliMole(s)/250 mL IVPB   63.75 mL/Hr IV Intermittent (24 @ 01:20)    thiamine   100 milliGRAM(s) Oral (24 @ 12:04)    vasopressin Infusion   4.5 mL/Hr IV Continuous (24 @ 21:19)      PHYSICAL EXAM:   Vital Signs last 24 hours:  T(F): 97.7 (24 @ 11:00), Max: 98.4 (24 @ 00:00)  HR: 77 (24 @ 12:15) (72 - 110)  BP: 111/48 (24 @ 19:30) (111/48 - 111/48)  BP(mean): 69 (24 @ 19:30) (69 - 69)  ABP: 122/46 (24 @ 12:15) (93/37 - 129/46)  ABP(mean): 66 (24 @ 12:15) (54 - 72)  RR: 19 (24 @ 12:15) (14 - 35)  SpO2: 100% (24 @ 12:15) (95% - 100%)    I&Os:    24 @ 07:01  -  24 @ 07:00  --------------------------------------------------------  IN:    Enteral Tube Flush: 190 mL    IV PiggyBack: 775 mL    IV PiggyBack: 275 mL    Miscellaneous Tube Feedin mL    Norepinephrine: 406.3 mL    Vasopressin: 108 mL  Total IN: 3019.3 mL    OUT:    Other (mL): 2769 mL  Total OUT: 2769 mL    Total NET: 250.3 mL      24 @ 07:01  -  24 @ 12:28  --------------------------------------------------------  IN:    IV PiggyBack: 25 mL    IV PiggyBack: 200 mL    Miscellaneous Tube Feedin mL    Norepinephrine: 113.3 mL    Vasopressin: 22.5 mL  Total IN: 645.8 mL    OUT:    Other (mL): 599 mL  Total OUT: 599 mL    Total NET: 46.8 mL        BMI (kg/m2): 33.2 (24 @ 16:46)  GENERAL: obese man, physically deconditioned.   NEURO: Awake and alert. Only oriented to person. Having minimal verbal output. Weak voice. Follows simple commands.    HEENT: Scleral icterus  CHEST: Bilateral breath sounds, decreased in bases.    CARDIAC: Regular rate and rhythm  ABDOMEN: Soft, non-tender, more distended than on Friday. Present bowel sounds. Anasarca.   EXTREMITIES: warm, well perfused. Anasarca improved since Friday.    SKIN: Jaundiced, no rash/ecchymoses    DIAGNOSTICS:  WBC      Hg       PLT      Na       K        CO2     BUN      Cr       ALT      AST      TB       ALP  22.34    7.1      88       ------   ------   ------   ------   ------   ------   ------   ------   ------   24 @ 12:07  21.88    7.5      84       136      4.3      20       11       1.20     34       63       11.4     253      24 @ 06:16  20.15    7.4      92       136      4.3      21       11       1.21     32       64       11.4     252      24 @ 00:43  20.30    7.5      82       137      4.3      21       11       1.23     33       64       11.0     250      24 @ 17:50  18.94    7.3      78       137      4.2      21       10       1.24     35       67       11.4     245      24 @ 12:11    PT             INR            MELDwith  MELDw/o  28.6           2.68           --             --             24 @ 06:16  27.3           2.55           --             --             24 @ 00:43  26.8           2.50           --             --             24 @ 17:50  26.9           2.51           --             --             24 @ 12:11  27.4           2.69           --             --             24 @ 06:12

## 2024-08-26 NOTE — PROGRESS NOTE ADULT - ASSESSMENT
78 yo m with alcohol abuse otherwise no known chronic medical issues (does not see doctors per sister) s/p 1 month stay at [facility] now transferred to [facility] for liver transplant eval.  Most of history obtained from sister (Ashlyn) at bedside and some from transfer paperwork. Per sister, pt was initially brought to the hospital by family for back pain and jaundice for unclear amount of time. Patient was seen by GI and underwent EGD soon after admission which only showed nonbleeding ?duodenal ulcers (no intervention) however few days later pt developed active signs of bleeding and emergently underwent EGD again showing bleeding esophageal varices which were clipped.  pt was electively intubated with stay in ICU thereafter. Patient then started with HD due to worsening renal function and MS for past 2.5 weeks at times limited by low BP requiring pressors to assist. Had numerous paracentesis with varying amount of fluid removal - albumin infusions. Sister not aware of any concerns for acute infection during his stay but aware that pt was on abx at some point due to bleeding issues.        PERTINENT RADIOLOGY:  CXR: Tubes and lines as above. No focal consolidations  CT Chest, A&P:  Patchy ground-glass opacities in the bilateral upper lobes. *  Cirrhosis with evidence of portal hypertension. *  Hypodense lesion in the periphery of the left hepatic lobe of uncertain etiology. Somewhat wedge-shaped morphology raises the possibility for a small infarct. Question subtle peripheral nodular enhancement, and a hemangioma is also considered. Contrast enhanced abdominal MRI can be performed for further characterization and to assess for other possibilities. *  A wedge-shaped region of hypoattenuation in the spleen may reflect a small infarct. *  Focal eccentric wall thickening in the low rectum, possibly due to a mural fold. Consider colonoscopy. *  Large volume ascites.  CT Head: No evidence of acute intracranial pathology. If clinical symptoms persist or worsen, more sensitive evaluation with brain MRI may be obtained, if no contraindications exist.    BCx (8/12) 1/4 MRSE  BCx (8/14) NGTD  Paracentesis (8/14) Cell Counts Negative for SBP  MRSA/MSSA Nasal PCR (8/16) Negative    CXR (8/19) Ground Glass infiltrates persist  CT from 8//13 with bilateral Ground glass infiltrates    # Persistent Ground glass infiltrates of unclear etiology  please send urine legionella ag  please send deep sputum for testing for gram stain/cx, fungal stain/cx    #Decompensated liver cirrhosis, Leukocytosis, Shock  --Meropenem deescalated to Zosyn for empiric coverage in light of negative culture workup  --Favor limiting duration of empiric fluconazole (8/12 -->)  --Continue to follow CBC with diff  --Continue to follow renal function (Cr/BUN)  --Continue to follow transaminases  --Continue to follow temperature curve  --Follow up on preliminary blood cultures  --If able will send BAL cultures for legionella & PJP PCR    #Positive BCx (8/12) (Staph epi)  Consistent with contaminant   as repeat testing is negative x many    #CMV PCR   --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 Would repeat CMV PCR on (8/26)  -- may need to treat if increasing  Cytomegalovirus By PCR: 4890 --->4730 --->1020 --->867 (8/20)    #Pre Transplant Evaluation   HIV-1/2 Combo Result: Nonreactive  EBVPCR Log: NotDetec Dgt68YN/mL   Hepatitis A IgG Ab Result: Reactive   Hepatitis B Core Antibody, Total: Nonreactive  Hepatitis B Surface Antibody: Reactive   Hepatitis B DNA PCR Log: Not Detected  Hepatitis B Surface Antigen: Nonreactive  Hepatitis C Virus Interpretation: Nonreactive  COVID-19 De Domain Antibody: Positive  Treponema Pallidum Antibody Interpretation: Negative  HSV 1 IgG  Positive/HSV 2 IgG Negative  EBV Serology Positive  CMV IgG Positive  VZV IgG Positive  Measles Positive, Mumps Positive and Rubella IgG Positive  Toxoplasma IgG Positive  QuantiFeron Gold Negative  Strongyloides Ab Negative  Babesia PCR negative  --Follow up on Schistosoma IgG  --Reportedly Lyme serology was previously positive and remains positive, Tick Diseases Panel is negative for additional tick born exposures  --Western blot negative, serologies not concerning for active Lyme disease  -- On doxycycline 100mg bid for atypical coverage   --Will send urine for Legionella, if negative can stop doxycycline    #Encounter to Vaccinate Patient - Will defer vaccination in context of critical illness  COVID19: No primary series. Would benefit from COVID19 7599-0203 dose  Influenza: Would benefit from dose next season  Pneumococcal: Would benefit from PCV20  HAV: Immune, no additional vaccines indicated  HBV: Immune, no additional vaccines indicated  MMR: Immune, will not require further vaccination  Varicella: Immune, will not require further vaccination  Shingles: Would benefit from Shingrix  Tdap: Would benefit from Tdap

## 2024-08-26 NOTE — PROGRESS NOTE ADULT - SUBJECTIVE AND OBJECTIVE BOX
Transplant Surgery - Multidisciplinary Progress Note  --------------------------------------------------------------    Present: Patient seen and examined with multidisciplinary Transplant team including (Surgery: Dr. Layton. Hepatologist: Dr. Jacobs, ACP Neeraj, RNs in AM rounds. Disciplines not in attendance will be notified of the plan.       Interval Events:  - remains on pressors  - repeat cultures  - remains on CVVH  - CT c/a/p w/ PNA        Potential Discharge date: Pending clinical course    Education: Medications    Plan of care: See Below        TX DATA HERE    Review of systems  All other systems were reviewed and are negative, except as noted.    PHYSICAL EXAM:  Constitutional: Well developed / well nourished  Eyes:  PERRLA  ENMT: NC/AT  Neck: supple,   Respiratory: CTA B/L  Cardiovascular: RRR  Gastrointestinal: Soft abdomen, ND, L sided abdominal wall hernia--stable, NT  Genitourinary: anuric   Extremities: SCD's in place and working bilaterally  Vascular: Palpable dp pulses bilaterally.   Neurological: waking up  Skin: no rashes, ulcerations, lesions  Musculoskeletal: Moving all extremities   Transplant Surgery - Multidisciplinary Progress Note  --------------------------------------------------------------    Present: Patient seen and examined with multidisciplinary Transplant team including (Surgery: Dr. Layton, Dr. Roberts. Hepatologist: Dr. Jacobs, Dr. Katrin To, Dr. Yang. Pharmacist CELY Butts. LAWRENCE Pickard RNs in AM rounds. Disciplines not in attendance will be notified of the plan.         Interval Events:  - remains on pressors  - repeat cultures  - remains on CVVH  - CT c/a/p w/ PNA        Potential Discharge date: Pending clinical course    Education: Medications    Plan of care: See Below          MEDICATIONS  (STANDING):  chlorhexidine 2% Cloths 1 Application(s) Topical <User Schedule>  CRRT Treatment    <Continuous>  doxycycline IVPB      doxycycline IVPB 100 milliGRAM(s) IV Intermittent every 12 hours  epoetin kareem (EPOGEN) Injectable 20213 Unit(s) SubCutaneous <User Schedule>  fluconAZOLE IVPB 400 milliGRAM(s) IV Intermittent every 24 hours  fluconAZOLE IVPB      folic acid 1 milliGRAM(s) Oral daily  insulin lispro (ADMELOG) corrective regimen sliding scale   SubCutaneous every 6 hours  lidocaine   4% Patch 1 Patch Transdermal daily  multivitamin/minerals/iron Oral Solution (CENTRUM) 15 milliLiter(s) Enteral Tube daily  norepinephrine Infusion 0.13 MICROgram(s)/kG/Min (13.9 mL/Hr) IV Continuous <Continuous>  pantoprazole  Injectable 40 milliGRAM(s) IV Push two times a day  Phoxillum Filtration BK 4 / 2.5 5000 milliLiter(s) (200 mL/Hr) CRRT <Continuous>  Phoxillum Filtration BK 4 / 2.5 5000 milliLiter(s) (1000 mL/Hr) CRRT <Continuous>  piperacillin/tazobactam IVPB.. 3.375 Gram(s) IV Intermittent every 8 hours  polyethylene glycol 3350 17 Gram(s) Oral <User Schedule>  PrismaSOL Filtration BGK 4 / 2.5 5000 milliLiter(s) (2000 mL/Hr) CRRT <Continuous>  rifAXIMin 550 milliGRAM(s) Oral two times a day  thiamine 100 milliGRAM(s) Oral daily  vasopressin Infusion 0.03 Unit(s)/Min (4.5 mL/Hr) IV Continuous <Continuous>    MEDICATIONS  (PRN):      PAST MEDICAL & SURGICAL HISTORY:  Alcohol abuse      No significant past surgical history          Vital Signs Last 24 Hrs  T(C): 36.5 (26 Aug 2024 11:00), Max: 36.9 (26 Aug 2024 00:00)  T(F): 97.7 (26 Aug 2024 11:00), Max: 98.4 (26 Aug 2024 00:00)  HR: 77 (26 Aug 2024 12:15) (72 - 110)  BP: 111/48 (25 Aug 2024 19:30) (111/48 - 111/48)  BP(mean): 69 (25 Aug 2024 19:30) (69 - 69)  RR: 19 (26 Aug 2024 12:15) (14 - 35)  SpO2: 100% (26 Aug 2024 12:15) (95% - 100%)    Parameters below as of 26 Aug 2024 07:00  Patient On (Oxygen Delivery Method): room air        I&O's Summary    25 Aug 2024 07:01  -  26 Aug 2024 07:00  --------------------------------------------------------  IN: 3019.3 mL / OUT: 2769 mL / NET: 250.3 mL    26 Aug 2024 07:01  -  26 Aug 2024 12:31  --------------------------------------------------------  IN: 645.8 mL / OUT: 599 mL / NET: 46.8 mL                              7.1    22.34 )-----------( 88       ( 26 Aug 2024 12:07 )             20.3     08-26    136  |  103  |  11  ----------------------------<  199<H>  4.3   |  20<L>  |  1.20    Ca    8.2<L>      26 Aug 2024 06:16  Phos  3.1     08-26  Mg     2.3     08-26    TPro  6.0  /  Alb  3.0<L>  /  TBili  11.4<H>  /  DBili  x   /  AST  63<H>  /  ALT  34  /  AlkPhos  253<H>  08-26          Culture - Blood (collected 08-23-24 @ 09:59)  Source: .Blood Blood  Preliminary Report (08-25-24 @ 15:00):    No growth at 48 Hours    Culture - Blood (collected 08-23-24 @ 09:45)  Source: .Blood Blood  Preliminary Report (08-25-24 @ 15:00):    No growth at 48 Hours    Culture - Blood (collected 08-21-24 @ 20:45)  Source: .Blood Blood-Venous  Preliminary Report (08-26-24 @ 01:01):    No growth at 4 days    Culture - Blood (collected 08-21-24 @ 20:00)  Source: .Blood Blood-Venous  Preliminary Report (08-26-24 @ 01:01):    No growth at 4 days    Culture - Bronchial (collected 08-21-24 @ 00:15)  Source: Combi-Cath Combi-Cath  Gram Stain (08-21-24 @ 15:23):    Moderate polymorphonuclear leukocytes per low power field    Rare Squamous epithelial cells per low power field    No organisms seen per oil power field  Final Report (08-22-24 @ 16:47):    Normal Respiratory Elo present    Culture - Blood (collected 08-20-24 @ 12:27)  Source: .Blood Blood  Final Report (08-25-24 @ 16:00):    No growth at 5 days    Culture - Blood (collected 08-20-24 @ 11:43)  Source: .Blood Blood  Final Report (08-25-24 @ 16:00):    No growth at 5 days                Review of systems  All other systems were reviewed and are negative, except as noted.    PHYSICAL EXAM:  Constitutional: Well developed / well nourished  Eyes:  PERRLA  ENMT: NC/AT  Neck: supple,   Respiratory: CTA B/L  Cardiovascular: RRR  Gastrointestinal: Soft abdomen, ND, L sided abdominal wall hernia--stable, NT  Genitourinary: anuric   Extremities: SCD's in place and working bilaterally  Vascular: Palpable dp pulses bilaterally.   Neurological: waking up  Skin: no rashes, ulcerations, lesions  Musculoskeletal: Moving all extremities

## 2024-08-26 NOTE — PROGRESS NOTE ADULT - SUBJECTIVE AND OBJECTIVE BOX
HISTORY  68y Male    24 HOUR EVENTS:  - off precedex  - CRRT even     SUBJECTIVE/ROS:  [ ] A ten-point review of systems was otherwise negative except as noted.  [ ] Due to altered mental status/intubation, subjective information were not able to be obtained from the patient. History was obtained, to the extent possible, from review of the chart and collateral sources of information.      NEURO  Exam: A/O x 1-2 waxes and wanes   Meds:   [x] Adequacy of sedation and pain control has been assessed and adjusted      RESPIRATORY  RR: 24 (24 @ 01:15) (16 - 35)  SpO2: 100% (24 @ 01:15) (95% - 100%)  Wt(kg): --  Exam: unlabored, clear to auscultation bilaterally  Mechanical Ventilation:   ABG - ( 25 Aug 2024 17:44 )  pH: 7.49  /  pCO2: 30    /  pO2: 78    / HCO3: 23    / Base Excess: -0.2  /  SaO2: 99.9    Lactate: x                [N/A] Extubation Readiness Assessed  Meds:       CARDIOVASCULAR  HR: 77 (24 @ 01:15) (72 - 98)  BP: 111/48 (24 @ 19:30) (111/48 - 111/48)  BP(mean): 69 (24 @ 19:30) (69 - 69)  ABP: 110/41 (24 @ 01:15) (93/37 - 130/44)  ABP(mean): 60 (24 @ 01:15) (53 - 78)  Wt(kg): --  CVP(cm H2O): --      Exam: regular rate and rhythm  Cardiac Rhythm: sinus  Perfusion     [x]Adequate   [ ]Inadequate  Mentation   [x]Normal       [ ]Reduced  Extremities  [x]Warm         [ ]Cool  Volume Status [ ]Hypervolemic [x]Euvolemic [ ]Hypovolemic  Meds: norepinephrine Infusion 0.13 MICROgram(s)/kG/Min IV Continuous <Continuous>        GI/NUTRITION  Exam: soft, nontender, distended   Diet: NGT TF at goal   Meds: pantoprazole  Injectable 40 milliGRAM(s) IV Push two times a day  polyethylene glycol 3350 17 Gram(s) Oral <User Schedule>      GENITOURINARY  I&O's Detail     @ 07: @ 07:00  --------------------------------------------------------  IN:    Enteral Tube Flush: 300 mL    IV PiggyBack: 250 mL    IV PiggyBack: 350 mL    IV PiggyBack: 625 mL    Miscellaneous Tube Feedin mL    Norepinephrine: 440.3 mL    Vasopressin: 108 mL  Total IN: 3333.3 mL    OUT:    Other (mL): 2234 mL  Total OUT: 2234 mL    Total NET: 1099.3 mL       @ 07: @ 01:47  --------------------------------------------------------  IN:    Enteral Tube Flush: 50 mL    IV PiggyBack: 425 mL    IV PiggyBack: 200 mL    Miscellaneous Tube Feedin mL    Norepinephrine: 297.1 mL    Vasopressin: 81 mL  Total IN: 1988.1 mL    OUT:    Other (mL): 1726 mL  Total OUT: 1726 mL    Total NET: 262.1 mL              136  |  103  |  11  ----------------------------<  166<H>  4.3   |  21<L>  |  1.21    Ca    8.3<L>      26 Aug 2024 00:43  Phos  2.7       Mg     2.3         TPro  6.1  /  Alb  3.0<L>  /  TBili  11.4<H>  /  DBili  x   /  AST  64<H>  /  ALT  32  /  AlkPhos  252<H>      [ ] Elizabeth catheter, indication: N/A  Meds: folic acid 1 milliGRAM(s) Oral daily  multivitamin 1 Tablet(s) Oral daily  sodium phosphate 15 milliMole(s)/250 mL IVPB 15 milliMole(s) IV Intermittent once  thiamine 100 milliGRAM(s) Oral daily        HEMATOLOGIC  Meds:   [x] VTE Prophylaxis                        7.4    20.15 )-----------( 92       ( 26 Aug 2024 00:43 )             21.4     PT/INR - ( 26 Aug 2024 00:43 )   PT: 27.3 sec;   INR: 2.55 ratio         PTT - ( 26 Aug 2024 00:43 )  PTT:51.0 sec  Transfusion     [ ] PRBC   [ ] Platelets   [ ] FFP   [ ] Cryoprecipitate      INFECTIOUS DISEASES  WBC Count: 20.15 K/uL ( @ 00:43)  WBC Count: 20.30 K/uL ( @ 17:50)  WBC Count: 18.94 K/uL ( @ 12:11)  WBC Count: 18.14 K/uL ( @ 06:12)    RECENT CULTURES:  Specimen Source: .Blood Blood  Date/Time:  @ 09:59  Culture Results:   No growth at 48 Hours  Gram Stain: --  Organism: --  Specimen Source: .Blood Blood  Date/Time:  @ 09:45  Culture Results:   No growth at 48 Hours  Gram Stain: --  Organism: --  Specimen Source: .Blood Blood-Venous  Date/Time:  @ 20:45  Culture Results:   No growth at 4 days  Gram Stain: --  Organism: --  Specimen Source: .Blood Blood-Venous  Date/Time:  @ 20:00  Culture Results:   No growth at 4 days  Gram Stain: --  Organism: --    Meds: doxycycline IVPB      doxycycline IVPB 100 milliGRAM(s) IV Intermittent every 12 hours  epoetin kareem (EPOGEN) Injectable 84488 Unit(s) SubCutaneous <User Schedule>  fluconAZOLE IVPB      fluconAZOLE IVPB 400 milliGRAM(s) IV Intermittent every 24 hours  piperacillin/tazobactam IVPB.. 3.375 Gram(s) IV Intermittent every 8 hours  rifAXIMin 550 milliGRAM(s) Oral two times a day        ENDOCRINE  CAPILLARY BLOOD GLUCOSE      POCT Blood Glucose.: 165 mg/dL (26 Aug 2024 00:14)  POCT Blood Glucose.: 164 mg/dL (25 Aug 2024 17:42)  POCT Blood Glucose.: 196 mg/dL (25 Aug 2024 11:59)  POCT Blood Glucose.: 188 mg/dL (25 Aug 2024 06:03)    Meds: insulin lispro (ADMELOG) corrective regimen sliding scale   SubCutaneous every 6 hours  vasopressin Infusion 0.03 Unit(s)/Min IV Continuous <Continuous>        ACCESS DEVICES:  [ ] Peripheral IV  [ ] Central Venous Line	[ ] R	[ ] L	[ ] IJ	[ ] Fem	[ ] SC	Placed:   [ ] Arterial Line		[ ] R	[ ] L	[ ] Fem	[ ] Rad	[ ] Ax	Placed:   [ ] PICC:					[ ] Mediport  [ ] Urinary Catheter, Date Placed:   [x] Necessity of urinary, arterial, and venous catheters discussed    OTHER MEDICATIONS:  chlorhexidine 2% Cloths 1 Application(s) Topical <User Schedule>  CRRT Treatment    <Continuous>  lidocaine   4% Patch 1 Patch Transdermal daily  Phoxillum Filtration BK 4 / 2.5 5000 milliLiter(s) CRRT <Continuous>  Phoxillum Filtration BK 4 / 2.5 5000 milliLiter(s) CRRT <Continuous>  PrismaSOL Filtration BGK 4 / 2.5 5000 milliLiter(s) CRRT <Continuous>      CODE STATUS:      IMAGING:

## 2024-08-26 NOTE — PROGRESS NOTE ADULT - ASSESSMENT
68 year old male with  AUD complicated by cirrhosis with Hepatic encephalopathy admitted to [facility] with back pain and jaundice on 7/8/24. Pt had dark / maroon colored stools   EGD that showed esophageal varices and duodenal ulcer with visible vessel. He got transfusions for low Hb and last transfusion was on 8/8. 1st session of IHD was on 7/9/24.     Transplant nephrology consulted for FANY and SLK eval.       1.  FANY VS FANY on CKD 2' likely HRS  Meets FANY criteria for SLK   -Started on CRRT on 8/13/24 (1st session of IHD was on 7/9/24 completed 6 weeks on 8/20). Has had issues with clotting >> adjusted CRRT.  -Pt remains anuric and on pressor support. Will cw CRRT.       Neeraj Maher  Nephrology Fellow  Feel free to contact me on TEAMS  After 5 pm please contact the on-call Fellow.

## 2024-08-26 NOTE — PROGRESS NOTE ADULT - ASSESSMENT
78 y/o male w/ no known PMHx (does not see doctors per sister) who presented as a transfer from [facility] for SLK evaluation. Patient was initially admitted to [facility] for back pain & jaundice and was diagnosed w/ cirrhosis. Hospital course there was c/b small EVs, melena 2/2 a duodenal ulcer s/p clipping, HRS requiring hemodialysis, hepatic encephalopathy, ascites s/p multiple LVPs, and deconditioning. Patient was admitted to SICU on 8/12 for initiation of CRRT as his BPs were too low to attempt intermittent HD.      Neuro:  - A/O x 1-2 waxes and wanes   - Miralax & rifaximin for prevention of hepatic encephalopathy  - Monitoring ammonia  - Thiamine, folic acid, & multivitamin for h/o EtOH abuse    Resp:  - Out of bed to chair, ambulate as tolerated, and incentive spirometry to prevent atelectasis  - Extubated    CV:  - Goal SBP >110, titrate pressors as able. On Levo gtt, vaso gtt   - +/- cardiac catheterization when stable to do so for transplant work-up   - Electrophys: nothing to do while not on AC, follow cards recs    GI:   - TF increased to goal 55cc/hr   - Protonix BID for h/o duodenal ulcer  - Monitor LFTs  - Undergoing evaluation for SLK transplantation    Renal:  - CRRT for concern of HRS, maintain circuit net neative 0-25cc/hr as tolerated   - Monitor I&Os and electrolytes w/ repletions as necessary  - Undergoing evaluation for SLK transplantation    Heme:  - Hold chemical VTE ppx in setting of liver failure   - Mechanical VTE ppx with SCDs  - Epogen 3x a week    ID:   - Empiric coverage w/ Zosyn, fluconazole as per transplant given vasopressor requirements  - Blood Cx 8/21, bronchial Cx 8/21 no growth   - 8/18: + doxycycline added for Lyme    Endo:   - Continue ISS [id number]   - Monitor glucose

## 2024-08-26 NOTE — PROGRESS NOTE ADULT - ASSESSMENT
69 yo M with obesity and risky alcohol consumption (with decades' history of daily consumption of homemade alcohol). Admitted to [facility] for 1 month due to recent onset of jaundice and with a prolonged hospital/ICU course due to newly diagnosed decompensated cirrhosis with acute on chronic liver failure (ACLF) with shock, FANY (started on intermittent HD since 7/9), acute on chronic anemia with recurrent overt GI bleeding, and hepatic encephalopathy.     Transferred to [facility] on 8/12/24 for SLK evaluation and then transferred to the SICU for initiation of CRRT (8/13- ).      # Distributive shock    - ACLF vs septic/hypovolemic shock.   - Hypothermic (8/21-22). Now afebrile.    - Norepinephrine, Vasopressin.   - Repeat sepsis work up negative thus far.   - Transplat ID following: CMV viremia (Valcyte not required). Unequivocal Lyme PCR (on Doxy).     - S/p Meropen (8/16-25).   - Zosyn (8/12-16 and 8/26- ), Doxycycline (8/18- ) and empiric fluconazole (8/12- ).     # History of UGIB (7'24 - resolved) and Hematochezia (8/18)   # Rectal Ulcer pending biopsy read    - EGD (7/12, at [facility]) with small non-bleeding EV and a duodenal ulcer with a visible vessel. No further hematemesis.   - Pantoprazole 40BID/Coreg 3.125mg.     - Flex Sig (8/19) with friable mucosa. No mass.   - EGD (8/20) with no active bleeding.   - Colonoscopy (8/21) friable mucosa, rectal ulcer biopsy ruled out malignancy.   - As needed PRBC and TEG-guided blood products.     # Anuric FANY on CKD    - Started intermittent HD (7/9-8/12) - Now on CRRT (8/13- ).   - Marked anasarca. Fluid removal on CRRT limited due to hypotension.   - Transplant nephrology following.    # Newly diagnosed decompensated cirrhosis with ACLF   - Infection: as above   - HE: Alert. Oriented to person. Minimally verbal. Following simple commands. Rifaximin/Miralax. Lactulose held for abdominal distention.    - Agitation/Insomnia: Seroquel 25mg or Haldol 2.5mg at night. Transplant Psych following.    - Large volume ascites and anasarca: Anuric. Limited fluid removal with CRRT. LVP (8/14) negative for SBP.   - Image: Contrast CT (8/13) with patent portohepatic vessels and no evidence of HCC. Small infarcts in left hepatic lobe and spleen.       - Nutrition: Poor po diet supplemented with NGT feeds. Nutrition following.   - PT: Last ambulatory on July/2024. Daily inpatient PT/OT.     # SLK ongoing evaluation (8/12)  - ABO O. MELD 3.0 score of 38  - Pending: dry pelvic CT and LHC for cardiology clearance. Deferred until medically optimized.    - Met SLK criteria in Aug 20th. Tissue typing completed.     PLAN   - Consider therapeutic/diagnostic paracentesis. Having abdominal discomfort.    - Dry pelvis CT for completion of SLK evaluation.   - LHC this week if clinically stable.   - Continue PRBC and TEG-guided products as needed   - Avoid Precedex. Management of agitation/insomnia as per  recommendations  - Titrate bowel regimen for goal 3-4 bowel movements daily   - Current prognosis is guarded. Consider early Palliative consultation in case patient does not progresses in a favorable path.        Note incomplete until finalized by attending signature/attestation.    Myra Maloney   Transplant Hepatology Fellow     67 yo M with obesity and risky alcohol consumption (with decades' history of daily consumption of homemade alcohol). Admitted to [facility] for 1 month due to recent onset of jaundice and with a prolonged hospital/ICU course due to newly diagnosed decompensated cirrhosis with acute on chronic liver failure (ACLF) with shock, FANY (started on intermittent HD since 7/9), acute on chronic anemia with recurrent overt GI bleeding, and hepatic encephalopathy.     Transferred to [facility] on 8/12/24 for SLK evaluation and then transferred to the SICU for initiation of CRRT (8/13- ).      # Distributive shock    - ACLF vs septic/hypovolemic shock.   - Hypothermic (8/21-22). Now afebrile.    - Norepinephrine, Vasopressin.   - Repeat sepsis work up negative thus far.   - Transplat ID following: CMV viremia (Valcyte not required). Unequivocal Lyme PCR (on Doxy).     - S/p Meropen (8/16-25).   - Zosyn (8/12-16 and 8/26- ), Doxycycline (8/18- ) and empiric fluconazole (8/12- ).     # History of UGIB (7'24 - resolved) and Hematochezia (8/18)   # Rectal Ulcer pending biopsy read    - EGD (7/12, at [facility]) with small non-bleeding EV and a duodenal ulcer with a visible vessel. No further hematemesis.   - Pantoprazole 40BID/Coreg 3.125mg.     - Flex Sig (8/19) with friable mucosa. No mass.   - EGD (8/20) with no active bleeding.   - Colonoscopy (8/21) friable mucosa, rectal ulcer biopsy ruled out malignancy.   - As needed PRBC and TEG-guided blood products.     # Anuric FANY on CKD    - Started intermittent HD (7/9-8/12) - Now on CRRT (8/13- ).   - Marked anasarca. Fluid removal on CRRT limited due to hypotension.   - Transplant nephrology following.    # Newly diagnosed decompensated cirrhosis with ACLF   - Infection: as above   - HE: Alert. Oriented to person. Minimally verbal. Following simple commands. Rifaximin/Miralax. Lactulose held for abdominal distention.    - Agitation/Insomnia: Seroquel 25mg or Haldol 2.5mg at night. Transplant Psych following.    - Large volume ascites and anasarca: Anuric. Limited fluid removal with CRRT. LVP (8/14) negative for SBP.   - Image: Contrast CT (8/13) with patent portohepatic vessels and no evidence of HCC. Small infarcts in left hepatic lobe and spleen.       - Nutrition: Poor po diet supplemented with NGT feeds. Nutrition following.   - PT: Last ambulatory on July/2024. Daily inpatient PT/OT.     # SLK ongoing evaluation (8/12)  - ABO O. MELD 3.0 score of 38  - Pending: [facility] for cardiology clearance.     - Met K criteria in Aug 20th. Tissue typing completed.     PLAN   - Consider therapeutic/diagnostic paracentesis. Having abdominal discomfort.    - C scheduled tomorrow with Dr. Peng. Please give TEG-guided products before procedure.    - Continue PRBC and TEG-guided products as needed   - Avoid Precedex. Management of agitation/insomnia as per  recommendations  - Titrate bowel regimen for goal 3-4 bowel movements daily   - Current prognosis is guarded. Consider early Palliative consultation in case patient does not progresses in a favorable path.        Note incomplete until finalized by attending signature/attestation.    Myra Maloney   Transplant Hepatology Fellow

## 2024-08-26 NOTE — GOALS OF CARE CONVERSATION - ADVANCED CARE PLANNING - CONVERSATION DETAILS
Mr. Hui admitted to the ICU.   I spoke with ____ who was identified as the health care surrogate / health care proxy and we had an extensive conversation about ____ 's care and current condition.     Discussed overall goals of care including advanced directives with ____ . During this dicussion we reviewed the current diagnosis, treatment plan, and likely prognosis. An explanination of advanced directives and MOLST was provided. ____ identifies ____ as one of the most important goals to ____.  After this conversation decision was made by ___ to continue full code status / change status to DNR/Allow Natural Death. MOLST form completed, signed, and placed in chart.     Above was reviewed with MICU attending physician  ____ .     Irma Montana PA-C Mr. Hui admitted to the ICU.   I spoke with Ms. Deisy Hui, who was identified as the patient's spouse and health care surrogate. We had an extensive conversation about Mr. Hui 's care and current condition.     Discussed overall goals of care including advanced directives. During this discussion we reviewed the current diagnosis, treatment plan, and likely prognosis. An explanation of advanced directives and MOLST was provided. Ms. Hui states that her  had a goals of care discussion prior to this hospitalization with an attending and had stated that he wants chest compression, intubation, and all measures to be performed to keep him alive. The patient's wife states that she understands his current clinical condition is not the quality of life that she would want for him, but she wants to honor her 's wishes.     After this conversation the decision was made by Ms. Hui to continue full code status.     Above was reviewed with SICU attending physician Dr.Kalyon Irma Montana PA-C

## 2024-08-26 NOTE — PROGRESS NOTE ADULT - SUBJECTIVE AND OBJECTIVE BOX
[facility] DIVISION OF KIDNEY DISEASES AND HYPERTENSION   FOLLOW UP NOTE    --------------------------------------------------------------------------------  Chief Complaint:    24 hour events/subjective: Pt. was seen and examined today.   More awake today, wife at bedside.       PAST HISTORY  --------------------------------------------------------------------------------  No significant changes to PMH, PSH, FHx, SHx, unless otherwise noted    ALLERGIES & MEDICATIONS  --------------------------------------------------------------------------------  Allergies    No Known Allergies    Intolerances      Standing Inpatient Medications  chlorhexidine 2% Cloths 1 Application(s) Topical <User Schedule>  CRRT Treatment    <Continuous>  doxycycline IVPB      doxycycline IVPB 100 milliGRAM(s) IV Intermittent every 12 hours  epoetin kareem (EPOGEN) Injectable 96120 Unit(s) SubCutaneous <User Schedule>  fluconAZOLE IVPB 400 milliGRAM(s) IV Intermittent every 24 hours  fluconAZOLE IVPB      folic acid 1 milliGRAM(s) Oral daily  insulin lispro (ADMELOG) corrective regimen sliding scale   SubCutaneous every 6 hours  lidocaine   4% Patch 1 Patch Transdermal daily  multivitamin/minerals/iron Oral Solution (CENTRUM) 15 milliLiter(s) Enteral Tube daily  norepinephrine Infusion 0.13 MICROgram(s)/kG/Min IV Continuous <Continuous>  pantoprazole  Injectable 40 milliGRAM(s) IV Push two times a day  Phoxillum Filtration BK 4 / 2.5 5000 milliLiter(s) CRRT <Continuous>  Phoxillum Filtration BK 4 / 2.5 5000 milliLiter(s) CRRT <Continuous>  piperacillin/tazobactam IVPB.. 3.375 Gram(s) IV Intermittent every 8 hours  polyethylene glycol 3350 17 Gram(s) Oral <User Schedule>  PrismaSOL Filtration BGK 4 / 2.5 5000 milliLiter(s) CRRT <Continuous>  rifAXIMin 550 milliGRAM(s) Oral two times a day  thiamine 100 milliGRAM(s) Oral daily  vasopressin Infusion 0.03 Unit(s)/Min IV Continuous <Continuous>    PRN Inpatient Medications      REVIEW OF SYSTEMS  -------------------------------------------------------------------------------  All other systems were reviewed and are negative, except as noted.    VITALS/PHYSICAL EXAM  --------------------------------------------------------------------------------  T(C): 36.4 (24 @ 07:00), Max: 36.9 (24 @ 00:00)  HR: 75 (24 @ 09:45) (72 - 110)  BP: 111/48 (24 @ 19:30) (111/48 - 111/48)  RR: 16 (24 @ 09:45) (16 - 35)  SpO2: 100% (24 @ 09:45) (95% - 100%)  Wt(kg): --        24 @ 07:01  -  24 @ 07:00  --------------------------------------------------------  IN: 3019.3 mL / OUT: 2769 mL / NET: 250.3 mL    24 @ 07:01  -  24 @ 10:28  --------------------------------------------------------  IN: 186.8 mL / OUT: 241 mL / NET: -54.2 mL        Physical Exam:  	Gen: NAD  	HEENT: Anicteric  	Pulm: CTA B/L  	CV: S1S2+  	Abd: Soft, +BS   	Ext: No LE edema B/L  	Neuro: Sedated  	Skin: Warm and dry  	Dialysis access: [facility]    LABS/STUDIES  --------------------------------------------------------------------------------              7.5    21.88 >-----------<  84       [24 @ 06:16]              21.7     136  |  103  |  11  ----------------------------<  199      [24 @ 06:16]  4.3   |  20  |  1.20        Ca     8.2     [24 @ 06:16]      Mg     2.3     [24 06:16]      Phos  3.1     [24 06:16]    TPro  6.0  /  Alb  3.0  /  TBili  11.4  /  DBili  x   /  AST  63  /  ALT  34  /  AlkPhos  253  [24 @ 06:16]    PT/INR: PT 28.6 , INR 2.68       [24 06:16]  PTT: 54.7       [24 @ 06:16]      Creatinine Trend:  SCr 1.20 [ 06:16]  SCr 1.21 [08-26 @ 00:43]  SCr 1.23 [ @ 17:50]  SCr 1.24 [ 12:11]  SCr 1.15 [ @ 06:12]    Urinalysis - [24 @ 06:16]      Color  / Appearance  / SG  / pH       Gluc 199 / Ketone   / Bili  / Urobili        Blood  / Protein  / Leuk Est  / Nitrite       RBC  / WBC  / Hyaline  / Gran  / Sq Epi  / Non Sq Epi  / Bacteria       HBsAb Reactive      [24 @ 21:16]  HBsAg Nonreact      [24 @ 21:13]  HBcAb Nonreact      [24 @ 21:16]  HCV 0.08, Nonreact      [24 @ 14:45]  HIV Nonreact      [24 @ 21:13]    CHETNA: titer Negative, pattern --      [24 @ 21:15]  Syphilis Screen (Treponema Pallidum Ab) Negative      [24 @ 21:15]  Immunofixation Serum:   Oligoclonal Pattern Consisting of One Weak IgM Kappa Band, Two Weak IgG  Kappa Bands, and One Weak IgA Lambda Band Identified      Reference Range: None Detected      [24 @ 21:13]  SPEP Interpretation: Oligoclonal Pattern Consisting of Three Weak Gamma-Migrating Paraproteins  and One Weak Beta-Migrating Paraprotein Identified      [24 @ 21:13]    Tacrolimus  Cyclosporine  Sirolimus  Mycophenolate  BK PCR  CMV PCRCMVPCR Lo.94 Kth25RY/mL ( @ 12:26)  CMVPCR Log: 3.01 Mkc38NP/mL ( @ 00:08)  CMVPCR Log: 3.67 Cfe79DR/mL ( @ 00:37)  CMVPCR Log: 3.69 Ofb42MP/mL ( @ 21:13)    Parvo PCR  EBV PCR

## 2024-08-26 NOTE — CHART NOTE - NSCHARTNOTEFT_GEN_A_CORE
NUTRITION FOLLOW UP NOTE    PATIENT SEEN FOR: sicu follow up     SOURCE: [] Patient  [x] Current Medical Record  [x] RN  [] Family/support person at bedside  [] Patient unavailable/inappropriate  [x] Other: Team    CHART REVIEWED/EVENTS NOTED.  [x] Nutrition Status:  -SLK eval; MELD: 38  -CRRT for HRS  -Extubated       DIET ORDER:   Diet, NPO:   Tube Feeding Modality: Nasogastric  [place] Peptide 1.5 ([id number])  Continuous  Starting Tube Feed Rate {mL per Hour}: 40  Until Goal Tube Feed Rate (mL per Hour): 55  Tube Feed Duration (in Hours): 24  Tube Feed Start Time: 12:00 (24)    ENTERAL NUTRITION  EN Order Provides:  1320ml formula, 2030kcal, 98g protein, 924ml free water; meets 24 kcal/kg and 1.2g protein/kg, based on wt: 83.4kg --> inadequate     Current Pump Rate:  EN provision: 96% EN volume goal provided in past 2 days     ANTHROPOMETRICS:  Drug Dosing Weight  Height (cm): 185.4 (12 Aug 2024 16:46)  Weight (kg): 114 (12 Aug 2024 16:46)  BMI (kg/m2): 33.2 (12 Aug 2024 16:46)  Daily Weight in k.8 (), Weight in k.2 ()     NUTRITIONALLY PERTINENT MEDICATIONS:  MEDICATIONS  (STANDING):  doxycycline IVPB  doxycycline IVPB  fluconAZOLE IVPB  fluconAZOLE IVPB  folic acid  insulin lispro (ADMELOG) corrective regimen sliding scale  multivitamin/minerals/iron Oral Solution (CENTRUM)  norepinephrine Infusion  pantoprazole  Injectable  piperacillin/tazobactam IVPB..  polyethylene glycol 3350  rifAXIMin  thiamine  vasopressin Infusion       NUTRITIONALLY PERTINENT LABS:   Na136 mmol/L Glu 199 mg/dL<H> K+ 4.3 mmol/L Cr  1.20 mg/dL BUN 11 mg/dL  Phos 3.1 mg/dL  Alb 3.0 g/dL<L>  Chol 49 mg/dL LDL --    HDL 14 mg/dL<L> Trig 70 mg/dL ALT 34 U/L AST 63 U/L<H> Alkaline Phosphatase 253 U/L<H>  - @ 21:13 a1c 5.0  24 @ 15:01 a1c 5.1    A1C with Estimated Average Glucose Result: 5.0 % (24 @ 21:13)  A1C with Estimated Average Glucose Result: 5.1 % (24 @ 15:01)          Finger Sticks:  POCT Blood Glucose.: 199 mg/dL ( @ 06:11)  POCT Blood Glucose.: 165 mg/dL ( @ 00:14)  POCT Blood Glucose.: 164 mg/dL ( @ 17:42)  POCT Blood Glucose.: 196 mg/dL ( @ 11:59)      NUTRITIONALLY PERTINENT MEDICATIONS/LABS:  [x] Relevant notes on medications/labs:  -Vaso/Levo --> pressor support     EDEMA:  [x] Reviewed  [] Relevant notes:    GI/ I&O:  [x] Reviewed  [] Relevant notes:  [] Other:    SKIN:   [x] Pressure injury previously noted    ESTIMATED NEEDS:  [x] No change:  Energy:   2251-2668kcal/day (27-32 kcal/kg)  Protein:  100-125g/day (1.2-1.5 g/kg)  Fluid:  [x] defer to team  Based on: 83.4kg    NUTRITION DIAGNOSIS:  [x] Prior Dx:  1) Increased protein-energy needs     EDUCATION:  [x] Not appropriate/warranted    NUTRITION CARE PLAN:  1. Diet: [employer] Peptide 1.5 @ 65mL x 24hrs providing 1560mL of formula, 2399kcal/day (29kcal/kg), 115g protein/day (1.4g/kg), 1092mL free water - based on 83.4kg   2. Supplements: N/A  3. Multivitamin/mineral supplementation: multivitamin, thiamine, folic acid     MONITORING AND EVALUATION:   RD remains available upon request and will follow up per protocol.    Luci Calzada, MS, RDN, CDN (Teams)   Available on [place] TEAMS

## 2024-08-26 NOTE — PROGRESS NOTE ADULT - SUBJECTIVE AND OBJECTIVE BOX
Follow Up:      Interval History:    REVIEW OF SYSTEMS  [  ] ROS unobtainable because:    [  ] All other systems negative except as noted below    Constitutional:  [ ] fever [ ] chills  [ ] weight loss  [ ] weakness  Skin:  [ ] rash [ ] phlebitis	  Eyes: [ ] icterus [ ] pain  [ ] discharge	  ENMT: [ ] sore throat  [ ] thrush [ ] ulcers [ ] exudates  Respiratory: [ ] dyspnea [ ] hemoptysis [ ] cough [ ] sputum	  Cardiovascular:  [ ] chest pain [ ] palpitations [ ] edema	  Gastrointestinal:  [ ] nausea [ ] vomiting [ ] diarrhea [ ] constipation [ ] pain	  Genitourinary:  [ ] dysuria [ ] frequency [ ] hematuria [ ] discharge [ ] flank pain  [ ] incontinence  Musculoskeletal:  [ ] myalgias [ ] arthralgias [ ] arthritis  [ ] back pain  Neurological:  [ ] headache [ ] seizures  [ ] confusion/altered mental status    Allergies  No Known Allergies        ANTIMICROBIALS:  doxycycline IVPB    doxycycline IVPB 100 every 12 hours  fluconAZOLE IVPB 400 every 24 hours  fluconAZOLE IVPB    piperacillin/tazobactam IVPB.. 3.375 every 8 hours  rifAXIMin 550 two times a day      OTHER MEDS:  MEDICATIONS  (STANDING):  epoetin kareem (EPOGEN) Injectable 59611 <User Schedule>  insulin lispro (ADMELOG) corrective regimen sliding scale  every 6 hours  norepinephrine Infusion 0.13 <Continuous>  pantoprazole  Injectable 40 two times a day  polyethylene glycol 3350 17 <User Schedule>  vasopressin Infusion 0.03 <Continuous>      Vital Signs Last 24 Hrs  T(C): 36.4 (26 Aug 2024 07:00), Max: 36.9 (26 Aug 2024 00:00)  T(F): 97.6 (26 Aug 2024 07:00), Max: 98.4 (26 Aug 2024 00:00)  HR: 75 (26 Aug 2024 09:45) (72 - 110)  BP: 111/48 (25 Aug 2024 19:30) (111/48 - 111/48)  BP(mean): 69 (25 Aug 2024 19:30) (69 - 69)  RR: 16 (26 Aug 2024 09:45) (16 - 35)  SpO2: 100% (26 Aug 2024 09:45) (95% - 100%)    Parameters below as of 26 Aug 2024 07:00  Patient On (Oxygen Delivery Method): room air        PHYSICAL EXAMINATION:  General: Alert and Awake, NAD  HEENT: PERRL, EOMI  Neck: Supple  Cardiac: RRR, No M/R/G  Resp: CTAB, No Wh/Rh/Ra  Abdomen: NBS, NT/ND, No HSM, No rigidity or guarding  MSK: No LE edema. No Calf tenderness  : No trejo  Skin: No rashes or lesions. Skin is warm and dry to the touch.   Neuro: Alert and Awake. CN 2-12 Grossly intact. Moves all four extremities spontaneously.  Psych: Calm, Pleasant, Cooperative                          7.5    21.88 )-----------( 84       ( 26 Aug 2024 06:16 )             21.7           136  |  103  |  11  ----------------------------<  199<H>  4.3   |  20<L>  |  1.20    Ca    8.2<L>      26 Aug 2024 06:16  Phos  3.1       Mg     2.3         TPro  6.0  /  Alb  3.0<L>  /  TBili  11.4<H>  /  DBili  x   /  AST  63<H>  /  ALT  34  /  AlkPhos  253<H>        Urinalysis Basic - ( 26 Aug 2024 06:16 )    Color: x / Appearance: x / SG: x / pH: x  Gluc: 199 mg/dL / Ketone: x  / Bili: x / Urobili: x   Blood: x / Protein: x / Nitrite: x   Leuk Esterase: x / RBC: x / WBC x   Sq Epi: x / Non Sq Epi: x / Bacteria: x        MICROBIOLOGY:  v  .Blood Blood  24   No growth at 48 Hours  --  --      .Blood Blood  24   No growth at 48 Hours  --  --      .Blood Blood-Venous  24   No growth at 4 days  --  --      .Blood Blood-Venous  24   No growth at 4 days  --  --      Combi-Cath Combi-Cath  24   Normal Respiratory Elo present  --    Moderate polymorphonuclear leukocytes per low power field  Rare Squamous epithelial cells per low power field  No organisms seen per oil power field      .Blood Blood  24   No growth at 5 days  --  --      .Blood Blood  24   No growth at 5 days  --  --      Peritoneal Peritoneal Fluid  24   No growth at 5 days  --    polymorphonuclear leukocytes seen  No organisms seen  by cytocentrifuge      .Blood Blood-Venous  24   No growth at 5 days  --  --      .Blood Blood-Venous  24   No growth at 5 days  --  --      .Blood Blood-Peripheral  24   No growth at 5 days  --  --      .Blood Blood  24   No growth at 5 days  --  --      .Blood Blood  24   Growth in aerobic bottle: Staphylococcus epidermidis Isolation of  Coagulase negative Staphylococcus from single blood culture sets may  represent  contamination. Contact the Microbiology Department at [phone] if  susceptibility testing is  clinically indicated.  Direct identification is available within approximately 3-5  hours either by Blood Panel Multiplexed PCR or Direct  MALDI-TOF. Details: [email or web address]  --  Blood Culture PCR        CMV IgG Antibody: >10.00 U/mL (24 @ 00:08)  Toxoplasma IgG Screen: 44.00 IU/mL (24 @ 21:15)  CMV IgG Antibody: >10.00 U/mL (24 @ 21:15)    CMVPCR Lo.94 Jci33NW/mL ( @ 12:26)  CMVPCR Log: 3.01 Dme00VB/mL ( @ 00:08)  Rapid RVP Result: NotDetec ( @ 00:06)        RADIOLOGY:    <The imaging below has been reviewed and visualized by me independently. Findings as detailed in report below> Follow Up: Leukocytosis    Interval History:  Afebrile, persistent leukocytosis    REVIEW OF SYSTEMS  [  ] ROS unobtainable because:    [ x ] All other systems negative except as noted below    Constitutional:  [ ] fever [ ] chills  [ ] weight loss  [ x] weakness  Skin:  [ ] rash [ ] phlebitis	  Eyes: [ ] icterus [ ] pain  [ ] discharge	  ENMT: [ ] sore throat  [ ] thrush [ ] ulcers [ ] exudates  Respiratory: [ ] dyspnea [ ] hemoptysis [ ] cough [ ] sputum	  Cardiovascular:  [ ] chest pain [ ] palpitations [ ] edema	  Gastrointestinal:  [ ] nausea [ ] vomiting [ ] diarrhea [ ] constipation [ ] pain	  Genitourinary:  [ ] dysuria [ ] frequency [ ] hematuria [ ] discharge [ ] flank pain  [ ] incontinence  Musculoskeletal:  [ ] myalgias [ ] arthralgias [ ] arthritis  [ ] back pain  Neurological:  [ ] headache [ ] seizures  [ ] confusion/altered mental status    Allergies  No Known Allergies        ANTIMICROBIALS:  doxycycline IVPB    doxycycline IVPB 100 every 12 hours  fluconAZOLE IVPB 400 every 24 hours  fluconAZOLE IVPB    piperacillin/tazobactam IVPB.. 3.375 every 8 hours  rifAXIMin 550 two times a day      OTHER MEDS:  MEDICATIONS  (STANDING):  epoetin kareem (EPOGEN) Injectable 03956 <User Schedule>  insulin lispro (ADMELOG) corrective regimen sliding scale  every 6 hours  norepinephrine Infusion 0.13 <Continuous>  pantoprazole  Injectable 40 two times a day  polyethylene glycol 3350 17 <User Schedule>  vasopressin Infusion 0.03 <Continuous>      Vital Signs Last 24 Hrs  T(C): 36.4 (26 Aug 2024 07:00), Max: 36.9 (26 Aug 2024 00:00)  T(F): 97.6 (26 Aug 2024 07:00), Max: 98.4 (26 Aug 2024 00:00)  HR: 75 (26 Aug 2024 09:45) (72 - 110)  BP: 111/48 (25 Aug 2024 19:30) (111/48 - 111/48)  BP(mean): 69 (25 Aug 2024 19:30) (69 - 69)  RR: 16 (26 Aug 2024 09:45) (16 - 35)  SpO2: 100% (26 Aug 2024 09:45) (95% - 100%)    Parameters below as of 26 Aug 2024 07:00  Patient On (Oxygen Delivery Method): room air        PHYSICAL EXAMINATION:  General: Alert and Awake, NAD, jaundice  HEENT: scleral icterus  Cardiac: RRR, No M/R/G  Resp: CTAB, No Wh/Rh/Ra  Abdomen: NBS, NT/ND, No HSM, No rigidity or guarding  MSK: No LE edema. No Calf tenderness  Skin: No rashes or lesions. Skin is warm and dry to the touch.   Neuro: Alert and Awake. CN 2-12 Grossly intact. Moves all four extremities spontaneously.  Psych: Calm, Pleasant, Cooperative                            7.5    21.88 )-----------( 84       ( 26 Aug 2024 06:16 )             21.7           136  |  103  |  11  ----------------------------<  199<H>  4.3   |  20<L>  |  1.20    Ca    8.2<L>      26 Aug 2024 06:16  Phos  3.1       Mg     2.3         TPro  6.0  /  Alb  3.0<L>  /  TBili  11.4<H>  /  DBili  x   /  AST  63<H>  /  ALT  34  /  AlkPhos  253<H>        Urinalysis Basic - ( 26 Aug 2024 06:16 )    Color: x / Appearance: x / SG: x / pH: x  Gluc: 199 mg/dL / Ketone: x  / Bili: x / Urobili: x   Blood: x / Protein: x / Nitrite: x   Leuk Esterase: x / RBC: x / WBC x   Sq Epi: x / Non Sq Epi: x / Bacteria: x        MICROBIOLOGY:  v  .Blood Blood  24   No growth at 48 Hours  --  --      .Blood Blood  24   No growth at 48 Hours  --  --      .Blood Blood-Venous  24   No growth at 4 days  --  --      .Blood Blood-Venous  24   No growth at 4 days  --  --      Combi-Cath Combi-Cath  24   Normal Respiratory Elo present  --    Moderate polymorphonuclear leukocytes per low power field  Rare Squamous epithelial cells per low power field  No organisms seen per oil power field      .Blood Blood  24   No growth at 5 days  --  --      .Blood Blood  24   No growth at 5 days  --  --      Peritoneal Peritoneal Fluid  24   No growth at 5 days  --    polymorphonuclear leukocytes seen  No organisms seen  by cytocentrifuge      .Blood Blood-Venous  24   No growth at 5 days  --  --      .Blood Blood-Venous  24   No growth at 5 days  --  --      .Blood Blood-Peripheral  24   No growth at 5 days  --  --      .Blood Blood  24   No growth at 5 days  --  --      .Blood Blood  24   Growth in aerobic bottle: Staphylococcus epidermidis Isolation of  Coagulase negative Staphylococcus from single blood culture sets may  represent  contamination. Contact the Microbiology Department at [phone] if  susceptibility testing is  clinically indicated.  Direct identification is available within approximately 3-5  hours either by Blood Panel Multiplexed PCR or Direct  MALDI-TOF. Details: [email or web address]  --  Blood Culture PCR        CMV IgG Antibody: >10.00 U/mL (24 @ 00:08)  Toxoplasma IgG Screen: 44.00 IU/mL (24 @ 21:15)  CMV IgG Antibody: >10.00 U/mL (24 @ 21:15)    CMVPCR Lo.94 Khd16BC/mL ( @ 12:26)  CMVPCR Log: 3.01 Zxr62QK/mL ( @ 00:08)  Rapid RVP Result: NotDetec ( @ 00:06)        RADIOLOGY:    <The imaging below has been reviewed and visualized by me independently. Findings as detailed in report below>      < from: CT Chest No Cont (24 @ 14:22) >  INTERPRETATION:  CLINICAL INFORMATION: Assess for infection    COMPARISON: CT chest, abdomen, and pelvis 2024.    CONTRAST/COMPLICATIONS:  IV Contrast: NONE  Oral Contrast: NONE  Complications: None reported at time of study completion    PROCEDURE:  CT of the Chest, Abdomen and Pelvis was performed.  Sagittal and coronal reformats were performed.    FINDINGS:    Study is limited secondary to patient motion.    CHEST:  LUNGS AND LARGE AIRWAYS: Patent central airways. Right lower lobe   consolidation with air bronchograms. Bilateral upper lobe and right   middle lobe patchy groundglass attenuation. Elevation of right   hemidiaphragm.  PLEURA: No pleural effusion.  VESSELS: Right-sided central venous catheter terminates in the SVC.   Coronary and aortic atherosclerotic changes.  HEART: Heart size is normal.No pericardial effusion.  MEDIASTINUM AND KALIN: No lymphadenopathy.  CHEST WALL AND LOWER NECK: Within normal limits.    ABDOMEN AND PELVIS:  LIVER: Cirrhosis. Right hepatic hypodensity too small to characterize.  BILE DUCTS: Normal caliber.  GALLBLADDER: Intraluminal hyperdensity may reflect sludge, stone, or   focal adenomyomatosis.  SPLEEN: Within normal limits.  PANCREAS: Within normal limits.  ADRENALS: Within normal limits.  KIDNEYS/URETERS: Within normal limits.    BLADDER: Within normal limits.  REPRODUCTIVE ORGANS: Prostate within normal limits.    BOWEL: No bowel obstruction. Appendix is not visualized. Enteric tube   terminates in the duodenum. Duodenal clip.  PERITONEUM/RETROPERITONEUM: Large ascites.  VESSELS: Atherosclerotic changes.  LYMPH NODES: No lymphadenopathy.  ABDOMINAL WALL: Left fat-containing inguinal hernia. Umbilical hernia   containing low density fluid  BONES: Degenerative changes of the spine.    IMPRESSION:  1.  Right lower lobe consolidation with air bronchograms suspicious for   pneumonia.  2.  Patchy groundglass opacities throughout bilateral lungs, nonspecific,   may represent pulmonary edema.  3.  Large volume ascites.        --- End of Report ---        < end of copied text >

## 2024-08-26 NOTE — PROGRESS NOTE ADULT - NS ATTEND AMEND GEN_ALL_CORE FT
Recent CT chest with ? RLL pneumonia yet negative cultures and >10 days of antibiotic coverage including atypical coverage with doxycicline  no e/o lyme disease per western blot, so if legionella antigen negative, could stop (would have received >7 days) this is day 8        Thank you for involving us in the care of this patient  Transplant ID will continue to follow  Please call or page with additional questions  Pager; #[phone]  Teams: from 8 am to 5 pm  Kimberly Zamarripa MD

## 2024-08-26 NOTE — PROGRESS NOTE ADULT - ASSESSMENT
68y with obesity and risky alcohol consumption (with decades' history of daily consumption of homemade alcohol), admitted to [facility] 1 month ago due to recent onset of jaundice and with a prolonged hospital and ICU course there due to newly diagnosed decompensated cirrhosis with acute on chronic liver failure (ACLF) with shock (resolved, but still on midodrine); FANY (on intermittent HD since 7/9); recurrent maroon stools and acute on chronic anemia necessitating intermittent PRBC transfusions (last on 8/8) and hypofibrinoginemia, with EGD (7/12) with small non-bleeding EV and a duodenal ulcer with a visible vessel; and waxing and waning hepatic encephalopathy. He was transferred to [facility] on 8/12/24 for evaluation for combined liver/kidney transplants (SLK).      Decompensated ETOH Cirrhosis  - SLK eval , MELD 38O  - TFs, pressors per SICU  - HE: cont rifaximin/miralax  - EV:  EGD (7/12) with small non-bleeding EV and a duodenal ulcer with a visible vessel. EGD 8/20: no active bleeding, PPI.  - s/p colonoscopy 8/21: lesion biopsied, f/u path   - stress dose steroids per SICU completed, wean pressors as able  - FANY/HRS: anuric, On CRRT 8/13, Renal following  - ID: Empiric Elise/fluc/ Doxy. Elise switched to Zosyn  -  8/13 w/ MRSE , repeat 8/14 with NGTD, repeat bcx today  - Thiamine/MVI/FOLIC ACID  - LHC deferred, will re-address daily once stabilized  - Needs CT a/p non contrast to eval iliacs   - cont sicu care    68y with obesity and risky alcohol consumption (with decades' history of daily consumption of homemade alcohol), admitted to [facility] 1 month ago due to recent onset of jaundice and with a prolonged hospital and ICU course there due to newly diagnosed decompensated cirrhosis with acute on chronic liver failure (ACLF) with shock (resolved, but still on midodrine); FANY (on intermittent HD since 7/9); recurrent maroon stools and acute on chronic anemia necessitating intermittent PRBC transfusions (last on 8/8) and hypofibrinoginemia, with EGD (7/12) with small non-bleeding EV and a duodenal ulcer with a visible vessel; and waxing and waning hepatic encephalopathy. He was transferred to [facility] on 8/12/24 for evaluation for combined liver/kidney transplants (SLK).      Decompensated ETOH Cirrhosis  - SLK eval , MELD 38O  - TFs, pressors per SICU  - HE: cont rifaximin/miralax  - EV:  EGD (7/12) with small non-bleeding EV and a duodenal ulcer with a visible vessel. EGD 8/20: no active bleeding, PPI.  - s/p colonoscopy 8/21: lesion biopsied, f/u path   - stress dose steroids per SICU completed, wean pressors as able  - FANY/HRS: anuric, On CRRT 8/13, Renal following  - ID: Empiric Elise/fluc/ Doxy. Elise switched to Zosyn  -  8/13 w/ MRSE , repeat 8/14 with NGTD, repeat bcx today  - Thiamine/MVI/FOLIC ACID  - LHC deferred, will re-address daily once stabilized  - Needs CT a/p non contrast to eval iliacs   - Paracentesis PRN   - cont sicu care

## 2024-08-26 NOTE — PROGRESS NOTE ADULT - ATTENDING COMMENTS
Pt seen and examined with SICU team, agree with above.    Decompensated cirrhosis with hepatic encephalopathy, ascites, HRS on CRRT with anuria:  - I discussed patient with Dr. Roberts - given pt's age and current medical condition, he would have to have substantial clinical improvement in order to qualify for a liver transplant, including being off pressors, being out of bed, etc. Although he remains a candidate, the likelihood is very low, so Dr. Roberts requested Palliative care consultation to start discussing goals of care under these circumstances.  - WBC increased slightly after kori downgraded to zosyn  - On levo 0.2mcg/kg/min - briefly on -25cc/hr CRRT but returned to net 0 after levo increased  - Ascites - pt uncomfortable, will perform paracentesis with albumin replacement  - On antibiotics for possible PNA (zosyn), Lyme disease (doxy), and fluconazole (empiric)  - MRSA swab negative  - Lines changed except joslyn, joslyn changed today  - Continue rifaxamin/miralax for hepatic encephalopathy    Aflutter:  - Per EP, no intervention needed    Duodenal ulcer with UGIB:  - S/p clipping  - On Protonix BID  - Hb stable    Total time spent in the care of this patient today (excluding procedures & teaching): 45 min

## 2024-08-26 NOTE — PROCEDURE NOTE - NSPROCDETAILS_GEN_ALL_CORE
patient pre-oxygenated, tube inserted, placement confirmed
location identified, draped/prepped, sterile technique used, needle inserted/introduced/positive blood return obtained via catheter/connected to a pressurized flush line/sutured in place/hemostasis with direct pressure, dressing applied/Seldinger technique/all materials/supplies accounted for at end of procedure
location identified, draped/prepped, sterile technique used, needle inserted/introduced/positive blood return obtained via catheter/sutured in place/Seldinger technique/all materials/supplies accounted for at end of procedure
sterile technique, indwelling urinary device inserted
location identified, draped/prepped, sterile technique used/sterile technique, catheter placed/ultrasound guidance
guidewire recovered/lumen(s) aspirated and flushed/sterile dressing applied/sterile technique, catheter placed/ultrasound guidance with use of sterile gel and probe cove
3cc/location identified, sterile technique used, catheter introduced, fluid drained/Seldinger technique/sterile dressing applied
guidewire recovered/lumen(s) aspirated and flushed/sterile dressing applied/sterile technique, catheter placed/ultrasound guidance with use of sterile gel and probe cove

## 2024-08-27 NOTE — PROGRESS NOTE ADULT - SUBJECTIVE AND OBJECTIVE BOX
HISTORY  68y Male    24 HOUR EVENTS:  - changed [place] iso uptrending WCC  - increased TF to 65cc/hr  - s/p 3u pRBC, 1u plt, 1u cryo for GIB       SUBJECTIVE/ROS:  [ ] A ten-point review of systems was otherwise negative except as noted.  [ ] Due to altered mental status/intubation, subjective information were not able to be obtained from the patient. History was obtained, to the extent possible, from review of the chart and collateral sources of information.      NEURO  Exam: awake, oriented x1-2, wax waning   Meds:   [x] Adequacy of sedation and pain control has been assessed and adjusted      RESPIRATORY  RR: 12 (24 @ 01:45) (12 - 38)  SpO2: 100% (24 @ 01:45) (93% - 100%)  Wt(kg): --  Exam: unlabored, clear to auscultation bilaterally    ABG - ( 27 Aug 2024 00:01 )  pH: 7.45  /  pCO2: 31    /  pO2: 63    / HCO3: 22    / Base Excess: -1.9  /  SaO2: 96.2    Lactate: x                [N/A] Extubation Readiness Assessed  Meds:       CARDIOVASCULAR  HR: 74 (24 @ 01:45) (55 - 110)  BP: 127/65 (24 @ 21:45) (121/66 - 127/65)  BP(mean): 89 (24 @ 21:45) (87 - 89)  ABP: 112/47 (24 @ 01:45) (89/45 - 135/54)  ABP(mean): 66 (24 @ 01:45) (57 - 79)  Wt(kg): --  CVP(cm H2O): --      Exam: regular rate and rhythm  Cardiac Rhythm: sinus  Perfusion     [x]Adequate   [ ]Inadequate  Mentation   [x]Normal       [ ]Reduced  Extremities  [x]Warm         [ ]Cool  Volume Status [ ]Hypervolemic [x]Euvolemic [ ]Hypovolemic  Meds: norepinephrine Infusion 0.13 MICROgram(s)/kG/Min IV Continuous <Continuous>        GI/NUTRITION  Exam: soft, distended nontender   Diet: NGT FT at goal   Meds: pantoprazole  Injectable 40 milliGRAM(s) IV Push two times a day  polyethylene glycol 3350 17 Gram(s) Oral <User Schedule>      GENITOURINARY  I&O's Detail     @ : @ 07:00  --------------------------------------------------------  IN:    Enteral Tube Flush: 190 mL    IV PiggyBack: 775 mL    IV PiggyBack: 275 mL    Miscellaneous Tube Feedin mL    Norepinephrine: 406.3 mL    Vasopressin: 108 mL  Total IN: 3019.3 mL    OUT:    Other (mL): 2769 mL  Total OUT: 2769 mL    Total NET: 250.3 mL       @ : @ 02:12  --------------------------------------------------------  IN:    Cryoprecipitate: 75 mL    Dexmedetomidine: 11.4 mL    Enteral Tube Flush: 50 mL    IV PiggyBack: 150 mL    IV PiggyBack: 650 mL    Miscellaneous Tube Feedin mL    Norepinephrine: 684.1 mL    Other (mL): 47 mL    Platelets - Single Donor: 225 mL    PRBCs (Packed Red Blood Cells): 900 mL    Vasopressin: 81 mL  Total IN: 3418.5 mL    OUT:    Other (mL): 1345 mL    Stool (mL): 100 mL  Total OUT: 1445 mL    Total NET: 1973.5 mL              139  |  105  |  13  ----------------------------<  185<H>  4.7   |  20<L>  |  1.13    Ca    8.1<L>      27 Aug 2024 00:08  Phos  3.6       Mg     2.2         TPro  6.0  /  Alb  2.9<L>  /  TBili  11.4<H>  /  DBili  x   /  AST  58<H>  /  ALT  32  /  AlkPhos  240<H>      [ ] Elizabeth catheter, indication: N/A  Meds: folic acid 1 milliGRAM(s) Oral daily  multivitamin/minerals/iron Oral Solution (CENTRUM) 15 milliLiter(s) Enteral Tube daily  thiamine 100 milliGRAM(s) Oral daily        HEMATOLOGIC  Meds:   [x] VTE Prophylaxis                        9.1    21.16 )-----------( 125      ( 27 Aug 2024 00:08 )             26.5     PT/INR - ( 27 Aug 2024 00:08 )   PT: 24.7 sec;   INR: 2.41 ratio         PTT - ( 27 Aug 2024 00:08 )  PTT:47.7 sec  Transfusion     [ ] PRBC   [ ] Platelets   [ ] FFP   [ ] Cryoprecipitate      INFECTIOUS DISEASES  WBC Count: 21.16 K/uL ( @ 00:08)  WBC Count: 23.16 K/uL ( @ 18:34)  WBC Count: 22.34 K/uL ( @ 12:07)  WBC Count: 21.88 K/uL ( @ 06:16)    RECENT CULTURES:  Specimen Source: .Blood Blood  Date/Time:  @ 09:59  Culture Results:   No growth at 72 Hours  Gram Stain: --  Organism: --  Specimen Source: .Blood Blood  Date/Time:  @ 09:45  Culture Results:   No growth at 72 Hours  Gram Stain: --  Organism: --    Meds: doxycycline IVPB      doxycycline IVPB 100 milliGRAM(s) IV Intermittent every 12 hours  epoetin kareem (EPOGEN) Injectable 31632 Unit(s) SubCutaneous <User Schedule>  fluconAZOLE IVPB      fluconAZOLE IVPB 400 milliGRAM(s) IV Intermittent every 24 hours  meropenem  IVPB 1000 milliGRAM(s) IV Intermittent every 8 hours  rifAXIMin 550 milliGRAM(s) Oral two times a day        ENDOCRINE  CAPILLARY BLOOD GLUCOSE      POCT Blood Glucose.: 176 mg/dL (26 Aug 2024 23:56)  POCT Blood Glucose.: 194 mg/dL (26 Aug 2024 18:00)  POCT Blood Glucose.: 185 mg/dL (26 Aug 2024 11:58)  POCT Blood Glucose.: 199 mg/dL (26 Aug 2024 06:11)    Meds: insulin lispro (ADMELOG) corrective regimen sliding scale   SubCutaneous every 6 hours  vasopressin Infusion 0.03 Unit(s)/Min IV Continuous <Continuous>        ACCESS DEVICES:  [ ] Peripheral IV  [ ] Central Venous Line	[ ] R	[ ] L	[ ] IJ	[ ] Fem	[ ] SC	Placed:   [ ] Arterial Line		[ ] R	[ ] L	[ ] Fem	[ ] Rad	[ ] Ax	Placed:   [ ] PICC:					[ ] Mediport  [ ] Urinary Catheter, Date Placed:   [x] Necessity of urinary, arterial, and venous catheters discussed    OTHER MEDICATIONS:  chlorhexidine 2% Cloths 1 Application(s) Topical <User Schedule>  CRRT Treatment    <Continuous>  lidocaine   4% Patch 1 Patch Transdermal daily  Phoxillum Filtration BK 4 / 2.5 5000 milliLiter(s) CRRT <Continuous>  Phoxillum Filtration BK 4 / 2.5 5000 milliLiter(s) CRRT <Continuous>  PrismaSOL Filtration BGK 4 / 2.5 5000 milliLiter(s) CRRT <Continuous>      CODE STATUS:      IMAGING:

## 2024-08-27 NOTE — PROGRESS NOTE ADULT - SUBJECTIVE AND OBJECTIVE BOX
Interval Events:   - Melena overnight. S/p 3PRBC, 1PLT and 1Cryo.   - Increase pressor requirements. POCUS showing hypovolemia. s/p Albumin 25% 250ml.   - Fluconazole switched to Caspo   - GOC reviewed with wife. Full code.     - Afebrile, on room air.   - Levo 0.44 and Vaso 0.03.   - anuric on net even CRRT.   - Net positive 2.5L   - 7 reported BM in 24h.     ROS:   Feels weak and tired. Having cough and abdominal discomfort.   Denies chest pain and SOB.       Hospital Medications:  caspofungin IVPB 70 milliGRAM(s) IV Intermittent once  caspofungin IVPB      chlorhexidine 2% Cloths 1 Application(s) Topical <User Schedule>  CRRT Treatment    <Continuous>  doxycycline IVPB      doxycycline IVPB 100 milliGRAM(s) IV Intermittent every 12 hours  epoetin kareem (EPOGEN) Injectable 17430 Unit(s) SubCutaneous <User Schedule>  folic acid 1 milliGRAM(s) Oral daily  hydrocortisone sodium succinate Injectable 50 milliGRAM(s) IV Push every 6 hours  insulin lispro (ADMELOG) corrective regimen sliding scale   SubCutaneous every 6 hours  lidocaine   4% Patch 1 Patch Transdermal daily  meropenem  IVPB 1000 milliGRAM(s) IV Intermittent every 8 hours  multivitamin/minerals/iron Oral Solution (CENTRUM) 15 milliLiter(s) Enteral Tube daily  norepinephrine Infusion 0.5 MICROgram(s)/kG/Min (53.4 mL/Hr) IV Continuous <Continuous>  pantoprazole  Injectable 40 milliGRAM(s) IV Push two times a day  Phoxillum Filtration BK 4 / 2.5 5000 milliLiter(s) (2000 mL/Hr) CRRT <Continuous>  Phoxillum Filtration BK 4 / 2.5 5000 milliLiter(s) (1000 mL/Hr) CRRT <Continuous>  Phoxillum Filtration BK 4 / 2.5 5000 milliLiter(s) (200 mL/Hr) CRRT <Continuous>  rifAXIMin 550 milliGRAM(s) Oral every 12 hours  thiamine 100 milliGRAM(s) Oral daily  vasopressin Infusion 0.03 Unit(s)/Min (4.5 mL/Hr) IV Continuous <Continuous>    MAR over past 24 hours:    albumin human  5% IVPB   250 mL/Hr IV Intermittent (24 @ 06:02)    calcium gluconate IVPB   200 mL/Hr IV Intermittent (24 @ 00:47)    chlorhexidine 2% Cloths   1 Application(s) Topical (24 @ 05:04)    dexMEDEtomidine Infusion   5.7 mL/Hr IV Continuous (24 @ 13:07)    doxycycline IVPB   100 mL/Hr IV Intermittent (24 @ 05:05)   100 mL/Hr IV Intermittent (24 @ 18:22)    fluconAZOLE IVPB   100 mL/Hr IV Intermittent (24 @ 10:22)    folic acid   1 milliGRAM(s) Oral (24 @ 12:05)    hydrocortisone sodium succinate Injectable   100 milliGRAM(s) IV Push (24 @ 18:25)    hydrocortisone sodium succinate Injectable   50 milliGRAM(s) IV Push (24 @ 05:05)    insulin lispro (ADMELOG) corrective regimen sliding scale   2 Unit(s) SubCutaneous (24 @ 06:03)   2 Unit(s) SubCutaneous (24 @ 00:06)   2 Unit(s) SubCutaneous (24 @ 19:23)   2 Unit(s) SubCutaneous (24 @ 12:08)    lidocaine   4% Patch   1 Patch Transdermal (24 @ 12:05)    meropenem  IVPB   100 mL/Hr IV Intermittent (24 @ 00:05)   100 mL/Hr IV Intermittent (24 @ 18:23)    multivitamin/minerals/iron Oral Solution (CENTRUM)   15 milliLiter(s) Enteral Tube (24 @ 12:05)    norepinephrine Infusion   53.4 mL/Hr IV Continuous (24 @ 06:22)    norepinephrine Infusion   13.9 mL/Hr IV Continuous (24 @ 19:47)    pantoprazole  Injectable   40 milliGRAM(s) IV Push (24 @ 05:05)   40 milliGRAM(s) IV Push (24 @ 18:24)    Phoxillum Filtration BK 4 / 2.5   200 mL/Hr CRRT (24 @ 07:42)    Phoxillum Filtration BK 4 / 2.5   1000 mL/Hr CRRT (24 @ 20:33)   1000 mL/Hr CRRT (24 @ 20:17)    Phoxillum Filtration BK 4 / 2.5   200 mL/Hr CRRT (24 @ 20:17)    Phoxillum Filtration BK 4 / 2.5   2000 mL/Hr CRRT (24 @ 07:42)    Phoxillum Filtration BK 4 / 2.5   1000 mL/Hr CRRT (24 @ 07:42)    piperacillin/tazobactam IVPB..   25 mL/Hr IV Intermittent (24 @ 13:27)    polyethylene glycol 3350   17 Gram(s) Oral (24 @ 05:05)   17 Gram(s) Oral (24 @ 00:05)   17 Gram(s) Oral (24 @ 18:25)   17 Gram(s) Oral (24 @ 12:06)    PrismaSOL Filtration BGK 4 / 2.5   2000 mL/Hr CRRT (24 @ 22:59)   2000 mL/Hr CRRT (24 @ 20:33)   2000 mL/Hr CRRT (24 @ 20:17)    rifAXIMin   550 milliGRAM(s) Oral (24 @ 05:05)   550 milliGRAM(s) Oral (24 @ 18:25)    sodium phosphate 15 milliMole(s)/250 mL IVPB   255 mL/Hr IV Intermittent (24 @ 13:27)    thiamine   100 milliGRAM(s) Oral (24 @ 12:04)    vasopressin Infusion   4.5 mL/Hr IV Continuous (24 @ 01:40)   4.5 mL/Hr IV Continuous (24 @ 19:47)      PHYSICAL EXAM:   Vital Signs last 24 hours:  T(F): 97.3 (24 @ 03:00), Max: 97.7 (24 @ 11:00)  HR: 69 (24 @ 07:00) (55 - 102)  BP: 127/65 (24 @ 21:45) (121/66 - 127/65)  BP(mean): 89 (24 @ 21:45) (87 - 89)  ABP: 135/55 (24 @ 07:00) (89/45 - 136/57)  ABP(mean): 79 (24 @ 07:00) (49 - 81)  RR: 14 (24 @ 07:00) (12 - 38)  SpO2: 100% (24 @ 07:00) (92% - 100%)    I&Os:    24 @ 07:01  -  24 @ 07:00  --------------------------------------------------------  IN:    Albumin 5%  - 250 mL: 250 mL    Cryoprecipitate: 75 mL    Dexmedetomidine: 11.4 mL    Enteral Tube Flush: 170 mL    IV PiggyBack: 150 mL    IV PiggyBack: 800 mL    Miscellaneous Tube Feedin mL    Norepinephrine: 942.8 mL    Norepinephrine: 47 mL    Other (mL): 47 mL    Platelets - Single Donor: 225 mL    PRBCs (Packed Red Blood Cells): 900 mL    Vasopressin: 108 mL  Total IN: 4271.2 mL    OUT:    Other (mL): 1604 mL    Stool (mL): 100 mL  Total OUT: 1704 mL    Total NET: 2567.2 mL    BMI (kg/m2): 33.2 (24 @ 16:46)  GENERAL: obese man, physically deconditioned.   NEURO: waxes and wanes. AOx3 today. Having minimal verbal output. Weak voice. Follows simple commands.    HEENT: Scleral icterus  CHEST: Bilateral breath sounds, decreased in bases.    CARDIAC: Regular rate and rhythm  ABDOMEN: Soft, non-tender, more distended than on Friday. Present bowel sounds. Anasarca.   EXTREMITIES: warm, well perfused. Anasarca improved since Friday.    SKIN: Jaundiced, no rash/ecchymoses      DIAGNOSTICS:  WBC      Hg       PLT      Na       K        CO2     BUN      Cr       ALT      AST      TB       ALP  21.12    8.5      114      137      4.8      19       14       1.15     32       53       10.9     207      24 @ 06:05  21.16    9.1      125      139      4.7      20       13       1.13     32       58       11.4     240      24 @ 00:08  23.16    7.2      126      139      4.2      21       12       1.12     33       55       10.7     253      24 @ 18:34  22.34    7.1      88       138      4.2      21       12       1.15     33       57       10.9     255      24 @ 12:07  21.88    7.5      84       136      4.3      20       11       1.20     34       63       11.4     253      24 @ 06:16    PT             INR            MELDwith  MELDw/o  27.9           2.74           --             --             24 @ 06:05  24.7           2.41           --             --             24 @ 00:08  25.2           2.46           --             --             24 @ 18:34  30.2           2.97           --             --             24 @ 12:07  28.6           2.68           --             --             24 @ 06:16

## 2024-08-27 NOTE — PHARMACOTHERAPY INTERVENTION NOTE - COMMENTS
ISA Hui is a 68y M w decompensated liver cirrhosis, Leukocytosis, Shock.  Now w Enterococcus faecium bacteremia BC 8/26, followed by transplant ID.     Enterococcus not VRE, initially started on linezolid but suggested switch to vancomycin 1.5 g x1 then 1.25 mg q12h.    Thank you,  Melody Pena, PharmD, BCIDP  Clinical Pharmacist, Infectious Diseases  Available via [facility]   Cell: [phone]

## 2024-08-27 NOTE — PROGRESS NOTE ADULT - SUBJECTIVE AND OBJECTIVE BOX
Transplant Surgery - Multidisciplinary Rounds  --------------------------------------------------------------    Present:   Patient seen and examined with multidisciplinary Transplant team including  Surgeon: Dr. Layton, Hepatologist Dr. Jacobs,  Pharmacist, ACPs, RNs in AM rounds.   Disciplines not in attendance will be notified of the plan.     Interval Events:  - levo uptrending, 0.48, levo     Education:  Medications    Plan of care:  See Below    MEDICATIONS  (STANDING):  chlorhexidine 2% Cloths 1 Application(s) Topical <User Schedule>  CRRT Treatment    <Continuous>  doxycycline IVPB      doxycycline IVPB 100 milliGRAM(s) IV Intermittent every 12 hours  epoetin kareem (EPOGEN) Injectable 44725 Unit(s) SubCutaneous <User Schedule>  fluconAZOLE IVPB 400 milliGRAM(s) IV Intermittent every 24 hours  fluconAZOLE IVPB      folic acid 1 milliGRAM(s) Oral daily  insulin lispro (ADMELOG) corrective regimen sliding scale   SubCutaneous every 6 hours  lidocaine   4% Patch 1 Patch Transdermal daily  meropenem  IVPB 1000 milliGRAM(s) IV Intermittent every 8 hours  multivitamin/minerals/iron Oral Solution (CENTRUM) 15 milliLiter(s) Enteral Tube daily  norepinephrine Infusion 0.13 MICROgram(s)/kG/Min (13.9 mL/Hr) IV Continuous <Continuous>  pantoprazole  Injectable 40 milliGRAM(s) IV Push two times a day  Phoxillum Filtration BK 4 / 2.5 5000 milliLiter(s) (200 mL/Hr) CRRT <Continuous>  Phoxillum Filtration BK 4 / 2.5 5000 milliLiter(s) (1000 mL/Hr) CRRT <Continuous>  polyethylene glycol 3350 17 Gram(s) Oral <User Schedule>  PrismaSOL Filtration BGK 4 / 2.5 5000 milliLiter(s) (2000 mL/Hr) CRRT <Continuous>  rifAXIMin 550 milliGRAM(s) Oral two times a day  thiamine 100 milliGRAM(s) Oral daily  vasopressin Infusion 0.03 Unit(s)/Min (4.5 mL/Hr) IV Continuous <Continuous>    MEDICATIONS  (PRN):      PAST MEDICAL & SURGICAL HISTORY:  Alcohol abuse      No significant past surgical history          Vital Signs Last 24 Hrs  T(C): 36.3 (26 Aug 2024 23:00), Max: 36.8 (26 Aug 2024 05:00)  T(F): 97.3 (26 Aug 2024 23:00), Max: 98.2 (26 Aug 2024 05:00)  HR: 89 (27 Aug 2024 00:30) (55 - 110)  BP: 127/65 (26 Aug 2024 21:45) (121/66 - 127/65)  BP(mean): 89 (26 Aug 2024 21:45) (87 - 89)  RR: 14 (27 Aug 2024 00:30) (12 - 38)  SpO2: 100% (27 Aug 2024 00:30) (93% - 100%)    Parameters below as of 26 Aug 2024 23:00  Patient On (Oxygen Delivery Method): room air        I&O's Summary    25 Aug 2024 07:01  -  26 Aug 2024 07:00  --------------------------------------------------------  IN: 3019.3 mL / OUT: 2769 mL / NET: 250.3 mL    26 Aug 2024 07:01  -  27 Aug 2024 00:52  --------------------------------------------------------  IN: 3262.7 mL / OUT: 1337 mL / NET: 1925.7 mL                              9.1    21.16 )-----------( 125      ( 27 Aug 2024 00:08 )             26.5     08-27    139  |  105  |  13  ----------------------------<  185<H>  4.7   |  20<L>  |  1.13    Ca    8.1<L>      27 Aug 2024 00:08  Phos  3.6     08-27  Mg     2.2     08-27    TPro  6.0  /  Alb  2.9<L>  /  TBili  11.4<H>  /  DBili  x   /  AST  58<H>  /  ALT  32  /  AlkPhos  240<H>  08-27        Review of systems  All other systems were reviewed and are negative, except as noted.      PHYSICAL EXAM:  Constitutional: Well developed / well nourished  Eyes:  PERRLA  ENMT: NC/AT  Neck: supple,   Respiratory: CTA B/L  Cardiovascular: RRR  Gastrointestinal: Soft abdomen, ND, L sided abdominal wall hernia--stable, NT  Genitourinary: anuric   Extremities: SCD's in place and working bilaterally  Vascular: Palpable dp pulses bilaterally.   Neurological: waking up  Skin: no rashes, ulcerations, lesions  Musculoskeletal: Moving all extremities     Transplant Surgery - Multidisciplinary Rounds  --------------------------------------------------------------    Present:   Patient seen and examined with multidisciplinary Transplant team including  Surgeon: Dr. Layton, Hepatologist Dr. Jacobs,  Pharmacist, ACPs, RNs in AM rounds.   Disciplines not in attendance will be notified of the plan.     Interval Events:  - levo uptrending, 0.48, levo   -     Education:  Medications    Plan of care:  See Below    MEDICATIONS  (STANDING):  chlorhexidine 2% Cloths 1 Application(s) Topical <User Schedule>  CRRT Treatment    <Continuous>  doxycycline IVPB      doxycycline IVPB 100 milliGRAM(s) IV Intermittent every 12 hours  epoetin kareem (EPOGEN) Injectable 06222 Unit(s) SubCutaneous <User Schedule>  fluconAZOLE IVPB 400 milliGRAM(s) IV Intermittent every 24 hours  fluconAZOLE IVPB      folic acid 1 milliGRAM(s) Oral daily  insulin lispro (ADMELOG) corrective regimen sliding scale   SubCutaneous every 6 hours  lidocaine   4% Patch 1 Patch Transdermal daily  meropenem  IVPB 1000 milliGRAM(s) IV Intermittent every 8 hours  multivitamin/minerals/iron Oral Solution (CENTRUM) 15 milliLiter(s) Enteral Tube daily  norepinephrine Infusion 0.13 MICROgram(s)/kG/Min (13.9 mL/Hr) IV Continuous <Continuous>  pantoprazole  Injectable 40 milliGRAM(s) IV Push two times a day  Phoxillum Filtration BK 4 / 2.5 5000 milliLiter(s) (200 mL/Hr) CRRT <Continuous>  Phoxillum Filtration BK 4 / 2.5 5000 milliLiter(s) (1000 mL/Hr) CRRT <Continuous>  polyethylene glycol 3350 17 Gram(s) Oral <User Schedule>  PrismaSOL Filtration BGK 4 / 2.5 5000 milliLiter(s) (2000 mL/Hr) CRRT <Continuous>  rifAXIMin 550 milliGRAM(s) Oral two times a day  thiamine 100 milliGRAM(s) Oral daily  vasopressin Infusion 0.03 Unit(s)/Min (4.5 mL/Hr) IV Continuous <Continuous>    MEDICATIONS  (PRN):      PAST MEDICAL & SURGICAL HISTORY:  Alcohol abuse      No significant past surgical history          Vital Signs Last 24 Hrs  T(C): 36.3 (26 Aug 2024 23:00), Max: 36.8 (26 Aug 2024 05:00)  T(F): 97.3 (26 Aug 2024 23:00), Max: 98.2 (26 Aug 2024 05:00)  HR: 89 (27 Aug 2024 00:30) (55 - 110)  BP: 127/65 (26 Aug 2024 21:45) (121/66 - 127/65)  BP(mean): 89 (26 Aug 2024 21:45) (87 - 89)  RR: 14 (27 Aug 2024 00:30) (12 - 38)  SpO2: 100% (27 Aug 2024 00:30) (93% - 100%)    Parameters below as of 26 Aug 2024 23:00  Patient On (Oxygen Delivery Method): room air        I&O's Summary    25 Aug 2024 07:01  -  26 Aug 2024 07:00  --------------------------------------------------------  IN: 3019.3 mL / OUT: 2769 mL / NET: 250.3 mL    26 Aug 2024 07:01  -  27 Aug 2024 00:52  --------------------------------------------------------  IN: 3262.7 mL / OUT: 1337 mL / NET: 1925.7 mL                              9.1    21.16 )-----------( 125      ( 27 Aug 2024 00:08 )             26.5     08-27    139  |  105  |  13  ----------------------------<  185<H>  4.7   |  20<L>  |  1.13    Ca    8.1<L>      27 Aug 2024 00:08  Phos  3.6     08-27  Mg     2.2     08-27    TPro  6.0  /  Alb  2.9<L>  /  TBili  11.4<H>  /  DBili  x   /  AST  58<H>  /  ALT  32  /  AlkPhos  240<H>  08-27        Review of systems  All other systems were reviewed and are negative, except as noted.      PHYSICAL EXAM:  Constitutional: Well developed / well nourished  Eyes:  PERRLA  ENMT: NC/AT  Neck: supple,   Respiratory: CTA B/L  Cardiovascular: RRR  Gastrointestinal: Soft abdomen, ND, L sided abdominal wall hernia--stable, NT  Genitourinary: anuric   Extremities: SCD's in place and working bilaterally  Vascular: Palpable dp pulses bilaterally.   Neurological: waking up  Skin: no rashes, ulcerations, lesions  Musculoskeletal: Moving all extremities

## 2024-08-27 NOTE — PROCEDURE NOTE - NSICDXPROCEDURE_GEN_ALL_CORE_FT
PROCEDURES:  US guided paracentesis 27-Aug-2024 16:41:59  Austin Monroy Sub  
PROCEDURES:  Insertion, catheter, venous, hemodialysis 21-Aug-2024 18:06:09  Todd Kenney  
PROCEDURES:  Endotracheal intubation 20-Aug-2024 18:40:58  Todd Kenney  
PROCEDURES:  Imaging guided insertion of central venous cannula 21-Aug-2024 18:03:27  Todd Kenney

## 2024-08-27 NOTE — PROGRESS NOTE ADULT - NS ATTEND AMEND GEN_ALL_CORE FT
worsening pressor requirements today   Discussed with SICU team and addended our recommendations above based on new cultrue growth      Thank you for involving us in the care of this patient  Transplant ID will continue to follow  Please call or page with additional questions  Pager; #[phone]  Teams: from 8 am to 5 pm  Kimberly Zamarripa MD

## 2024-08-27 NOTE — PROGRESS NOTE ADULT - ASSESSMENT
68y with obesity and risky alcohol consumption (with decades' history of daily consumption of homemade alcohol), admitted to [facility] 1 month ago due to recent onset of jaundice and with a prolonged hospital and ICU course there due to newly diagnosed decompensated cirrhosis with acute on chronic liver failure (ACLF) with shock (resolved, but still on midodrine); FANY (on intermittent HD since 7/9); recurrent maroon stools and acute on chronic anemia necessitating intermittent PRBC transfusions (last on 8/8) and hypofibrinoginemia, with EGD (7/12) with small non-bleeding EV and a duodenal ulcer with a visible vessel; and waxing and waning hepatic encephalopathy. He was transferred to [facility] on 8/12/24 for evaluation for combined liver/kidney transplants (SLK).      Decompensated ETOH Cirrhosis  - SLK eval , MELD 38O  - TFs, pressors per SICU  - HE: cont rifaximin/miralax  - EV:  EGD (7/12) with small non-bleeding EV and a duodenal ulcer with a visible vessel. EGD 8/20: no active bleeding, PPI.  - s/p colonoscopy 8/21: lesion biopsied, f/u path   - stress dose steroids per SICU completed, wean pressors as able  - FANY/HRS: anuric, On CRRT 8/13, Renal following  - ID: Empiric Elise/fluc/ Doxy. Elise switched to Zosyn  -  8/13 w/ MRSE , repeat 8/14 with NGTD, repeat bcx today  - Thiamine/MVI/FOLIC ACID  - LHC deferred, will re-address daily once stabilized  - Needs CT a/p non contrast to eval iliacs   - Paracentesis PRN   - cont sicu care    68y with obesity and risky alcohol consumption (with decades' history of daily consumption of homemade alcohol), admitted to [facility] 1 month ago due to recent onset of jaundice and with a prolonged hospital and ICU course there due to newly diagnosed decompensated cirrhosis with acute on chronic liver failure (ACLF) with shock (resolved, but still on midodrine); FANY (on intermittent HD since 7/9); recurrent maroon stools and acute on chronic anemia necessitating intermittent PRBC transfusions (last on 8/8) and hypofibrinoginemia, with EGD (7/12) with small non-bleeding EV and a duodenal ulcer with a visible vessel; and waxing and waning hepatic encephalopathy. He was transferred to [facility] on 8/12/24 for evaluation for combined liver/kidney transplants (SLK).      Decompensated ETOH Cirrhosis  - SLK eval , MELD 38O  - TFs, pressors per SICU  - dx para today  - HE: cont rifaximin/miralax  - EV:  EGD (7/12) with small non-bleeding EV and a duodenal ulcer with a visible vessel. EGD 8/20: no active bleeding, PPI.  - s/p colonoscopy 8/21: lesion biopsied, f/u path   - stress dose steroids per SICU completed, wean pressors as able  - FANY/HRS: anuric, On CRRT 8/13, Renal following  - ID: Empiric Elise/fluc/ Doxy. Elise switched to Zosyn.  -  8/13 w/ MRSE , repeat 8/14 with NGTD,  - BCX 8/26: enterococcus faecium , started linezolid  - Thiamine/MVI/FOLIC ACID  - LHC deferred, will re-address daily once stabilized  - Needs CT a/p non contrast to eval iliacs   - Paracentesis PRN   - cont sicu care

## 2024-08-27 NOTE — PROGRESS NOTE ADULT - ATTENDING COMMENTS
SICU ATTENDING ATTESTATION    I have seen and examined this patient on multidisciplinary SICU rounds thismorning. I have reviewed all new labs, imaging and reports. I have participated in formulating the plan for the day, and have read and agree with the history, ROS, exam, assessment and plan as stated above, with my additions listed below:     Hemodynamic decompensation overnight with increased pressor requirements.   Now on Vaso, increased levophed, stress dose steroids started.   Abx escalated to Merem and Caspo.   Repeat BCx sent, CT reviewed with minimal ascites (no window for dx para), tracheal aspirate unlikely to yield specific infectious results.   CRRT is now running net +50mL per hour.   TF held for increased pressor requirements.   GOC: wife says patient very clearly told her he would want to live, and would not want withdrawal of care.   Cont supportive care for worsening sepsic shock of unclear etiology (suspected pulmonary).     The patient required critical care. Critical care time was indicated due to the patient's inherent instability and high risk for decompensation. E & M work was done by me in multiple 10-15 minute intervals over the course of the day in the ICU. I have coordinated care with multiple teams, and reviewed the medical record, consult notes, ICU flow sheets, cardiac monitoring, mechanical ventilation, medication record, brain and body imaging, and serial sequential labs.       Total time spent in the care of this patient today (excluding procedures & teaching): CCT 30 min                 Over 50% of the total time was spent in discussion and coordination of care with consulting services, dietary and rehab services.       Erin Oscar M.D., M.S.  Surgical Critical Care

## 2024-08-27 NOTE — PROGRESS NOTE ADULT - SUBJECTIVE AND OBJECTIVE BOX
Follow Up:      Interval History:    REVIEW OF SYSTEMS  [  ] ROS unobtainable because:    [  ] All other systems negative except as noted below    Constitutional:  [ ] fever [ ] chills  [ ] weight loss  [ ] weakness  Skin:  [ ] rash [ ] phlebitis	  Eyes: [ ] icterus [ ] pain  [ ] discharge	  ENMT: [ ] sore throat  [ ] thrush [ ] ulcers [ ] exudates  Respiratory: [ ] dyspnea [ ] hemoptysis [ ] cough [ ] sputum	  Cardiovascular:  [ ] chest pain [ ] palpitations [ ] edema	  Gastrointestinal:  [ ] nausea [ ] vomiting [ ] diarrhea [ ] constipation [ ] pain	  Genitourinary:  [ ] dysuria [ ] frequency [ ] hematuria [ ] discharge [ ] flank pain  [ ] incontinence  Musculoskeletal:  [ ] myalgias [ ] arthralgias [ ] arthritis  [ ] back pain  Neurological:  [ ] headache [ ] seizures  [ ] confusion/altered mental status    Allergies  No Known Allergies        ANTIMICROBIALS:  caspofungin IVPB    doxycycline IVPB    doxycycline IVPB 100 every 12 hours  meropenem  IVPB 1000 every 8 hours  rifAXIMin 550 every 12 hours      OTHER MEDS:  MEDICATIONS  (STANDING):  epoetin kareem (EPOGEN) Injectable 57465 <User Schedule>  hydrocortisone sodium succinate Injectable 50 every 6 hours  insulin lispro (ADMELOG) corrective regimen sliding scale  every 6 hours  norepinephrine Infusion 0.5 <Continuous>  pantoprazole  Injectable 40 two times a day  vasopressin Infusion 0.03 <Continuous>      Vital Signs Last 24 Hrs  T(C): 36.5 (27 Aug 2024 07:00), Max: 36.5 (27 Aug 2024 07:00)  T(F): 97.7 (27 Aug 2024 07:00), Max: 97.7 (27 Aug 2024 07:00)  HR: 71 (27 Aug 2024 10:45) (55 - 102)  BP: 135/60 (27 Aug 2024 07:00) (121/66 - 135/60)  BP(mean): 87 (27 Aug 2024 07:00) (87 - 89)  RR: 17 (27 Aug 2024 10:45) (12 - 38)  SpO2: 100% (27 Aug 2024 10:45) (92% - 100%)    Parameters below as of 27 Aug 2024 07:00  Patient On (Oxygen Delivery Method): room air        PHYSICAL EXAMINATION:  General: Alert and Awake, NAD  HEENT: PERRL, EOMI  Neck: Supple  Cardiac: RRR, No M/R/G  Resp: CTAB, No Wh/Rh/Ra  Abdomen: NBS, NT/ND, No HSM, No rigidity or guarding  MSK: No LE edema. No Calf tenderness  : No trejo  Skin: No rashes or lesions. Skin is warm and dry to the touch.   Neuro: Alert and Awake. CN 2-12 Grossly intact. Moves all four extremities spontaneously.  Psych: Calm, Pleasant, Cooperative                          8.5    21.12 )-----------( 114      ( 27 Aug 2024 06:05 )             24.8           137  |  104  |  14  ----------------------------<  198<H>  4.8   |  19<L>  |  1.15    Ca    8.2<L>      27 Aug 2024 06:05  Phos  4.1       Mg     2.1         TPro  5.8<L>  /  Alb  2.7<L>  /  TBili  10.9<H>  /  DBili  x   /  AST  53<H>  /  ALT  32  /  AlkPhos  207<H>        Urinalysis Basic - ( 27 Aug 2024 06:05 )    Color: x / Appearance: x / SG: x / pH: x  Gluc: 198 mg/dL / Ketone: x  / Bili: x / Urobili: x   Blood: x / Protein: x / Nitrite: x   Leuk Esterase: x / RBC: x / WBC x   Sq Epi: x / Non Sq Epi: x / Bacteria: x        MICROBIOLOGY:  v  .Blood Blood  24   No growth at 72 Hours  --  --      .Blood Blood  24   No growth at 72 Hours  --  --      .Blood Blood-Venous  24   No growth at 5 days  --  --      .Blood Blood-Venous  24   No growth at 5 days  --  --      Combi-Cath Combi-Cath  24   Normal Respiratory Elo present  --    Moderate polymorphonuclear leukocytes per low power field  Rare Squamous epithelial cells per low power field  No organisms seen per oil power field      .Blood Blood  24   No growth at 5 days  --  --      .Blood Blood  24   No growth at 5 days  --  --      Peritoneal Peritoneal Fluid  24   No growth at 5 days  --    polymorphonuclear leukocytes seen  No organisms seen  by cytocentrifuge      .Blood Blood-Venous  24   No growth at 5 days  --  --      .Blood Blood-Venous  24   No growth at 5 days  --  --      .Blood Blood-Peripheral  24   No growth at 5 days  --  --      .Blood Blood  24   No growth at 5 days  --  --      .Blood Blood  24   Growth in aerobic bottle: Staphylococcus epidermidis Isolation of  Coagulase negative Staphylococcus from single blood culture sets may  represent  contamination. Contact the Microbiology Department at [phone] if  susceptibility testing is  clinically indicated.  Direct identification is available within approximately 3-5  hours either by Blood Panel Multiplexed PCR or Direct  MALDI-TOF. Details: [email or web address]  --  Blood Culture PCR        CMV IgG Antibody: >10.00 U/mL (24 @ 00:08)  Toxoplasma IgG Screen: 44.00 IU/mL (24 @ 21:15)  CMV IgG Antibody: >10.00 U/mL (24 @ 21:15)    CMVPCR Lo.94 Osl20LM/mL ( @ 12:26)        RADIOLOGY:    <The imaging below has been reviewed and visualized by me independently. Findings as detailed in report below> Follow Up: Leukocytosis    Interval History:  Afebrile, waxing/waning leukocytosis  Escalated to caspofungin   RVP-negative    REVIEW OF SYSTEMS  [  ] ROS unobtainable because:    [ x ] All other systems negative except as noted below    Constitutional:  [ ] fever [ ] chills  [ ] weight loss  [ x] weakness  Skin:  [ ] rash [ ] phlebitis	  Eyes: [ ] icterus [ ] pain  [ ] discharge	  ENMT: [ ] sore throat  [ ] thrush [ ] ulcers [ ] exudates  Respiratory: [ ] dyspnea [ ] hemoptysis [ ] cough [ ] sputum	  Cardiovascular:  [ ] chest pain [ ] palpitations [ ] edema	  Gastrointestinal:  [ ] nausea [ ] vomiting [ ] diarrhea [ ] constipation [ ] pain	  Genitourinary:  [ ] dysuria [ ] frequency [ ] hematuria [ ] discharge [ ] flank pain  [ ] incontinence  Musculoskeletal:  [ ] myalgias [ ] arthralgias [ ] arthritis  [ ] back pain  Neurological:  [ ] headache [ ] seizures  [ x] confusion/altered mental status    Allergies  No Known Allergies        ANTIMICROBIALS:  caspofungin IVPB    doxycycline IVPB    doxycycline IVPB 100 every 12 hours  meropenem  IVPB 1000 every 8 hours  rifAXIMin 550 every 12 hours      OTHER MEDS:  MEDICATIONS  (STANDING):  epoetin kareem (EPOGEN) Injectable 25484 <User Schedule>  hydrocortisone sodium succinate Injectable 50 every 6 hours  insulin lispro (ADMELOG) corrective regimen sliding scale  every 6 hours  norepinephrine Infusion 0.5 <Continuous>  pantoprazole  Injectable 40 two times a day  vasopressin Infusion 0.03 <Continuous>      Vital Signs Last 24 Hrs  T(C): 36.5 (27 Aug 2024 07:00), Max: 36.5 (27 Aug 2024 07:00)  T(F): 97.7 (27 Aug 2024 07:00), Max: 97.7 (27 Aug 2024 07:00)  HR: 71 (27 Aug 2024 10:45) (55 - 102)  BP: 135/60 (27 Aug 2024 07:00) (121/66 - 135/60)  BP(mean): 87 (27 Aug 2024 07:00) (87 - 89)  RR: 17 (27 Aug 2024 10:45) (12 - 38)  SpO2: 100% (27 Aug 2024 10:45) (92% - 100%)    Parameters below as of 27 Aug 2024 07:00  Patient On (Oxygen Delivery Method): room air        PHYSICAL EXAMINATION:  General: lethargic and Awake, NAD, jaundice  HEENT: scleral icterus  Cardiac: RRR, No M/R/G  Resp: CTAB, No Wh/Rh/Ra  Abdomen: NBS, NT/ND, No rigidity or guarding  MSK: No LE edema. No Calf tenderness  Skin: No rashes or lesions. Skin is warm and dry to the touch.   Neuro: Alert and Awake. CN 2-12 Grossly intact. Moves all four extremities spontaneously.  Psych: Calm, Pleasant, Cooperative                            8.5    21.12 )-----------( 114      ( 27 Aug 2024 06:05 )             24.8           137  |  104  |  14  ----------------------------<  198<H>  4.8   |  19<L>  |  1.15    Ca    8.2<L>      27 Aug 2024 06:05  Phos  4.1       Mg     2.1         TPro  5.8<L>  /  Alb  2.7<L>  /  TBili  10.9<H>  /  DBili  x   /  AST  53<H>  /  ALT  32  /  AlkPhos  207<H>        Urinalysis Basic - ( 27 Aug 2024 06:05 )    Color: x / Appearance: x / SG: x / pH: x  Gluc: 198 mg/dL / Ketone: x  / Bili: x / Urobili: x   Blood: x / Protein: x / Nitrite: x   Leuk Esterase: x / RBC: x / WBC x   Sq Epi: x / Non Sq Epi: x / Bacteria: x        MICROBIOLOGY:  v  .Blood Blood  24   No growth at 72 Hours  --  --      .Blood Blood  24   No growth at 72 Hours  --  --      .Blood Blood-Venous  24   No growth at 5 days  --  --      .Blood Blood-Venous  24   No growth at 5 days  --  --      Combi-Cath Combi-Cath  24   Normal Respiratory Elo present  --    Moderate polymorphonuclear leukocytes per low power field  Rare Squamous epithelial cells per low power field  No organisms seen per oil power field      .Blood Blood  24   No growth at 5 days  --  --      .Blood Blood  24   No growth at 5 days  --  --      Peritoneal Peritoneal Fluid  24   No growth at 5 days  --    polymorphonuclear leukocytes seen  No organisms seen  by cytocentrifuge      .Blood Blood-Venous  24   No growth at 5 days  --  --      .Blood Blood-Venous  24   No growth at 5 days  --  --      .Blood Blood-Peripheral  24   No growth at 5 days  --  --      .Blood Blood  24   No growth at 5 days  --  --      .Blood Blood  24   Growth in aerobic bottle: Staphylococcus epidermidis Isolation of  Coagulase negative Staphylococcus from single blood culture sets may  represent  contamination. Contact the Microbiology Department at [phone] if  susceptibility testing is  clinically indicated.  Direct identification is available within approximately 3-5  hours either by Blood Panel Multiplexed PCR or Direct  MALDI-TOF. Details: [email or web address]  --  Blood Culture PCR        CMV IgG Antibody: >10.00 U/mL (24 @ 00:08)  Toxoplasma IgG Screen: 44.00 IU/mL (24 @ 21:15)  CMV IgG Antibody: >10.00 U/mL (24 @ 21:15)    CMVPCR Lo.94 Nfh83CE/mL ( @ 12:26)        RADIOLOGY:    <The imaging below has been reviewed and visualized by me independently. Findings as detailed in report below>    < from: CT Chest No Cont (24 @ 14:22) >  FINDINGS:    Study is limited secondary to patient motion.    CHEST:  LUNGS AND LARGE AIRWAYS: Patent central airways. Right lower lobe   consolidation with air bronchograms. Bilateral upper lobe and right   middle lobe patchy groundglass attenuation. Elevation of right   hemidiaphragm.  PLEURA: No pleural effusion.  VESSELS: Right-sided central venous catheter terminates in the SVC.   Coronary and aortic atherosclerotic changes.  HEART: Heart size is normal.No pericardial effusion.  MEDIASTINUM AND KALIN: No lymphadenopathy.  CHEST WALL AND LOWER NECK: Within normal limits.    ABDOMEN AND PELVIS:  LIVER: Cirrhosis. Right hepatic hypodensity too small to characterize.  BILE DUCTS: Normal caliber.  GALLBLADDER: Intraluminal hyperdensity may reflect sludge, stone, or   focal adenomyomatosis.  SPLEEN: Within normal limits.  PANCREAS: Within normal limits.  ADRENALS: Within normal limits.  KIDNEYS/URETERS: Within normal limits.    BLADDER: Within normal limits.  REPRODUCTIVE ORGANS: Prostate within normal limits.    BOWEL: No bowel obstruction. Appendix is not visualized. Enteric tube   terminates in the duodenum. Duodenal clip.  PERITONEUM/RETROPERITONEUM: Large ascites.  VESSELS: Atherosclerotic changes.  LYMPH NODES: No lymphadenopathy.  ABDOMINAL WALL: Left fat-containing inguinal hernia. Umbilical hernia   containing low density fluid  BONES: Degenerative changes of the spine.    IMPRESSION:  1.  Right lower lobe consolidation with air bronchograms suspicious for   pneumonia.  2.  Patchy groundglass opacities throughout bilateral lungs, nonspecific,   may represent pulmonary edema.  3.  Large volume ascites.        --- End of Report ---      < end of copied text >

## 2024-08-27 NOTE — PROGRESS NOTE ADULT - ASSESSMENT
76 yo m with alcohol abuse otherwise no known chronic medical issues (does not see doctors per sister) s/p 1 month stay at [facility] now transferred to [facility] for liver transplant eval.  Most of history obtained from sister (Ashlyn) at bedside and some from transfer paperwork. Per sister, pt was initially brought to the hospital by family for back pain and jaundice for unclear amount of time. Patient was seen by GI and underwent EGD soon after admission which only showed nonbleeding ?duodenal ulcers (no intervention) however few days later pt developed active signs of bleeding and emergently underwent EGD again showing bleeding esophageal varices which were clipped.  pt was electively intubated with stay in ICU thereafter. Patient then started with HD due to worsening renal function and MS for past 2.5 weeks at times limited by low BP requiring pressors to assist. Had numerous paracentesis with varying amount of fluid removal - albumin infusions. Sister not aware of any concerns for acute infection during his stay but aware that pt was on abx at some point due to bleeding issues.        PERTINENT RADIOLOGY:  CXR: Tubes and lines as above. No focal consolidations  CT Chest, A&P:  Patchy ground-glass opacities in the bilateral upper lobes. *  Cirrhosis with evidence of portal hypertension. *  Hypodense lesion in the periphery of the left hepatic lobe of uncertain etiology. Somewhat wedge-shaped morphology raises the possibility for a small infarct. Question subtle peripheral nodular enhancement, and a hemangioma is also considered. Contrast enhanced abdominal MRI can be performed for further characterization and to assess for other possibilities. *  A wedge-shaped region of hypoattenuation in the spleen may reflect a small infarct. *  Focal eccentric wall thickening in the low rectum, possibly due to a mural fold. Consider colonoscopy. *  Large volume ascites.  CT Head: No evidence of acute intracranial pathology. If clinical symptoms persist or worsen, more sensitive evaluation with brain MRI may be obtained, if no contraindications exist.    BCx (8/12) 1/4 MRSE  BCx (8/14) NGTD  Paracentesis (8/14) Cell Counts Negative for SBP  MRSA/MSSA Nasal PCR (8/16) Negative    CXR (8/19) Ground Glass infiltrates persist  CT from 8//13 with bilateral Ground glass infiltrates    # Persistent Ground glass infiltrates of unclear etiology  please send urine legionella ag  please send deep sputum for testing for gram stain/cx, fungal stain/cx    #Decompensated liver cirrhosis, Leukocytosis, Shock  --Meropenem deescalated to Zosyn for empiric coverage in light of negative culture workup  --S/P empiric fluconazole (8/12 -->8/26), Now escalated to Caspofungin --->8/27 --->  --Continue to follow CBC with diff  --Continue to follow renal function (Cr/BUN)  --Continue to follow transaminases  --Continue to follow temperature curve  --Follow up on preliminary blood cultures  --If able will send BAL cultures for legionella & PJP PCR    #Positive BCx (8/12) (Staph epi)  Consistent with contaminant   as repeat testing is negative x many    #CMV PCR   --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 Would repeat CMV PCR on (8/26)  -- may need to treat if increasing  Cytomegalovirus By PCR: 4890 --->4730 --->1020 --->867 (8/20)    #Pre Transplant Evaluation   HIV-1/2 Combo Result: Nonreactive  EBVPCR Log: NotDetec [id number]/mL   Hepatitis A IgG Ab Result: Reactive   Hepatitis B Core Antibody, Total: Nonreactive  Hepatitis B Surface Antibody: Reactive   Hepatitis B DNA PCR Log: Not Detected  Hepatitis B Surface Antigen: Nonreactive  Hepatitis C Virus Interpretation: Nonreactive  COVID-19 De Domain Antibody: Positive  Treponema Pallidum Antibody Interpretation: Negative  HSV 1 IgG  Positive/HSV 2 IgG Negative  EBV Serology Positive  CMV IgG Positive  VZV IgG Positive  Measles Positive, Mumps Positive and Rubella IgG Positive  Toxoplasma IgG Positive  QuantiFeron Gold Negative  Strongyloides Ab Negative  Babesia PCR negative  --Schistosoma IgG - NEGATIVE  --Reportedly Lyme serology was previously positive and remains positive, Tick Diseases Panel is negative for additional tick born exposures  --Western blot negative, serologies not concerning for active Lyme disease  -- On doxycycline 100mg bid for atypical coverage   --Will send urine for Legionella, if negative can stop doxycycline    #Encounter to Vaccinate Patient - Will defer vaccination in context of critical illness  COVID19: No primary series. Would benefit from COVID19 7919-6120 dose  Influenza: Would benefit from dose next season  Pneumococcal: Would benefit from PCV20  HAV: Immune, no additional vaccines indicated  HBV: Immune, no additional vaccines indicated  MMR: Immune, will not require further vaccination  Varicella: Immune, will not require further vaccination  Shingles: Would benefit from Shingrix  Tdap: Would benefit from Tdap   78 yo m with alcohol abuse otherwise no known chronic medical issues (does not see doctors per sister) s/p 1 month stay at [facility] now transferred to [facility] for liver transplant eval.  Most of history obtained from sister (Ashlyn) at bedside and some from transfer paperwork. Per sister, pt was initially brought to the hospital by family for back pain and jaundice for unclear amount of time. Patient was seen by GI and underwent EGD soon after admission which only showed nonbleeding ?duodenal ulcers (no intervention) however few days later pt developed active signs of bleeding and emergently underwent EGD again showing bleeding esophageal varices which were clipped.  pt was electively intubated with stay in ICU thereafter. Patient then started with HD due to worsening renal function and MS for past 2.5 weeks at times limited by low BP requiring pressors to assist. Had numerous paracentesis with varying amount of fluid removal - albumin infusions. Sister not aware of any concerns for acute infection during his stay but aware that pt was on abx at some point due to bleeding issues.        PERTINENT RADIOLOGY:  CXR: Tubes and lines as above. No focal consolidations  CT Chest, A&P:  Patchy ground-glass opacities in the bilateral upper lobes. *  Cirrhosis with evidence of portal hypertension. *  Hypodense lesion in the periphery of the left hepatic lobe of uncertain etiology. Somewhat wedge-shaped morphology raises the possibility for a small infarct. Question subtle peripheral nodular enhancement, and a hemangioma is also considered. Contrast enhanced abdominal MRI can be performed for further characterization and to assess for other possibilities. *  A wedge-shaped region of hypoattenuation in the spleen may reflect a small infarct. *  Focal eccentric wall thickening in the low rectum, possibly due to a mural fold. Consider colonoscopy. *  Large volume ascites.  CT Head: No evidence of acute intracranial pathology. If clinical symptoms persist or worsen, more sensitive evaluation with brain MRI may be obtained, if no contraindications exist.    BCx (8/12) 1/4 MRSE  BCx (8/14) NGTD  Paracentesis (8/14) Cell Counts Negative for SBP  MRSA/MSSA Nasal PCR (8/16) Negative    CXR (8/19) Ground Glass infiltrates persist  CT from 8//13 with bilateral Ground glass infiltrates    # Persistent Ground glass infiltrates of unclear etiology  please send urine legionella ag  please send deep sputum for testing for gram stain/cx, fungal stain/cx    #Decompensated liver cirrhosis, Leukocytosis, Shock  # Enterococcus faecium bacteremia BC 8/26 now positive and worsneing albin    --Meropenem deescalated to Zosyn for empiric coverage in light of negative culture workup then reescalated back to meropenem- ggiven etiology of sepsis and shock is Enterococcus bacteremia, would stop meropenem and would deescalate caspofungin  - also would stop doxycycline after today- received total of 10 day course  --Recommend starting linezolid pending susceptibilities given local rates of dapto resistance  repeat BC tomorrow  --remove midline and exchange RIJ if feasible  - TTE  - s/p paracentesis today, follow cx , counts not c/w SBP   --S/P empiric fluconazole (8/12 -->8/26), Now escalated to Caspofungin --->8/27 --->  --Continue to follow CBC with diff  --Continue to follow renal function (Cr/BUN)  --Continue to follow transaminases  --Continue to follow temperature curve  --Follow up on preliminary blood cultures  --If able will send BAL cultures for legionella & PJP PCR    #Positive BCx (8/12) (Staph epi)  Consistent with contaminant   as repeat testing is negative x many    #CMV PCR   --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 Would repeat CMV PCR on (8/26)  -- may need to treat if increasing  Cytomegalovirus By PCR: 4890 --->4730 --->1020 --->867 (8/20)    #Pre Transplant Evaluation   HIV-1/2 Combo Result: Nonreactive  EBVPCR Log: NotDetec [id number]/mL   Hepatitis A IgG Ab Result: Reactive   Hepatitis B Core Antibody, Total: Nonreactive  Hepatitis B Surface Antibody: Reactive   Hepatitis B DNA PCR Log: Not Detected  Hepatitis B Surface Antigen: Nonreactive  Hepatitis C Virus Interpretation: Nonreactive  COVID-19 De Domain Antibody: Positive  Treponema Pallidum Antibody Interpretation: Negative  HSV 1 IgG  Positive/HSV 2 IgG Negative  EBV Serology Positive  CMV IgG Positive  VZV IgG Positive  Measles Positive, Mumps Positive and Rubella IgG Positive  Toxoplasma IgG Positive  QuantiFeron Gold Negative  Strongyloides Ab Negative  Babesia PCR negative  --Schistosoma IgG - NEGATIVE  --Reportedly Lyme serology was previously positive and remains positive, Tick Diseases Panel is negative for additional tick born exposures  --Western blot negative, serologies not concerning for active Lyme disease  -- On doxycycline 100mg bid for atypical coverage   --Will send urine for Legionella, if negative can stop doxycycline    #Encounter to Vaccinate Patient - Will defer vaccination in context of critical illness  COVID19: No primary series. Would benefit from COVID19 0080-2885 dose  Influenza: Would benefit from dose next season  Pneumococcal: Would benefit from PCV20  HAV: Immune, no additional vaccines indicated  HBV: Immune, no additional vaccines indicated  MMR: Immune, will not require further vaccination  Varicella: Immune, will not require further vaccination  Shingles: Would benefit from Shingrix  Tdap: Would benefit from Tdap   78 yo m with alcohol abuse otherwise no known chronic medical issues (does not see doctors per sister) s/p 1 month stay at [facility] now transferred to [facility] for liver transplant eval.  Most of history obtained from sister (Ashlyn) at bedside and some from transfer paperwork. Per sister, pt was initially brought to the hospital by family for back pain and jaundice for unclear amount of time. Patient was seen by GI and underwent EGD soon after admission which only showed nonbleeding ?duodenal ulcers (no intervention) however few days later pt developed active signs of bleeding and emergently underwent EGD again showing bleeding esophageal varices which were clipped.  pt was electively intubated with stay in ICU thereafter. Patient then started with HD due to worsening renal function and MS for past 2.5 weeks at times limited by low BP requiring pressors to assist. Had numerous paracentesis with varying amount of fluid removal - albumin infusions. Sister not aware of any concerns for acute infection during his stay but aware that pt was on abx at some point due to bleeding issues.        PERTINENT RADIOLOGY:  CXR: Tubes and lines as above. No focal consolidations  CT Chest, A&P:  Patchy ground-glass opacities in the bilateral upper lobes. *  Cirrhosis with evidence of portal hypertension. *  Hypodense lesion in the periphery of the left hepatic lobe of uncertain etiology. Somewhat wedge-shaped morphology raises the possibility for a small infarct. Question subtle peripheral nodular enhancement, and a hemangioma is also considered. Contrast enhanced abdominal MRI can be performed for further characterization and to assess for other possibilities. *  A wedge-shaped region of hypoattenuation in the spleen may reflect a small infarct. *  Focal eccentric wall thickening in the low rectum, possibly due to a mural fold. Consider colonoscopy. *  Large volume ascites.  CT Head: No evidence of acute intracranial pathology. If clinical symptoms persist or worsen, more sensitive evaluation with brain MRI may be obtained, if no contraindications exist.    BCx (8/12) 1/4 MRSE  BCx (8/14) NGTD  Paracentesis (8/14) Cell Counts Negative for SBP  MRSA/MSSA Nasal PCR (8/16) Negative    CXR (8/19) Ground Glass infiltrates persist  CT from 8//13 with bilateral Ground glass infiltrates    # Persistent Ground glass infiltrates of unclear etiology  please send urine legionella ag  please send deep sputum for testing for gram stain/cx, fungal stain/cx    #Decompensated liver cirrhosis, Leukocytosis, Shock  # Enterococcus faecium bacteremia BC 8/26 now positive and worsening shock  biliary/SBp vs line related in c/o prolonged empirical antibiotic course and Enterococcus selection    --Meropenem deescalated to Zosyn for empiric coverage in light of negative culture workup then reescalated back to meropenem- suspect  etiology of sepsis and shock is Enterococcus bacteremia, would stop meropenem tomorrow if no additional growth on ascitic fluid and would deescalate caspofungin  - also would stop doxycycline after today- received total of 10 day course  --Recommend starting linezolid pending susceptibilities given local rates of dapto resistance  --repeat BC tomorrow  --if BC remain positive will also favor removing midline and exchange RIJ    - TTE  - s/p paracentesis today, follow cx , counts not c/w SBP   --S/P empiric fluconazole (8/12 -->8/26), Now escalated to Caspofungin --->8/27 --->  --Continue to follow CBC with diff  --Continue to follow renal function (Cr/BUN)  --Continue to follow transaminases  --Continue to follow temperature curve  --Follow up on preliminary blood cultures  --If able will send BAL cultures for legionella & PJP PCR    #Positive BCx (8/12) (Staph epi)  Consistent with contaminant   as repeat testing is negative x many    #CMV PCR   --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 Would repeat CMV PCR on (8/26)  -- may need to treat if increasing  Cytomegalovirus By PCR: 4890 --->4730 --->1020 --->867 (8/20)    #Pre Transplant Evaluation   HIV-1/2 Combo Result: Nonreactive  EBVPCR Log: NotDetec [id number]/mL   Hepatitis A IgG Ab Result: Reactive   Hepatitis B Core Antibody, Total: Nonreactive  Hepatitis B Surface Antibody: Reactive   Hepatitis B DNA PCR Log: Not Detected  Hepatitis B Surface Antigen: Nonreactive  Hepatitis C Virus Interpretation: Nonreactive  COVID-19 De Domain Antibody: Positive  Treponema Pallidum Antibody Interpretation: Negative  HSV 1 IgG  Positive/HSV 2 IgG Negative  EBV Serology Positive  CMV IgG Positive  VZV IgG Positive  Measles Positive, Mumps Positive and Rubella IgG Positive  Toxoplasma IgG Positive  QuantiFeron Gold Negative  Strongyloides Ab Negative  Babesia PCR negative  --Schistosoma IgG - NEGATIVE  --Reportedly Lyme serology was previously positive and remains positive, Tick Diseases Panel is negative for additional tick born exposures  --Western blot negative, serologies not concerning for active Lyme disease  -- On doxycycline 100mg bid for atypical coverage   --Will send urine for Legionella, if negative can stop doxycycline    #Encounter to Vaccinate Patient - Will defer vaccination in context of critical illness  COVID19: No primary series. Would benefit from COVID19 4927-1723 dose  Influenza: Would benefit from dose next season  Pneumococcal: Would benefit from PCV20  HAV: Immune, no additional vaccines indicated  HBV: Immune, no additional vaccines indicated  MMR: Immune, will not require further vaccination  Varicella: Immune, will not require further vaccination  Shingles: Would benefit from Shingrix  Tdap: Would benefit from Tdap   76 yo m with alcohol abuse otherwise no known chronic medical issues (does not see doctors per sister) s/p 1 month stay at [facility] now transferred to [facility] for liver transplant eval.  Most of history obtained from sister (Ashlyn) at bedside and some from transfer paperwork. Per sister, pt was initially brought to the hospital by family for back pain and jaundice for unclear amount of time. Patient was seen by GI and underwent EGD soon after admission which only showed nonbleeding ?duodenal ulcers (no intervention) however few days later pt developed active signs of bleeding and emergently underwent EGD again showing bleeding esophageal varices which were clipped.  pt was electively intubated with stay in ICU thereafter. Patient then started with HD due to worsening renal function and MS for past 2.5 weeks at times limited by low BP requiring pressors to assist. Had numerous paracentesis with varying amount of fluid removal - albumin infusions. Sister not aware of any concerns for acute infection during his stay but aware that pt was on abx at some point due to bleeding issues.        PERTINENT RADIOLOGY:  CXR: Tubes and lines as above. No focal consolidations  CT Chest, A&P:  Patchy ground-glass opacities in the bilateral upper lobes. *  Cirrhosis with evidence of portal hypertension. *  Hypodense lesion in the periphery of the left hepatic lobe of uncertain etiology. Somewhat wedge-shaped morphology raises the possibility for a small infarct. Question subtle peripheral nodular enhancement, and a hemangioma is also considered. Contrast enhanced abdominal MRI can be performed for further characterization and to assess for other possibilities. *  A wedge-shaped region of hypoattenuation in the spleen may reflect a small infarct. *  Focal eccentric wall thickening in the low rectum, possibly due to a mural fold. Consider colonoscopy. *  Large volume ascites.  CT Head: No evidence of acute intracranial pathology. If clinical symptoms persist or worsen, more sensitive evaluation with brain MRI may be obtained, if no contraindications exist.    BCx (8/12) 1/4 MRSE  BCx (8/14) NGTD  Paracentesis (8/14) Cell Counts Negative for SBP  MRSA/MSSA Nasal PCR (8/16) Negative    CXR (8/19) Ground Glass infiltrates persist  CT from 8//13 with bilateral Ground glass infiltrates    # Persistent Ground glass infiltrates of unclear etiology  please send urine legionella ag  please send deep sputum for testing for gram stain/cx, fungal stain/cx    #Decompensated liver cirrhosis, Leukocytosis, Shock  # Enterococcus faecium bacteremia BC 8/26 now positive and worsening shock  biliary/SBp vs line related in c/o prolonged empirical antibiotic course and Enterococcus selection    --Meropenem deescalated to Zosyn for empiric coverage in light of negative culture workup then reescalated back to meropenem- suspect  etiology of sepsis and shock is Enterococcus bacteremia, would stop meropenem tomorrow if no additional growth on ascitic fluid and would deescalate caspofungin  - also would stop doxycycline after today- received total of 10 day course  --Enterococcus not VRE, initially recommended linezolid but can switch now linezolid to vancomycin 1.5 g x1 then 1.25 mg q12h   --repeat BC tomorrow  --if BC remain positive will also favor removing midline and exchange RIJ    - TTE  - s/p paracentesis today, follow cx , counts not c/w SBP   --S/P empiric fluconazole (8/12 -->8/26), Now escalated to Caspofungin --->8/27 --->  --Continue to follow CBC with diff  --Continue to follow renal function (Cr/BUN)  --Continue to follow transaminases  --Continue to follow temperature curve  --Follow up on preliminary blood cultures  --If able will send BAL cultures for legionella & PJP PCR    #Positive BCx (8/12) (Staph epi)  Consistent with contaminant   as repeat testing is negative x many    #CMV PCR   --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 Would repeat CMV PCR on (8/26)  -- may need to treat if increasing  Cytomegalovirus By PCR: 4890 --->4730 --->1020 --->867 (8/20)    #Pre Transplant Evaluation   HIV-1/2 Combo Result: Nonreactive  EBVPCR Log: NotDetec [id number]/mL   Hepatitis A IgG Ab Result: Reactive   Hepatitis B Core Antibody, Total: Nonreactive  Hepatitis B Surface Antibody: Reactive   Hepatitis B DNA PCR Log: Not Detected  Hepatitis B Surface Antigen: Nonreactive  Hepatitis C Virus Interpretation: Nonreactive  COVID-19 De Domain Antibody: Positive  Treponema Pallidum Antibody Interpretation: Negative  HSV 1 IgG  Positive/HSV 2 IgG Negative  EBV Serology Positive  CMV IgG Positive  VZV IgG Positive  Measles Positive, Mumps Positive and Rubella IgG Positive  Toxoplasma IgG Positive  QuantiFeron Gold Negative  Strongyloides Ab Negative  Babesia PCR negative  --Schistosoma IgG - NEGATIVE  --Reportedly Lyme serology was previously positive and remains positive, Tick Diseases Panel is negative for additional tick born exposures  --Western blot negative, serologies not concerning for active Lyme disease  -- On doxycycline 100mg bid for atypical coverage   --Will send urine for Legionella, if negative can stop doxycycline    #Encounter to Vaccinate Patient - Will defer vaccination in context of critical illness  COVID19: No primary series. Would benefit from COVID19 0200-0396 dose  Influenza: Would benefit from dose next season  Pneumococcal: Would benefit from PCV20  HAV: Immune, no additional vaccines indicated  HBV: Immune, no additional vaccines indicated  MMR: Immune, will not require further vaccination  Varicella: Immune, will not require further vaccination  Shingles: Would benefit from Shingrix  Tdap: Would benefit from Tdap

## 2024-08-27 NOTE — PROGRESS NOTE ADULT - ASSESSMENT
76 y/o male w/ no known PMHx (does not see doctors per sister) who presented as a transfer from [facility] for [facility] evaluation. Patient was initially admitted to [facility] for back pain & jaundice and was diagnosed w/ cirrhosis. Hospital course there was c/b small EVs, melena 2/2 a duodenal ulcer s/p clipping, HRS requiring hemodialysis, hepatic encephalopathy, ascites s/p multiple LVPs, and deconditioning. Patient was admitted to SICU on 8/12 for initiation of CRRT as his BPs were too low to attempt intermittent HD.      Neuro:  - A/O x 1-2 waxes and wanes   - Miralax & rifaximin for prevention of hepatic encephalopathy  - Monitoring ammonia  - Thiamine, folic acid, & multivitamin for h/o EtOH abuse    Resp:  - Out of bed to chair, ambulate as tolerated, and incentive spirometry to prevent atelectasis  - CT chest 8/25 with RLL pneumonia    CV:  - Goal SBP >110, titrate pressors as able. On Levo gtt, vaso gtt   - +/- cardiac catheterization when stable to do so for transplant work-up   - Electrophys: nothing to do while not on AC, follow cards recs    GI:   - TF increased to goal 65cc/hr   - Protonix BID for h/o duodenal ulcer  - GIB: s/p 3u pRBC, 1u plt, 1u cryo (8/26)   - Monitor LFTs  - Undergoing evaluation for SLK transplantation  - plan for paracentesis     Renal:  - CRRT for concern of HRS, maintain circuit net even given high pressor requirement   - Monitor I&Os and electrolytes w/ repletions as necessary  - Undergoing evaluation for SLK transplantation    Heme:  - Hold chemical VTE ppx in setting of liver failure   - Mechanical VTE ppx with SCDs  - Epogen 3x/week    ID:   - Empiric coverage w/ Elise (broaded from zosyn on 8/26), fluconazole   - 8/18: + doxycycline added for Lyme  - Blood Cx 8/21, bronchial Cx 8/21 no growth   - f/u rpt Bcx, RVP      Endo:   - Continue ISS [id number]   - Monitor glucose    Dispo: SICU

## 2024-08-27 NOTE — PROGRESS NOTE ADULT - SUBJECTIVE AND OBJECTIVE BOX
[facility] DIVISION OF KIDNEY DISEASES AND HYPERTENSION   FOLLOW UP NOTE    --------------------------------------------------------------------------------  Chief Complaint:    24 hour events/subjective: Pt. was seen and examined today.   Feels ok. Remains in SICU on CRRT.        PAST HISTORY  --------------------------------------------------------------------------------  No significant changes to PMH, PSH, FHx, SHx, unless otherwise noted    ALLERGIES & MEDICATIONS  --------------------------------------------------------------------------------  Allergies    No Known Allergies    Intolerances      Standing Inpatient Medications  chlorhexidine 2% Cloths 1 Application(s) Topical <User Schedule>  CRRT Treatment    <Continuous>  doxycycline IVPB      doxycycline IVPB 100 milliGRAM(s) IV Intermittent every 12 hours  epoetin kareem (EPOGEN) Injectable 24739 Unit(s) SubCutaneous <User Schedule>  fluconAZOLE IVPB 400 milliGRAM(s) IV Intermittent every 24 hours  fluconAZOLE IVPB      folic acid 1 milliGRAM(s) Oral daily  hydrocortisone sodium succinate Injectable 50 milliGRAM(s) IV Push every 6 hours  insulin lispro (ADMELOG) corrective regimen sliding scale   SubCutaneous every 6 hours  lidocaine   4% Patch 1 Patch Transdermal daily  meropenem  IVPB 1000 milliGRAM(s) IV Intermittent every 8 hours  multivitamin/minerals/iron Oral Solution (CENTRUM) 15 milliLiter(s) Enteral Tube daily  norepinephrine Infusion 0.5 MICROgram(s)/kG/Min IV Continuous <Continuous>  pantoprazole  Injectable 40 milliGRAM(s) IV Push two times a day  Phoxillum Filtration BK 4 / 2.5 5000 milliLiter(s) CRRT <Continuous>  Phoxillum Filtration BK 4 / 2.5 5000 milliLiter(s) CRRT <Continuous>  Phoxillum Filtration BK 4 / 2.5 5000 milliLiter(s) CRRT <Continuous>  polyethylene glycol 3350 17 Gram(s) Oral <User Schedule>  rifAXIMin 550 milliGRAM(s) Oral every 12 hours  thiamine 100 milliGRAM(s) Oral daily  vasopressin Infusion 0.03 Unit(s)/Min IV Continuous <Continuous>    PRN Inpatient Medications      REVIEW OF SYSTEMS  --------------------------------------------------------------------------------    All other systems were reviewed and are negative, except as noted.    VITALS/PHYSICAL EXAM  --------------------------------------------------------------------------------  T(C): 36.3 (24 @ 03:00), Max: 36.5 (24 @ 11:00)  HR: 69 (24 @ 07:00) (55 - 102)  BP: 127/65 (24 @ 21:45) (121/66 - 127/65)  RR: 14 (24 @ 07:00) (12 - 38)  SpO2: 100% (24 @ 07:00) (92% - 100%)  Wt(kg): --        24 @ 07:01  -  24 @ 07:00  --------------------------------------------------------  IN: 4271.2 mL / OUT: 1704 mL / NET: 2567.2 mL        Physical Exam:  	Gen: NAD  	HEENT: Anicteric  	Pulm: CTA B/L  	CV: S1S2+  	Abd: Soft, +BS   	Ext: No LE edema B/L  	Neuro: Sedated  	Skin: Warm and dry  	Dialysis access: [facility]    LABS/STUDIES  --------------------------------------------------------------------------------              8.5    21.12 >-----------<  114      [24 @ 06:05]              24.8     137  |  104  |  14  ----------------------------<  198      [24 @ 06:05]  4.8   |  19  |  1.15        Ca     8.2     [24 06:05]      Mg     2.1     [24 06:05]      Phos  4.1     [24 06:05]    TPro  5.8  /  Alb  2.7  /  TBili  10.9  /  DBili  x   /  AST  53  /  ALT  32  /  AlkPhos  207  [24 06:05]    PT/INR: PT 27.9 , INR 2.74       [24 06:05]  PTT: 50.5       [24 06:05]      Creatinine Trend:  SCr 1.15 [ 06:05]  SCr 1.13 [ 00:08]  SCr 1.12 [ 18:34]  SCr 1.15 [ 12:07]  SCr 1.20 [ 06:16]    Urinalysis - [24 06:05]      Color  / Appearance  / SG  / pH       Gluc 198 / Ketone   / Bili  / Urobili        Blood  / Protein  / Leuk Est  / Nitrite       RBC  / WBC  / Hyaline  / Gran  / Sq Epi  / Non Sq Epi  / Bacteria       HBsAb Reactive      [24 @ 21:16]  HBsAg Nonreact      [24 @ 21:13]  HBcAb Nonreact      [24 @ 21:16]  HCV 0.08, Nonreact      [24 @ 14:45]  HIV Nonreact      [24 @ 21:13]    CHETNA: titer Negative, pattern --      [24 @ 21:15]  Syphilis Screen (Treponema Pallidum Ab) Negative      [24 @ 21:15]  Immunofixation Serum:   Oligoclonal Pattern Consisting of One Weak IgM Kappa Band, Two Weak IgG  Kappa Bands, and One Weak IgA Lambda Band Identified      Reference Range: None Detected      [24 @ 21:13]  SPEP Interpretation: Oligoclonal Pattern Consisting of Three Weak Gamma-Migrating Paraproteins  and One Weak Beta-Migrating Paraprotein Identified      [24 @ 21:13]    Tacrolimus  Cyclosporine  Sirolimus  Mycophenolate  BK PCR  CMV PCRCMVPCR Lo.94 Mpd92YA/mL ( @ 12:26)  CMVPCR Log: 3.01 Grc53BV/mL ( @ 00:08)  CMVPCR Log: 3.67 Ldl06WR/mL ( @ 00:37)  CMVPCR Log: 3.69 Dxh43CZ/mL ( @ 21:13)    Parvo PCR  EBV PCR

## 2024-08-27 NOTE — CHART NOTE - NSCHARTNOTEFT_GEN_A_CORE
SICU POCUS NOTE    : VADIM Barron    INDICATION: increased vasopressor requirements    PROCEDURE:  [x ] LIMITED ECHO  [ ] LIMITED CHEST  [ ] LIMITED RETROPERITONEAL  [ ] LIMITED ABDOMINAL  [ ] LIMITED DVT  [ ] NEEDLE GUIDANCE VASCULAR  [ ] NEEDLE GUIDANCE THORACENTESIS  [ ] NEEDLE GUIDANCE PARACENTESIS  [ ] NEEDLE GUIDANCE PERICARDIOCENTESIS  [ ] OTHER    FINDINGS: poor ventricular filling, hyperdynamic. IVC small with respiratory variation.      INTERPRETATION: hypovolemia

## 2024-08-27 NOTE — PROGRESS NOTE ADULT - ASSESSMENT
68 year old male with  AUD complicated by cirrhosis with Hepatic encephalopathy admitted to [facility] with back pain and jaundice on 7/8/24. Pt had dark / maroon colored stools   EGD that showed esophageal varices and duodenal ulcer with visible vessel. He got transfusions for low Hb and last transfusion was on 8/8. 1st session of IHD was on 7/9/24. Met SLK criteria on 8/20.     Transplant nephrology consulted for FANY and SLK eval.       1.  FANY VS FANY on CKD 2' likely HRS  Meets FANY criteria for SLK   -Started on CRRT on 8/13/24 (1st session of IHD was on 7/9/24 completed 6 weeks on 8/20). Has had issues with clotting >> adjusted CRRT.  -Pt remains anuric and on pressor support. Will cw CRRT.       Neeraj Maher  Nephrology Fellow  Feel free to contact me on TEAMS  After 5 pm please contact the on-call Fellow.

## 2024-08-27 NOTE — PROGRESS NOTE ADULT - ASSESSMENT
69 yo M with obesity and risky alcohol consumption (with decades' history of daily consumption of homemade alcohol). Admitted to [facility] for 1 month due to recent onset of jaundice and with a prolonged hospital/ICU course due to newly diagnosed decompensated cirrhosis with acute on chronic liver failure (ACLF) with shock, FANY (started on intermittent HD since 7/9), acute on chronic anemia with recurrent overt GI bleeding, and hepatic encephalopathy.     Transferred to [facility] on 8/12/24 for SLK evaluation and then transferred to the SICU for initiation of CRRT (8/13- ).      # Distributive shock    - ACLF vs septic/hypovolemic shock.   - Hypothermic (8/21-22). Now afebrile. CT with ?RLL pneumonia.     - Norepinephrine, Vasopressin with increasing requirements.   - Recent sepsis work up negative thus far.   - Transplat ID following: CMV viremia (Valcyte not required). Unequivocal Lyme PCR/negative WB (on Doxy).     - S/p Zosyn (8/12-16) and Fluconazole (8/12-27).   - Meropenem (8/16- ), Doxycycline (8/18- ) and Caspofungin (8/27- )      # History of UGIB (7'24 - resolved) and Hematochezia (8/18)   - Hematemesis from duodenal ulcer (07/2024). Hematochezia from rectal ulcer/friable mucosa (8/18).  - New episodes of melena (8/27).    - EGD (7/12, at [facility]) small non-bleeding EV and a duodenal ulcer with a visible vessel. No further hematemesis.   - EGD (8/20) with no active bleeding. Colonoscopy (8/21) friable mucosa, rectal ulcer (biopsy ruled out malignancy).  - Pantoprazole 40BID  - Will need secondary prophylaxis with BB once stable.   - As needed PRBC and TEG-guided blood products.     # Anuric FANY on CKD    - Started intermittent HD (7/9-8/12) - Now on CRRT (8/13- ).   - Marked anasarca. Fluid removal on CRRT limited due to hypotension.   - Transplant nephrology following.    # Newly diagnosed decompensated cirrhosis with ACLF   - HE: Waxes and wanes. Minimally verbal. Following simple commands. Rifaximin/Miralax. Lactulose held for abdominal distention.    - Agitation/Insomnia: Seroquel 25mg or Haldol 2.5mg at night. Transplant Psych following.    - Large volume ascites and anasarca: Anuric. Limited fluid removal with CRRT. LVP (8/14) negative for SBP.   - Image: Contrast CT (8/13) with patent portohepatic vessels and no evidence of HCC. Small infarcts in left hepatic lobe and spleen.       - Nutrition: Poor po diet supplemented with NGT feeds. Nutrition following.   - PT: Last ambulatory on July/2024. Daily inpatient PT/OT.     # SLK ongoing evaluation (8/12)  - ABO O. MELD 3.0 score of 38  - Met SLK criteria in Aug 20th. Tissue typing completed.   - Pending: [facility] for cardiology clearance.       PLAN   - Having new melena overnight. Consider stoping hydrocortisone.    - Follow Transplant ID rec: BAL for legionella and PJP PCR + Urine legionella Ag (if negative, stop Doxy).  - LHC deferred until HD stable.   - Consider LVP when HD stable. Having abdominal discomfort.    - Continue PRBC and TEG-guided products as needed   - Avoid Precedex. Management of agitation/insomnia as per  recommendations  - Titrate bowel regimen for goal 3-4 bowel movements daily     Note incomplete until finalized by attending signature/attestation.    Myra Maloney   Transplant Hepatology Fellow

## 2024-08-28 ENCOUNTER — RESULT REVIEW (OUTPATIENT)
Age: 69
End: 2024-08-28

## 2024-08-28 NOTE — PROGRESS NOTE ADULT - NS ATTEND AMEND GEN_ALL_CORE FT
worsening pressor requirements yesterday, remains on 2 pressors, awake, alert and responding but slow,   Enterococcus faecium bacteremia, low grade ?translocation vs line related  Ascitic fluid so far not c/f infection, may need to replace RIJ/MIdline  Would also discontinue meropenem as its continuation further selects Enterococci in the Gi tract (direct correlation with cumulative exposure)      Thank you for involving us in the care of this patient  Transplant ID will continue to follow  Please call or page with additional questions  Pager; #[phone]  Teams: from 8 am to 5 pm  Kimberly Zamarripa MD

## 2024-08-28 NOTE — PROGRESS NOTE ADULT - ASSESSMENT
67 yo M with obesity and risky alcohol consumption (with decades' history of daily consumption of homemade alcohol). Admitted to [facility] for 1 month due to recent onset of jaundice and with a prolonged hospital/ICU course due to newly diagnosed decompensated cirrhosis with acute on chronic liver failure (ACLF) with shock, FANY (started on intermittent HD since 7/9), acute on chronic anemia with recurrent overt GI bleeding, and hepatic encephalopathy.     Transferred to [facility] on 8/12/24 for SLK evaluation and then transferred to the SICU for initiation of CRRT (8/13- ).      # Distributive shock   # E. faecalis bacteremia (8/27)   - ACLF + sepsis + hypovolemia.    - Norepinephrine and Vasopressin with increasing requirements.  - CMV viremia (Valcyte not required). Unequivocal Lyme PCR/negative WB (on Doxy).     - BCx (8/26) E. faecalis. Dg paracentesis not suggestive of SBP.     - S/p Zosyn (8/12-16) and Doxycycline (8/18-27).    - Vancomycin (8/27- ), Meropenem (8/16- ), and Fluconazole (8/12- ) with Transplant ID following.     # History of UGIB (7'24 - resolved)/ Hematochezia (8/18)/ Melena (8/27).    - Hematemesis from duodenal ulcer (07/2024). Hematochezia from rectal ulcer/friable mucosa (8/18). New episodes of melena (8/27).    - EGD (7/12, at [facility]) small non-bleeding EV and a duodenal ulcer with a visible vessel. No further hematemesis.   - EGD (8/20) with no active bleeding. Colonoscopy (8/21) friable mucosa, rectal ulcer (biopsy ruled out malignancy).  - Pantoprazole 40BID  - Will need secondary prophylaxis with BB once stable.   - As needed PRBC and TEG-guided blood products.     # Anuric FANY on CKD    - Started intermittent HD (7/9-8/12) - Now on CRRT (8/13- ).   - Fluid removal on CRRT limited due to hypotension.   - Transplant nephrology following.    # Newly diagnosed decompensated cirrhosis with ACLF   - HE: Waxes and wanes. Minimally verbal. Following simple commands. Rifaximin/Miralax. Lactulose held for abdominal distention.    - Agitation/Insomnia: Seroquel 25mg or Haldol 2.5mg at night. Transplant Psych following.    - Large volume ascites and anasarca: Anuric. Limited fluid removal with CRRT.   - Paracentesis (8/14 and 8/27) negative for SBP.   - Image: Contrast CT (8/13) with patent portohepatic vessels and no evidence of HCC. Small infarcts in left hepatic lobe and spleen.       - Nutrition: Poor po diet supplemented with NGT feeds. Nutrition following.   - PT: Last ambulatory on July/2024. Daily inpatient PT/OT.     # SLK ongoing evaluation (8/12)  - ABO O. MELD 3.0 score of 38  - Met SLK criteria in Aug 20th. Tissue typing completed.   - [facility] for cardiology clearance deferred until medically stable.     - Pt too sick to undergo OLT. Patient's wife is aware. All questions answered (8/28).     PLAN   - Follow up repeat BCx and paracentesis CX.   - Reconsider steroids in the setting of melena/possible UGIB.     - Continue PRBC and TEG-guided products as needed  - Management of CRRT and fluid resuscitation as per SICU and nephrology team    - Titrate bowel regimen for goal 3-4 bowel movements daily   - Avoid Precedex. Management of agitation/insomnia as per  recommendations  - Too sick to undergo OLT at the time. Family aware.     Note incomplete until finalized by attending signature/attestation.    Myra Maloney   Transplant Hepatology Fellow     69 yo M with obesity and risky alcohol consumption (with decades' history of daily consumption of homemade alcohol). Admitted to [facility] for 1 month due to recent onset of jaundice and with a prolonged hospital/ICU course due to newly diagnosed decompensated cirrhosis with acute on chronic liver failure (ACLF) with shock, FANY (started on intermittent HD since 7/9), acute on chronic anemia with recurrent overt GI bleeding, and hepatic encephalopathy.     Transferred to [facility] on 8/12/24 for SLK evaluation and then transferred to the SICU for initiation of CRRT (8/13- ).      # Distributive shock   # E. faecalis bacteremia (8/27)   - ACLF + sepsis + hypovolemia.    - Norepinephrine and Vasopressin with increasing requirements.  - Transplant ID following.   - CMV viremia (Valcyte not required). Unequivocal Lyme PCR/negative WB (on Doxy).     - BCx (8/26) E. faecalis. Dg paracentesis (8/27) not suggestive of SBP.  TTE (8/28) no vegetations. CT (8/25) no evident source of infection.     - S/p Zosyn (8/12-16) and Doxycycline (8/18-27).    - Vancomycin (8/27- ), Meropenem (8/16- ), and Fluconazole (8/12- ).     # History of UGIB (7'24 - resolved)/ Hematochezia (8/18)/ Melena (8/27).    - Hematemesis from duodenal ulcer (07/2024). Hematochezia from rectal ulcer/friable mucosa (8/18). New episodes of melena (8/27).    - EGD (7/12, at [facility]) small non-bleeding EV and a duodenal ulcer with a visible vessel. No further hematemesis.   - EGD (8/20) with no active bleeding. Colonoscopy (8/21) friable mucosa, rectal ulcer (biopsy ruled out malignancy).  - Pantoprazole 40BID  - Will need secondary prophylaxis with BB once stable.   - As needed PRBC and TEG-guided blood products.     # Anuric FANY on CKD    - Started intermittent HD (7/9-8/12) - Now on CRRT (8/13- ).   - Fluid removal on CRRT limited due to hypotension.   - Transplant nephrology following.    # Newly diagnosed decompensated cirrhosis with ACLF   - HE: Waxes and wanes. Minimally verbal. Following simple commands. Rifaximin/Miralax. Lactulose held for abdominal distention.    - Agitation/Insomnia: Seroquel 25mg or Haldol 2.5mg at night. Transplant Psych following.    - Large volume ascites and anasarca: Anuric. Limited fluid removal with CRRT.   - Paracentesis (8/14 and 8/27) negative for SBP.   - Image: Contrast CT (8/13) with patent portohepatic vessels and no evidence of HCC. Small infarcts in left hepatic lobe and spleen.       - Nutrition: Poor po diet supplemented with NGT feeds. Nutrition following.   - PT: Last ambulatory on July/2024. Daily inpatient PT/OT.     # SLK ongoing evaluation (8/12)  - ABO O. MELD 3.0 score of 38  - Met K criteria in Aug 20th. Tissue typing completed.   - [facility] for cardiology clearance deferred until medically stable.     - Pt too sick to undergo OLT. Patient's wife is aware. All questions answered (8/28).     PLAN   - Follow up repeat BCx and paracentesis CX.   - Reconsider steroids in the setting of melena/possible UGIB.     - Continue PRBC and TEG-guided products as needed  - Management of CRRT and fluid resuscitation as per SICU and nephrology team    - Titrate bowel regimen for goal 3-4 bowel movements daily   - Avoid Precedex. Management of agitation/insomnia as per  recommendations  - Too sick to undergo OLT at the time. Family aware.     Note incomplete until finalized by attending signature/attestation.    Myra Maloney   Transplant Hepatology Fellow

## 2024-08-28 NOTE — PROGRESS NOTE ADULT - SUBJECTIVE AND OBJECTIVE BOX
Interval Events:   - Melena continues. Hb 6.8   - s/p 3PRBC, 1PLT, and 1Cryo    - POCUS: hypovolemic. Continues on Albumin.   - BCx () positive for E. faecium.   - ID: Start Vancomycin (). Stop Doxy.     - Diagnostic paracentesis () not suggestive of SBP. Pending Cx.    - Pt too sick to undergo OLT. Patient's wife is aware. All questions answered.      - Afebrile, on room air.   - Increasing pressor requirements. Levo 0.5 and Vaso 0.03.   - anuric on net even to positive CRRT.   - 3 reported BM in 24h.     ROS:   Feels weak and tired. Mild abdominal discomfort.   Denies chest pain and SOB.       Hospital Medications:  chlorhexidine 2% Cloths 1 Application(s) Topical <User Schedule>  CRRT Treatment    <Continuous>  epoetin kareem (EPOGEN) Injectable 66964 Unit(s) SubCutaneous <User Schedule>  fluconAZOLE IVPB 400 milliGRAM(s) IV Intermittent every 24 hours  folic acid 1 milliGRAM(s) Oral daily  hydrocortisone sodium succinate Injectable 50 milliGRAM(s) IV Push every 6 hours  insulin lispro (ADMELOG) corrective regimen sliding scale   SubCutaneous every 6 hours  lidocaine   4% Patch 1 Patch Transdermal daily  meropenem  IVPB 1000 milliGRAM(s) IV Intermittent every 8 hours  multivitamin/minerals/iron Oral Solution (CENTRUM) 15 milliLiter(s) Enteral Tube daily  norepinephrine Infusion 0.5 MICROgram(s)/kG/Min (53.4 mL/Hr) IV Continuous <Continuous>  pantoprazole  Injectable 40 milliGRAM(s) IV Push two times a day  Phoxillum Filtration BK 4 / 2.5 5000 milliLiter(s) (2000 mL/Hr) CRRT <Continuous>  PrismaSATE Dialysate BGK 4 / 2.5 5000 milliLiter(s) (1000 mL/Hr) CRRT <Continuous>  PrismaSOL Filtration BGK 4 / 2.5 5000 milliLiter(s) (200 mL/Hr) CRRT <Continuous>  rifAXIMin 550 milliGRAM(s) Oral every 12 hours  thiamine 100 milliGRAM(s) Oral daily  vancomycin  IVPB      vancomycin  IVPB 1250 milliGRAM(s) IV Intermittent every 12 hours  vasopressin Infusion 0.03 Unit(s)/Min (4.5 mL/Hr) IV Continuous <Continuous>    MAR over past 24 hours:    albumin human  5% IVPB   125 mL/Hr IV Intermittent (24 @ 02:24)    chlorhexidine 2% Cloths   1 Application(s) Topical (24 @ 05:54)    epoetin kareem (EPOGEN) Injectable   22964 Unit(s) SubCutaneous (24 @ 06:25)    fluconAZOLE IVPB   100 mL/Hr IV Intermittent (24 @ 10:38)    folic acid   1 milliGRAM(s) Oral (24 @ 12:18)    hydrocortisone sodium succinate Injectable   50 milliGRAM(s) IV Push (24 @ 12:18)   50 milliGRAM(s) IV Push (24 @ 05:53)   50 milliGRAM(s) IV Push (24 @ 00:58)   50 milliGRAM(s) IV Push (24 @ 17:29)    insulin lispro (ADMELOG) corrective regimen sliding scale   2 Unit(s) SubCutaneous (24 @ 06:37)    lidocaine   4% Patch   1 Patch Transdermal (24 @ 12:18)    linezolid  IVPB   300 mL/Hr IV Intermittent (24 @ 17:51)    meropenem  IVPB   100 mL/Hr IV Intermittent (24 @ 10:05)   100 mL/Hr IV Intermittent (24 @ 01:03)   100 mL/Hr IV Intermittent (24 @ 17:52)    multivitamin/minerals/iron Oral Solution (CENTRUM)   15 milliLiter(s) Enteral Tube (24 @ 12:18)    norepinephrine Infusion   53.4 mL/Hr IV Continuous (24 @ 19:16)    pantoprazole  Injectable   40 milliGRAM(s) IV Push (24 @ 05:53)   40 milliGRAM(s) IV Push (24 @ 17:29)    Phoxillum Filtration BK 4 / 2.5   2000 mL/Hr CRRT (24 @ 08:07)    PrismaSATE Dialysate BGK 4 / 2.5   1000 mL/Hr CRRT (24 @ 08:07)    PrismaSOL Filtration BGK 4 / 2.5   200 mL/Hr CRRT (24 @ 08:07)    rifAXIMin   550 milliGRAM(s) Oral (24 @ 05:53)   550 milliGRAM(s) Oral (24 @ 17:30)    thiamine   100 milliGRAM(s) Oral (24 @ 12:18)    vancomycin  IVPB   300 mL/Hr IV Intermittent (24 @ 05:54)    vasopressin Infusion   4.5 mL/Hr IV Continuous (24 @ 19:17)      PHYSICAL EXAM:   Vital Signs last 24 hours:  T(F): 97.3 (24 @ 11:00), Max: 98.1 (24 @ 22:00)  HR: 68 (24 @ 13:15) (63 - 99)  BP: 111/54 (24 @ 07:30) (111/54 - 152/63)  BP(mean): 78 (24 @ 07:30) (78 - 91)  ABP: 122/50 (24 @ 13:15) (107/46 - 153/95)  ABP(mean): 70 (24 @ 13:15) (57 - 115)  RR: 25 (24 @ 13:15) (11 - 34)  SpO2: 100% (24 @ 13:15) (92% - 100%)    I&Os:    24 @ 07:01  -  24 @ 07:00  --------------------------------------------------------  IN:    Albumin 5%  - 250 mL: 250 mL    Enteral Tube Flush: 370 mL    IV PiggyBack: 310 mL    IV PiggyBack: 958 mL    Norepinephrine: 847.4 mL    Platelets - Single Donor: 225 mL    Vasopressin: 108 mL  Total IN: 3068.4 mL    OUT:    Other (mL): 1074 mL  Total OUT: 1074 mL    Total NET: 1994.4 mL      24 @ 07:01  -  24 @ 13:20  --------------------------------------------------------  IN:    Enteral Tube Flush: 45 mL    IV PiggyBack: 200 mL    IV PiggyBack: 50 mL    Miscellaneous Tube Feedin mL    Norepinephrine: 167.7 mL    Platelets - Single Donor: 75 mL    PRBCs (Packed Red Blood Cells): 300 mL    Vasopressin: 27 mL  Total IN: 904.7 mL    OUT:    Other (mL): 357 mL    Stool (mL): 3 mL  Total OUT: 360 mL    Total NET: 544.7 mL      BMI (kg/m2): 33.2 (24 @ 16:46)  GENERAL: obese man, physically deconditioned.   NEURO: waxes and wanes. AOx3 today. Having minimal verbal output. Weak voice. Follows simple commands.    HEENT: Scleral icterus  CHEST: Bilateral breath sounds, decreased in bases.    CARDIAC: Regular rate and rhythm  ABDOMEN: Distended, soft, non-tender. Present bowel sounds. Anasarca.   EXTREMITIES: warm, well perfused. Anasarca improved.   SKIN: Jaundiced, no rash/ecchymoses    DIAGNOSTICS:  WBC      Hg       PLT      Na       K        CO2     BUN      Cr       ALT      AST      TB       ALP  18.58    7.4      152      138      4.6      20       16       1.06     31       54       11.3     154      24 @ 12:36  19.23    6.6      120      136      4.7      19       18       1.16     31       55       11.0     154      24 @ 06:35  21.28    7.4      139      137      4.7      20       17       1.13     32       57       12.1     172      24 @ 01:03  21.65    7.5      145      137      4.7      20       17       1.16     31       54       11.7     175      24 @ 17:50  19.77    7.8      149      137      4.8      19       17       1.18     30       51       11.4     182      24 @ 12:06    PT             INR            MELDwith  MELDw/o  25.5           2.49           --             --             24 @ 12:36  30.5           2.86           --             --             24 @ 06:35  28.4           2.66           --             --             24 @ 01:03  26.8           2.50           --             --             24 @ 17:50  26.0           2.43           --             --             24 @ 12:06   Interval Events:   - Melena continues. Hb 6.8   - s/p 3PRBC, 1PLT, and 1Cryo    - POCUS: hypovolemic. Continues on Albumin.   - BCx () positive for E. faecium. TTE () no vegetations.   - ID: Start Vancomycin (). Stop Doxy.     - Diagnostic paracentesis () not suggestive of SBP. Pending Cx.    - Pt too sick to undergo OLT. Patient's wife is aware. All questions answered.      - Afebrile, on room air.   - Increasing pressor requirements. Levo 0.5 and Vaso 0.03.   - anuric on net even to positive CRRT.   - 3 reported BM in 24h.     ROS:   Feels weak and tired. Mild abdominal discomfort.   Denies chest pain and SOB.       Hospital Medications:  chlorhexidine 2% Cloths 1 Application(s) Topical <User Schedule>  CRRT Treatment    <Continuous>  epoetin kareem (EPOGEN) Injectable 99829 Unit(s) SubCutaneous <User Schedule>  fluconAZOLE IVPB 400 milliGRAM(s) IV Intermittent every 24 hours  folic acid 1 milliGRAM(s) Oral daily  hydrocortisone sodium succinate Injectable 50 milliGRAM(s) IV Push every 6 hours  insulin lispro (ADMELOG) corrective regimen sliding scale   SubCutaneous every 6 hours  lidocaine   4% Patch 1 Patch Transdermal daily  meropenem  IVPB 1000 milliGRAM(s) IV Intermittent every 8 hours  multivitamin/minerals/iron Oral Solution (CENTRUM) 15 milliLiter(s) Enteral Tube daily  norepinephrine Infusion 0.5 MICROgram(s)/kG/Min (53.4 mL/Hr) IV Continuous <Continuous>  pantoprazole  Injectable 40 milliGRAM(s) IV Push two times a day  Phoxillum Filtration BK 4 / 2.5 5000 milliLiter(s) (2000 mL/Hr) CRRT <Continuous>  PrismaSATE Dialysate BGK 4 / 2.5 5000 milliLiter(s) (1000 mL/Hr) CRRT <Continuous>  PrismaSOL Filtration BGK 4 / 2.5 5000 milliLiter(s) (200 mL/Hr) CRRT <Continuous>  rifAXIMin 550 milliGRAM(s) Oral every 12 hours  thiamine 100 milliGRAM(s) Oral daily  vancomycin  IVPB      vancomycin  IVPB 1250 milliGRAM(s) IV Intermittent every 12 hours  vasopressin Infusion 0.03 Unit(s)/Min (4.5 mL/Hr) IV Continuous <Continuous>    MAR over past 24 hours:    albumin human  5% IVPB   125 mL/Hr IV Intermittent (24 @ 02:24)    chlorhexidine 2% Cloths   1 Application(s) Topical (24 @ 05:54)    epoetin kareem (EPOGEN) Injectable   65817 Unit(s) SubCutaneous (24 @ 06:25)    fluconAZOLE IVPB   100 mL/Hr IV Intermittent (24 @ 10:38)    folic acid   1 milliGRAM(s) Oral (24 @ 12:18)    hydrocortisone sodium succinate Injectable   50 milliGRAM(s) IV Push (24 @ 12:18)   50 milliGRAM(s) IV Push (24 @ 05:53)   50 milliGRAM(s) IV Push (24 @ 00:58)   50 milliGRAM(s) IV Push (24 @ 17:29)    insulin lispro (ADMELOG) corrective regimen sliding scale   2 Unit(s) SubCutaneous (24 @ 06:37)    lidocaine   4% Patch   1 Patch Transdermal (24 @ 12:18)    linezolid  IVPB   300 mL/Hr IV Intermittent (24 @ 17:51)    meropenem  IVPB   100 mL/Hr IV Intermittent (24 @ 10:05)   100 mL/Hr IV Intermittent (24 @ 01:03)   100 mL/Hr IV Intermittent (24 @ 17:52)    multivitamin/minerals/iron Oral Solution (CENTRUM)   15 milliLiter(s) Enteral Tube (24 @ 12:18)    norepinephrine Infusion   53.4 mL/Hr IV Continuous (24 @ 19:16)    pantoprazole  Injectable   40 milliGRAM(s) IV Push (24 @ 05:53)   40 milliGRAM(s) IV Push (24 @ 17:29)    Phoxillum Filtration BK 4 / 2.5   2000 mL/Hr CRRT (24 @ 08:07)    PrismaSATE Dialysate BGK 4 / 2.5   1000 mL/Hr CRRT (24 @ 08:07)    PrismaSOL Filtration BGK 4 / 2.5   200 mL/Hr CRRT (24 @ 08:07)    rifAXIMin   550 milliGRAM(s) Oral (24 @ 05:53)   550 milliGRAM(s) Oral (24 @ 17:30)    thiamine   100 milliGRAM(s) Oral (24 @ 12:18)    vancomycin  IVPB   300 mL/Hr IV Intermittent (24 @ 05:54)    vasopressin Infusion   4.5 mL/Hr IV Continuous (24 @ 19:17)      PHYSICAL EXAM:   Vital Signs last 24 hours:  T(F): 97.3 (24 @ 11:00), Max: 98.1 (24 @ 22:00)  HR: 68 (24 @ 13:15) (63 - 99)  BP: 111/54 (24 @ 07:30) (111/54 - 152/63)  BP(mean): 78 (24 @ 07:30) (78 - 91)  ABP: 122/50 (24 @ 13:15) (107/46 - 153/95)  ABP(mean): 70 (24 @ 13:15) (57 - 115)  RR: 25 (24 @ 13:15) (11 - 34)  SpO2: 100% (24 @ 13:15) (92% - 100%)    I&Os:    24 @ 07:01  -  24 @ 07:00  --------------------------------------------------------  IN:    Albumin 5%  - 250 mL: 250 mL    Enteral Tube Flush: 370 mL    IV PiggyBack: 310 mL    IV PiggyBack: 958 mL    Norepinephrine: 847.4 mL    Platelets - Single Donor: 225 mL    Vasopressin: 108 mL  Total IN: 3068.4 mL    OUT:    Other (mL): 1074 mL  Total OUT: 1074 mL    Total NET: 1994.4 mL      24 @ 07:01  -  24 @ 13:20  --------------------------------------------------------  IN:    Enteral Tube Flush: 45 mL    IV PiggyBack: 200 mL    IV PiggyBack: 50 mL    Miscellaneous Tube Feedin mL    Norepinephrine: 167.7 mL    Platelets - Single Donor: 75 mL    PRBCs (Packed Red Blood Cells): 300 mL    Vasopressin: 27 mL  Total IN: 904.7 mL    OUT:    Other (mL): 357 mL    Stool (mL): 3 mL  Total OUT: 360 mL    Total NET: 544.7 mL      BMI (kg/m2): 33.2 (24 @ 16:46)  GENERAL: obese man, physically deconditioned.   NEURO: waxes and wanes. AOx3 today. Having minimal verbal output. Weak voice. Follows simple commands.    HEENT: Scleral icterus  CHEST: Bilateral breath sounds, decreased in bases.    CARDIAC: Regular rate and rhythm  ABDOMEN: Distended, soft, non-tender. Present bowel sounds. Anasarca.   EXTREMITIES: warm, well perfused. Anasarca improved.   SKIN: Jaundiced, no rash/ecchymoses    DIAGNOSTICS:  WBC      Hg       PLT      Na       K        CO2     BUN      Cr       ALT      AST      TB       ALP  18.58    7.4      152      138      4.6      20       16       1.06     31       54       11.3     154      24 @ 12:36  19.23    6.6      120      136      4.7      19       18       1.16     31       55       11.0     154      24 @ 06:35  21.28    7.4      139      137      4.7      20       17       1.13     32       57       12.1     172      24 @ 01:03  21.65    7.5      145      137      4.7      20       17       1.16     31       54       11.7     175      24 @ 17:50  19.77    7.8      149      137      4.8      19       17       1.18     30       51       11.4     182      24 @ 12:06    PT             INR            MELDwith  MELDw/o  25.5           2.49           --             --             24 @ 12:36  30.5           2.86           --             --             24 @ 06:35  28.4           2.66           --             --             24 @ 01:03  26.8           2.50           --             --             24 @ 17:50  26.0           2.43           --             --             24 @ 12:06

## 2024-08-28 NOTE — PROGRESS NOTE ADULT - SUBJECTIVE AND OBJECTIVE BOX
Transplant Surgery - Multidisciplinary Rounds  --------------------------------------------------------------    Present:   Patient seen and examined with multidisciplinary Transplant team including  Surgeon: Dr. Layton, Hepatologist Dr. Jacobs,  Pharmacist, AC[facility] Martina RNs in AM rounds.   Disciplines not in attendance will be notified of the plan.     Interval Events:  - dx para 75 cc removed  - bcx + for E. Faceium, started kori/linezolid    Education:  Medications    Plan of care:  See Below      TX DATA HERE       Review of systems  All other systems were reviewed and are negative, except as noted.      PHYSICAL EXAM:  Constitutional: Well developed / well nourished  Eyes:  PERRLA  ENMT: NC/AT  Neck: supple,   Respiratory: CTA B/L  Cardiovascular: RRR  Gastrointestinal: Soft abdomen, ND, L sided abdominal wall hernia--stable, NT  Genitourinary: anuric   Extremities: SCD's in place and working bilaterally  Vascular: Palpable dp pulses bilaterally.   Neurological: waking up  Skin: no rashes, ulcerations, lesions  Musculoskeletal: Moving all extremities     Transplant Surgery - Multidisciplinary Rounds  --------------------------------------------------------------    Present:   Patient seen and examined with multidisciplinary Transplant team including  Surgeon: Dr. Layton, Hepatologist Dr. Jacobs,  Pharmacist, Jenny Mack RNs in AM rounds.   Disciplines not in attendance will be notified of the plan.     Interval Events:  - dx para 75 cc removed  - bcx + for E. Faceium, started kori/linezolid    Education:  Medications    Plan of care:  See Below        MEDICATIONS  (STANDING):  chlorhexidine 2% Cloths 1 Application(s) Topical <User Schedule>  CRRT Treatment    <Continuous>  epoetin kareem (EPOGEN) Injectable 56912 Unit(s) SubCutaneous <User Schedule>  fluconAZOLE IVPB 400 milliGRAM(s) IV Intermittent every 24 hours  folic acid 1 milliGRAM(s) Oral daily  hydrocortisone sodium succinate Injectable 50 milliGRAM(s) IV Push every 6 hours  insulin lispro (ADMELOG) corrective regimen sliding scale   SubCutaneous every 6 hours  lidocaine   4% Patch 1 Patch Transdermal daily  meropenem  IVPB 1000 milliGRAM(s) IV Intermittent every 8 hours  multivitamin/minerals/iron Oral Solution (CENTRUM) 15 milliLiter(s) Enteral Tube daily  norepinephrine Infusion 0.5 MICROgram(s)/kG/Min (53.4 mL/Hr) IV Continuous <Continuous>  pantoprazole  Injectable 40 milliGRAM(s) IV Push two times a day  Phoxillum Filtration BK 4 / 2.5 5000 milliLiter(s) (2000 mL/Hr) CRRT <Continuous>  PrismaSATE Dialysate BGK 4 / 2.5 5000 milliLiter(s) (1000 mL/Hr) CRRT <Continuous>  PrismaSOL Filtration BGK 4 / 2.5 5000 milliLiter(s) (200 mL/Hr) CRRT <Continuous>  rifAXIMin 550 milliGRAM(s) Oral every 12 hours  thiamine 100 milliGRAM(s) Oral daily  vancomycin  IVPB 1250 milliGRAM(s) IV Intermittent every 12 hours  vasopressin Infusion 0.03 Unit(s)/Min (4.5 mL/Hr) IV Continuous <Continuous>    MEDICATIONS  (PRN):      PAST MEDICAL & SURGICAL HISTORY:  Alcohol abuse      No significant past surgical history          Vital Signs Last 24 Hrs  T(C): 36.7 (28 Aug 2024 17:00), Max: 36.7 (27 Aug 2024 22:00)  T(F): 98 (28 Aug 2024 17:00), Max: 98.1 (27 Aug 2024 22:00)  HR: 67 (28 Aug 2024 18:45) (63 - 99)  BP: 123/56 (28 Aug 2024 14:30) (111/54 - 123/56)  BP(mean): 81 (28 Aug 2024 14:30) (78 - 81)  RR: 28 (28 Aug 2024 18:45) (11 - 34)  SpO2: 100% (28 Aug 2024 18:45) (92% - 100%)    Parameters below as of 28 Aug 2024 12:10  Patient On (Oxygen Delivery Method): room air        I&O's Summary    27 Aug 2024 07:01  -  28 Aug 2024 07:00  --------------------------------------------------------  IN: 3068.4 mL / OUT: 1074 mL / NET: 1994.4 mL    28 Aug 2024 07:01  -  28 Aug 2024 19:31  --------------------------------------------------------  IN: 1506.4 mL / OUT: 531 mL / NET: 975.4 mL                              7.3    18.45 )-----------( 144      ( 28 Aug 2024 18:30 )             20.9     08-28    137  |  103  |  17  ----------------------------<  149<H>  4.5   |  20<L>  |  1.04    Ca    8.4      28 Aug 2024 18:31  Phos  4.1     08-28  Mg     2.3     08-28    TPro  5.6<L>  /  Alb  3.0<L>  /  TBili  10.9<H>  /  DBili  x   /  AST  53<H>  /  ALT  31  /  AlkPhos  159<H>  08-28          Culture - Body Fluid with Gram Stain (collected 08-27-24 @ 16:29)  Source: Ascites Fl Ascites Fluid  Gram Stain (08-28-24 @ 03:35):    No polymorphonuclear cells seen    No organisms seen    by cytocentrifuge    Culture - Fungal, Body Fluid (collected 08-27-24 @ 16:29)  Source: Ascites Fl Ascites Fluid  Preliminary Report (08-28-24 @ 06:51):    Testing in progress    Culture - Blood (collected 08-26-24 @ 18:42)  Source: .Blood Blood-Peripheral  Preliminary Report (08-28-24 @ 01:02):    No growth at 24 hours    Culture - Blood (collected 08-26-24 @ 18:28)  Source: .Blood Blood-Peripheral  Gram Stain (08-27-24 @ 15:12):    Growth in aerobic bottle: Gram positive cocci in pairs  Preliminary Report (08-28-24 @ 12:59):    Growth in aerobic bottle: Enterococcus faecium    Direct identification is available within approximately 3-5    hours either by Blood Panel Multiplexed PCR or Direct    MALDI-TOF. Details: [email or web address]  Organism: Blood Culture PCR (08-27-24 @ 17:02)  Organism: Blood Culture PCR (08-27-24 @ 17:02)    Culture - Blood (collected 08-23-24 @ 09:59)  Source: .Blood Blood  Final Report (08-28-24 @ 15:00):    No growth at 5 days    Culture - Blood (collected 08-23-24 @ 09:45)  Source: .Blood Blood  Final Report (08-28-24 @ 15:00):    No growth at 5 days    Culture - Blood (collected 08-21-24 @ 20:45)  Source: .Blood Blood-Venous  Final Report (08-27-24 @ 01:00):    No growth at 5 days    Culture - Blood (collected 08-21-24 @ 20:00)  Source: .Blood Blood-Venous  Final Report (08-27-24 @ 01:00):    No growth at 5 days                  Review of systems  All other systems were reviewed and are negative, except as noted.      PHYSICAL EXAM:  Constitutional: Well developed / well nourished  Eyes:  PERRLA  ENMT: NC/AT  Neck: supple,   Respiratory: CTA B/L  Cardiovascular: RRR  Gastrointestinal: Soft abdomen, ND, L sided abdominal wall hernia--stable, NT  Genitourinary: anuric   Extremities: SCD's in place and working bilaterally  Vascular: Palpable dp pulses bilaterally.   Neurological: waking up  Skin: no rashes, ulcerations, lesions  Musculoskeletal: Moving all extremities

## 2024-08-28 NOTE — PROGRESS NOTE ADULT - SUBJECTIVE AND OBJECTIVE BOX
HISTORY  68y Male    24 HOUR EVENTS:    SUBJECTIVE/ROS:  [ ] A ten-point review of systems was otherwise negative except as noted.  [ ] Due to altered mental status/intubation, subjective information were not able to be obtained from the patient. History was obtained, to the extent possible, from review of the chart and collateral sources of information.      NEURO  RASS:     GCS:     CAM ICU:  Exam: awake, alert, oriented  Meds:   [x] Adequacy of sedation and pain control has been assessed and adjusted      RESPIRATORY  RR: 17 (24 @ 02:30) (12 - 26)  SpO2: 100% (24 @ 02:30) (92% - 100%)  Wt(kg): --  Exam: unlabored, clear to auscultation bilaterally  Mechanical Ventilation:   ABG - ( 28 Aug 2024 01:01 )  pH: 7.46  /  pCO2: 28    /  pO2: 74    / HCO3: 20    / Base Excess: -3.4  /  SaO2: 96.9    Lactate: x                [N/A] Extubation Readiness Assessed  Meds:       CARDIOVASCULAR  HR: 74 (24 @ 02:30) (68 - 101)  BP: 152/63 (24 @ 19:30) (135/60 - 152/63)  BP(mean): 91 (24 @ 19:30) (87 - 91)  ABP: 121/51 (24 @ 02:30) (107/46 - 153/95)  ABP(mean): 73 (24 @ 02:30) (49 - 115)  Wt(kg): --  CVP(cm H2O): --      Exam: regular rate and rhythm  Cardiac Rhythm: sinus  Perfusion     [x]Adequate   [ ]Inadequate  Mentation   [x]Normal       [ ]Reduced  Extremities  [x]Warm         [ ]Cool  Volume Status [ ]Hypervolemic [x]Euvolemic [ ]Hypovolemic  Meds: norepinephrine Infusion 0.5 MICROgram(s)/kG/Min IV Continuous <Continuous>        GI/NUTRITION  Exam: soft, nontender, nondistended, incision C/D/I  Diet:  Meds: pantoprazole  Injectable 40 milliGRAM(s) IV Push two times a day      GENITOURINARY  I&O's Detail     @ 07:01  -   @ 07:00  --------------------------------------------------------  IN:    Albumin 5%  - 250 mL: 250 mL    Cryoprecipitate: 75 mL    Dexmedetomidine: 11.4 mL    Enteral Tube Flush: 170 mL    IV PiggyBack: 150 mL    IV PiggyBack: 800 mL    Miscellaneous Tube Feedin mL    Norepinephrine: 942.8 mL    Norepinephrine: 47 mL    Other (mL): 47 mL    Platelets - Single Donor: 225 mL    PRBCs (Packed Red Blood Cells): 900 mL    Vasopressin: 108 mL  Total IN: 4271.2 mL    OUT:    Other (mL): 1604 mL    Stool (mL): 100 mL  Total OUT: 1704 mL    Total NET: 2567.2 mL       @ 07:  -   @ 02:46  --------------------------------------------------------  IN:    Albumin 5%  - 250 mL: 125 mL    Enteral Tube Flush: 330 mL    IV PiggyBack: 310 mL    IV PiggyBack: 458 mL    Norepinephrine: 686.9 mL    Platelets - Single Donor: 225 mL    Vasopressin: 85.5 mL  Total IN: 2220.4 mL    OUT:    Other (mL): 877 mL  Total OUT: 877 mL    Total NET: 1343.4 mL              137  |  103  |  17  ----------------------------<  136<H>  4.7   |  20<L>  |  1.13    Ca    8.6      28 Aug 2024 01:03  Phos  4.6       Mg     2.4         TPro  6.2  /  Alb  3.1<L>  /  TBili  12.1<H>  /  DBili  x   /  AST  57<H>  /  ALT  32  /  AlkPhos  172<H>      [ ] Elizabeth catheter, indication: N/A  Meds: folic acid 1 milliGRAM(s) Oral daily  multivitamin/minerals/iron Oral Solution (CENTRUM) 15 milliLiter(s) Enteral Tube daily  thiamine 100 milliGRAM(s) Oral daily        HEMATOLOGIC  Meds:   [x] VTE Prophylaxis                        7.4    21.28 )-----------( 139      ( 28 Aug 2024 01:03 )             21.4     PT/INR - ( 28 Aug 2024 01:03 )   PT: 28.4 sec;   INR: 2.66 ratio         PTT - ( 28 Aug 2024 01:03 )  PTT:49.4 sec  Transfusion     [ ] PRBC   [ ] Platelets   [ ] FFP   [ ] Cryoprecipitate      INFECTIOUS DISEASES  WBC Count: 21.28 K/uL ( @ 01:03)  WBC Count: 21.65 K/uL ( @ 17:50)  WBC Count: 19.77 K/uL ( @ 12:06)  WBC Count: 21.12 K/uL ( @ 06:05)    RECENT CULTURES:  Specimen Source: .Blood Blood-Peripheral  Date/Time:  @ 18:42  Culture Results:   No growth at 24 hours  Gram Stain: --  Organism: --  Specimen Source: .Blood Blood-Peripheral  Date/Time:  @ 18:28  Culture Results:   Growth in aerobic bottle: Gram positive cocci in pairs  Direct identification is available within approximately 3-5  hours either by Blood Panel Multiplexed PCR or Direct  MALDI-TOF. Details: [email or web address]>  Gram Stain:   Growth in aerobic bottle: Gram positive cocci in pairs<!>  Organism: Blood Culture PCR<!>  Specimen Source: .Blood Blood  Date/Time:  @ 09:59  Culture Results:   No growth at 4 days  Gram Stain: --  Organism: --  Specimen Source: .Blood Blood  Date/Time:  @ 09:45  Culture Results:   No growth at 4 days  Gram Stain: --  Organism: --    Meds: epoetin kareem (EPOGEN) Injectable 51216 Unit(s) SubCutaneous <User Schedule>  fluconAZOLE IVPB 400 milliGRAM(s) IV Intermittent every 24 hours  meropenem  IVPB 1000 milliGRAM(s) IV Intermittent every 8 hours  rifAXIMin 550 milliGRAM(s) Oral every 12 hours  vancomycin  IVPB      vancomycin  IVPB 1250 milliGRAM(s) IV Intermittent every 12 hours  vancomycin  IVPB 1500 milliGRAM(s) IV Intermittent once        ENDOCRINE  CAPILLARY BLOOD GLUCOSE      POCT Blood Glucose.: 147 mg/dL (28 Aug 2024 00:56)  POCT Blood Glucose.: 144 mg/dL (27 Aug 2024 17:44)  POCT Blood Glucose.: 160 mg/dL (27 Aug 2024 12:03)  POCT Blood Glucose.: 198 mg/dL (27 Aug 2024 05:56)    Meds: hydrocortisone sodium succinate Injectable 50 milliGRAM(s) IV Push every 6 hours  insulin lispro (ADMELOG) corrective regimen sliding scale   SubCutaneous every 6 hours  vasopressin Infusion 0.03 Unit(s)/Min IV Continuous <Continuous>        ACCESS DEVICES:  [ ] Peripheral IV  [ ] Central Venous Line	[ ] R	[ ] L	[ ] IJ	[ ] Fem	[ ] SC	Placed:   [ ] Arterial Line		[ ] R	[ ] L	[ ] Fem	[ ] Rad	[ ] Ax	Placed:   [ ] PICC:					[ ] Mediport  [ ] Urinary Catheter, Date Placed:   [x] Necessity of urinary, arterial, and venous catheters discussed    OTHER MEDICATIONS:  chlorhexidine 2% Cloths 1 Application(s) Topical <User Schedule>  CRRT Treatment    <Continuous>  lidocaine   4% Patch 1 Patch Transdermal daily  Phoxillum Filtration BK 4 / 2.5 5000 milliLiter(s) CRRT <Continuous>  Phoxillum Filtration BK 4 / 2.5 5000 milliLiter(s) CRRT <Continuous>  Phoxillum Filtration BK 4 / 2.5 5000 milliLiter(s) CRRT <Continuous>      CODE STATUS:      IMAGING: HISTORY  68y Male    24 HOUR EVENTS:  - d/c miralax  - s/p diagnostic paracentesis   - s/p 3u pRBC, 1u plt, 1u cryo for GIB   - Inc pressure demands  Levo .53-> albumin 250 x 2 for the day, added stress dose steroids   - POCUS- volume down; CRRT net even to positive 50 cc/hr  - BCx  (+) enterococcus faecium   - linezolid switched to vanc (). F/u paracentesis cultures. If positive, exchange lines  - continue kori, fluc, vanc   - 5% alb 250cc    SUBJECTIVE/ROS:  [ ] A ten-point review of systems was otherwise negative except as noted.  [ ] Due to altered mental status/intubation, subjective information were not able to be obtained from the patient. History was obtained, to the extent possible, from review of the chart and collateral sources of information.      NEURO  Exam: awake, oriented x1-2, wax waning   Meds:   [x] Adequacy of sedation and pain control has been assessed and adjusted      RESPIRATORY  RR: 17 (24 @ 02:30) (12 - 26)  SpO2: 100% (24 @ 02:30) (92% - 100%)  Wt(kg): --  Exam: unlabored, clear to auscultation bilaterally    ABG - ( 28 Aug 2024 01:01 )  pH: 7.46  /  pCO2: 28    /  pO2: 74    / HCO3: 20    / Base Excess: -3.4  /  SaO2: 96.9    Lactate: x                [N/A] Extubation Readiness Assessed  Meds:       CARDIOVASCULAR  HR: 74 (24 @ 02:30) (68 - 101)  BP: 152/63 (24 @ 19:30) (135/60 - 152/63)  BP(mean): 91 (24 @ 19:30) (87 - 91)  ABP: 121/51 (24 @ 02:30) (107/46 - 153/95)  ABP(mean): 73 (24 @ 02:30) (49 - 115)  Wt(kg): --      Exam: regular rate and rhythm  Cardiac Rhythm: sinus  Perfusion     [x]Adequate   [ ]Inadequate  Mentation   [x]Normal       [ ]Reduced  Extremities  [x]Warm         [ ]Cool  Volume Status [ ]Hypervolemic [x]Euvolemic [ ]Hypovolemic  Meds: norepinephrine Infusion 0.5 MICROgram(s)/kG/Min IV Continuous <Continuous>        GI/NUTRITION  Exam: soft, nontender, nondistended  Diet: NPO  Meds: pantoprazole  Injectable 40 milliGRAM(s) IV Push two times a day      GENITOURINARY  I&O's Detail     @ 07: @ 07:00  --------------------------------------------------------  IN:    Albumin 5%  - 250 mL: 250 mL    Cryoprecipitate: 75 mL    Dexmedetomidine: 11.4 mL    Enteral Tube Flush: 170 mL    IV PiggyBack: 150 mL    IV PiggyBack: 800 mL    Miscellaneous Tube Feedin mL    Norepinephrine: 942.8 mL    Norepinephrine: 47 mL    Other (mL): 47 mL    Platelets - Single Donor: 225 mL    PRBCs (Packed Red Blood Cells): 900 mL    Vasopressin: 108 mL  Total IN: 4271.2 mL    OUT:    Other (mL): 1604 mL    Stool (mL): 100 mL  Total OUT: 1704 mL    Total NET: 2567.2 mL       @ : @ 02:46  --------------------------------------------------------  IN:    Albumin 5%  - 250 mL: 125 mL    Enteral Tube Flush: 330 mL    IV PiggyBack: 310 mL    IV PiggyBack: 458 mL    Norepinephrine: 686.9 mL    Platelets - Single Donor: 225 mL    Vasopressin: 85.5 mL  Total IN: 2220.4 mL    OUT:    Other (mL): 877 mL  Total OUT: 877 mL    Total NET: 1343.4 mL              137  |  103  |  17  ----------------------------<  136<H>  4.7   |  20<L>  |  1.13    Ca    8.6      28 Aug 2024 01:03  Phos  4.6       Mg     2.4         TPro  6.2  /  Alb  3.1<L>  /  TBili  12.1<H>  /  DBili  x   /  AST  57<H>  /  ALT  32  /  AlkPhos  172<H>      [ ] Elizabeth catheter, indication: N/A  Meds: folic acid 1 milliGRAM(s) Oral daily  multivitamin/minerals/iron Oral Solution (CENTRUM) 15 milliLiter(s) Enteral Tube daily  thiamine 100 milliGRAM(s) Oral daily        HEMATOLOGIC  Meds:   [x] VTE Prophylaxis                        7.4    21.28 )-----------( 139      ( 28 Aug 2024 01:03 )             21.4     PT/INR - ( 28 Aug 2024 01:03 )   PT: 28.4 sec;   INR: 2.66 ratio         PTT - ( 28 Aug 2024 01:03 )  PTT:49.4 sec  Transfusion     [ ] PRBC   [ ] Platelets   [ ] FFP   [ ] Cryoprecipitate      INFECTIOUS DISEASES  WBC Count: 21.28 K/uL ( @ 01:03)  WBC Count: 21.65 K/uL ( @ 17:50)  WBC Count: 19.77 K/uL ( @ 12:06)  WBC Count: 21.12 K/uL ( @ 06:05)    RECENT CULTURES:  Specimen Source: .Blood Blood-Peripheral  Date/Time:  @ 18:42  Culture Results:   No growth at 24 hours  Gram Stain: --  Organism: --  Specimen Source: .Blood Blood-Peripheral  Date/Time:  @ 18:28  Culture Results:   Growth in aerobic bottle: Gram positive cocci in pairs  Direct identification is available within approximately 3-5  hours either by Blood Panel Multiplexed PCR or Direct  MALDI-TOF. Details: [email or web address]>  Gram Stain:   Growth in aerobic bottle: Gram positive cocci in pairs<!>  Organism: Blood Culture PCR<!>  Specimen Source: .Blood Blood  Date/Time:  @ 09:59  Culture Results:   No growth at 4 days  Gram Stain: --  Organism: --  Specimen Source: .Blood Blood  Date/Time:  @ 09:45  Culture Results:   No growth at 4 days  Gram Stain: --  Organism: --    Meds: epoetin kareem (EPOGEN) Injectable 11357 Unit(s) SubCutaneous <User Schedule>  fluconAZOLE IVPB 400 milliGRAM(s) IV Intermittent every 24 hours  meropenem  IVPB 1000 milliGRAM(s) IV Intermittent every 8 hours  rifAXIMin 550 milliGRAM(s) Oral every 12 hours  vancomycin  IVPB      vancomycin  IVPB 1250 milliGRAM(s) IV Intermittent every 12 hours  vancomycin  IVPB 1500 milliGRAM(s) IV Intermittent once        ENDOCRINE  CAPILLARY BLOOD GLUCOSE      POCT Blood Glucose.: 147 mg/dL (28 Aug 2024 00:56)  POCT Blood Glucose.: 144 mg/dL (27 Aug 2024 17:44)  POCT Blood Glucose.: 160 mg/dL (27 Aug 2024 12:03)  POCT Blood Glucose.: 198 mg/dL (27 Aug 2024 05:56)    Meds: hydrocortisone sodium succinate Injectable 50 milliGRAM(s) IV Push every 6 hours  insulin lispro (ADMELOG) corrective regimen sliding scale   SubCutaneous every 6 hours  vasopressin Infusion 0.03 Unit(s)/Min IV Continuous <Continuous>        ACCESS DEVICES:  [ ] Peripheral IV  [ x ] Central Venous Line	[ x ] R	[ ] L	[ ] IJ	[ ] Fem	[ ] SC	Placed:   [ ] Arterial Line		[ ] R	[ ] L	[ ] Fem	[ ] Rad	[ ] Ax	Placed:   [ ] PICC:					[ ] Mediport  [ ] Urinary Catheter, Date Placed:   [x] Necessity of urinary, arterial, and venous catheters discussed    OTHER MEDICATIONS:  chlorhexidine 2% Cloths 1 Application(s) Topical <User Schedule>  CRRT Treatment    <Continuous>  lidocaine   4% Patch 1 Patch Transdermal daily  Phoxillum Filtration BK 4 / 2.5 5000 milliLiter(s) CRRT <Continuous>  Phoxillum Filtration BK 4 / 2.5 5000 milliLiter(s) CRRT <Continuous>  Phoxillum Filtration BK 4 / 2.5 5000 milliLiter(s) CRRT <Continuous>      CODE STATUS:      IMAGING:

## 2024-08-28 NOTE — PROGRESS NOTE ADULT - ASSESSMENT
68y with obesity and risky alcohol consumption (with decades' history of daily consumption of homemade alcohol), admitted to [facility] 1 month ago due to recent onset of jaundice and with a prolonged hospital and ICU course there due to newly diagnosed decompensated cirrhosis with acute on chronic liver failure (ACLF) with shock (resolved, but still on midodrine); FANY (on intermittent HD since 7/9); recurrent maroon stools and acute on chronic anemia necessitating intermittent PRBC transfusions (last on 8/8) and hypofibrinoginemia, with EGD (7/12) with small non-bleeding EV and a duodenal ulcer with a visible vessel; and waxing and waning hepatic encephalopathy. He was transferred to [facility] on 8/12/24 for evaluation for combined liver/kidney transplants (SLK).      Decompensated ETOH Cirrhosis  - SLK eval , MELD 38O  - TFs, pressors per SICU  - dx para removed 75 cc, serologies pending  - HE: cont rifaximin/miralax  - EV:  EGD (7/12) with small non-bleeding EV and a duodenal ulcer with a visible vessel. EGD 8/20: no active bleeding, PPI.  - s/p colonoscopy 8/21: lesion biopsied, f/u path   - stress dose steroids per SICU completed, wean pressors as able  - FANY/HRS: anuric, On CRRT 8/13, Renal following  - ID: Empiric Elise/fluc/ Doxy. Elise switched to Zosyn.  -  8/13 w/ MRSE , repeat 8/14 with NGTD,  - BCX 8/26: enterococcus faecium , started linezolid  - Thiamine/MVI/FOLIC ACID  - LHC deferred, will re-address daily once stabilized  - Needs CT a/p non contrast to eval iliacs   - Paracentesis PRN   - cont sicu care    68y with obesity and risky alcohol consumption (with decades' history of daily consumption of homemade alcohol), admitted to [facility] 1 month ago due to recent onset of jaundice and with a prolonged hospital and ICU course there due to newly diagnosed decompensated cirrhosis with acute on chronic liver failure (ACLF) with shock (resolved, but still on midodrine); FANY (on intermittent HD since 7/9); recurrent maroon stools and acute on chronic anemia necessitating intermittent PRBC transfusions (last on 8/8) and hypofibrinoginemia, with EGD (7/12) with small non-bleeding EV and a duodenal ulcer with a visible vessel; and waxing and waning hepatic encephalopathy. He was transferred to [facility] on 8/12/24 for evaluation for combined liver/kidney transplants (SLK).      Decompensated ETOH Cirrhosis  - SLK eval , MELD 38O  - TFs, pressors per SICU  - dx para removed 75 cc, serologies pending  - HE: cont rifaximin/miralax  - EV:  EGD (7/12) with small non-bleeding EV and a duodenal ulcer with a visible vessel. EGD 8/20: no active bleeding, PPI.  - s/p colonoscopy 8/21: lesion biopsied, f/u path   - stress dose steroids per SICU completed, wean pressors as able  - FANY/HRS: anuric, On CRRT 8/13, Renal following  - ID: Empiric Elise/fluc/ Doxy. Elise switched to Zosyn.  -  8/13 w/ MRSE , repeat 8/14 with NGTD,  - BCX 8/26: enterococcus faecium , linezolid  - Thiamine/MVI/FOLIC ACID  - LHC deferred, will re-address daily once stabilized  - Needs CT a/p non contrast to eval iliacs   - Paracentesis PRN   - cont sicu care, 1U PRBC, 1U PLT  - ECHO 8/28: no vegetations   68y with obesity and risky alcohol consumption (with decades' history of daily consumption of homemade alcohol), admitted to [facility] 1 month ago due to recent onset of jaundice and with a prolonged hospital and ICU course there due to newly diagnosed decompensated cirrhosis with acute on chronic liver failure (ACLF) with shock (resolved, but still on midodrine); FANY (on intermittent HD since 7/9); recurrent maroon stools and acute on chronic anemia necessitating intermittent PRBC transfusions (last on 8/8) and hypofibrinoginemia, with EGD (7/12) with small non-bleeding EV and a duodenal ulcer with a visible vessel; and waxing and waning hepatic encephalopathy. He was transferred to [facility] on 8/12/24 for evaluation for combined liver/kidney transplants (SLK).      Decompensated ETOH Cirrhosis  - SLK eval , MELD 38O  - TFs, pressors per SICU  - dx para removed 75 cc, serologies pending  - HE: cont rifaximin/miralax  - EV:  EGD (7/12) with small non-bleeding EV and a duodenal ulcer with a visible vessel. EGD 8/20: no active bleeding, PPI.  - s/p colonoscopy 8/21: lesion biopsied, f/u path   - stress dose steroids per SICU completed, wean pressors as able  - FANY/HRS: anuric, On CRRT 8/13, Renal following  - ID: Empiric Elise/fluc/ Doxy. Elise switched to Zosyn.  -  8/13 w/ MRSE , repeat 8/14 with NGTD,  - BCX 8/26: enterococcus faecium , linezolid  - Thiamine/MVI/FOLIC ACID  - LHC deferred, will re-address daily once stabilized  - Needs CT a/p non contrast to eval iliacs   - Paracentesis PRN   - cont sicu care, 1U PRBC, 1U PLT  - ECHO 8/28: no vegetations

## 2024-08-28 NOTE — PROGRESS NOTE ADULT - ASSESSMENT
76 y/o male w/ no known PMHx (does not see doctors per sister) who presented as a transfer from [facility] for SLK evaluation. Patient was initially admitted to [facility] for back pain & jaundice and was diagnosed w/ cirrhosis. Hospital course there was c/b small EVs, melena 2/2 a duodenal ulcer s/p clipping, HRS requiring hemodialysis, hepatic encephalopathy, ascites s/p multiple LVPs, and deconditioning. Patient was admitted to SICU on 8/12 for initiation of CRRT as his BPs were too low to attempt intermittent HD.    Neuro:  - A/O x 1-2 waxes and wanes  - Rifaximin for prevention of hepatic encephalopathy  - Monitoring ammonia  - Thiamine, folic acid, & multivitamin for h/o EtOH abuse    Resp:  - Incentive spirometry to prevent atelectasis  - CT chest 8/25 with RLL pneumonia    CV:  - Goal SBP >110, titrate pressors as able. On Levo gtt, vaso gtt., requirments increasing overnight 8/26 now downtrending   - +/- cardiac catheterization when stable to do so for transplant work-up   - Electrophys: nothing to do while not on AC, follow cards recs    GI:   - NPO (8/27-)   - Protonix BID for h/o duodenal ulcer  - bowel regimen held as pt with BM x3 overnight  - GIB: s/p 2u pRBC, 1u plt, 1u cryo (8/26), 1U Plts (8/27)   - Monitor LFTs  - Undergoing eval for SLK transplantation  - s/p diagnostic para 8/27, moom# not indicative of SBP, f/u gram stain/culture     Renal:  - CRRT for concern of HRS, maintain CRRT even to +50 cc/hr given high pressor requirement   - Monitor I&Os and electrolytes w/ repletions as necessary  - Undergoing evaluation for SLK transplantation    Heme:  - Hold chemical VTE ppx in setting of liver failure   - Mechanical VTE ppx with SCDs  - Epogen 3x/week    ID:   - Empiric coverage w/ Elise (broaded from zosyn on 8/26), Vanc (8/27-)  fluconazole   - 8/18: + doxycycline added for Lyme  - Blood Cx 8/21, bronchial Cx 8/21 no growth   - BCx 8/26 (+) enterococcus faecium   - f/u rpt Bcx 8/27, if positive exchange LUE midline and RIJ lines     Endo:   - stress dosage steroids started 8/26, continue solucortef 50mg q6hr  - Continue ISS [id number]   - Monitor glucose    Dispo: SICU

## 2024-08-28 NOTE — PROGRESS NOTE ADULT - SUBJECTIVE AND OBJECTIVE BOX
[facility] DIVISION OF KIDNEY DISEASES AND HYPERTENSION   FOLLOW UP NOTE    --------------------------------------------------------------------------------  Chief Complaint:    24 hour events/subjective: Pt. was seen and examined today.   Remains in SICU on CRRT. Getting Echo.       PAST HISTORY  --------------------------------------------------------------------------------  No significant changes to PMH, PSH, FHx, SHx, unless otherwise noted    ALLERGIES & MEDICATIONS  --------------------------------------------------------------------------------  Allergies    No Known Allergies    Intolerances      Standing Inpatient Medications  chlorhexidine 2% Cloths 1 Application(s) Topical <User Schedule>  CRRT Treatment    <Continuous>  epoetin kareem (EPOGEN) Injectable 02308 Unit(s) SubCutaneous <User Schedule>  fluconAZOLE IVPB 400 milliGRAM(s) IV Intermittent every 24 hours  folic acid 1 milliGRAM(s) Oral daily  hydrocortisone sodium succinate Injectable 50 milliGRAM(s) IV Push every 6 hours  insulin lispro (ADMELOG) corrective regimen sliding scale   SubCutaneous every 6 hours  lidocaine   4% Patch 1 Patch Transdermal daily  meropenem  IVPB 1000 milliGRAM(s) IV Intermittent every 8 hours  multivitamin/minerals/iron Oral Solution (CENTRUM) 15 milliLiter(s) Enteral Tube daily  norepinephrine Infusion 0.5 MICROgram(s)/kG/Min IV Continuous <Continuous>  pantoprazole  Injectable 40 milliGRAM(s) IV Push two times a day  Phoxillum Filtration BK 4 / 2.5 5000 milliLiter(s) CRRT <Continuous>  Phoxillum Filtration BK 4 / 2.5 5000 milliLiter(s) CRRT <Continuous>  Phoxillum Filtration BK 4 / 2.5 5000 milliLiter(s) CRRT <Continuous>  rifAXIMin 550 milliGRAM(s) Oral every 12 hours  thiamine 100 milliGRAM(s) Oral daily  vancomycin  IVPB      vancomycin  IVPB 1250 milliGRAM(s) IV Intermittent every 12 hours  vasopressin Infusion 0.03 Unit(s)/Min IV Continuous <Continuous>    PRN Inpatient Medications      REVIEW OF SYSTEMS  --------------------------------------------------------------------------------  All other systems were reviewed and are negative, except as noted.    VITALS/PHYSICAL EXAM  --------------------------------------------------------------------------------  T(C): 36.4 (24 @ 05:00), Max: 36.7 (24 @ 22:00)  HR: 68 (24 @ 07:30) (63 - 99)  BP: 111/54 (24 @ 07:30) (111/54 - 152/63)  RR: 22 (24 @ 07:30) (11 - 26)  SpO2: 100% (24 @ 07:30) (98% - 100%)  Wt(kg): --        24 @ 07:01  -  24 @ 07:00  --------------------------------------------------------  IN: 3068.4 mL / OUT: 1074 mL / NET: 1994.4 mL        Physical Exam:  	Gen: NAD  	HEENT: Anicteric  	Pulm: CTA B/L  	CV: S1S2+  	Abd: Soft, +BS   	Ext: No LE edema B/L  	Neuro: Sedated  	Skin: Warm and dry  	Dialysis access: [facility]      LABS/STUDIES  --------------------------------------------------------------------------------              6.6    19.23 >-----------<  120      [24 @ 06:35]              19.4     136  |  104  |  18  ----------------------------<  172      [24 06:35]  4.7   |  19  |  1.16        Ca     8.4     [24 06:35]      Mg     2.3     [24 06:35]      Phos  4.8     [24 06:35]    TPro  5.7  /  Alb  3.0  /  TBili  11.0  /  DBili  x   /  AST  55  /  ALT  31  /  AlkPhos  154  [24 @ 06:35]    PT/INR: PT 30.5 , INR 2.86       [24 06:35]  PTT: 51.3       [24 06:35]      Creatinine Trend:  SCr 1.16 [ 06:35]  SCr 1.13 [ 01:03]  SCr 1.16 [ @ 17:50]  SCr 1.18 [ 12:06]  SCr 1.15 [ 06:05]    Urinalysis - [24 @ 06:35]      Color  / Appearance  / SG  / pH       Gluc 172 / Ketone   / Bili  / Urobili        Blood  / Protein  / Leuk Est  / Nitrite       RBC  / WBC  / Hyaline  / Gran  / Sq Epi  / Non Sq Epi  / Bacteria       HBsAb Reactive      [24 @ 21:16]  HBsAg Nonreact      [24 @ 21:13]  HBcAb Nonreact      [24 @ 21:16]  HCV 0.08, Nonreact      [24 @ 14:45]  HIV Nonreact      [24 @ 21:13]    CHETNA: titer Negative, pattern --      [24 @ 21:15]  Syphilis Screen (Treponema Pallidum Ab) Negative      [24 @ 21:15]  Immunofixation Serum:   Oligoclonal Pattern Consisting of One Weak IgM Kappa Band, Two Weak IgG  Kappa Bands, and One Weak IgA Lambda Band Identified      Reference Range: None Detected      [24 @ 21:13]  SPEP Interpretation: Oligoclonal Pattern Consisting of Three Weak Gamma-Migrating Paraproteins  and One Weak Beta-Migrating Paraprotein Identified      [24 @ 21:13]    Tacrolimus  Cyclosporine  Sirolimus  Mycophenolate  BK PCR  CMV PCRCMVPCR Lo.94 Kjj52WV/mL ( @ 12:26)  CMVPCR Log: 3.01 Coh74TV/mL ( @ 00:08)  CMVPCR Log: 3.67 Qlo02QL/mL ( @ 00:37)  CMVPCR Log: 3.69 Apg44VU/mL ( @ 21:13)    Parvo PCR  EBV PCR

## 2024-08-28 NOTE — PROGRESS NOTE ADULT - ASSESSMENT
68 year old male with  AUD complicated by cirrhosis with Hepatic encephalopathy admitted to [facility] with back pain and jaundice on 7/8/24. Pt had dark / maroon colored stools   EGD that showed esophageal varices and duodenal ulcer with visible vessel. He got transfusions for low Hb and last transfusion was on 8/8. 1st session of IHD was on 7/9/24.   Transplant nephrology was consulted for FANY and SLK eval.       1.  FANY VS FANY on CKD 2' likely HRS  -Met SLK criteria on 8/20, currently undergoing eval  -Started on CRRT on 8/13/24 (1st session of IHD was on 7/9/24 completed 6 weeks on 8/20). Has had issues with clotting >> adjusted CRRT.  -Pt remains anuric and on pressor support. Will cw CRRT.       Neeraj Maher  Nephrology Fellow  Feel free to contact me on TEAMS  After 5 pm please contact the on-call Fellow.

## 2024-08-28 NOTE — PROGRESS NOTE ADULT - ASSESSMENT
78 yo m with alcohol abuse otherwise no known chronic medical issues (does not see doctors per sister) s/p 1 month stay at [facility] now transferred to [facility] for liver transplant eval.  Most of history obtained from sister (Ashlyn) at bedside and some from transfer paperwork. Per sister, pt was initially brought to the hospital by family for back pain and jaundice for unclear amount of time. Patient was seen by GI and underwent EGD soon after admission which only showed nonbleeding ?duodenal ulcers (no intervention) however few days later pt developed active signs of bleeding and emergently underwent EGD again showing bleeding esophageal varices which were clipped.  pt was electively intubated with stay in ICU thereafter. Patient then started with HD due to worsening renal function and MS for past 2.5 weeks at times limited by low BP requiring pressors to assist. Had numerous paracentesis with varying amount of fluid removal - albumin infusions. Sister not aware of any concerns for acute infection during his stay but aware that pt was on abx at some point due to bleeding issues.        PERTINENT RADIOLOGY:  CXR: Tubes and lines as above. No focal consolidations  CT Chest, A&P:  Patchy ground-glass opacities in the bilateral upper lobes. *  Cirrhosis with evidence of portal hypertension. *  Hypodense lesion in the periphery of the left hepatic lobe of uncertain etiology. Somewhat wedge-shaped morphology raises the possibility for a small infarct. Question subtle peripheral nodular enhancement, and a hemangioma is also considered. Contrast enhanced abdominal MRI can be performed for further characterization and to assess for other possibilities. *  A wedge-shaped region of hypoattenuation in the spleen may reflect a small infarct. *  Focal eccentric wall thickening in the low rectum, possibly due to a mural fold. Consider colonoscopy. *  Large volume ascites.  CT Head: No evidence of acute intracranial pathology. If clinical symptoms persist or worsen, more sensitive evaluation with brain MRI may be obtained, if no contraindications exist.    BCx (8/12) 1/4 MRSE  BCx (8/14) NGTD  Paracentesis (8/14) Cell Counts Negative for SBP  MRSA/MSSA Nasal PCR (8/16) Negative    CXR (8/19) Ground Glass infiltrates persist  CT from 8//13 with bilateral Ground glass infiltrates    # Persistent Ground glass infiltrates of unclear etiology  please send urine legionella ag  please send deep sputum for testing for gram stain/cx, fungal stain/cx    #Decompensated liver cirrhosis, Leukocytosis, Shock  # Enterococcus faecium bacteremia BC 8/26 now positive and worsening shock  biliary/SBp vs line related in c/o prolonged empirical antibiotic course and Enterococcus selection    --Meropenem deescalated to Zosyn for empiric coverage in light of negative culture workup then reescalated back to meropenem- suspect  etiology of sepsis and shock is Enterococcus bacteremia, would stop meropenem today, at risk for enterococcus with prolonged exposure to meropenem   Deescalate caspofungin to fluconazole  - also would stop doxycycline after today- received total of 10 day course  --Enterococcus not VRE, initially recommended linezolid but can switch now linezolid to vancomycin 1.5 g x1 then 1.25 mg q12h   --repeat BC tomorrow  --if BC remain positive will also favor removing midline and exchange RIJ    - TTE  - s/p paracentesis today, follow cx , counts not c/w SBP   --S/P empiric fluconazole (8/12 -->8/26), Now escalated to Caspofungin --->(8/27), Fluconazole 8/28--->  --Continue to follow CBC with diff  --Continue to follow renal function (Cr/BUN)  --Continue to follow transaminases  --Continue to follow temperature curve  --Follow up on preliminary blood cultures  --If able will send BAL cultures for legionella & PJP PCR    #Positive BCx (8/12) (Staph epi)  Consistent with contaminant   as repeat testing is negative x many    #CMV PCR   --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 Would repeat CMV PCR on (8/26)  -- may need to treat if increasing  Cytomegalovirus By PCR: 4890 --->4730 --->1020 --->867 (8/20)    #Pre Transplant Evaluation   HIV-1/2 Combo Result: Nonreactive  EBVPCR Log: NotDetec Ala94LS/mL   Hepatitis A IgG Ab Result: Reactive   Hepatitis B Core Antibody, Total: Nonreactive  Hepatitis B Surface Antibody: Reactive   Hepatitis B DNA PCR Log: Not Detected  Hepatitis B Surface Antigen: Nonreactive  Hepatitis C Virus Interpretation: Nonreactive  COVID-19 De Domain Antibody: Positive  Treponema Pallidum Antibody Interpretation: Negative  HSV 1 IgG  Positive/HSV 2 IgG Negative  EBV Serology Positive  CMV IgG Positive  VZV IgG Positive  Measles Positive, Mumps Positive and Rubella IgG Positive  Toxoplasma IgG Positive  QuantiFeron Gold Negative  Strongyloides Ab Negative  Babesia PCR negative  --Schistosoma IgG - NEGATIVE  --Reportedly Lyme serology was previously positive and remains positive, Tick Diseases Panel is negative for additional tick born exposures  --Western blot negative, serologies not concerning for active Lyme disease  -- On doxycycline 100mg bid for atypical coverage   --Will send urine for Legionella, if negative can stop doxycycline    #Encounter to Vaccinate Patient - Will defer vaccination in context of critical illness  COVID19: No primary series. Would benefit from COVID19 0898-5161 dose  Influenza: Would benefit from dose next season  Pneumococcal: Would benefit from PCV20  HAV: Immune, no additional vaccines indicated  HBV: Immune, no additional vaccines indicated  MMR: Immune, will not require further vaccination  Varicella: Immune, will not require further vaccination  Shingles: Would benefit from Shingrix  Tdap: Would benefit from Tdap     78 yo m with alcohol abuse otherwise no known chronic medical issues (does not see doctors per sister) s/p 1 month stay at [facility] now transferred to [facility] for liver transplant eval.  Most of history obtained from sister (Ashlyn) at bedside and some from transfer paperwork. Per sister, pt was initially brought to the hospital by family for back pain and jaundice for unclear amount of time. Patient was seen by GI and underwent EGD soon after admission which only showed nonbleeding ?duodenal ulcers (no intervention) however few days later pt developed active signs of bleeding and emergently underwent EGD again showing bleeding esophageal varices which were clipped.  pt was electively intubated with stay in ICU thereafter. Patient then started with HD due to worsening renal function and MS for past 2.5 weeks at times limited by low BP requiring pressors to assist. Had numerous paracentesis with varying amount of fluid removal - albumin infusions. Sister not aware of any concerns for acute infection during his stay but aware that pt was on abx at some point due to bleeding issues.        PERTINENT RADIOLOGY:  CXR: Tubes and lines as above. No focal consolidations  CT Chest, A&P:  Patchy ground-glass opacities in the bilateral upper lobes. *  Cirrhosis with evidence of portal hypertension. *  Hypodense lesion in the periphery of the left hepatic lobe of uncertain etiology. Somewhat wedge-shaped morphology raises the possibility for a small infarct. Question subtle peripheral nodular enhancement, and a hemangioma is also considered. Contrast enhanced abdominal MRI can be performed for further characterization and to assess for other possibilities. *  A wedge-shaped region of hypoattenuation in the spleen may reflect a small infarct. *  Focal eccentric wall thickening in the low rectum, possibly due to a mural fold. Consider colonoscopy. *  Large volume ascites.  CT Head: No evidence of acute intracranial pathology. If clinical symptoms persist or worsen, more sensitive evaluation with brain MRI may be obtained, if no contraindications exist.    BCx (8/12) 1/4 MRSE  BCx (8/14) NGTD  Paracentesis (8/14) Cell Counts Negative for SBP  MRSA/MSSA Nasal PCR (8/16) Negative    CXR (8/19) Ground Glass infiltrates persist  CT from 8//13 with bilateral Ground glass infiltrates    # Persistent Ground glass infiltrates of unclear etiology  please send urine legionella ag  please send deep sputum for testing for gram stain/cx, fungal stain/cx    #Decompensated liver cirrhosis, Leukocytosis, Shock  # Enterococcus faecium bacteremia BC 8/26 now positive and worsening shock  biliary/SBp vs line related in c/o prolonged empirical antibiotic course and Enterococcus selection    --Meropenem deescalated to Zosyn for empiric coverage in light of negative culture workup then reescalated back to meropenem- suspect  etiology of sepsis and shock is Enterococcus bacteremia, would stop meropenem today, at risk for enterococcus with prolonged exposure to meropenem   Deescalate caspofungin to fluconazole  - also would stop doxycycline after today- received total of 10 day course  --Enterococcus not VRE, initially recommended linezolid but can switch now linezolid to vancomycin 1.5 g x1 then 1.25 mg q12h   --repeat BC tomorrow  --if BC remain positive will also favor removing midline and exchange RIJ    --Would obtain TTE  - s/p paracentesis today, follow cx , counts not c/w SBP   --S/P empiric fluconazole (8/12 -->8/26), Now escalated to Caspofungin --->(8/27), deescalated to Fluconazole 8/28--->  --Continue to follow CBC with diff  --Continue to follow renal function (Cr/BUN)  --Continue to follow transaminases  --Continue to follow temperature curve  --Follow up on preliminary blood cultures  --If able will send BAL cultures for legionella & PJP PCR    #Positive BCx (8/12) (Staph epi)  Consistent with contaminant   as repeat testing is negative x many    #CMV PCR   --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 Would repeat CMV PCR on (8/26)  -- may need to treat if increasing  Cytomegalovirus By PCR: 4890 --->4730 --->1020 --->867 (8/20)    #Pre Transplant Evaluation   HIV-1/2 Combo Result: Nonreactive  EBVPCR Log: NotDetec Ucn65BW/mL   Hepatitis A IgG Ab Result: Reactive   Hepatitis B Core Antibody, Total: Nonreactive  Hepatitis B Surface Antibody: Reactive   Hepatitis B DNA PCR Log: Not Detected  Hepatitis B Surface Antigen: Nonreactive  Hepatitis C Virus Interpretation: Nonreactive  COVID-19 De Domain Antibody: Positive  Treponema Pallidum Antibody Interpretation: Negative  HSV 1 IgG  Positive/HSV 2 IgG Negative  EBV Serology Positive  CMV IgG Positive  VZV IgG Positive  Measles Positive, Mumps Positive and Rubella IgG Positive  Toxoplasma IgG Positive  QuantiFeron Gold Negative  Strongyloides Ab Negative  Babesia PCR negative  --Schistosoma IgG - NEGATIVE  --Reportedly Lyme serology was previously positive and remains positive, Tick Diseases Panel is negative for additional tick born exposures  --Western blot negative, serologies not concerning for active Lyme disease  -- On doxycycline 100mg bid for atypical coverage   --Will send urine for Legionella, if negative can stop doxycycline    #Encounter to Vaccinate Patient - Will defer vaccination in context of critical illness  COVID19: No primary series. Would benefit from COVID19 6567-2733 dose  Influenza: Would benefit from dose next season  Pneumococcal: Would benefit from PCV20  HAV: Immune, no additional vaccines indicated  HBV: Immune, no additional vaccines indicated  MMR: Immune, will not require further vaccination  Varicella: Immune, will not require further vaccination  Shingles: Would benefit from Shingrix  Tdap: Would benefit from Tdap

## 2024-08-28 NOTE — PROGRESS NOTE ADULT - SUBJECTIVE AND OBJECTIVE BOX
Follow Up:      Interval History:    REVIEW OF SYSTEMS  [  ] ROS unobtainable because:    [  ] All other systems negative except as noted below    Constitutional:  [ ] fever [ ] chills  [ ] weight loss  [ ] weakness  Skin:  [ ] rash [ ] phlebitis	  Eyes: [ ] icterus [ ] pain  [ ] discharge	  ENMT: [ ] sore throat  [ ] thrush [ ] ulcers [ ] exudates  Respiratory: [ ] dyspnea [ ] hemoptysis [ ] cough [ ] sputum	  Cardiovascular:  [ ] chest pain [ ] palpitations [ ] edema	  Gastrointestinal:  [ ] nausea [ ] vomiting [ ] diarrhea [ ] constipation [ ] pain	  Genitourinary:  [ ] dysuria [ ] frequency [ ] hematuria [ ] discharge [ ] flank pain  [ ] incontinence  Musculoskeletal:  [ ] myalgias [ ] arthralgias [ ] arthritis  [ ] back pain  Neurological:  [ ] headache [ ] seizures  [ ] confusion/altered mental status    Allergies  No Known Allergies        ANTIMICROBIALS:  fluconAZOLE IVPB 400 every 24 hours  meropenem  IVPB 1000 every 8 hours  rifAXIMin 550 every 12 hours  vancomycin  IVPB    vancomycin  IVPB 1250 every 12 hours      OTHER MEDS:  MEDICATIONS  (STANDING):  epoetin kareem (EPOGEN) Injectable 24488 <User Schedule>  hydrocortisone sodium succinate Injectable 50 every 6 hours  insulin lispro (ADMELOG) corrective regimen sliding scale  every 6 hours  norepinephrine Infusion 0.5 <Continuous>  pantoprazole  Injectable 40 two times a day  vasopressin Infusion 0.03 <Continuous>      Vital Signs Last 24 Hrs  T(C): 36.1 (28 Aug 2024 07:00), Max: 36.7 (27 Aug 2024 22:00)  T(F): 97 (28 Aug 2024 07:00), Max: 98.1 (27 Aug 2024 22:00)  HR: 66 (28 Aug 2024 11:15) (63 - 99)  BP: 111/54 (28 Aug 2024 07:30) (111/54 - 152/63)  BP(mean): 78 (28 Aug 2024 07:30) (78 - 91)  RR: 21 (28 Aug 2024 11:15) (11 - 32)  SpO2: 100% (28 Aug 2024 11:15) (98% - 100%)    Parameters below as of 28 Aug 2024 07:00  Patient On (Oxygen Delivery Method): room air        PHYSICAL EXAMINATION:  General: Alert and Awake, NAD  HEENT: PERRL, EOMI  Neck: Supple  Cardiac: RRR, No M/R/G  Resp: CTAB, No Wh/Rh/Ra  Abdomen: NBS, NT/ND, No HSM, No rigidity or guarding  MSK: No LE edema. No Calf tenderness  : No trejo  Skin: No rashes or lesions. Skin is warm and dry to the touch.   Neuro: Alert and Awake. CN 2-12 Grossly intact. Moves all four extremities spontaneously.  Psych: Calm, Pleasant, Cooperative                          6.6    19.23 )-----------( 120      ( 28 Aug 2024 06:35 )             19.4       08-28    136  |  104  |  18  ----------------------------<  172<H>  4.7   |  19<L>  |  1.16    Ca    8.4      28 Aug 2024 06:35  Phos  4.8     08-28  Mg     2.3     08-28    TPro  5.7<L>  /  Alb  3.0<L>  /  TBili  11.0<H>  /  DBili  x   /  AST  55<H>  /  ALT  31  /  AlkPhos  154<H>  08-28      Urinalysis Basic - ( 28 Aug 2024 06:35 )    Color: x / Appearance: x / SG: x / pH: x  Gluc: 172 mg/dL / Ketone: x  / Bili: x / Urobili: x   Blood: x / Protein: x / Nitrite: x   Leuk Esterase: x / RBC: x / WBC x   Sq Epi: x / Non Sq Epi: x / Bacteria: x        MICROBIOLOGY:  v  Ascites Fl Ascites Fluid  08-27-24   Testing in progress  --    No polymorphonuclear cells seen  No organisms seen  by cytocentrifuge      .Blood Blood-Peripheral  08-26-24   No growth at 24 hours  --  --      .Blood Blood-Peripheral  08-26-24   Growth in aerobic bottle: Gram positive cocci in pairs  Direct identification is available within approximately 3-5  hours either by Blood Panel Multiplexed PCR or Direct  MALDI-TOF. Details: [email or web address]  --  Blood Culture PCR      .Blood Blood  08-23-24   No growth at 4 days  --  --      .Blood Blood  08-23-24   No growth at 4 days  --  --      .Blood Blood-Venous  08-21-24   No growth at 5 days  --  --      .Blood Blood-Venous  08-21-24   No growth at 5 days  --  --      Combi-Cath Combi-Cath  08-21-24   Normal Respiratory Elo present  --    Moderate polymorphonuclear leukocytes per low power field  Rare Squamous epithelial cells per low power field  No organisms seen per oil power field      .Blood Blood  08-20-24   No growth at 5 days  --  --      .Blood Blood  08-20-24   No growth at 5 days  --  --      Peritoneal Peritoneal Fluid  08-14-24   No growth at 5 days  --    polymorphonuclear leukocytes seen  No organisms seen  by cytocentrifuge      .Blood Blood-Venous  08-14-24   No growth at 5 days  --  --      .Blood Blood-Venous  08-14-24   No growth at 5 days  --  --      .Blood Blood-Peripheral  08-14-24   No growth at 5 days  --  --      .Blood Blood  08-12-24   No growth at 5 days  --  --      .Blood Blood  08-12-24   Growth in aerobic bottle: Staphylococcus epidermidis Isolation of  Coagulase negative Staphylococcus from single blood culture sets may  represent  contamination. Contact the Microbiology Department at [phone] if  susceptibility testing is  clinically indicated.  Direct identification is available within approximately 3-5  hours either by Blood Panel Multiplexed PCR or Direct  MALDI-TOF. Details: [email or web address]  --  Blood Culture PCR        CMV IgG Antibody: >10.00 U/mL (08-20-24 @ 00:08)  Toxoplasma IgG Screen: 44.00 IU/mL (08-12-24 @ 21:15)  CMV IgG Antibody: >10.00 U/mL (08-12-24 @ 21:15)          RADIOLOGY:    <The imaging below has been reviewed and visualized by me independently. Findings as detailed in report below>    < from: Xray Chest 1 View- PORTABLE-Routine (Xray Chest 1 View- PORTABLE-Routine in AM.) (08.27.24 @ 07:33) >  INTERPRETATION:  Chest one view    HISTORY: Pleural effusion    COMPARISON STUDY: 8/26/2024    Frontal expiratory view of the chest shows the heart to be similar in   size. Right jugular catheters and feeding tube are present.    The lungs show similar pulmonary vascularity with progression of small   left effusion and reduction of right effusion. There is no evidence of   pneumothorax.    IMPRESSION:  Changing effusions.        Thank you for the courtesy of this referral.    --- End of Report ---            < end of copied text >   Follow Up: Leukocytosis     Interval History:  Afebrile, more alert today  S/P diagnostic para- cell count not consistent with SBP    REVIEW OF SYSTEMS  [  ] ROS unobtainable because:    [ X ] All other systems negative except as noted below    Constitutional:  [ ] fever [ ] chills  [ ] weight loss  [ ] weakness  Skin:  [ ] rash [ ] phlebitis	  Eyes: [ ] icterus [ ] pain  [ ] discharge	  ENMT: [ ] sore throat  [ ] thrush [ ] ulcers [ ] exudates  Respiratory: [ ] dyspnea [ ] hemoptysis [ ] cough [ ] sputum	  Cardiovascular:  [ ] chest pain [ ] palpitations [ ] edema	  Gastrointestinal:  [ ] nausea [ ] vomiting [ ] diarrhea [ ] constipation [ ] pain	  Genitourinary:  [ ] dysuria [ ] frequency [ ] hematuria [ ] discharge [ ] flank pain  [ ] incontinence  Musculoskeletal:  [ ] myalgias [ ] arthralgias [ ] arthritis  [ ] back pain  Neurological:  [ ] headache [ ] seizures  [ ] confusion/altered mental status    Allergies  No Known Allergies        ANTIMICROBIALS:  fluconAZOLE IVPB 400 every 24 hours  meropenem  IVPB 1000 every 8 hours  rifAXIMin 550 every 12 hours  vancomycin  IVPB    vancomycin  IVPB 1250 every 12 hours      OTHER MEDS:  MEDICATIONS  (STANDING):  epoetin kareem (EPOGEN) Injectable 42257 <User Schedule>  hydrocortisone sodium succinate Injectable 50 every 6 hours  insulin lispro (ADMELOG) corrective regimen sliding scale  every 6 hours  norepinephrine Infusion 0.5 <Continuous>  pantoprazole  Injectable 40 two times a day  vasopressin Infusion 0.03 <Continuous>      Vital Signs Last 24 Hrs  T(C): 36.1 (28 Aug 2024 07:00), Max: 36.7 (27 Aug 2024 22:00)  T(F): 97 (28 Aug 2024 07:00), Max: 98.1 (27 Aug 2024 22:00)  HR: 66 (28 Aug 2024 11:15) (63 - 99)  BP: 111/54 (28 Aug 2024 07:30) (111/54 - 152/63)  BP(mean): 78 (28 Aug 2024 07:30) (78 - 91)  RR: 21 (28 Aug 2024 11:15) (11 - 32)  SpO2: 100% (28 Aug 2024 11:15) (98% - 100%)    Parameters below as of 28 Aug 2024 07:00  Patient On (Oxygen Delivery Method): room air        PHYSICAL EXAMINATION:  General: Alert and Awake, NAD, jaundice  HEENT: scleral icterus  Cardiac: RRR, No M/R/G  Resp: CTAB, No Wh/Rh/Ra  Abdomen: NBS, NT/ND, No rigidity or guarding  MSK: ++ LE edema. No Calf tenderness  Skin: No rashes or lesions. Skin is warm and dry to the touch.   Neuro: Alert and Awake. CN 2-12 Grossly intact. Moves all four extremities spontaneously.  Psych: Calm, Pleasant, Cooperative                        6.6    19.23 )-----------( 120      ( 28 Aug 2024 06:35 )             19.4       08-28    136  |  104  |  18  ----------------------------<  172<H>  4.7   |  19<L>  |  1.16    Ca    8.4      28 Aug 2024 06:35  Phos  4.8     08-28  Mg     2.3     08-28    TPro  5.7<L>  /  Alb  3.0<L>  /  TBili  11.0<H>  /  DBili  x   /  AST  55<H>  /  ALT  31  /  AlkPhos  154<H>  08-28      Urinalysis Basic - ( 28 Aug 2024 06:35 )    Color: x / Appearance: x / SG: x / pH: x  Gluc: 172 mg/dL / Ketone: x  / Bili: x / Urobili: x   Blood: x / Protein: x / Nitrite: x   Leuk Esterase: x / RBC: x / WBC x   Sq Epi: x / Non Sq Epi: x / Bacteria: x        MICROBIOLOGY:  v  Ascites Fl Ascites Fluid  08-27-24   Testing in progress  --    No polymorphonuclear cells seen  No organisms seen  by cytocentrifuge      .Blood Blood-Peripheral  08-26-24   No growth at 24 hours  --  --      .Blood Blood-Peripheral  08-26-24   Growth in aerobic bottle: Gram positive cocci in pairs  Direct identification is available within approximately 3-5  hours either by Blood Panel Multiplexed PCR or Direct  MALDI-TOF. Details: [email or web address]  --  Blood Culture PCR      .Blood Blood  08-23-24   No growth at 4 days  --  --      .Blood Blood  08-23-24   No growth at 4 days  --  --      .Blood Blood-Venous  08-21-24   No growth at 5 days  --  --      .Blood Blood-Venous  08-21-24   No growth at 5 days  --  --      Combi-Cath Combi-Cath  08-21-24   Normal Respiratory Elo present  --    Moderate polymorphonuclear leukocytes per low power field  Rare Squamous epithelial cells per low power field  No organisms seen per oil power field      .Blood Blood  08-20-24   No growth at 5 days  --  --      .Blood Blood  08-20-24   No growth at 5 days  --  --      Peritoneal Peritoneal Fluid  08-14-24   No growth at 5 days  --    polymorphonuclear leukocytes seen  No organisms seen  by cytocentrifuge      .Blood Blood-Venous  08-14-24   No growth at 5 days  --  --      .Blood Blood-Venous  08-14-24   No growth at 5 days  --  --      .Blood Blood-Peripheral  08-14-24   No growth at 5 days  --  --      .Blood Blood  08-12-24   No growth at 5 days  --  --      .Blood Blood  08-12-24   Growth in aerobic bottle: Staphylococcus epidermidis Isolation of  Coagulase negative Staphylococcus from single blood culture sets may  represent  contamination. Contact the Microbiology Department at [phone] if  susceptibility testing is  clinically indicated.  Direct identification is available within approximately 3-5  hours either by Blood Panel Multiplexed PCR or Direct  MALDI-TOF. Details: [email or web address]  --  Blood Culture PCR        CMV IgG Antibody: >10.00 U/mL (08-20-24 @ 00:08)  Toxoplasma IgG Screen: 44.00 IU/mL (08-12-24 @ 21:15)  CMV IgG Antibody: >10.00 U/mL (08-12-24 @ 21:15)          RADIOLOGY:    <The imaging below has been reviewed and visualized by me independently. Findings as detailed in report below>    < from: Xray Chest 1 View- PORTABLE-Routine (Xray Chest 1 View- PORTABLE-Routine in AM.) (08.27.24 @ 07:33) >  INTERPRETATION:  Chest one view    HISTORY: Pleural effusion    COMPARISON STUDY: 8/26/2024    Frontal expiratory view of the chest shows the heart to be similar in   size. Right jugular catheters and feeding tube are present.    The lungs show similar pulmonary vascularity with progression of small   left effusion and reduction of right effusion. There is no evidence of   pneumothorax.    IMPRESSION:  Changing effusions.        Thank you for the courtesy of this referral.    --- End of Report ---            < end of copied text >

## 2024-08-29 NOTE — PROGRESS NOTE ADULT - ATTENDING COMMENTS
Pt seen and examined with SICU team, agree with above.    Decompensated cirrhosis with hepatic encephalopathy, ascites, HRS on CRRT with anuria:  - Per pt's family, pt's wishes are that all invasive medical interventions continue regardless of quality of life or prognosis.   - Levo requirements increasing, lactate normal, mental status waxing and waning (A&O 1-3)  - Pt has been on levo for days but generally dose increases when net volume status is made negative on CRRT. Overnight, started to maintain -25cc/hr net balance, but this morning, levo dose was up to 0.34mcg/kg/min, so returned to net even fluid balance on CRRT.  - If levo requirements increase further, will attempt fluid challenge with albumin  - Continue rifaxamin/ lactulose via ngt for hepatic encephalopathy  - Holding tube feeds currently, will resume if levo dose decreases to less than 0.2mcg/kg/min  - Diagnostic paracentesis 8/27 neg  - Blood culture 8/26 with E. faecium - unclear source  - Repeat blood cultures from 8/28 pending (received in lab)  - Continue kori/ vanc/ fluc  - S/p treatment for Lyme  - Will d/c stress dose steroids as they have not made any clinical significance in hemodynamic status    - Shiley and TLC both placed 8/21  - [place] placed 8/26    Aflutter:  - Per EP, no intervention needed    Duodenal ulcer with UGIB:  - S/p clipping  - On Protonix BID  - Hb stable    Total time spent in the care of this patient today (excluding procedures & teaching): 45 min .

## 2024-08-29 NOTE — PROGRESS NOTE ADULT - SUBJECTIVE AND OBJECTIVE BOX
Transplant Surgery - Multidisciplinary Rounds  --------------------------------------------------------------    Present:   Patient seen and examined with multidisciplinary Transplant team including  Surgeon: Dr. Layton, Hepatologist Dr. Jacobs,  Pharmacist, Jenny Mack RNs in AM rounds.   Disciplines not in attendance will be notified of the plan.     Interval Events:  - dx para neg for sbp  - 1u prbc, 1u plt   - CRRT net even   - remains on levo/vaso  - echo neg for vegetations    Education:  Medications    Plan of care:  See Below          MEDICATIONS  (STANDING):  chlorhexidine 2% Cloths 1 Application(s) Topical <User Schedule>  CRRT Treatment    <Continuous>  epoetin kareem (EPOGEN) Injectable 66132 Unit(s) SubCutaneous <User Schedule>  fluconAZOLE IVPB 400 milliGRAM(s) IV Intermittent every 24 hours  folic acid 1 milliGRAM(s) Oral daily  hydrocortisone sodium succinate Injectable 50 milliGRAM(s) IV Push every 6 hours  insulin lispro (ADMELOG) corrective regimen sliding scale   SubCutaneous every 6 hours  lidocaine   4% Patch 1 Patch Transdermal daily  meropenem  IVPB 1000 milliGRAM(s) IV Intermittent every 8 hours  multivitamin/minerals/iron Oral Solution (CENTRUM) 15 milliLiter(s) Enteral Tube daily  norepinephrine Infusion 0.5 MICROgram(s)/kG/Min (53.4 mL/Hr) IV Continuous <Continuous>  pantoprazole  Injectable 40 milliGRAM(s) IV Push two times a day  Phoxillum Filtration BK 4 / 2.5 5000 milliLiter(s) (2000 mL/Hr) CRRT <Continuous>  PrismaSATE Dialysate BGK 4 / 2.5 5000 milliLiter(s) (1000 mL/Hr) CRRT <Continuous>  PrismaSOL Filtration BGK 4 / 2.5 5000 milliLiter(s) (200 mL/Hr) CRRT <Continuous>  rifAXIMin 550 milliGRAM(s) Oral every 12 hours  thiamine 100 milliGRAM(s) Oral daily  vancomycin  IVPB 1250 milliGRAM(s) IV Intermittent every 12 hours  vasopressin Infusion 0.03 Unit(s)/Min (4.5 mL/Hr) IV Continuous <Continuous>    MEDICATIONS  (PRN):      PAST MEDICAL & SURGICAL HISTORY:  Alcohol abuse      No significant past surgical history          Vital Signs Last 24 Hrs  T(C): 36.7 (28 Aug 2024 22:00), Max: 36.7 (28 Aug 2024 17:00)  T(F): 98 (28 Aug 2024 22:00), Max: 98 (28 Aug 2024 17:00)  HR: 66 (29 Aug 2024 00:45) (63 - 89)  BP: 123/56 (28 Aug 2024 14:30) (111/54 - 123/56)  BP(mean): 81 (28 Aug 2024 14:30) (78 - 81)  RR: 10 (29 Aug 2024 00:45) (10 - 34)  SpO2: 100% (29 Aug 2024 00:45) (91% - 100%)    Parameters below as of 28 Aug 2024 22:00  Patient On (Oxygen Delivery Method): room air        I&O's Summary    27 Aug 2024 07:01  -  28 Aug 2024 07:00  --------------------------------------------------------  IN: 3068.4 mL / OUT: 1074 mL / NET: 1994.4 mL    28 Aug 2024 07:01  -  29 Aug 2024 00:52  --------------------------------------------------------  IN: 2105 mL / OUT: 1171 mL / NET: 934 mL                              7.9    17.94 )-----------( 143      ( 29 Aug 2024 00:30 )             22.1     08-28    137  |  103  |  17  ----------------------------<  149<H>  4.5   |  20<L>  |  1.04    Ca    8.4      28 Aug 2024 18:31  Phos  4.1     08-28  Mg     2.3     08-28    TPro  5.6<L>  /  Alb  3.0<L>  /  TBili  10.9<H>  /  DBili  x   /  AST  53<H>  /  ALT  31  /  AlkPhos  159<H>  08-28          Culture - Fungal, Body Fluid (collected 08-27-24 @ 16:29)  Source: Ascites Fl Ascites Fluid  Preliminary Report (08-28-24 @ 23:03):    Culture is being performed. Fungal cultures are held for 4 weeks.    Culture - Body Fluid with Gram Stain (collected 08-27-24 @ 16:29)  Source: Ascites Fl Ascites Fluid  Gram Stain (08-28-24 @ 03:35):    No polymorphonuclear cells seen    No organisms seen    by cytocentrifuge  Preliminary Report (08-28-24 @ 20:25):    No growth    Culture - Blood (collected 08-26-24 @ 18:42)  Source: .Blood Blood-Peripheral  Preliminary Report (08-28-24 @ 01:02):    No growth at 24 hours    Culture - Blood (collected 08-26-24 @ 18:28)  Source: .Blood Blood-Peripheral  Gram Stain (08-27-24 @ 15:12):    Growth in aerobic bottle: Gram positive cocci in pairs  Preliminary Report (08-28-24 @ 12:59):    Growth in aerobic bottle: Enterococcus faecium    Direct identification is available within approximately 3-5    hours either by Blood Panel Multiplexed PCR or Direct    MALDI-TOF. Details: [email or web address]  Organism: Blood Culture PCR (08-27-24 @ 17:02)  Organism: Blood Culture PCR (08-27-24 @ 17:02)    Culture - Blood (collected 08-23-24 @ 09:59)  Source: .Blood Blood  Final Report (08-28-24 @ 15:00):    No growth at 5 days    Culture - Blood (collected 08-23-24 @ 09:45)  Source: .Blood Blood  Final Report (08-28-24 @ 15:00):    No growth at 5 days                  Review of systems  All other systems were reviewed and are negative, except as noted.      PHYSICAL EXAM:  Constitutional: Well developed / well nourished  Eyes:  PERRLA  ENMT: NC/AT  Neck: supple,   Respiratory: CTA B/L  Cardiovascular: RRR  Gastrointestinal: Soft abdomen, ND, L sided abdominal wall hernia--stable, NT  Genitourinary: anuric   Extremities: SCD's in place and working bilaterally  Vascular: Palpable dp pulses bilaterally.   Neurological: waking up  Skin: no rashes, ulcerations, lesions  Musculoskeletal: Moving all extremities     Transplant Surgery - Multidisciplinary Rounds  --------------------------------------------------------------    Present:   Patient seen and examined with multidisciplinary Transplant team including  Surgeon: Dr. Layton, Hepatologist Dr. Jacobs,  Pharmacist, Jenny Mack RNs in AM rounds.   Disciplines not in attendance will be notified of the plan.     Interval Events:  - restart trickle feeds  - CRRT net negative 25  - continue to hold bowel regimen  - duplex LE negative for DVT  - 1u pRBC, 1 u plt transfusion in AM  - 1u pRBC, 1u plt ovn    Education:  Medications    Plan of care:  See Below      MEDICATIONS  (STANDING):  chlorhexidine 2% Cloths 1 Application(s) Topical <User Schedule>  CRRT Treatment    <Continuous>  epoetin kareem (EPOGEN) Injectable 37144 Unit(s) SubCutaneous <User Schedule>  fluconAZOLE IVPB 400 milliGRAM(s) IV Intermittent every 24 hours  folic acid 1 milliGRAM(s) Oral daily  insulin lispro (ADMELOG) corrective regimen sliding scale   SubCutaneous every 6 hours  lactulose Syrup 20 Gram(s) Enteral Tube every 12 hours  lidocaine   4% Patch 1 Patch Transdermal daily  meropenem  IVPB 1000 milliGRAM(s) IV Intermittent every 8 hours  multivitamin/minerals/iron Oral Solution (CENTRUM) 15 milliLiter(s) Enteral Tube daily  norepinephrine Infusion 0.5 MICROgram(s)/kG/Min (53.4 mL/Hr) IV Continuous <Continuous>  pantoprazole  Injectable 40 milliGRAM(s) IV Push two times a day  Phoxillum Filtration BK 4 / 2.5 5000 milliLiter(s) (2000 mL/Hr) CRRT <Continuous>  PrismaSATE Dialysate BGK 4 / 2.5 5000 milliLiter(s) (1000 mL/Hr) CRRT <Continuous>  PrismaSOL Filtration BGK 4 / 2.5 5000 milliLiter(s) (200 mL/Hr) CRRT <Continuous>  rifAXIMin 550 milliGRAM(s) Oral every 12 hours  thiamine 100 milliGRAM(s) Oral daily  vancomycin  IVPB 1250 milliGRAM(s) IV Intermittent every 12 hours  vasopressin Infusion 0.03 Unit(s)/Min (4.5 mL/Hr) IV Continuous <Continuous>    MEDICATIONS  (PRN):      PAST MEDICAL & SURGICAL HISTORY:  Alcohol abuse      No significant past surgical history      Vital Signs Last 24 Hrs  T(C): 36.2 (29 Aug 2024 11:00), Max: 36.7 (28 Aug 2024 17:00)  T(F): 97.2 (29 Aug 2024 11:00), Max: 98.1 (29 Aug 2024 07:00)  HR: 66 (29 Aug 2024 11:45) (64 - 70)  BP: 111/56 (29 Aug 2024 07:15) (111/56 - 123/56)  BP(mean): 81 (29 Aug 2024 07:15) (81 - 81)  RR: 14 (29 Aug 2024 11:45) (10 - 43)  SpO2: 100% (29 Aug 2024 11:45) (91% - 100%)    Parameters below as of 29 Aug 2024 11:00  Patient On (Oxygen Delivery Method): room air        I&O's Summary    28 Aug 2024 07:01  -  29 Aug 2024 07:00  --------------------------------------------------------  IN: 2819 mL / OUT: 1992 mL / NET: 827 mL    29 Aug 2024 07:01  -  29 Aug 2024 12:15  --------------------------------------------------------  IN: 494.4 mL / OUT: 537 mL / NET: -42.6 mL                              7.9    16.71 )-----------( 139      ( 29 Aug 2024 06:40 )             22.7     08-29    137  |  101  |  14  ----------------------------<  170<H>  4.6   |  19<L>  |  1.01    Ca    8.7      29 Aug 2024 06:35  Phos  4.0     08-29  Mg     2.4     08-29    TPro  5.9<L>  /  Alb  3.1<L>  /  TBili  10.6<H>  /  DBili  x   /  AST  57<H>  /  ALT  32  /  AlkPhos  175<H>  08-29          Culture - Fungal, Body Fluid (collected 08-27-24 @ 16:29)  Source: Ascites Fl Ascites Fluid  Preliminary Report (08-28-24 @ 23:03):    Culture is being performed. Fungal cultures are held for 4 weeks.    Culture - Body Fluid with Gram Stain (collected 08-27-24 @ 16:29)  Source: Ascites Fl Ascites Fluid  Gram Stain (08-28-24 @ 03:35):    No polymorphonuclear cells seen    No organisms seen    by cytocentrifuge  Preliminary Report (08-28-24 @ 20:25):    No growth    Culture - Blood (collected 08-26-24 @ 18:42)  Source: .Blood Blood-Peripheral  Preliminary Report (08-29-24 @ 01:02):    No growth at 48 Hours    Culture - Blood (collected 08-26-24 @ 18:28)  Source: .Blood Blood-Peripheral  Gram Stain (08-27-24 @ 15:12):    Growth in aerobic bottle: Gram positive cocci in pairs  Preliminary Report (08-28-24 @ 12:59):    Growth in aerobic bottle: Enterococcus faecium    Direct identification is available within approximately 3-5    hours either by Blood Panel Multiplexed PCR or Direct    MALDI-TOF. Details: [email or web address]  Organism: Blood Culture PCR (08-27-24 @ 17:02)  Organism: Blood Culture PCR (08-27-24 @ 17:02)    Culture - Blood (collected 08-23-24 @ 09:59)  Source: .Blood Blood  Final Report (08-28-24 @ 15:00):    No growth at 5 days    Culture - Blood (collected 08-23-24 @ 09:45)  Source: .Blood Blood  Final Report (08-28-24 @ 15:00):    No growth at 5 days        Review of systems  All other systems were reviewed and are negative, except as noted.      PHYSICAL EXAM:  Constitutional: Well developed / well nourished  Eyes:  PERRLA  ENMT: NC/AT  Neck: supple,   Respiratory: CTA B/L  Cardiovascular: RRR  Gastrointestinal: Soft abdomen, ND, L sided abdominal wall hernia--stable, NT  Genitourinary: anuric   Extremities: SCD's in place and working bilaterally  Vascular: Palpable dp pulses bilaterally.   Neurological: waking up  Skin: no rashes, ulcerations, lesions  Musculoskeletal: Moving all extremities     Transplant Surgery - Multidisciplinary Rounds  --------------------------------------------------------------    Present:   Patient seen and examined with multidisciplinary Transplant team including  Surgeon: Dr. Layton, Hepatologist Dr. Jacobs,  Pharmacist, Jenny Mack RNs in AM rounds.   Disciplines not in attendance will be notified of the plan.     Interval Events:  - restart triple feeds  - CRRT net negative 25  - continue to hold bowel regimen  - duplex LE negative for DVT  - 1u pRBC, 1 u plt transfusion in AM  - 1u pRBC, 1u plt ovn    Education:  Medications    Plan of care:  See Below      MEDICATIONS  (STANDING):  chlorhexidine 2% Cloths 1 Application(s) Topical <User Schedule>  CRRT Treatment    <Continuous>  epoetin kareem (EPOGEN) Injectable 10025 Unit(s) SubCutaneous <User Schedule>  fluconAZOLE IVPB 400 milliGRAM(s) IV Intermittent every 24 hours  folic acid 1 milliGRAM(s) Oral daily  insulin lispro (ADMELOG) corrective regimen sliding scale   SubCutaneous every 6 hours  lactulose Syrup 20 Gram(s) Enteral Tube every 12 hours  lidocaine   4% Patch 1 Patch Transdermal daily  meropenem  IVPB 1000 milliGRAM(s) IV Intermittent every 8 hours  multivitamin/minerals/iron Oral Solution (CENTRUM) 15 milliLiter(s) Enteral Tube daily  norepinephrine Infusion 0.5 MICROgram(s)/kG/Min (53.4 mL/Hr) IV Continuous <Continuous>  pantoprazole  Injectable 40 milliGRAM(s) IV Push two times a day  Phoxillum Filtration BK 4 / 2.5 5000 milliLiter(s) (2000 mL/Hr) CRRT <Continuous>  PrismaSATE Dialysate BGK 4 / 2.5 5000 milliLiter(s) (1000 mL/Hr) CRRT <Continuous>  PrismaSOL Filtration BGK 4 / 2.5 5000 milliLiter(s) (200 mL/Hr) CRRT <Continuous>  rifAXIMin 550 milliGRAM(s) Oral every 12 hours  thiamine 100 milliGRAM(s) Oral daily  vancomycin  IVPB 1250 milliGRAM(s) IV Intermittent every 12 hours  vasopressin Infusion 0.03 Unit(s)/Min (4.5 mL/Hr) IV Continuous <Continuous>    MEDICATIONS  (PRN):      PAST MEDICAL & SURGICAL HISTORY:  Alcohol abuse      No significant past surgical history      Vital Signs Last 24 Hrs  T(C): 36.2 (29 Aug 2024 11:00), Max: 36.7 (28 Aug 2024 17:00)  T(F): 97.2 (29 Aug 2024 11:00), Max: 98.1 (29 Aug 2024 07:00)  HR: 66 (29 Aug 2024 11:45) (64 - 70)  BP: 111/56 (29 Aug 2024 07:15) (111/56 - 123/56)  BP(mean): 81 (29 Aug 2024 07:15) (81 - 81)  RR: 14 (29 Aug 2024 11:45) (10 - 43)  SpO2: 100% (29 Aug 2024 11:45) (91% - 100%)    Parameters below as of 29 Aug 2024 11:00  Patient On (Oxygen Delivery Method): room air        I&O's Summary    28 Aug 2024 07:01  -  29 Aug 2024 07:00  --------------------------------------------------------  IN: 2819 mL / OUT: 1992 mL / NET: 827 mL    29 Aug 2024 07:01  -  29 Aug 2024 12:15  --------------------------------------------------------  IN: 494.4 mL / OUT: 537 mL / NET: -42.6 mL                              7.9    16.71 )-----------( 139      ( 29 Aug 2024 06:40 )             22.7     08-29    137  |  101  |  14  ----------------------------<  170<H>  4.6   |  19<L>  |  1.01    Ca    8.7      29 Aug 2024 06:35  Phos  4.0     08-29  Mg     2.4     08-29    TPro  5.9<L>  /  Alb  3.1<L>  /  TBili  10.6<H>  /  DBili  x   /  AST  57<H>  /  ALT  32  /  AlkPhos  175<H>  08-29          Culture - Fungal, Body Fluid (collected 08-27-24 @ 16:29)  Source: Ascites Fl Ascites Fluid  Preliminary Report (08-28-24 @ 23:03):    Culture is being performed. Fungal cultures are held for 4 weeks.    Culture - Body Fluid with Gram Stain (collected 08-27-24 @ 16:29)  Source: Ascites Fl Ascites Fluid  Gram Stain (08-28-24 @ 03:35):    No polymorphonuclear cells seen    No organisms seen    by cytocentrifuge  Preliminary Report (08-28-24 @ 20:25):    No growth    Culture - Blood (collected 08-26-24 @ 18:42)  Source: .Blood Blood-Peripheral  Preliminary Report (08-29-24 @ 01:02):    No growth at 48 Hours    Culture - Blood (collected 08-26-24 @ 18:28)  Source: .Blood Blood-Peripheral  Gram Stain (08-27-24 @ 15:12):    Growth in aerobic bottle: Gram positive cocci in pairs  Preliminary Report (08-28-24 @ 12:59):    Growth in aerobic bottle: Enterococcus faecium    Direct identification is available within approximately 3-5    hours either by Blood Panel Multiplexed PCR or Direct    MALDI-TOF. Details: [email or web address]  Organism: Blood Culture PCR (08-27-24 @ 17:02)  Organism: Blood Culture PCR (08-27-24 @ 17:02)    Culture - Blood (collected 08-23-24 @ 09:59)  Source: .Blood Blood  Final Report (08-28-24 @ 15:00):    No growth at 5 days    Culture - Blood (collected 08-23-24 @ 09:45)  Source: .Blood Blood  Final Report (08-28-24 @ 15:00):    No growth at 5 days        Review of systems  All other systems were reviewed and are negative, except as noted.      PHYSICAL EXAM:  Constitutional: Well developed / well nourished  Eyes:  PERRLA  ENMT: NC/AT  Neck: supple,   Respiratory: CTA B/L  Cardiovascular: RRR  Gastrointestinal: Soft abdomen, ND, L sided abdominal wall hernia--stable, NT  Genitourinary: anuric   Extremities: SCD's in place and working bilaterally  Vascular: Palpable dp pulses bilaterally.   Neurological: waking up  Skin: no rashes, ulcerations, lesions  Musculoskeletal: Moving all extremities     Transplant Surgery - Multidisciplinary Rounds  --------------------------------------------------------------    Present:   Patient seen and examined with multidisciplinary Transplant team including  Surgeon: Dr. Layton, Hepatologist Dr. Jacobs,  Pharmacist, Jenny Mack RNs in AM rounds.   Disciplines not in attendance will be notified of the plan.     Interval Events:  - restart triple feeds  - CRRT net negative 25  - continue to hold bowel regimen  - duplex LE negative for DVT  - 1u pRBC, 1 u plt transfusion in AM  - 1u pRBC, 1u plt ovn    Education:  Medications  Plan of care:  See Below    MEDICATIONS  (STANDING):  chlorhexidine 2% Cloths 1 Application(s) Topical <User Schedule>  CRRT Treatment    <Continuous>  epoetin kareem (EPOGEN) Injectable 45175 Unit(s) SubCutaneous <User Schedule>  fluconAZOLE IVPB 400 milliGRAM(s) IV Intermittent every 24 hours  folic acid 1 milliGRAM(s) Oral daily  insulin lispro (ADMELOG) corrective regimen sliding scale   SubCutaneous every 6 hours  lactulose Syrup 20 Gram(s) Enteral Tube every 12 hours  lidocaine   4% Patch 1 Patch Transdermal daily  meropenem  IVPB 1000 milliGRAM(s) IV Intermittent every 8 hours  multivitamin/minerals/iron Oral Solution (CENTRUM) 15 milliLiter(s) Enteral Tube daily  norepinephrine Infusion 0.5 MICROgram(s)/kG/Min (53.4 mL/Hr) IV Continuous <Continuous>  pantoprazole  Injectable 40 milliGRAM(s) IV Push two times a day  Phoxillum Filtration BK 4 / 2.5 5000 milliLiter(s) (2000 mL/Hr) CRRT <Continuous>  PrismaSATE Dialysate BGK 4 / 2.5 5000 milliLiter(s) (1000 mL/Hr) CRRT <Continuous>  PrismaSOL Filtration BGK 4 / 2.5 5000 milliLiter(s) (200 mL/Hr) CRRT <Continuous>  rifAXIMin 550 milliGRAM(s) Oral every 12 hours  thiamine 100 milliGRAM(s) Oral daily  vancomycin  IVPB 1250 milliGRAM(s) IV Intermittent every 12 hours  vasopressin Infusion 0.03 Unit(s)/Min (4.5 mL/Hr) IV Continuous <Continuous>    PAST MEDICAL & SURGICAL HISTORY:  Alcohol abuse  No significant past surgical history    Vital Signs Last 24 Hrs  T(C): 36.2 (29 Aug 2024 11:00), Max: 36.7 (28 Aug 2024 17:00)  T(F): 97.2 (29 Aug 2024 11:00), Max: 98.1 (29 Aug 2024 07:00)  HR: 66 (29 Aug 2024 11:45) (64 - 70)  BP: 111/56 (29 Aug 2024 07:15) (111/56 - 123/56)  BP(mean): 81 (29 Aug 2024 07:15) (81 - 81)  RR: 14 (29 Aug 2024 11:45) (10 - 43)  SpO2: 100% (29 Aug 2024 11:45) (91% - 100%)    Parameters below as of 29 Aug 2024 11:00  Patient On (Oxygen Delivery Method): room air    I&O's Summary    28 Aug 2024 07:01  -  29 Aug 2024 07:00  --------------------------------------------------------  IN: 2819 mL / OUT: 1992 mL / NET: 827 mL    29 Aug 2024 07:01  -  29 Aug 2024 12:15  --------------------------------------------------------  IN: 494.4 mL / OUT: 537 mL / NET: -42.6 mL                        7.9    16.71 )-----------( 139      ( 29 Aug 2024 06:40 )             22.7     08-29    137  |  101  |  14  ----------------------------<  170<H>  4.6   |  19<L>  |  1.01    Ca    8.7      29 Aug 2024 06:35  Phos  4.0     08-29  Mg     2.4     08-29    TPro  5.9<L>  /  Alb  3.1<L>  /  TBili  10.6<H>  /  DBili  x   /  AST  57<H>  /  ALT  32  /  AlkPhos  175<H>  08-29    Culture - Fungal, Body Fluid (collected 08-27-24 @ 16:29)  Source: Ascites Fl Ascites Fluid  Preliminary Report (08-28-24 @ 23:03):    Culture is being performed. Fungal cultures are held for 4 weeks.    Culture - Body Fluid with Gram Stain (collected 08-27-24 @ 16:29)  Source: Ascites Fl Ascites Fluid  Gram Stain (08-28-24 @ 03:35):    No polymorphonuclear cells seen    No organisms seen    by cytocentrifuge  Preliminary Report (08-28-24 @ 20:25):    No growth    Culture - Blood (collected 08-26-24 @ 18:42)  Source: .Blood Blood-Peripheral  Preliminary Report (08-29-24 @ 01:02):    No growth at 48 Hours    Culture - Blood (collected 08-26-24 @ 18:28)  Source: .Blood Blood-Peripheral  Gram Stain (08-27-24 @ 15:12):    Growth in aerobic bottle: Gram positive cocci in pairs  Preliminary Report (08-28-24 @ 12:59):    Growth in aerobic bottle: Enterococcus faecium    Direct identification is available within approximately 3-5    hours either by Blood Panel Multiplexed PCR or Direct    MALDI-TOF. Details: [email or web address]  Organism: Blood Culture PCR (08-27-24 @ 17:02)  Organism: Blood Culture PCR (08-27-24 @ 17:02)    Culture - Blood (collected 08-23-24 @ 09:59)  Source: .Blood Blood  Final Report (08-28-24 @ 15:00):    No growth at 5 days    Culture - Blood (collected 08-23-24 @ 09:45)  Source: .Blood Blood  Final Report (08-28-24 @ 15:00):    No growth at 5 days    Review of systems  All other systems were reviewed and are negative, except as noted.    PHYSICAL EXAM:  Constitutional: Well developed / well nourished  Eyes:  PERRLA  ENMT: NC/AT  Neck: supple,   Respiratory: CTA B/L  Cardiovascular: RRR  Gastrointestinal: Soft abdomen, ND, L sided abdominal wall hernia--stable, NT  Genitourinary: anuric   Extremities: SCD's in place and working bilaterally  Vascular: Palpable dp pulses bilaterally.   Neurological: waking up  Skin: no rashes, ulcerations, lesions  Musculoskeletal: Moving all extremities

## 2024-08-29 NOTE — PROGRESS NOTE ADULT - ASSESSMENT
68 year old male with  AUD complicated by cirrhosis with Hepatic encephalopathy admitted to [facility] with back pain and jaundice on 7/8/24. Pt had dark / maroon colored stools   EGD that showed esophageal varices and duodenal ulcer with visible vessel. He got transfusions for low Hb and last transfusion was on 8/8. 1st session of IHD was on 7/9/24.   Transplant nephrology was consulted for FANY and SLK eval.       1.  FANY VS FANY on CKD 2' likely HRS  -Met SLK criteria on 8/20, currently undergoing eval  -Started on CRRT on 8/13/24 (1st session of IHD was on 7/9/24 completed 6 weeks on 8/20). Has had issues with clotting >> adjusted CRRT.  -Pt remains anuric and on pressor support. Will cw CRRT.       Neeraj Maher  Nephrology Fellow  Feel free to contact me on TEAMS  After 5 pm please contact the on-call Fellow.

## 2024-08-29 NOTE — PROGRESS NOTE ADULT - ASSESSMENT
76 y/o male w/ no known PMHx (does not see doctors per sister) who presented as a transfer from [facility] for SLK evaluation. Patient was initially admitted to [facility] for back pain & jaundice and was diagnosed w/ cirrhosis. Hospital course there was c/b small EVs, melena 2/2 a duodenal ulcer s/p clipping, HRS requiring hemodialysis, hepatic encephalopathy, ascites s/p multiple LVPs, and deconditioning. Patient was admitted to SICU on 8/12 for initiation of CRRT as his BPs were too low to attempt intermittent HD      Neuro:  - A/O x 1-2 waxes and wanes  - Rifaximin for prevention of hepatic encephalopathy  - Monitoring ammonia  - Thiamine, folic acid, & multivitamin for h/o EtOH use    Resp:  - Incentive spirometry to prevent atelectasis  - CT chest 8/25 with RLL pneumonia    CV:  - Goal SBP >110, titrate pressors as able. On Levo gtt, vaso gtt., requirments increasing overnight 8/26 now downtrending   - +/- cardiac catheterization when stable to do so for transplant work-up   - Electrophys: nothing to do while not on AC, follow cards recs  - TTE 8/28 with normal LV function    GI:   - Trickle feed   - Protonix BID for h/o duodenal ulcer  - bowel regimen held as pt with bloody loose diarrhea   - Monitor LFTs  - Undergoing eval for SLK transplantation  - s/p diagnostic para 8/27 neg SBP    Renal:  - CRRT for concern of HRS, maintain CRRT even  - Monitor I&Os and electrolytes w/ repletions as necessary  - Undergoing evaluation for SLK transplantation    Heme:  - Hold chemical VTE ppx in setting of liver failure   - Mechanical VTE ppx with SCDs  - Epogen 3x/week  - duplex LE neg for DVT 8/28     ID:   - BCx 8/26 (+) 1/2 enterococcus faecium   - continue kori, fluc, vanc  - s/p 8/18-8/27: doxycycline added for Lyme  - f/u rpt Bcx 8/28, if positive exchange LUE midline and RIJ lines     Endo:   - stress dosage steroids started 8/26, continue solucortef 50mg q6hr  - Continue ISS [id number]   - Monitor glucose    Dispo: SICU   76 y/o male w/ no known PMHx (does not see doctors per sister) who presented as a transfer from  for SLK evaluation. Patient was initially admitted to  for back pain & jaundice and was diagnosed w/ cirrhosis. Hospital course there was c/b small EVs, melena 2/2 a duodenal ulcer s/p clipping, HRS requiring hemodialysis, hepatic encephalopathy, ascites s/p multiple LVPs, and deconditioning. Patient was admitted to SICU on 8/12 for initiation of CRRT as his BPs were too low to attempt intermittent HD      Neuro:  - A/O x 1-2 waxes and wanes  - Rifaximin for prevention of hepatic encephalopathy  - Monitoring ammonia  - Thiamine, folic acid, & multivitamin for h/o EtOH use    Resp:  - Incentive spirometry to prevent atelectasis  - CT chest 8/25 with RLL pneumonia    CV:  - Goal SBP >110, titrate pressors as able. On Levo gtt, vaso gtt., requirments increasing overnight 8/26 now downtrending   - +/- cardiac catheterization when stable to do so for transplant work-up   - Electrophys: nothing to do while not on AC, follow cards recs  - TTE 8/28 with normal LV function    GI:   - Trickle feed   - Protonix BID for h/o duodenal ulcer  - bowel regimen held as pt with bloody loose diarrhea   - Monitor LFTs  - Undergoing eval for SLK transplantation  - s/p diagnostic para 8/27 neg SBP    Renal:  - CRRT for concern of HRS, maintain CRRT even  - Monitor I&Os and electrolytes w/ repletions as necessary  - Undergoing evaluation for SLK transplantation    Heme:  - Hold chemical VTE ppx in setting of liver failure   - Mechanical VTE ppx with SCDs  - Epogen 3x/week  - duplex LE neg for DVT 8/28     ID:   - BCx 8/26 (+) 1/2 enterococcus faecium   - continue kori, fluc, vanc  - s/p 8/18-8/27: doxycycline added for Lyme  - f/u rpt Bcx 8/28, if positive exchange LUE midline and RIJ lines     Endo:   - stress dosage steroids started 8/26, continue solucortef 50mg q6hr  - Continue ISS [id number]   - Monitor glucose    Dispo: SALINA Cline ([facility])  MD Kayla   EM/IM PGY-2

## 2024-08-29 NOTE — PROGRESS NOTE ADULT - ASSESSMENT
68y with obesity and risky alcohol consumption (with decades' history of daily consumption of homemade alcohol), admitted to [facility] 1 month ago due to recent onset of jaundice and with a prolonged hospital and ICU course there due to newly diagnosed decompensated cirrhosis with acute on chronic liver failure (ACLF) with shock (resolved, but still on midodrine); FANY (on intermittent HD since 7/9); recurrent maroon stools and acute on chronic anemia necessitating intermittent PRBC transfusions (last on 8/8) and hypofibrinoginemia, with EGD (7/12) with small non-bleeding EV and a duodenal ulcer with a visible vessel; and waxing and waning hepatic encephalopathy. He was transferred to [facility] on 8/12/24 for evaluation for combined liver/kidney transplants (SLK).      Decompensated ETOH Cirrhosis  - SLK eval , MELD 38O  - TFs, pressors per SICU  - dx para removed 75 cc, serologies pending  - HE: cont rifaximin/miralax  - EV:  EGD (7/12) with small non-bleeding EV and a duodenal ulcer with a visible vessel. EGD 8/20: no active bleeding, PPI.  - s/p colonoscopy 8/21: lesion biopsied, f/u path   - stress dose steroids per SICU completed, wean pressors as able  - FAYN/HRS: anuric, On CRRT 8/13, Renal following  - ID: Empiric Elise/fluc/ Doxy. Elise switched to Zosyn.  -  8/13 w/ MRSE , repeat 8/14 with NGTD,  - BCX 8/26: enterococcus faecium , linezolid  - Thiamine/MVI/FOLIC ACID  - LHC deferred, will re-address daily once stabilized  - Needs CT a/p non contrast to eval iliacs   - Paracentesis PRN   - cont sicu care, 1U PRBC, 1U PLT  - ECHO 8/28: no vegetations     68y with obesity and risky alcohol consumption (with decades' history of daily consumption of homemade alcohol), admitted to [facility] 1 month ago due to recent onset of jaundice and with a prolonged hospital and ICU course there due to newly diagnosed decompensated cirrhosis with acute on chronic liver failure (ACLF) with shock (resolved, but still on midodrine); FANY (on intermittent HD since 7/9); recurrent maroon stools and acute on chronic anemia necessitating intermittent PRBC transfusions (last on 8/8) and hypofibrinoginemia, with EGD (7/12) with small non-bleeding EV and a duodenal ulcer with a visible vessel; and waxing and waning hepatic encephalopathy. He was transferred to [facility] on 8/12/24 for evaluation for combined liver/kidney transplants (SLK).      Decompensated ETOH Cirrhosis  - SLK eval , MELD 38O  - TFs, pressors per SICU  - dx para removed 75 cc, serologies pending  - HE: cont rifaximin/miralax  - EV:  EGD (7/12) with small non-bleeding EV and a duodenal ulcer with a visible vessel. EGD 8/20: no active bleeding, PPI.  - s/p colonoscopy 8/21: lesion biopsied, f/u path   - stress dose steroids per SICU completed, wean pressors as able  - FANY/HRS: anuric, On CRRT 8/13, Renal following  - ID: Empiric Elise/fluc/ Doxy. Elise switched to Zosyn.  -  8/13 w/ MRSE , repeat 8/14 with NGTD,  - BCX 8/26: enterococcus faecium , linezolid  - Thiamine/MVI/FOLIC ACID  - LHC deferred, will re-address daily once stabilized  - Needs CT a/p non contrast to eval iliacs   - Paracentesis PRN   - cont sicu care, 1U PRBC, 1U PLT  - ECHO 8/28: no vegetations

## 2024-08-29 NOTE — PROGRESS NOTE ADULT - SUBJECTIVE AND OBJECTIVE BOX
HISTORY  68y Male    24 HOUR EVENTS:    SUBJECTIVE/ROS:  [ ] A ten-point review of systems was otherwise negative except as noted.  [ ] Due to altered mental status/intubation, subjective information were not able to be obtained from the patient. History was obtained, to the extent possible, from review of the chart and collateral sources of information.      NEURO  RASS:     GCS:     CAM ICU:  Exam: awake, alert, oriented  Meds:   [x] Adequacy of sedation and pain control has been assessed and adjusted      RESPIRATORY  RR: 18 (08-28-24 @ 23:30) (11 - 34)  SpO2: 100% (08-28-24 @ 23:30) (91% - 100%)  Wt(kg): --  Exam: unlabored, clear to auscultation bilaterally  Mechanical Ventilation:   ABG - ( 29 Aug 2024 00:21 )  pH: 7.45  /  pCO2: 31    /  pO2: 75    / HCO3: 22    / Base Excess: -2.1  /  SaO2: 98.6    Lactate: x                [N/A] Extubation Readiness Assessed  Meds:       CARDIOVASCULAR  HR: 66 (08-28-24 @ 23:30) (63 - 89)  BP: 123/56 (08-28-24 @ 14:30) (111/54 - 123/56)  BP(mean): 81 (08-28-24 @ 14:30) (78 - 81)  ABP: 110/40 (08-28-24 @ 23:30) (106/42 - 141/61)  ABP(mean): 59 (08-28-24 @ 23:30) (57 - 89)  Wt(kg): --  CVP(cm H2O): --      Exam: regular rate and rhythm  Cardiac Rhythm: sinus  Perfusion     [x]Adequate   [ ]Inadequate  Mentation   [x]Normal       [ ]Reduced  Extremities  [x]Warm         [ ]Cool  Volume Status [ ]Hypervolemic [x]Euvolemic [ ]Hypovolemic  Meds: norepinephrine Infusion 0.5 MICROgram(s)/kG/Min IV Continuous <Continuous>        GI/NUTRITION  Exam: soft, nontender, nondistended, incision C/D/I  Diet:  Meds: pantoprazole  Injectable 40 milliGRAM(s) IV Push two times a day      GENITOURINARY  I&O's Detail    08-27 @ 07:01  -  08-28 @ 07:00  --------------------------------------------------------  IN:    Albumin 5%  - 250 mL: 250 mL    Enteral Tube Flush: 370 mL    IV PiggyBack: 310 mL    IV PiggyBack: 958 mL    Norepinephrine: 847.4 mL    Platelets - Single Donor: 225 mL    Vasopressin: 108 mL  Total IN: 3068.4 mL    OUT:    Other (mL): 1074 mL  Total OUT: 1074 mL    Total NET: 1994.4 mL      08-28 @ 07:01 - 08-29 @ 00:41  --------------------------------------------------------  IN:    Enteral Tube Flush: 95 mL    IV PiggyBack: 300 mL    IV PiggyBack: 250 mL    Miscellaneous Tube Feeding: 160 mL    Norepinephrine: 328 mL    Platelets - Single Donor: 300 mL    PRBCs (Packed Red Blood Cells): 600 mL    Vasopressin: 72 mL  Total IN: 2105 mL    OUT:    Other (mL): 1168 mL    Stool (mL): 3 mL  Total OUT: 1171 mL    Total NET: 934 mL          08-28    137  |  103  |  17  ----------------------------<  149<H>  4.5   |  20<L>  |  1.04    Ca    8.4      28 Aug 2024 18:31  Phos  4.1     08-28  Mg     2.3     08-28    TPro  5.6<L>  /  Alb  3.0<L>  /  TBili  10.9<H>  /  DBili  x   /  AST  53<H>  /  ALT  31  /  AlkPhos  159<H>  08-28    [ ] Elizabeth catheter, indication: N/A  Meds: folic acid 1 milliGRAM(s) Oral daily  multivitamin/minerals/iron Oral Solution (CENTRUM) 15 milliLiter(s) Enteral Tube daily  thiamine 100 milliGRAM(s) Oral daily        HEMATOLOGIC  Meds:   [x] VTE Prophylaxis                        7.3    18.45 )-----------( 144      ( 28 Aug 2024 18:30 )             20.9     PT/INR - ( 28 Aug 2024 18:31 )   PT: 26.9 sec;   INR: 2.64 ratio         PTT - ( 28 Aug 2024 18:31 )  PTT:47.4 sec  Transfusion     [ ] PRBC   [ ] Platelets   [ ] FFP   [ ] Cryoprecipitate      INFECTIOUS DISEASES  WBC Count: 18.45 K/uL (08-28 @ 18:30)  WBC Count: 18.58 K/uL (08-28 @ 12:36)  WBC Count: 19.23 K/uL (08-28 @ 06:35)  WBC Count: 21.28 K/uL (08-28 @ 01:03)    RECENT CULTURES:  Specimen Source: Ascites Fl Ascites Fluid  Date/Time: 08-27 @ 16:29  Culture Results:   No growth  Gram Stain:   No polymorphonuclear cells seen  No organisms seen  by cytocentrifuge  Organism: --  Specimen Source: .Blood Blood-Peripheral  Date/Time: 08-26 @ 18:42  Culture Results:   No growth at 24 hours  Gram Stain: --  Organism: --  Specimen Source: .Blood Blood-Peripheral  Date/Time: 08-26 @ 18:28  Culture Results:   Growth in aerobic bottle: Enterococcus faecium  Direct identification is available within approximately 3-5  hours either by Blood Panel Multiplexed PCR or Direct  MALDI-TOF. Details: [email or web address]<!>  Gram Stain:   Growth in aerobic bottle: Gram positive cocci in pairs<!>  Organism: Blood Culture PCR<!>    Meds: epoetin kareem (EPOGEN) Injectable 64821 Unit(s) SubCutaneous <User Schedule>  fluconAZOLE IVPB 400 milliGRAM(s) IV Intermittent every 24 hours  meropenem  IVPB 1000 milliGRAM(s) IV Intermittent every 8 hours  rifAXIMin 550 milliGRAM(s) Oral every 12 hours  vancomycin  IVPB 1250 milliGRAM(s) IV Intermittent every 12 hours        ENDOCRINE  CAPILLARY BLOOD GLUCOSE      POCT Blood Glucose.: 131 mg/dL (29 Aug 2024 00:32)  POCT Blood Glucose.: 177 mg/dL (28 Aug 2024 06:22)  POCT Blood Glucose.: 147 mg/dL (28 Aug 2024 00:56)    Meds: hydrocortisone sodium succinate Injectable 50 milliGRAM(s) IV Push every 6 hours  insulin lispro (ADMELOG) corrective regimen sliding scale   SubCutaneous every 6 hours  vasopressin Infusion 0.03 Unit(s)/Min IV Continuous <Continuous>        ACCESS DEVICES:  [ ] Peripheral IV  [ ] Central Venous Line	[ ] R	[ ] L	[ ] IJ	[ ] Fem	[ ] SC	Placed:   [ ] Arterial Line		[ ] R	[ ] L	[ ] Fem	[ ] Rad	[ ] Ax	Placed:   [ ] PICC:					[ ] Mediport  [ ] Urinary Catheter, Date Placed:   [x] Necessity of urinary, arterial, and venous catheters discussed    OTHER MEDICATIONS:  chlorhexidine 2% Cloths 1 Application(s) Topical <User Schedule>  CRRT Treatment    <Continuous>  lidocaine   4% Patch 1 Patch Transdermal daily  Phoxillum Filtration BK 4 / 2.5 5000 milliLiter(s) CRRT <Continuous>  PrismaSATE Dialysate BGK 4 / 2.5 5000 milliLiter(s) CRRT <Continuous>  PrismaSOL Filtration BGK 4 / 2.5 5000 milliLiter(s) CRRT <Continuous>      CODE STATUS:      IMAGING: HISTORY  68y Male    24 HOUR EVENTS:  - restart trickle feeds  - CRRT transition to net even --> net negative 25 in PM   - continue to hold bowel regimen  - duplex LE neg for DVT  - 1u pRBC, 1u plt transfusion in AM  - 1u pRBC, 1u plt ovn     SUBJECTIVE/ROS:  [ ] A ten-point review of systems was otherwise negative except as noted.  [ ] Due to altered mental status/intubation, subjective information were not able to be obtained from the patient. History was obtained, to the extent possible, from review of the chart and collateral sources of information.      NEURO  Exam: awake, alert, oriented  Meds:   [x] Adequacy of sedation and pain control has been assessed and adjusted      RESPIRATORY  RR: 18 (08-28-24 @ 23:30) (11 - 34)  SpO2: 100% (08-28-24 @ 23:30) (91% - 100%)  Wt(kg): --  Exam: unlabored, clear to auscultation bilaterally  Mechanical Ventilation:   ABG - ( 29 Aug 2024 00:21 )  pH: 7.45  /  pCO2: 31    /  pO2: 75    / HCO3: 22    / Base Excess: -2.1  /  SaO2: 98.6    Lactate: x                [N/A] Extubation Readiness Assessed  Meds:       CARDIOVASCULAR  HR: 66 (08-28-24 @ 23:30) (63 - 89)  BP: 123/56 (08-28-24 @ 14:30) (111/54 - 123/56)  BP(mean): 81 (08-28-24 @ 14:30) (78 - 81)  ABP: 110/40 (08-28-24 @ 23:30) (106/42 - 141/61)  ABP(mean): 59 (08-28-24 @ 23:30) (57 - 89)  Wt(kg): --  CVP(cm H2O): --      Exam: regular rate and rhythm  Cardiac Rhythm: sinus  Perfusion     [x]Adequate   [ ]Inadequate  Mentation   [x]Normal       [ ]Reduced  Extremities  [x]Warm         [ ]Cool  Volume Status [ ]Hypervolemic [x]Euvolemic [ ]Hypovolemic  Meds: norepinephrine Infusion 0.5 MICROgram(s)/kG/Min IV Continuous <Continuous>        GI/NUTRITION  Exam: soft, nontender, nondistended, incision C/D/I  Diet:  Meds: pantoprazole  Injectable 40 milliGRAM(s) IV Push two times a day      GENITOURINARY  I&O's Detail    08-27 @ 07:01 - 08-28 @ 07:00  --------------------------------------------------------  IN:    Albumin 5%  - 250 mL: 250 mL    Enteral Tube Flush: 370 mL    IV PiggyBack: 310 mL    IV PiggyBack: 958 mL    Norepinephrine: 847.4 mL    Platelets - Single Donor: 225 mL    Vasopressin: 108 mL  Total IN: 3068.4 mL    OUT:    Other (mL): 1074 mL  Total OUT: 1074 mL    Total NET: 1994.4 mL      08-28 @ 07:01 - 08-29 @ 00:41  --------------------------------------------------------  IN:    Enteral Tube Flush: 95 mL    IV PiggyBack: 300 mL    IV PiggyBack: 250 mL    Miscellaneous Tube Feeding: 160 mL    Norepinephrine: 328 mL    Platelets - Single Donor: 300 mL    PRBCs (Packed Red Blood Cells): 600 mL    Vasopressin: 72 mL  Total IN: 2105 mL    OUT:    Other (mL): 1168 mL    Stool (mL): 3 mL  Total OUT: 1171 mL    Total NET: 934 mL          08-28    137  |  103  |  17  ----------------------------<  149<H>  4.5   |  20<L>  |  1.04    Ca    8.4      28 Aug 2024 18:31  Phos  4.1     08-28  Mg     2.3     08-28    TPro  5.6<L>  /  Alb  3.0<L>  /  TBili  10.9<H>  /  DBili  x   /  AST  53<H>  /  ALT  31  /  AlkPhos  159<H>  08-28    [ ] Elizabeth catheter, indication: N/A  Meds: folic acid 1 milliGRAM(s) Oral daily  multivitamin/minerals/iron Oral Solution (CENTRUM) 15 milliLiter(s) Enteral Tube daily  thiamine 100 milliGRAM(s) Oral daily        HEMATOLOGIC  Meds:   [x] VTE Prophylaxis                        7.3    18.45 )-----------( 144      ( 28 Aug 2024 18:30 )             20.9     PT/INR - ( 28 Aug 2024 18:31 )   PT: 26.9 sec;   INR: 2.64 ratio         PTT - ( 28 Aug 2024 18:31 )  PTT:47.4 sec  Transfusion     [ ] PRBC   [ ] Platelets   [ ] FFP   [ ] Cryoprecipitate      INFECTIOUS DISEASES  WBC Count: 18.45 K/uL (08-28 @ 18:30)  WBC Count: 18.58 K/uL (08-28 @ 12:36)  WBC Count: 19.23 K/uL (08-28 @ 06:35)  WBC Count: 21.28 K/uL (08-28 @ 01:03)    RECENT CULTURES:  Specimen Source: Ascites Fl Ascites Fluid  Date/Time: 08-27 @ 16:29  Culture Results:   No growth  Gram Stain:   No polymorphonuclear cells seen  No organisms seen  by cytocentrifuge  Organism: --  Specimen Source: .Blood Blood-Peripheral  Date/Time: 08-26 @ 18:42  Culture Results:   No growth at 24 hours  Gram Stain: --  Organism: --  Specimen Source: .Blood Blood-Peripheral  Date/Time: 08-26 @ 18:28  Culture Results:   Growth in aerobic bottle: Enterococcus faecium  Direct identification is available within approximately 3-5  hours either by Blood Panel Multiplexed PCR or Direct  MALDI-TOF. Details: [email or web address]<!>  Gram Stain:   Growth in aerobic bottle: Gram positive cocci in pairs<!>  Organism: Blood Culture PCR<!>    Meds: epoetin kareem (EPOGEN) Injectable 03828 Unit(s) SubCutaneous <User Schedule>  fluconAZOLE IVPB 400 milliGRAM(s) IV Intermittent every 24 hours  meropenem  IVPB 1000 milliGRAM(s) IV Intermittent every 8 hours  rifAXIMin 550 milliGRAM(s) Oral every 12 hours  vancomycin  IVPB 1250 milliGRAM(s) IV Intermittent every 12 hours        ENDOCRINE  CAPILLARY BLOOD GLUCOSE      POCT Blood Glucose.: 131 mg/dL (29 Aug 2024 00:32)  POCT Blood Glucose.: 177 mg/dL (28 Aug 2024 06:22)  POCT Blood Glucose.: 147 mg/dL (28 Aug 2024 00:56)    Meds: hydrocortisone sodium succinate Injectable 50 milliGRAM(s) IV Push every 6 hours  insulin lispro (ADMELOG) corrective regimen sliding scale   SubCutaneous every 6 hours  vasopressin Infusion 0.03 Unit(s)/Min IV Continuous <Continuous>        ACCESS DEVICES:  [ ] Peripheral IV  [ ] Central Venous Line	[ ] R	[ ] L	[ ] IJ	[ ] Fem	[ ] SC	Placed:   [ ] Arterial Line		[ ] R	[ ] L	[ ] Fem	[ ] Rad	[ ] Ax	Placed:   [ ] PICC:					[ ] Mediport  [ ] Urinary Catheter, Date Placed:   [x] Necessity of urinary, arterial, and venous catheters discussed    OTHER MEDICATIONS:  chlorhexidine 2% Cloths 1 Application(s) Topical <User Schedule>  CRRT Treatment    <Continuous>  lidocaine   4% Patch 1 Patch Transdermal daily  Phoxillum Filtration BK 4 / 2.5 5000 milliLiter(s) CRRT <Continuous>  PrismaSATE Dialysate BGK 4 / 2.5 5000 milliLiter(s) CRRT <Continuous>  PrismaSOL Filtration BGK 4 / 2.5 5000 milliLiter(s) CRRT <Continuous>      CODE STATUS:      IMAGING:

## 2024-08-29 NOTE — PROGRESS NOTE ADULT - ASSESSMENT
76 yo m with alcohol abuse otherwise no known chronic medical issues (does not see doctors per sister) s/p 1 month stay at [facility] now transferred to [facility] for liver transplant eval.  Most of history obtained from sister (Ashlyn) at bedside and some from transfer paperwork. Per sister, pt was initially brought to the hospital by family for back pain and jaundice for unclear amount of time. Patient was seen by GI and underwent EGD soon after admission which only showed nonbleeding ?duodenal ulcers (no intervention) however few days later pt developed active signs of bleeding and emergently underwent EGD again showing bleeding esophageal varices which were clipped.  pt was electively intubated with stay in ICU thereafter. Patient then started with HD due to worsening renal function and MS for past 2.5 weeks at times limited by low BP requiring pressors to assist. Had numerous paracentesis with varying amount of fluid removal - albumin infusions. Sister not aware of any concerns for acute infection during his stay but aware that pt was on abx at some point due to bleeding issues.        PERTINENT RADIOLOGY:  CXR: Tubes and lines as above. No focal consolidations  CT Chest, A&P:  Patchy ground-glass opacities in the bilateral upper lobes. *  Cirrhosis with evidence of portal hypertension. *  Hypodense lesion in the periphery of the left hepatic lobe of uncertain etiology. Somewhat wedge-shaped morphology raises the possibility for a small infarct. Question subtle peripheral nodular enhancement, and a hemangioma is also considered. Contrast enhanced abdominal MRI can be performed for further characterization and to assess for other possibilities. *  A wedge-shaped region of hypoattenuation in the spleen may reflect a small infarct. *  Focal eccentric wall thickening in the low rectum, possibly due to a mural fold. Consider colonoscopy. *  Large volume ascites.  CT Head: No evidence of acute intracranial pathology. If clinical symptoms persist or worsen, more sensitive evaluation with brain MRI may be obtained, if no contraindications exist.    BCx (8/12) 1/4 MRSE  BCx (8/14) NGTD  Paracentesis (8/14) Cell Counts Negative for SBP  MRSA/MSSA Nasal PCR (8/16) Negative    CXR (8/19) Ground Glass infiltrates persist  CT from 8//13 with bilateral Ground glass infiltrates    # Persistent Ground glass infiltrates of unclear etiology  please send urine legionella ag  please send deep sputum for testing for gram stain/cx, fungal stain/cx    #Decompensated liver cirrhosis, Leukocytosis, Shock  # Enterococcus faecium bacteremia BC 8/26 now positive and worsening shock  biliary/SBp vs line related in c/o prolonged empirical antibiotic course and Enterococcus selection    --Meropenem deescalated to Zosyn for empiric coverage in light of negative culture workup then re-escalated back to meropenem- suspect  etiology of sepsis and shock is Enterococcus bacteremia  would stop meropenem today, at risk for enterococcus with prolonged exposure to meropenem  Needs a Vanco trough today   Deescalated caspofungin to fluconazole  - also would stop doxycycline after today- received total of 10 day course  --Enterococcus not VRE, initially recommended linezolid but can switch now linezolid to vancomycin 1.5 g x1 then 1.25 mg q12h   --repeat BC tomorrow  --if BC remain positive will also favor removing midline and exchange RIJ    --Would obtain TTE  - s/p paracentesis today, follow cx , counts not c/w SBP   --S/P empiric fluconazole (8/12 -->8/26), Now escalated to Caspofungin --->(8/27), deescalated to Fluconazole 8/28--->  --Continue to follow CBC with diff  --Continue to follow renal function (Cr/BUN)  --Continue to follow transaminases  --Continue to follow temperature curve  --Follow up on preliminary blood cultures  --If able will send BAL cultures for legionella & PJP PCR    #Positive BCx (8/12) (Staph epi)  Consistent with contaminant   as repeat testing is negative x many    #CMV PCR   --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 Would repeat CMV PCR on (8/26)  -- may need to treat if increasing  Cytomegalovirus By PCR: 4890 --->4730 --->1020 --->867 (8/20)    #Pre Transplant Evaluation   HIV-1/2 Combo Result: Nonreactive  EBVPCR Log: NotDetec Qwb34OI/mL   Hepatitis A IgG Ab Result: Reactive   Hepatitis B Core Antibody, Total: Nonreactive  Hepatitis B Surface Antibody: Reactive   Hepatitis B DNA PCR Log: Not Detected  Hepatitis B Surface Antigen: Nonreactive  Hepatitis C Virus Interpretation: Nonreactive  COVID-19 De Domain Antibody: Positive  Treponema Pallidum Antibody Interpretation: Negative  HSV 1 IgG  Positive/HSV 2 IgG Negative  EBV Serology Positive  CMV IgG Positive  VZV IgG Positive  Measles Positive, Mumps Positive and Rubella IgG Positive  Toxoplasma IgG Positive  QuantiFeron Gold Negative  Strongyloides Ab Negative  Babesia PCR negative  --Schistosoma IgG - NEGATIVE  --Reportedly Lyme serology was previously positive and remains positive, Tick Diseases Panel is negative for additional tick born exposures  --Western blot negative, serologies not concerning for active Lyme disease  -- On doxycycline 100mg bid for atypical coverage   --Will send urine for Legionella, if negative can stop doxycycline    #Encounter to Vaccinate Patient - Will defer vaccination in context of critical illness  COVID19: No primary series. Would benefit from COVID19 0775-4501 dose  Influenza: Would benefit from dose next season  Pneumococcal: Would benefit from PCV20  HAV: Immune, no additional vaccines indicated  HBV: Immune, no additional vaccines indicated  MMR: Immune, will not require further vaccination  Varicella: Immune, will not require further vaccination  Shingles: Would benefit from Shingrix  Tdap: Would benefit from Tdap

## 2024-08-29 NOTE — PROGRESS NOTE ADULT - ASSESSMENT
69 yo M with obesity and risky alcohol consumption (with decades' history of daily consumption of homemade alcohol). Admitted to [facility] for 1 month due to recent onset of jaundice and with a prolonged hospital/ICU course due to newly diagnosed decompensated cirrhosis with acute on chronic liver failure (ACLF) with shock, FANY (started on intermittent HD since 7/9), acute on chronic anemia with recurrent overt GI bleeding, and hepatic encephalopathy.     Transferred to [facility] on 8/12/24 for SLK evaluation and then transferred to the SICU for initiation of CRRT (8/13- ).      # Distributive shock   # E. faecalis bacteremia (8/27)   - ACLF + sepsis + hypovolemia.    - Norepinephrine and Vasopressin with increasing requirements.  - Transplant ID following.   - CMV viremia (Valcyte not required). Unequivocal Lyme PCR/negative WB (on Doxy).     - BCx (8/26) E. faecalis. Dg paracentesis (8/27) not suggestive of SBP.  TTE (8/28) no vegetations. CT (8/25) no evident source of infection.     - S/p Zosyn (8/12-16) and Doxycycline (8/18-27).    - Vancomycin (8/27- ), Meropenem (8/16- ), and Fluconazole (8/12- ).     # History of UGIB (7'24 - resolved)/ Hematochezia (8/18)/ Melena (8/27).    - Hematemesis from duodenal ulcer (07/2024). Hematochezia from rectal ulcer/friable mucosa (8/18). New episodes of melena (8/27).    - EGD (7/12, at [facility]) small non-bleeding EV and a duodenal ulcer with a visible vessel. No further hematemesis.   - EGD (8/20) with no active bleeding. Colonoscopy (8/21) friable mucosa, rectal ulcer (biopsy ruled out malignancy).  - Pantoprazole 40BID  - Will need secondary prophylaxis with BB once stable.   - As needed PRBC and TEG-guided blood products.     # Anuric FANY on CKD    - Started intermittent HD (7/9-8/12) - Now on CRRT (8/13- ).   - Fluid removal on CRRT limited due to hypotension.   - Transplant nephrology following.    # Newly diagnosed decompensated cirrhosis with ACLF   - HE: Waxes and wanes. Minimally verbal. Following simple commands. Rifaximin/Miralax. Lactulose held for abdominal distention.    - Agitation/Insomnia: Seroquel 25mg or Haldol 2.5mg at night. Transplant Psych following.    - Large volume ascites and anasarca: Anuric. Limited fluid removal with CRRT.   - Paracentesis (8/14 and 8/27) negative for SBP.   - Image: Contrast CT (8/13) with patent portohepatic vessels and no evidence of HCC. Small infarcts in left hepatic lobe and spleen.       - Nutrition: Poor po diet supplemented with NGT feeds. Nutrition following.   - PT: Last ambulatory on July/2024. Daily inpatient PT/OT.     # SLK ongoing evaluation (8/12)  - ABO O. MELD 3.0 score of 37 O (8/29)  - Met SLK criteria in Aug 20th. Tissue typing completed.   - [facility] for cardiology clearance deferred until medically stable.     - Pt too sick to undergo OLT. Patient's wife is aware. All questions answered (8/28).     PLAN   - appreciate ID recs re ABx therapy  - Continue PRBC and TEG-guided products as needed  - Management of CRRT and fluid resuscitation as per SICU and nephrology team    - Titrate bowel regimen for goal 3-4 bowel movements daily   - Avoid Precedex. Management of agitation/insomnia as per  recommendations  - Too sick to undergo OLT at the time. Family aware.

## 2024-08-29 NOTE — PROGRESS NOTE ADULT - SUBJECTIVE AND OBJECTIVE BOX
Interval Events:   -remains on pressor support / CRRT  -OLT eval paused iso acute illness, family made aware    Hospital Medications:  chlorhexidine 2% Cloths 1 Application(s) Topical <User Schedule>  CRRT Treatment    <Continuous>  epoetin kareem (EPOGEN) Injectable 52741 Unit(s) SubCutaneous <User Schedule>  fluconAZOLE IVPB 400 milliGRAM(s) IV Intermittent every 24 hours  folic acid 1 milliGRAM(s) Oral daily  hydrocortisone sodium succinate Injectable 50 milliGRAM(s) IV Push every 6 hours  insulin lispro (ADMELOG) corrective regimen sliding scale   SubCutaneous every 6 hours  lidocaine   4% Patch 1 Patch Transdermal daily  meropenem  IVPB 1000 milliGRAM(s) IV Intermittent every 8 hours  multivitamin/minerals/iron Oral Solution (CENTRUM) 15 milliLiter(s) Enteral Tube daily  norepinephrine Infusion 0.5 MICROgram(s)/kG/Min IV Continuous <Continuous>  pantoprazole  Injectable 40 milliGRAM(s) IV Push two times a day  Phoxillum Filtration BK 4 / 2.5 5000 milliLiter(s) CRRT <Continuous>  PrismaSATE Dialysate BGK 4 / 2.5 5000 milliLiter(s) CRRT <Continuous>  PrismaSOL Filtration BGK 4 / 2.5 5000 milliLiter(s) CRRT <Continuous>  rifAXIMin 550 milliGRAM(s) Oral every 12 hours  thiamine 100 milliGRAM(s) Oral daily  vancomycin  IVPB 1250 milliGRAM(s) IV Intermittent every 12 hours  vasopressin Infusion 0.03 Unit(s)/Min IV Continuous <Continuous>      PHYSICAL EXAM:   Vital Signs:  Vital Signs Last 24 Hrs  T(C): 36.7 (29 Aug 2024 07:00), Max: 36.7 (28 Aug 2024 17:00)  T(F): 98.1 (29 Aug 2024 07:00), Max: 98.1 (29 Aug 2024 07:00)  HR: 67 (29 Aug 2024 08:00) (64 - 73)  BP: 111/56 (29 Aug 2024 07:15) (111/56 - 123/56)  BP(mean): 81 (29 Aug 2024 07:15) (81 - 81)  RR: 16 (29 Aug 2024 08:00) (10 - 43)  SpO2: 100% (29 Aug 2024 08:00) (91% - 100%)    Parameters below as of 29 Aug 2024 07:00  Patient On (Oxygen Delivery Method): room air      Daily     Daily     GENERAL: obese man, physically deconditioned.   NEURO: waxes and wanes. AOx1-2 today  HEENT: Scleral icterus  CHEST: Bilateral breath sounds, decreased in bases.    CARDIAC: Regular rate and rhythm  ABDOMEN: Distended, soft, non-tender. Present bowel sounds. Anasarca.   EXTREMITIES: warm, well perfused. Anasarca improved.   SKIN: Jaundiced, no rash/ecchymoses                        7.9    16.71 )-----------( 139      ( 29 Aug 2024 06:40 )             22.7     08-29    137  |  101  |  14  ----------------------------<  170<H>  4.6   |  19<L>  |  1.01    Ca    8.7      29 Aug 2024 06:35  Phos  4.0     08-29  Mg     2.4     08-29    TPro  5.9<L>  /  Alb  3.1<L>  /  TBili  10.6<H>  /  DBili  x   /  AST  57<H>  /  ALT  32  /  AlkPhos  175<H>  08-29    LIVER FUNCTIONS - ( 29 Aug 2024 06:35 )  Alb: 3.1 g/dL / Pro: 5.9 g/dL / ALK PHOS: 175 U/L / ALT: 32 U/L / AST: 57 U/L / GGT: x

## 2024-08-29 NOTE — PROGRESS NOTE ADULT - SUBJECTIVE AND OBJECTIVE BOX
Follow Up:      Interval History:    REVIEW OF SYSTEMS  [  ] ROS unobtainable because:    [  ] All other systems negative except as noted below    Constitutional:  [ ] fever [ ] chills  [ ] weight loss  [ ] weakness  Skin:  [ ] rash [ ] phlebitis	  Eyes: [ ] icterus [ ] pain  [ ] discharge	  ENMT: [ ] sore throat  [ ] thrush [ ] ulcers [ ] exudates  Respiratory: [ ] dyspnea [ ] hemoptysis [ ] cough [ ] sputum	  Cardiovascular:  [ ] chest pain [ ] palpitations [ ] edema	  Gastrointestinal:  [ ] nausea [ ] vomiting [ ] diarrhea [ ] constipation [ ] pain	  Genitourinary:  [ ] dysuria [ ] frequency [ ] hematuria [ ] discharge [ ] flank pain  [ ] incontinence  Musculoskeletal:  [ ] myalgias [ ] arthralgias [ ] arthritis  [ ] back pain  Neurological:  [ ] headache [ ] seizures  [ ] confusion/altered mental status    Allergies  No Known Allergies        ANTIMICROBIALS:  fluconAZOLE IVPB 400 every 24 hours  meropenem  IVPB 1000 every 8 hours  rifAXIMin 550 every 12 hours  vancomycin  IVPB 1250 every 12 hours      OTHER MEDS:  MEDICATIONS  (STANDING):  epoetin kareem (EPOGEN) Injectable 77913 <User Schedule>  insulin lispro (ADMELOG) corrective regimen sliding scale  every 6 hours  lactulose Syrup 20 every 12 hours  norepinephrine Infusion 0.5 <Continuous>  pantoprazole  Injectable 40 two times a day  vasopressin Infusion 0.03 <Continuous>      Vital Signs Last 24 Hrs  T(C): 36.2 (29 Aug 2024 11:00), Max: 36.7 (28 Aug 2024 17:00)  T(F): 97.2 (29 Aug 2024 11:00), Max: 98.1 (29 Aug 2024 07:00)  HR: 67 (29 Aug 2024 11:15) (64 - 70)  BP: 111/56 (29 Aug 2024 07:15) (111/56 - 123/56)  BP(mean): 81 (29 Aug 2024 07:15) (81 - 81)  RR: 25 (29 Aug 2024 11:15) (10 - 43)  SpO2: 100% (29 Aug 2024 11:15) (91% - 100%)    Parameters below as of 29 Aug 2024 11:00  Patient On (Oxygen Delivery Method): room air        PHYSICAL EXAMINATION:  General: Alert and Awake, NAD  HEENT: PERRL, EOMI  Neck: Supple  Cardiac: RRR, No M/R/G  Resp: CTAB, No Wh/Rh/Ra  Abdomen: NBS, NT/ND, No HSM, No rigidity or guarding  MSK: No LE edema. No Calf tenderness  : No trejo  Skin: No rashes or lesions. Skin is warm and dry to the touch.   Neuro: Alert and Awake. CN 2-12 Grossly intact. Moves all four extremities spontaneously.  Psych: Calm, Pleasant, Cooperative                          7.9    16.71 )-----------( 139      ( 29 Aug 2024 06:40 )             22.7       08-29    137  |  101  |  14  ----------------------------<  170<H>  4.6   |  19<L>  |  1.01    Ca    8.7      29 Aug 2024 06:35  Phos  4.0     08-29  Mg     2.4     08-29    TPro  5.9<L>  /  Alb  3.1<L>  /  TBili  10.6<H>  /  DBili  x   /  AST  57<H>  /  ALT  32  /  AlkPhos  175<H>  08-29      Urinalysis Basic - ( 29 Aug 2024 06:35 )    Color: x / Appearance: x / SG: x / pH: x  Gluc: 170 mg/dL / Ketone: x  / Bili: x / Urobili: x   Blood: x / Protein: x / Nitrite: x   Leuk Esterase: x / RBC: x / WBC x   Sq Epi: x / Non Sq Epi: x / Bacteria: x        MICROBIOLOGY:  v  Ascites Fl Ascites Fluid  08-27-24   No growth  --    No polymorphonuclear cells seen  No organisms seen  by cytocentrifuge      .Blood Blood-Peripheral  08-26-24   No growth at 48 Hours  --  --      .Blood Blood-Peripheral  08-26-24   Growth in aerobic bottle: Enterococcus faecium  Direct identification is available within approximately 3-5  hours either by Blood Panel Multiplexed PCR or Direct  MALDI-TOF. Details: [email or web address]  --  Blood Culture PCR      .Blood Blood  08-23-24   No growth at 5 days  --  --      .Blood Blood  08-23-24   No growth at 5 days  --  --      .Blood Blood-Venous  08-21-24   No growth at 5 days  --  --      .Blood Blood-Venous  08-21-24   No growth at 5 days  --  --      Combi-Cath Combi-Cath  08-21-24   Normal Respiratory Elo present  --    Moderate polymorphonuclear leukocytes per low power field  Rare Squamous epithelial cells per low power field  No organisms seen per oil power field      .Blood Blood  08-20-24   No growth at 5 days  --  --      .Blood Blood  08-20-24   No growth at 5 days  --  --      Peritoneal Peritoneal Fluid  08-14-24   No growth at 5 days  --    polymorphonuclear leukocytes seen  No organisms seen  by cytocentrifuge      .Blood Blood-Venous  08-14-24   No growth at 5 days  --  --      .Blood Blood-Venous  08-14-24   No growth at 5 days  --  --      .Blood Blood-Peripheral  08-14-24   No growth at 5 days  --  --      .Blood Blood  08-12-24   No growth at 5 days  --  --      .Blood Blood  08-12-24   Growth in aerobic bottle: Staphylococcus epidermidis Isolation of  Coagulase negative Staphylococcus from single blood culture sets may  represent  contamination. Contact the Microbiology Department at [phone] if  susceptibility testing is  clinically indicated.  Direct identification is available within approximately 3-5  hours either by Blood Panel Multiplexed PCR or Direct  MALDI-TOF. Details: [email or web address]  --  Blood Culture PCR        CMV IgG Antibody: >10.00 U/mL (08-20-24 @ 00:08)  Toxoplasma IgG Screen: 44.00 IU/mL (08-12-24 @ 21:15)  CMV IgG Antibody: >10.00 U/mL (08-12-24 @ 21:15)          RADIOLOGY:    <The imaging below has been reviewed and visualized by me independently. Findings as detailed in report below>    < from: [facility] Duplex Lower Ext Vein Scan, Bilat (08.28.24 @ 20:21) >  INTERPRETATION:  CLINICAL INFORMATION: Evaluate for DVT.    COMPARISON: None available.    TECHNIQUE: Duplex sonography of the BILATERAL LOWER extremity veins with   color and spectral Doppler, with and without compression.    FINDINGS:    RIGHT:  Normal compressibility of the RIGHT common femoral, femoral and popliteal   veins.  Doppler examination shows normal spontaneous and phasic flow.  Poorly visualized RIGHT calf veins.    LEFT:  Normal compressibility of the LEFT common femoral, femoral and popliteal   veins.  Doppler examination shows normal spontaneous and phasic flow.  Poorly visualized LEFT calf veins.    IMPRESSION:  No evidence of deep venous thrombosis in either lower extremity. Please   note, however, that the calf veins bilaterally were poorly visualized.    --- End of Report ---    < end of copied text >   Follow Up: Bacteriemia    Interval History:  Afebrile, persistent leukocytosis  US BUE- No DVT  TTE- No vegetations    REVIEW OF SYSTEMS  [ x ] ROS unobtainable because: confusion/altered mental status      Allergies  No Known Allergies        ANTIMICROBIALS:  fluconAZOLE IVPB 400 every 24 hours  meropenem  IVPB 1000 every 8 hours  rifAXIMin 550 every 12 hours  vancomycin  IVPB 1250 every 12 hours      OTHER MEDS:  MEDICATIONS  (STANDING):  epoetin kareem (EPOGEN) Injectable 01176 <User Schedule>  insulin lispro (ADMELOG) corrective regimen sliding scale  every 6 hours  lactulose Syrup 20 every 12 hours  norepinephrine Infusion 0.5 <Continuous>  pantoprazole  Injectable 40 two times a day  vasopressin Infusion 0.03 <Continuous>      Vital Signs Last 24 Hrs  T(C): 36.2 (29 Aug 2024 11:00), Max: 36.7 (28 Aug 2024 17:00)  T(F): 97.2 (29 Aug 2024 11:00), Max: 98.1 (29 Aug 2024 07:00)  HR: 67 (29 Aug 2024 11:15) (64 - 70)  BP: 111/56 (29 Aug 2024 07:15) (111/56 - 123/56)  BP(mean): 81 (29 Aug 2024 07:15) (81 - 81)  RR: 25 (29 Aug 2024 11:15) (10 - 43)  SpO2: 100% (29 Aug 2024 11:15) (91% - 100%)    Parameters below as of 29 Aug 2024 11:00  Patient On (Oxygen Delivery Method): room air        PHYSICAL EXAMINATION:  General: NAD, jaundice  HEENT: scleral icterus  Cardiac: RRR, No M/R/G  Resp: CTAB, No Wh/Rh/Ra  Abdomen: NBS, NT/ND, No rigidity or guarding  MSK: ++ LE edema. No Calf tenderness  Skin: No rashes or lesions. Skin is warm and dry to the touch.   Neuro: Lethargy                          7.9    16.71 )-----------( 139      ( 29 Aug 2024 06:40 )             22.7       08-29    137  |  101  |  14  ----------------------------<  170<H>  4.6   |  19<L>  |  1.01    Ca    8.7      29 Aug 2024 06:35  Phos  4.0     08-29  Mg     2.4     08-29    TPro  5.9<L>  /  Alb  3.1<L>  /  TBili  10.6<H>  /  DBili  x   /  AST  57<H>  /  ALT  32  /  AlkPhos  175<H>  08-29      Urinalysis Basic - ( 29 Aug 2024 06:35 )    Color: x / Appearance: x / SG: x / pH: x  Gluc: 170 mg/dL / Ketone: x  / Bili: x / Urobili: x   Blood: x / Protein: x / Nitrite: x   Leuk Esterase: x / RBC: x / WBC x   Sq Epi: x / Non Sq Epi: x / Bacteria: x        MICROBIOLOGY:  v  Ascites Fl Ascites Fluid  08-27-24   No growth  --    No polymorphonuclear cells seen  No organisms seen  by cytocentrifuge      .Blood Blood-Peripheral  08-26-24   No growth at 48 Hours  --  --      .Blood Blood-Peripheral  08-26-24   Growth in aerobic bottle: Enterococcus faecium  Direct identification is available within approximately 3-5  hours either by Blood Panel Multiplexed PCR or Direct  MALDI-TOF. Details: [email or web address]  --  Blood Culture PCR      .Blood Blood  08-23-24   No growth at 5 days  --  --      .Blood Blood  08-23-24   No growth at 5 days  --  --      .Blood Blood-Venous  08-21-24   No growth at 5 days  --  --      .Blood Blood-Venous  08-21-24   No growth at 5 days  --  --      Combi-Cath Combi-Cath  08-21-24   Normal Respiratory Elo present  --    Moderate polymorphonuclear leukocytes per low power field  Rare Squamous epithelial cells per low power field  No organisms seen per oil power field      .Blood Blood  08-20-24   No growth at 5 days  --  --      .Blood Blood  08-20-24   No growth at 5 days  --  --      Peritoneal Peritoneal Fluid  08-14-24   No growth at 5 days  --    polymorphonuclear leukocytes seen  No organisms seen  by cytocentrifuge      .Blood Blood-Venous  08-14-24   No growth at 5 days  --  --      .Blood Blood-Venous  08-14-24   No growth at 5 days  --  --      .Blood Blood-Peripheral  08-14-24   No growth at 5 days  --  --      .Blood Blood  08-12-24   No growth at 5 days  --  --      .Blood Blood  08-12-24   Growth in aerobic bottle: Staphylococcus epidermidis Isolation of  Coagulase negative Staphylococcus from single blood culture sets may  represent  contamination. Contact the Microbiology Department at [phone] if  susceptibility testing is  clinically indicated.  Direct identification is available within approximately 3-5  hours either by Blood Panel Multiplexed PCR or Direct  MALDI-TOF. Details: [email or web address]  --  Blood Culture PCR        CMV IgG Antibody: >10.00 U/mL (08-20-24 @ 00:08)  Toxoplasma IgG Screen: 44.00 IU/mL (08-12-24 @ 21:15)  CMV IgG Antibody: >10.00 U/mL (08-12-24 @ 21:15)          RADIOLOGY:    <The imaging below has been reviewed and visualized by me independently. Findings as detailed in report below>    < from: [facility] Duplex Lower Ext Vein Scan, Gnia (08.28.24 @ 20:21) >  INTERPRETATION:  CLINICAL INFORMATION: Evaluate for DVT.    COMPARISON: None available.    TECHNIQUE: Duplex sonography of the BILATERAL LOWER extremity veins with   color and spectral Doppler, with and without compression.    FINDINGS:    RIGHT:  Normal compressibility of the RIGHT common femoral, femoral and popliteal   veins.  Doppler examination shows normal spontaneous and phasic flow.  Poorly visualized RIGHT calf veins.    LEFT:  Normal compressibility of the LEFT common femoral, femoral and popliteal   veins.  Doppler examination shows normal spontaneous and phasic flow.  Poorly visualized LEFT calf veins.    IMPRESSION:  No evidence of deep venous thrombosis in either lower extremity. Please   note, however, that the calf veins bilaterally were poorly visualized.    --- End of Report ---    < end of copied text >

## 2024-08-29 NOTE — PROGRESS NOTE ADULT - SUBJECTIVE AND OBJECTIVE BOX
[facility] DIVISION OF KIDNEY DISEASES AND HYPERTENSION   FOLLOW UP NOTE    --------------------------------------------------------------------------------  Chief Complaint:    24 hour events/subjective: Pt. was seen and examined today.  Remains in SICU on CRRT.       PAST HISTORY  --------------------------------------------------------------------------------  No significant changes to PMH, PSH, FHx, SHx, unless otherwise noted    ALLERGIES & MEDICATIONS  --------------------------------------------------------------------------------  Allergies    No Known Allergies    Intolerances      Standing Inpatient Medications  chlorhexidine 2% Cloths 1 Application(s) Topical <User Schedule>  CRRT Treatment    <Continuous>  epoetin kareem (EPOGEN) Injectable 00638 Unit(s) SubCutaneous <User Schedule>  fluconAZOLE IVPB 400 milliGRAM(s) IV Intermittent every 24 hours  folic acid 1 milliGRAM(s) Oral daily  hydrocortisone sodium succinate Injectable 50 milliGRAM(s) IV Push every 6 hours  insulin lispro (ADMELOG) corrective regimen sliding scale   SubCutaneous every 6 hours  lidocaine   4% Patch 1 Patch Transdermal daily  meropenem  IVPB 1000 milliGRAM(s) IV Intermittent every 8 hours  multivitamin/minerals/iron Oral Solution (CENTRUM) 15 milliLiter(s) Enteral Tube daily  norepinephrine Infusion 0.5 MICROgram(s)/kG/Min IV Continuous <Continuous>  pantoprazole  Injectable 40 milliGRAM(s) IV Push two times a day  Phoxillum Filtration BK 4 / 2.5 5000 milliLiter(s) CRRT <Continuous>  PrismaSATE Dialysate BGK 4 / 2.5 5000 milliLiter(s) CRRT <Continuous>  PrismaSOL Filtration BGK 4 / 2.5 5000 milliLiter(s) CRRT <Continuous>  rifAXIMin 550 milliGRAM(s) Oral every 12 hours  thiamine 100 milliGRAM(s) Oral daily  vancomycin  IVPB 1250 milliGRAM(s) IV Intermittent every 12 hours  vasopressin Infusion 0.03 Unit(s)/Min IV Continuous <Continuous>    PRN Inpatient Medications      REVIEW OF SYSTEMS  --------------------------------------------------------------------------------  All other systems were reviewed and are negative, except as noted.    VITALS/PHYSICAL EXAM  --------------------------------------------------------------------------------  T(C): 36.7 (24 @ 07:00), Max: 36.7 (24 @ 17:00)  HR: 67 (24 @ 07:30) (64 - 73)  BP: 111/56 (24 @ 07:15) (111/56 - 123/56)  RR: 17 (24 @ 07:30) (10 - 43)  SpO2: 100% (24 @ 07:30) (91% - 100%)  Wt(kg): --        24 @ 07:01  -  24 @ 07:00  --------------------------------------------------------  IN: 2819 mL / OUT:  mL / NET: 827 mL    24 @ 07:01  -  24 @ 08:07  --------------------------------------------------------  IN: 39.5 mL / OUT: 0 mL / NET: 39.5 mL        Physical Exam:  	Gen: NAD  	HEENT: Anicteric  	Pulm: CTA B/L  	CV: S1S2+  	Abd: Soft, +BS   	Ext: No LE edema B/L  	Neuro: Sedated  	Skin: Warm and dry  	Dialysis access: [facility]    LABS/STUDIES  --------------------------------------------------------------------------------              7.9    16.71 >-----------<  139      [24 @ 06:40]              22.7     137  |  101  |  14  ----------------------------<  170      [24 @ 06:35]  4.6   |  19  |  1.01        Ca     8.7     [24 @ 06:35]      Mg     2.4     [24 06:35]      Phos  4.0     [24 @ 06:35]    TPro  5.9  /  Alb  3.1  /  TBili  10.6  /  DBili  x   /  AST  57  /  ALT  32  /  AlkPhos  175  [24 @ 06:35]    PT/INR: PT 25.8 , INR 2.52       [24 @ 06:40]  PTT: 46.5       [24 @ 06:40]      Creatinine Trend:  SCr 1.01 [ 06:35]  SCr 0.99 [ 00:30]  SCr 1.04 [08-28 @ 18:31]  SCr 1.06 [ @ 12:36]  SCr 1.16 [ @ 06:35]    Urinalysis - [24 @ 06:35]      Color  / Appearance  / SG  / pH       Gluc 170 / Ketone   / Bili  / Urobili        Blood  / Protein  / Leuk Est  / Nitrite       RBC  / WBC  / Hyaline  / Gran  / Sq Epi  / Non Sq Epi  / Bacteria       HBsAb Reactive      [24 @ 21:16]  HBsAg Nonreact      [24 @ 21:13]  HBcAb Nonreact      [24 @ 21:16]  HCV 0.08, Nonreact      [24 @ 14:45]  HIV Nonreact      [24 @ 21:13]    CHETNA: titer Negative, pattern --      [24 @ 21:15]  Syphilis Screen (Treponema Pallidum Ab) Negative      [24 @ 21:15]  Immunofixation Serum:   Oligoclonal Pattern Consisting of One Weak IgM Kappa Band, Two Weak IgG  Kappa Bands, and One Weak IgA Lambda Band Identified      Reference Range: None Detected      [24 @ 21:13]  SPEP Interpretation: Oligoclonal Pattern Consisting of Three Weak Gamma-Migrating Paraproteins  and One Weak Beta-Migrating Paraprotein Identified      [24 @ 21:13]    Tacrolimus  Cyclosporine  Sirolimus  Mycophenolate  BK PCR  CMV PCRCMVPCR Lo.94 Dck49TY/mL ( @ 12:26)  CMVPCR Log: 3.01 Joa46JC/mL ( @ 00:08)  CMVPCR Log: 3.67 Mcf38XJ/mL ( @ 00:37)  CMVPCR Log: 3.69 Ief62ZJ/mL ( @ 21:13)    Parvo PCR  EBV PCR

## 2024-08-29 NOTE — PROGRESS NOTE ADULT - NS ATTEND AMEND GEN_ALL_CORE FT
worsening pressor requirements yesterday, remains on 2 pressors, awake, alert and responding but slow,   Enterococcus faecium bacteremia, low grade ?translocation vs line related  Ascitic fluid so far not c/f infection,   favor down to line to replace RIJ/MIdline  meanwhile continue vancomycin for enterococcus bacteremia (low grade)- needs vancomycin torugh prior to next dose goal 10-15   Would also discontinue meropenem as its continuation further selects Enterococci in the Gi tract (direct correlation with cumulative exposure)      Thank you for involving us in the care of this patient  Transplant ID will continue to follow  Please call or page with additional questions  Pager; #[phone]  Teams: from 8 am to 5 pm  Kimberly Zamarripa MD

## 2024-08-30 NOTE — PROGRESS NOTE ADULT - SUBJECTIVE AND OBJECTIVE BOX
Interval Events:   - ID: Stop Meropenem. Continue Vanco. Repeat TTE.   - Repeat BCx negative.        - Afebrile, on room air.   - Levo 0.09 and Vaso 0.03. Improved.    - Anuric on net even CRRT.   - 3 reported BM in 24h.     ROS:   12 point review of systems performed and negative except otherwise noted above.     Hospital Medications:  chlorhexidine 2% Cloths 1 Application(s) Topical <User Schedule>  CRRT Treatment    <Continuous>  epoetin kareem (EPOGEN) Injectable 71052 Unit(s) SubCutaneous <User Schedule>  fluconAZOLE IVPB 400 milliGRAM(s) IV Intermittent every 24 hours  folic acid 1 milliGRAM(s) Oral daily  insulin lispro (ADMELOG) corrective regimen sliding scale   SubCutaneous every 6 hours  lactulose Syrup 20 Gram(s) Enteral Tube every 12 hours  lidocaine   4% Patch 1 Patch Transdermal daily  multivitamin/minerals/iron Oral Solution (CENTRUM) 15 milliLiter(s) Enteral Tube daily  norepinephrine Infusion 0.5 MICROgram(s)/kG/Min (53.4 mL/Hr) IV Continuous <Continuous>  pantoprazole  Injectable 40 milliGRAM(s) IV Push two times a day  Phoxillum Filtration BK 4 / 2.5 5000 milliLiter(s) (2000 mL/Hr) CRRT <Continuous>  Phoxillum Filtration BK 4 / 2.5 5000 milliLiter(s) (200 mL/Hr) CRRT <Continuous>  PrismaSATE Dialysate BGK 4 / 2.5 5000 milliLiter(s) (1000 mL/Hr) CRRT <Continuous>  rifAXIMin 550 milliGRAM(s) Oral every 12 hours  thiamine 100 milliGRAM(s) Oral daily  vancomycin  IVPB 1250 milliGRAM(s) IV Intermittent every 12 hours  vasopressin Infusion 0.03 Unit(s)/Min (4.5 mL/Hr) IV Continuous <Continuous>    MAR over past 24 hours:    chlorhexidine 2% Cloths   1 Application(s) Topical (24 @ 05:34)    epoetin kareem (EPOGEN) Injectable   78326 Unit(s) SubCutaneous (24 @ 05:43)    fluconAZOLE IVPB   100 mL/Hr IV Intermittent (24 @ 09:45)    folic acid   1 milliGRAM(s) Oral (24 @ 12:02)    insulin lispro (ADMELOG) corrective regimen sliding scale   2 Unit(s) SubCutaneous (24 @ 12:12)   2 Unit(s) SubCutaneous (24 @ 06:42)   2 Unit(s) SubCutaneous (24 @ 00:32)   2 Unit(s) SubCutaneous (24 @ 18:04)    lactulose Syrup   20 Gram(s) Enteral Tube (24 @ 17:25)    lidocaine   4% Patch   1 Patch Transdermal (24 @ 12:02)    multivitamin/minerals/iron Oral Solution (CENTRUM)   15 milliLiter(s) Enteral Tube (24 @ 12:01)    norepinephrine Infusion   53.4 mL/Hr IV Continuous (24 @ 19:16)    pantoprazole  Injectable   40 milliGRAM(s) IV Push (24 @ 05:33)   40 milliGRAM(s) IV Push (24 @ 17:22)    Phoxillum Filtration BK 4 / 2.5   2000 mL/Hr CRRT (24 @ 00:16)   2000 mL/Hr CRRT (24 @ 21:47)   2000 mL/Hr CRRT (24 @ 19:15)    Phoxillum Filtration BK 4 / 2.5   2000 mL/Hr CRRT (24 @ 10:58)    Phoxillum Filtration BK 4 / 2.5   200 mL/Hr CRRT (24 @ 10:58)    piperacillin/tazobactam IVPB.   200 mL/Hr IV Intermittent (24 @ 10:46)    PrismaSATE Dialysate BGK 4 / 2.5   1000 mL/Hr CRRT (24 @ 10:58)    PrismaSATE Dialysate BGK 4 / 2.5   1000 mL/Hr CRRT (24 @ 21:47)   1000 mL/Hr CRRT (24 @ 19:15)    rifAXIMin   550 milliGRAM(s) Oral (24 @ 05:28)   550 milliGRAM(s) Oral (24 @ 17:25)    thiamine   100 milliGRAM(s) Oral (24 @ 12:02)    vancomycin  IVPB   166.67 mL/Hr IV Intermittent (24 @ 05:35)   166.67 mL/Hr IV Intermittent (24 @ 17:20)    vasopressin Infusion   4.5 mL/Hr IV Continuous (24 @ 00:32)   4.5 mL/Hr IV Continuous (24 @ 19:16)      PHYSICAL EXAM:   Vital Signs last 24 hours:  T(F): 98.1 (24 @ 15:00), Max: 98.2 (24 @ 07:00)  HR: 85 (24 @ 16:30) (66 - 85)  BP: 116/56 (24 @ 07:00) (116/56 - 116/56)  BP(mean): --  ABP: 118/46 (24 @ 16:30) (102/41 - 128/48)  ABP(mean): 64 (24 @ 16:30) (51 - 155)  RR: 21 (24 @ 16:30) (10 - 52)  SpO2: 100% (24 @ 16:30) (94% - 100%)    I&Os:    24 @ 07:01  -  24 @ 07:00  --------------------------------------------------------  IN:    Enteral Tube Flush: 180 mL    IV PiggyBack: 200 mL    IV PiggyBack: 550 mL    Miscellaneous Tube Feedin mL    Norepinephrine: 413.5 mL    Vasopressin: 108 mL  Total IN: 2231.5 mL    OUT:    Other (mL): 2172 mL  Total OUT: 2172 mL    Total NET: 59.5 mL      24 @ 07:01  -  24 @ 16:49  --------------------------------------------------------  IN:    Enteral Tube Flush: 60 mL    IV PiggyBack: 100 mL    IV PiggyBack: 200 mL    Miscellaneous Tube Feedin mL    Norepinephrine: 135.7 mL    Vasopressin: 45 mL  Total IN: 840.7 mL    OUT:    Other (mL): 849 mL  Total OUT: 849 mL    Total NET: -8.3 mL        BMI (kg/m2): 33.2 (24 @ 16:46)  GENERAL: obese man, physically deconditioned.   NEURO: waxes and wanes. Having minimal verbal output. Weak voice. Follows simple commands.    HEENT: Scleral icterus  CHEST: Bilateral breath sounds, decreased in bases.    CARDIAC: Regular rate and rhythm  ABDOMEN: Distended, soft, non-tender. Present bowel sounds. Anasarca.   EXTREMITIES: warm, well perfused. Anasarca improved.   SKIN: Jaundiced, no rash/ecchymoses    DIAGNOSTICS:  WBC      Hg       PLT      Na       K        CO2     BUN      Cr       ALT      AST      TB       ALP  15.40    7.7      103      138      3.9      22       12       0.96     36       60       9.3      218      24 @ 12:24  16.53    8.0      119      136      4.1      21       13       0.98     36       62       9.9      211      24 @ 06:24  16.31    7.9      121      137      4.3      20       13       0.96     36       60       9.9      198      24 @ 00:39  15.84    7.8      128      136      4.4      18       14       0.98     35       59       9.9      186      24 @ 18:11  17.29    8.2      130      136      4.5      21       15       0.97     34       57       10.5     186      24 @ 12:14    PT             INR            MELDwith  MELDw/o  24.3           2.37           --             --             24 @ 12:24  23.3           2.17           --             --             24 @ 06:24  24.7           2.30           --             --             24 @ 00:39  26.4           2.46           --             --             24 @ 18:11  27.1           2.53           --             --             24 @ 12:14

## 2024-08-30 NOTE — PROGRESS NOTE ADULT - ASSESSMENT
67 yo M with obesity and risky alcohol consumption (with decades' history of daily consumption of homemade alcohol). Admitted to [facility] for 1 month due to recent onset of jaundice and with a prolonged hospital/ICU course due to newly diagnosed decompensated cirrhosis with acute on chronic liver failure (ACLF) with shock, FANY (started on intermittent HD since 7/9), acute on chronic anemia with recurrent overt GI bleeding, and hepatic encephalopathy.     Transferred to [facility] on 8/12/24 for SLK evaluation and then transferred to the SICU for initiation of CRRT (8/13- ).      # Distributive shock   # E. faecalis bacteremia (8/27)   - ACLF + sepsis + hypovolemia.    - Norepinephrine and Vasopressin. Improved requirements.   - Transplant ID following.   - CMV viremia (Valcyte not required). Unequivocal Lyme PCR/negative WB (on Doxy).     - BCx (8/26) E. faecalis. Dg paracentesis (8/27) not suggestive of SBP.  TTE (8/28) no vegetations. CT (8/25) no evident source of infection.     - S/p Zosyn (8/12-16), Doxycycline (8/18-27), Meropenem (8/16-30),    - Vancomycin (8/27- ), and Fluconazole (8/12- ).      # History of UGIB (7'24 - resolved)/ Hematochezia (8/18 - resolved)/ Melena (8/27 - resolved).    - Hematemesis from duodenal ulcer (07/2024). Hematochezia from rectal ulcer/friable mucosa (8/18). New episodes of melena (8/27).    - EGD (7/12, at [facility]) small non-bleeding EV and a duodenal ulcer with a visible vessel. No further hematemesis.   - EGD (8/20) with no active bleeding. Colonoscopy (8/21) friable mucosa, rectal ulcer (biopsy ruled out malignancy).  - Pantoprazole 40BID  - Will need secondary prophylaxis with BB once stable.   - As needed PRBC and TEG-guided blood products.     # Anuric FANY on CKD    - Started intermittent HD (7/9-8/12) - Now on CRRT (8/13- ).   - Fluid removal on CRRT limited due to hypotension.   - Transplant nephrology following.    # Newly diagnosed decompensated cirrhosis with ACLF   - HE: Waxes and wanes. Minimally verbal. Following simple commands. Rifaximin/Lactulose.  - Agitation/Insomnia: Seroquel 25mg or Haldol 2.5mg at night. Transplant Psych following.    - Large volume ascites and anasarca: Anuric. Limited fluid removal with CRRT.   - Paracentesis (8/14 and 8/27) negative for SBP.   - Image: Contrast CT (8/13) patent portohepatic vessels and no HCC. Small infarcts in left hepatic lobe and spleen.       - Nutrition: Poor PO diet supplemented with NGT feeds. Nutrition following.   - PT: Last ambulatory on July/2024. Daily inpatient PT/OT.     # SLK ongoing evaluation (8/12)  - ABO O. MELD 3.0 score of 36  - Met K criteria in Aug 20th. Tissue typing completed.   - C for cardiology clearance deferred until medically stable.     - Pt too sick to undergo OLT. Patient's wife is aware. All questions answered (8/28).     PLAN   - Meropenem stopped. Start empiric Zosyn.   - Measure CMV PCR weekly on Friday.  - As needed PRBC and TEG-guided blood products.   - Will need NSBB once stable for EVB prophylaxis.  - Titrate bowel regimen for goal 3-4 BM daily.   - Avoid Precedex. Management of agitation/insomnia as per  recommendations  - Management of CRRT and fluid resuscitation as per SICU and nephrology team    - Too sick to undergo OLT at the time. Family aware.   - Will consider LHC on Monday if Pt continues to improve.       Note incomplete until finalized by attending signature/attestation.    Myra Wilkes-Zuleika   Transplant Hepatology Fellow

## 2024-08-30 NOTE — PROGRESS NOTE ADULT - SUBJECTIVE AND OBJECTIVE BOX
HISTORY  68y Male    24 HOUR EVENTS:  - CRRT net even  - restart lactulose 20 BID  - dc stress steroids  - dc meropenem     SUBJECTIVE/ROS:  [ ] A ten-point review of systems was otherwise negative except as noted.  [ ] Due to altered mental status/intubation, subjective information were not able to be obtained from the patient. History was obtained, to the extent possible, from review of the chart and collateral sources of information.      NEURO  Exam: awake, alert   Meds:   [x] Adequacy of sedation and pain control has been assessed and adjusted      RESPIRATORY  RR: 12 (24 @ 03:15) (10 - 43)  SpO2: 100% (24 @ 03:15) (96% - 100%)  Wt(kg): --  Exam: unlabored, clear to auscultation bilaterally  Mechanical Ventilation:   ABG - ( 30 Aug 2024 00:32 )  pH: 7.46  /  pCO2: 31    /  pO2: 72    / HCO3: 22    / Base Excess: -1.4  /  SaO2: 97.2    Lactate: x              Meds:       CARDIOVASCULAR  HR: 73 (24 @ 03:15) (64 - 79)  BP: 111/56 (24 @ 07:15) (111/56 - 111/56)  BP(mean): 81 (24 @ 07:15) (81 - 81)  ABP: 114/38 (24 @ 03:15) (103/49 - 127/52)  ABP(mean): 58 (24 @ 03:15) (51 - 155)      Exam: regular rate and rhythm  Cardiac Rhythm: sinus  Perfusion     [x]Adequate   [ ]Inadequate  Mentation   [x]Normal       [ ]Reduced  Extremities  [x]Warm         [ ]Cool  Volume Status [ ]Hypervolemic [x]Euvolemic [ ]Hypovolemic  Meds: norepinephrine Infusion 0.5 MICROgram(s)/kG/Min IV Continuous <Continuous>        GI/NUTRITION  Exam: soft, nontender, nondistended   Diet: TF  Meds: lactulose Syrup 20 Gram(s) Enteral Tube every 12 hours  pantoprazole  Injectable 40 milliGRAM(s) IV Push two times a day      GENITOURINARY  I&O's Detail    - @ 07:01  -   @ 07:00  --------------------------------------------------------  IN:    Enteral Tube Flush: 195 mL    IV PiggyBack: 300 mL    IV PiggyBack: 550 mL    Miscellaneous Tube Feedin mL    Norepinephrine: 466 mL    Platelets - Single Donor: 300 mL    PRBCs (Packed Red Blood Cells): 600 mL    Vasopressin: 108 mL  Total IN: 2819 mL    OUT:    Other (mL):  mL    Stool (mL): 3 mL  Total OUT: 1992 mL    Total NET: 827 mL       @ 07:  -   @ 03:19  --------------------------------------------------------  IN:    Enteral Tube Flush: 80 mL    IV PiggyBack: 200 mL    IV PiggyBack: 300 mL    Miscellaneous Tube Feedin mL    Norepinephrine: 349.5 mL    Vasopressin: 90 mL  Total IN: 1599.5 mL    OUT:    Other (mL): 1643 mL  Total OUT: 1643 mL    Total NET: -43.5 mL              137  |  104  |  13  ----------------------------<  169<H>  4.3   |  20<L>  |  0.96    Ca    8.4      30 Aug 2024 00:39  Phos  3.1       Mg     2.4         TPro  5.9<L>  /  Alb  3.0<L>  /  TBili  9.9<H>  /  DBili  x   /  AST  60<H>  /  ALT  36  /  AlkPhos  198<H>      [ ] Elizabeth catheter, indication: N/A  Meds: folic acid 1 milliGRAM(s) Oral daily  multivitamin/minerals/iron Oral Solution (CENTRUM) 15 milliLiter(s) Enteral Tube daily  thiamine 100 milliGRAM(s) Oral daily        HEMATOLOGIC  Meds:   [x] VTE Prophylaxis                        7.9    16.31 )-----------( 121      ( 30 Aug 2024 00:39 )             23.0     PT/INR - ( 30 Aug 2024 00:39 )   PT: 24.7 sec;   INR: 2.30 ratio         PTT - ( 30 Aug 2024 00:39 )  PTT:45.2 sec  Transfusion     [ ] PRBC   [ ] Platelets   [ ] FFP   [ ] Cryoprecipitate      INFECTIOUS DISEASES  WBC Count: 16.31 K/uL ( @ 00:39)  WBC Count: 15.84 K/uL ( @ 18:11)  WBC Count: 17.29 K/uL ( @ 12:14)  WBC Count: 16.71 K/uL ( @ 06:40)    RECENT CULTURES:  Specimen Source: .Blood Blood-Peripheral  Date/Time:  @ 19:28  Culture Results:   No growth at 24 hours  Gram Stain: --  Organism: --  Specimen Source: .Blood Blood-Peripheral  Date/Time:  @ 18:32  Culture Results:   No growth at 24 hours  Gram Stain: --  Organism: --  Specimen Source: Ascites Fl Ascites Fluid  Date/Time:  @ 16:29  Culture Results:   No growth  Gram Stain:   No polymorphonuclear cells seen  No organisms seen  by cytocentrifuge  Organism: --  Specimen Source: .Blood Blood-Peripheral  Date/Time:  @ 18:42  Culture Results:   No growth at 72 Hours  Gram Stain: --  Organism: --  Specimen Source: .Blood Blood-Peripheral  Date/Time:  @ 18:28  Culture Results:   Growth in aerobic bottle: Enterococcus faecium  Direct identification is available within approximately 3-5  hours either by Blood Panel Multiplexed PCR or Direct  MALDI-TOF. Details: [email or web address]<!>  Gram Stain:   Growth in aerobic bottle: Gram positive cocci in pairs<!>  Organism: Blood Culture PCR  Enterococcus faecium<!>    Meds: epoetin kareem (EPOGEN) Injectable 29744 Unit(s) SubCutaneous <User Schedule>  fluconAZOLE IVPB 400 milliGRAM(s) IV Intermittent every 24 hours  rifAXIMin 550 milliGRAM(s) Oral every 12 hours  vancomycin  IVPB 1250 milliGRAM(s) IV Intermittent every 12 hours        ENDOCRINE  CAPILLARY BLOOD GLUCOSE      POCT Blood Glucose.: 162 mg/dL (30 Aug 2024 00:27)  POCT Blood Glucose.: 167 mg/dL (29 Aug 2024 18:01)  POCT Blood Glucose.: 143 mg/dL (29 Aug 2024 11:59)  POCT Blood Glucose.: 165 mg/dL (29 Aug 2024 06:31)    Meds: insulin lispro (ADMELOG) corrective regimen sliding scale   SubCutaneous every 6 hours  vasopressin Infusion 0.03 Unit(s)/Min IV Continuous <Continuous>        ACCESS DEVICES:  [ ] Peripheral IV  [ ] Central Venous Line	[ ] R	[ ] L	[ ] IJ	[ ] Fem	[ ] SC	Placed:   [ ] Arterial Line		[ ] R	[ ] L	[ ] Fem	[ ] Rad	[ ] Ax	Placed:   [ ] PICC:					[ ] Mediport  [ ] Urinary Catheter, Date Placed:   [x] Necessity of urinary, arterial, and venous catheters discussed    OTHER MEDICATIONS:  chlorhexidine 2% Cloths 1 Application(s) Topical <User Schedule>  CRRT Treatment    <Continuous>  lidocaine   4% Patch 1 Patch Transdermal daily  Phoxillum Filtration BK 4 / 2.5 5000 milliLiter(s) CRRT <Continuous>  PrismaSATE Dialysate BGK 4 / 2.5 5000 milliLiter(s) CRRT <Continuous>  PrismaSOL Filtration BGK 4 / 2.5 5000 milliLiter(s) CRRT <Continuous>      CODE STATUS:      IMAGING:

## 2024-08-30 NOTE — PROGRESS NOTE ADULT - SUBJECTIVE AND OBJECTIVE BOX
Follow Up: Enterococcus Bacteriemia    Interval History:  Afebrile, Improved pressor requirements        Vital Signs Last 24 Hrs  T(C): 36.7 (30 Aug 2024 11:00), Max: 36.8 (30 Aug 2024 07:00)  T(F): 98.1 (30 Aug 2024 11:00), Max: 98.2 (30 Aug 2024 07:00)  HR: 79 (30 Aug 2024 11:15) (65 - 84)  BP: 116/56 (30 Aug 2024 07:00) (116/56 - 116/56)  BP(mean): --  RR: 17 (30 Aug 2024 11:15) (10 - 52)  SpO2: 95% (30 Aug 2024 11:15) (94% - 100%)    Parameters below as of 30 Aug 2024 11:00  Patient On (Oxygen Delivery Method): room air            PHYSICAL EXAMINATION:  General: NAD, jaundice  HEENT: scleral icterus  Cardiac: RRR, No M/R/G  Resp: CTAB, No Wh/Rh/Ra  Abdomen: NBS, NT/ND, No rigidity or guarding  MSK: ++ LE edema. No Calf tenderness  Skin: No rashes or lesions. Skin is warm and dry to the touch.   Neuro: Lethargy                    ____________________________________________________  ROS  GENERAL: denies chills, , night sweats, weight loss.   PSYCH: denies depression, anxiety, suicidal ideation, hallucination, and delusions  SKIN: no rash or lesions; no color changes, no abnormal nevi,no  dryness, and nojaundice    EYES: denies visual changes, floaters, pain, inflammation, blurred vision, and discharge  ENT: denies tinnitus, vertigo, epistaxis, oral lesion, and decreased acuity  PULM: denies, hemoptysis, pleurisy  CVS: denies angina, palpitations,+ orthopnea, no syncope, or heart murmur  GI: denies constipation, diarrhea, melena, abdominal pain, nausea.   : denies dysuria, frequency, discharge, incontinence, stones or macroscopic hematuria  MS: no arthralgias, no erythema or swelling, no myalgias, noedema, or lower back pain.   CNS: denies numbness, dizziness, seizure, or tremor  ENDO: denies heat/cold intolerance, polyuria, polydipsia, malaise.    HEME: denies bruising, bleeding, lymphadenopathy, anemia, and calf pain    Allergies  No Known Allergies    __________________________________________________  MEDS:  MEDICATIONS  (STANDING):  epoetin kareem (EPOGEN) Injectable 04505 <User Schedule>  insulin lispro (ADMELOG) corrective regimen sliding scale  every 6 hours  lactulose Syrup 20 every 12 hours  norepinephrine Infusion 0.5 <Continuous>  pantoprazole  Injectable 40 two times a day  vasopressin Infusion 0.03 <Continuous>    _________________________________________________  ANTIMICOBIALS  epoetin kareem (EPOGEN) Injectable 07262 <User Schedule>  fluconAZOLE IVPB 400 every 24 hours  rifAXIMin 550 every 12 hours  vancomycin  IVPB 1250 every 12 hours      GENERAL LABS              8.0                  136  | 21   | 13           16.53 >-----------< 119     ------------------------< 184                   23.5                 4.1  | 104  | 0.98                                         Ca 8.3   Mg 2.4   Ph 3.1      Vancomycin Level, Trough: 18.9 (08-29 @ 16:14)    Urinalysis Basic - ( 30 Aug 2024 06:24 )    Color: x / Appearance: x / SG: x / pH: x  Gluc: 184 mg/dL / Ketone: x  / Bili: x / Urobili: x   Blood: x / Protein: x / Nitrite: x   Leuk Esterase: x / RBC: x / WBC x   Sq Epi: x / Non Sq Epi: x / Bacteria: x        _________________________________________________  MICROBIOLOGY  -----------    Culture - Blood (collected 28 Aug 2024 19:28)  Source: .Blood Blood-Peripheral  Preliminary Report (30 Aug 2024 01:02):    No growth at 24 hours    Culture - Blood (collected 28 Aug 2024 18:32)  Source: .Blood Blood-Peripheral  Preliminary Report (30 Aug 2024 01:02):    No growth at 24 hours    Culture - Body Fluid with Gram Stain (collected 27 Aug 2024 16:29)  Source: Ascites Fl Ascites Fluid  Gram Stain (28 Aug 2024 03:35):    No polymorphonuclear cells seen    No organisms seen    by cytocentrifuge  Preliminary Report (28 Aug 2024 20:25):    No growth    Culture - Fungal, Body Fluid (collected 27 Aug 2024 16:29)  Source: Ascites Fl Ascites Fluid  Preliminary Report (28 Aug 2024 23:03):    Culture is being performed. Fungal cultures are held for 4 weeks.    Culture - Blood (collected 26 Aug 2024 18:42)  Source: .Blood Blood-Peripheral  Preliminary Report (30 Aug 2024 01:02):    No growth at 72 Hours    Culture - Blood (collected 26 Aug 2024 18:28)  Source: .Blood Blood-Peripheral  Gram Stain (27 Aug 2024 15:12):    Growth in aerobic bottle: Gram positive cocci in pairs  Final Report (29 Aug 2024 16:44):    Growth in aerobic bottle: Enterococcus faecium    Direct identification is available within approximately 3-5    hours either by Blood Panel Multiplexed PCR or Direct    MALDI-TOF. Details: [email or web address]  Organism: Blood Culture PCR  Enterococcus faecium (29 Aug 2024 16:44)  Organism: Enterococcus faecium (29 Aug 2024 16:44)      -  Streptomycin synergy: S <=1000      -  Vancomycin: S 1      -  Ampicillin: R >8 Predicts results to ampicillin/sulbactam, amoxacillin-clavulanate and  piperacillin-tazobactam.      Method Type: CHRIS      -  Gentamicin synergy: S <=500  Organism: Blood Culture PCR (29 Aug 2024 16:44)      Method Type: PCR      -  Enterococcus faecium: Detec

## 2024-08-30 NOTE — PROGRESS NOTE ADULT - ASSESSMENT
78 yo m with alcohol abuse otherwise no known chronic medical issues (does not see doctors per sister) s/p 1 month stay at [facility] now transferred to [facility] for liver transplant eval.  Most of history obtained from sister (Ashlyn) at bedside and some from transfer paperwork. Per sister, pt was initially brought to the hospital by family for back pain and jaundice for unclear amount of time. Patient was seen by GI and underwent EGD soon after admission which only showed nonbleeding ?duodenal ulcers (no intervention) however few days later pt developed active signs of bleeding and emergently underwent EGD again showing bleeding esophageal varices which were clipped.  pt was electively intubated with stay in ICU thereafter. Patient then started with HD due to worsening renal function and MS for past 2.5 weeks at times limited by low BP requiring pressors to assist. Had numerous paracentesis with varying amount of fluid removal - albumin infusions. Sister not aware of any concerns for acute infection during his stay but aware that pt was on abx at some point due to bleeding issues.        PERTINENT RADIOLOGY:  CXR: Tubes and lines as above. No focal consolidations  CT Chest, A&P:  Patchy ground-glass opacities in the bilateral upper lobes. *  Cirrhosis with evidence of portal hypertension. *  Hypodense lesion in the periphery of the left hepatic lobe of uncertain etiology. Somewhat wedge-shaped morphology raises the possibility for a small infarct. Question subtle peripheral nodular enhancement, and a hemangioma is also considered. Contrast enhanced abdominal MRI can be performed for further characterization and to assess for other possibilities. *  A wedge-shaped region of hypoattenuation in the spleen may reflect a small infarct. *  Focal eccentric wall thickening in the low rectum, possibly due to a mural fold. Consider colonoscopy. *  Large volume ascites.  CT Head: No evidence of acute intracranial pathology. If clinical symptoms persist or worsen, more sensitive evaluation with brain MRI may be obtained, if no contraindications exist.    BCx (8/12) 1/4 MRSE  BCx (8/14) NGTD  Paracentesis (8/14) Cell Counts Negative for SBP  MRSA/MSSA Nasal PCR (8/16) Negative    CXR (8/19) Ground Glass infiltrates persist  CT from 8//13 with bilateral Ground glass infiltrates    # Persistent Ground glass infiltrates of unclear etiology  please send urine legionella ag  please send deep sputum for testing for gram stain/cx, fungal stain/cx    #Decompensated liver cirrhosis, Leukocytosis, Shock  # Enterococcus faecium bacteremia BC 8/26 now positive and worsening shock  biliary/SBp vs line related in c/o prolonged empirical antibiotic course and Enterococcus selection    --Meropenem deescalated to Zosyn for empiric coverage in light of negative culture workup then re-escalated back to meropenem- suspect  etiology of sepsis and shock is Enterococcus bacteremia  would stop meropenem  and zosyn today, at risk for enterococcus with prolonged exposure to meropenem  Needs a Vanco trough today today, lev  Deescalated caspofungin to fluconazole  - also would stop doxycycline after today- received total of 10 day course  --Enterococcus not VRE, initially recommended linezolid but can switch now linezolid to vancomycin 1.5 g x1 then 1.25 mg q12h   --repeat BC tomorrow  --if BC remain positive will also favor removing midline and exchange RIJ    --Would obtain TTE  - s/p paracentesis today, follow cx , counts not c/w SBP   --S/P empiric fluconazole (8/12 -->8/26), Now escalated to Caspofungin --->(8/27), deescalated to Fluconazole 8/28--->  --Continue to follow CBC with diff  --Continue to follow renal function (Cr/BUN)  --Continue to follow transaminases  --Continue to follow temperature curve  --Follow up on preliminary blood cultures  --If able will send BAL cultures for legionella & PJP PCR    #Positive BCx (8/12) (Staph epi)  Consistent with contaminant   as repeat testing is negative x many    #CMV PCR   --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 Would repeat CMV PCR on (8/26)  -- may need to treat if increasing  Cytomegalovirus By PCR: 4890 --->4730 --->1020 --->867 (8/20)    #Pre Transplant Evaluation   HIV-1/2 Combo Result: Nonreactive  EBVPCR Log: NotDetec [id number]   Hepatitis A IgG Ab Result: Reactive   Hepatitis B Core Antibody, Total: Nonreactive  Hepatitis B Surface Antibody: Reactive   Hepatitis B DNA PCR Log: Not Detected  Hepatitis B Surface Antigen: Nonreactive  Hepatitis C Virus Interpretation: Nonreactive  COVID-19 De Domain Antibody: Positive  Treponema Pallidum Antibody Interpretation: Negative  HSV 1 IgG  Positive/HSV 2 IgG Negative  EBV Serology Positive  CMV IgG Positive  VZV IgG Positive  Measles Positive, Mumps Positive and Rubella IgG Positive  Toxoplasma IgG Positive  QuantiFeron Gold Negative  Strongyloides Ab Negative  Babesia PCR negative  --Schistosoma IgG - NEGATIVE  --Reportedly Lyme serology was previously positive and remains positive, Tick Diseases Panel is negative for additional tick born exposures  --Western blot negative, serologies not concerning for active Lyme disease  -- On doxycycline 100mg bid for atypical coverage   --Will send urine for Legionella, if negative can stop doxycycline    #Encounter to Vaccinate Patient - Will defer vaccination in context of critical illness  COVID19: No primary series. Would benefit from COVID19 4092-4109 dose  Influenza: Would benefit from dose next season  Pneumococcal: Would benefit from PCV20  HAV: Immune, no additional vaccines indicated  HBV: Immune, no additional vaccines indicated  MMR: Immune, will not require further vaccination  Varicella: Immune, will not require further vaccination  Shingles: Would benefit from Shingrix  Tdap: Would benefit from Tdap   78 yo m with alcohol abuse otherwise no known chronic medical issues (does not see doctors per sister) s/p 1 month stay at [facility] now transferred to [facility] for liver transplant eval.  Most of history obtained from sister (Ashlyn) at bedside and some from transfer paperwork. Per sister, pt was initially brought to the hospital by family for back pain and jaundice for unclear amount of time. Patient was seen by GI and underwent EGD soon after admission which only showed nonbleeding ?duodenal ulcers (no intervention) however few days later pt developed active signs of bleeding and emergently underwent EGD again showing bleeding esophageal varices which were clipped.  pt was electively intubated with stay in ICU thereafter. Patient then started with HD due to worsening renal function and MS for past 2.5 weeks at times limited by low BP requiring pressors to assist. Had numerous paracentesis with varying amount of fluid removal - albumin infusions. Sister not aware of any concerns for acute infection during his stay but aware that pt was on abx at some point due to bleeding issues.        PERTINENT RADIOLOGY:  CXR: Tubes and lines as above. No focal consolidations  CT Chest, A&P:  Patchy ground-glass opacities in the bilateral upper lobes. *  Cirrhosis with evidence of portal hypertension. *  Hypodense lesion in the periphery of the left hepatic lobe of uncertain etiology. Somewhat wedge-shaped morphology raises the possibility for a small infarct. Question subtle peripheral nodular enhancement, and a hemangioma is also considered. Contrast enhanced abdominal MRI can be performed for further characterization and to assess for other possibilities. *  A wedge-shaped region of hypoattenuation in the spleen may reflect a small infarct. *  Focal eccentric wall thickening in the low rectum, possibly due to a mural fold. Consider colonoscopy. *  Large volume ascites.  CT Head: No evidence of acute intracranial pathology. If clinical symptoms persist or worsen, more sensitive evaluation with brain MRI may be obtained, if no contraindications exist.    BCx (8/12) 1/4 MRSE  BCx (8/14) NGTD  Paracentesis (8/14) Cell Counts Negative for SBP  MRSA/MSSA Nasal PCR (8/16) Negative    CXR (8/19) Ground Glass infiltrates persist  CT from 8//13 with bilateral Ground glass infiltrates    # Persistent Ground glass infiltrates of unclear etiology  please send urine legionella ag  please send deep sputum for testing for gram stain/cx, fungal stain/cx    #Decompensated liver cirrhosis, Leukocytosis, Shock  # Enterococcus faecium bacteremia BC 8/26 now positive and worsening shock  biliary/SBp vs line related in c/o prolonged empirical antibiotic course and Enterococcus selection    --Meropenem deescalated to Zosyn for empiric coverage in light of negative culture workup then re-escalated back to meropenem- suspect  etiology of sepsis and shock is Enterococcus bacteremia  would stop meropenem  and zosyn today, at risk for enterococcus with prolonged exposure to meropenem  Needs a Vanco trough today prior to dose, 8/29  not true trough?, if >15, hold dose and redose at 1 g q12h   Deescalated caspofungin to fluconazole  --repeat BC NGTD- should they become positive will also favor removing midline and exchange RIJ  now, otherwise, would plan to do down the line  --Would obtain TTE  - s/p paracentesis today, follow cx , counts not c/w SBP   --S/P empiric fluconazole (8/12 -->8/26), Now escalated to Caspofungin --->(8/27), deescalated to Fluconazole 8/28--->  --Continue to follow CBC with diff  --Continue to follow renal function (Cr/BUN)  --Continue to follow transaminases  --Continue to follow temperature curve  --Follow up on preliminary blood cultures  --If able will send BAL cultures for legionella & PJP PCR    #Positive BCx (8/12) (Staph epi)  Consistent with contaminant   as repeat testing is negative x many    #CMV PCR   --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 Would repeat CMV PCR on (8/26)  -- may need to treat if increasing  Cytomegalovirus By PCR: 4890 --->4730 --->1020 --->867 (8/20)    #Pre Transplant Evaluation   HIV-1/2 Combo Result: Nonreactive  EBVPCR Log: NotDetec [id number]   Hepatitis A IgG Ab Result: Reactive   Hepatitis B Core Antibody, Total: Nonreactive  Hepatitis B Surface Antibody: Reactive   Hepatitis B DNA PCR Log: Not Detected  Hepatitis B Surface Antigen: Nonreactive  Hepatitis C Virus Interpretation: Nonreactive  COVID-19 De Domain Antibody: Positive  Treponema Pallidum Antibody Interpretation: Negative  HSV 1 IgG  Positive/HSV 2 IgG Negative  EBV Serology Positive  CMV IgG Positive  VZV IgG Positive  Measles Positive, Mumps Positive and Rubella IgG Positive  Toxoplasma IgG Positive  QuantiFeron Gold Negative  Strongyloides Ab Negative  Babesia PCR negative  --Schistosoma IgG - NEGATIVE  --Reportedly Lyme serology was previously positive and remains positive, Tick Diseases Panel is negative for additional tick born exposures  --Western blot negative, serologies not concerning for active Lyme disease  -- On doxycycline 100mg bid for atypical coverage   --Will send urine for Legionella, if negative can stop doxycycline    #Encounter to Vaccinate Patient - Will defer vaccination in context of critical illness  COVID19: No primary series. Would benefit from COVID19 1525-2737 dose  Influenza: Would benefit from dose next season  Pneumococcal: Would benefit from PCV20  HAV: Immune, no additional vaccines indicated  HBV: Immune, no additional vaccines indicated  MMR: Immune, will not require further vaccination  Varicella: Immune, will not require further vaccination  Shingles: Would benefit from Shingrix  Tdap: Would benefit from Tdap

## 2024-08-30 NOTE — PROGRESS NOTE ADULT - NS ATTEND AMEND GEN_ALL_CORE FT
worsening pressor requirements yesterday, remains on 2 pressors, awake, alert and responding but slow,   Enterococcus faecium bacteremia, low grade ?translocation vs line related  Ascitic fluid so far not c/f infection,   favor down to line to replace RIJ/MIdline  meanwhile continue vancomycin for enterococcus bacteremia (low grade)- needs vancomycin torugh prior to next dose goal 10-15   Would also discontinue meropenem as its continuation further selects Enterococci in the Gi tract (direct correlation with cumulative exposure)      Thank you for involving us in the care of this patient  Transplant ID will continue to follow  Please call or page with additional questions  Pager; #[phone]  Teams: from 8 am to 5 pm  Kimberly Zamarripa MD

## 2024-08-30 NOTE — PROGRESS NOTE ADULT - ATTENDING COMMENTS
Pt seen and examined with SICU team, agree with above.    Decompensated cirrhosis with hepatic encephalopathy, ascites, HRS on CRRT with anuria:  - Per pt's family, pt's wishes are that all invasive medical interventions continue regardless of quality of life or prognosis.   - Levo requirements decreased again after CRRT was put back to net zero fluid balance. Will not attempt further fluid removal for now.  - Continue rifaxamin/ lactulose via ngt for hepatic encephalopathy  - Pt was lethargic but arousable with A&Ox2 this morning. Had some suctioning requirements so considered intubation, but subsequently, there were no secretions during suctioning, so will hold off for now.   - Resume tube feeds, increase to goal  - Diagnostic paracentesis 8/27 neg  - Blood culture 8/26 with E. faecium - unclear source  - Repeat blood cultures from 8/28 ngtd  - Continue vanc/ fluc  - S/p treatment for Lyme  - Pt's status has not changed since stress steroids were stopped yesterday, will not resume    - Shiley and TLC both placed 8/21  - Jagruti placed 8/26    Aflutter:  - Per EP, no intervention needed    Duodenal ulcer with UGIB:  - S/p clipping  - On Protonix BID  - Hb stable    Total time spent in the care of this patient today (excluding procedures & teaching): 45 min .

## 2024-08-30 NOTE — CHART NOTE - NSCHARTNOTEFT_GEN_A_CORE
NUTRITION FOLLOW UP NOTE    PATIENT SEEN FOR: sicu follow up     SOURCE: [] Patient  [x] Current Medical Record  [x] RN  [] Family/support person at bedside  [] Patient unavailable/inappropriate  [x] Other: Team     CHART REVIEWED/EVENTS NOTED.  [x] Nutrition Status:  -MELD: 36   -CRRT   -Stress dose steroids d/c'ed     DIET ORDER:   Diet, NPO:   Except Medications (24)    Previously on Vendobots Peptide 1.5 @ 50mL x 24hrs     ENTERAL NUTRITION  EN Order Provides:  1200ml formula, xxxx kcal, xx g protein, xxxx ml free water; meets xx kcal/kg and xx g protein/kg, based on wt:    Protein Modular(s) Provides:    Current Pump Rate:  EN provision: ___% EN volume goal provided in past __ days       CURRENT DIET ORDER IS:  [] Appropriate:  [] Inadequate:  [] Other:    NUTRITION INTAKE/PROVISION:  [] PO:  [] Enteral Nutrition:  [] Parenteral Nutrition:    ANTHROPOMETRICS:  Drug Dosing Weight  Height (cm): 185.4 (12 Aug 2024 16:46)  Weight (kg): 114 (12 Aug 2024 16:46)  BMI (kg/m2): 33.2 (12 Aug 2024 16:46)  BSA (m2): 2.37 (12 Aug 2024 16:46)  Weights:   Daily Weight in k.8 ()     NUTRITIONALLY PERTINENT MEDICATIONS:  MEDICATIONS  (STANDING):  fluconAZOLE IVPB  folic acid  insulin lispro (ADMELOG) corrective regimen sliding scale  lactulose Syrup  multivitamin/minerals/iron Oral Solution (CENTRUM)  norepinephrine Infusion  pantoprazole  Injectable  piperacillin/tazobactam IVPB.-  piperacillin/tazobactam IVPB..  rifAXIMin  thiamine  vancomycin  IVPB  vasopressin Infusion       NUTRITIONALLY PERTINENT LABS:   Na136 mmol/L Glu 184 mg/dL<H> K+ 4.1 mmol/L Cr  0.98 mg/dL BUN 13 mg/dL  Phos 3.1 mg/dL  Alb 3.1 g/dL<L>  Chol 49 mg/dL LDL --    HDL 14 mg/dL<L> Trig 70 mg/dL ALT 36 U/L AST 62 U/L<H> Alkaline Phosphatase 211 U/L<H>  - @ 21:13 a1c 5.0  24 @ 15:01 a1c 5.1    A1C with Estimated Average Glucose Result: 5.0 % (24 @ 21:13)  A1C with Estimated Average Glucose Result: 5.1 % (24 @ 15:01)          Finger Sticks:  POCT Blood Glucose.: 173 mg/dL ( @ 06:14)  POCT Blood Glucose.: 162 mg/dL ( @ 00:27)  POCT Blood Glucose.: 167 mg/dL ( @ 18:01)  POCT Blood Glucose.: 143 mg/dL ( @ 11:59)      NUTRITIONALLY PERTINENT MEDICATIONS/LABS:  [x] Reviewed  [] Relevant notes on medications/labs:    EDEMA:  [x] Reviewed  [] Relevant notes:    GI/ I&O:  [x] Reviewed  [] Relevant notes:  [] Other:    SKIN:   [] No pressure injuries documented, per nursing flowsheet  [] Pressure injury previously noted  [] Change in pressure injury documentation:  [] Other:    ESTIMATED NEEDS:  [] No change:  [] Updated:  Energy:   kcal/day (xx-xx kcal/kg)  Protein:   g/day (xx-xx g/kg)  Fluid:   ml/day or [] defer to team  Based on:    NUTRITION DIAGNOSIS:  [] Prior Dx:  [] New Dx:    EDUCATION:  [] Yes:  [] Not appropriate/warranted    NUTRITION CARE PLAN:  1. Diet:  2. Supplements:  3. Multivitamin/mineral supplementation:  4:     [] Achieved - Continue current nutrition intervention(s)  [] Current medical condition precludes nutrition intervention at this time.    MONITORING AND EVALUATION:   RD remains available upon request and will follow up per protocol.    Name  Available on MS TEAMS NUTRITION FOLLOW UP NOTE    PATIENT SEEN FOR: sicu follow up     SOURCE: [] Patient  [x] Current Medical Record  [x] RN  [] Family/support person at bedside  [] Patient unavailable/inappropriate  [x] Other: Team     CHART REVIEWED/EVENTS NOTED.  [x] Nutrition Status:  -MELD: 36   -CRRT   -Stress dose steroids d/c'ed     DIET ORDER:   Diet, NPO:   Except Medications (24)    Previously on ideeli Peptide 1.5 @ 50mL x 24hrs     ENTERAL NUTRITION  EN Order Provides:  1200ml formula, 1846kcal, 89 g protein, 840ml free water; meets 22kcal/kg and 1.1 g protein/kg, based on wt: 83.4kg - does not meet estimated protein-energy needs     Current Pump Rate:  EN provision: previously on trickle while pressor support was elevated; upgraded to enteral rate of 50mL/hr. Goal rate reached this morning; Pt is now NPO.     ANTHROPOMETRICS:  Drug Dosing Weight  Height (cm): 185.4 (12 Aug 2024 16:46)  Weight (kg): 114 (12 Aug 2024 16:46)  BMI (kg/m2): 33.2 (12 Aug 2024 16:46)  BSA (m2): 2.37 (12 Aug 2024 16:46)  Daily Weight in k.8 ()     NUTRITIONALLY PERTINENT MEDICATIONS:  MEDICATIONS  (STANDING):  fluconAZOLE IVPB  folic acid  insulin lispro (ADMELOG) corrective regimen sliding scale  lactulose Syrup  multivitamin/minerals/iron Oral Solution (CENTRUM)  norepinephrine Infusion  pantoprazole  Injectable  piperacillin/tazobactam IVPB.-  piperacillin/tazobactam IVPB..  rifAXIMin  thiamine  vancomycin  IVPB  vasopressin Infusion       NUTRITIONALLY PERTINENT LABS:   Na136 mmol/L Glu 184 mg/dL<H> K+ 4.1 mmol/L Cr  0.98 mg/dL BUN 13 mg/dL  Phos 3.1 mg/dL  Alb 3.1 g/dL<L>  Chol 49 mg/dL LDL --    HDL 14 mg/dL<L> Trig 70 mg/dL ALT 36 U/L AST 62 U/L<H> Alkaline Phosphatase 211 U/L<H>  24 @ 21:13 a1c 5.0  24 @ 15:01 a1c 5.1    A1C with Estimated Average Glucose Result: 5.0 % (24 @ 21:13)  A1C with Estimated Average Glucose Result: 5.1 % (24 @ 15:01)          Finger Sticks:  POCT Blood Glucose.: 173 mg/dL ( @ 06:14)  POCT Blood Glucose.: 162 mg/dL ( @ 00:27)  POCT Blood Glucose.: 167 mg/dL ( @ 18:01)  POCT Blood Glucose.: 143 mg/dL ( @ 11:59)      NUTRITIONALLY PERTINENT MEDICATIONS/LABS:  [x] Reviewed  [] Relevant notes on medications/labs:    EDEMA:  [x] Reviewed  [x] Relevant notes:  -3+ dependent; generalized; right ankle; right foot   -4+ left ankle; left foot    GI/ I&O:  [x] Reviewed  [] Relevant notes:  [] Other:    SKIN:   [x] Pressure injury previously noted    ESTIMATED NEEDS:  [x] No change:  Energy:   2251-2668kcal/day (27-32 kcal/kg)  Protein:  100-125g/day (1.2-1.5 g/kg)  Fluid:  [x] defer to team  Based on: 83.4kg     NUTRITION DIAGNOSIS:  [x] Prior Dx:  -Increased protein-energy needs   [] New Dx:  P: Severe malnutrition in the context of acute illness  E: related to inadequate protein-energy intake in the setting of prolonged hospitalization  S: as evidenced by severe edema, <50% intake >/5 days   Goal: Pt to receive >80% of estimated needs      EDUCATION:  [] Yes:  [] Not appropriate/warranted    NUTRITION CARE PLAN:  1. Diet:  2. Supplements:  3. Multivitamin/mineral supplementation:  4:     [] Achieved - Continue current nutrition intervention(s)  [] Current medical condition precludes nutrition intervention at this time.    MONITORING AND EVALUATION:   RD remains available upon request and will follow up per protocol.    Name  Available on MS TEAMS NUTRITION FOLLOW UP NOTE    PATIENT SEEN FOR: sicu follow up     SOURCE: [] Patient  [x] Current Medical Record  [x] RN  [] Family/support person at bedside  [] Patient unavailable/inappropriate  [x] Other: Team     CHART REVIEWED/EVENTS NOTED.  [x] Nutrition Status:  -MELD: 36   -CRRT   -Stress dose steroids d/c'ed     DIET ORDER:   Diet, NPO:   Except Medications (24)    Previously on Wingu Peptide 1.5 @ 50mL x 24hrs     ENTERAL NUTRITION  EN Order Provides:  1200ml formula, 1846kcal, 89 g protein, 840ml free water; meets 22kcal/kg and 1.1 g protein/kg, based on wt: 83.4kg - does not meet estimated protein-energy needs     Current Pump Rate:  EN provision: previously on trickle while pressor support was elevated; upgraded to enteral rate of 50mL/hr. Goal rate reached this morning; Pt is now NPO.     ANTHROPOMETRICS:  Drug Dosing Weight  Height (cm): 185.4 (12 Aug 2024 16:46)  Weight (kg): 114 (12 Aug 2024 16:46)  BMI (kg/m2): 33.2 (12 Aug 2024 16:46)  BSA (m2): 2.37 (12 Aug 2024 16:46)  Daily Weight in k.8 ()     NUTRITIONALLY PERTINENT MEDICATIONS:  MEDICATIONS  (STANDING):  fluconAZOLE IVPB  folic acid  insulin lispro (ADMELOG) corrective regimen sliding scale  lactulose Syrup  multivitamin/minerals/iron Oral Solution (CENTRUM)  norepinephrine Infusion  pantoprazole  Injectable  piperacillin/tazobactam IVPB.-  piperacillin/tazobactam IVPB..  rifAXIMin  thiamine  vancomycin  IVPB  vasopressin Infusion       NUTRITIONALLY PERTINENT LABS:   Na136 mmol/L Glu 184 mg/dL<H> K+ 4.1 mmol/L Cr  0.98 mg/dL BUN 13 mg/dL  Phos 3.1 mg/dL  Alb 3.1 g/dL<L>  Chol 49 mg/dL LDL --    HDL 14 mg/dL<L> Trig 70 mg/dL ALT 36 U/L AST 62 U/L<H> Alkaline Phosphatase 211 U/L<H>  24 @ 21:13 a1c 5.0  24 @ 15:01 a1c 5.1    A1C with Estimated Average Glucose Result: 5.0 % (24 @ 21:13)  A1C with Estimated Average Glucose Result: 5.1 % (24 @ 15:01)          Finger Sticks:  POCT Blood Glucose.: 173 mg/dL ( @ 06:14)  POCT Blood Glucose.: 162 mg/dL ( @ 00:27)  POCT Blood Glucose.: 167 mg/dL ( @ 18:01)  POCT Blood Glucose.: 143 mg/dL ( @ 11:59)      NUTRITIONALLY PERTINENT MEDICATIONS/LABS:  [x] Reviewed  [x] Relevant notes on medications/labs:  -Levo/Vaso --> low dose pressors    EDEMA:  [x] Reviewed  [x] Relevant notes:  -3+ dependent; generalized; right ankle; right foot   -4+ left ankle; left foot    GI/ I&O:  [x] Reviewed  [] Relevant notes:  [] Other:    SKIN:   [x] Pressure injury previously noted    ESTIMATED NEEDS:  [x] No change:  Energy:   2251-2668kcal/day (27-32 kcal/kg)  Protein:  100-125g/day (1.2-1.5 g/kg)  Fluid:  [x] defer to team  Based on: 83.4kg     NUTRITION DIAGNOSIS:  [x] Prior Dx:  -Increased protein-energy needs   [x] New Dx:  P: Severe malnutrition in the context of acute illness  E: related to inadequate protein-energy intake in the setting of prolonged hospitalization  S: as evidenced by severe edema, <50% intake >/5 days   Goal: Pt to receive >80% of estimated needs      EDUCATION:  [] Yes:  [x] Not appropriate/warranted    NUTRITION CARE PLAN:  1. Diet: Upon advancement re-recommend Helen Dorantes Peptide 1.5 @ 65mL x 24hrs providing 1560mL of formula, 2399kcal/day (29kcal/kg), 115g protein/day (1.4g/kg), 1092mL free water - based on 83.4kg   2. Supplements: N/A  3. Multivitamin/mineral supplementation: multivitamin, thiamine, folic acid   4. Malnutrition sticker placed     MONITORING AND EVALUATION:   RD remains available upon request and will follow up per protocol.    Luci Calzada, MS, RDN, CDN (Teams)   Available on MS TEAMS

## 2024-08-30 NOTE — PROGRESS NOTE ADULT - SUBJECTIVE AND OBJECTIVE BOX
[facility] DIVISION OF KIDNEY DISEASES AND HYPERTENSION   FOLLOW UP NOTE    --------------------------------------------------------------------------------  Chief Complaint:    24 hour events/subjective: Pt. was seen and examined today.   Remains in SICU on CRRT.       PAST HISTORY  --------------------------------------------------------------------------------  No significant changes to PMH, PSH, FHx, SHx, unless otherwise noted    ALLERGIES & MEDICATIONS  --------------------------------------------------------------------------------  Allergies    No Known Allergies    Intolerances      Standing Inpatient Medications  chlorhexidine 2% Cloths 1 Application(s) Topical <User Schedule>  CRRT Treatment    <Continuous>  epoetin kareem (EPOGEN) Injectable 44132 Unit(s) SubCutaneous <User Schedule>  fluconAZOLE IVPB 400 milliGRAM(s) IV Intermittent every 24 hours  folic acid 1 milliGRAM(s) Oral daily  insulin lispro (ADMELOG) corrective regimen sliding scale   SubCutaneous every 6 hours  lactulose Syrup 20 Gram(s) Enteral Tube every 12 hours  lidocaine   4% Patch 1 Patch Transdermal daily  multivitamin/minerals/iron Oral Solution (CENTRUM) 15 milliLiter(s) Enteral Tube daily  norepinephrine Infusion 0.5 MICROgram(s)/kG/Min IV Continuous <Continuous>  pantoprazole  Injectable 40 milliGRAM(s) IV Push two times a day  Phoxillum Filtration BK 4 / 2.5 5000 milliLiter(s) CRRT <Continuous>  piperacillin/tazobactam IVPB.- 3.375 Gram(s) IV Intermittent once  piperacillin/tazobactam IVPB.. 3.375 Gram(s) IV Intermittent every 8 hours  PrismaSATE Dialysate BGK 4 / 2.5 5000 milliLiter(s) CRRT <Continuous>  PrismaSOL Filtration BGK 4 / 2.5 5000 milliLiter(s) CRRT <Continuous>  rifAXIMin 550 milliGRAM(s) Oral every 12 hours  thiamine 100 milliGRAM(s) Oral daily  vancomycin  IVPB 1250 milliGRAM(s) IV Intermittent every 12 hours  vasopressin Infusion 0.03 Unit(s)/Min IV Continuous <Continuous>    PRN Inpatient Medications      REVIEW OF SYSTEMS  --------------------------------------------------------------------------------    All other systems were reviewed and are negative, except as noted.    VITALS/PHYSICAL EXAM  --------------------------------------------------------------------------------  T(C): 36.7 (24 @ 11:00), Max: 36.8 (24 @ 07:00)  HR: 79 (24 @ 10:45) (65 - 84)  BP: 116/56 (24 @ 07:00) (116/56 - 116/56)  RR: 20 (24 @ 10:45) (10 - 52)  SpO2: 97% (24 @ 10:45) (94% - 100%)  Wt(kg): --        24 @ 07:01  -  24 @ 07:00  --------------------------------------------------------  IN: 2231.5 mL / OUT: 2172 mL / NET: 59.5 mL    24 @ 07:01  -  24 @ 10:54  --------------------------------------------------------  IN: 556.5 mL / OUT: 203 mL / NET: 353.5 mL        Physical Exam:  	Gen: NAD  	HEENT: Anicteric  	Pulm: CTA B/L  	CV: S1S2+  	Abd: Soft, +BS   	Ext: ++LE edema B/L  	Neuro: Sedated  	Skin: Warm and dry  	Dialysis access: [facility]      LABS/STUDIES  --------------------------------------------------------------------------------              8.0    16.53 >-----------<  119      [24 @ 06:24]              23.5     136  |  104  |  13  ----------------------------<  184      [24 @ 06:24]  4.1   |  21  |  0.98        Ca     8.3     [24 06:24]      Mg     2.4     [24 06:24]      Phos  3.1     [24 06:24]    TPro  6.0  /  Alb  3.1  /  TBili  9.9  /  DBili  x   /  AST  62  /  ALT  36  /  AlkPhos  211  [24 @ 06:24]    PT/INR: PT 23.3 , INR 2.17       [24 06:24]  PTT: 47.1       [24 @ 06:24]      Creatinine Trend:  SCr 0.98 [08-30 @ 06:24]  SCr 0.96 [ @ 00:39]  SCr 0.98 [ @ 18:11]  SCr 0.97 [ 12:14]  SCr 1.01 [ @ 06:35]    Urinalysis - [24 @ 06:24]      Color  / Appearance  / SG  / pH       Gluc 184 / Ketone   / Bili  / Urobili        Blood  / Protein  / Leuk Est  / Nitrite       RBC  / WBC  / Hyaline  / Gran  / Sq Epi  / Non Sq Epi  / Bacteria       HBsAb Reactive      [24 @ 21:16]  HBsAg Nonreact      [24 @ 21:13]  HBcAb Nonreact      [24 @ 21:16]  HCV 0.08, Nonreact      [24 @ 14:45]  HIV Nonreact      [24 @ 21:13]    CHETNA: titer Negative, pattern --      [24 @ 21:15]  Syphilis Screen (Treponema Pallidum Ab) Negative      [24 @ 21:15]  Immunofixation Serum:   Oligoclonal Pattern Consisting of One Weak IgM Kappa Band, Two Weak IgG  Kappa Bands, and One Weak IgA Lambda Band Identified      Reference Range: None Detected      [24 @ 21:13]  SPEP Interpretation: Oligoclonal Pattern Consisting of Three Weak Gamma-Migrating Paraproteins  and One Weak Beta-Migrating Paraprotein Identified      [24 @ 21:13]    Tacrolimus  Cyclosporine  Sirolimus  Mycophenolate  BK PCR  CMV PCRCMVPCR Lo.94 Lgw37TP/mL ( @ 12:26)  CMVPCR Log: 3.01 Azt43BO/mL ( @ 00:08)  CMVPCR Log: 3.67 Wmx48FM/mL ( @ 00:37)  CMVPCR Log: 3.69 Tcu00EM/mL ( @ 21:13)    Parvo PCR  EBV PCR

## 2024-08-30 NOTE — PROGRESS NOTE ADULT - ASSESSMENT
68 year old male with  AUD complicated by cirrhosis with Hepatic encephalopathy admitted to [facility] with back pain and jaundice on 7/8/24. Pt had dark / maroon colored stools   EGD that showed esophageal varices and duodenal ulcer with visible vessel. He got transfusions for low Hb and last transfusion was on 8/8. 1st session of IHD was on 7/9/24.   Transplant nephrology was consulted for FANY and SLK eval.       1.  FANY VS FANY on CKD 2' likely HRS  -Met SLK criteria on 8/20, currently undergoing eval  -Started on CRRT on 8/13/24 (1st session of IHD was on 7/9/24 completed 6 weeks on 8/20). Has had issues with clotting >> adjusted CRRT.  -Pt remains on pressor support. Remain anuric, please obtain bladder scan. Will cw CRRT.         Neeraj Maher  Nephrology Fellow  Feel free to contact me on TEAMS  After 5 pm please contact the on-call Fellow.

## 2024-08-30 NOTE — PROGRESS NOTE ADULT - ASSESSMENT
78 y/o male w/ no known PMHx (does not see doctors per sister) who presented as a transfer from [facility] for SLK evaluation. Patient was initially admitted to [facility] for back pain & jaundice and was diagnosed w/ cirrhosis. Hospital course there was c/b small EVs, melena 2/2 a duodenal ulcer s/p clipping, HRS requiring hemodialysis, hepatic encephalopathy, ascites s/p multiple LVPs, and deconditioning. Patient was admitted to SICU on 8/12 for initiation of CRRT as his BPs were too low to attempt intermittent HD.    Neuro:  - A/O x 1-2 waxes and wanes  - HE: Rifaximin and lactolose 20 BID    - Monitoring ammonia  - Thiamine, folic acid, & multivitamin      Resp:  - Incentive spirometry to prevent atelectasis  - CT chest 8/25 with RLL pneumonia    CV:  - Goal SBP >110, titrate pressors as able. On Levo gtt, vaso gtt., requirments increasing overnight 8/26 now downtrending   - +/- cardiac catheterization when stable to do so for transplant work-up   - Electrophys: nothing to do while not on AC, follow cards recs  - TTE 8/28 with normal LV function    GI:   - Trickle feed   - Protonix BID for h/o duodenal ulcer  - bowel regimen held as pt with bloody loose diarrhea   - Monitor LFTs  - Undergoing eval for SLK transplantation  - s/p diagnostic para 8/27 neg SBP    Renal:  - CRRT for concern of HRS, maintain CRRT net even  - Monitor I&Os and electrolytes w/ repletions as necessary  - Undergoing evaluation for SLK transplantation    Heme:  - Hold chemical VTE ppx in setting of liver failure   - Mechanical VTE ppx with SCDs  - Epogen 3x/week  - duplex LE neg for DVT 8/28     ID:   - BCx 8/26 (+) 1/2 enterococcus faecium   - continue fluc, vanc  - s/p 8/18-8/27: doxycycline added for Lyme  - f/u rpt Bcx x2 8/28 neg     Endo:   - s.p stress dosage steroids started 8/26, followed by solucortef 50mg q6hr til 8/29  - Continue ISS [id number]     Dispo: SALINA Cline ([facility])  MD Kayla   EM/IM PGY-2

## 2024-08-31 NOTE — PROGRESS NOTE ADULT - SUBJECTIVE AND OBJECTIVE BOX
Follow Up: Enterococcus Bacteriemia    Interval History:  Afebrile  on levophed 0.17 and vasopressin,   very encephalopathic        Vital Signs Last 24 Hrs  T(C): 36.5 (31 Aug 2024 07:00), Max: 36.7 (30 Aug 2024 11:00)  T(F): 97.7 (31 Aug 2024 07:00), Max: 98.1 (30 Aug 2024 11:00)  HR: 79 (31 Aug 2024 09:45) (72 - 107)  BP: --  BP(mean): --  RR: 15 (31 Aug 2024 09:45) (11 - 29)  SpO2: 100% (31 Aug 2024 09:45) (91% - 100%)    Parameters below as of 31 Aug 2024 07:00  Patient On (Oxygen Delivery Method): room air              PHYSICAL EXAMINATION:  General: NAD, jaundice  HEENT: scleral icterus  Cardiac: RRR, No M/R/G  Resp: CTAB, No Wh/Rh/Ra  Abdomen: NBS, NT/ND, No rigidity or guarding  MSK: ++ LE edema. No Calf tenderness  Skin: No rashes or lesions. Skin is warm and dry to the touch.   Neuro: Lethargy                    ____________________________________________________  ROS  GENERAL: denies chills, , night sweats, weight loss.   PSYCH: denies depression, anxiety, suicidal ideation, hallucination, and delusions  SKIN: no rash or lesions; no color changes, no abnormal nevi,no  dryness, and nojaundice    EYES: denies visual changes, floaters, pain, inflammation, blurred vision, and discharge  ENT: denies tinnitus, vertigo, epistaxis, oral lesion, and decreased acuity  PULM: denies, hemoptysis, pleurisy  CVS: denies angina, palpitations,+ orthopnea, no syncope, or heart murmur  GI: denies constipation, diarrhea, melena, abdominal pain, nausea.   : denies dysuria, frequency, discharge, incontinence, stones or macroscopic hematuria  MS: no arthralgias, no erythema or swelling, no myalgias, noedema, or lower back pain.   CNS: denies numbness, dizziness, seizure, or tremor  ENDO: denies heat/cold intolerance, polyuria, polydipsia, malaise.    HEME: denies bruising, bleeding, lymphadenopathy, anemia, and calf pain    Allergies  No Known Allergies    __________________________________________________  MEDS:  MEDICATIONS  (STANDING):  epoetin kareem (EPOGEN) Injectable 14656 <User Schedule>  insulin lispro (ADMELOG) corrective regimen sliding scale  every 6 hours  lactulose Syrup 20 every 12 hours  norepinephrine Infusion 0.5 <Continuous>  pantoprazole  Injectable 40 two times a day  vasopressin Infusion 0.03 <Continuous>    _________________________________________________  ANTIMICOBIALS  epoetin kareem (EPOGEN) Injectable 62313 <User Schedule>  fluconAZOLE IVPB 400 every 24 hours  rifAXIMin 550 every 12 hours      GENERAL LABS              8.2                  137  | 22   | 9            15.99 >-----------< 94      ------------------------< 168                   24.5                 4.0  | 103  | 0.92                                         Ca 8.1   Mg 2.4   Ph 2.9      Vancomycin Level, Random: 19.3 (08-31 @ 06:12)  Vancomycin Level, Trough: 27.0 (08-30 @ 16:26)  Vancomycin Level, Trough: 18.9 (08-29 @ 16:14)    Urinalysis Basic - ( 31 Aug 2024 06:12 )    Color: x / Appearance: x / SG: x / pH: x  Gluc: 168 mg/dL / Ketone: x  / Bili: x / Urobili: x   Blood: x / Protein: x / Nitrite: x   Leuk Esterase: x / RBC: x / WBC x   Sq Epi: x / Non Sq Epi: x / Bacteria: x        _________________________________________________  MICROBIOLOGY  -----------    Culture - Blood (collected 28 Aug 2024 19:28)  Source: .Blood Blood-Peripheral  Preliminary Report (31 Aug 2024 01:02):    No growth at 48 Hours    Culture - Blood (collected 28 Aug 2024 18:32)  Source: .Blood Blood-Peripheral  Preliminary Report (31 Aug 2024 01:02):    No growth at 48 Hours    Culture - Fungal, Body Fluid (collected 27 Aug 2024 16:29)  Source: Ascites Fl Ascites Fluid  Preliminary Report (28 Aug 2024 23:03):    Culture is being performed. Fungal cultures are held for 4 weeks.    Culture - Body Fluid with Gram Stain (collected 27 Aug 2024 16:29)  Source: Ascites Fl Ascites Fluid  Gram Stain (28 Aug 2024 03:35):    No polymorphonuclear cells seen    No organisms seen    by cytocentrifuge  Preliminary Report (28 Aug 2024 20:25):    No growth    Culture - Blood (collected 26 Aug 2024 18:42)  Source: .Blood Blood-Peripheral  Preliminary Report (31 Aug 2024 01:01):    No growth at 4 days    Culture - Blood (collected 26 Aug 2024 18:28)  Source: .Blood Blood-Peripheral  Gram Stain (27 Aug 2024 15:12):    Growth in aerobic bottle: Gram positive cocci in pairs  Final Report (29 Aug 2024 16:44):    Growth in aerobic bottle: Enterococcus faecium    Direct identification is available within approximately 3-5    hours either by Blood Panel Multiplexed PCR or Direct    MALDI-TOF. Details: [email or web address]  Organism: Blood Culture PCR  Enterococcus faecium (29 Aug 2024 16:44)  Organism: Enterococcus faecium (29 Aug 2024 16:44)      Method Type: CHRIS      -  Ampicillin: R >8 Predicts results to ampicillin/sulbactam, amoxacillin-clavulanate and  piperacillin-tazobactam.      -  Vancomycin: S 1      -  Gentamicin synergy: S <=500      -  Streptomycin synergy: S <=1000  Organism: Blood Culture PCR (29 Aug 2024 16:44)      Method Type: PCR      -  Enterococcus faecium: Detec                    Fungitell:   _______________________________________________  PERTINENT IMAGING

## 2024-08-31 NOTE — PROGRESS NOTE ADULT - ATTENDING COMMENTS
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      Transferred to [facility] on 8/12/24 for SLK evaluation and then transferred to the SICU for initiation of CRRT (8/13- ).     - Flex sig (8/19/24): congested and friable mucosa, and internal/external hemorrhoids; no lesions; no rectal varices   - EGD (8/20/24): PHG, duodenal clips seen; no active bleeding.  - COL (8/21/24): concentric nodularity on rectum s/p biopsy (PATH: Colonic mucosa with mild crypt architectural distortion. No evidence of granulomas or dysplasia), solitary nonbleeding ulcer in proximal rectum, hemorrhoids.     On exam today, pt was awake and alert. +asterixis. Had difficulty verbalizing responses to questions. Appeared to be exhausted after lifting his arms (for assessment of asterixis). Abd moderately distended, soft, nontender. 2+ LE edema b/l.    #ALD cirrhosis  Under SLK eval.  ABO: O. MELD 3.0 = 36.    - Septic shock, E faecium bacteremia (BCx 8/26): Dx tap (8/27) neg for SBP. TTE (8/28) with no vegetations. CT (8/25) with no evident source of infection. S/p Zosyn (8/12-16), Doxycycline (8/18-27), meropenem (8/16-8/30). BCx (8/28) NGTD. Continue Vancomycin (8/27-- ), Fluconazole (8/12-- ). F/u ID recs.  - Ascites: Volume removal as tolerated with CRRT. Consider LVP.  - Anuric FANY on CKD: Started iHD (7/9-8/12), Continue CRRT (8/13-- ).   - HE: Waxes and wanes. Minimally verbal. Following simple commands. Rifaximin/Miralax. Lactulose held for abdominal distention.     - Agitation/Insomnia: Seroquel 25mg or Haldol 2.5mg at night. Transplant Psych following.     - CMV Viremia: Check CMV PCR weekly (867 on 8/20/24). Check CMV PCR.   - Hx of UGIB/LGIB: Continue PPI BID. Will need BB for secondary ppx when stable. Transfuse PRN based on TEG.   - Volume: Fluid removal as tolerated with CRRT.    - Nutrition: NGT feeds. Nutrition following.    - PT: Last ambulatory in July/2024. Daily inpatient PT/OT.         #SLK Eval (opened 8/12)     - ABO O. MELD 3.0 score of 37 O (8/29)     - Met SLK criteria 8/20/24. Tissue typing completed.      - Cards: Pending [facility] when more clinically stable.     - Pt too sick to undergo OLT. Patient's wife is aware. All questions answered (8/28). 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      Transferred to [facility] on 8/12/24 for SLK evaluation and then transferred to the SICU for initiation of CRRT (8/13- ).     - Flex sig (8/19/24): congested and friable mucosa, and internal/external hemorrhoids; no lesions; no rectal varices   - EGD (8/20/24): PHG, duodenal clips seen; no active bleeding.  - COL (8/21/24): concentric nodularity on rectum s/p biopsy (PATH: Colonic mucosa with mild crypt architectural distortion. No evidence of granulomas or dysplasia), solitary nonbleeding ulcer in proximal rectum, hemorrhoids.     On exam today, pt was awake and alert. +asterixis. Had difficulty verbalizing responses to questions. Appeared to be exhausted after lifting his arms (for assessment of asterixis). Abd moderately distended, soft, nontender. 2+ LE edema b/l.    #ALD cirrhosis  Under SLK eval.  ABO: O. MELD 3.0 = 36.    - Septic shock, E faecium bacteremia (BCx 8/26): Dx tap (8/27) neg for SBP. TTE (8/28) with no vegetations. CT (8/25) with no evident source of infection. S/p Zosyn (8/12-16), Doxycycline (8/18-27), meropenem (8/16-8/30). BCx (8/28) NGTD. Continue Vancomycin (8/27-- ), Fluconazole (8/12-- ). F/u ID recs.  - Ascites: Volume removal as tolerated with CRRT. Consider LVP.  - Anuric FANY on CKD: Started iHD (7/9-8/12), Continue CRRT (8/13-- ).   - HE: Waxes and wanes. Minimally verbal. Following simple commands. Rifaximin/Miralax. Lactulose held for abdominal distention.     - Agitation/Insomnia: Seroquel 25mg or Haldol 2.5mg at night. Transplant Psych following.     - CMV Viremia: Check CMV PCR weekly (867 on 8/20/24). Check CMV PCR.   - Hx of UGIB/LGIB: Continue PPI BID. Will need BB for secondary ppx when stable. Transfuse PRN based on TEG.   - Volume: Fluid removal as tolerated with CRRT.    - Nutrition: NGT feeds. Nutrition following.    - PT: Last ambulatory in July 2024. Daily inpatient PT/OT.    - Please check MELD labs (CBC, CMP, INR) daily.     #SLK Eval (opened 8/12)   - Met SLK criteria 8/20/24. Tissue typing completed.    - Cards: Pending [facility] when more clinically stable (ie, off pressors).       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      Transferred to [facility] on 8/12/24 for SLK evaluation and then transferred to the SICU for initiation of CRRT (8/13- ).     - Flex sig (8/19/24): congested and friable mucosa, and internal/external hemorrhoids; no lesions; no rectal varices   - EGD (8/20/24): PHG, duodenal clips seen; no active bleeding.  - COL (8/21/24): concentric nodularity on rectum s/p biopsy (PATH: Colonic mucosa with mild crypt architectural distortion. No evidence of granulomas or dysplasia), solitary nonbleeding ulcer in proximal rectum, hemorrhoids.     On exam today, pt was awake and alert. +asterixis. Had difficulty verbalizing responses to questions. Appeared to be exhausted after lifting his arms (for assessment of asterixis). Abd moderately distended, soft, nontender. 2+ LE edema b/l.  Remains on pressors: levophed @ 0.16 mcg/kg/min (from 0.15 mcg/kg/min yesterday AM), vaso @ 0.03.    #ALD cirrhosis  Under SLK eval.  ABO: O. MELD 3.0 = 36.    - Septic shock, E faecium bacteremia (BCx 8/26): Dx tap (8/27) neg for SBP. TTE (8/28) with no vegetations. CT (8/25) with no evident source of infection. S/p Zosyn (8/12-16), Doxycycline (8/18-27), meropenem (8/16-8/30). BCx (8/28) NGTD. Continue Vancomycin (8/27-- ), Fluconazole (8/12-- ). F/u ID recs.  - Ascites: Volume removal as tolerated with CRRT. Consider LVP.  - Anuric FANY on CKD: Started iHD (7/9-8/12), Continue CRRT (8/13-- ).   - HE: Waxes and wanes. Minimally verbal. Following simple commands. Rifaximin/Miralax. Lactulose held for abdominal distention.     - Agitation/Insomnia: Seroquel 25mg or Haldol 2.5mg at night. Transplant Psych following.     - CMV Viremia: Check CMV PCR weekly (867 on 8/20/24). Check CMV PCR.   - Hx of UGIB/LGIB: Continue PPI BID. Will need BB for secondary ppx when stable. Transfuse PRN based on TEG.   - Volume: Fluid removal as tolerated with CRRT.    - Nutrition: NGT feeds. Nutrition following.    - PT: Last ambulatory in July 2024. Daily inpatient PT/OT.    - Please check MELD labs (CBC, CMP, INR) daily.     #SLK Eval (opened 8/12)   - Met SLK criteria 8/20/24. Tissue typing completed.    - Cards: Pending [facility] when more clinically stable (ie, off pressors).       Evette Downey MD  Transplant Hepatology

## 2024-08-31 NOTE — PROGRESS NOTE ADULT - SUBJECTIVE AND OBJECTIVE BOX
Transplant Surgery - Multidisciplinary Rounds  --------------------------------------------------------------  Present:   Patient seen and examined with multidisciplinary Transplant team including  Surgeon: Dr. Layton, Hepatologist Dr. Downey,  Pharmacist, Jenny Mack RNs in AM rounds.   Disciplines not in attendance will be notified of the plan.     Interval Events:  - continue to be on CVVH  - levo requirements labile    MEDICATIONS  (STANDING):  chlorhexidine 2% Cloths 1 Application(s) Topical <User Schedule>  CRRT Treatment    <Continuous>  epoetin kareem (EPOGEN) Injectable 99863 Unit(s) SubCutaneous <User Schedule>  fluconAZOLE IVPB 400 milliGRAM(s) IV Intermittent every 24 hours  folic acid 1 milliGRAM(s) Oral daily  insulin lispro (ADMELOG) corrective regimen sliding scale   SubCutaneous every 6 hours  lactulose Syrup 20 Gram(s) Enteral Tube every 12 hours  lidocaine   4% Patch 1 Patch Transdermal daily  multivitamin/minerals/iron Oral Solution (CENTRUM) 15 milliLiter(s) Enteral Tube daily  norepinephrine Infusion 0.5 MICROgram(s)/kG/Min (53.4 mL/Hr) IV Continuous <Continuous>  pantoprazole  Injectable 40 milliGRAM(s) IV Push two times a day  Phoxillum Filtration BK 4 / 2.5 5000 milliLiter(s) (200 mL/Hr) CRRT <Continuous>  Phoxillum Filtration BK 4 / 2.5 5000 milliLiter(s) (2000 mL/Hr) CRRT <Continuous>  PrismaSATE Dialysate BGK 4 / 2.5 5000 milliLiter(s) (1000 mL/Hr) CRRT <Continuous>  rifAXIMin 550 milliGRAM(s) Oral every 12 hours  thiamine 100 milliGRAM(s) Oral daily  vasopressin Infusion 0.03 Unit(s)/Min (4.5 mL/Hr) IV Continuous <Continuous>    MEDICATIONS  (PRN):      PAST MEDICAL & SURGICAL HISTORY:  Alcohol abuse      No significant past surgical history          Vital Signs Last 24 Hrs  T(C): 36.4 (30 Aug 2024 22:00), Max: 36.8 (30 Aug 2024 07:00)  T(F): 97.6 (30 Aug 2024 22:00), Max: 98.2 (30 Aug 2024 07:00)  HR: 80 (31 Aug 2024 02:45) (66 - 107)  BP: 116/56 (30 Aug 2024 07:00) (116/56 - 116/56)  BP(mean): --  RR: 19 (31 Aug 2024 02:45) (10 - 52)  SpO2: 93% (31 Aug 2024 02:45) (91% - 100%)    Parameters below as of 30 Aug 2024 22:00  Patient On (Oxygen Delivery Method): room air        I&O's Summary    29 Aug 2024 07:01  -  30 Aug 2024 07:00  --------------------------------------------------------  IN: 2231.5 mL / OUT: 2172 mL / NET: 59.5 mL    30 Aug 2024 07:01  -  31 Aug 2024 03:04  --------------------------------------------------------  IN: 1745.7 mL / OUT: 1678 mL / NET: 67.7 mL                              8.0    15.84 )-----------( 94       ( 31 Aug 2024 00:22 )             22.8     08-31    135  |  104  |  10  ----------------------------<  163<H>  3.8   |  20<L>  |  0.93    Ca    8.6      31 Aug 2024 00:22  Phos  3.1     08-31  Mg     2.5     08-31    TPro  5.9<L>  /  Alb  3.9  /  TBili  9.8<H>  /  DBili  x   /  AST  64<H>  /  ALT  40  /  AlkPhos  231<H>  08-31        Review of systems  All other systems were reviewed and are negative, except as noted.    PHYSICAL EXAM:  Constitutional: Well developed / well nourished  Eyes:  PERRLA  ENMT: NC/AT  Neck: supple,   Respiratory: CTA B/L  Cardiovascular: RRR  Gastrointestinal: Soft abdomen, ND, L sided abdominal wall hernia--stable, NT  Genitourinary: anuric   Extremities: SCD's in place and working bilaterally  Vascular: Palpable dp pulses bilaterally.   Neurological: waking up  Skin: no rashes, ulcerations, lesions  Musculoskeletal: Moving all extremitiesReview of systems

## 2024-08-31 NOTE — PROGRESS NOTE ADULT - ASSESSMENT
68 year old male with  AUD complicated by cirrhosis with Hepatic encephalopathy admitted to [facility] with back pain and jaundice on 7/8/24. Pt had dark / maroon colored stools   EGD that showed esophageal varices and duodenal ulcer with visible vessel. He got transfusions for low Hb and last transfusion was on 8/8. 1st session of IHD was on 7/9/24.   Transplant nephrology was consulted for FANY and SLK eval.       FANY VS FANY on CKD 2' likely HRS  Meets SLK criteria. Started on CRRT on 8/13/24 (1st session of IHD was on 7/9/24 completed 6 weeks on 8/20).    Remain anuric, Will c/w CRRT.

## 2024-08-31 NOTE — PROGRESS NOTE ADULT - SUBJECTIVE AND OBJECTIVE BOX
[facility] DIVISION OF KIDNEY DISEASES AND HYPERTENSION -- FOLLOW UP NOTE  --------------------------------------------------------------------------------  Chief Complaint:    24 hour events/subjective:  Patient was seen and examined at bedside  on CRRT        PAST HISTORY  --------------------------------------------------------------------------------  No significant changes to PMH, PSH, FHx, SHx, unless otherwise noted    ALLERGIES & MEDICATIONS  --------------------------------------------------------------------------------  Allergies    No Known Allergies    Intolerances      Standing Inpatient Medications  chlorhexidine 2% Cloths 1 Application(s) Topical <User Schedule>  CRRT Treatment    <Continuous>  epoetin kareem (EPOGEN) Injectable 22116 Unit(s) SubCutaneous <User Schedule>  fluconAZOLE IVPB 400 milliGRAM(s) IV Intermittent every 24 hours  folic acid 1 milliGRAM(s) Oral daily  insulin lispro (ADMELOG) corrective regimen sliding scale   SubCutaneous every 6 hours  lactulose Syrup 20 Gram(s) Enteral Tube every 12 hours  lidocaine   4% Patch 1 Patch Transdermal daily  multivitamin/minerals/iron Oral Solution (CENTRUM) 15 milliLiter(s) Enteral Tube daily  norepinephrine Infusion 0.5 MICROgram(s)/kG/Min IV Continuous <Continuous>  pantoprazole  Injectable 40 milliGRAM(s) IV Push two times a day  Phoxillum Filtration BK 4 / 2.5 5000 milliLiter(s) CRRT <Continuous>  Phoxillum Filtration BK 4 / 2.5 5000 milliLiter(s) CRRT <Continuous>  PrismaSATE Dialysate BGK 4 / 2.5 5000 milliLiter(s) CRRT <Continuous>  rifAXIMin 550 milliGRAM(s) Oral every 12 hours  thiamine 100 milliGRAM(s) Oral daily  vasopressin Infusion 0.03 Unit(s)/Min IV Continuous <Continuous>    PRN Inpatient Medications      REVIEW OF SYSTEMS  --------------------------------------------------------------------------------  Gen: No fatigue, fevers/chills, weakness  Skin: No rashes  Head/Eyes/Ears/Mouth: No headache;No sore throat  Respiratory: No dyspnea, cough,   CV: No chest pain, PND, orthopnea  GI: No abdominal pain, diarrhea, constipation, nausea, vomiting  Transplant: No pain  : No increased frequency, dysuria, hematuria, nocturia  Neuro: No dizziness/lightheadedness, weakness, seizures, numbness, tingling    All other systems were reviewed and are negative, except as noted.    VITALS/PHYSICAL EXAM  --------------------------------------------------------------------------------  T(C): 36.5 (08-31-24 @ 07:00), Max: 36.7 (08-30-24 @ 11:00)  HR: 79 (08-31-24 @ 09:45) (72 - 107)  BP: --  RR: 15 (08-31-24 @ 09:45) (11 - 29)  SpO2: 100% (08-31-24 @ 09:45) (91% - 100%)  Wt(kg): --        08-30-24 @ 07:01  -  08-31-24 @ 07:00  --------------------------------------------------------  IN: 2274.2 mL / OUT: 2198 mL / NET: 76.2 mL    08-31-24 @ 07:01  -  08-31-24 @ 10:38  --------------------------------------------------------  IN: 245.4 mL / OUT: 147 mL / NET: 98.4 mL      Physical Exam:  	Gen: NAD  	HEENT: PERRL, supple neck, clear oropharynx  	Pulm: CTA B/L  	CV: RRR, S1S2; no rub  	Abd: +BS, soft, nontender/nondistended  	: No suprapubic tenderness  	UE: Warm, FROM; no edema; no asterixis  	LE: Warm, FROM; no edema  	Neuro: No focal deficits  	Skin: Warm, without rashes      LABS/STUDIES  --------------------------------------------------------------------------------              8.2    15.99 >-----------<  94       [08-31-24 @ 06:12]              24.5     137  |  103  |  9   ----------------------------<  168      [08-31-24 @ 06:12]  4.0   |  22  |  0.92        Ca     8.1     [08-31-24 @ 06:12]      Mg     2.4     [08-31-24 @ 06:12]      Phos  2.9     [08-31-24 @ 06:12]    TPro  6.0  /  Alb  2.9  /  TBili  9.8  /  DBili  x   /  AST  67  /  ALT  40  /  AlkPhos  248  [08-31-24 @ 06:12]    PT/INR: PT 23.4 , INR 2.28       [08-31-24 @ 06:12]  PTT: 46.9       [08-31-24 @ 06:12]      Creatinine Trend:  SCr 0.92 [08-31 @ 06:12]  SCr 0.93 [08-31 @ 00:22]  SCr 0.94 [08-30 @ 18:12]  SCr 0.96 [08-30 @ 12:24]  SCr 0.98 [08-30 @ 06:24]              Urinalysis - [08-31-24 @ 06:12]      Color  / Appearance  / SG  / pH       Gluc 168 / Ketone   / Bili  / Urobili        Blood  / Protein  / Leuk Est  / Nitrite       RBC  / WBC  / Hyaline  / Gran  / Sq Epi  / Non Sq Epi  / Bacteria       Iron 52, TIBC 78, %sat 66      [08-12-24 @ 21:13]  Ferritin 788      [08-12-24 @ 21:13]  TSH 4.09      [08-12-24 @ 21:13]  Lipid: chol 49, TG 70, HDL 14, LDL --      [08-12-24 @ 21:13]    HBsAb Reactive      [08-12-24 @ 21:16]  HBsAg Nonreact      [08-12-24 @ 21:13]  HBcAb Nonreact      [08-12-24 @ 21:16]  HCV 0.08, Nonreact      [08-12-24 @ 14:45]  HIV Nonreact      [08-12-24 @ 21:13]    CHETNA: titer Negative, pattern --      [08-12-24 @ 21:15]  Syphilis Screen (Treponema Pallidum Ab) Negative      [08-12-24 @ 21:15]  Immunofixation Serum:   Oligoclonal Pattern Consisting of One Weak IgM Kappa Band, Two Weak IgG  Kappa Bands, and One Weak IgA Lambda Band Identified      Reference Range: None Detected      [08-12-24 @ 21:13]  SPEP Interpretation: Oligoclonal Pattern Consisting of Three Weak Gamma-Migrating Paraproteins  and One Weak Beta-Migrating Paraprotein Identified      [08-12-24 @ 21:13]

## 2024-08-31 NOTE — PROGRESS NOTE ADULT - SUBJECTIVE AND OBJECTIVE BOX
Transplant Hepatology Progress Note    Interval Events:   - no acute ON events     Allergies:  No Known Allergies      Hospital Medications:  chlorhexidine 2% Cloths 1 Application(s) Topical <User Schedule>  CRRT Treatment    <Continuous>  epoetin kareem (EPOGEN) Injectable 76787 Unit(s) SubCutaneous <User Schedule>  fluconAZOLE IVPB 400 milliGRAM(s) IV Intermittent every 24 hours  folic acid 1 milliGRAM(s) Oral daily  insulin lispro (ADMELOG) corrective regimen sliding scale   SubCutaneous every 6 hours  lactulose Syrup 20 Gram(s) Enteral Tube every 12 hours  lidocaine   4% Patch 1 Patch Transdermal daily  multivitamin/minerals/iron Oral Solution (CENTRUM) 15 milliLiter(s) Enteral Tube daily  norepinephrine Infusion 0.5 MICROgram(s)/kG/Min IV Continuous <Continuous>  pantoprazole  Injectable 40 milliGRAM(s) IV Push two times a day  Phoxillum Filtration BK 4 / 2.5 5000 milliLiter(s) CRRT <Continuous>  Phoxillum Filtration BK 4 / 2.5 5000 milliLiter(s) CRRT <Continuous>  PrismaSATE Dialysate BGK 4 / 2.5 5000 milliLiter(s) CRRT <Continuous>  rifAXIMin 550 milliGRAM(s) Oral every 12 hours  thiamine 100 milliGRAM(s) Oral daily  vasopressin Infusion 0.03 Unit(s)/Min IV Continuous <Continuous>      ROS: 14 point ROS negative unless otherwise state in subjective    PHYSICAL EXAM:   Vital Signs:  Vital Signs Last 24 Hrs  T(C): 36.5 (31 Aug 2024 07:00), Max: 36.7 (30 Aug 2024 11:00)  T(F): 97.7 (31 Aug 2024 07:00), Max: 98.1 (30 Aug 2024 11:00)  HR: 79 (31 Aug 2024 09:45) (72 - 107)  BP: --  BP(mean): --  RR: 15 (31 Aug 2024 09:45) (11 - 29)  SpO2: 100% (31 Aug 2024 09:45) (91% - 100%)    Parameters below as of 31 Aug 2024 07:00  Patient On (Oxygen Delivery Method): room air      Daily     Daily     GENERAL: obese man, physically deconditioned.   NEURO: waxes and wanes. Having minimal verbal output. Weak voice. Follows simple commands.    HEENT: Scleral icterus  CHEST: Bilateral breath sounds, decreased in bases.    CARDIAC: Regular rate and rhythm  ABDOMEN: Distended, soft, non-tender. Present bowel sounds. Anasarca.   EXTREMITIES: warm, well perfused. Anasarca improved.   SKIN: Jaundiced, no rash/ecchymoses    LABS:                        8.2    15.99 )-----------( 94       ( 31 Aug 2024 06:12 )             24.5     Mean Cell Volume: 95.7 fl (08-31-24 @ 06:12)    08-31    137  |  103  |  9   ----------------------------<  168<H>  4.0   |  22  |  0.92    Ca    8.1<L>      31 Aug 2024 06:12  Phos  2.9     08-31  Mg     2.4     08-31    TPro  6.0  /  Alb  2.9<L>  /  TBili  9.8<H>  /  DBili  x   /  AST  67<H>  /  ALT  40  /  AlkPhos  248<H>  08-31    LIVER FUNCTIONS - ( 31 Aug 2024 06:12 )  Alb: 2.9 g/dL / Pro: 6.0 g/dL / ALK PHOS: 248 U/L / ALT: 40 U/L / AST: 67 U/L / GGT: x           PT/INR - ( 31 Aug 2024 06:12 )   PT: 23.4 sec;   INR: 2.28 ratio         PTT - ( 31 Aug 2024 06:12 )  PTT:46.9 sec  Urinalysis Basic - ( 31 Aug 2024 06:12 )    Color: x / Appearance: x / SG: x / pH: x  Gluc: 168 mg/dL / Ketone: x  / Bili: x / Urobili: x   Blood: x / Protein: x / Nitrite: x   Leuk Esterase: x / RBC: x / WBC x   Sq Epi: x / Non Sq Epi: x / Bacteria: x      Amylase Serum--      Lipase serum--       [id number]        Imaging:

## 2024-08-31 NOTE — PROGRESS NOTE ADULT - ASSESSMENT
78 yo m with alcohol abuse otherwise no known chronic medical issues (does not see doctors per sister) s/p 1 month stay at [facility] now transferred to [facility] for liver transplant eval.  Most of history obtained from sister (Ashlyn) at bedside and some from transfer paperwork. Per sister, pt was initially brought to the hospital by family for back pain and jaundice for unclear amount of time. Patient was seen by GI and underwent EGD soon after admission which only showed nonbleeding ?duodenal ulcers (no intervention) however few days later pt developed active signs of bleeding and emergently underwent EGD again showing bleeding esophageal varices which were clipped.  pt was electively intubated with stay in ICU thereafter. Patient then started with HD due to worsening renal function and MS for past 2.5 weeks at times limited by low BP requiring pressors to assist. Had numerous paracentesis with varying amount of fluid removal - albumin infusions. Sister not aware of any concerns for acute infection during his stay but aware that pt was on abx at some point due to bleeding issues.        PERTINENT RADIOLOGY:  CXR: Tubes and lines as above. No focal consolidations  CT Chest, A&P:  Patchy ground-glass opacities in the bilateral upper lobes. *  Cirrhosis with evidence of portal hypertension. *  Hypodense lesion in the periphery of the left hepatic lobe of uncertain etiology. Somewhat wedge-shaped morphology raises the possibility for a small infarct. Question subtle peripheral nodular enhancement, and a hemangioma is also considered. Contrast enhanced abdominal MRI can be performed for further characterization and to assess for other possibilities. *  A wedge-shaped region of hypoattenuation in the spleen may reflect a small infarct. *  Focal eccentric wall thickening in the low rectum, possibly due to a mural fold. Consider colonoscopy. *  Large volume ascites.  CT Head: No evidence of acute intracranial pathology. If clinical symptoms persist or worsen, more sensitive evaluation with brain MRI may be obtained, if no contraindications exist.    BCx (8/12) 1/4 MRSE  BCx (8/14) NGTD  Paracentesis (8/14) Cell Counts Negative for SBP  MRSA/MSSA Nasal PCR (8/16) Negative    CXR (8/19) Ground Glass infiltrates persist  CT from 8//13 with bilateral Ground glass infiltrates    # Persistent Ground glass infiltrates of unclear etiology  please send urine legionella ag  please send deep sputum for testing for gram stain/cx, fungal stain/cx    #Decompensated liver cirrhosis, Leukocytosis, Shock  # Enterococcus faecium bacteremia BC 8/26 now positive and worsening shock  biliary/SBp vs line related in c/o prolonged empirical antibiotic course and Enterococcus selection  --Meropenem deescalated to Zosyn for empiric coverage in light of negative culture workup then re-escalated back to meropenem- suspect  etiology of sepsis and shock is Enterococcus bacteremia  - vancomycin trough 27- dose held overnight. repeat trough today and daily , redose once vancomycin level <15 at 1 g q12h   --Deescalated caspofungin to fluconazole  --repeat BC NGTD- should they become positive will also favor removing midline and exchange RIJ  now, otherwise, would plan to do down the line  --Would obtain TTE  - s/p paracentesis  follow cx , counts not c/w SBP - cx NGTD  --S/P empiric fluconazole (8/12 -->8/26), Now escalated to Caspofungin --->(8/27), deescalated to Fluconazole 8/28--->      #Positive BCx (8/12) (Staph epi)  Consistent with contaminant   as repeat testing is negative x many    #CMV PCR positive in cirrhotic patient  --Low level to moderate CMV viremia is noted, not clinically relevant and in context of illness. Would repeat CMV PCR on (8/26)  Cytomegalovirus By PCR: 4890 --->4730 --->1020 --->867 (8/20)    #Pre Transplant Evaluation   HIV-1/2 Combo Result: Nonreactive  EBVPCR Log: NotDetec [id number]/mL   Hepatitis A IgG Ab Result: Reactive   Hepatitis B Core Antibody, Total: Nonreactive  Hepatitis B Surface Antibody: Reactive   Hepatitis B DNA PCR Log: Not Detected  Hepatitis B Surface Antigen: Nonreactive  Hepatitis C Virus Interpretation: Nonreactive  COVID-19 De Domain Antibody: Positive  Treponema Pallidum Antibody Interpretation: Negative  HSV 1 IgG  Positive/HSV 2 IgG Negative  EBV Serology Positive  CMV IgG Positive  VZV IgG Positive  Measles Positive, Mumps Positive and Rubella IgG Positive  Toxoplasma IgG Positive  QuantiFeron Gold Negative  Strongyloides Ab Negative  Babesia PCR negative  --Schistosoma IgG - NEGATIVE  --Reportedly Lyme serology was previously positive and remains positive, Tick Diseases Panel is negative for additional tick born exposures  --Western blot negative, serologies not concerning for active Lyme disease- off doxy now       #Encounter to Vaccinate Patient - Will defer vaccination in context of critical illness  COVID19: No primary series. Would benefit from COVID19 7402-4890 dose  Influenza: Would benefit from dose next season  Pneumococcal: Would benefit from PCV20  HAV: Immune, no additional vaccines indicated  HBV: Immune, no additional vaccines indicated  MMR: Immune, will not require further vaccination  Varicella: Immune, will not require further vaccination  Shingles: Would benefit from Shingrix  Tdap: Would benefit from Tdap          Thank you for involving us in the care of this patient  Transplant ID will continue to follow  Please call or page with additional questions  Pager; #[phone]  Teams: from 8 am to 5 pm  Kimberly Zamarripa MD

## 2024-08-31 NOTE — PROGRESS NOTE ADULT - ASSESSMENT
78 y/o male w/ no known PMHx (does not see doctors per sister) who presented as a transfer from [facility] for SLK evaluation. Patient was initially admitted to [facility] for back pain & jaundice and was diagnosed w/ cirrhosis. Hospital course there was c/b small EVs, melena 2/2 a duodenal ulcer s/p clipping, HRS requiring hemodialysis, hepatic encephalopathy, ascites s/p multiple LVPs, and deconditioning. Patient was admitted to SICU on 8/12 for initiation of CRRT as his BPs were too low to attempt intermittent HD.    Neuro:  - A/O x 1-2 waxes and wanes  - HE: Rifaximin and lactolose 20 BID    - Monitoring ammonia  - Thiamine, folic acid, & multivitamin      Resp:  - Incentive spirometry to prevent atelectasis  - CT chest 8/25 with RLL pneumonia    CV:  - Goal SBP >110, titrate pressors as able. On Levo gtt, vaso gtt., requirments increasing overnight 8/26 now downtrending   - +/- cardiac catheterization when stable to do so for transplant work-up   - Electrophys: nothing to do while not on AC, follow cards recs  - TTE 8/28 with normal LV function    GI:   - Trickle feed   - Protonix BID for h/o duodenal ulcer  - bowel regimen held as pt with bloody loose diarrhea   - Monitor LFTs  - Undergoing eval for SLK transplantation  - s/p diagnostic para 8/27 neg SBP    Renal:  - CRRT for concern of HRS, maintain CRRT net even  - Monitor I&Os and electrolytes w/ repletions as necessary  - Undergoing evaluation for SLK transplantation    Heme:  - Hold chemical VTE ppx in setting of liver failure   - Mechanical VTE ppx with SCDs  - Epogen 3x/week  - duplex LE neg for DVT 8/28     ID:   - BCx 8/26 (+) 1/2 enterococcus faecium   - continue fluc, vanc  - s/p 8/18-8/27: doxycycline added for Lyme  - f/u rpt Bcx x2 8/28 neg     Endo:   - s.p stress dosage steroids started 8/26 - 8/29  - Continue ISS [id number]     Dispo: SALINA Cline ([facility])  MD Kayla   EM/IM PGY-2

## 2024-08-31 NOTE — PROGRESS NOTE ADULT - SUBJECTIVE AND OBJECTIVE BOX
HISTORY  68y Male    24 HOUR EVENTS:  - CRRT net even  - vanlevel 6a/6p goal level 10-15, redose after <15    SUBJECTIVE/ROS:  [ ] A ten-point review of systems was otherwise negative except as noted.  [ ] Due to altered mental status/intubation, subjective information were not able to be obtained from the patient. History was obtained, to the extent possible, from review of the chart and collateral sources of information.      NEURO  Exam: awake, alert   Meds:   [x] Adequacy of sedation and pain control has been assessed and adjusted      RESPIRATORY  RR: 16 (24 @ 00:45) (10 - 52)  SpO2: 99% (24 @ 00:45) (91% - 100%)    Exam: unlabored, clear to auscultation bilaterally    ABG - ( 31 Aug 2024 00:20 )  pH: 7.47  /  pCO2: 32    /  pO2: 66    / HCO3: 23    / Base Excess: -0.1  /  SaO2: 96.9    Lactate: x        Meds:       CARDIOVASCULAR  HR: 80 (24 @ 00:45) (66 - 96)  BP: 116/56 (24 @ 07:00) (116/56 - 116/56)  BP(mean): --  ABP: 116/37 (24 @ 00:45) (102/41 - 128/48)  ABP(mean): 57 (24 @ 00:45) (51 - 71)        Exam: regular rate and rhythm  Cardiac Rhythm: sinus  Perfusion     [x]Adequate   [ ]Inadequate  Mentation   [x]Normal       [ ]Reduced  Extremities  [x]Warm         [ ]Cool  Volume Status [ ]Hypervolemic [x]Euvolemic [ ]Hypovolemic  Meds: norepinephrine Infusion 0.5 MICROgram(s)/kG/Min IV Continuous <Continuous>        GI/NUTRITION  Exam: soft, nontender, nondistende  Diet: TF  Meds: lactulose Syrup 20 Gram(s) Enteral Tube every 12 hours  pantoprazole  Injectable 40 milliGRAM(s) IV Push two times a day      GENITOURINARY  I&O's Detail     @ 07:01  -   @ 07:00  --------------------------------------------------------  IN:    Enteral Tube Flush: 180 mL    IV PiggyBack: 200 mL    IV PiggyBack: 550 mL    Miscellaneous Tube Feedin mL    Norepinephrine: 413.5 mL    Vasopressin: 108 mL  Total IN: 2231.5 mL    OUT:    Other (mL): 2172 mL  Total OUT: 2172 mL    Total NET: 59.5 mL       @ 07:01  -   @ 00:53  --------------------------------------------------------  IN:    Enteral Tube Flush: 140 mL    IV PiggyBack: 100 mL    IV PiggyBack: 450 mL    Miscellaneous Tube Feedin mL    Norepinephrine: 258.7 mL    Vasopressin: 76.5 mL  Total IN: 1675.2 mL    OUT:    Other (mL): 1538 mL  Total OUT: 1538 mL    Total NET: 137.2 mL              135  |  104  |  10  ----------------------------<  163<H>  3.8   |  20<L>  |  0.93    Ca    8.6      31 Aug 2024 00:22  Phos  3.1       Mg     2.5         TPro  5.9<L>  /  Alb  3.9  /  TBili  9.8<H>  /  DBili  x   /  AST  64<H>  /  ALT  40  /  AlkPhos  231<H>      [ ] Elizabeth catheter, indication: N/A  Meds: folic acid 1 milliGRAM(s) Oral daily  multivitamin/minerals/iron Oral Solution (CENTRUM) 15 milliLiter(s) Enteral Tube daily  thiamine 100 milliGRAM(s) Oral daily        HEMATOLOGIC  Meds:   [x] VTE Prophylaxis                        8.0    15.84 )-----------( 94       ( 31 Aug 2024 00:22 )             22.8     PT/INR - ( 31 Aug 2024 00:22 )   PT: 24.1 sec;   INR: 2.35 ratio         PTT - ( 31 Aug 2024 00:22 )  PTT:46.2 sec  Transfusion     [ ] PRBC   [ ] Platelets   [ ] FFP   [ ] Cryoprecipitate      INFECTIOUS DISEASES  WBC Count: 15.84 K/uL ( @ 00:22)  WBC Count: 15.87 K/uL ( @ 18:12)  WBC Count: 15.40 K/uL ( @ 12:24)  WBC Count: 16.53 K/uL ( @ 06:24)    RECENT CULTURES:  Specimen Source: .Blood Blood-Peripheral  Date/Time:  @ 19:28  Culture Results:   No growth at 24 hours  Gram Stain: --  Organism: --  Specimen Source: .Blood Blood-Peripheral  Date/Time:  @ 18:32  Culture Results:   No growth at 24 hours  Gram Stain: --  Organism: --  Specimen Source: Ascites Fl Ascites Fluid  Date/Time:  @ 16:29  Culture Results:   No growth  Gram Stain:   No polymorphonuclear cells seen  No organisms seen  by cytocentrifuge  Organism: --  Specimen Source: .Blood Blood-Peripheral  Date/Time:  @ 18:42  Culture Results:   No growth at 72 Hours  Gram Stain: --  Organism: --  Specimen Source: .Blood Blood-Peripheral  Date/Time:  @ 18:28  Culture Results:   Growth in aerobic bottle: Enterococcus faecium  Direct identification is available within approximately 3-5  hours either by Blood Panel Multiplexed PCR or Direct  MALDI-TOF. Details: [email or web address]<!>  Gram Stain:   Growth in aerobic bottle: Gram positive cocci in pairs<!>  Organism: Blood Culture PCR  Enterococcus faecium<!>    Meds: epoetin kareem (EPOGEN) Injectable 13768 Unit(s) SubCutaneous <User Schedule>  fluconAZOLE IVPB 400 milliGRAM(s) IV Intermittent every 24 hours  rifAXIMin 550 milliGRAM(s) Oral every 12 hours        ENDOCRINE  CAPILLARY BLOOD GLUCOSE      POCT Blood Glucose.: 152 mg/dL (31 Aug 2024 00:45)  POCT Blood Glucose.: 157 mg/dL (30 Aug 2024 17:57)  POCT Blood Glucose.: 173 mg/dL (30 Aug 2024 12:10)  POCT Blood Glucose.: 173 mg/dL (30 Aug 2024 06:14)    Meds: insulin lispro (ADMELOG) corrective regimen sliding scale   SubCutaneous every 6 hours  vasopressin Infusion 0.03 Unit(s)/Min IV Continuous <Continuous>        ACCESS DEVICES:  [ ] Peripheral IV  [ ] Central Venous Line	[ ] R	[ ] L	[ ] IJ	[ ] Fem	[ ] SC	Placed:   [ ] Arterial Line		[ ] R	[ ] L	[ ] Fem	[ ] Rad	[ ] Ax	Placed:   [ ] PICC:					[ ] Mediport  [ ] Urinary Catheter, Date Placed:   [x] Necessity of urinary, arterial, and venous catheters discussed    OTHER MEDICATIONS:  chlorhexidine 2% Cloths 1 Application(s) Topical <User Schedule>  CRRT Treatment    <Continuous>  lidocaine   4% Patch 1 Patch Transdermal daily  Phoxillum Filtration BK 4 / 2.5 5000 milliLiter(s) CRRT <Continuous>  Phoxillum Filtration BK 4 / 2.5 5000 milliLiter(s) CRRT <Continuous>  PrismaSATE Dialysate BGK 4 / 2.5 5000 milliLiter(s) CRRT <Continuous>      CODE STATUS:      IMAGING:

## 2024-08-31 NOTE — PROGRESS NOTE ADULT - ASSESSMENT
69 yo M with obesity and risky alcohol consumption (with decades' history of daily consumption of homemade alcohol). Admitted to [facility] for 1 month due to recent onset of jaundice and with a prolonged hospital/ICU course due to newly diagnosed decompensated cirrhosis with acute on chronic liver failure (ACLF) with shock, FAYN (started on intermittent HD since 7/9), acute on chronic anemia with recurrent overt GI bleeding, and hepatic encephalopathy.     Transferred to [facility] on 8/12/24 for SLK evaluation and then transferred to the SICU for initiation of CRRT (8/13- ).      # Distributive shock   # E. faecalis bacteremia (8/27)   - ACLF + sepsis + hypovolemia.    - Norepinephrine and Vasopressin. Improved requirements.   - Transplant ID following.   - CMV viremia (Valcyte not required). Unequivocal Lyme PCR/negative WB (on Doxy).     - BCx (8/26) E. faecalis. Dg paracentesis (8/27) not suggestive of SBP.  TTE (8/28) no vegetations. CT (8/25) no evident source of infection.     - S/p Zosyn (8/12-16), Doxycycline (8/18-27), Meropenem (8/16-30),    - Vancomycin (8/27- ), and Fluconazole (8/12- ).      # History of UGIB (7'24 - resolved)/ Hematochezia (8/18 - resolved)/ Melena (8/27 - resolved).    - Hematemesis from duodenal ulcer (07/2024). Hematochezia from rectal ulcer/friable mucosa (8/18). New episodes of melena (8/27).    - EGD (7/12, at [facility]) small non-bleeding EV and a duodenal ulcer with a visible vessel. No further hematemesis.   - EGD (8/20) with no active bleeding. Colonoscopy (8/21) friable mucosa, rectal ulcer (biopsy ruled out malignancy).  - Pantoprazole 40BID  - Will need secondary prophylaxis with BB once stable.   - As needed PRBC and TEG-guided blood products.     # Anuric FANY on CKD    - Started intermittent HD (7/9-8/12) - Now on CRRT (8/13- ).   - Fluid removal on CRRT limited due to hypotension.   - Transplant nephrology following.    # Newly diagnosed decompensated cirrhosis with ACLF   - HE: Waxes and wanes. Minimally verbal. Following simple commands. Rifaximin/Lactulose.  - Agitation/Insomnia: Seroquel 25mg or Haldol 2.5mg at night. Transplant Psych following.    - Large volume ascites and anasarca: Anuric. Limited fluid removal with CRRT.   - Paracentesis (8/14 and 8/27) negative for SBP.   - Image: Contrast CT (8/13) patent portohepatic vessels and no HCC. Small infarcts in left hepatic lobe and spleen.       - Nutrition: Poor PO diet supplemented with NGT feeds. Nutrition following.   - PT: Last ambulatory on July/2024. Daily inpatient PT/OT.     # SLK ongoing evaluation (8/12)  - ABO O. MELD 3.0 score of 36  - Met K criteria in Aug 20th. Tissue typing completed.   - LHC for cardiology clearance deferred until medically stable.     - Pt too sick to undergo OLT. Patient's wife is aware. All questions answered (8/28).     PLAN   - Meropenem stopped. Start empiric Zosyn.   - Measure CMV PCR weekly on Friday.  - As needed PRBC and TEG-guided blood products.   - Will need NSBB once stable for EVB prophylaxis.  - Titrate bowel regimen for goal 3-4 BM daily.   - Avoid Precedex. Management of agitation/insomnia as per  recommendations  - Management of CRRT and fluid resuscitation as per SICU and nephrology team    - Too sick to undergo OLT at the time. Family aware.   - Will consider LHC on Monday if Pt continues to improve.   - therapeutic paracentesis     All recommendations are tentative until note is attested by attending.     Christine Miller, PGY-6  Gastroenterology/Hepatology Fellow  Available on [employer] Teams  [phone] ([facility] Short Range Pager)  [phone] ([facility] Long Range Pager)    On Weekends/Holidays (All day) and Weekdays after 5 PM to 8AM:  For non-urgent consults, please email [email or web address]  For emergent consults, please contact on call GI team.  See Amion schedule ([facility]), Stepcaseing system ([facility]), or call hospital  ([facility])

## 2024-08-31 NOTE — PROGRESS NOTE ADULT - ASSESSMENT
68y with obesity and risky alcohol consumption (with decades' history of daily consumption of homemade alcohol), admitted to [facility] 1 month ago due to recent onset of jaundice and with a prolonged hospital and ICU course there due to newly diagnosed decompensated cirrhosis with acute on chronic liver failure (ACLF) with shock (resolved, but still on midodrine); FANY (on intermittent HD since 7/9); recurrent maroon stools and acute on chronic anemia necessitating intermittent PRBC transfusions (last on 8/8) and hypofibrinoginemia, with EGD (7/12) with small non-bleeding EV and a duodenal ulcer with a visible vessel; and waxing and waning hepatic encephalopathy. He was transferred to [facility] on 8/12/24 for evaluation for combined liver/kidney transplants (SLK).      Decompensated ETOH Cirrhosis  - SLK eval , MELD 38O  - TFs, pressors per SICU  - dx para removed 75 cc, serologies pending  - HE: cont rifaximin/miralax  - EV:  EGD (7/12) with small non-bleeding EV and a duodenal ulcer with a visible vessel. EGD 8/20: no active bleeding, PPI.  - s/p colonoscopy 8/21: lesion biopsied, f/u path   - stress dose steroids per SICU completed, wean pressors as able  - FANY/HRS: anuric, On CRRT 8/13, Renal following  - ID: Empiric Elise/fluc/ Doxy. Elise switched to Zosyn.  -  8/13 w/ MRSE , repeat 8/14 with NGTD,  - BCX 8/26: enterococcus faecium , linezolid  - f/u repeat BCx  - Thiamine/MVI/FOLIC ACID  - LHC deferred, potentially Tuesday 9/3  - Needs CT a/p non contrast to eval iliacs   - Paracentesis PRN   - cont sicu care, 1U PRBC, 1U PLT  - ECHO 8/28: no vegetations     - pending repeat TTE

## 2024-09-01 NOTE — PROVIDER CONTACT NOTE (OTHER) - ACTION/TREATMENT ORDERED:
sicu team and gelacio dinh aware. no new orders given@this time. tube feeds continued@goal as ordered. continue to monitor. next dose lactulose to be held as ordered by gelacio dinh sicu team and gelacio dinh aware. [email or web address] assessing pt's status. no new orders given@this time. tube feeds continued@goal as ordered. Next dose of lactulose to be held as ordered by gelacio dinh

## 2024-09-01 NOTE — PROGRESS NOTE ADULT - ASSESSMENT
68 year old male with  AUD complicated by cirrhosis with Hepatic encephalopathy admitted to [facility] with back pain and jaundice on 7/8/24. Pt had dark / maroon colored stools   EGD that showed esophageal varices and duodenal ulcer with visible vessel. He got transfusions for low Hb and last transfusion was on 8/8. 1st session of IHD was on 7/9/24.   Transplant nephrology was consulted for FANY and SLK eval.       FANY VS FANY on CKD 2' likely HRS  Meets SLK criteria. Started on CRRT on 8/13/24 (1st session of IHD was on 7/9/24 completed 6 weeks on 8/20).    Remain anuric, Will c/w CRRT.

## 2024-09-01 NOTE — PROGRESS NOTE ADULT - SUBJECTIVE AND OBJECTIVE BOX
Transplant Surgery - Multidisciplinary Rounds    Interval Events:   - CVVH net even   - unable to perform paracentesis given instability   - continues on pressors    Allergies:  No Known Allergies      Hospital Medications:  Biotene Dry Mouth Oral Rinse 5 milliLiter(s) Swish and Spit every 12 hours  chlorhexidine 2% Cloths 1 Application(s) Topical <User Schedule>  CRRT Treatment    <Continuous>  epoetin kareem (EPOGEN) Injectable 67297 Unit(s) SubCutaneous <User Schedule>  fluconAZOLE IVPB 400 milliGRAM(s) IV Intermittent every 24 hours  folic acid 1 milliGRAM(s) Oral daily  insulin lispro (ADMELOG) corrective regimen sliding scale   SubCutaneous every 6 hours  lactulose Syrup 20 Gram(s) Enteral Tube every 12 hours  lidocaine   4% Patch 1 Patch Transdermal daily  multivitamin/minerals/iron Oral Solution (CENTRUM) 15 milliLiter(s) Enteral Tube daily  norepinephrine Infusion 0.5 MICROgram(s)/kG/Min IV Continuous <Continuous>  pantoprazole  Injectable 40 milliGRAM(s) IV Push two times a day  Phoxillum Filtration BK 4 / 2.5 5000 milliLiter(s) CRRT <Continuous>  Phoxillum Filtration BK 4 / 2.5 5000 milliLiter(s) CRRT <Continuous>  PrismaSATE Dialysate BGK 4 / 2.5 5000 milliLiter(s) CRRT <Continuous>  rifAXIMin 550 milliGRAM(s) Oral every 12 hours  thiamine 100 milliGRAM(s) Oral daily  vasopressin Infusion 0.03 Unit(s)/Min IV Continuous <Continuous>      ROS: 14 point ROS unable to assess given mental status     PHYSICAL EXAM:   Vital Signs:  Vital Signs Last 24 Hrs  T(C): 36.6 (01 Sep 2024 08:00), Max: 36.9 (31 Aug 2024 23:00)  T(F): 97.8 (01 Sep 2024 08:00), Max: 98.4 (31 Aug 2024 23:00)  HR: 71 (01 Sep 2024 08:15) (66 - 84)  BP: 105/51 (01 Sep 2024 08:15) (105/51 - 105/51)  BP(mean): 73 (01 Sep 2024 08:15) (73 - 73)  RR: 16 (01 Sep 2024 08:15) (10 - 57)  SpO2: 98% (01 Sep 2024 08:15) (89% - 100%)    Parameters below as of 01 Sep 2024 08:00  Patient On (Oxygen Delivery Method): room air      Daily     Daily     Constitutional: Well developed / well nourished  Eyes:  PERRLA  ENMT: NC/AT  Neck: supple,   Respiratory: CTA B/L  Cardiovascular: RRR  Gastrointestinal: Soft abdomen, ND, L sided abdominal wall hernia--stable, NT  Genitourinary: anuric   Extremities: SCD's in place and working bilaterally  Vascular: Palpable dp pulses bilaterally.   Neurological: waking up  Skin: no rashes, ulcerations, lesions  Musculoskeletal: Moving all extremities    LABS:                        7.8    14.70 )-----------( 73       ( 01 Sep 2024 06:50 )             23.7     Mean Cell Volume: 98.3 fl (09-01-24 @ 06:50)    09-01    137  |  105  |  6<L>  ----------------------------<  176<H>  4.1   |  21<L>  |  0.93    Ca    8.3<L>      01 Sep 2024 06:50  Phos  3.1     09-01  Mg     2.3     09-01    TPro  6.1  /  Alb  3.8  /  TBili  9.2<H>  /  DBili  x   /  AST  55<H>  /  ALT  36  /  AlkPhos  243<H>  09-01    LIVER FUNCTIONS - ( 01 Sep 2024 06:50 )  Alb: 3.8 g/dL / Pro: 6.1 g/dL / ALK PHOS: 243 U/L / ALT: 36 U/L / AST: 55 U/L / GGT: x           PT/INR - ( 01 Sep 2024 06:52 )   PT: 23.0 sec;   INR: 2.24 ratio         PTT - ( 01 Sep 2024 06:52 )  PTT:50.4 sec  Urinalysis Basic - ( 01 Sep 2024 06:50 )    Color: x / Appearance: x / SG: x / pH: x  Gluc: 176 mg/dL / Ketone: x  / Bili: x / Urobili: x   Blood: x / Protein: x / Nitrite: x   Leuk Esterase: x / RBC: x / WBC x   Sq Epi: x / Non Sq Epi: x / Bacteria: x      Amylase Serum--      Lipase serum--       [id number]        Imaging:

## 2024-09-01 NOTE — PROGRESS NOTE ADULT - ASSESSMENT
68y with obesity and risky alcohol consumption (with decades' history of daily consumption of homemade alcohol), admitted to [facility] 1 month ago due to recent onset of jaundice and with a prolonged hospital and ICU course there due to newly diagnosed decompensated cirrhosis with acute on chronic liver failure (ACLF) with shock (resolved, but still on midodrine); FANY (on intermittent HD since 7/9); recurrent maroon stools and acute on chronic anemia necessitating intermittent PRBC transfusions (last on 8/8) and hypofibrinoginemia, with EGD (7/12) with small non-bleeding EV and a duodenal ulcer with a visible vessel; and waxing and waning hepatic encephalopathy. He was transferred to [facility] on 8/12/24 for evaluation for combined liver/kidney transplants (SLK).      Decompensated ETOH Cirrhosis  - SLK eval , MELD 36O (9/1)  - EV:  EGD (7/12) with small non-bleeding EV and a duodenal ulcer with a visible vessel. EGD 8/20: no active bleeding, PPI.  - s/p colonoscopy 8/21: lesion biopsied, f/u path   - BCX 8/13 w/ MRSE , repeat 8/14 with NGTD, BCX 8/26: enterococcus faecium , linezolid  - ECHO 8/28: no vegetations    Recommendations:   - TFs, pressors per SICU  - HE: cont rifaximin/miralax  - stress dose steroids per SICU completed, wean pressors as able  - FANY/HRS: anuric, On CRRT 8/13, Renal following  - ID: Empiric Elise/fluc/ Doxy. Elise switched to Zosyn.  - f/u repeat BCx  - Thiamine/MVI/FOLIC ACID  - LHC deferred, potentially Tuesday 9/3    All recommendations are tentative until note is attested by attending.     Christine Miller, PGY-6  Gastroenterology/Hepatology Fellow  Available on Microsoft Teams  [phone] ([facility] Short Range Pager)  [phone] ([facility] Long Range Pager)    On Weekends/Holidays (All day) and Weekdays after 5 PM to 8AM:  For non-urgent consults, please email [email or web address] or [email or web address]  For emergent consults, please contact on call GI team.  See Amion schedule ([facility]), Spok paging system ([facility]), or call hospital  ([facility])      68y with obesity and risky alcohol consumption (with decades' history of daily consumption of homemade alcohol), admitted to [facility] 1 month ago due to recent onset of jaundice and with a prolonged hospital and ICU course there due to newly diagnosed decompensated cirrhosis with acute on chronic liver failure (ACLF) with shock (resolved, but still on midodrine); FANY (on intermittent HD since 7/9); recurrent maroon stools and acute on chronic anemia necessitating intermittent PRBC transfusions (last on 8/8) and hypofibrinoginemia, with EGD (7/12) with small non-bleeding EV and a duodenal ulcer with a visible vessel; and waxing and waning hepatic encephalopathy. He was transferred to [facility] on 8/12/24 for evaluation for combined liver/kidney transplants (SLK).      Decompensated ETOH Cirrhosis  - SLK eval , MELD 36O (9/1)  - EV:  EGD (7/12) with small non-bleeding EV and a duodenal ulcer with a visible vessel. EGD 8/20: no active bleeding, PPI.  - s/p colonoscopy 8/21: lesion biopsied, f/u path   - BCX 8/13 w/ MRSE , repeat 8/14 with NGTD, BCX 8/26: enterococcus faecium , linezolid  - ECHO 8/28: no vegetations    Recommendations:   - TFs, pressors per SICU  - HE: cont rifaximin/miralax  - stress dose steroids per SICU completed, wean pressors as able  - FANY/HRS: anuric, On CRRT 8/13, Renal following  - ID: Empiric Elise/fluc/ Doxy. Elise switched to Zosyn.  - f/u repeat BCx  - Thiamine/MVI/FOLIC ACID  - LHC deferred, potentially Tuesday 9/3  - plan for diagnostic tap    All recommendations are tentative until note is attested by attending.     Christine Miller, PGY-6  Gastroenterology/Hepatology Fellow  Available on Microsoft Teams  [phone] ([facility] Short Range Pager)  [phone] ([facility] Long Range Pager)    On Weekends/Holidays (All day) and Weekdays after 5 PM to 8AM:  For non-urgent consults, please email [email or web address] or [email or web address]  For emergent consults, please contact on call GI team.  See Amion schedule ([facility]), Spok paging system ([facility]), or call hospital  ([facility])

## 2024-09-01 NOTE — PROGRESS NOTE ADULT - ASSESSMENT
ASSESSMENT: 67 y/o male w/ no known PMHx (does not see doctors per sister) who presented as a transfer from [facility] for SLK evaluation. Patient was initially admitted to [facility] for back pain & jaundice and was diagnosed w/ cirrhosis. Hospital course there was c/b small EVs, melena 2/2 a duodenal ulcer s/p clipping, HRS requiring hemodialysis, hepatic encephalopathy, ascites s/p multiple LVPs, and deconditioning. Patient was admitted to SICU on 8/12 for initiation of CRRT as his BPs were too low to attempt intermittent HD.  PLAN:    Neuro:  - A/O x 1-2 waxes and wanes  - HE: Rifaximin and lactolose 20 BID    - Monitoring ammonia  - Thiamine, folic acid, & multivitamin      Resp:  - Incentive spirometry to prevent atelectasis  - CT chest 8/25 with RLL pneumonia    CV:  - Goal SBP >110, titrate pressors as able. On Levo gtt, vaso gtt., requirments increasing overnight 8/26 now downtrending; 25% albumin given 8/31  - +/- cardiac catheterization when stable to do so for transplant work-up   - Electrophys: nothing to do while not on AC, follow cards recs  - TTE 8/28 with normal LV function    GI:   - TF at goal. Plan to stop if pressor requirements increasing  - Protonix BID for h/o duodenal ulcer  - bowel regimen held as pt with bloody loose diarrhea   - Monitor LFTs  - Undergoing eval for SLK transplantation  - s/p diagnostic para 8/27 neg SBP    Renal:  - CRRT for concern of HRS, maintain CRRT net even  - Monitor I&Os and electrolytes w/ repletions as necessary  - Undergoing evaluation for SLK transplantation    Heme:  - Hold chemical VTE ppx in setting of liver failure   - Mechanical VTE ppx with SCDs  - Epogen 3x/week  - duplex LE neg for DVT 8/28     ID:   - BCx 8/26 (+) 1/2 enterococcus faecium   - continue fluc  - vanc to be given to titrate to 10-15 troph  - s/p 8/18-8/27: doxycycline added for Lyme, completed  - f/u rpt Bcx x2 8/28 neg     Endo:   - s.p stress dosage steroids started 8/26 - 8/29  - Continue ISS [id number]     Code Status: Full    Dispo: SICU    MARTIR Trevizo-C     [id number]

## 2024-09-01 NOTE — PROGRESS NOTE ADULT - ATTENDING COMMENTS
69yo male with PMH ALD cirrhosis (+HE), AUD, class I obesity, who was admitted to [facility] for 1 month due to recent onset of jaundice and with a prolonged hospital/ICU course due to newly diagnosed decompensated cirrhosis with ACLF with shock, FANY (started on iHD 7/9/24), acute on chronic anemia 2/2 UGIB (EGD showed small EV and duodenal ulcer with visible vessel s/p clip), and HE.      Transferred to [facility] on 8/12/24 for SLK evaluation and then transferred to the SICU for initiation of CRRT (8/13- ).     - Flex sig (8/19/24): congested and friable mucosa, and internal/external hemorrhoids; no lesions; no rectal varices   - EGD (8/20/24): PHG, duodenal clips seen; no active bleeding.  - COL (8/21/24): concentric nodularity on rectum s/p biopsy (PATH: Colonic mucosa with mild crypt architectural distortion. No evidence of granulomas or dysplasia), solitary nonbleeding ulcer in proximal rectum, hemorrhoids.     Exam today unchanged from yesterday - pt was awake and alert. Was with his sister in the room, who was encouraging pt to participate in ROM exercises (able to move his foot, appeared to have difficulty bending legs). Abd moderately distended, soft, nontender. 2+ LE edema b/l.  Remains on pressors: levophed @ 0.196 mcg/kg/min (from 0.16 mcg/kg/min yesterday AM), vaso @ 0.03. CRRT net even.    #ALD cirrhosis  Under SLK eval.  ABO: O. MELD 3.0 = 36.    - Septic shock, E faecium bacteremia (BCx 8/26): Dx tap (8/27) neg for SBP. TTE (8/28) with no vegetations. CT (8/25) with no evident source of infection. S/p Zosyn (8/12-16), Doxycycline (8/18-27), meropenem (8/16-8/30). BCx (8/28) NGTD. Continue Vancomycin (8/27-- ), Fluconazole (8/12-- ). F/u ID recs.  - Ascites: Volume removal as tolerated with CRRT. LVP PRN.  - Anuric FANY on CKD: Started iHD (7/9-8/12), Continue CRRT (8/13-- ).   - HE: Waxes and wanes. Minimally verbal. Following simple commands. Rifaximin/Miralax. Lactulose held for abdominal distention.     - Agitation/Insomnia: Seroquel 25mg or Haldol 2.5mg at night. Transplant Psych following.     - CMV Viremia: Check CMV PCR weekly (867 on 8/20/24). F/u CMV PCR (8/31).   - Hx of UGIB/LGIB: Continue PPI BID. Will need BB for secondary ppx when stable. Transfuse PRN based on TEG.   - Volume: Fluid removal as tolerated with CRRT (currently net even).  - Nutrition: NGT feeds - decreased to trickle feeds given pressor requirements. Nutrition following.    - Debility: Last ambulatory in July 2024. Daily inpatient PT/OT.    - Please check MELD labs (CBC, CMP, INR) daily.     #SLK Eval (opened 8/12)   - Met SLK criteria 8/20/24. Tissue typing completed.    - Cards: Pending [facility] when more clinically stable (ie, off pressors).       Evette Downey MD  Transplant Hepatology

## 2024-09-01 NOTE — PROGRESS NOTE ADULT - SUBJECTIVE AND OBJECTIVE BOX
24 HOUR EVENTS:  - 25% albumin challenge, but did not improve pressor requirements  - decrease TF to trickle feeds given pressor requrements of 0.2 levo and 0.03 vaso  - f/u PM vanc level  - CRRT net even    NEURO  Exam: A&O x 1-2    RESPIRATORY  RR: 25 (24 @ 01:30) (10 - 57)  SpO2: 100% (24 @ 01:30) (89% - 100%)  Exam: unlabored  ABG - ( 01 Sep 2024 01:05 )  pH: 7.45  /  pCO2: 34    /  pO2: 68    / HCO3: 24    / Base Excess: -0.2  /  SaO2: 96.9        CARDIOVASCULAR  HR: 73 (24 @ 01:30) (66 - 107)  ABP: 119/43 (24 @ 01:30) (106/42 - 137/47)  ABP(mean): 62 (24 @ 01:30) (52 - 73)    Exam:   Cardiac Rhythm:   Perfusion     [ x]Adequate   [ ]Inadequate  Mentation   [x ]Normal       [ ]Reduced  Extremities  [x ]Warm         [ ]Cool  Volume Status [ ]Hypervolemic [x ]Euvolemic [ ]Hypovolemic  Meds: norepinephrine Infusion 0.5 MICROgram(s)/kG/Min IV Continuous <Continuous>      GI/NUTRITION  Exam: distended   Diet: TF @ trickle  Meds: lactulose Syrup 20 Gram(s) Enteral Tube every 12 hours  pantoprazole  Injectable 40 milliGRAM(s) IV Push two times a day      GENITOURINARY  I&O's Detail     @ :  -   @ 07:00  --------------------------------------------------------  IN:    Enteral Tube Flush: 240 mL    IV PiggyBack: 100 mL    IV PiggyBack: 450 mL    Miscellaneous Tube Feedin mL    Norepinephrine: 376.2 mL    Vasopressin: 108 mL  Total IN: 2274.2 mL    OUT:    Other (mL): 2198 mL  Total OUT: 2198 mL    Total NET: 76.2 mL       @ 07:01  -  09-01 @ 01:40  --------------------------------------------------------  IN:    Enteral Tube Flush: 200 mL    IV PiggyBack: 300 mL    Miscellaneous Tube Feedin mL    Norepinephrine: 331.9 mL    Vasopressin: 76.5 mL  Total IN: 1668.4 mL    OUT:    Other (mL): 1021 mL  Total OUT: 1021 mL    Total NET: 647.4 mL              139  |  104  |  8   ----------------------------<  171<H>  4.2   |  22  |  0.93    Ca    8.3<L>      01 Sep 2024 01:10  Phos  2.8       Mg     2.3         TPro  5.9<L>  /  Alb  3.1<L>  /  TBili  9.7<H>  /  DBili  x   /  AST  57<H>  /  ALT  37  /  AlkPhos  246<H>      Meds: folic acid 1 milliGRAM(s) Oral daily  multivitamin/minerals/iron Oral Solution (CENTRUM) 15 milliLiter(s) Enteral Tube daily  sodium phosphate 15 milliMole(s)/250 mL IVPB 15 milliMole(s) IV Intermittent once  sodium phosphate 15 milliMole(s)/250 mL IVPB 15 milliMole(s) IV Intermittent once  thiamine 100 milliGRAM(s) Oral daily      HEMATOLOGIC  Meds:                         8.1    15.28 )-----------( 76       ( 01 Sep 2024 01:10 )             23.6     PT/INR - ( 31 Aug 2024 18:11 )   PT: 23.8 sec;   INR: 2.21 ratio         PTT - ( 31 Aug 2024 18:11 )  PTT:48.5 sec    INFECTIOUS DISEASES  T(C): 36.5 (24 @ 15:00), Max: 36.6 (24 @ 05:00)  Wt(kg): --  WBC Count: 15.28 K/uL ( @ 01:10)  WBC Count: 14.98 K/uL ( @ 18:11)  WBC Count: 15.53 K/uL ( @ 13:03)  WBC Count: 15.99 K/uL ( @ 06:12)    Recent Cultures:  Specimen Source: .Blood Blood-Peripheral,  @ 19:28; Results   No growth at 72 Hours; Gram Stain: --; Organism: --  Specimen Source: .Blood Blood-Peripheral,  @ 18:32; Results   No growth at 72 Hours; Gram Stain: --; Organism: --  Specimen Source: Ascites Fl Ascites Fluid,  @ 16:29; Results   No growth; Gram Stain:   No polymorphonuclear cells seen  No organisms seen  by cytocentrifuge; Organism: --  Specimen Source: .Blood Blood-Peripheral,  @ 18:42; Results   No growth at 5 days; Gram Stain: --; Organism: --  Specimen Source: .Blood Blood-Peripheral,  @ 18:28; Results   Growth in aerobic bottle: Enterococcus faecium  Direct identification is available within approximately 3-5  hours either by Blood Panel Multiplexed PCR or Direct  MALDI-TOF. Details: [email or web address]<!>; Gram Stain:   Growth in aerobic bottle: Gram positive cocci in pairs<!>; Organism: Blood Culture PCR  Enterococcus faecium<!>    Meds: epoetin kareem (EPOGEN) Injectable 17286 Unit(s) SubCutaneous <User Schedule>  fluconAZOLE IVPB 400 milliGRAM(s) IV Intermittent every 24 hours  rifAXIMin 550 milliGRAM(s) Oral every 12 hours      ENDOCRINE  Capillary Blood Glucose    Meds: insulin lispro (ADMELOG) corrective regimen sliding scale   SubCutaneous every 6 hours  vasopressin Infusion 0.03 Unit(s)/Min IV Continuous <Continuous>      ACCESS DEVICES:  [ ] Peripheral IV  [ ] Central Venous Line		[ ] R	[ ] L	[ ] IJ	[ ] Fem	[ ] SC	Placed:   [ ] Arterial Line			[ ] R	[ ] L	[ ] Fem	[ ] Rad	[ ] Ax	Placed:   [ ] PICC:					[ ] Mediport  [ ] Urinary Catheter, Date Placed:   [ ] Necessity of urinary, arterial, and venous catheters discussed    OTHER MEDICATIONS:  Biotene Dry Mouth Oral Rinse 5 milliLiter(s) Swish and Spit every 12 hours  chlorhexidine 2% Cloths 1 Application(s) Topical <User Schedule>  CRRT Treatment    <Continuous>  lidocaine   4% Patch 1 Patch Transdermal daily  Phoxillum Filtration BK 4 / 2.5 5000 milliLiter(s) CRRT <Continuous>  Phoxillum Filtration BK 4 / 2.5 5000 milliLiter(s) CRRT <Continuous>  PrismaSATE Dialysate BGK 4 / 2.5 5000 milliLiter(s) CRRT <Continuous>      IMAGING:

## 2024-09-01 NOTE — PROGRESS NOTE ADULT - ASSESSMENT
68y with obesity and risky alcohol consumption (with decades' history of daily consumption of homemade alcohol), admitted to [facility] 1 month ago due to recent onset of jaundice and with a prolonged hospital and ICU course there due to newly diagnosed decompensated cirrhosis with acute on chronic liver failure (ACLF) with shock (resolved, but still on midodrine); FANY (on intermittent HD since 7/9); recurrent maroon stools and acute on chronic anemia necessitating intermittent PRBC transfusions (last on 8/8) and hypofibrinoginemia, with EGD (7/12) with small non-bleeding EV and a duodenal ulcer with a visible vessel; and waxing and waning hepatic encephalopathy. He was transferred to [facility] on 8/12/24 for evaluation for combined liver/kidney transplants (SLK).      Decompensated ETOH Cirrhosis  - SLK eval , MELD 36O (9/1)  - EV:  EGD (7/12) with small non-bleeding EV and a duodenal ulcer with a visible vessel. EGD 8/20: no active bleeding, PPI.  - s/p colonoscopy 8/21: lesion biopsied, f/u path   - BCX 8/13 w/ MRSE , repeat 8/14 with NGTD, BCX 8/26: enterococcus faecium , linezolid  - ECHO 8/28: no vegetations    Recommendations:   - TFs, pressors per SICU  - HE: cont rifaximin/miralax  - stress dose steroids per SICU completed, wean pressors as able  - FANY/HRS: anuric, On CRRT 8/13, Renal following  - ID: Empiric Elise/fluc/ Doxy. Elise switched to Zosyn.  - f/u repeat BCx  - Thiamine/MVI/FOLIC ACID  - LHC deferred, potentially Tuesday 9/3  - plan for diagnostic tap    All recommendations are tentative until note is attested by attending.     Christine Miller, PGY-6  Gastroenterology/Hepatology Fellow  Available on Microsoft Teams  [phone] ([facility] Short Range Pager)  [phone] ([facility] Long Range Pager)    On Weekends/Holidays (All day) and Weekdays after 5 PM to 8AM:  For non-urgent consults, please email [email or web address]  For emergent consults, please contact on call GI team.  See Amion schedule ([facility]), Spok paging system ([facility]), or call hospital  ([facility])

## 2024-09-01 NOTE — PROGRESS NOTE ADULT - SUBJECTIVE AND OBJECTIVE BOX
Transplant Surgery - Multidisciplinary Rounds    Interval Events:   - CVVH net even   - unable to perform paracentesis given instability   - continues on pressors    Allergies:  No Known Allergies      Hospital Medications:  Biotene Dry Mouth Oral Rinse 5 milliLiter(s) Swish and Spit every 12 hours  chlorhexidine 2% Cloths 1 Application(s) Topical <User Schedule>  CRRT Treatment    <Continuous>  epoetin kareem (EPOGEN) Injectable 96684 Unit(s) SubCutaneous <User Schedule>  fluconAZOLE IVPB 400 milliGRAM(s) IV Intermittent every 24 hours  folic acid 1 milliGRAM(s) Oral daily  insulin lispro (ADMELOG) corrective regimen sliding scale   SubCutaneous every 6 hours  lactulose Syrup 20 Gram(s) Enteral Tube every 12 hours  lidocaine   4% Patch 1 Patch Transdermal daily  multivitamin/minerals/iron Oral Solution (CENTRUM) 15 milliLiter(s) Enteral Tube daily  norepinephrine Infusion 0.5 MICROgram(s)/kG/Min IV Continuous <Continuous>  pantoprazole  Injectable 40 milliGRAM(s) IV Push two times a day  Phoxillum Filtration BK 4 / 2.5 5000 milliLiter(s) CRRT <Continuous>  Phoxillum Filtration BK 4 / 2.5 5000 milliLiter(s) CRRT <Continuous>  PrismaSATE Dialysate BGK 4 / 2.5 5000 milliLiter(s) CRRT <Continuous>  rifAXIMin 550 milliGRAM(s) Oral every 12 hours  thiamine 100 milliGRAM(s) Oral daily  vasopressin Infusion 0.03 Unit(s)/Min IV Continuous <Continuous>      ROS: 14 point ROS unable to assess given mental status     PHYSICAL EXAM:   Vital Signs:  Vital Signs Last 24 Hrs  T(C): 36.6 (01 Sep 2024 08:00), Max: 36.9 (31 Aug 2024 23:00)  T(F): 97.8 (01 Sep 2024 08:00), Max: 98.4 (31 Aug 2024 23:00)  HR: 71 (01 Sep 2024 08:15) (66 - 84)  BP: 105/51 (01 Sep 2024 08:15) (105/51 - 105/51)  BP(mean): 73 (01 Sep 2024 08:15) (73 - 73)  RR: 16 (01 Sep 2024 08:15) (10 - 57)  SpO2: 98% (01 Sep 2024 08:15) (89% - 100%)    Parameters below as of 01 Sep 2024 08:00  Patient On (Oxygen Delivery Method): room air      Daily     Daily     Constitutional: Well developed / well nourished  Eyes:  PERRLA  ENMT: NC/AT  Neck: supple,   Respiratory: CTA B/L  Cardiovascular: RRR  Gastrointestinal: Soft abdomen, ND, L sided abdominal wall hernia--stable, NT  Genitourinary: anuric   Extremities: SCD's in place and working bilaterally  Vascular: Palpable dp pulses bilaterally.   Neurological: waking up  Skin: no rashes, ulcerations, lesions  Musculoskeletal: Moving all extremities    LABS:                        7.8    14.70 )-----------( 73       ( 01 Sep 2024 06:50 )             23.7     Mean Cell Volume: 98.3 fl (09-01-24 @ 06:50)    09-01    137  |  105  |  6<L>  ----------------------------<  176<H>  4.1   |  21<L>  |  0.93    Ca    8.3<L>      01 Sep 2024 06:50  Phos  3.1     09-01  Mg     2.3     09-01    TPro  6.1  /  Alb  3.8  /  TBili  9.2<H>  /  DBili  x   /  AST  55<H>  /  ALT  36  /  AlkPhos  243<H>  09-01    LIVER FUNCTIONS - ( 01 Sep 2024 06:50 )  Alb: 3.8 g/dL / Pro: 6.1 g/dL / ALK PHOS: 243 U/L / ALT: 36 U/L / AST: 55 U/L / GGT: x           PT/INR - ( 01 Sep 2024 06:52 )   PT: 23.0 sec;   INR: 2.24 ratio         PTT - ( 01 Sep 2024 06:52 )  PTT:50.4 sec  Urinalysis Basic - ( 01 Sep 2024 06:50 )    Color: x / Appearance: x / SG: x / pH: x  Gluc: 176 mg/dL / Ketone: x  / Bili: x / Urobili: x   Blood: x / Protein: x / Nitrite: x   Leuk Esterase: x / RBC: x / WBC x   Sq Epi: x / Non Sq Epi: x / Bacteria: x      Amylase Serum--      Lipase serum--       [id number]        Imaging:

## 2024-09-02 NOTE — PROGRESS NOTE ADULT - ATTENDING COMMENTS
69yo male with PMH ALD cirrhosis (+HE), AUD, class I obesity, who was admitted to [facility] for 1 month due to recent onset of jaundice and with a prolonged hospital/ICU course due to newly diagnosed decompensated cirrhosis with ACLF with shock, FANY (started on iHD 7/9/24), acute on chronic anemia 2/2 UGIB (EGD showed small EV and duodenal ulcer with visible vessel s/p clip), and HE.      Transferred to [facility] on 8/12/24 for SLK evaluation and then transferred to the SICU for initiation of CRRT (8/13- ).     - Flex sig (8/19/24): congested and friable mucosa, and internal/external hemorrhoids; no lesions; no rectal varices   - EGD (8/20/24): PHG, duodenal clips seen; no active bleeding.  - COL (8/21/24): concentric nodularity on rectum s/p biopsy (PATH: Colonic mucosa with mild crypt architectural distortion. No evidence of granulomas or dysplasia), solitary nonbleeding ulcer in proximal rectum, hemorrhoids.     Was started on midodrine 20mg TID yesterday; thus pressor requirement is lower today - levophed @ 0.1 mcg/kg/min (from 0.196 mcg/kg/min yesterday), vaso @ 0.03.   Pt received Dilaudid 0.5mg IV x1 around 6am; when pt was examined on rounds around 9am, he was asleep in the armchair, did not open eyes to verbal/mechanical stimuli or answer questions. Abd moderately distended, soft, nontender. 2+ LE edema b/l - L foot noted to be more edematous than right foot.    #ALD cirrhosis  Under SLK eval.  ABO: O. MELD 3.0 = 36.  - Septic shock, E faecium bacteremia (BCx 8/26): Dx tap (8/27) neg for SBP. TTE (8/28) with no vegetations. CT (8/25) with no evident source of infection. S/p Zosyn (8/12-16), Doxycycline (8/18-27), meropenem (8/16-8/30). BCx (8/28) NGTD. Continue Vancomycin (8/27-- ), Fluconazole (8/12-- ). F/u ID recs.  - Ascites: Volume removal as tolerated with CRRT. LVP PRN.  - Anuric FANY on CKD: Started iHD (7/9-8/12), Continue CRRT (8/13-- ).   - HE: Waxes and wanes. Minimally verbal. Following simple commands. Rifaximin/Miralax. Lactulose held for abdominal distention.     - Agitation/Insomnia: Seroquel 25mg or Haldol 2.5mg at night. Transplant Psych following.     - CMV Viremia: Check CMV PCR weekly (867 on 8/20/24). F/u CMV PCR (8/31).   - Hx of UGIB/LGIB: Continue PPI BID. Will need BB for secondary ppx when stable. Transfuse PRN based on TEG.   - Volume: Fluid removal as tolerated with CRRT (currently net even).  - Nutrition: NGT feeds. Nutrition following.    - Debility: Last ambulatory in July 2024. Daily inpatient PT/OT.    - LLE > RLE edema: of unclear chronicity. Discussed with SICU team - would recommend LLE doppler if this is new.  - Please check MELD labs (CBC, CMP, INR) daily.     #SLK Eval (opened 8/12)   - Met SLK criteria 8/20/24. Tissue typing completed.    - Cards: Pending [facility] when more clinically stable (ie, off pressors).       Evette Downey MD  Transplant Hepatology

## 2024-09-02 NOTE — PROGRESS NOTE ADULT - SUBJECTIVE AND OBJECTIVE BOX
Transplant Surgery - Multidisciplinary Rounds    Interval Events:                   Potential Discharge date:  Education:  Medications  Plan of care:  See Below    MEDICATIONS  (STANDING):  Biotene Dry Mouth Oral Rinse 5 milliLiter(s) Swish and Spit every 12 hours  chlorhexidine 2% Cloths 1 Application(s) Topical <User Schedule>  CRRT Treatment    <Continuous>  epoetin kareem (EPOGEN) Injectable 71351 Unit(s) SubCutaneous <User Schedule>  fluconAZOLE IVPB 400 milliGRAM(s) IV Intermittent every 24 hours  folic acid 1 milliGRAM(s) Oral daily  insulin lispro (ADMELOG) corrective regimen sliding scale   SubCutaneous every 6 hours  lactulose Syrup 20 Gram(s) Enteral Tube every 12 hours  lidocaine   4% Patch 1 Patch Transdermal daily  midodrine 20 milliGRAM(s) Oral every 8 hours  multivitamin/minerals/iron Oral Solution (CENTRUM) 15 milliLiter(s) Enteral Tube daily  norepinephrine Infusion 0.5 MICROgram(s)/kG/Min (53.4 mL/Hr) IV Continuous <Continuous>  pantoprazole  Injectable 40 milliGRAM(s) IV Push two times a day  Phoxillum Filtration BK 4 / 2.5 5000 milliLiter(s) (2000 mL/Hr) CRRT <Continuous>  Phoxillum Filtration BK 4 / 2.5 5000 milliLiter(s) (200 mL/Hr) CRRT <Continuous>  PrismaSATE Dialysate BGK 4 / 2.5 5000 milliLiter(s) (1000 mL/Hr) CRRT <Continuous>  rifAXIMin 550 milliGRAM(s) Oral every 12 hours  thiamine 100 milliGRAM(s) Oral daily  vancomycin  IVPB 1000 milliGRAM(s) IV Intermittent <User Schedule>  vasopressin Infusion 0.03 Unit(s)/Min (4.5 mL/Hr) IV Continuous <Continuous>    MEDICATIONS  (PRN):      PAST MEDICAL & SURGICAL HISTORY:  Alcohol abuse  No significant past surgical history    Vital Signs Last 24 Hrs  T(C): 36.3 (02 Sep 2024 03:00), Max: 36.6 (01 Sep 2024 08:00)  T(F): 97.4 (02 Sep 2024 03:00), Max: 97.8 (01 Sep 2024 08:00)  HR: 69 (02 Sep 2024 04:00) (68 - 75)  BP: 104/54 (01 Sep 2024 19:45) (104/54 - 107/54)  BP(mean): 75 (01 Sep 2024 19:45) (73 - 77)  RR: 17 (02 Sep 2024 04:00) (10 - 33)  SpO2: 94% (02 Sep 2024 04:00) (92% - 100%)  Parameters below as of 02 Sep 2024 03:00  Patient On (Oxygen Delivery Method): room air    I&O's Summary    31 Aug 2024 07:01  -  01 Sep 2024 07:00  --------------------------------------------------------  IN: 1987.3 mL / OUT: 1889 mL / NET: 98.3 mL    01 Sep 2024 07:01  -  02 Sep 2024 04:31  --------------------------------------------------------  IN: 2041.1 mL / OUT: 2065 mL / NET: -23.9 mL                        7.7    14.89 )-----------( 60       ( 02 Sep 2024 00:12 )             22.5     09-02    135  |  104  |  7   ----------------------------<  148<H>  4.1   |  22  |  0.88    Ca    8.0<L>      02 Sep 2024 00:12  Phos  2.7     09-02  Mg     2.4     09-02  TPro  5.7<L>  /  Alb  2.8<L>  /  TBili  9.1<H>  /  DBili  x   /  AST  49<H>  /  ALT  33  /  AlkPhos  228<H>  09-02    Review of systems  All other systems were reviewed and are negative, except as noted.    Constitutional: Well developed / well nourished  Eyes:  PERRLA  ENMT: NC/AT  Neck: supple,   Respiratory: CTA B/L  Cardiovascular: RRR  Gastrointestinal: Soft abdomen, ND, L sided abdominal wall hernia--stable, NT  Genitourinary: anuric   Extremities: SCD's in place and working bilaterally  Vascular: Palpable dp pulses bilaterally.   Neurological: waking up  Skin: no rashes, ulcerations, lesions  Musculoskeletal: Moving all extremities Transplant Surgery - Multidisciplinary Rounds    Interval Events:   - decreasing pressor requirement  - started midodrine 20mg q8hrs  - CRRT net even    Potential Discharge date:  Education:  Medications  Plan of care:  See Below    MEDICATIONS  (STANDING):  Biotene Dry Mouth Oral Rinse 5 milliLiter(s) Swish and Spit every 12 hours  chlorhexidine 2% Cloths 1 Application(s) Topical <User Schedule>  CRRT Treatment    <Continuous>  epoetin kareem (EPOGEN) Injectable 37909 Unit(s) SubCutaneous <User Schedule>  fluconAZOLE IVPB 400 milliGRAM(s) IV Intermittent every 24 hours  folic acid 1 milliGRAM(s) Oral daily  insulin lispro (ADMELOG) corrective regimen sliding scale   SubCutaneous every 6 hours  lactulose Syrup 20 Gram(s) Enteral Tube every 12 hours  lidocaine   4% Patch 1 Patch Transdermal daily  midodrine 20 milliGRAM(s) Oral every 8 hours  multivitamin/minerals/iron Oral Solution (CENTRUM) 15 milliLiter(s) Enteral Tube daily  norepinephrine Infusion 0.5 MICROgram(s)/kG/Min (53.4 mL/Hr) IV Continuous <Continuous>  pantoprazole  Injectable 40 milliGRAM(s) IV Push two times a day  Phoxillum Filtration BK 4 / 2.5 5000 milliLiter(s) (2000 mL/Hr) CRRT <Continuous>  Phoxillum Filtration BK 4 / 2.5 5000 milliLiter(s) (200 mL/Hr) CRRT <Continuous>  PrismaSATE Dialysate BGK 4 / 2.5 5000 milliLiter(s) (1000 mL/Hr) CRRT <Continuous>  rifAXIMin 550 milliGRAM(s) Oral every 12 hours  thiamine 100 milliGRAM(s) Oral daily  vancomycin  IVPB 1000 milliGRAM(s) IV Intermittent <User Schedule>  vasopressin Infusion 0.03 Unit(s)/Min (4.5 mL/Hr) IV Continuous <Continuous>      PAST MEDICAL & SURGICAL HISTORY:  Alcohol abuse  No significant past surgical history    Vital Signs Last 24 Hrs  T(C): 36.3 (02 Sep 2024 03:00), Max: 36.6 (01 Sep 2024 08:00)  T(F): 97.4 (02 Sep 2024 03:00), Max: 97.8 (01 Sep 2024 08:00)  HR: 69 (02 Sep 2024 04:00) (68 - 75)  BP: 104/54 (01 Sep 2024 19:45) (104/54 - 107/54)  BP(mean): 75 (01 Sep 2024 19:45) (73 - 77)  RR: 17 (02 Sep 2024 04:00) (10 - 33)  SpO2: 94% (02 Sep 2024 04:00) (92% - 100%)  Parameters below as of 02 Sep 2024 03:00  Patient On (Oxygen Delivery Method): room air    I&O's Summary    31 Aug 2024 07:01  -  01 Sep 2024 07:00  --------------------------------------------------------  IN: 1987.3 mL / OUT: 1889 mL / NET: 98.3 mL    01 Sep 2024 07:01  -  02 Sep 2024 04:31  --------------------------------------------------------  IN: 2041.1 mL / OUT: 2065 mL / NET: -23.9 mL                        7.7    14.89 )-----------( 60       ( 02 Sep 2024 00:12 )             22.5     09-02    135  |  104  |  7   ----------------------------<  148<H>  4.1   |  22  |  0.88    Ca    8.0<L>      02 Sep 2024 00:12  Phos  2.7     09-02  Mg     2.4     09-02  TPro  5.7<L>  /  Alb  2.8<L>  /  TBili  9.1<H>  /  DBili  x   /  AST  49<H>  /  ALT  33  /  AlkPhos  228<H>  09-02    Review of systems  All other systems were reviewed and are negative, except as noted.    Constitutional: Well developed / well nourished  Eyes:  PERRLA  ENMT: NC/AT  Neck: supple,   Respiratory: CTA B/L  Cardiovascular: RRR  Gastrointestinal: Soft abdomen, ND, L sided abdominal wall hernia--stable, NT  Genitourinary: anuric   Extremities: SCD's in place and working bilaterally, + LE edema   Vascular: Palpable dp pulses bilaterally.   Neurological: waking up  Skin: no rashes, ulcerations, lesions  Musculoskeletal: Moving all extremities

## 2024-09-02 NOTE — PROGRESS NOTE ADULT - ASSESSMENT
76 yo m with alcohol abuse otherwise no known chronic medical issues (does not see doctors per sister) s/p 1 month stay at [facility] now transferred to [facility] for liver transplant eval.  Most of history obtained from sister (Ashlyn) at bedside and some from transfer paperwork. Per sister, pt was initially brought to the hospital by family for back pain and jaundice for unclear amount of time. Patient was seen by GI and underwent EGD soon after admission which only showed nonbleeding ?duodenal ulcers (no intervention) however few days later pt developed active signs of bleeding and emergently underwent EGD again showing bleeding esophageal varices which were clipped.  pt was electively intubated with stay in ICU thereafter. Patient then started with HD due to worsening renal function and MS for past 2.5 weeks at times limited by low BP requiring pressors to assist. Had numerous paracentesis with varying amount of fluid removal - albumin infusions. Sister not aware of any concerns for acute infection during his stay but aware that pt was on abx at some point due to bleeding issues.        PERTINENT RADIOLOGY:  CXR: Tubes and lines as above. No focal consolidations  CT Chest, A&P:  Patchy ground-glass opacities in the bilateral upper lobes. *  Cirrhosis with evidence of portal hypertension. *  Hypodense lesion in the periphery of the left hepatic lobe of uncertain etiology. Somewhat wedge-shaped morphology raises the possibility for a small infarct. Question subtle peripheral nodular enhancement, and a hemangioma is also considered. Contrast enhanced abdominal MRI can be performed for further characterization and to assess for other possibilities. *  A wedge-shaped region of hypoattenuation in the spleen may reflect a small infarct. *  Focal eccentric wall thickening in the low rectum, possibly due to a mural fold. Consider colonoscopy. *  Large volume ascites.  CT Head: No evidence of acute intracranial pathology. If clinical symptoms persist or worsen, more sensitive evaluation with brain MRI may be obtained, if no contraindications exist.    BCx (8/12) 1/4 MRSE  BCx (8/14) NGTD  Paracentesis (8/14) Cell Counts Negative for SBP  MRSA/MSSA Nasal PCR (8/16) Negative    CXR (8/19) Ground Glass infiltrates persist  CT from 8//13 with bilateral Ground glass infiltrates    # Persistent Ground glass infiltrates of unclear etiology  please send urine legionella ag  please send deep sputum for testing for gram stain/cx, fungal stain/cx    #Decompensated liver cirrhosis, Leukocytosis, Shock  # Enterococcus faecium bacteremia BC 8/26 now positive and worsening shock  biliary/SBp vs line related in c/o prolonged empirical antibiotic course and Enterococcus selection  --Meropenem deescalated to Zosyn for empiric coverage in light of negative culture workup then re-escalated back to meropenem- suspect  etiology of sepsis and shock is Enterococcus bacteremia  - vancomycin trough 19- would decrease Vancomycin dose to 750 mg. repeat trough today and daily , maintain vancomycin level <15 at 1 g q12h   --Deescalated caspofungin to fluconazole  --repeat BC NGTD- should they become positive will also favor removing midline and exchange RIJ  now, otherwise, would plan to do down the line  --Would obtain TTE  - s/p paracentesis  follow cx , counts not c/w SBP - cx NGTD  --S/P empiric fluconazole (8/12 -->8/26), Now escalated to Caspofungin --->(8/27), deescalated to Fluconazole 8/28--->      #Positive BCx (8/12) (Staph epi)  Consistent with contaminant   as repeat testing is negative x many    #CMV PCR positive in cirrhotic patient  --Low level to moderate CMV viremia is noted, not clinically relevant and in context of illness. Would repeat CMV PCR on (8/26)  Cytomegalovirus By PCR: 4890 --->4730 --->1020 --->867 (8/20)    #Pre Transplant Evaluation   HIV-1/2 Combo Result: Nonreactive  EBVPCR Log: NotDetec [id number]   Hepatitis A IgG Ab Result: Reactive   Hepatitis B Core Antibody, Total: Nonreactive  Hepatitis B Surface Antibody: Reactive   Hepatitis B DNA PCR Log: Not Detected  Hepatitis B Surface Antigen: Nonreactive  Hepatitis C Virus Interpretation: Nonreactive  COVID-19 De Domain Antibody: Positive  Treponema Pallidum Antibody Interpretation: Negative  HSV 1 IgG  Positive/HSV 2 IgG Negative  EBV Serology Positive  CMV IgG Positive  VZV IgG Positive  Measles Positive, Mumps Positive and Rubella IgG Positive  Toxoplasma IgG Positive  QuantiFeron Gold Negative  Strongyloides Ab Negative  Babesia PCR negative  --Schistosoma IgG - NEGATIVE  --Reportedly Lyme serology was previously positive and remains positive, Tick Diseases Panel is negative for additional tick born exposures  --Western blot negative, serologies not concerning for active Lyme disease- off doxy now       #Encounter to Vaccinate Patient - Will defer vaccination in context of critical illness  COVID19: No primary series. Would benefit from COVID19 7828-3140 dose  Influenza: Would benefit from dose next season  Pneumococcal: Would benefit from PCV20  HAV: Immune, no additional vaccines indicated  HBV: Immune, no additional vaccines indicated  MMR: Immune, will not require further vaccination  Varicella: Immune, will not require further vaccination  Shingles: Would benefit from Shingrix  Tdap: Would benefit from Tdap     78 yo m with alcohol abuse otherwise no known chronic medical issues (does not see doctors per sister) s/p 1 month stay at [facility] now transferred to [facility] for liver transplant eval.  Most of history obtained from sister (Ashlyn) at bedside and some from transfer paperwork. Per sister, pt was initially brought to the hospital by family for back pain and jaundice for unclear amount of time. Patient was seen by GI and underwent EGD soon after admission which only showed nonbleeding ?duodenal ulcers (no intervention) however few days later pt developed active signs of bleeding and emergently underwent EGD again showing bleeding esophageal varices which were clipped.  pt was electively intubated with stay in ICU thereafter. Patient then started with HD due to worsening renal function and MS for past 2.5 weeks at times limited by low BP requiring pressors to assist. Had numerous paracentesis with varying amount of fluid removal - albumin infusions. Sister not aware of any concerns for acute infection during his stay but aware that pt was on abx at some point due to bleeding issues.        PERTINENT RADIOLOGY:  CXR: Tubes and lines as above. No focal consolidations  CT Chest, A&P:  Patchy ground-glass opacities in the bilateral upper lobes. *  Cirrhosis with evidence of portal hypertension. *  Hypodense lesion in the periphery of the left hepatic lobe of uncertain etiology. Somewhat wedge-shaped morphology raises the possibility for a small infarct. Question subtle peripheral nodular enhancement, and a hemangioma is also considered. Contrast enhanced abdominal MRI can be performed for further characterization and to assess for other possibilities. *  A wedge-shaped region of hypoattenuation in the spleen may reflect a small infarct. *  Focal eccentric wall thickening in the low rectum, possibly due to a mural fold. Consider colonoscopy. *  Large volume ascites.  CT Head: No evidence of acute intracranial pathology. If clinical symptoms persist or worsen, more sensitive evaluation with brain MRI may be obtained, if no contraindications exist.    BCx (8/12) 1/4 MRSE  BCx (8/14) NGTD  Paracentesis (8/14) Cell Counts Negative for SBP  MRSA/MSSA Nasal PCR (8/16) Negative    CXR (8/19) Ground Glass infiltrates persist  CT from 8//13 with bilateral Ground glass infiltrates    # Persistent Ground glass infiltrates of unclear etiology  please send urine legionella ag  please send deep sputum for testing for gram stain/cx, fungal stain/cx    #Decompensated liver cirrhosis, Leukocytosis, Shock  # Enterococcus faecium bacteremia BC 8/26 now positive and worsening shock  biliary/SBp vs line related in c/o prolonged empirical antibiotic course and Enterococcus selection  --Meropenem deescalated to Zosyn for empiric coverage in light of negative culture workup then re-escalated back to meropenem- suspect  etiology of sepsis and shock is Enterococcus bacteremia  - vancomycin level 19 today on 1 g q12h (nut not drawn prior to 4th dose), repeat level tonight with goal 10-15  --Deescalated caspofungin to fluconazole  --repeat BC NGTD- should they become positive will also favor removing midline and exchange RIJ  now, otherwise, would plan to do down the line  --Would obtain TTE  - s/p paracentesis  follow cx , counts not c/w SBP - cx NGTD  --S/P empiric fluconazole (8/12 -->8/26), Now escalated to Caspofungin --->(8/27), deescalated to Fluconazole 8/28--->      #Positive BCx (8/12) (Staph epi)  Consistent with contaminant   as repeat testing is negative x many    #CMV PCR positive in cirrhotic patient  --Low level to moderate CMV viremia is noted, not clinically relevant and in context of illness. Would repeat CMV PCR on (8/26)  Cytomegalovirus By PCR: 4890 --->4730 --->1020 --->867 (8/20)    #Pre Transplant Evaluation   HIV-1/2 Combo Result: Nonreactive  EBVPCR Log: NotDetec [id number]/mL   Hepatitis A IgG Ab Result: Reactive   Hepatitis B Core Antibody, Total: Nonreactive  Hepatitis B Surface Antibody: Reactive   Hepatitis B DNA PCR Log: Not Detected  Hepatitis B Surface Antigen: Nonreactive  Hepatitis C Virus Interpretation: Nonreactive  COVID-19 De Domain Antibody: Positive  Treponema Pallidum Antibody Interpretation: Negative  HSV 1 IgG  Positive/HSV 2 IgG Negative  EBV Serology Positive  CMV IgG Positive  VZV IgG Positive  Measles Positive, Mumps Positive and Rubella IgG Positive  Toxoplasma IgG Positive  QuantiFeron Gold Negative  Strongyloides Ab Negative  Babesia PCR negative  --Schistosoma IgG - NEGATIVE  --Reportedly Lyme serology was previously positive and remains positive, Tick Diseases Panel is negative for additional tick born exposures  --Western blot negative, serologies not concerning for active Lyme disease- off doxy now       #Encounter to Vaccinate Patient - Will defer vaccination in context of critical illness  COVID19: No primary series. Would benefit from COVID19 9253-4147 dose  Influenza: Would benefit from dose next season  Pneumococcal: Would benefit from PCV20  HAV: Immune, no additional vaccines indicated  HBV: Immune, no additional vaccines indicated  MMR: Immune, will not require further vaccination  Varicella: Immune, will not require further vaccination  Shingles: Would benefit from Shingrix  Tdap: Would benefit from Tdap

## 2024-09-02 NOTE — PROCEDURE NOTE - NSINDICATIONS_GEN_A_CORE
arterial puncture to obtain ABG's/critical patient/monitoring purposes
ascites/suspected spontaneous bacterial peritonitis
blood sampling/monitoring purposes
critical patient
critical illness/venous access
ascites
dialysis/CRRT
venous access
for endoscopy/airway protection

## 2024-09-02 NOTE — PROCEDURE NOTE - PROCEDURE DATE TIME, MLM
02-Sep-2024 17:46
21-Aug-2024 18:05
20-Aug-2024 20:00
26-Aug-2024 15:09
20-Aug-2024 18:39
12-Aug-2024 21:40
14-Aug-2024 15:26
14-Aug-2024 16:22
21-Aug-2024 18:00

## 2024-09-02 NOTE — PROGRESS NOTE ADULT - SUBJECTIVE AND OBJECTIVE BOX
24 HOUR EVENTS:  - CRRT keep net even  - start midodrine 20q8     NEURO  RASS (if intubated): 		CAM ICU (if concern for delirium):  Exam: A&O 1-2, follows some commands all 4 extremities  Meds:     RESPIRATORY  RR: 17 (24 @ 00:00) (10 - 33)  SpO2: 95% (24 @ 00:00) (89% - 100%)  Wt(kg): --  Exam: Lungs CTA b/l  Mechanical Ventilation:   ABG - ( 02 Sep 2024 00:00 )  pH: 7.44  /  pCO2: 35    /  pO2: 67    / HCO3: 24    / Base Excess: -0.2  /  SaO2: 96.7    Lactate: x                Meds:     CARDIOVASCULAR  HR: 69 (24 @ 00:00) (68 - 81)  BP: 104/54 (24 @ 19:45) (104/54 - 107/54)  BP(mean): 75 (24 @ 19:45) (73 - 77)  ABP: 123/45 (24 @ 00:00) (105/44 - 138/54)  ABP(mean): 66 (24 @ 00:00) (59 - 77)  Wt(kg): --  CVP(cm H2O): --      Exam: Normal S1/S2, b/l radial pulses present and symmetrical  Cardiac Rhythm:   Perfusion     [ ]Adequate   [ ]Inadequate  Mentation   [ ]Normal       [ ]Reduced  Extremities  [ ]Warm         [ ]Cool  Volume Status [ ]Hypervolemic [ ]Euvolemic [ ]Hypovolemic  Meds: midodrine 20 milliGRAM(s) Oral every 8 hours  norepinephrine Infusion 0.5 MICROgram(s)/kG/Min IV Continuous <Continuous>      GI/NUTRITION  Exam: abdomen distended, minimal tenderness, (+) scleral icterus, jaundiced  Diet:   Last Bowel Movement: 01-Sep-2024 (24 @ 19:00)  Last Bowel Movement: 01-Sep-2024 (24 @ 08:00)  Last Bowel Movement: 30-Aug-2024 (24 @ 19:15)  Last Bowel Movement: 30-Aug-2024 (24 @ 07:00)        Meds: lactulose Syrup 20 Gram(s) Enteral Tube every 12 hours  pantoprazole  Injectable 40 milliGRAM(s) IV Push two times a day      GENITOURINARY  I&O's Detail     @ : @ 07:00  --------------------------------------------------------  IN:    Enteral Tube Flush: 200 mL    IV PiggyBack: 300 mL    Miscellaneous Tube Feedin mL    Norepinephrine: 479.3 mL    Vasopressin: 108 mL  Total IN: 1987.3 mL    OUT:    Other (mL): 1889 mL  Total OUT: 1889 mL    Total NET: 98.3 mL       @ :  -   @ 00:26  --------------------------------------------------------  IN:    Enteral Tube Flush: 70 mL    IV PiggyBack: 250 mL    IV PiggyBack: 450 mL    Miscellaneous Tube Feedin mL    Norepinephrine: 173.7 mL    Vasopressin: 76.5 mL  Total IN: 1810.2 mL    OUT:    Other (mL): 1825 mL  Total OUT: 1825 mL    Total NET: -14.8 mL              137  |  105  |  6<L>  ----------------------------<  145<H>  4.2   |  21<L>  |  0.90    Ca    8.2<L>      01 Sep 2024 18:46  Phos  2.7       Mg     2.3         TPro  5.7<L>  /  Alb  2.9<L>  /  TBili  8.8<H>  /  DBili  x   /  AST  50<H>  /  ALT  32  /  AlkPhos  222<H>      Meds: folic acid 1 milliGRAM(s) Oral daily  multivitamin/minerals/iron Oral Solution (CENTRUM) 15 milliLiter(s) Enteral Tube daily  thiamine 100 milliGRAM(s) Oral daily      HEMATOLOGIC  Meds:                         7.7    13.89 )-----------( 58       ( 01 Sep 2024 18:46 )             22.4     PT/INR - ( 01 Sep 2024 18:46 )   PT: 25.6 sec;   INR: 2.39 ratio         PTT - ( 01 Sep 2024 18:46 )  PTT:51.6 sec    INFECTIOUS DISEASES  T(C): 36.5 (24 @ 23:00), Max: 36.7 (24 @ 03:00)  Wt(kg): --  WBC Count: 13.89 K/uL ( @ 18:46)  WBC Count: 14.40 K/uL ( @ 12:59)  WBC Count: 14.70 K/uL ( @ 06:50)  WBC Count: 15.28 K/uL ( @ 01:10)    Recent Cultures:  Specimen Source: .Blood Blood-Peripheral,  @ 19:28; Results   No growth at 72 Hours; Gram Stain: --; Organism: --  Specimen Source: .Blood Blood-Peripheral,  @ 18:32; Results   No growth at 72 Hours; Gram Stain: --; Organism: --  Specimen Source: Ascites Fl Ascites Fluid,  @ 16:29; Results   No growth at 5 days; Gram Stain:   No polymorphonuclear cells seen  No organisms seen  by cytocentrifuge; Organism: --  Specimen Source: .Blood Blood-Peripheral,  @ 18:42; Results   No growth at 5 days; Gram Stain: --; Organism: --  Specimen Source: .Blood Blood-Peripheral,  @ 18:28; Results   Growth in aerobic bottle: Enterococcus faecium  Direct identification is available within approximately 3-5  hours either by Blood Panel Multiplexed PCR or Direct  MALDI-TOF. Details: [email or web address]<!>; Gram Stain:   Growth in aerobic bottle: Gram positive cocci in pairs<!>; Organism: Blood Culture PCR  Enterococcus faecium<!>    Meds: epoetin kareem (EPOGEN) Injectable 67248 Unit(s) SubCutaneous <User Schedule>  fluconAZOLE IVPB 400 milliGRAM(s) IV Intermittent every 24 hours  rifAXIMin 550 milliGRAM(s) Oral every 12 hours  vancomycin  IVPB 1000 milliGRAM(s) IV Intermittent <User Schedule>      ENDOCRINE  Capillary Blood Glucose    Meds: insulin lispro (ADMELOG) corrective regimen sliding scale   SubCutaneous every 6 hours  vasopressin Infusion 0.03 Unit(s)/Min IV Continuous <Continuous>      ACCESS DEVICES:  [ ] Peripheral IV  [ ] Central Venous Line		[ ] R	[ ] L	[ ] IJ	[ ] Fem	[ ] SC	Placed:   [ ] Arterial Line			[ ] R	[ ] L	[ ] Fem	[ ] Rad	[ ] Ax	Placed:   [ ] PICC:					[ ] Mediport  [ ] Urinary Catheter, Date Placed:   [ ] Necessity of urinary, arterial, and venous catheters discussed    OTHER MEDICATIONS:  Biotene Dry Mouth Oral Rinse 5 milliLiter(s) Swish and Spit every 12 hours  chlorhexidine 2% Cloths 1 Application(s) Topical <User Schedule>  CRRT Treatment    <Continuous>  lidocaine   4% Patch 1 Patch Transdermal daily  Phoxillum Filtration BK 4 / 2.5 5000 milliLiter(s) CRRT <Continuous>  Phoxillum Filtration BK 4 / 2.5 5000 milliLiter(s) CRRT <Continuous>  PrismaSATE Dialysate BGK 4 / 2.5 5000 milliLiter(s) CRRT <Continuous>      IMAGING:

## 2024-09-02 NOTE — PROGRESS NOTE ADULT - ASSESSMENT
68y with obesity and risky alcohol consumption (with decades' history of daily consumption of homemade alcohol), admitted to [facility] 1 month ago due to recent onset of jaundice and with a prolonged hospital and ICU course there due to newly diagnosed decompensated cirrhosis with acute on chronic liver failure (ACLF) with shock (resolved, but still on midodrine); FANY (on intermittent HD since 7/9); recurrent maroon stools and acute on chronic anemia necessitating intermittent PRBC transfusions (last on 8/8) and hypofibrinoginemia, with EGD (7/12) with small non-bleeding EV and a duodenal ulcer with a visible vessel; and waxing and waning hepatic encephalopathy. He was transferred to [facility] on 8/12/24 for evaluation for combined liver/kidney transplants (SLK).      Decompensated ETOH Cirrhosis  - SLK eval , MELD 36O (9/1)  - EV:  EGD (7/12) with small non-bleeding EV and a duodenal ulcer with a visible vessel. EGD 8/20: no active bleeding, PPI.  - s/p colonoscopy 8/21: lesion biopsied, f/u path   - BCX 8/13 w/ MRSE , repeat 8/14 with NGTD, BCX 8/26: enterococcus faecium , linezolid  - ECHO 8/28: no vegetations    Recommendations:   - TFs, pressors per SICU  - HE: cont rifaximin/miralax  - stress dose steroids per SICU completed, wean pressors as able  - FANY/HRS: anuric, On CRRT 8/13, Renal following  - ID: Empiric Elise/fluc/ Doxy. Elise switched to Zosyn.  - f/u repeat BCx  - Thiamine/MVI/FOLIC ACID  - LHC deferred, potentially Tuesday 9/3  - plan for diagnostic tap    All recommendations are tentative until note is attested by attending.     Lexi Rosenberg MD  Gastroenterology/Hepatology Fellow, PGY-5  Please contact via TEAMS    NON-URGENT CONSULTS:  Please email [email or web address]  [email or web address]

## 2024-09-02 NOTE — PROCEDURE NOTE - NSPROCNAME_GEN_A_CORE
Paracentesis
Tracheal Intubation
Midline Insertion
Urinary Device Placement
Central Line Insertion
Central Line Insertion
Paracentesis
Paracentesis
Arterial Puncture/Cannulation
Arterial Puncture/Cannulation

## 2024-09-02 NOTE — PROGRESS NOTE ADULT - ASSESSMENT
68y with obesity and risky alcohol consumption (with decades' history of daily consumption of homemade alcohol), admitted to [facility] 1 month ago due to recent onset of jaundice and with a prolonged hospital and ICU course there due to newly diagnosed decompensated cirrhosis with acute on chronic liver failure (ACLF) with shock (resolved, but still on midodrine); FANY (on intermittent HD since 7/9); recurrent maroon stools and acute on chronic anemia necessitating intermittent PRBC transfusions (last on 8/8) and hypofibrinoginemia, with EGD (7/12) with small non-bleeding EV and a duodenal ulcer with a visible vessel; and waxing and waning hepatic encephalopathy. He was transferred to [facility] on 8/12/24 for evaluation for combined liver/kidney transplants (SLK).      [ ] Decompensated ETOH Cirrhosis  - SLK eval , MELD 36O (9/1)  - EV:  EGD (7/12) with small non-bleeding EV and a duodenal ulcer with a visible vessel. EGD 8/20: no active bleeding, PPI.  - s/p colonoscopy 8/21: lesion biopsied, f/u path   - BCX 8/13 w/ MRSE , repeat 8/14 with NGTD, BCX 8/26: enterococcus faecium , linezolid  - ECHO 8/28: no vegetations   - TFs, pressors per SICU  - HE: cont rifaximin/miralax  - stress dose steroids per SICU completed, wean pressors as able  - ID: Empiric Elise/fluc/ Doxy. Elise switched to Zosyn.  - f/u repeat BCx  - Thiamine/MVI/FOLIC ACID  - LHC deferred, potentially Tuesday 9/3  - plan for diagnostic tap    [ ] HRS  - iHD since 7/9   - CRRT (8/13-    [ ] UGI bleed @ OSH  - EGD 7/12: small non bleeding EV, duodenal ulcer   - EGD 8/20: no active bleeding    [ ] rectal mass bx on 8/21 scope, path: no cancer   68y with obesity and risky alcohol consumption (with decades' history of daily consumption of homemade alcohol), admitted to [facility] 1 month ago due to recent onset of jaundice and with a prolonged hospital and ICU course there due to newly diagnosed decompensated cirrhosis with acute on chronic liver failure (ACLF) with shock (resolved, but still on midodrine); FANY (on intermittent HD since 7/9); recurrent maroon stools and acute on chronic anemia necessitating intermittent PRBC transfusions (last on 8/8) and hypofibrinoginemia, with EGD (7/12) with small non-bleeding EV and a duodenal ulcer with a visible vessel; and waxing and waning hepatic encephalopathy. He was transferred to [facility] on 8/12/24 for evaluation for combined liver/kidney transplants (SLK).      [ ] Decompensated ETOH Cirrhosis  - SLK eval , MELD 36O (9/2)  - EV:  EGD (7/12) with small non-bleeding EV and a duodenal ulcer with a visible vessel. EGD 8/20: no active bleeding, PPI.  - s/p colonoscopy 8/21: lesion biopsied, f/u path   - BCX 8/13 w/ MRSE , repeat 8/14 with NGTD, BCX 8/26: enterococcus faecium , linezolid  - ECHO 8/28: no vegetations   - TFs, pressors per SICU  - HE: cont rifaximin/miralax  - stress dose steroids per SICU completed, wean pressors as able  - Thiamine/MVI/FOLIC ACID  - LHC deferred, potentially Tuesday 9/3    [ ] HRS  - iHD since 7/9   - CRRT (8/13-    [] EC faesium bacteremia (8/26)  [] MRSE (8/12)   - repeat bld cxs neg  - vano by level   - ID following     [ ] UGI bleed @ OSH  - EGD 7/12: small non bleeding EV, duodenal ulcer   - EGD 8/20: no active bleeding    [ ] rectal mass bx on 8/21 scope, path: no cancer

## 2024-09-02 NOTE — PROGRESS NOTE ADULT - ATTENDING COMMENTS
67 y/o male transferred from [facility] for SLK evaluation. Patient was initially admitted to [facility] for back pain & jaundice and was diagnosed w/ cirrhosis. Hospital course there was c/b small EVs, melena 2/2 a duodenal ulcer s/p clipping, HRS requiring hemodialysis, hepatic encephalopathy, ascites s/p multiple LVPs, and deconditioning. Patient was admitted to SICU on 8/12 for initiation of CRRT as his BPs were too low to attempt intermittent HD.    Mental status still poor, on Rifaximin and Lactulose  Saturating well  Unable to wean off vasopressor support, on Levo 0.15 and Vaso, midodrine added  No adr insufficiency steroid level in 40s, TSH wnl  NGT feed at goal  CRRT with net even, does not tolerate fluid removal  Abx Vanco by level and Difluc, BC E faecium  ISS  Will attempt paracentesis, replace  .5 cc per cc with albumin.  Low Hb thrombocytopenia stable  Prognosis guarded    Spoke to his sister at bed side

## 2024-09-02 NOTE — PROCEDURE NOTE - NSINFORMCONSENT_GEN_A_CORE
Benefits, risks, and possible complications of procedure explained to patient/caregiver who verbalized understanding and gave written consent.
This was an emergent procedure.
Benefits, risks, and possible complications of procedure explained to patient/caregiver who verbalized understanding and gave written consent.
Benefits, risks, and possible complications of procedure explained to patient/caregiver who verbalized understanding and gave verbal consent.
Benefits, risks, and possible complications of procedure explained to patient/caregiver who verbalized understanding and gave written consent.
Son (health proxy) consent in chart/Benefits, risks, and possible complications of procedure explained to patient/caregiver who verbalized understanding and gave written consent.
This was an emergent procedure.

## 2024-09-02 NOTE — PROGRESS NOTE ADULT - SUBJECTIVE AND OBJECTIVE BOX
Progress Note   Lexi Bui PGY4- GI/Hep    SUBJECTIVE: Patient seen and examined at bedside.     OBJECTIVE:    VITAL SIGNS:  ICU Vital Signs Last 24 Hrs  T(C): 36.3 (02 Sep 2024 03:00), Max: 36.6 (01 Sep 2024 08:00)  T(F): 97.4 (02 Sep 2024 03:00), Max: 97.8 (01 Sep 2024 08:00)  HR: 73 (02 Sep 2024 07:00) (68 - 77)  BP: 104/54 (01 Sep 2024 19:45) (104/54 - 107/54)  BP(mean): 75 (01 Sep 2024 19:45) (73 - 77)  ABP: 105/42 (02 Sep 2024 07:00) (105/42 - 138/54)  ABP(mean): 59 (02 Sep 2024 07:00) (59 - 77)  RR: 12 (02 Sep 2024 07:00) (10 - 29)  SpO2: 93% (02 Sep 2024 07:00) (92% - 100%)    O2 Parameters below as of 02 Sep 2024 03:00  Patient On (Oxygen Delivery Method): room air              09-01 @ 07:01  -  09-02 @ 07:00  --------------------------------------------------------  IN: 2277.1 mL / OUT: 2255 mL / NET: 22.1 mL        PHYSICAL EXAM:    General: NAD  HEENT: NC/AT  Neck: supple  Respiratory: Regular RR, no increase in WOB  Cardiovascular: RRR  Abdomen: soft, NT/ND; +BS x4  Extremities: WWP, 2+ peripheral pulses b/l  Skin: normal color and turgor; no rash  Neurological: A&OX    MEDICATIONS:  MEDICATIONS  (STANDING):  Biotene Dry Mouth Oral Rinse 5 milliLiter(s) Swish and Spit every 12 hours  chlorhexidine 2% Cloths 1 Application(s) Topical <User Schedule>  CRRT Treatment    <Continuous>  epoetin kareem (EPOGEN) Injectable 39450 Unit(s) SubCutaneous <User Schedule>  fluconAZOLE IVPB 400 milliGRAM(s) IV Intermittent every 24 hours  folic acid 1 milliGRAM(s) Oral daily  insulin lispro (ADMELOG) corrective regimen sliding scale   SubCutaneous every 6 hours  lactulose Syrup 20 Gram(s) Enteral Tube every 12 hours  lidocaine   4% Patch 1 Patch Transdermal daily  midodrine 20 milliGRAM(s) Oral every 8 hours  multivitamin/minerals/iron Oral Solution (CENTRUM) 15 milliLiter(s) Enteral Tube daily  norepinephrine Infusion 0.5 MICROgram(s)/kG/Min (53.4 mL/Hr) IV Continuous <Continuous>  pantoprazole  Injectable 40 milliGRAM(s) IV Push two times a day  Phoxillum Filtration BK 4 / 2.5 5000 milliLiter(s) (200 mL/Hr) CRRT <Continuous>  Phoxillum Filtration BK 4 / 2.5 5000 milliLiter(s) (2000 mL/Hr) CRRT <Continuous>  PrismaSATE Dialysate BGK 4 / 2.5 5000 milliLiter(s) (1000 mL/Hr) CRRT <Continuous>  rifAXIMin 550 milliGRAM(s) Oral every 12 hours  thiamine 100 milliGRAM(s) Oral daily  vasopressin Infusion 0.03 Unit(s)/Min (4.5 mL/Hr) IV Continuous <Continuous>    MEDICATIONS  (PRN):      ALLERGIES:  Allergies    No Known Allergies    Intolerances        LABS:                        7.7    15.33 )-----------( 60       ( 02 Sep 2024 06:06 )             22.9     09-02    138  |  105  |  7   ----------------------------<  156<H>  4.3   |  21<L>  |  0.95    Ca    7.9<L>      02 Sep 2024 06:06  Phos  2.6     09-02  Mg     2.3     09-02    TPro  5.6<L>  /  Alb  2.8<L>  /  TBili  8.8<H>  /  DBili  x   /  AST  49<H>  /  ALT  33  /  AlkPhos  223<H>  09-02    PT/INR - ( 02 Sep 2024 06:06 )   PT: 26.0 sec;   INR: 2.43 ratio         PTT - ( 02 Sep 2024 06:06 )  PTT:48.4 sec  Urinalysis Basic - ( 02 Sep 2024 06:06 )    Color: x / Appearance: x / SG: x / pH: x  Gluc: 156 mg/dL / Ketone: x  / Bili: x / Urobili: x   Blood: x / Protein: x / Nitrite: x   Leuk Esterase: x / RBC: x / WBC x   Sq Epi: x / Non Sq Epi: x / Bacteria: x         Progress Note   Lexi Rosenberg- PGY4- GI/Hep    SUBJECTIVE: Patient seen and examined at bedside.   - added midodrine   - CRRT net even  - on tube feeds  - 6BM yesterday     OBJECTIVE:    VITAL SIGNS:  ICU Vital Signs Last 24 Hrs  T(C): 36.3 (02 Sep 2024 03:00), Max: 36.6 (01 Sep 2024 08:00)  T(F): 97.4 (02 Sep 2024 03:00), Max: 97.8 (01 Sep 2024 08:00)  HR: 73 (02 Sep 2024 07:00) (68 - 77)  BP: 104/54 (01 Sep 2024 19:45) (104/54 - 107/54)  BP(mean): 75 (01 Sep 2024 19:45) (73 - 77)  ABP: 105/42 (02 Sep 2024 07:00) (105/42 - 138/54)  ABP(mean): 59 (02 Sep 2024 07:00) (59 - 77)  RR: 12 (02 Sep 2024 07:00) (10 - 29)  SpO2: 93% (02 Sep 2024 07:00) (92% - 100%)    O2 Parameters below as of 02 Sep 2024 03:00  Patient On (Oxygen Delivery Method): room air          09-01 @ 07:01  -  09-02 @ 07:00  --------------------------------------------------------  IN: 2277.1 mL / OUT: 2255 mL / NET: 22.1 mL        PHYSICAL EXAM:    General: NAD  HEENT: NC/AT  Neck: supple  Respiratory: Regular RR, no increase in WOB  Cardiovascular: RRR  Abdomen: soft, NT, distended; +BS x4  Extremities: WWP, 2+ peripheral pulses b/l, left leg swollen   Skin: jaundice,  no rash  Neurological: Obtunded     MEDICATIONS:  MEDICATIONS  (STANDING):  Biotene Dry Mouth Oral Rinse 5 milliLiter(s) Swish and Spit every 12 hours  chlorhexidine 2% Cloths 1 Application(s) Topical <User Schedule>  CRRT Treatment    <Continuous>  epoetin kareem (EPOGEN) Injectable 39305 Unit(s) SubCutaneous <User Schedule>  fluconAZOLE IVPB 400 milliGRAM(s) IV Intermittent every 24 hours  folic acid 1 milliGRAM(s) Oral daily  insulin lispro (ADMELOG) corrective regimen sliding scale   SubCutaneous every 6 hours  lactulose Syrup 20 Gram(s) Enteral Tube every 12 hours  lidocaine   4% Patch 1 Patch Transdermal daily  midodrine 20 milliGRAM(s) Oral every 8 hours  multivitamin/minerals/iron Oral Solution (CENTRUM) 15 milliLiter(s) Enteral Tube daily  norepinephrine Infusion 0.5 MICROgram(s)/kG/Min (53.4 mL/Hr) IV Continuous <Continuous>  pantoprazole  Injectable 40 milliGRAM(s) IV Push two times a day  Phoxillum Filtration BK 4 / 2.5 5000 milliLiter(s) (200 mL/Hr) CRRT <Continuous>  Phoxillum Filtration BK 4 / 2.5 5000 milliLiter(s) (2000 mL/Hr) CRRT <Continuous>  PrismaSATE Dialysate BGK 4 / 2.5 5000 milliLiter(s) (1000 mL/Hr) CRRT <Continuous>  rifAXIMin 550 milliGRAM(s) Oral every 12 hours  thiamine 100 milliGRAM(s) Oral daily  vasopressin Infusion 0.03 Unit(s)/Min (4.5 mL/Hr) IV Continuous <Continuous>    MEDICATIONS  (PRN):      ALLERGIES:  Allergies    No Known Allergies    Intolerances        LABS:                        7.7    15.33 )-----------( 60       ( 02 Sep 2024 06:06 )             22.9     09-02    138  |  105  |  7   ----------------------------<  156<H>  4.3   |  21<L>  |  0.95    Ca    7.9<L>      02 Sep 2024 06:06  Phos  2.6     09-02  Mg     2.3     09-02    TPro  5.6<L>  /  Alb  2.8<L>  /  TBili  8.8<H>  /  DBili  x   /  AST  49<H>  /  ALT  33  /  AlkPhos  223<H>  09-02    PT/INR - ( 02 Sep 2024 06:06 )   PT: 26.0 sec;   INR: 2.43 ratio         PTT - ( 02 Sep 2024 06:06 )  PTT:48.4 sec  Urinalysis Basic - ( 02 Sep 2024 06:06 )    Color: x / Appearance: x / SG: x / pH: x  Gluc: 156 mg/dL / Ketone: x  / Bili: x / Urobili: x   Blood: x / Protein: x / Nitrite: x   Leuk Esterase: x / RBC: x / WBC x   Sq Epi: x / Non Sq Epi: x / Bacteria: x

## 2024-09-02 NOTE — PROGRESS NOTE ADULT - ASSESSMENT
69 y/o male w/ no known PMHx (does not see doctors per sister) who presented as a transfer from [facility] for SLK evaluation. Patient was initially admitted to [facility] for back pain & jaundice and was diagnosed w/ cirrhosis. Hospital course there was c/b small EVs, melena 2/2 a duodenal ulcer s/p clipping, HRS requiring hemodialysis, hepatic encephalopathy, ascites s/p multiple LVPs, and deconditioning. Patient was admitted to SICU on 8/12 for initiation of CRRT as his BPs were too low to attempt intermittent HD.    Neuro:  - A/O x 1-2 waxes and wanes  - HE: Rifaximin and lactolose 20 BID    - Monitoring ammonia  - Thiamine, folic acid, & multivitamin      Resp:  - Incentive spirometry to prevent atelectasis  - CT chest 8/25 with RLL pneumonia    CV:  - Goal SBP >110, titrate pressors as able. On Levo gtt, vaso gtt., requirments increasing overnight 8/26 now downtrending; 25% albumin given 8/31  - +/- cardiac catheterization when stable to do so for transplant work-up   - Electrophys: nothing to do while not on AC, follow cards recs  - TTE 8/28 with normal LV function    GI:   - TF - 50cc/hr   - Protonix BID for h/o duodenal ulcer  - bowel regimen held as pt with bloody loose diarrhea   - Monitor LFTs  - Undergoing eval for SLK transplantation  - s/p diagnostic para 8/27 neg SBP    Renal:  - CRRT for concern of HRS, maintain CRRT net even  - Monitor I&Os and electrolytes w/ repletions as necessary  - Undergoing evaluation for SLK transplantation    Heme:  - Hold chemical VTE ppx in setting of liver failure   - Mechanical VTE ppx with SCDs  - Epogen 3x/week  - duplex LE neg for DVT 8/28     ID:   - BCx 8/26 (+) 1/2 enterococcus faecium   - continue fluc  - vanc to be given to titrate to 10-15 troph, next trough 9PM 9/2  - s/p 8/18-8/27: doxycycline added for Lyme, completed  - f/u rpt Bcx x2 8/28 neg     Endo:   - s.p stress dosage steroids started 8/26 - 8/29  - Continue ISS [id number]     Dispo: SICU

## 2024-09-02 NOTE — PROGRESS NOTE ADULT - SUBJECTIVE AND OBJECTIVE BOX
Follow Up:      Interval History:    REVIEW OF SYSTEMS  [  ] ROS unobtainable because:    [  ] All other systems negative except as noted below    Constitutional:  [ ] fever [ ] chills  [ ] weight loss  [ ] weakness  Skin:  [ ] rash [ ] phlebitis	  Eyes: [ ] icterus [ ] pain  [ ] discharge	  ENMT: [ ] sore throat  [ ] thrush [ ] ulcers [ ] exudates  Respiratory: [ ] dyspnea [ ] hemoptysis [ ] cough [ ] sputum	  Cardiovascular:  [ ] chest pain [ ] palpitations [ ] edema	  Gastrointestinal:  [ ] nausea [ ] vomiting [ ] diarrhea [ ] constipation [ ] pain	  Genitourinary:  [ ] dysuria [ ] frequency [ ] hematuria [ ] discharge [ ] flank pain  [ ] incontinence  Musculoskeletal:  [ ] myalgias [ ] arthralgias [ ] arthritis  [ ] back pain  Neurological:  [ ] headache [ ] seizures  [ ] confusion/altered mental status    Allergies  No Known Allergies        ANTIMICROBIALS:  fluconAZOLE IVPB 400 every 24 hours  rifAXIMin 550 every 12 hours      OTHER MEDS:  MEDICATIONS  (STANDING):  epoetin kareem (EPOGEN) Injectable 20560 <User Schedule>  insulin lispro (ADMELOG) corrective regimen sliding scale  every 6 hours  lactulose Syrup 20 every 12 hours  midodrine 20 every 8 hours  norepinephrine Infusion 0.5 <Continuous>  pantoprazole  Injectable 40 two times a day  vasopressin Infusion 0.03 <Continuous>      Vital Signs Last 24 Hrs  T(C): 37.1 (02 Sep 2024 07:00), Max: 37.1 (02 Sep 2024 07:00)  T(F): 98.8 (02 Sep 2024 07:00), Max: 98.8 (02 Sep 2024 07:00)  HR: 72 (02 Sep 2024 09:15) (69 - 77)  BP: 104/59 (02 Sep 2024 08:00) (104/54 - 107/54)  BP(mean): 78 (02 Sep 2024 08:00) (75 - 78)  RR: 12 (02 Sep 2024 09:15) (10 - 25)  SpO2: 96% (02 Sep 2024 09:15) (92% - 100%)    Parameters below as of 02 Sep 2024 07:00  Patient On (Oxygen Delivery Method): room air        PHYSICAL EXAMINATION:  General: Alert and Awake, NAD  HEENT: PERRL, EOMI  Neck: Supple  Cardiac: RRR, No M/R/G  Resp: CTAB, No Wh/Rh/Ra  Abdomen: NBS, NT/ND, No HSM, No rigidity or guarding  MSK: No LE edema. No Calf tenderness  : No trejo  Skin: No rashes or lesions. Skin is warm and dry to the touch.   Neuro: Alert and Awake. CN 2-12 Grossly intact. Moves all four extremities spontaneously.  Psych: Calm, Pleasant, Cooperative                          7.7    15.33 )-----------( 60       ( 02 Sep 2024 06:06 )             22.9       09-02    138  |  105  |  7   ----------------------------<  156<H>  4.3   |  21<L>  |  0.95    Ca    7.9<L>      02 Sep 2024 06:06  Phos  2.6     09-02  Mg     2.3     09-02    TPro  5.6<L>  /  Alb  2.8<L>  /  TBili  8.8<H>  /  DBili  x   /  AST  49<H>  /  ALT  33  /  AlkPhos  223<H>  09-02      Urinalysis Basic - ( 02 Sep 2024 06:06 )    Color: x / Appearance: x / SG: x / pH: x  Gluc: 156 mg/dL / Ketone: x  / Bili: x / Urobili: x   Blood: x / Protein: x / Nitrite: x   Leuk Esterase: x / RBC: x / WBC x   Sq Epi: x / Non Sq Epi: x / Bacteria: x        MICROBIOLOGY:  Vancomycin Level, Random: 19.0 ug/mL (09-02-24 @ 06:06)  v  .Blood Blood-Peripheral  08-28-24   No growth at 4 days  --  --      .Blood Blood-Peripheral  08-28-24   No growth at 4 days  --  --      Ascites Fl Ascites Fluid  08-27-24   No growth at 5 days  --    No polymorphonuclear cells seen  No organisms seen  by cytocentrifuge      .Blood Blood-Peripheral  08-26-24   No growth at 5 days  --  --      .Blood Blood-Peripheral  08-26-24   Growth in aerobic bottle: Enterococcus faecium  Direct identification is available within approximately 3-5  hours either by Blood Panel Multiplexed PCR or Direct  MALDI-TOF. Details: [email or web address]  --  Blood Culture PCR  Enterococcus faecium      .Blood Blood  08-23-24   No growth at 5 days  --  --      .Blood Blood  08-23-24   No growth at 5 days  --  --      .Blood Blood-Venous  08-21-24   No growth at 5 days  --  --      .Blood Blood-Venous  08-21-24   No growth at 5 days  --  --      Combi-Cath Combi-Cath  08-21-24   Normal Respiratory Elo present  --    Moderate polymorphonuclear leukocytes per low power field  Rare Squamous epithelial cells per low power field  No organisms seen per oil power field      .Blood Blood  08-20-24   No growth at 5 days  --  --      .Blood Blood  08-20-24   No growth at 5 days  --  --      Peritoneal Peritoneal Fluid  08-14-24   No growth at 5 days  --    polymorphonuclear leukocytes seen  No organisms seen  by cytocentrifuge      .Blood Blood-Venous  08-14-24   No growth at 5 days  --  --      .Blood Blood-Venous  08-14-24   No growth at 5 days  --  --      .Blood Blood-Peripheral  08-14-24   No growth at 5 days  --  --      .Blood Blood  08-12-24   No growth at 5 days  --  --      .Blood Blood  08-12-24   Growth in aerobic bottle: Staphylococcus epidermidis Isolation of  Coagulase negative Staphylococcus from single blood culture sets may  represent  contamination. Contact the Microbiology Department at [phone] if  susceptibility testing is  clinically indicated.  Direct identification is available within approximately 3-5  hours either by Blood Panel Multiplexed PCR or Direct  MALDI-TOF. Details: [email or web address]  --  Blood Culture PCR        CMV IgG Antibody: >10.00 U/mL (08-20-24 @ 00:08)  Toxoplasma IgG Screen: 44.00 IU/mL (08-12-24 @ 21:15)  CMV IgG Antibody: >10.00 U/mL (08-12-24 @ 21:15)          RADIOLOGY:    <The imaging below has been reviewed and visualized by me independently. Findings as detailed in report below> Follow Up: Leukocytosis     Interval History:  Afebrile, persistent leukocytosis   V trough 19  Sitting up in chair    REVIEW OF SYSTEMS  GENERAL: denies chills, , night sweats, weight loss.   PSYCH: denies depression, anxiety, suicidal ideation, hallucination, and delusions  SKIN: no rash or lesions; no color changes, no abnormal nevi,no  dryness, and nojaundice    EYES: denies visual changes, floaters, pain, inflammation, blurred vision, and discharge  ENT: denies tinnitus, vertigo, epistaxis, oral lesion, and decreased acuity  PULM: denies, hemoptysis, pleurisy  CVS: denies angina, palpitations,+ orthopnea, no syncope, or heart murmur  GI: denies constipation, diarrhea, melena, abdominal pain, nausea.   : denies dysuria, frequency, discharge, incontinence, stones or macroscopic hematuria  MS: no arthralgias, no erythema or swelling, no myalgias, noedema, or lower back pain.   CNS: denies numbness, dizziness, seizure, or tremor  ENDO: denies heat/cold intolerance, polyuria, polydipsia, malaise.    HEME: denies bruising, bleeding, lymphadenopathy, anemia, and calf pain      Allergies  No Known Allergies        ANTIMICROBIALS:  fluconAZOLE IVPB 400 every 24 hours  rifAXIMin 550 every 12 hours      OTHER MEDS:  MEDICATIONS  (STANDING):  epoetin kareem (EPOGEN) Injectable 67673 <User Schedule>  insulin lispro (ADMELOG) corrective regimen sliding scale  every 6 hours  lactulose Syrup 20 every 12 hours  midodrine 20 every 8 hours  norepinephrine Infusion 0.5 <Continuous>  pantoprazole  Injectable 40 two times a day  vasopressin Infusion 0.03 <Continuous>      Vital Signs Last 24 Hrs  T(C): 37.1 (02 Sep 2024 07:00), Max: 37.1 (02 Sep 2024 07:00)  T(F): 98.8 (02 Sep 2024 07:00), Max: 98.8 (02 Sep 2024 07:00)  HR: 72 (02 Sep 2024 09:15) (69 - 77)  BP: 104/59 (02 Sep 2024 08:00) (104/54 - 107/54)  BP(mean): 78 (02 Sep 2024 08:00) (75 - 78)  RR: 12 (02 Sep 2024 09:15) (10 - 25)  SpO2: 96% (02 Sep 2024 09:15) (92% - 100%)    Parameters below as of 02 Sep 2024 07:00  Patient On (Oxygen Delivery Method): room air        PHYSICAL EXAMINATION:  General: NAD, jaundice  HEENT: scleral icterus  Cardiac: RRR, No M/R/G  Resp: CTAB, No Wh/Rh/Ra  Abdomen: NBS, NT/ND, No rigidity or guarding  MSK: ++ LE edema. No Calf tenderness  Skin: No rashes or lesions. Skin is warm and dry to the touch.   Neuro: Awake                             7.7    15.33 )-----------( 60       ( 02 Sep 2024 06:06 )             22.9       09-02    138  |  105  |  7   ----------------------------<  156<H>  4.3   |  21<L>  |  0.95    Ca    7.9<L>      02 Sep 2024 06:06  Phos  2.6     09-02  Mg     2.3     09-02    TPro  5.6<L>  /  Alb  2.8<L>  /  TBili  8.8<H>  /  DBili  x   /  AST  49<H>  /  ALT  33  /  AlkPhos  223<H>  09-02      Urinalysis Basic - ( 02 Sep 2024 06:06 )    Color: x / Appearance: x / SG: x / pH: x  Gluc: 156 mg/dL / Ketone: x  / Bili: x / Urobili: x   Blood: x / Protein: x / Nitrite: x   Leuk Esterase: x / RBC: x / WBC x   Sq Epi: x / Non Sq Epi: x / Bacteria: x        MICROBIOLOGY:  Vancomycin Level, Random: 19.0 ug/mL (09-02-24 @ 06:06)  v  .Blood Blood-Peripheral  08-28-24   No growth at 4 days  --  --      .Blood Blood-Peripheral  08-28-24   No growth at 4 days  --  --      Ascites Fl Ascites Fluid  08-27-24   No growth at 5 days  --    No polymorphonuclear cells seen  No organisms seen  by cytocentrifuge      .Blood Blood-Peripheral  08-26-24   No growth at 5 days  --  --      .Blood Blood-Peripheral  08-26-24   Growth in aerobic bottle: Enterococcus faecium  Direct identification is available within approximately 3-5  hours either by Blood Panel Multiplexed PCR or Direct  MALDI-TOF. Details: [email or web address]  --  Blood Culture PCR  Enterococcus faecium      .Blood Blood  08-23-24   No growth at 5 days  --  --      .Blood Blood  08-23-24   No growth at 5 days  --  --      .Blood Blood-Venous  08-21-24   No growth at 5 days  --  --      .Blood Blood-Venous  08-21-24   No growth at 5 days  --  --      Combi-Cath Combi-Cath  08-21-24   Normal Respiratory Elo present  --    Moderate polymorphonuclear leukocytes per low power field  Rare Squamous epithelial cells per low power field  No organisms seen per oil power field      .Blood Blood  08-20-24   No growth at 5 days  --  --      .Blood Blood  08-20-24   No growth at 5 days  --  --      Peritoneal Peritoneal Fluid  08-14-24   No growth at 5 days  --    polymorphonuclear leukocytes seen  No organisms seen  by cytocentrifuge      .Blood Blood-Venous  08-14-24   No growth at 5 days  --  --      .Blood Blood-Venous  08-14-24   No growth at 5 days  --  --      .Blood Blood-Peripheral  08-14-24   No growth at 5 days  --  --      .Blood Blood  08-12-24   No growth at 5 days  --  --      .Blood Blood  08-12-24   Growth in aerobic bottle: Staphylococcus epidermidis Isolation of  Coagulase negative Staphylococcus from single blood culture sets may  represent  contamination. Contact the Microbiology Department at [phone] if  susceptibility testing is  clinically indicated.  Direct identification is available within approximately 3-5  hours either by Blood Panel Multiplexed PCR or Direct  MALDI-TOF. Details: [email or web address]  --  Blood Culture PCR        CMV IgG Antibody: >10.00 U/mL (08-20-24 @ 00:08)  Toxoplasma IgG Screen: 44.00 IU/mL (08-12-24 @ 21:15)  CMV IgG Antibody: >10.00 U/mL (08-12-24 @ 21:15)          RADIOLOGY:    <The imaging below has been reviewed and visualized by me independently. Findings as detailed in report below>      < from: Xray Chest 1 View- PORTABLE-Routine (Xray Chest 1 View- PORTABLE-Routine in AM.) (08.31.24 @ 07:29) >    PROCEDURE DATE:  08/30/2024          INTERPRETATION:  Chest one view 8/30/2024 6:28 AM    HISTORY: Pulmonary congestion    COMPARISON STUDY: 8/28/2024    Frontal expiratory view of the chest shows the heart to be similar in   size. Right jugular central line, right jugular dialysis catheter and   feeding tube remain present.    Thelungs show slight increase of pulmonary congestion with smaller   pleural effusions and there is no evidence of pneumothorax.    Chest one view 8/31/2024 6:17 AM  Compared to the prior study, 2 views of the chest initially show feeding   tube at the level of the midesophagus. The tube was subsequently advanced   to the stomach in the second image. The lungs show less pulmonary   congestion.    IMPRESSION:  Decreasing congestion. Feeding tube to ultimately stomach.        Thank you for the courtesy of this referral.    --- End of Report ---        < end of copied text >

## 2024-09-02 NOTE — PROGRESS NOTE ADULT - NS ATTEND AMEND GEN_ALL_CORE FT
Enterococcus faecium bacteremia, low grade ?translocation vs line related  Ascitic fluid so far not c/f infection,   favor down to line to replace RIJ/MIdline  meanwhile continue vancomycin for enterococcus bacteremia (low grade)-   continue vancomycin - anticiapte total of 10 days      Thank you for involving us in the care of this patient  Transplant ID will continue to follow  Please call or page with additional questions  Pager; #[phone]  Teams: from 8 am to 5 pm  Kimberly Zamarripa MD

## 2024-09-03 NOTE — PROGRESS NOTE ADULT - ASSESSMENT
68 year old male with  AUD complicated by cirrhosis with Hepatic encephalopathy admitted to [facility] with back pain and jaundice on 7/8/24. Pt had dark / maroon colored stools   EGD that showed esophageal varices and duodenal ulcer with visible vessel. He got transfusions for low Hb and last transfusion was on 8/8. 1st session of IHD was on 7/9/24.   Transplant nephrology was consulted for FANY and SLK eval.       FANY VS FANY on CKD 2' likely HRS  -Met SLK criteria on 8/20, currently undergoing eval  -Started on CRRT on 8/13/24 (1st session of IHD was on 7/9/24 completed 6 weeks on 8/20). Has had issues with clotting >> adjusted CRRT.  -Pt remains on pressor support. Remain oliguric with UOP 20, Will cw CRRT. Increased blood flow and pre- filter flow as there was clotting issue.   - Pt has melena - management as per primary team.  - Dose medications as per CRRT.   - Strict I/Os and daily wts.

## 2024-09-03 NOTE — PROGRESS NOTE ADULT - SUBJECTIVE AND OBJECTIVE BOX
Transplant Surgery - Multidisciplinary Rounds    Interval Events:                   Potential Discharge date:  Education:  Medications  Plan of care:  See Below        MEDICATIONS  (STANDING):  Biotene Dry Mouth Oral Rinse 5 milliLiter(s) Swish and Spit every 12 hours  chlorhexidine 2% Cloths 1 Application(s) Topical <User Schedule>  CRRT Treatment    <Continuous>  epoetin kareem (EPOGEN) Injectable 47977 Unit(s) SubCutaneous <User Schedule>  fluconAZOLE IVPB 400 milliGRAM(s) IV Intermittent every 24 hours  folic acid 1 milliGRAM(s) Oral daily  insulin lispro (ADMELOG) corrective regimen sliding scale   SubCutaneous every 6 hours  lactulose Syrup 20 Gram(s) Enteral Tube every 12 hours  lidocaine   4% Patch 1 Patch Transdermal daily  midodrine 20 milliGRAM(s) Oral every 8 hours  multivitamin/minerals/iron Oral Solution (CENTRUM) 15 milliLiter(s) Enteral Tube daily  norepinephrine Infusion 0.5 MICROgram(s)/kG/Min (53.4 mL/Hr) IV Continuous <Continuous>  pantoprazole  Injectable 40 milliGRAM(s) IV Push two times a day  Phoxillum Filtration BK 4 / 2.5 5000 milliLiter(s) (200 mL/Hr) CRRT <Continuous>  Phoxillum Filtration BK 4 / 2.5 5000 milliLiter(s) (2000 mL/Hr) CRRT <Continuous>  PrismaSATE Dialysate BGK 4 / 2.5 5000 milliLiter(s) (1000 mL/Hr) CRRT <Continuous>  rifAXIMin 550 milliGRAM(s) Oral every 12 hours  thiamine 100 milliGRAM(s) Oral daily  vancomycin  IVPB 750 milliGRAM(s) IV Intermittent every 12 hours  vasopressin Infusion 0.03 Unit(s)/Min (4.5 mL/Hr) IV Continuous <Continuous>    MEDICATIONS  (PRN):      PAST MEDICAL & SURGICAL HISTORY:  Alcohol abuse      No significant past surgical history          Vital Signs Last 24 Hrs  T(C): 36.5 (02 Sep 2024 23:00), Max: 37.1 (02 Sep 2024 07:00)  T(F): 97.7 (02 Sep 2024 23:00), Max: 98.8 (02 Sep 2024 07:00)  HR: 67 (03 Sep 2024 00:45) (66 - 84)  BP: 120/56 (02 Sep 2024 19:30) (104/59 - 120/56)  BP(mean): 81 (02 Sep 2024 19:30) (78 - 81)  RR: 12 (03 Sep 2024 00:45) (10 - 26)  SpO2: 98% (03 Sep 2024 00:45) (92% - 100%)    Parameters below as of 02 Sep 2024 23:00  Patient On (Oxygen Delivery Method): room air        I&O's Summary    01 Sep 2024 07:01  -  02 Sep 2024 07:00  --------------------------------------------------------  IN: 2278.6 mL / OUT: 2255 mL / NET: 23.6 mL    02 Sep 2024 07:01  -  03 Sep 2024 01:12  --------------------------------------------------------  IN: 2900.1 mL / OUT: 4350 mL / NET: -1449.9 mL                              7.4    15.14 )-----------( 63       ( 03 Sep 2024 00:08 )             21.8     09-03    136  |  102  |  8   ----------------------------<  170<H>  4.3   |  20<L>  |  0.86    Ca    8.5      03 Sep 2024 00:08  Phos  3.2     09-03  Mg     2.4     09-03    TPro  6.0  /  Alb  3.1<L>  /  TBili  8.9<H>  /  DBili  x   /  AST  42<H>  /  ALT  28  /  AlkPhos  212<H>  09-03          Culture - Blood (collected 08-28-24 @ 19:28)  Source: .Blood Blood-Peripheral  Final Report (09-03-24 @ 01:00):    No growth at 5 days    Culture - Blood (collected 08-28-24 @ 18:32)  Source: .Blood Blood-Peripheral  Final Report (09-03-24 @ 01:00):    No growth at 5 days    Culture - Fungal, Body Fluid (collected 08-27-24 @ 16:29)  Source: Ascites Fl Ascites Fluid  Preliminary Report (08-28-24 @ 23:03):    Culture is being performed. Fungal cultures are held for 4 weeks.    Culture - Body Fluid with Gram Stain (collected 08-27-24 @ 16:29)  Source: Ascites Fl Ascites Fluid  Gram Stain (08-28-24 @ 03:35):    No polymorphonuclear cells seen    No organisms seen    by cytocentrifuge  Final Report (09-01-24 @ 17:05):    No growth at 5 days        Review of systems  All other systems were reviewed and are negative, except as noted.    Constitutional: Well developed / well nourished  Eyes:  PERRLA  ENMT: NC/AT  Neck: supple,   Respiratory: CTA B/L  Cardiovascular: RRR  Gastrointestinal: Soft abdomen, ND, L sided abdominal wall hernia--stable, NT  Genitourinary: anuric   Extremities: SCD's in place and working bilaterally, + LE edema   Vascular: Palpable dp pulses bilaterally.   Neurological: waking up  Skin: no rashes, ulcerations, lesions  Musculoskeletal: Moving all extremities Transplant Surgery - Multidisciplinary Rounds      Present:   Patient seen and examined with multidisciplinary Transplant team including  Surgeon: Dr. Zepeda  Hepatlogist: Dr. Downey    ACP: Neeraj and unit RN during am rounds.  Disciplines not in attendance will be notified of the plan  Interval Events:                   Potential Discharge date:  Education:  Medications  Plan of care:  See Below        MEDICATIONS  (STANDING):  Biotene Dry Mouth Oral Rinse 5 milliLiter(s) Swish and Spit every 12 hours  chlorhexidine 2% Cloths 1 Application(s) Topical <User Schedule>  CRRT Treatment    <Continuous>  epoetin kareem (EPOGEN) Injectable 65219 Unit(s) SubCutaneous <User Schedule>  fluconAZOLE IVPB 400 milliGRAM(s) IV Intermittent every 24 hours  folic acid 1 milliGRAM(s) Oral daily  insulin lispro (ADMELOG) corrective regimen sliding scale   SubCutaneous every 6 hours  lactulose Syrup 20 Gram(s) Enteral Tube every 12 hours  lidocaine   4% Patch 1 Patch Transdermal daily  midodrine 20 milliGRAM(s) Oral every 8 hours  multivitamin/minerals/iron Oral Solution (CENTRUM) 15 milliLiter(s) Enteral Tube daily  norepinephrine Infusion 0.5 MICROgram(s)/kG/Min (53.4 mL/Hr) IV Continuous <Continuous>  pantoprazole  Injectable 40 milliGRAM(s) IV Push two times a day  Phoxillum Filtration BK 4 / 2.5 5000 milliLiter(s) (200 mL/Hr) CRRT <Continuous>  Phoxillum Filtration BK 4 / 2.5 5000 milliLiter(s) (2000 mL/Hr) CRRT <Continuous>  PrismaSATE Dialysate BGK 4 / 2.5 5000 milliLiter(s) (1000 mL/Hr) CRRT <Continuous>  rifAXIMin 550 milliGRAM(s) Oral every 12 hours  thiamine 100 milliGRAM(s) Oral daily  vancomycin  IVPB 750 milliGRAM(s) IV Intermittent every 12 hours  vasopressin Infusion 0.03 Unit(s)/Min (4.5 mL/Hr) IV Continuous <Continuous>    MEDICATIONS  (PRN):      PAST MEDICAL & SURGICAL HISTORY:  Alcohol abuse      No significant past surgical history          Vital Signs Last 24 Hrs  T(C): 36.5 (02 Sep 2024 23:00), Max: 37.1 (02 Sep 2024 07:00)  T(F): 97.7 (02 Sep 2024 23:00), Max: 98.8 (02 Sep 2024 07:00)  HR: 67 (03 Sep 2024 00:45) (66 - 84)  BP: 120/56 (02 Sep 2024 19:30) (104/59 - 120/56)  BP(mean): 81 (02 Sep 2024 19:30) (78 - 81)  RR: 12 (03 Sep 2024 00:45) (10 - 26)  SpO2: 98% (03 Sep 2024 00:45) (92% - 100%)    Parameters below as of 02 Sep 2024 23:00  Patient On (Oxygen Delivery Method): room air        I&O's Summary    01 Sep 2024 07:01  -  02 Sep 2024 07:00  --------------------------------------------------------  IN: 2278.6 mL / OUT: 2255 mL / NET: 23.6 mL    02 Sep 2024 07:01  -  03 Sep 2024 01:12  --------------------------------------------------------  IN: 2900.1 mL / OUT: 4350 mL / NET: -1449.9 mL                              7.4    15.14 )-----------( 63       ( 03 Sep 2024 00:08 )             21.8     09-03    136  |  102  |  8   ----------------------------<  170<H>  4.3   |  20<L>  |  0.86    Ca    8.5      03 Sep 2024 00:08  Phos  3.2     09-03  Mg     2.4     09-03    TPro  6.0  /  Alb  3.1<L>  /  TBili  8.9<H>  /  DBili  x   /  AST  42<H>  /  ALT  28  /  AlkPhos  212<H>  09-03          Culture - Blood (collected 08-28-24 @ 19:28)  Source: .Blood Blood-Peripheral  Final Report (09-03-24 @ 01:00):    No growth at 5 days    Culture - Blood (collected 08-28-24 @ 18:32)  Source: .Blood Blood-Peripheral  Final Report (09-03-24 @ 01:00):    No growth at 5 days    Culture - Fungal, Body Fluid (collected 08-27-24 @ 16:29)  Source: Ascites Fl Ascites Fluid  Preliminary Report (08-28-24 @ 23:03):    Culture is being performed. Fungal cultures are held for 4 weeks.    Culture - Body Fluid with Gram Stain (collected 08-27-24 @ 16:29)  Source: Ascites Fl Ascites Fluid  Gram Stain (08-28-24 @ 03:35):    No polymorphonuclear cells seen    No organisms seen    by cytocentrifuge  Final Report (09-01-24 @ 17:05):    No growth at 5 days        Review of systems  All other systems were reviewed and are negative, except as noted.    Constitutional: Well developed / well nourished  Eyes:  PERRLA  ENMT: NC/AT  Neck: supple,   Respiratory: CTA B/L  Cardiovascular: RRR  Gastrointestinal: Soft abdomen, ND, L sided abdominal wall hernia--stable, NT  Genitourinary: anuric   Extremities: SCD's in place and working bilaterally, + LE edema   Vascular: Palpable dp pulses bilaterally.   Neurological: waking up  Skin: no rashes, ulcerations, lesions  Musculoskeletal: Moving all extremities Transplant Surgery - Multidisciplinary Rounds    Present:   Patient seen and examined with multidisciplinary Transplant team including  Surgeon: Dr. Zepeda  Hepatlogist: Dr. Downey  ACP: Neeraj and unit RN during am rounds.  Disciplines not in attendance will be notified of the plan.       Interval Events:                   Potential Discharge date:  Education:  Medications  Plan of care:  See Below    MEDICATIONS  (STANDING):  Biotene Dry Mouth Oral Rinse 5 milliLiter(s) Swish and Spit every 12 hours  chlorhexidine 2% Cloths 1 Application(s) Topical <User Schedule>  CRRT Treatment    <Continuous>  epoetin kareem (EPOGEN) Injectable 60782 Unit(s) SubCutaneous <User Schedule>  fluconAZOLE IVPB 400 milliGRAM(s) IV Intermittent every 24 hours  folic acid 1 milliGRAM(s) Oral daily  insulin lispro (ADMELOG) corrective regimen sliding scale   SubCutaneous every 6 hours  lactulose Syrup 20 Gram(s) Enteral Tube every 12 hours  lidocaine   4% Patch 1 Patch Transdermal daily  midodrine 20 milliGRAM(s) Oral every 8 hours  multivitamin/minerals/iron Oral Solution (CENTRUM) 15 milliLiter(s) Enteral Tube daily  norepinephrine Infusion 0.5 MICROgram(s)/kG/Min (53.4 mL/Hr) IV Continuous <Continuous>  pantoprazole  Injectable 40 milliGRAM(s) IV Push two times a day  Phoxillum Filtration BK 4 / 2.5 5000 milliLiter(s) (200 mL/Hr) CRRT <Continuous>  Phoxillum Filtration BK 4 / 2.5 5000 milliLiter(s) (2000 mL/Hr) CRRT <Continuous>  PrismaSATE Dialysate BGK 4 / 2.5 5000 milliLiter(s) (1000 mL/Hr) CRRT <Continuous>  rifAXIMin 550 milliGRAM(s) Oral every 12 hours  thiamine 100 milliGRAM(s) Oral daily  vancomycin  IVPB 750 milliGRAM(s) IV Intermittent every 12 hours  vasopressin Infusion 0.03 Unit(s)/Min (4.5 mL/Hr) IV Continuous <Continuous>    MEDICATIONS  (PRN):      PAST MEDICAL & SURGICAL HISTORY:  Alcohol abuse  No significant past surgical history    Vital Signs Last 24 Hrs  T(C): 36.5 (02 Sep 2024 23:00), Max: 37.1 (02 Sep 2024 07:00)  T(F): 97.7 (02 Sep 2024 23:00), Max: 98.8 (02 Sep 2024 07:00)  HR: 67 (03 Sep 2024 00:45) (66 - 84)  BP: 120/56 (02 Sep 2024 19:30) (104/59 - 120/56)  BP(mean): 81 (02 Sep 2024 19:30) (78 - 81)  RR: 12 (03 Sep 2024 00:45) (10 - 26)  SpO2: 98% (03 Sep 2024 00:45) (92% - 100%)  Parameters below as of 02 Sep 2024 23:00  Patient On (Oxygen Delivery Method): room air    I&O's Summary    01 Sep 2024 07:01  -  02 Sep 2024 07:00  --------------------------------------------------------  IN: 2278.6 mL / OUT: 2255 mL / NET: 23.6 mL    02 Sep 2024 07:01  -  03 Sep 2024 01:12  --------------------------------------------------------  IN: 2900.1 mL / OUT: 4350 mL / NET: -1449.9 mL                           7.4    15.14 )-----------( 63       ( 03 Sep 2024 00:08 )             21.8     09-03    136  |  102  |  8   ----------------------------<  170<H>  4.3   |  20<L>  |  0.86    Ca    8.5      03 Sep 2024 00:08  Phos  3.2     09-03  Mg     2.4     09-03  TPro  6.0  /  Alb  3.1<L>  /  TBili  8.9<H>  /  DBili  x   /  AST  42<H>  /  ALT  28  /  AlkPhos  212<H>  09-03    Culture - Blood (collected 08-28-24 @ 19:28)  Source: .Blood Blood-Peripheral  Final Report (09-03-24 @ 01:00):    No growth at 5 days    Culture - Blood (collected 08-28-24 @ 18:32)  Source: .Blood Blood-Peripheral  Final Report (09-03-24 @ 01:00):    No growth at 5 days    Culture - Fungal, Body Fluid (collected 08-27-24 @ 16:29)  Source: Ascites Fl Ascites Fluid  Preliminary Report (08-28-24 @ 23:03):    Culture is being performed. Fungal cultures are held for 4 weeks.    Culture - Body Fluid with Gram Stain (collected 08-27-24 @ 16:29)  Source: Ascites Fl Ascites Fluid  Gram Stain (08-28-24 @ 03:35):    No polymorphonuclear cells seen    No organisms seen    by cytocentrifuge  Final Report (09-01-24 @ 17:05):    No growth at 5 days    Review of systems  All other systems were reviewed and are negative, except as noted.    Constitutional: Well developed / well nourished  Eyes:  PERRLA  ENMT: NC/AT  Neck: supple,   Respiratory: CTA B/L  Cardiovascular: RRR  Gastrointestinal: Soft abdomen, ND, L sided abdominal wall hernia--stable, NT  Genitourinary: anuric   Extremities: SCD's in place and working bilaterally, + LE edema   Vascular: Palpable dp pulses bilaterally.   Neurological: waking up  Skin: no rashes, ulcerations, lesions  Musculoskeletal: Moving all extremities Transplant Surgery - Multidisciplinary Rounds    Present:   Patient seen and examined with multidisciplinary Transplant team including  Surgeon: Dr. Zepeda.  Hepatlogist: Dr. Downey. Hepatology Fellow Dr. Man, Dr. Bender. Kedar Spain. ACP: Neeraj. PA student Braden, and unit RN during am rounds.  Disciplines not in attendance will be notified of the plan.       Interval Events:   9/2 MELD 36  Increasing levo this am with large volume melana  S/P LVP 2L yesterday negative SBP  ON: CRRT Net even  A&O X1-2.   +NG Tube with TF   LLE swelling f/u Doppler, S/P 1U PRBC.       Potential Discharge date:  Education:  Medications  Plan of care:  See Below       MEDICATIONS  (STANDING):  Biotene Dry Mouth Oral Rinse 5 milliLiter(s) Swish and Spit every 12 hours  calcium gluconate IVPB 2 Gram(s) IV Intermittent once  chlorhexidine 2% Cloths 1 Application(s) Topical <User Schedule>  CRRT Treatment    <Continuous>  epoetin kareem (EPOGEN) Injectable 67702 Unit(s) SubCutaneous <User Schedule>  fluconAZOLE IVPB 400 milliGRAM(s) IV Intermittent every 24 hours  folic acid 1 milliGRAM(s) Oral daily  insulin lispro (ADMELOG) corrective regimen sliding scale   SubCutaneous every 6 hours  lactulose Syrup 20 Gram(s) Enteral Tube every 12 hours  lidocaine   4% Patch 1 Patch Transdermal daily  midodrine 20 milliGRAM(s) Oral every 8 hours  multivitamin/minerals/iron Oral Solution (CENTRUM) 15 milliLiter(s) Enteral Tube daily  norepinephrine Infusion 0.18 MICROgram(s)/kG/Min (19.2 mL/Hr) IV Continuous <Continuous>  pantoprazole  Injectable 40 milliGRAM(s) IV Push two times a day  Phoxillum Filtration BK 4 / 2.5 5000 milliLiter(s) (2000 mL/Hr) CRRT <Continuous>  Phoxillum Filtration BK 4 / 2.5 5000 milliLiter(s) (200 mL/Hr) CRRT <Continuous>  PrismaSATE Dialysate BGK 4 / 2.5 5000 milliLiter(s) (1000 mL/Hr) CRRT <Continuous>  rifAXIMin 550 milliGRAM(s) Oral every 12 hours  thiamine 100 milliGRAM(s) Oral daily  vancomycin  IVPB 750 milliGRAM(s) IV Intermittent every 12 hours  vasopressin Infusion 0.03 Unit(s)/Min (4.5 mL/Hr) IV Continuous <Continuous>    MEDICATIONS  (PRN):      PAST MEDICAL & SURGICAL HISTORY:  Alcohol abuse      No significant past surgical history          Vital Signs Last 24 Hrs  T(C): 36.7 (03 Sep 2024 07:00), Max: 36.8 (02 Sep 2024 11:00)  T(F): 98 (03 Sep 2024 07:00), Max: 98.3 (02 Sep 2024 11:00)  HR: 93 (03 Sep 2024 08:00) (66 - 96)  BP: 120/56 (02 Sep 2024 19:30) (120/56 - 120/56)  BP(mean): 81 (02 Sep 2024 19:30) (81 - 81)  RR: 19 (03 Sep 2024 08:00) (10 - 26)  SpO2: 92% (03 Sep 2024 08:00) (92% - 100%)    Parameters below as of 03 Sep 2024 07:00  Patient On (Oxygen Delivery Method): room air        I&O's Summary    02 Sep 2024 07:01  -  03 Sep 2024 07:00  --------------------------------------------------------  IN: 3643.8 mL / OUT: 4912 mL / NET: -1268.2 mL    03 Sep 2024 07:01  -  03 Sep 2024 09:42  --------------------------------------------------------  IN: 4.5 mL / OUT: 0 mL / NET: 4.5 mL                              7.9    16.75 )-----------( 62       ( 03 Sep 2024 06:25 )             23.3     09-03    137  |  105  |  11  ----------------------------<  192<H>  4.6   |  20<L>  |  0.89    Ca    8.1<L>      03 Sep 2024 06:25  Phos  2.9     09-03  Mg     2.3     09-03    TPro  5.8<L>  /  Alb  3.0<L>  /  TBili  8.8<H>  /  DBili  x   /  AST  39  /  ALT  30  /  AlkPhos  203<H>  09-03          Culture - Fungal, Body Fluid (collected 09-02-24 @ 18:25)  Source: Ascites Fl Ascites Fluid  Preliminary Report (09-03-24 @ 08:16):    Testing in progress    Culture - Body Fluid with Gram Stain (collected 09-02-24 @ 18:25)  Source: Ascites Fl Ascites Fluid  Gram Stain (09-03-24 @ 05:27):    polymorphonuclear leukocytes seen    No organisms seen    by cytocentrifuge    Culture - Blood (collected 08-28-24 @ 19:28)  Source: .Blood Blood-Peripheral  Final Report (09-03-24 @ 01:00):    No growth at 5 days    Culture - Blood (collected 08-28-24 @ 18:32)  Source: .Blood Blood-Peripheral  Final Report (09-03-24 @ 01:00):    No growth at 5 days    Culture - Fungal, Body Fluid (collected 08-27-24 @ 16:29)  Source: Ascites Fl Ascites Fluid  Preliminary Report (08-28-24 @ 23:03):    Culture is being performed. Fungal cultures are held for 4 weeks.    Culture - Body Fluid with Gram Stain (collected 08-27-24 @ 16:29)  Source: Ascites Fl Ascites Fluid  Gram Stain (08-28-24 @ 03:35):    No polymorphonuclear cells seen    No organisms seen    by cytocentrifuge  Final Report (09-01-24 @ 17:05):    No growth at 5 days      Review of systems  All other systems were reviewed and are negative, except as noted.    Constitutional: Well developed / well nourished  Eyes:  PERRLA  ENMT: NC/AT  Neck: supple,   Respiratory: CTA B/L  Cardiovascular: RRR  Gastrointestinal: Soft abdomen, ND, L sided abdominal wall hernia--stable, NT  Genitourinary: anuric   Extremities: SCD's in place and working bilaterally, + LE edema   Vascular: Palpable dp pulses bilaterally.   Neurological: waking up  Skin: no rashes, ulcerations, lesions  Musculoskeletal: Moving all extremities

## 2024-09-03 NOTE — PROGRESS NOTE ADULT - SUBJECTIVE AND OBJECTIVE BOX
[facility] DIVISION OF KIDNEY DISEASES AND HYPERTENSION -- FOLLOW UP NOTE  --------------------------------------------------------------------------------  Chief Complaint:    24 hour events/subjective:  Pt was seen and examined earlier today. Pt is on CRRT. Had Melena at the time of examinations. ROS is limited.   Pt was responding to commands.         PAST HISTORY  --------------------------------------------------------------------------------  No significant changes to PMH, PSH, FHx, SHx, unless otherwise noted    ALLERGIES & MEDICATIONS  --------------------------------------------------------------------------------  Allergies    No Known Allergies    Intolerances      Standing Inpatient Medications  Biotene Dry Mouth Oral Rinse 5 milliLiter(s) Swish and Spit every 12 hours  chlorhexidine 2% Cloths 1 Application(s) Topical <User Schedule>  CRRT Treatment    <Continuous>  desmopressin IVPB 30 MICROGram(s) IV Intermittent once  epoetin kareem (EPOGEN) Injectable 90870 Unit(s) SubCutaneous <User Schedule>  fluconAZOLE IVPB 400 milliGRAM(s) IV Intermittent every 24 hours  folic acid 1 milliGRAM(s) Oral daily  insulin lispro (ADMELOG) corrective regimen sliding scale   SubCutaneous every 6 hours  lactulose Syrup 20 Gram(s) Enteral Tube every 12 hours  lidocaine   4% Patch 1 Patch Transdermal daily  midodrine 20 milliGRAM(s) Oral every 8 hours  multivitamin/minerals/iron Oral Solution (CENTRUM) 15 milliLiter(s) Enteral Tube daily  norepinephrine Infusion 0.18 MICROgram(s)/kG/Min IV Continuous <Continuous>  pantoprazole  Injectable 40 milliGRAM(s) IV Push two times a day  Phoxillum Filtration BK 4 / 2.5 5000 milliLiter(s) CRRT <Continuous>  Phoxillum Filtration BK 4 / 2.5 5000 milliLiter(s) CRRT <Continuous>  PrismaSATE Dialysate BGK 4 / 2.5 5000 milliLiter(s) CRRT <Continuous>  rifAXIMin 550 milliGRAM(s) Oral every 12 hours  thiamine 100 milliGRAM(s) Oral daily  vancomycin  IVPB 750 milliGRAM(s) IV Intermittent every 12 hours  vasopressin Infusion 0.03 Unit(s)/Min IV Continuous <Continuous>    PRN Inpatient Medications      REVIEW OF SYSTEMS  --------------------------------------------------------------------------------  Unable to obtain accurately.     VITALS/PHYSICAL EXAM  --------------------------------------------------------------------------------  T(C): 36.7 (09-03-24 @ 07:00), Max: 36.8 (09-02-24 @ 11:00)  HR: 81 (09-03-24 @ 10:15) (66 - 96)  BP: 95/54 (09-03-24 @ 08:15) (95/54 - 120/56)  RR: 13 (09-03-24 @ 10:15) (10 - 26)  SpO2: 95% (09-03-24 @ 10:15) (92% - 100%)  Wt(kg): --        09-02-24 @ 07:01  -  09-03-24 @ 07:00  --------------------------------------------------------  IN: 3643.8 mL / OUT: 4912 mL / NET: -1268.2 mL    09-03-24 @ 07:01  -  09-03-24 @ 10:37  --------------------------------------------------------  IN: 4.5 mL / OUT: 0 mL / NET: 4.5 mL      Physical Exam:  Gen: On CRRT.   HEENT: Supple neck, No JVD  Pulm: CTA B/L  CVS: S1 S2 +  Abd: distended abdomen.  : No suprapubic tenderness  Extremities: Mild pedal edema.   Neuro: AAOx 2, lethargic      LABS/STUDIES  --------------------------------------------------------------------------------              7.9    16.75 >-----------<  62       [09-03-24 @ 06:25]              23.3     137  |  105  |  11  ----------------------------<  192      [09-03-24 @ 06:25]  4.6   |  20  |  0.89        Ca     8.1     [09-03-24 @ 06:25]      Mg     2.3     [09-03-24 @ 06:25]      Phos  2.9     [09-03-24 @ 06:25]    TPro  5.8  /  Alb  3.0  /  TBili  8.8  /  DBili  x   /  AST  39  /  ALT  30  /  AlkPhos  203  [09-03-24 @ 06:25]    PT/INR: PT 30.4 , INR 2.99       [09-03-24 @ 06:25]  PTT: 60.7       [09-03-24 @ 06:25]          [09-03-24 @ 00:08]    Creatinine Trend:  SCr 0.89 [09-03 @ 06:25]  SCr 0.86 [09-03 @ 00:08]  SCr 0.83 [09-02 @ 18:03]  SCr 0.87 [09-02 @ 12:11]  SCr 0.95 [09-02 @ 06:06]    Iron 52, TIBC 78, %sat 66      [08-12-24 @ 21:13]  Ferritin 788      [08-12-24 @ 21:13]  TSH 4.09      [08-12-24 @ 21:13]  Lipid: chol 49, TG 70, HDL 14, LDL --      [08-12-24 @ 21:13]    HBsAb Reactive      [08-12-24 @ 21:16]  HBsAg Nonreact      [08-12-24 @ 21:13]  HBcAb Nonreact      [08-12-24 @ 21:16]  HCV 0.08, Nonreact      [08-12-24 @ 14:45]  HIV Nonreact      [08-12-24 @ 21:13]    CHETNA: titer Negative, pattern --      [08-12-24 @ 21:15]  Syphilis Screen (Treponema Pallidum Ab) Negative      [08-12-24 @ 21:15]  Immunofixation Serum:   Oligoclonal Pattern Consisting of One Weak IgM Kappa Band, Two Weak IgG  Kappa Bands, and One Weak IgA Lambda Band Identified      Reference Range: None Detected      [08-12-24 @ 21:13]  SPEP Interpretation: Oligoclonal Pattern Consisting of Three Weak Gamma-Migrating Paraproteins  and One Weak Beta-Migrating Paraprotein Identified      [08-12-24 @ 21:13]

## 2024-09-03 NOTE — PROGRESS NOTE ADULT - ASSESSMENT
68 year-old with EtOH cirrhosis.  Now admitted with encephalopathy and FANY requiring HD.  Hypotension requiring pressor support after melena.    TTE with RV enlargement.    Plan for right and left heart cath.     Discussed with Transplant Team on rounds.

## 2024-09-03 NOTE — PROGRESS NOTE ADULT - SUBJECTIVE AND OBJECTIVE BOX
Interval Events:   - Large episode of melena this morning.   - Paracentesis () 2L drained, negative SBP.   - Afebrile on room air.   - Levop 0.23, Vaso 0.03 and Midodrine 20mg q8h.   - Anuric on net even CRRT.   - 7 reported BM.     ROS:   Feels weak, tired and complains of abdominal discomfort. Limited due to AMS.     Hospital Medications:  Biotene Dry Mouth Oral Rinse 5 milliLiter(s) Swish and Spit every 12 hours  chlorhexidine 2% Cloths 1 Application(s) Topical <User Schedule>  CRRT Treatment    <Continuous>  epoetin kareem (EPOGEN) Injectable 14880 Unit(s) SubCutaneous <User Schedule>  fluconAZOLE IVPB 400 milliGRAM(s) IV Intermittent every 24 hours  folic acid 1 milliGRAM(s) Oral daily  insulin lispro (ADMELOG) corrective regimen sliding scale   SubCutaneous every 6 hours  lactulose Syrup 20 Gram(s) Enteral Tube every 12 hours  lidocaine   4% Patch 1 Patch Transdermal daily  midodrine 20 milliGRAM(s) Oral every 8 hours  multivitamin/minerals/iron Oral Solution (CENTRUM) 15 milliLiter(s) Enteral Tube daily  norepinephrine Infusion 0.18 MICROgram(s)/kG/Min (19.2 mL/Hr) IV Continuous <Continuous>  pantoprazole  Injectable 40 milliGRAM(s) IV Push two times a day  Phoxillum Filtration BK 4 / 2.5 5000 milliLiter(s) (200 mL/Hr) CRRT <Continuous>  Phoxillum Filtration BK 4 / 2.5 5000 milliLiter(s) (2000 mL/Hr) CRRT <Continuous>  PrismaSATE Dialysate BGK 4 / 2.5 5000 milliLiter(s) (1000 mL/Hr) CRRT <Continuous>  rifAXIMin 550 milliGRAM(s) Oral every 12 hours  thiamine 100 milliGRAM(s) Oral daily  vancomycin  IVPB 750 milliGRAM(s) IV Intermittent every 12 hours  vasopressin Infusion 0.03 Unit(s)/Min (4.5 mL/Hr) IV Continuous <Continuous>    MAR over past 24 hours:    albumin human  5% IVPB   500 mL/Hr IV Intermittent (24 @ 17:05)    albumin human  5% IVPB   125 mL/Hr IV Intermittent (24 @ 17:21)    albumin human  5% IVPB   125 mL/Hr IV Intermittent (24 @ 17:29)    Biotene Dry Mouth Oral Rinse   5 milliLiter(s) Swish and Spit (24 @ 05:43)   5 milliLiter(s) Swish and Spit (24 @ 17:14)    calcium gluconate IVPB   200 mL/Hr IV Intermittent (24 @ 19:35)    calcium gluconate IVPB   200 mL/Hr IV Intermittent (24 @ 09:58)    chlorhexidine 2% Cloths   1 Application(s) Topical (24 @ 05:44)    fluconAZOLE IVPB   100 mL/Hr IV Intermittent (24 @ 09:58)   100 mL/Hr IV Intermittent (24 @ 11:14)    folic acid   1 milliGRAM(s) Oral (24 @ 11:15)    insulin lispro (ADMELOG) corrective regimen sliding scale   2 Unit(s) SubCutaneous (24 @ 06:15)   2 Unit(s) SubCutaneous (24 @ 00:18)   2 Unit(s) SubCutaneous (24 @ 18:16)   2 Unit(s) SubCutaneous (24 @ 12:15)    lactulose Syrup   20 Gram(s) Enteral Tube (24 @ 05:43)    lidocaine   4% Patch   1 Patch Transdermal (24 @ 11:15)    midodrine   20 milliGRAM(s) Oral (24 @ 05:43)   20 milliGRAM(s) Oral (24 @ 21:12)   20 milliGRAM(s) Oral (24 @ 14:27)    multivitamin/minerals/iron Oral Solution (CENTRUM)   15 milliLiter(s) Enteral Tube (24 @ 11:15)    norepinephrine Infusion   53.4 mL/Hr IV Continuous (24 @ 19:36)   53.4 mL/Hr IV Continuous (24 @ 14:27)    norepinephrine Infusion   19.2 mL/Hr IV Continuous (24 @ 06:14)    ondansetron Injectable   4 milliGRAM(s) IV Push (24 @ 06:36)    pantoprazole  Injectable   40 milliGRAM(s) IV Push (24 @ 05:43)   40 milliGRAM(s) IV Push (24 @ 17:14)    Phoxillum Filtration BK 4 / 2.5   200 mL/Hr CRRT (24 @ 19:38)   200 mL/Hr CRRT (24 @ 14:28)   200 mL/Hr CRRT (24 @ 11:51)    Phoxillum Filtration BK 4 / 2.5   2000 mL/Hr CRRT (24 @ 04:38)   2000 mL/Hr CRRT (24 @ 19:38)   2000 mL/Hr CRRT (24 @ 18:20)   2000 mL/Hr CRRT (24 @ 15:59)   2000 mL/Hr CRRT (24 @ 14:27)   2000 mL/Hr CRRT (24 @ 11:51)    PrismaSATE Dialysate BGK 4 / 2.5   1000 mL/Hr CRRT (24 @ 19:38)   1000 mL/Hr CRRT (24 @ 18:20)   1000 mL/Hr CRRT (24 @ 16:00)   1000 mL/Hr CRRT (24 @ 14:28)   1000 mL/Hr CRRT (24 @ 11:51)    rifAXIMin   550 milliGRAM(s) Oral (24 @ 05:43)   550 milliGRAM(s) Oral (24 @ 17:14)    sodium phosphate 15 milliMole(s)/250 mL IVPB   255 mL/Hr IV Intermittent (24 @ 16:03)    thiamine   100 milliGRAM(s) Oral (24 @ 11:15)    vancomycin  IVPB   250 mL/Hr IV Intermittent (24 @ 21:23)    vasopressin Infusion   4.5 mL/Hr IV Continuous (24 @ 19:36)   4.5 mL/Hr IV Continuous (24 @ 14:27)      PHYSICAL EXAM:   Vital Signs last 24 hours:  T(F): 98 (24 @ 07:00), Max: 98.3 (09-02-24 @ 11:00)  HR: 93 (24 @ 08:00) (66 - 96)  BP: 120/56 (24 @ 19:30) (120/56 - 120/56)  BP(mean): 81 (24 @ 19:30) (81 - 81)  ABP: 93/44 (24 @ 08:00) (93/44 - 133/50)  ABP(mean): 60 (24 @ 08:00) (56 - 77)  RR: 19 (24 @ 08:00) (10 - 26)  SpO2: 92% (24 @ 08:00) (92% - 100%)    I&Os:    24 @ 07:01  -  24 @ 07:00  --------------------------------------------------------  IN:    Albumin 5%  - 250 mL: 750 mL    Enteral Tube Flush: 225 mL    IV PiggyBack: 800 mL    Miscellaneous Tube Feedin mL    Norepinephrine: 291.6 mL    Norepinephrine: 19.2 mL    PRBCs (Packed Red Blood Cells): 300 mL    Vasopressin: 108 mL  Total IN: 3643.8 mL    OUT:    Gastric Aspirate - Discarded (mL): 150 mL    Intermittent Catheterization - Urethral (mL): 20 mL    Other (mL): 2000 mL    Other (mL): 2742 mL  Total OUT: 4912 mL    Total NET: -1268.2 mL      24 @ 07:01  -  24 @ 10:04  --------------------------------------------------------  IN:    Vasopressin: 4.5 mL  Total IN: 4.5 mL    OUT:    Miscellaneous Tube Feedin mL    Other (mL): 0 mL  Total OUT: 0 mL    Total NET: 4.5 mL        BMI (kg/m2): 33.2 (24 @ 16:46)  GENERAL: obese man, physically deconditioned.   NEURO: waxes and wanes. Having minimal verbal output. Weak voice. Follows simple commands.    HEENT: Scleral icterus  CHEST: Bilateral breath sounds, decreased in bases.    CARDIAC: Regular rate and rhythm  ABDOMEN: Distended, soft, non-tender. Present bowel sounds. Anasarca.   EXTREMITIES: warm, well perfused. Anasarca improved.   SKIN: Jaundiced, no rash/ecchymoses    DIAGNOSTICS:  WBC      Hg       PLT      Na       K        CO2     BUN      Cr       ALT      AST      TB       ALP  16.75    7.9      62       137      4.6      20       11       0.89     30       39       8.8      203      24 @ 06:25  15.14    7.4      63       136      4.3      20       8        0.86     28       42       8.9      212      24 @ 00:08  16.06    7.7      59       136      4.3      21       8        0.83     33       47       9.0      215      24 @ 18:03  16.92    8.4      72       136      4.4      22       7        0.87     37       51       10.0     240      24 @ 12:11  15.33    7.7      60       138      4.3      21       7        0.95     33       49       8.8      223      24 @ 06:06    PT             INR            MELDwith  MELDw/o  30.4           2.99           --             --             24 @ 06:25  28.9           2.84           --             --             24 @ 00:08  28.5           2.80           --             --             24 @ 18:03  26.0           2.54           --             --             24 @ 12:11  26.0           2.43           --             --             24 @ 06:06

## 2024-09-03 NOTE — PROGRESS NOTE ADULT - TIME BILLING
clinical exam, review of labs, review of recent imaging and discussion of assessment and plan with other team members and ICU team.
pretransplant cardiac evaluation prior to possible liver transplant  hemodynamic instability requiring pressor support
clinical exam, review of labs, review of recent imaging and discussion of assessment and plan with other team members and ICU team.
Cirrhosis, FANY on CRRT,  in SICU  Meets SLK criteria  Will continue CRRT  I have seen the patient and reviewed CRRT prescription and flow sheet. vascular access is functioning. Patient is tolerating CRRT and the prescription has been adjusted for optimized control of volume status, uremia and electrolytes. Management of additional metabolic abnormalities/anemia will continue to be addressed on follow up.  D/w SICU team, liver team  I was present during and reviewed clinical and lab data as well as assessment and plan as documented by the housestaff as noted. Please contact if any additional questions with any change in clinical condition or on availability of any additional information or reports.
ETOH related decompensated chronic liver disease with oliguric FANY  Reviewed clinical, lab, data, hospital course.  Plan  Continue CRRT  I was present during and reviewed clinical and lab data as well as assessment and plan as documented by the house staff as noted. Please contact if any additional questions with any change in clinical condition or on availability of any additional information or reports.
clinical exam, review of labs, review of recent imaging and discussion of assessment and plan with other team members and ICU team.
pretransplant cardiac evaluation prior to possible liver transplant
Cirrhosis, FANY on CRRT,  in SICU  Meets SLK criteria  Will continue CRRT  I have s reviewed CRRT prescription and flow sheet. vascular access is noted and the prescription has been adjusted for optimized control of volume status, uremia and electrolytes as well as longer circuit integrity. Management of additional metabolic abnormalities will continue to be addressed on follow up.  D/w SICU team, liver team  I was present during and reviewed clinical and lab data as well as assessment and plan as documented by the housestaff as noted. Please contact if any additional questions with any change in clinical condition or on availability of any additional information or reports.
Cirrhosis, FANY on CRRT,  in SICU  Meets SLK criteria  Will continue CRRT  I have seen the patient and reviewed CRRT prescription and flow sheet. vascular access is functioning. Patient is tolerating CRRT and the prescription has been adjusted for optimized control of volume status, uremia and electrolytes as well as longer circuit integrity. Management of additional metabolic abnormalities/anemia will continue to be addressed on follow up.  D/w SICU team, liver team  I was present during and reviewed clinical and lab data as well as assessment and plan as documented by the housestaff as noted. Please contact if any additional questions with any change in clinical condition or on availability of any additional information or reports.
Cirrhosis, FANY on CRRT, seen in SICU  Meets SLK criteria  Will continue CRRT  I have seen the patient and reviewed CRRT prescription and flowsheet. vascular access is functioning well. Patient is tolerating CRRT and the prescription has been adjusted for optimized control of volume status, uremia and electrolytes. Management of additional metabolic abnormalities/anemia will continue to be addressed on follow up.  D/w SICU team, liver team  I was present during and reviewed clinical and lab data as well as assessment and plan as documented by the housestaff as noted. Please contact if any additional questions with any change in clinical condition or on availability of any additional information or reports.
face-to-face encounter, review of extensive medical records in this and prior charts, laboratory findings, radiographic and microbiology results; documentation as noted above and discussion of diagnostic impressions and plan with the patient and team
pretransplant cardiac evaluation prior to possible liver transplant.
Cirrhosis, FANY on CRRT  Meets SLK criteria  Will continue CRRT  I have seen the patient and reviewed CRRT prescription and flowsheet. vascular access is functioning well. Patient is tolerating CRRT and the prescription has been adjusted for optimized control of volume status, uremia and electrolytes. Management of additional metabolic abnormalities/anemia will continue to be addressed on follow up.  D/w SICU team, liver team  I was present during and reviewed clinical and lab data as well as assessment and plan as documented by the housestaff as noted. Please contact if any additional questions with any change in clinical condition or on availability of any additional information or reports.
clinical exam, review of labs, review of recent imaging and discussion of assessment and plan with other team members and ICU team.
face-to-face encounter, review of extensive medical records in this and prior charts, laboratory findings, radiographic and microbiology results; documentation as noted above and discussion of diagnostic impressions and plan with the patient and team
clinical exam, review of labs, review of recent imaging and discussion of assessment and plan with other team members and ICU team.
face-to-face encounter, review of extensive medical records in this and prior charts, laboratory findings, radiographic and microbiology results; documentation as noted above and discussion of diagnostic impressions and plan with the patient and team
clinical exam, review of labs, review of recent imaging and discussion of assessment and plan with other team members and ICU team.
clinical exam, review of labs, review of recent imaging and discussion of assessment and plan with other team members and ICU team.
face-to-face encounter, review of extensive medical records in this and prior charts, laboratory findings, radiographic and microbiology results; documentation as noted above and discussion of diagnostic impressions and plan with the patient and team
face-to-face encounter, review of extensive medical records in this and prior charts, laboratory findings, radiographic and microbiology results; documentation as noted above and discussion of diagnostic impressions and plan with the patient and team
FANY, decompensated liver disease, on CRRT  I have seen the patient and reviewed CRRT prescription and flowsheet. Vascular access is functioning well. Patient is tolerating CRRT and the prescription has been adjusted for optimized control of volume status, uremia and electrolytes. Management of additional metabolic abnormalities/anemia will continue to be addressed on follow up.  Will follow  I was present during and reviewed clinical and lab data as well as assessment and plan as documented . Please contact if any additional questions with any change in clinical condition or on availability of any additional information or reports.
face-to-face encounter, review of extensive medical records in this and prior charts, laboratory findings, radiographic and microbiology results; documentation as noted above and discussion of diagnostic impressions and plan with the patient and team

## 2024-09-03 NOTE — PROGRESS NOTE ADULT - ATTENDING COMMENTS
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      Transferred to [facility] on 8/12/24 for SLK evaluation and then transferred to the SICU for initiation of CRRT (8/13- ).     - Flex sig (8/19/24): congested and friable mucosa, and internal/external hemorrhoids; no lesions; no rectal varices   - EGD (8/20/24): PHG, duodenal clips seen; no active bleeding.  - COL (8/21/24): concentric nodularity on rectum s/p biopsy (PATH: Colonic mucosa with mild crypt architectural distortion. No evidence of granulomas or dysplasia), solitary nonbleeding ulcer in proximal rectum, hemorrhoids.     Pt was started on midodrine 20mg TID on 9/1.  Levophed requirement initially decreased to 0.1 mcg/kg/min on 9/2 (from 0.196 mcg/kg/min on 9/1), however now back up to 0.38 mcg/kg/min. Vaso @ 0.03.   Pt had 6 brown BMs yesterday. However this AM, had 1 episode of melena -- on exam, maroon/black stool noted on xena while pt was being cleaned this AM. Abd moderately distended. 2+ LE edema b/l.    #ALD cirrhosis  Under SLK eval.  ABO: O. MELD 3.0 = 36.  - Septic shock, E faecium bacteremia (BCx 8/26): Dx tap (8/27) neg for SBP. TTE (8/28) with no vegetations. CT (8/25) with no evident source of infection. S/p Zosyn (8/12-16), Doxycycline (8/18-27), meropenem (8/16-8/30). BCx (8/28) NGTD. Continue Vancomycin (8/27-- ), Fluconazole (8/12-- ). F/u ID recs.  - Ascites: Volume removal as tolerated with CRRT. LVP PRN.  - Anuric FANY on CKD: Started iHD (7/9-8/12), Continue CRRT (8/13-- ).   - HE: Waxes and wanes. Minimally verbal. Following simple commands. Rifaximin/Miralax. Lactulose held for abdominal distention.     - Agitation/Insomnia: Seroquel 25mg or Haldol 2.5mg at night. Transplant Psych following.     - CMV Viremia: Check CMV PCR weekly (867 on 8/20/24). F/u CMV PCR (8/31).   - Hx of UGIB/LGIB: Continue PPI BID. Will need BB for secondary ppx when stable. Transfuse PRN based on TEG.   - Volume: Fluid removal as tolerated with CRRT (currently net even).  - Nutrition: NGT feeds. Nutrition following.    - Debility: Last ambulatory in July 2024. Daily inpatient PT/OT.    - LLE > RLE edema: of unclear chronicity. Discussed with SICU team - would recommend LLE doppler if this is new.  - Please check MELD labs (CBC, CMP, INR) daily.     #SLK Eval (opened 8/12)   - Met SLK criteria 8/20/24. Tissue typing completed.    - Cards: Pending [facility] when more clinically stable (ie, off pressors).       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   Transferred to [facility] on 8/12/24 for SLK evaluation and then transferred to the SICU for initiation of CRRT (8/13-- ). [facility] hospital course c/b UGIB/LGIB, E faecium bacteremia, distributive shock, worsening sarcopenia/debility.  - Flex sig (8/19/24): congested and friable mucosa, and internal/external hemorrhoids; no lesions; no rectal varices   - EGD (8/20/24): PHG, duodenal clips seen; no active bleeding.  - COL (8/21/24): concentric nodularity on rectum s/p biopsy (PATH: Colonic mucosa with mild crypt architectural distortion. No evidence of granulomas or dysplasia), solitary nonbleeding ulcer in proximal rectum, hemorrhoids.     Pt was started on midodrine 20mg TID on 9/1.  Levophed requirement initially decreased to 0.1 mcg/kg/min on 9/2 (from 0.196 mcg/kg/min on 9/1), however now back up to 0.38 mcg/kg/min this AM. Vaso @ 0.03.   Pt had 6 brown BMs yesterday. However this AM, had 1 episode of melena -- on exam, maroon/black stool noted on xena while pt was being cleaned this AM. Abd moderately distended. 2+ LE edema b/l.  Pt received 1u pRBC, 1 cryo, 1 plt today, Hgb stable in 7s. However, levophed requirement continues to climb -- was at 0.51mcg/kg/min this afternoon on my reassessment.    #ALD cirrhosis  Under SLK eval.  ABO: O. MELD 3.0 = 41.  - Septic shock, E faecium bacteremia (BCx 8/26): Dx tap (8/27) neg for SBP. TTE (8/28) with no vegetations. CT (8/25) with no evident source of infection. S/p Zosyn (8/12-16), Doxycycline (8/18-27), meropenem (8/16-8/30). BCx (8/28) NGTD. Continue Vancomycin (8/27-- ). Given worsening shock (in the absence of ongoing large-volume GI bleeding), would recommend broadening Fluconazole (8/12-- ) to caspofungin, adding meropenem.   - Ascites: Volume removal as tolerated with CRRT. LVP PRN.  - Anuric FANY on CKD: Started iHD (7/9-8/12), Continue CRRT (8/13-- ).   - HE: Waxes and wanes. Minimally verbal. Following simple commands. Rifaximin/Miralax. Lactulose held for abdominal distention.     - Agitation/Insomnia: Seroquel 25mg or Haldol 2.5mg at night. Transplant Psych following.     - CMV Viremia: Check CMV PCR weekly (867 on 8/20/24). F/u CMV PCR (8/31).   - Hx of UGIB/LGIB: Continue PPI BID. Will need BB for secondary ppx when stable. Transfuse PRN based on TEG.   - Volume: Fluid removal as tolerated with CRRT (currently net even).  - Nutrition: NGT feeds. Nutrition following.    - Debility: Last ambulatory in July 2024. Daily inpatient PT/OT.    - Please check MELD labs (CBC, CMP, INR) daily.     #SLK Eval (opened 8/12)   - Met SLK criteria 8/20/24. Tissue typing completed.    - Cards: Pending [facility] when more clinically stable (ie, off pressors).     Pt's prognosis is guarded. I had a long discussion with pt's wife and son today. I informed them that pt is critically ill, and is not a transplant candidate at this time. His frailty/sarcopenia present a significant barrier to transplant. I informed them that pt would require a significant improvement in his functional status (would need to be walking) prior to being considered for transplant, which would require weeks-months of PT in a rehab (beyond recovery from his acute illness). They verbalized understanding. They stated that they want to honor the pt's wishes that he had verbalized previously, which was to continue aggressive medical treatment, despite his current prognosis.     D/w SICU team ([facility]).    Evtete Downey MD  Transplant Hepatology

## 2024-09-03 NOTE — PROGRESS NOTE ADULT - ASSESSMENT
69 y/o male w/ no known PMHx (does not see doctors per sister) who presented as a transfer from [facility] for SLK evaluation. Patient was initially admitted to [facility] for back pain & jaundice and was diagnosed w/ cirrhosis. Hospital course there was c/b small EVs, melena 2/2 a duodenal ulcer s/p clipping, HRS requiring hemodialysis, hepatic encephalopathy, ascites s/p multiple LVPs, and deconditioning. Patient was admitted to SICU on 8/12 for initiation of CRRT as his BPs were too low to attempt intermittent HD. SICU c/b delirium, persistent pressor reqs, symptomatic ascites s/p LVPs, melena 2/2 coagulopathy, and E. faecium bacteremia      Neuro:  - A/O x 1-2 waxes and wanes  - HE: Rifaximin and lactolose  - Monitoring ammonia  - Thiamine, folic acid, & multivitamin  - pain control with lidocaine patch    Resp:  - Incentive spirometry to prevent atelectasis    CV:  - Goal SBP >110, titrate pressors as able. On Levo gtt, vaso gtt  - Midodrine 20mg q8hrs  - +/- cardiac catheterization when stable to do so for transplant work-up  - Episode of AF w/ RVR, monitoring for now at this time  - TTE 8/28 with normal LV function    GI:   - TF at goal 50cc/hr via NGT  - Protonix BID for h/o duodenal ulcer  - Monitor LFTs  - Ongoing SLK evaluation  - s/p para 9/2 removing 2L, ascites testing negative    Renal:  - CRRT for concern of HRS, maintain CRRT net even  - Ongoing SLK evaluation    Heme:  - Hold chemical VTE ppx in setting of liver failure   - Mechanical VTE ppx with SCDs  - Epogen 3x/week  - duplex LE neg for DVT 8/28    ID:   - BCx 8/26 (+) enterococcus faecium, on vancomycin by level w/ repeat BCx 8/28 neg  - continue fluconazole as per transplant surgery  - s/p 8/18-8/27: doxycycline added for Lyme, completed    Endo:   - s.p stress dosage steroids started 8/26 - 8/29  - ISS [id number]     Dispo: SICU

## 2024-09-03 NOTE — PROGRESS NOTE ADULT - ASSESSMENT
67 yo M with obesity and risky alcohol consumption (with decades' history of daily consumption of homemade alcohol). Admitted to [facility] for 1 month due to recent onset of jaundice and with a prolonged hospital/ICU course due to newly diagnosed decompensated cirrhosis with acute on chronic liver failure (ACLF) with shock, FANY (started on intermittent HD since 7/9), acute on chronic anemia with recurrent overt GI bleeding, and hepatic encephalopathy.     Transferred to [facility] on 8/12/24 for SLK evaluation and then transferred to the SICU for initiation of CRRT (8/13- ).      # Distributive shock    - Norepinephrine, Vasopressin and Midodrine.   - Transplant ID following.   - CMV viremia (Valcyte not required).   - Unequivocal Lyme PCR/negative WB (post Doxy).     - E. faecalis bacteremia (8/26), resolved.   - S/p Zosyn (8/12-16), Doxycycline (8/18-27), Meropenem (8/16-30),    - Vancomycin (8/27- ), and Fluconazole (8/12- ).      # History of UGIB, Hematochezia and Melena   - Hematemesis from duodenal ulcer (07/2024). Hematochezia from rectal ulcer/friable mucosa (8/18). Episodes of melena (8/27 and 9/3).    - EGD (7/12, at [facility]) small non-bleeding EV and a duodenal ulcer with a visible vessel. No further hematemesis.   - EGD (8/20) with no active bleeding. Colonoscopy (8/21) friable mucosa, rectal ulcer (biopsy ruled out malignancy).  - Pantoprazole 40BID  - Will need secondary prophylaxis with BB once stable.   - As needed PRBC and TEG-guided blood products.     # Anuric FANY on CKD    - Started intermittent HD (7/9-8/12) - Now on CRRT (8/13- ).   - Fluid removal on CRRT limited due to hypotension.   - Transplant nephrology following.    # Newly diagnosed decompensated cirrhosis with ACLF   - HE: Waxes and wanes. Minimally verbal. Following simple commands. Rifaximin/Lactulose.  - Agitation/Insomnia: Seroquel 25mg or Haldol 2.5mg at night. Transplant Psych following.    - Large volume ascites and anasarca: Anuric. Limited fluid removal with CRRT.   - Paracentesis (8/14 and 8/27) negative for SBP.   - Image: Contrast CT (8/13) patent portohepatic vessels and no HCC. Small infarcts in left hepatic lobe and spleen.       - Nutrition: Poor PO diet supplemented with NGT feeds. Nutrition following.   - PT: Last ambulatory on July/2024. Daily inpatient PT/OT.     # SLK ongoing evaluation (8/12)  - ABO O. MELD 3.0 score of 36  - Met SLK criteria in Aug 20th. Tissue typing completed.   - LHC for cardiology clearance deferred until medically stable.     - Pt too sick to undergo OLT. Patient's wife is aware. All questions answered (8/28).     PLAN   - Meropenem stopped. Start empiric Zosyn.   - Measure CMV PCR weekly on Friday.  - As needed PRBC and TEG-guided blood products.   - Will need NSBB once stable for EVB prophylaxis.  - Titrate bowel regimen for goal 3-4 BM daily.   - Avoid Precedex. Management of agitation/insomnia as per  recommendations  - Management of CRRT and fluid resuscitation as per SICU and nephrology team    - Too sick to undergo OLT at the time. Family aware.   - Will consider LHC on Monday if Pt continues to improve.       Note incomplete until finalized by attending signature/attestation.    Myra Wilkes-Zuleika   Transplant Hepatology Fellow     69 yo M with obesity and risky alcohol consumption (with decades' history of daily consumption of homemade alcohol). Admitted to [facility] for 1 month due to recent onset of jaundice and with a prolonged hospital/ICU course due to newly diagnosed decompensated cirrhosis with acute on chronic liver failure (ACLF) with shock, FANY (started on intermittent HD since 7/9), acute on chronic anemia with recurrent overt GI bleeding, and hepatic encephalopathy.     Transferred to [facility] on 8/12/24 for SLK evaluation and then transferred to the SICU for initiation of CRRT (8/13- ).      # Distributive shock    - Norepinephrine, Vasopressin and Midodrine.   - Transplant ID following.   - CMV viremia (Valcyte not required).   - Unequivocal Lyme PCR/negative WB (post Doxy).     - E. faecalis bacteremia (8/26), resolved.   - S/p Zosyn (8/12-16), Doxycycline (8/18-27), Meropenem (8/16-30),    - Vancomycin (8/27- ), and Fluconazole (8/12- ).      # History of upper and lower GI Bleeding.   - Hematemesis from duodenal ulcer (07/2024).  - Hematochezia from rectal ulcer/friable mucosa (8/18).   - Episodes of melena (8/27 and 9/3).    - EGD (7/12, at [facility]) small non-bleeding EV and a duodenal ulcer with a visible vessel. No further hematemesis.   - EGD (8/20) with no active bleeding. Colonoscopy (8/21) friable mucosa, rectal ulcer (biopsy ruled out malignancy).  - Pantoprazole 40BID  - NSBB prophylaxis once HD stable.   - As needed PRBC and TEG-guided blood products.     # Anuric renal failure on CRRT    - Started intermittent HD (7/9-8/12) - Now on CRRT (8/13- ).   - Fluid removal on CRRT limited due to hypotension.   - Transplant nephrology following.    # Newly diagnosed decompensated cirrhosis with ACLF   - HE: Waxes and wanes. Minimally verbal. Following simple commands. Rifaximin/Lactulose.  - Agitation/Insomnia: Seroquel 25mg or Haldol 2.5mg at night. Transplant Psych following.    - Large volume ascites and anasarca: Anuric. Limited fluid removal with CRRT.   - Paracentesis (8/14, 8/27 and 9/2) negative for SBP.   - Image: Contrast CT (8/13) patent portohepatic vessels and no HCC. Small infarcts in left hepatic lobe and spleen.       - Nutrition: Poor PO. NGT feeds. Nutrition following.   - PT: Last ambulatory on July/2024. Daily inpatient PT/OT.     # SLK ongoing evaluation (8/12)  - ABO O. MELD 3.0 score of 38  - Met K criteria in Aug 20th. Tissue typing completed.   - [facility] for cardiology clearance deferred until medically stable.     - Pt too sick to undergo OLT. Patient's wife is aware. All questions answered.     PLAN   - As needed PRBC and TEG-guided blood products.   - Ceftriaxone for SBP prophylaxis.   - Titrate bowel regimen for goal 3-4 BM daily.   - Measure CMV PCR weekly on Friday.  - NSBB prophylaxis once HD stable.  - Avoid Precedex. Management of agitation/insomnia as per  recommendations  - Management of CRRT and fluid resuscitation as per SICU and nephrology team    - Too sick to undergo OLT at the time. Family aware.     Note incomplete until finalized by attending signature/attestation.    Myra Maloney   Transplant Hepatology Fellow     69 yo M with obesity and risky alcohol consumption (with decades' history of daily consumption of homemade alcohol). Admitted to [facility] for 1 month due to recent onset of jaundice and with a prolonged hospital/ICU course due to newly diagnosed decompensated cirrhosis with acute on chronic liver failure (ACLF) with shock, FANY (started on intermittent HD since 7/9), acute on chronic anemia with recurrent overt GI bleeding, and hepatic encephalopathy.     Transferred to [facility] on 8/12/24 for SLK evaluation and then transferred to the SICU for initiation of CRRT (8/13- ).      # Distributive shock    - Norepinephrine, Vasopressin and Midodrine.   - Transplant ID following.   - CMV viremia (Valcyte not required).   - Unequivocal Lyme PCR/negative WB (post Doxy).     - E. faecalis bacteremia (8/26), resolved.   - S/p Zosyn (8/12-16), Doxycycline (8/18-27), Meropenem (8/16-30),    - Vancomycin (8/27- ), and Fluconazole (8/12- ).      # History of upper and lower GI Bleeding.   - Hematemesis from duodenal ulcer (07/2024).  - Hematochezia from rectal ulcer/friable mucosa (8/18).   - Episodes of melena (8/27 and 9/3).    - EGD (7/12, at [facility]) small non-bleeding EV and a duodenal ulcer with a visible vessel. No further hematemesis.   - EGD (8/20) with no active bleeding. Colonoscopy (8/21) friable mucosa, rectal ulcer (biopsy ruled out malignancy).  - Pantoprazole 40BID  - NSBB prophylaxis once HD stable.   - As needed PRBC and TEG-guided blood products.     # Anuric renal failure on CRRT    - Started intermittent HD (7/9-8/12) - Now on CRRT (8/13- ).   - Fluid removal on CRRT limited due to hypotension.   - Transplant nephrology following.    # Newly diagnosed decompensated cirrhosis with ACLF   - HE: Waxes and wanes. Minimally verbal. Following simple commands. Rifaximin/Lactulose.  - Agitation/Insomnia: Seroquel 25mg or Haldol 2.5mg at night. Transplant Psych following.    - Large volume ascites and anasarca: Anuric. Limited fluid removal with CRRT.   - Paracentesis (8/14, 8/27 and 9/2) negative for SBP.   - Image: Contrast CT (8/13) patent portohepatic vessels and no HCC. Small infarcts in left hepatic lobe and spleen.       - Nutrition: Poor PO. NGT feeds. Nutrition following.   - PT: Last ambulatory on July/2024. Daily inpatient PT/OT.     # SLK ongoing evaluation (8/12)  - ABO O. MELD 3.0 score of 38  - Met K criteria in Aug 20th. Tissue typing completed.   - [facility] for cardiology clearance deferred until medically stable.     - Pt too sick to undergo OLT. Patient's wife is aware. All questions answered.     PLAN   - Monitor episodes of melena. Conservative management for now.   - As needed PRBC and TEG-guided blood products.   - Ceftriaxone for SBP prophylaxis.   - Titrate bowel regimen for goal 3-4 BM daily.   - Avoid Precedex. Management of agitation/insomnia as per  recommendations  - Measure CMV PCR weekly on Friday.  - Too sick to undergo OLT at the time. Continue GOC conversations.     Note incomplete until finalized by attending signature/attestation.    Myra Maloney   Transplant Hepatology Fellow

## 2024-09-03 NOTE — CHART NOTE - NSCHARTNOTEFT_GEN_A_CORE
I have seen the patient and reviewed CRRT prescription and flow sheet. Vascular access is functioning well. CRRT prescription has been adjusted for optimized control of volume status, uremia and electrolytes. Management of additional metabolic abnormalities/anemia will continue to be addressed on follow up.  D/w liver team and SICU team  I was present during and reviewed clinical and lab data as well as assessment and plan as documented . Please contact if any additional questions with any change in clinical condition or on availability of any additional information or reports.

## 2024-09-03 NOTE — PROGRESS NOTE ADULT - SUBJECTIVE AND OBJECTIVE BOX
24 HOUR EVENTS:  - CRRT keep net even  - s/p paracentesis. Removal of 2L of fluid, ascites testing negative  - random vanc level 11.2 -> switch vanc from 1g BID to 750mg BID  - plan to call podiatry in AM for toenail cut    NEURO  RASS (if intubated): 		CAM ICU (if concern for delirium):  Exam: awake, A&O1-2, follows commands all 4 extremities, moving all extremities spontaneously  Meds:     RESPIRATORY  RR: 20 (24 @ 23:30) (10 - 26)  SpO2: 96% (24 @ 23:30) (92% - 100%)  Wt(kg): --  Exam: Lungs CTA b/l  Mechanical Ventilation:   ABG - ( 03 Sep 2024 00:00 )  pH: 7.44  /  pCO2: 35    /  pO2: 70    / HCO3: 24    / Base Excess: -0.2  /  SaO2: 97.3    Lactate: x                Meds:     CARDIOVASCULAR  HR: 74 (24 @ 23:30) (66 - 84)  BP: 120/56 (24 @ 19:30) (104/59 - 120/56)  BP(mean): 81 (24 @ 19:30) (78 - 81)  ABP: 116/415 (24 @ 23:30) (101/39 - 133/50)  ABP(mean): 73 (24 @ 23:30) (56 - 77)  Wt(kg): --  CVP(cm H2O): --      Exam: Normal S1/S2 w/o murmurs or rubs  Cardiac Rhythm: regular rate and rhythm  Meds: midodrine 20 milliGRAM(s) Oral every 8 hours  norepinephrine Infusion 0.5 MICROgram(s)/kG/Min IV Continuous <Continuous>      GI/NUTRITION  Exam: abdomen distended, jaundiced, minimal tenderness, (+) scleral icterus  Diet:   Last Bowel Movement: 02-Sep-2024 (24 @ 19:00)  Last Bowel Movement: 01-Sep-2024 (24 @ 19:00)  Last Bowel Movement: 01-Sep-2024 (24 @ 08:00)    Meds: lactulose Syrup 20 Gram(s) Enteral Tube every 12 hours  pantoprazole  Injectable 40 milliGRAM(s) IV Push two times a day      GENITOURINARY  I&O's Detail     @ 07: @ 07:00  --------------------------------------------------------  IN:    Enteral Tube Flush: 120 mL    IV PiggyBack: 250 mL    IV PiggyBack: 450 mL    Miscellaneous Tube Feedin mL    Norepinephrine: 210.6 mL    Vasopressin: 108 mL  Total IN: 2278.6 mL    OUT:    Other (mL): 2255 mL  Total OUT: 2255 mL    Total NET: 23.6 mL       @ 07: @ 00:16  --------------------------------------------------------  IN:    Albumin 5%  - 250 mL: 750 mL    Enteral Tube Flush: 225 mL    IV PiggyBack: 800 mL    Miscellaneous Tube Feedin mL    Norepinephrine: 186.8 mL    Vasopressin: 72 mL  Total IN: 2833.8 mL    OUT:    Intermittent Catheterization - Urethral (mL): 20 mL    Other (mL): 2000 mL    Other (mL): 2265 mL  Total OUT: 4285 mL    Total NET: -1451.2 mL              136  |  104  |  8   ----------------------------<  182<H>  4.3   |  21<L>  |  0.83    Ca    7.8<L>      02 Sep 2024 18:03  Phos  3.6       Mg     2.3         TPro  6.2  /  Alb  3.2<L>  /  TBili  9.0<H>  /  DBili  x   /  AST  47<H>  /  ALT  33  /  AlkPhos  215<H>      Meds: folic acid 1 milliGRAM(s) Oral daily  multivitamin/minerals/iron Oral Solution (CENTRUM) 15 milliLiter(s) Enteral Tube daily  thiamine 100 milliGRAM(s) Oral daily      HEMATOLOGIC  Meds:                         7.7    16.06 )-----------( 59       ( 02 Sep 2024 18:03 )             23.0     PT/INR - ( 02 Sep 2024 18:03 )   PT: 28.5 sec;   INR: 2.80 ratio         PTT - ( 02 Sep 2024 18:03 )  PTT:58.5 sec    INFECTIOUS DISEASES  T(C): 36.5 (24 @ 23:00), Max: 37.1 (24 @ 07:00)  Wt(kg): --  WBC Count: 16.06 K/uL ( @ 18:03)  WBC Count: 16.92 K/uL ( @ 12:11)  WBC Count: 15.33 K/uL ( @ 06:06)    Recent Cultures:  Specimen Source: .Blood Blood-Peripheral,  @ 19:28; Results   No growth at 4 days; Gram Stain: --; Organism: --  Specimen Source: .Blood Blood-Peripheral,  @ 18:32; Results   No growth at 4 days; Gram Stain: --; Organism: --  Specimen Source: Ascites Fl Ascites Fluid,  @ 16:29; Results   No growth at 5 days; Gram Stain:   No polymorphonuclear cells seen  No organisms seen  by cytocentrifuge; Organism: --    Meds: epoetin kareem (EPOGEN) Injectable 14195 Unit(s) SubCutaneous <User Schedule>  fluconAZOLE IVPB 400 milliGRAM(s) IV Intermittent every 24 hours  rifAXIMin 550 milliGRAM(s) Oral every 12 hours  vancomycin  IVPB 750 milliGRAM(s) IV Intermittent every 12 hours      ENDOCRINE  Capillary Blood Glucose    Meds: insulin lispro (ADMELOG) corrective regimen sliding scale   SubCutaneous every 6 hours  vasopressin Infusion 0.03 Unit(s)/Min IV Continuous <Continuous>      ACCESS DEVICES:  [ ] Peripheral IV  [ ] Central Venous Line		[ ] R	[ ] L	[ ] IJ	[ ] Fem	[ ] SC	Placed:   [ ] Arterial Line			[ ] R	[ ] L	[ ] Fem	[ ] Rad	[ ] Ax	Placed:   [ ] PICC:					[ ] Mediport  [ ] Urinary Catheter, Date Placed:   [ ] Necessity of urinary, arterial, and venous catheters discussed    OTHER MEDICATIONS:  Biotene Dry Mouth Oral Rinse 5 milliLiter(s) Swish and Spit every 12 hours  chlorhexidine 2% Cloths 1 Application(s) Topical <User Schedule>  CRRT Treatment    <Continuous>  lidocaine   4% Patch 1 Patch Transdermal daily  Phoxillum Filtration BK 4 / 2.5 5000 milliLiter(s) CRRT <Continuous>  Phoxillum Filtration BK 4 / 2.5 5000 milliLiter(s) CRRT <Continuous>  PrismaSATE Dialysate BGK 4 / 2.5 5000 milliLiter(s) CRRT <Continuous>      IMAGING:

## 2024-09-03 NOTE — PROGRESS NOTE ADULT - SUBJECTIVE AND OBJECTIVE BOX
Follow Up: Leukocytosis    Interval History:  S/P LVP yesterday with 2 liters drained, cell count not suggestive of SBP  Ascites culture negative  Vanco Trough 13.3  More encephalopathic today    REVIEW OF SYSTEMS  [x  ] ROS unobtainable because:  confusion/altered mental status        Allergies  No Known Allergies        ANTIMICROBIALS:  cefTRIAXone   IVPB 1000 every 24 hours  fluconAZOLE IVPB 400 every 24 hours  rifAXIMin 550 every 12 hours      OTHER MEDS:  MEDICATIONS  (STANDING):  epoetin kareem (EPOGEN) Injectable 80206 <User Schedule>  insulin lispro (ADMELOG) corrective regimen sliding scale  every 6 hours  lactulose Syrup 20 every 12 hours  midodrine 20 every 8 hours  norepinephrine Infusion 0.18 <Continuous>  pantoprazole  Injectable 40 two times a day  vasopressin Infusion 0.03 <Continuous>      Vital Signs Last 24 Hrs  T(C): 36.7 (03 Sep 2024 07:00), Max: 36.7 (03 Sep 2024 07:00)  T(F): 98 (03 Sep 2024 07:00), Max: 98 (03 Sep 2024 07:00)  HR: 97 (03 Sep 2024 13:00) (66 - 97)  BP: 95/54 (03 Sep 2024 08:15) (95/54 - 120/56)  BP(mean): 71 (03 Sep 2024 08:15) (71 - 81)  RR: 21 (03 Sep 2024 13:00) (10 - 26)  SpO2: 94% (03 Sep 2024 13:00) (92% - 100%)    Parameters below as of 03 Sep 2024 11:35  Patient On (Oxygen Delivery Method): room air        PHYSICAL EXAMINATION:  General: NAD, jaundice  HEENT: scleral icterus  Cardiac: RRR, No M/R/G  Resp: CTAB, No Wh/Rh/Ra  Abdomen: NBS, NT/ND, No rigidity or guarding  MSK: ++ LE edema. No Calf tenderness  Skin: No rashes or lesions. Skin is warm and dry to the touch.   Neuro: Awake, lethargic                              7.9    17.13 )-----------( 64       ( 03 Sep 2024 12:23 )             23.6       09-03    134<L>  |  103  |  11  ----------------------------<  197<H>  4.8   |  21<L>  |  0.81    Ca    8.4      03 Sep 2024 12:23  Phos  2.9       Mg     2.3         TPro  5.9<L>  /  Alb  3.0<L>  /  TBili  9.0<H>  /  DBili  x   /  AST  41<H>  /  ALT  30  /  AlkPhos  205<H>        Urinalysis Basic - ( 03 Sep 2024 12:23 )    Color: x / Appearance: x / SG: x / pH: x  Gluc: 197 mg/dL / Ketone: x  / Bili: x / Urobili: x   Blood: x / Protein: x / Nitrite: x   Leuk Esterase: x / RBC: x / WBC x   Sq Epi: x / Non Sq Epi: x / Bacteria: x        MICROBIOLOGY:  Vancomycin Level, Random: 13.3 ug/mL (24 @ 06:25)  Vancomycin Level, Random: 11.2 ug/mL (24 @ 18:03)  v  Ascites Fl Ascites Fluid  24   Testing in progress  --    polymorphonuclear leukocytes seen  No organisms seen  by cytocentrifuge      .Blood Blood-Peripheral  24   No growth at 5 days  --  --      .Blood Blood-Peripheral  24   No growth at 5 days  --  --      Ascites Fl Ascites Fluid  24   No growth at 5 days  --    No polymorphonuclear cells seen  No organisms seen  by cytocentrifuge      .Blood Blood-Peripheral  24   No growth at 5 days  --  --      .Blood Blood-Peripheral  24   Growth in aerobic bottle: Enterococcus faecium  Direct identification is available within approximately 3-5  hours either by Blood Panel Multiplexed PCR or Direct  MALDI-TOF. Details: [email or web address]  --  Blood Culture PCR  Enterococcus faecium      .Blood Blood  24   No growth at 5 days  --  --      .Blood Blood  24   No growth at 5 days  --  --      .Blood Blood-Venous  24   No growth at 5 days  --  --      .Blood Blood-Venous  24   No growth at 5 days  --  --      Combi-Cath Combi-Cath  24   Normal Respiratory Elo present  --    Moderate polymorphonuclear leukocytes per low power field  Rare Squamous epithelial cells per low power field  No organisms seen per oil power field      .Blood Blood  24   No growth at 5 days  --  --      .Blood Blood  24   No growth at 5 days  --  --      Peritoneal Peritoneal Fluid  24   No growth at 5 days  --    polymorphonuclear leukocytes seen  No organisms seen  by cytocentrifuge      .Blood Blood-Venous  24   No growth at 5 days  --  --      .Blood Blood-Venous  24   No growth at 5 days  --  --      .Blood Blood-Peripheral  24   No growth at 5 days  --  --      .Blood Blood  24   No growth at 5 days  --  --      .Blood Blood  24   Growth in aerobic bottle: Staphylococcus epidermidis Isolation of  Coagulase negative Staphylococcus from single blood culture sets may  represent  contamination. Contact the Microbiology Department at [phone] if  susceptibility testing is  clinically indicated.  Direct identification is available within approximately 3-5  hours either by Blood Panel Multiplexed PCR or Direct  MALDI-TOF. Details: [email or web address]  --  Blood Culture PCR        CMV IgG Antibody: >10.00 U/mL (24 @ 00:08)  Toxoplasma IgG Screen: 44.00 IU/mL (24 @ 21:15)  CMV IgG Antibody: >10.00 U/mL (24 @ 21:15)    CMVPCR Lo.24 Rvq52VX/mL ( @ 18:11)        RADIOLOGY:    <The imaging below has been reviewed and visualized by me independently. Findings as detailed in report below>            < from: Xray Chest 1 View- PORTABLE-Routine (Xray Chest 1 View- PORTABLE-Routine in AM.) (24 @ 07:51) >  INTERPRETATION:  Clinical History / Reason for exam: Assess pulm vasc   congestion    Comparison : Chest radiograph: 2024    Technique/Positioning: Frontal view of the chest    Findings:    Support devices: There is an enteric tube coursing below the diaphragm.   Remaining lines are without significant change    Cardiac/mediastinum/hilum: Partially obscured and not well evaluated    Skeleton/soft tissues: No significant change.    Lung parenchyma/Pleura: Low lung volume. Similar-appearing pulmonary   congestion. Bibasilar opacities/effusions without significant change. No   evidence of pneumothorax    Impression:  Low lung volume. Similar-appearing pulmonary congestion. Bibasilar   opacities/effusions without significant change. No evidence of   pneumothorax            --- End of Report ---      < end of copied text >

## 2024-09-03 NOTE — PROGRESS NOTE ADULT - ATTENDING COMMENTS
67 y/o male transferred from [facility] for SLK evaluation. Patient was initially admitted to [facility] for back pain & jaundice and was diagnosed w/ cirrhosis. Hospital course there was c/b small EVs, melena 2/2 a duodenal ulcer s/p clipping, HRS requiring hemodialysis, hepatic encephalopathy, ascites s/p multiple LVPs, and deconditioning. Patient was admitted to SICU on 8/12 for initiation of CRRT as his BPs were too low to attempt intermittent HD.    Mental status still poor, on Rifaximin and Lactulose  Saturating well  Unable to wean off vasopressor support, on Levo 0.15 and Vaso, continue high dose Midodrine  No adr insufficiency steroid level in 40s, TSH wnl, off stress dose steroid  NGT feed on hold sec to episode of vomiting  CRRT with net even fluid balance, does not tolerate fluid removal  Abx Vanco by level and Difluc, BC E faecium  ISS  S/p  paracentesis with 2L fluid removal  Low Hb thrombocytopenia stable  Prognosis guarded    Not a candidate for liver transplant currently as per transplant team  Spoke to his wife at bed side, DNR comfort measures only offered  Palliative care consult 67 y/o male transferred from [facility] for SLK evaluation. Patient was initially admitted to [facility] for back pain & jaundice and was diagnosed w/ cirrhosis. Hospital course there was c/b small EVs, melena 2/2 a duodenal ulcer s/p clipping, HRS requiring hemodialysis, hepatic encephalopathy, ascites s/p multiple LVPs, and deconditioning. Patient was admitted to SICU on 8/12 for initiation of CRRT as his BPs were too low to attempt intermittent HD.    Mental status still poor, on Rifaximin and Lactulose  Saturating well  Unable to wean off vasopressor support, on Levo 0.15 and Vaso, continue high dose Midodrine  No adr insufficiency steroid level in 40s, TSH wnl, off stress dose steroid  NGT feed on hold sec to episode of vomiting  CRRT with net even fluid balance, does not tolerate fluid removal  Abx Vanco by level and Difluc, BC E faecium. Start abx CTX for SBP ppx  ISS  S/p  paracentesis with 2L fluid removal  Low Hb thrombocytopenia stable, one dose DDAVP as per transplant team  Prognosis guarded    Not a candidate for liver transplant currently as per transplant team  Spoke to his wife at bed side, DNR comfort measures only offered  Palliative care consult 67 y/o male transferred from [facility] for SLK evaluation. Patient was initially admitted to [facility] for back pain & jaundice and was diagnosed w/ cirrhosis. Hospital course there was c/b small EVs, melena 2/2 a duodenal ulcer s/p clipping, HRS requiring hemodialysis, hepatic encephalopathy, ascites s/p multiple LVPs, and deconditioning. Patient was admitted to SICU on 8/12 for initiation of CRRT as his BPs were too low to attempt intermittent HD.    Mental status still poor, on Rifaximin and Lactulose  Saturating well  Unable to wean off vasopressor support, on Levo 0.15 and Vaso, continue high dose Midodrine  No adr insufficiency steroid level in 40s, TSH wnl, off stress dose steroid  NGT feed on hold sec to episode of vomiting  CRRT with net even fluid balance, does not tolerate fluid removal  Abx Vanco by level and Difluc, BC E faecium. Start abx CTX for SBP ppx  ISS  S/p  paracentesis with 2L fluid removal  Low Hb thrombocytopenia stable, one dose DDAVP as per transplant team  Prognosis guarded  Addendum  Levo requirement continues to increase  Not a candidate for liver transplant currently as per transplant team  Spoke to his wife at bed side, DNR comfort measures only offered  Spoke to son   Palliative care consult

## 2024-09-03 NOTE — PROGRESS NOTE ADULT - SUBJECTIVE AND OBJECTIVE BOX
HPI:  Patient seen and examined at bedside in SICU.  Increasing pressor support after melena    Review Of Systems:       Unable to assess        Medications:  Biotene Dry Mouth Oral Rinse 5 milliLiter(s) Swish and Spit every 12 hours  cefTRIAXone   IVPB 1000 milliGRAM(s) IV Intermittent every 24 hours  chlorhexidine 2% Cloths 1 Application(s) Topical <User Schedule>  epoetin kareem (EPOGEN) Injectable 74832 Unit(s) SubCutaneous <User Schedule>  fluconAZOLE IVPB 400 milliGRAM(s) IV Intermittent every 24 hours  insulin lispro (ADMELOG) corrective regimen sliding scale   SubCutaneous every 6 hours  lidocaine   4% Patch 1 Patch Transdermal daily  norepinephrine Infusion 0.18 MICROgram(s)/kG/Min IV Continuous <Continuous>  pantoprazole  Injectable 40 milliGRAM(s) IV Push two times a day  rifAXIMin 550 milliGRAM(s) Oral every 12 hours  vasopressin Infusion 0.03 Unit(s)/Min IV Continuous <Continuous>    PAST MEDICAL & SURGICAL HISTORY:  Alcohol abuse      No significant past surgical history        Vitals:  T(C): 36.1 (09-03-24 @ 19:00), Max: 36.7 (09-03-24 @ 07:00)  HR: 96 (09-03-24 @ 22:15) (67 - 118)  BP: 121/56 (09-03-24 @ 19:45) (95/54 - 121/56)  BP(mean): 80 (09-03-24 @ 19:45) (71 - 80)  RR: 16 (09-03-24 @ 22:15) (12 - 26)  SpO2: 100% (09-03-24 @ 22:15) (85% - 100%)  Wt(kg): --  Daily     Daily   I&O's Summary    02 Sep 2024 07:01  -  03 Sep 2024 07:00  --------------------------------------------------------  IN: 3643.8 mL / OUT: 4912 mL / NET: -1268.2 mL    03 Sep 2024 07:01  -  03 Sep 2024 22:43  --------------------------------------------------------  IN: 2183.8 mL / OUT: 1164 mL / NET: 1019.8 mL        Physical Exam:  Appearance: No acute distress; well appearing  Eyes: PERRL, EOMI, pink conjunctiva  HENT: Normal oral mucosa  Cardiovascular: RRR, S1, S2, no murmurs, rubs, or gallops; no edema; no JVD  Respiratory: Clear to auscultation bilaterally  Gastrointestinal: soft, non-tender, non-distended with normal bowel sounds  Musculoskeletal: No clubbing; no joint deformity   Neurologic: Non-focal  Lymphatic: No lymphadenopathy  Psychiatry: AAOx3, mood & affect appropriate  Skin: No rashes, ecchymoses, or cyanosis                          7.2    15.63 )-----------( 105      ( 03 Sep 2024 18:03 )             22.1     09-03    137  |  105  |  12  ----------------------------<  144<H>  4.9   |  18<L>  |  0.81    Ca    8.5      03 Sep 2024 18:03  Phos  3.4     09-03  Mg     2.4     09-03    TPro  6.0  /  Alb  3.1<L>  /  TBili  8.8<H>  /  DBili  x   /  AST  45<H>  /  ALT  30  /  AlkPhos  193<H>  09-03    PT/INR - ( 03 Sep 2024 18:03 )   PT: 30.2 sec;   INR: 2.83 ratio         PTT - ( 03 Sep 2024 18:03 )  PTT:48.5 sec      Cardiovascular Diagnostic Testing:  ECG:  atrial flutter at 88 bpm    Echo:  < from: TTE W or WO Ultrasound Enhancing Agent (08.14.24 @ 06:54) >  _______________________________________________________________________________________     CONCLUSIONS:      1. Technically difficult image quality.   2. Left ventricular cavity is normal in size. Left ventricular wall thickness is normal. Left ventricular systolic function is hyperdynamic with an ejection fraction of 73 % by Marroquin's method of disks. There are no regional wall motion abnormalities seen.   3. Moderately enlarged right ventricular cavity size and normal right ventricular systolic function.   4. Left atrium is mildly dilated.   5. No significant valvular disease.   6. No priorechocardiogram is available for comparison.    ________________________________________________________________________________________    < end of copied text >      Stress Testing:    Cath:    Interpretation of Telemetry:    Imaging:

## 2024-09-03 NOTE — PROGRESS NOTE ADULT - NS ATTEND AMEND GEN_ALL_CORE FT
large melena today, increasing pressor requirements,   At time of dictation, patient transitioned to comfort care    Thank you for involving us in the care of this patient    Please call or page with additional questions  Pager; #[phone]  Teams: from 8 am to 5 pm  Kimberly Zamarripa MD

## 2024-09-03 NOTE — CHART NOTE - NSCHARTNOTEFT_GEN_A_CORE
NUTRITION FOLLOW UP NOTE    PATIENT SEEN FOR: malnutrition follow up     SOURCE: [] Patient  [x] Current Medical Record  [x] RN  [] Family/support person at bedside  [] Patient unavailable/inappropriate  [x] Other: Team     CHART REVIEWED/EVENTS NOTED.  [x] Nutrition Status:  -S/p para --> 2L removed ()   -MELD: 36 ()   -CRRT   -Off steroid regimen since      DIET ORDER:   Diet, NPO:   Except Medications (24)    Previously on [employer] Peptide 1.5 @ 50mL x 24hrs; Currently NPO in setting of emesis this morning     ENTERAL NUTRITION  EN Order Provides:  1200ml formula, 1846kcal, 89 g protein, 840ml free water; meets 22kcal/kg and 1.1 g protein/kg, based on wt: 83.4kg - does not meet estimated protein-energy needs     Current Pump Rate:  EN provision: 64% EN volume goal provided in past x3 days       ANTHROPOMETRICS:  Drug Dosing Weight  Height (cm): 185.4 (12 Aug 2024 16:46)  Weight (kg): 114 (12 Aug 2024 16:46)  BMI (kg/m2): 33.2 (12 Aug 2024 16:46)  Daily Weight in k.4 ()     NUTRITIONALLY PERTINENT MEDICATIONS:  MEDICATIONS  (STANDING):  calcium gluconate IVPB  fluconAZOLE IVPB  folic acid  insulin lispro (ADMELOG) corrective regimen sliding scale  lactulose Syrup  midodrine  multivitamin/minerals/iron Oral Solution (CENTRUM)  norepinephrine Infusion  pantoprazole  Injectable  rifAXIMin  thiamine  vancomycin  IVPB  vasopressin Infusion       NUTRITIONALLY PERTINENT LABS:   Na137 mmol/L Glu 192 mg/dL<H> K+ 4.6 mmol/L Cr  0.89 mg/dL BUN 11 mg/dL  Phos 2.9 mg/dL  Alb 3.0 g/dL<L>  Chol 49 mg/dL LDL --    HDL 14 mg/dL<L> Trig 70 mg/dL ALT 30 U/L AST 39 U/L Alkaline Phosphatase 203 U/L<H>    A1C with Estimated Average Glucose Result: 5.0 % (24 @ 21:13)  A1C with Estimated Average Glucose Result: 5.1 % (24 @ 15:01)          Finger Sticks:  POCT Blood Glucose.: 180 mg/dL ( @ 06:06)  POCT Blood Glucose.: 163 mg/dL ( @ 23:53)      NUTRITIONALLY PERTINENT MEDICATIONS/LABS:  [x] Reviewed  [x] Relevant notes on medications/labs:  -Levo/Vaso --> pressor support     EDEMA:  [x] Reviewed  [] Relevant notes:    GI/ I&O:  [x] Reviewed  [] Relevant notes:  [] Other:    SKIN:   [x] Pressure injury previously noted    ESTIMATED NEEDS:  [x] No change:  Energy:   2251-2668kcal/day (27-32 kcal/kg)  Protein:  100-125g/day (1.2-1.5 g/kg)  Fluid:  [x] defer to team  Based on: 83.4kg     NUTRITION DIAGNOSIS:  [x] Prior Dx:  1) Increased protein-energy needs   2) Severe malnutrition     EDUCATION:  [] Yes:  [x] Not appropriate/warranted    NUTRITION CARE PLAN:  1. Diet: Upon advancement re-recommend [employer] Peptide 1.5 @ 65mL x 24hrs providing 1560mL of formula, 2399kcal/day (29kcal/kg), 115g protein/day (1.4g/kg), 1092mL free water - based on 83.4kg   2. Supplements: N/A  3. Multivitamin/mineral supplementation: multivitamin, thiamine, folic acid     MONITORING AND EVALUATION:   RD remains available upon request and will follow up per protocol.    Luci Calzada, MS, RDN, CDN (Teams)   Available on MS TEAMS

## 2024-09-03 NOTE — PROGRESS NOTE ADULT - ASSESSMENT
68y with obesity and risky alcohol consumption (with decades' history of daily consumption of homemade alcohol), admitted to [facility] 1 month ago due to recent onset of jaundice and with a prolonged hospital and ICU course there due to newly diagnosed decompensated cirrhosis with acute on chronic liver failure (ACLF) with shock (resolved, but still on midodrine); FANY (on intermittent HD since 7/9); recurrent maroon stools and acute on chronic anemia necessitating intermittent PRBC transfusions (last on 8/8) and hypofibrinoginemia, with EGD (7/12) with small non-bleeding EV and a duodenal ulcer with a visible vessel; and waxing and waning hepatic encephalopathy. He was transferred to [facility] on 8/12/24 for evaluation for combined liver/kidney transplants (SLK).      [ ] Decompensated ETOH Cirrhosis  - SLK eval , MELD 36O (9/2)  - EV:  EGD (7/12) with small non-bleeding EV and a duodenal ulcer with a visible vessel. EGD 8/20: no active bleeding, PPI.  - s/p colonoscopy 8/21: lesion biopsied, f/u path   - BCX 8/13 w/ MRSE , repeat 8/14 with NGTD, BCX 8/26: enterococcus faecium , linezolid  - ECHO 8/28: no vegetations   - TFs, pressors per SICU  - HE: cont rifaximin/miralax  - stress dose steroids per SICU completed, wean pressors as able  - Thiamine/MVI/FOLIC ACID  - LHC deferred, potentially Tuesday 9/3    [ ] HRS  - iHD since 7/9   - CRRT (8/13-    [] EC faesium bacteremia (8/26)  [] MRSE (8/12)   - repeat bld cxs neg  - vano by level   - ID following     [ ] UGI bleed @ OSH  - EGD 7/12: small non bleeding EV, duodenal ulcer   - EGD 8/20: no active bleeding    [ ] rectal mass bx on 8/21 scope, path: no cancer   68y with obesity and risky alcohol consumption (with decades' history of daily consumption of homemade alcohol), admitted to [facility] 1 month ago due to recent onset of jaundice and with a prolonged hospital and ICU course there due to newly diagnosed decompensated cirrhosis with acute on chronic liver failure (ACLF) with shock (resolved, but still on midodrine); FANY (on intermittent HD since 7/9); recurrent maroon stools and acute on chronic anemia necessitating intermittent PRBC transfusions (last on 8/8) and hypofibrinoginemia, with EGD (7/12) with small non-bleeding EV and a duodenal ulcer with a visible vessel; and waxing and waning hepatic encephalopathy. He was transferred to [facility] on 8/12/24 for evaluation for combined liver/kidney transplants (SLK).      [ ] Decompensated ETOH Cirrhosis  - SLK eval , MELD 36O (9/2)  - EV:  EGD (7/12) with small non-bleeding EV and a duodenal ulcer with a visible vessel. EGD 8/20: no active bleeding, PPI.  - s/p colonoscopy 8/21: lesion biopsied, f/u path   - BCX 8/13 w/ MRSE , repeat 8/14 with NGTD, BCX 8/26: enterococcus faecium , linezolid  - ECHO 8/28: no vegetations   - TFs, pressors per SICU  - HE: cont rifaximin/miralax  - stress dose steroids per SICU completed, wean pressors as able  - Thiamine/MVI/FOLIC ACID  - LHC deferred, potentially Tuesday 9/3    [ ] HRS  - iHD since 7/9   - CRRT (8/13-    [ ] EC faesium bacteremia (8/26)  [ ] MRSE (8/12)   - repeat bld cxs neg  - vano by level   - ID following     [ ] UGI bleed @ OSH  - EGD 7/12: small non bleeding EV, duodenal ulcer   - EGD 8/20: no active bleeding    [ ] rectal mass bx on 8/21 scope, path: no cancer  68y with obesity and risky alcohol consumption (with decades' history of daily consumption of homemade alcohol), admitted to [facility] 1 month ago due to recent onset of jaundice and with a prolonged hospital and ICU course there due to newly diagnosed decompensated cirrhosis with acute on chronic liver failure (ACLF) with shock (resolved, but still on midodrine); FANY (on intermittent HD since 7/9); recurrent maroon stools and acute on chronic anemia necessitating intermittent PRBC transfusions (last on 8/8) and hypofibrinoginemia, with EGD (7/12) with small non-bleeding EV and a duodenal ulcer with a visible vessel; and waxing and waning hepatic encephalopathy. He was transferred to [facility] on 8/12/24 for evaluation for combined liver/kidney transplants (SLK).      [ ] Decompensated ETOH Cirrhosis  - SLK eval , MELD 36O (9/2)  - EV:  EGD (7/12) with small non-bleeding EV and a duodenal ulcer with a visible vessel. EGD 8/20: no active bleeding, PPI.  - Elizabeth today please transfuse based on TEG  - Not a candidate for EGD today  - Add DDAVP  - s/p colonoscopy 8/21: lesion biopsied, f/u path   - BCX 8/13 w/ MRSE , repeat 8/14 with NGTD, BCX 8/26: enterococcus faecium , linezolid  - ECHO 8/28: no vegetations   - TFs, pressors per SICU  - HE: cont rifaximin/miralax  - stress dose steroids per SICU completed, wean pressors as able  - Thiamine/MVI/FOLIC ACID  - LHC deferred, due to active melana  [ ] HRS  - iHD since 7/9   - CRRT (8/13-    [ ] EC faesium bacteremia (8/26)  [ ] MRSE (8/12)   - repeat bld cxs neg  - vano by level   - ID following     [ ] UGI bleed @ OSH  - EGD 7/12: small non bleeding EV, duodenal ulcer   - EGD 8/20: no active bleeding    [ ] rectal mass bx on 8/21 scope, path: no cancer

## 2024-09-03 NOTE — PROGRESS NOTE ADULT - TIME-BASED BILLING (NON-CRITICAL CARE)
Time-based billing (NON-critical care)

## 2024-09-03 NOTE — CHART NOTE - NSCHARTNOTESELECT_GEN_ALL_CORE
CRRT/Event Note
CRRT/Event Note
Nutrition Services
POCUS/Event Note

## 2024-09-03 NOTE — PROGRESS NOTE ADULT - ASSESSMENT
78 yo m with alcohol abuse otherwise no known chronic medical issues (does not see doctors per sister) s/p 1 month stay at [facility] now transferred to [facility] for liver transplant eval.  Most of history obtained from sister (Ashlyn) at bedside and some from transfer paperwork. Per sister, pt was initially brought to the hospital by family for back pain and jaundice for unclear amount of time. Patient was seen by GI and underwent EGD soon after admission which only showed nonbleeding ?duodenal ulcers (no intervention) however few days later pt developed active signs of bleeding and emergently underwent EGD again showing bleeding esophageal varices which were clipped.  pt was electively intubated with stay in ICU thereafter. Patient then started with HD due to worsening renal function and MS for past 2.5 weeks at times limited by low BP requiring pressors to assist. Had numerous paracentesis with varying amount of fluid removal - albumin infusions. Sister not aware of any concerns for acute infection during his stay but aware that pt was on abx at some point due to bleeding issues.        PERTINENT RADIOLOGY:  CXR: Tubes and lines as above. No focal consolidations  CT Chest, A&P:  Patchy ground-glass opacities in the bilateral upper lobes. *  Cirrhosis with evidence of portal hypertension. *  Hypodense lesion in the periphery of the left hepatic lobe of uncertain etiology. Somewhat wedge-shaped morphology raises the possibility for a small infarct. Question subtle peripheral nodular enhancement, and a hemangioma is also considered. Contrast enhanced abdominal MRI can be performed for further characterization and to assess for other possibilities. *  A wedge-shaped region of hypoattenuation in the spleen may reflect a small infarct. *  Focal eccentric wall thickening in the low rectum, possibly due to a mural fold. Consider colonoscopy. *  Large volume ascites.  CT Head: No evidence of acute intracranial pathology. If clinical symptoms persist or worsen, more sensitive evaluation with brain MRI may be obtained, if no contraindications exist.    BCx (8/12) 1/4 MRSE  BCx (8/14) NGTD  Paracentesis (8/14, 9/2) Cell Counts Negative for SBP  MRSA/MSSA Nasal PCR (8/16) Negative    CXR (8/19) Ground Glass infiltrates persist  CT from 8//13 with bilateral Ground glass infiltrates    # Persistent Ground glass infiltrates of unclear etiology  please send urine legionella ag  please send deep sputum for testing for gram stain/cx, fungal stain/cx    #Decompensated liver cirrhosis, Leukocytosis, Shock  # Enterococcus faecium bacteremia BC 8/26 now positive and worsening shock  biliary/SBp vs line related in c/o prolonged empirical antibiotic course and Enterococcus selection  --Meropenem deescalated to Zosyn for empiric coverage in light of negative culture workup then re-escalated back to meropenem- suspect  etiology of sepsis and shock is Enterococcus bacteremia  - vancomycin level 19 today on 1 g q12h (nut not drawn prior to 4th dose), repeat level tonight with goal 10-15  --Deescalated caspofungin to fluconazole  --repeat BC NGTD- should they become positive will also favor removing midline and exchange RIJ  now, otherwise, would plan to do down the line  --Would obtain TTE  - s/p paracentesis  follow cx , counts not c/w SBP - cx NGTD  --S/P empiric fluconazole (8/12 -->8/26), Now escalated to Caspofungin --->(8/27), deescalated to Fluconazole 8/28--->      #Positive BCx (8/12) (Staph epi)  Consistent with contaminant   as repeat testing is negative x many    #CMV PCR positive in cirrhotic patient  --Low level to moderate CMV viremia is noted, not clinically relevant and in context of illness. Would repeat CMV PCR on (8/26)  Cytomegalovirus By PCR: 4890 --->4730 --->1020 --->867 (8/20)    #Pre Transplant Evaluation   HIV-1/2 Combo Result: Nonreactive  EBVPCR Log: NotDetec Nsf98OX/mL   Hepatitis A IgG Ab Result: Reactive   Hepatitis B Core Antibody, Total: Nonreactive  Hepatitis B Surface Antibody: Reactive   Hepatitis B DNA PCR Log: Not Detected  Hepatitis B Surface Antigen: Nonreactive  Hepatitis C Virus Interpretation: Nonreactive  COVID-19 De Domain Antibody: Positive  Treponema Pallidum Antibody Interpretation: Negative  HSV 1 IgG  Positive/HSV 2 IgG Negative  EBV Serology Positive  CMV IgG Positive  VZV IgG Positive  Measles Positive, Mumps Positive and Rubella IgG Positive  Toxoplasma IgG Positive  QuantiFeron Gold Negative  Strongyloides Ab Negative  Babesia PCR negative  --Schistosoma IgG - NEGATIVE  --Reportedly Lyme serology was previously positive and remains positive, Tick Diseases Panel is negative for additional tick born exposures  --Western blot negative, serologies not concerning for active Lyme disease- off doxy now       #Encounter to Vaccinate Patient - Will defer vaccination in context of critical illness  COVID19: No primary series. Would benefit from COVID19 5462-4636 dose  Influenza: Would benefit from dose next season  Pneumococcal: Would benefit from PCV20  HAV: Immune, no additional vaccines indicated  HBV: Immune, no additional vaccines indicated  MMR: Immune, will not require further vaccination  Varicella: Immune, will not require further vaccination  Shingles: Would benefit from Shingrix  Tdap: Would benefit from Tdap

## 2024-09-03 NOTE — PROGRESS NOTE ADULT - NS ATTEND AMEND GEN_ALL_CORE FT
decompensated etoh cirrhosis  Not yet listed for transplant. Currently very frail  +melena this morning. Very frail and pressor requirement worsening  Cont supportive care. Palliative care eval  transfuse 2 u RBC, check TEG  patient in critical condition. discussed with family

## 2024-09-04 NOTE — PROGRESS NOTE ADULT - ASSESSMENT
67 y/o male w/ no known PMHx (does not see doctors per sister) who presented as a transfer from [facility] for SLK evaluation. Patient was initially admitted to [facility] for back pain & jaundice and was diagnosed w/ cirrhosis. Hospital course there was c/b small EVs, melena 2/2 a duodenal ulcer s/p clipping, HRS requiring hemodialysis, hepatic encephalopathy, ascites s/p multiple LVPs, and deconditioning. Patient was admitted to SICU on 8/12 for initiation of CRRT as his BPs were too low to attempt intermittent HD. SICU c/b delirium, persistent pressor reqs, symptomatic ascites s/p LVPs, melena 2/2 coagulopathy, and E. faecium bacteremia    Neuro:  - A/O x 1-2 waxes and wanes  - HE: Rifaximin  - Monitoring ammonia  - pain control with lidocaine patch  - Palliative Consult    Resp:  - Incentive spirometry to prevent atelectasis  - 3L NC    CV:  - Goal SBP >110, titrate pressors as able. On Levo gtt, vaso gtt  - Episode of AF w/ RVR, monitoring for now at this time  - TTE 8/28 with normal LV function    GI:   - TF held 9/3 and made NPO iso emesis  - Protonix BID for h/o duodenal ulcer  - s/p para 9/2 removing 2L, ascites testing negative  - Melena 9/3, f/u H/H and TEG     Renal:  - CRRT held 9/3 iso increasing pressor requirements  - s/p DDAVP x1 9/3    Heme:  - Hold chemical VTE ppx in setting of liver failure   - Mechanical VTE ppx with SCDs  - Epogen 3x/week  - duplex LE neg for DVT 8/28, repeat 9/3 iso swelling of LLE also negative  - s/p 1u PRBCs, 1u Cryo, and 1u PLTs 9/3    ID:   - BCx 8/26 (+) enterococcus faecium, on vancomycin by level w/ repeat BCx 8/28 neg  - continue fluconazole as per transplant surgery  - s/p 8/18-8/27: doxycycline added for Lyme, completed  - start CTX 1g q24    Endo:   - s.p stress dosage steroids started 8/26 - 8/29  - ISS [id number]     CODE STATUS: DNR/DNI/trial NIV    Dispo: SICU

## 2024-09-04 NOTE — PROGRESS NOTE ADULT - REASON FOR ADMISSION
cirrhosis/transplant eval
cirrhosis/transplant eval.
cirrhosis/transplant eval
cirrhosis/transplant eval.
cirrhosis/transplant eval

## 2024-09-04 NOTE — PROGRESS NOTE ADULT - NS ATTEND AMEND GEN_ALL_CORE FT
decompensated cirrhosis, ow with GI bleed and worsening pressor requirements  r/o infection  supportive care, transfuse rbc  palliative care follow up  patient not candidate for transplant

## 2024-09-04 NOTE — PROVIDER CONTACT NOTE (OTHER) - REASON
To discuss further goals of care with family
pat abdomen noted very distended/taut. tube feeds infusing@goal as ordered.
Increased abdominal distension.
Pt agonally breathing, decline in mentation, capped on levo at 1mcg

## 2024-09-04 NOTE — PROVIDER CONTACT NOTE (OTHER) - RECOMMENDATIONS
Assess at bedside, revisit GOC conversation and clarification of noninvasive ventilation trial as per JACINTO
Ativan, Rubinol
MD Gutiérrez made aware. Possible therapeutic paracentesis.

## 2024-09-04 NOTE — PROGRESS NOTE ADULT - ATTENDING COMMENTS
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   Transferred to [facility] on 8/12/24 for SLK evaluation and then transferred to the SICU for initiation of CRRT (8/13-- ). [facility] hospital course c/b UGIB/LGIB, E faecium bacteremia, distributive shock, worsening sarcopenia/debility.  - Flex sig (8/19/24): congested and friable mucosa, and internal/external hemorrhoids; no lesions; no rectal varices   - EGD (8/20/24): PHG, duodenal clips seen; no active bleeding.  - COL (8/21/24): concentric nodularity on rectum s/p biopsy (PATH: Colonic mucosa with mild crypt architectural distortion. No evidence of granulomas or dysplasia), solitary nonbleeding ulcer in proximal rectum, hemorrhoids.     Pt was started on midodrine 20mg TID on 9/1.  Pt had 2 episodes of melena yesterday, received 1u pRBC, 1u plt, 1 of cryo. Hgb remained stable in 7s in the afternoon, however levophed requirement continued to rise.   In the setting of rising pressor requirements, pt was made DNR/DNI overnight by his family.  This AM, pt was on levophed @ 0.85 mcg/kg/min, vaso @ 0.03.  Levophed requirement initially decreased to 0.1 mcg/kg/min on 9/2 (from 0.196 mcg/kg/min on 9/1), however now back up to 0.38 mcg/kg/min this AM. Vaso @ 0.03.   Pt had 6 brown BMs yesterday. However this AM, had 1 episode of melena -- on exam, maroon/black stool noted on xena while pt was being cleaned this AM. Abd moderately distended. 2+ LE edema b/l.  Pt received 1u pRBC, 1 cryo, 1 plt today, Hgb stable in 7s. However, levophed requirement continues to climb -- was at 0.51mcg/kg/min this afternoon on my reassessment.    #ALD cirrhosis  Under SLK eval.  ABO: O. MELD 3.0 = 41.  - Septic shock, E faecium bacteremia (BCx 8/26): Dx tap (8/27) neg for SBP. TTE (8/28) with no vegetations. CT (8/25) with no evident source of infection. S/p Zosyn (8/12-16), Doxycycline (8/18-27), meropenem (8/16-8/30). BCx (8/28) NGTD. Continue Vancomycin (8/27-- ). Given worsening shock (in the absence of ongoing large-volume GI bleeding), would recommend broadening Fluconazole (8/12-- ) to caspofungin, adding meropenem.   - Ascites: Volume removal as tolerated with CRRT. LVP PRN.  - Anuric FANY on CKD: Started iHD (7/9-8/12), Continue CRRT (8/13-- ).   - HE: Waxes and wanes. Minimally verbal. Following simple commands. Rifaximin/Miralax. Lactulose held for abdominal distention.     - Agitation/Insomnia: Seroquel 25mg or Haldol 2.5mg at night. Transplant Psych following.     - CMV Viremia: Check CMV PCR weekly (867 on 8/20/24). F/u CMV PCR (8/31).   - Hx of UGIB/LGIB: Continue PPI BID. Will need BB for secondary ppx when stable. Transfuse PRN based on TEG.   - Volume: Fluid removal as tolerated with CRRT (currently net even).  - Nutrition: NGT feeds. Nutrition following.    - Debility: Last ambulatory in July 2024. Daily inpatient PT/OT.    - Please check MELD labs (CBC, CMP, INR) daily.     #SLK Eval (opened 8/12)   - Met SLK criteria 8/20/24. Tissue typing completed.    - Cards: Pending [facility] when more clinically stable (ie, off pressors).     Pt's prognosis is guarded. I had a long discussion with pt's wife and son today. I informed them that pt is critically ill, and is not a transplant candidate at this time. His frailty/sarcopenia present a significant barrier to transplant. I informed them that pt would require a significant improvement in his functional status (would need to be walking) prior to being considered for transplant, which would require weeks-months of PT in a rehab (beyond recovery from his acute illness). They verbalized understanding. They stated that they want to honor the pt's wishes that he had verbalized previously, which was to continue aggressive medical treatment, despite his current prognosis.     D/w SICU team ([facility]).    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   Transferred to [facility] on 8/12/24 for SLK evaluation and then transferred to the SICU for initiation of CRRT (8/13-- ). [facility] hospital course c/b UGIB/LGIB, E faecium bacteremia, distributive shock, worsening sarcopenia/debility.  - Flex sig (8/19/24): congested and friable mucosa, and internal/external hemorrhoids; no lesions; no rectal varices   - EGD (8/20/24): PHG, duodenal clips seen; no active bleeding.  - COL (8/21/24): concentric nodularity on rectum s/p biopsy (PATH: Colonic mucosa with mild crypt architectural distortion. No evidence of granulomas or dysplasia), solitary nonbleeding ulcer in proximal rectum, hemorrhoids.     Pt had 2 episodes of melena yesterday, received 1u pRBC, 1u plt, 1 of cryo. Hgb remained stable in 7s in the afternoon, however levophed requirement continued to rise. CRRT held. In the setting of rising pressor requirements, pt was made DNR/DNI overnight by his family.  This AM, pt was on levophed @ 0.85 mcg/kg/min, vaso @ 0.03. Levophed increased to 1 mcg/kg/min in the afternoon. Per ICU note from today, ICU team spoke with pt's wife and son, all interventions capped with no further escalation in treatment.    #ALD cirrhosis  Under SLK eval.  ABO: O. MELD 3.0 = 42.  - Septic shock, E faecium bacteremia (BCx 8/26): Dx tap (8/27) neg for SBP. TTE (8/28) with no vegetations. CT (8/25) with no evident source of infection. S/p Zosyn (8/12-16), Doxycycline (8/18-27), meropenem (8/16-8/30). BCx (8/28) NGTD. Continue Vancomycin (8/27-- ), Fluconazole (8/12-- ).   - Ascites/Volume: CRRT held given high pressor requirements.  - Anuric FANY on CKD: Started iHD (7/9-8/12), Continue CRRT (8/13-- ).   - HE: Waxes and wanes. Minimally verbal. Following simple commands. Rifaximin/Miralax. Lactulose held for abdominal distention.     - Agitation/Insomnia: Seroquel 25mg or Haldol 2.5mg at night. Transplant Psych following.     - CMV Viremia: Check CMV PCR weekly: 867 (8/20) -> 174 (8/31).   - Hx of UGIB/LGIB: Continue PPI BID. Will need BB for secondary ppx when stable. Transfuse PRN based on TEG. Continue CTX 1g daily for SBP ppx given recurrent GIB (9/3-- ).  - Nutrition: TFs held given vomiting this AM.  - Debility: Last ambulatory in July 2024. Daily inpatient PT/OT.    - Please check MELD labs (CBC, CMP, INR) daily.     #SLK Eval (opened 8/12)   - Met SLK criteria 8/20/24. Tissue typing completed.    - Cards: Pending [facility] when more clinically stable (ie, off pressors).     Pt's prognosis is guarded. I had a long discussion with pt's wife and son on 9/3/24. I informed them that pt is critically ill, and is not a transplant candidate at this time. His frailty/sarcopenia present a significant barrier to transplant. I informed them that pt would require a significant improvement in his functional status (would need to be walking) prior to being considered for transplant, which would require weeks-months of PT in a rehab (beyond recovery from his acute illness). They verbalized understanding.       Evette Downey MD  Transplant Hepatology

## 2024-09-04 NOTE — PROVIDER CONTACT NOTE (OTHER) - SITUATION
Patient has increased abdominal distension.
pt denies having any pain@this time. pt receiving tube feeds via kft@goal. pt noted with 3 BM's so far during shift.
To discuss further goals of care with family
Pt agonally breathing, decline in mentation, capped on levo at 1mcg.

## 2024-09-04 NOTE — PROGRESS NOTE ADULT - ATTENDING COMMENTS
67 y/o male transferred from  for SLK evaluation. Patient was initially admitted to  for back pain & jaundice and was diagnosed w/ cirrhosis. Hospital course there was c/b small EVs, melena 2/2 a duodenal ulcer s/p clipping, HRS requiring hemodialysis, hepatic encephalopathy, ascites s/p multiple LVPs, and deconditioning. Patient was admitted to SICU on 8/12 for initiation of CRRT as his BPs were too low to attempt intermittent HD.    Worsening of hemodynamics on very high dose of Levo and Vaso  Add prn low dose Dilaudid for pain comfort as per family  O2  Not tolerating NGT feed, will DC  Mental status still poor, on Rifaximin and Lactulose  Saturating well  Hold CRRT sec to very high pressure requirement.  Continue Abx Vanco by level and Difluc, BC E faecium. Start abx CTX for SBP ppx  DC ISS, BS not high  Spoke to son and wife, all intervention capped with no further escalation in Rx, pt remains DNR DNI  Palliative care consult P

## 2024-09-04 NOTE — PROGRESS NOTE ADULT - NUTRITIONAL ASSESSMENT
This patient has been assessed with a concern for Malnutrition and has been determined to have a diagnosis/diagnoses of Severe protein-calorie malnutrition.    This patient is being managed with:   Diet NPO with Tube Feed-  Tube Feeding Modality: Nasogastric  [place] Peptide 1.5 (KFPEPT1.5RTH)  Total Volume for 24 Hours (mL): 480  Continuous  Starting Tube Feed Rate {mL per Hour}: 20  Until Goal Tube Feed Rate (mL per Hour): 20  Tube Feed Duration (in Hours): 24  Tube Feed Start Time: 21:30  Entered: Aug 31 2024  9:13PM  
This patient has been assessed with a concern for Malnutrition and has been determined to have a diagnosis/diagnoses of Severe protein-calorie malnutrition.    This patient is being managed with:   Diet NPO-  Tube Feeding Modality: Nasogastric  [place] Peptide 1.5 (KFPEPT1.5RTH)  Total Volume for 24 Hours (mL): 1200  Continuous  Starting Tube Feed Rate {mL per Hour}: 20  Increase Tube Feed Rate by (mL): 10     Every 4 hours  Until Goal Tube Feed Rate (mL per Hour): 50  Tube Feed Duration (in Hours): 24  Tube Feed Start Time: 09:30  Entered: Aug 30 2024 10:13AM  
This patient has been assessed with a concern for Malnutrition and has been determined to have a diagnosis/diagnoses of Severe protein-calorie malnutrition.    This patient is being managed with:   Diet NPO-  Tube Feeding Modality: Nasogastric  [place] Peptide 1.5 (KFPEPT1.5RTH)  Total Volume for 24 Hours (mL): 1200  Continuous  Starting Tube Feed Rate {mL per Hour}: 50  Until Goal Tube Feed Rate (mL per Hour): 50  Tube Feed Duration (in Hours): 24  Entered: Sep  1 2024  9:32AM  
This patient has been assessed with a concern for Malnutrition and has been determined to have a diagnosis/diagnoses of Severe protein-calorie malnutrition.    This patient is being managed with:   Diet NPO-  Tube Feeding Modality: Nasogastric  [place] Peptide 1.5 (KFPEPT1.5RTH)  Total Volume for 24 Hours (mL): 1200  Continuous  Starting Tube Feed Rate {mL per Hour}: 50  Until Goal Tube Feed Rate (mL per Hour): 50  Tube Feed Duration (in Hours): 24  Entered: Sep  1 2024  9:32AM    This patient has been assessed with a concern for Malnutrition and has been determined to have a diagnosis/diagnoses of Severe protein-calorie malnutrition.    This patient is being managed with:   Diet NPO-  Tube Feeding Modality: Nasogastric  [place] Peptide 1.5 (KFPEPT1.5RTH)  Total Volume for 24 Hours (mL): 1200  Continuous  Starting Tube Feed Rate {mL per Hour}: 50  Until Goal Tube Feed Rate (mL per Hour): 50  Tube Feed Duration (in Hours): 24  Entered: Sep  1 2024  9:32AM  
PLAN:   - Repeat BCx  - Procalcitonin  - Possible
This patient has been assessed with a concern for Malnutrition and has been determined to have a diagnosis/diagnoses of Severe protein-calorie malnutrition.    This patient is being managed with:   Diet NPO-  Except Medications  Entered: Sep  3 2024  7:27AM  
This patient has been assessed with a concern for Malnutrition and has been determined to have a diagnosis/diagnoses of Severe protein-calorie malnutrition.    This patient is being managed with:   Diet NPO-  Tube Feeding Modality: Nasogastric  [place] Peptide 1.5 (KFPEPT1.5RTH)  Total Volume for 24 Hours (mL): 1200  Continuous  Starting Tube Feed Rate {mL per Hour}: 50  Until Goal Tube Feed Rate (mL per Hour): 50  Tube Feed Duration (in Hours): 24  Entered: Sep  1 2024  9:32AM  
This patient has been assessed with a concern for Malnutrition and has been determined to have a diagnosis/diagnoses of Severe protein-calorie malnutrition.    This patient is being managed with:   Diet NPO-  Except Medications  Entered: Sep  3 2024  7:27AM

## 2024-09-04 NOTE — PROGRESS NOTE ADULT - SUBJECTIVE AND OBJECTIVE BOX
Interval Events:   - Worsening shock. Levop 0.9 and Vaso 0.03.   - Off CRRT due to pressor requirements.   - Net positive 2L with new O2 requirements 3L NC.   - NPO due to episodes of NBNB emesis.   - Received 1PLT and 1Cryo in last 24h.   - GOC reviewed with family: DNR/DNI     ROS:   Unable to obatin due to AMS.     Hospital Medications:  cefTRIAXone   IVPB 1000 milliGRAM(s) IV Intermittent every 24 hours  chlorhexidine 2% Cloths 1 Application(s) Topical <User Schedule>  fluconAZOLE IVPB 400 milliGRAM(s) IV Intermittent every 24 hours  lidocaine   4% Patch 1 Patch Transdermal every 24 hours  lidocaine   4% Patch 1 Patch Transdermal daily  norepinephrine Infusion 0.18 MICROgram(s)/kG/Min (19.2 mL/Hr) IV Continuous <Continuous>  rifAXIMin 550 milliGRAM(s) Oral every 12 hours  vasopressin Infusion 0.03 Unit(s)/Min (4.5 mL/Hr) IV Continuous <Continuous>    MAR over past 24 hours:    albumin human  5% IVPB   125 mL/Hr IV Intermittent (24 @ 13:28)    Biotene Dry Mouth Oral Rinse   5 milliLiter(s) Swish and Spit (24 @ 06:01)    calcium gluconate IVPB   200 mL/Hr IV Intermittent (24 @ 09:58)    cefTRIAXone   IVPB   100 mL/Hr IV Intermittent (24 @ 14:43)    chlorhexidine 2% Cloths   1 Application(s) Topical (24 @ 06:00)    desmopressin IVPB   230 mL/Hr IV Intermittent (24 @ 12:10)    epoetin kareem (EPOGEN) Injectable   64736 Unit(s) SubCutaneous (24 @ 06:50)    fluconAZOLE IVPB   100 mL/Hr IV Intermittent (24 @ 09:58)    HYDROmorphone  Injectable   0.25 milliGRAM(s) IV Push (24 @ 06:31)    insulin lispro (ADMELOG) corrective regimen sliding scale   2 Unit(s) SubCutaneous (24 @ 05:52)   2 Unit(s) SubCutaneous (24 @ 12:56)    lidocaine   4% Patch   1 Patch Transdermal (24 @ 00:54)    lidocaine   4% Patch   1 Patch Transdermal (24 @ 19:27)    norepinephrine Infusion   19.2 mL/Hr IV Continuous (24 @ 19:26)   19.2 mL/Hr IV Continuous (24 @ 09:59)    pantoprazole  Injectable   40 milliGRAM(s) IV Push (24 @ 06:01)   40 milliGRAM(s) IV Push (24 @ 17:59)    Phoxillum Filtration BK 4 / 2.5   2000 mL/Hr CRRT (24 @ 12:32)    PrismaSATE Dialysate BGK 4 / 2.5   1000 mL/Hr CRRT (24 @ 12:32)    vancomycin  IVPB   250 mL/Hr IV Intermittent (24 @ 11:08)    vasopressin Infusion   4.5 mL/Hr IV Continuous (24 @ 19:26)      PHYSICAL EXAM:   Vital Signs last 24 hours:  T(F): 97 (24 @ 07:00), Max: 98.1 (24 @ 11:00)  HR: 114 (24 @ 08:30) (70 - 125)  BP: 121/56 (24 @ 19:45) (121/56 - 121/56)  BP(mean): 80 (24 @ 19:45) (80 - 80)  ABP: 84/55 (24 @ 08:30) (70/51 - 125/44)  ABP(mean): 68 (24 @ 08:30) (58 - 79)  RR: 17 (24 @ 08:30) (12 - 26)  SpO2: 99% (24 @ 08:30) (85% - 100%)    I&Os:    24 @ 07:01  -  24 @ 07:00  --------------------------------------------------------  IN:    Albumin 5%  - 250 mL: 250 mL    Cryoprecipitate: 75 mL    IV PiggyBack: 650 mL    Norepinephrine: 1740.7 mL    Platelets - Single Donor: 225 mL    Vasopressin: 108 mL  Total IN: 3048.7 mL    OUT:    Miscellaneous Tube Feedin mL    Other (mL): 1164 mL  Total OUT: 1164 mL    Total NET: 1884.7 mL        BMI (kg/m2): 33.2 (24 @ 16:46)  GENERAL: obese man, physically deconditioned.   NEURO: waxes and wanes. Lethargic. Having minimal verbal output. Follows simple commands.    HEENT: Scleral icterus  CHEST: Bilateral breath sounds, decreased in bases.    CARDIAC: Regular rate and rhythm  ABDOMEN: Distended, soft, non-tender. Present bowel sounds. Anasarca.   EXTREMITIES: warm, well perfused. Anasarca improved.   SKIN: Jaundiced, no rash/ecchymoses    DIAGNOSTICS:  WBC      Hg       PLT      Na       K        CO2     BUN      Cr       ALT      AST      TB       ALP  16.67    6.6      97       138      5.4      10       19       1.27     34       69       9.2      173      24 @ 06:03  15.63    7.2      105      137      4.9      18       12       0.81     30       45       8.8      193      24 @ 18:03  17.13    7.9      64       134      4.8      21       11       0.81     30       41       9.0      205      24 @ 12:23  16.75    7.9      62       137      4.6      20       11       0.89     30       39       8.8      203      24 @ 06:25  15.14    7.4      63       136      4.3      20       8        0.86     28       42       8.9      212      24 @ 00:08    PT             INR            MELDwith  MELDw/o  48.1           4.57           --             --             24 @ 06:03  30.2           2.83           --             --             24 @ 18:03  32.8           3.08           --             --             24 @ 12:23  30.4           2.99           --             --             24 @ 06:25  28.9           2.84           --             --             24 @ 00:08

## 2024-09-04 NOTE — PROGRESS NOTE ADULT - ASSESSMENT
68y with obesity and risky alcohol consumption (with decades' history of daily consumption of homemade alcohol), admitted to [facility] 1 month ago due to recent onset of jaundice and with a prolonged hospital and ICU course there due to newly diagnosed decompensated cirrhosis with acute on chronic liver failure (ACLF) with shock (resolved, but still on midodrine); FANY (on intermittent HD since 7/9); recurrent maroon stools and acute on chronic anemia necessitating intermittent PRBC transfusions (last on 8/8) and hypofibrinoginemia, with EGD (7/12) with small non-bleeding EV and a duodenal ulcer with a visible vessel; and waxing and waning hepatic encephalopathy. He was transferred to [facility] on 8/12/24 for evaluation for combined liver/kidney transplants (SLK).      [ ] Decompensated ETOH Cirrhosis  - SLK eval now on hold, pt is too unstable for transplant. DNR/DNI as of 9/3  - MELD ===========O (9/4)  - EV:  EGD (7/12) with small non-bleeding EV and a duodenal ulcer with a visible vessel. EGD 8/20: no active bleeding, PPI.  - Melena, coagulopathy corrected as able by SICU  - s/p colonoscopy 8/21: lesion biopsied, f/u path   - BCX 8/13 w/ MRSE , repeat 8/14 with NGTD, BCX 8/26: enterococcus faecium , linezolid  - ECHO 8/28: no vegetations   - TFs, pressors per SICU  - HE: cont rifaximin/miralax  - stress dose steroids per SICU completed, wean pressors as able  - Thiamine/MVI/FOLIC ACID  - LHC deferred, due to active melena    [ ] HRS  - iHD since 7/9   - CRRT (8/13-, currently on hold    [ ] EC faecium bacteremia (8/26)  [ ] MRSE (8/12)   - repeat bld cxs neg  - vanco by level   - ID following     [ ] UGI bleed @ OSH  - EGD 7/12: small non bleeding EV, duodenal ulcer   - EGD 8/20: no active bleeding    [ ] rectal mass bx on 8/21 scope, path: no cancer

## 2024-09-04 NOTE — DISCHARGE NOTE FOR THE EXPIRED PATIENT - HOSPITAL COURSE
67 y/o male w/ no known PMHx (does not see doctors per sister) who presented as a transfer from [facility] for SLK evaluation. Patient was initially admitted to [facility] for back pain & jaundice and was diagnosed w/ cirrhosis. Hospital course there was c/b small EVs, melena 2/2 a duodenal ulcer s/p clipping, HRS requiring hemodialysis, hepatic encephalopathy, ascites s/p multiple LVPs, and deconditioning. Patient was admitted to SICU on 8/12 for initiation of CRRT as his BPs were too low to attempt intermittent HD. SICU c/b delirium, persistent pressor reqs, symptomatic ascites s/p LVPs, melena 2/2 coagulopathy, and E. faecium bacteremia 69 y/o male w/ no known PMHx (does not see doctors per sister) who presented as a transfer from [facility] for SLK evaluation. Patient was initially admitted to [facility] for back pain & jaundice and was diagnosed w/ cirrhosis. Hospital course there was c/b small EVs, melena 2/2 a duodenal ulcer s/p clipping, HRS requiring hemodialysis, hepatic encephalopathy, ascites s/p multiple LVPs, and deconditioning. Patient was admitted to SICU on 8/12 for initiation of CRRT as his BPs were too low to attempt intermittent HD. SICU c/b delirium, persistent pressor reqs, symptomatic ascites s/p LVPs, melena 2/2 coagulopathy, and E. faecium bacteremia. Hospital course c/b worsening mental status, rising pressor requirements, not tolerating CRRT. Patient was made was made DNR/DNI overnight by his family 9/4 and ultimately decided that all interventions should be capped with no further escalation in treatment. Patient went into asystole, no lung or heart sounds auscultated, and time of death pronounced 21:40.

## 2024-09-04 NOTE — PROVIDER CONTACT NOTE (OTHER) - ASSESSMENT
pt agonal breathing at this time with no response to physical or painful stimuli. Pupils sluggish. MARTIR Sheridan to discuss further comfort measures with family. Family educated on Ativan and Rubinol. Pt verbalized understanding. Spouse wants to time to think about additional interventions deferring additional medications at this time. pt agonal breathing at this time with no response to physical or painful stimuli. Pupils sluggish. hypotensive capped on pressor support at 1mcg of Levo. MARTIR Sheridan to discuss further comfort measures with family. Family educated on Ativan and Rubinol. Family verbalized understanding however, spouse wants to time to think about additional interventions deferring additional medications at this time.

## 2024-09-04 NOTE — PROGRESS NOTE ADULT - ATTENDING SUPERVISION STATEMENT
Fellow
Resident
Resident
Fellow
Resident
Fellow
Resident
Fellow
Resident
Fellow
Resident

## 2024-09-04 NOTE — PROVIDER CONTACT NOTE (OTHER) - BACKGROUND
Patient on CRRT, current SLK transplant evaluation.
Pt admitted with liver failure. c/b ICU stay/ CRRT
s/p liver failure

## 2024-09-04 NOTE — INITIAL ORGAN DONATION REFERRAL - NSORGANDONATIONCLINICALTRIGGER_GEN_ALL_CORE
[place] Coma Scale is less than or equal to 5/Family discussion withdrawal of life-sustaining therapies is anticipated

## 2024-09-04 NOTE — PROGRESS NOTE ADULT - SUBJECTIVE AND OBJECTIVE BOX
24 HOUR EVENTS:  - CRRT held iso increasing pressor requirements  - Podiatry consulted for toenail cut  - CTX added  - x1 DDAVP  - f/u GOC with son, pt now DNR/DNI/trial NIV  - d/c PO meds and hold TFs iso vomiting in AM      NEURO  RASS (if intubated): 		CAM ICU (if concern for delirium):  Exam: A&O 1-2, waxing and waning, moving all 4 extremities spontaneously, following commands  Meds:     RESPIRATORY  RR: 20 (24 @ 01:45) (12 - 26)  SpO2: 100% (24 @ 01:45) (85% - 100%)  Wt(kg): --  Exam: Lungs CTA b/l  Mechanical Ventilation:   ABG - ( 03 Sep 2024 17:54 )  pH: 7.46  /  pCO2: 28    /  pO2: 77    / HCO3: 20    / Base Excess: -3.4  /  SaO2: 98.0    Lactate: x                Meds:     CARDIOVASCULAR  HR: 116 (24 @ 01:45) (68 - 125)  BP: 121/56 (24 @ 19:45) (95/54 - 121/56)  BP(mean): 80 (24 @ 19:45) (71 - 80)  ABP: 86/53 (24 @ 01:45) (83/46 - 125/44)  ABP(mean): 68 (24 @ 01:45) (58 - 77)  Wt(kg): --  CVP(cm H2O): --      Exam: Normal S1/S2 w/o murmurs or rubs  Cardiac Rhythm:   Perfusion     [ ]Adequate   [ ]Inadequate  Mentation   [ ]Normal       [X]Reduced  Extremities  [ ]Warm         [ ]Cool  Volume Status [ ]Hypervolemic [ ]Euvolemic [ ]Hypovolemic  Meds: norepinephrine Infusion 0.18 MICROgram(s)/kG/Min IV Continuous <Continuous>      GI/NUTRITION  Exam:   Diet:   Last Bowel Movement: 03-Sep-2024 (24 @ 19:00)  Last Bowel Movement: 03-Sep-2024 (24 @ 07:00)  Last Bowel Movement: 02-Sep-2024 (24 @ 19:00)  Last Bowel Movement: 01-Sep-2024 (24 @ 19:00)  Last Bowel Movement: 01-Sep-2024 (24 @ 08:00)  Last Bowel Movement: 30-Aug-2024 (24 @ 19:15)  Last Bowel Movement: 30-Aug-2024 (24 @ 07:00)  Last Bowel Movement: 29-Aug-2024 (24 @ 21:00)  Last Bowel Movement: 28-Aug-2024 (24 @ 19:15)  Last Bowel Movement: 28-Aug-2024 (24 @ 07:00)  Last Bowel Movement: 28-Aug-2024 (24 @ 19:15)  Last Bowel Movement: 28-Aug-2024 (24 @ 08:00)  Last Bowel Movement: 27-Aug-2024 (24 @ 19:00)  Last Bowel Movement: 27-Aug-2024 (24 @ 07:00)  Last Bowel Movement: 26-Aug-2024 (24 @ 19:00)  Last Bowel Movement: 26-Aug-2024 (24 @ 07:00)  Last Bowel Movement: 25-Aug-2024 (24 @ 19:00)  Last Bowel Movement: 25-Aug-2024 (24 @ 07:00)  Last Bowel Movement: 24-Aug-2024 (24 @ 19:00)  Last Bowel Movement: 24-Aug-2024 (24 @ 07:00)  Last Bowel Movement: 23-Aug-2024 (24 @ 19:00)  Last Bowel Movement: 22-Aug-2024 (24 @ 19:00)    Meds: pantoprazole  Injectable 40 milliGRAM(s) IV Push two times a day      GENITOURINARY  I&O's Detail     @ 07:01  -   @ 07:00  --------------------------------------------------------  IN:    Albumin 5%  - 250 mL: 750 mL    Enteral Tube Flush: 225 mL    IV PiggyBack: 800 mL    Miscellaneous Tube Feedin mL    Norepinephrine: 291.6 mL    Norepinephrine: 19.2 mL    PRBCs (Packed Red Blood Cells): 300 mL    Vasopressin: 108 mL  Total IN: 3643.8 mL    OUT:    Gastric Aspirate - Discarded (mL): 150 mL    Intermittent Catheterization - Urethral (mL): 20 mL    Other (mL): 2000 mL    Other (mL): 2742 mL  Total OUT: 4912 mL    Total NET: -1268.2 mL       @ 07:01  -   @ 02:26  --------------------------------------------------------  IN:    Albumin 5%  - 250 mL: 250 mL    Cryoprecipitate: 75 mL    IV PiggyBack: 650 mL    Norepinephrine: 1183.3 mL    Platelets - Single Donor: 225 mL    Vasopressin: 81 mL  Total IN: 2464.3 mL    OUT:    Miscellaneous Tube Feedin mL    Other (mL): 1164 mL  Total OUT: 1164 mL    Total NET: 1300.3 mL              137  |  105  |  12  ----------------------------<  144<H>  4.9   |  18<L>  |  0.81    Ca    8.5      03 Sep 2024 18:03  Phos  3.4       Mg     2.4         TPro  6.0  /  Alb  3.1<L>  /  TBili  8.8<H>  /  DBili  x   /  AST  45<H>  /  ALT  30  /  AlkPhos  193<H>      Meds:     HEMATOLOGIC  Meds:                         7.2    15.63 )-----------( 105      ( 03 Sep 2024 18:03 )             22.1     PT/INR - ( 03 Sep 2024 18:03 )   PT: 30.2 sec;   INR: 2.83 ratio         PTT - ( 03 Sep 2024 18:03 )  PTT:48.5 sec    INFECTIOUS DISEASES  T(C): 36.2 (24 @ 23:00), Max: 36.7 (24 @ 07:00)  Wt(kg): --  WBC Count: 15.63 K/uL ( @ 18:03)  WBC Count: 17.13 K/uL ( @ 12:23)  WBC Count: 16.75 K/uL ( @ 06:25)    Recent Cultures:  Specimen Source: Ascites Fl Ascites Fluid,  @ 18:25; Results   Testing in progress; Gram Stain:   polymorphonuclear leukocytes seen  No organisms seen  by cytocentrifuge; Organism: --  Specimen Source: .Blood Blood-Peripheral,  @ 19:28; Results   No growth at 5 days; Gram Stain: --; Organism: --  Specimen Source: .Blood Blood-Peripheral,  @ 18:32; Results   No growth at 5 days; Gram Stain: --; Organism: --    Meds: cefTRIAXone   IVPB 1000 milliGRAM(s) IV Intermittent every 24 hours  epoetin kareem (EPOGEN) Injectable 11449 Unit(s) SubCutaneous <User Schedule>  fluconAZOLE IVPB 400 milliGRAM(s) IV Intermittent every 24 hours  rifAXIMin 550 milliGRAM(s) Oral every 12 hours      ENDOCRINE  Capillary Blood Glucose    Meds: insulin lispro (ADMELOG) corrective regimen sliding scale   SubCutaneous every 6 hours  vasopressin Infusion 0.03 Unit(s)/Min IV Continuous <Continuous>      ACCESS DEVICES:  [ ] Peripheral IV  [ ] Central Venous Line		[ ] R	[ ] L	[ ] IJ	[ ] Fem	[ ] SC	Placed:   [ ] Arterial Line			[ ] R	[ ] L	[ ] Fem	[ ] Rad	[ ] Ax	Placed:   [ ] PICC:					[ ] Mediport  [ ] Urinary Catheter, Date Placed:   [ ] Necessity of urinary, arterial, and venous catheters discussed    OTHER MEDICATIONS:  Biotene Dry Mouth Oral Rinse 5 milliLiter(s) Swish and Spit every 12 hours  chlorhexidine 2% Cloths 1 Application(s) Topical <User Schedule>  lidocaine   4% Patch 1 Patch Transdermal every 24 hours  lidocaine   4% Patch 1 Patch Transdermal daily      IMAGING: 24 HOUR EVENTS:  - CRRT held iso increasing pressor requirements  - Podiatry consulted for toenail cut  - CTX added  - x1 DDAVP  - f/u GOC with son, pt now DNR/DNI/trial NIV  - d/c PO meds and hold TFs iso vomiting in AM      NEURO  RASS (if intubated): 		CAM ICU (if concern for delirium):  Exam: A&O 1-2, waxing and waning, moving all 4 extremities spontaneously, following commands  Meds:     RESPIRATORY  RR: 20 (24 @ 01:45) (12 - 26)  SpO2: 100% (24 @ 01:45) (85% - 100%)  Wt(kg): --  Exam: Lungs CTA b/l  Mechanical Ventilation:   ABG - ( 03 Sep 2024 17:54 )  pH: 7.46  /  pCO2: 28    /  pO2: 77    / HCO3: 20    / Base Excess: -3.4  /  SaO2: 98.0    Lactate: x                Meds:     CARDIOVASCULAR  HR: 116 (24 @ 01:45) (68 - 125)  BP: 121/56 (24 @ 19:45) (95/54 - 121/56)  BP(mean): 80 (24 @ 19:45) (71 - 80)  ABP: 86/53 (24 @ 01:45) (83/46 - 125/44)  ABP(mean): 68 (24 @ 01:45) (58 - 77)  Wt(kg): --  CVP(cm H2O): --      Exam: Normal S1/S2 w/o murmurs or rubs  Cardiac Rhythm: regular rate and rhythm  Perfusion     [ ]Adequate   [ ]Inadequate  Mentation   [ ]Normal       [X]Reduced  Extremities  [ ]Warm         [ ]Cool  Volume Status [ ]Hypervolemic [ ]Euvolemic [ ]Hypovolemic  Meds: norepinephrine Infusion 0.18 MICROgram(s)/kG/Min IV Continuous <Continuous>      GI/NUTRITION  Exam: abdomen distended, soft, minimally tender, jaundiced, scleral icterus  Diet:   Last Bowel Movement: 03-Sep-2024 (24 @ 19:00)  Last Bowel Movement: 03-Sep-2024 (24 @ 07:00)  Last Bowel Movement: 02-Sep-2024 (24 @ 19:00)  Last Bowel Movement: 01-Sep-2024 (24 @ 19:00)    Meds: pantoprazole  Injectable 40 milliGRAM(s) IV Push two times a day      GENITOURINARY  I&O's Detail     @ 07:  -   @ 07:00  --------------------------------------------------------  IN:    Albumin 5%  - 250 mL: 750 mL    Enteral Tube Flush: 225 mL    IV PiggyBack: 800 mL    Miscellaneous Tube Feedin mL    Norepinephrine: 291.6 mL    Norepinephrine: 19.2 mL    PRBCs (Packed Red Blood Cells): 300 mL    Vasopressin: 108 mL  Total IN: 3643.8 mL    OUT:    Gastric Aspirate - Discarded (mL): 150 mL    Intermittent Catheterization - Urethral (mL): 20 mL    Other (mL): 2000 mL    Other (mL): 2742 mL  Total OUT: 4912 mL    Total NET: -1268.2 mL       @ 07:  -   @ 02:26  --------------------------------------------------------  IN:    Albumin 5%  - 250 mL: 250 mL    Cryoprecipitate: 75 mL    IV PiggyBack: 650 mL    Norepinephrine: 1183.3 mL    Platelets - Single Donor: 225 mL    Vasopressin: 81 mL  Total IN: 2464.3 mL    OUT:    Miscellaneous Tube Feedin mL    Other (mL): 1164 mL  Total OUT: 1164 mL    Total NET: 1300.3 mL              137  |  105  |  12  ----------------------------<  144<H>  4.9   |  18<L>  |  0.81    Ca    8.5      03 Sep 2024 18:03  Phos  3.4       Mg     2.4         TPro  6.0  /  Alb  3.1<L>  /  TBili  8.8<H>  /  DBili  x   /  AST  45<H>  /  ALT  30  /  AlkPhos  193<H>      Meds:     HEMATOLOGIC  Meds:                         7.2    15.63 )-----------( 105      ( 03 Sep 2024 18:03 )             22.1     PT/INR - ( 03 Sep 2024 18:03 )   PT: 30.2 sec;   INR: 2.83 ratio         PTT - ( 03 Sep 2024 18:03 )  PTT:48.5 sec    INFECTIOUS DISEASES  T(C): 36.2 (24 @ 23:00), Max: 36.7 (24 @ 07:00)  Wt(kg): --  WBC Count: 15.63 K/uL ( @ 18:03)  WBC Count: 17.13 K/uL ( @ 12:23)  WBC Count: 16.75 K/uL ( @ 06:25)    Recent Cultures:  Specimen Source: Ascites Fl Ascites Fluid,  @ 18:25; Results   Testing in progress; Gram Stain:   polymorphonuclear leukocytes seen  No organisms seen  by cytocentrifuge; Organism: --  Specimen Source: .Blood Blood-Peripheral,  @ 19:28; Results   No growth at 5 days; Gram Stain: --; Organism: --  Specimen Source: .Blood Blood-Peripheral,  @ 18:32; Results   No growth at 5 days; Gram Stain: --; Organism: --    Meds: cefTRIAXone   IVPB 1000 milliGRAM(s) IV Intermittent every 24 hours  epoetin kareem (EPOGEN) Injectable 18827 Unit(s) SubCutaneous <User Schedule>  fluconAZOLE IVPB 400 milliGRAM(s) IV Intermittent every 24 hours  rifAXIMin 550 milliGRAM(s) Oral every 12 hours      ENDOCRINE  Capillary Blood Glucose    Meds: insulin lispro (ADMELOG) corrective regimen sliding scale   SubCutaneous every 6 hours  vasopressin Infusion 0.03 Unit(s)/Min IV Continuous <Continuous>      ACCESS DEVICES:  [ ] Peripheral IV  [ ] Central Venous Line		[ ] R	[ ] L	[ ] IJ	[ ] Fem	[ ] SC	Placed:   [ ] Arterial Line			[ ] R	[ ] L	[ ] Fem	[ ] Rad	[ ] Ax	Placed:   [ ] PICC:					[ ] Mediport  [ ] Urinary Catheter, Date Placed:   [ ] Necessity of urinary, arterial, and venous catheters discussed    OTHER MEDICATIONS:  Biotene Dry Mouth Oral Rinse 5 milliLiter(s) Swish and Spit every 12 hours  chlorhexidine 2% Cloths 1 Application(s) Topical <User Schedule>  lidocaine   4% Patch 1 Patch Transdermal every 24 hours  lidocaine   4% Patch 1 Patch Transdermal daily      IMAGING:

## 2024-09-04 NOTE — PROGRESS NOTE ADULT - NS ATTEND OPT1 GEN_ALL_CORE

## 2024-09-04 NOTE — PROGRESS NOTE ADULT - SUBJECTIVE AND OBJECTIVE BOX
Transplant Surgery - Multidisciplinary Progress Note    Present:   Patient seen and examined with multidisciplinary Transplant team including  Surgeon: Dr. Zepeda, Dr. Contreras, MARTIR Mack.  Hepatologist: Dr. Downey.  Pharmacist Beckie, and unit RN during am rounds.  Disciplines not in attendance will be notified of the plan.       Interval Events:   made DNR/DNI by family  CRRT held due to increasing pressor requirements      TX DATA HERE        Review of systems  All other systems were reviewed and are negative, except as noted.    Constitutional: Well developed / well nourished  Eyes:  PERRLA  ENMT: NC/AT  Neck: supple,   Respiratory: CTA B/L  Cardiovascular: RRR  Gastrointestinal: Soft abdomen, ND, L sided abdominal wall hernia--stable, NT  Genitourinary: anuric   Extremities: SCD's in place and working bilaterally, + LE edema   Vascular: Palpable dp pulses bilaterally.   Neurological: waking up  Skin: no rashes, ulcerations, lesions  Musculoskeletal: Moving all extremities Transplant Surgery - Multidisciplinary Progress Note    Present:   Patient seen and examined with multidisciplinary Transplant team including  Surgeon: Dr. Zepeda, Dr. Contreras, MARTIR Mack.  Hepatologist: Dr. Downey.  Pharmacist Beckie, and unit RN during am rounds.  Disciplines not in attendance will be notified of the plan.       Interval Events:   made DNR/DNI by family  CRRT held due to increasing pressor requirements      MEDICATIONS  (STANDING):  cefTRIAXone   IVPB 1000 milliGRAM(s) IV Intermittent every 24 hours  chlorhexidine 2% Cloths 1 Application(s) Topical <User Schedule>  fluconAZOLE IVPB 400 milliGRAM(s) IV Intermittent every 24 hours  lidocaine   4% Patch 1 Patch Transdermal every 24 hours  lidocaine   4% Patch 1 Patch Transdermal daily  norepinephrine Infusion 0.18 MICROgram(s)/kG/Min (19.2 mL/Hr) IV Continuous <Continuous>  vasopressin Infusion 0.03 Unit(s)/Min (4.5 mL/Hr) IV Continuous <Continuous>    MEDICATIONS  (PRN):  HYDROmorphone  Injectable 0.25 milliGRAM(s) IV Push every 2 hours PRN pain 1-10      PAST MEDICAL & SURGICAL HISTORY:  Alcohol abuse      No significant past surgical history          Vital Signs Last 24 Hrs  T(C): 36.2 (04 Sep 2024 11:00), Max: 36.2 (03 Sep 2024 23:00)  T(F): 97.2 (04 Sep 2024 11:00), Max: 97.2 (04 Sep 2024 11:00)  HR: 92 (04 Sep 2024 18:30) (90 - 125)  BP: 121/56 (03 Sep 2024 19:45) (121/56 - 121/56)  BP(mean): 80 (03 Sep 2024 19:45) (80 - 80)  RR: 13 (04 Sep 2024 18:30) (10 - 52)  SpO2: 99% (04 Sep 2024 18:30) (93% - 100%)    Parameters below as of 04 Sep 2024 16:00  Patient On (Oxygen Delivery Method): nasal cannula  O2 Flow (L/min): 2      I&O's Summary    03 Sep 2024 07:01  -  04 Sep 2024 07:00  --------------------------------------------------------  IN: 3048.7 mL / OUT: 1164 mL / NET: 1884.7 mL    04 Sep 2024 07:01  -  04 Sep 2024 18:40  --------------------------------------------------------  IN: 1476.5 mL / OUT: 0 mL / NET: 1476.5 mL                              6.6    16.67 )-----------( 97       ( 04 Sep 2024 06:03 )             20.8     09-04    138  |  103  |  19  ----------------------------<  169<H>  5.4<H>   |  10<LL>  |  1.27    Ca    9.1      04 Sep 2024 06:03  Phos  4.8     09-04  Mg     2.4     09-04    TPro  5.8<L>  /  Alb  2.9<L>  /  TBili  9.2<H>  /  DBili  x   /  AST  69<H>  /  ALT  34  /  AlkPhos  173<H>  09-04          Culture - Body Fluid with Gram Stain (collected 09-02-24 @ 18:25)  Source: Ascites Fl Ascites Fluid  Gram Stain (09-03-24 @ 05:27):    polymorphonuclear leukocytes seen    No organisms seen    by cytocentrifuge  Preliminary Report (09-04-24 @ 09:20):    No growth to date.    Culture - Fungal, Body Fluid (collected 09-02-24 @ 18:25)  Source: Ascites Fl Ascites Fluid  Preliminary Report (09-03-24 @ 08:16):    Testing in progress    Culture - Blood (collected 08-28-24 @ 19:28)  Source: .Blood Blood-Peripheral  Final Report (09-03-24 @ 01:00):    No growth at 5 days                Review of systems  All other systems were reviewed and are negative, except as noted.    Constitutional: Well developed / well nourished  Eyes:  PERRLA  ENMT: NC/AT  Neck: supple,   Respiratory: CTA B/L  Cardiovascular: RRR  Gastrointestinal: Soft abdomen, ND, L sided abdominal wall hernia--stable, NT  Genitourinary: anuric   Extremities: SCD's in place and working bilaterally, + LE edema   Vascular: Palpable dp pulses bilaterally.   Neurological: waking up  Skin: no rashes, ulcerations, lesions  Musculoskeletal: Moving all extremities

## 2024-09-04 NOTE — PROGRESS NOTE ADULT - ASSESSMENT
67 yo M with obesity and risky alcohol consumption (with decades' history of daily consumption of homemade alcohol). Admitted to [facility] for 1 month due to recent onset of jaundice and with a prolonged hospital/ICU course due to newly diagnosed decompensated cirrhosis with acute on chronic liver failure (ACLF) with shock, FANY (started on intermittent HD since 7/9), acute on chronic anemia with recurrent overt GI bleeding, and hepatic encephalopathy.     Transferred to [facility] on 8/12/24 for SLK evaluation and then transferred to the SICU for initiation of CRRT (8/13- ).      # Distributive shock    - Worsening shock. Levop 0.9 and Vaso 0.03.   - Lactic acid uptrend to 12.3 and Bicarb is 10.   - Transplant ID rec further sepsis work up and broaden Abx.   - E. faecalis bacteremia (8/26) with follow up negative BCx, could be cause of worsening septic shock.   - CMV viremia (Valcyte not required).   - Unequivocal Lyme PCR/negative WB (post Doxy).     - S/p Zosyn (8/12-16), Doxycycline (8/18-27), Meropenem (8/16-30),    - Ceftriaxone (9/4- ), Vancomycin (8/27- ), and Fluconazole (8/12- ).      # History of upper and lower GI Bleeding.   - Hematemesis from duodenal ulcer (07/2024).  - Hematochezia from rectal ulcer/friable mucosa (8/18).   - Episodes of melena (8/27 and 9/3).    - EGD (7/12, at [facility]) small non-bleeding EV and a duodenal ulcer with a visible vessel. No further hematemesis.   - EGD (8/20) with no active bleeding. Colonoscopy (8/21) friable mucosa, rectal ulcer (biopsy ruled out malignancy).  - Pantoprazole 40BID  - NSBB prophylaxis once HD stable.   - As needed PRBC and TEG-guided blood products.     # Anuric renal failure on CRRT    # Pulmonary edema   - Started intermittent HD (7/9-8/12) - and CRRT (8/13- ).  - Fluid removal on CRRT has been limited due to hypotension. Off CRRT today due to pressor requirements.    - Net positive 2L with new O2 requirements 3L NC.   - Transplant nephrology following.    # Newly diagnosed decompensated cirrhosis with ACLF   - HE: waxes and wanes. Minimally verbal. Following simple commands. Rifaximin/Lactulose.  - Agitation/Insomnia: Seroquel 25mg or Haldol 2.5mg at night. Transplant Psych following.    - Large volume ascites and anasarca: Anuric. Limited fluid removal with CRRT.   - Paracentesis (8/14, 8/27 and 9/2) negative for SBP.   - Contrast CT (8/13) patent portohepatic vessels and no HCC. Small infarcts in left hepatic lobe and spleen.       - Nutrition: NPO due to emesis. Nutrition following.   - PT: Last ambulatory on July/2024. Daily inpatient PT/OT.     # SLK ongoing evaluation (8/12)  - ABO O. MELD 3.0 score of 38 yesterday > 42 today   - Met SLK criteria in Aug 20th. Tissue typing completed.   - Pt too sick to undergo OLT.   - GOC reviewed with family: DNR/DNI     PLAN   - ID rec to broaden Abx and repeat sepsis work up.   - As needed PRBC and TEG-guided blood products.    - Too sick to undergo OLT at the time.  - Prognosis is guarded. Continue GOC with family.     Note incomplete until finalized by attending signature/attestation.    Myra Maloney   Transplant Hepatology Fellow

## 2024-09-08 LAB
CULTURE RESULTS: NO GROWTH — SIGNIFICANT CHANGE UP
SPECIMEN SOURCE: SIGNIFICANT CHANGE UP

## 2024-09-11 LAB
CULTURE RESULTS: SIGNIFICANT CHANGE UP
SPECIMEN SOURCE: SIGNIFICANT CHANGE UP

## 2024-09-25 LAB
CULTURE RESULTS: SIGNIFICANT CHANGE UP
SPECIMEN SOURCE: SIGNIFICANT CHANGE UP

## 2024-09-29 PROCEDURE — G0480: CPT

## 2024-09-29 PROCEDURE — 82728 ASSAY OF FERRITIN: CPT

## 2024-09-29 PROCEDURE — 87075 CULTR BACTERIA EXCEPT BLOOD: CPT

## 2024-09-29 PROCEDURE — 82330 ASSAY OF CALCIUM: CPT

## 2024-09-29 PROCEDURE — 86664 EPSTEIN-BARR NUCLEAR ANTIGEN: CPT

## 2024-09-29 PROCEDURE — 84132 ASSAY OF SERUM POTASSIUM: CPT

## 2024-09-29 PROCEDURE — 86403 PARTICLE AGGLUT ANTBDY SCRN: CPT

## 2024-09-29 PROCEDURE — 86665 EPSTEIN-BARR CAPSID VCA: CPT

## 2024-09-29 PROCEDURE — 97110 THERAPEUTIC EXERCISES: CPT

## 2024-09-29 PROCEDURE — 86480 TB TEST CELL IMMUN MEASURE: CPT

## 2024-09-29 PROCEDURE — 87389 HIV-1 AG W/HIV-1&-2 AB AG IA: CPT

## 2024-09-29 PROCEDURE — 87070 CULTURE OTHR SPECIMN AEROBIC: CPT

## 2024-09-29 PROCEDURE — 84157 ASSAY OF PROTEIN OTHER: CPT

## 2024-09-29 PROCEDURE — 85025 COMPLETE CBC W/AUTO DIFF WBC: CPT

## 2024-09-29 PROCEDURE — 86901 BLOOD TYPING SEROLOGIC RH(D): CPT

## 2024-09-29 PROCEDURE — 74176 CT ABD & PELVIS W/O CONTRAST: CPT | Mod: MC

## 2024-09-29 PROCEDURE — 87517 HEPATITIS B DNA QUANT: CPT

## 2024-09-29 PROCEDURE — 82248 BILIRUBIN DIRECT: CPT

## 2024-09-29 PROCEDURE — 86850 RBC ANTIBODY SCREEN: CPT

## 2024-09-29 PROCEDURE — 86762 RUBELLA ANTIBODY: CPT

## 2024-09-29 PROCEDURE — 93971 EXTREMITY STUDY: CPT

## 2024-09-29 PROCEDURE — 87340 HEPATITIS B SURFACE AG IA: CPT

## 2024-09-29 PROCEDURE — P9016: CPT

## 2024-09-29 PROCEDURE — 83036 HEMOGLOBIN GLYCOSYLATED A1C: CPT

## 2024-09-29 PROCEDURE — 93970 EXTREMITY STUDY: CPT

## 2024-09-29 PROCEDURE — 87040 BLOOD CULTURE FOR BACTERIA: CPT

## 2024-09-29 PROCEDURE — 86708 HEPATITIS A ANTIBODY: CPT

## 2024-09-29 PROCEDURE — 86663 EPSTEIN-BARR ANTIBODY: CPT

## 2024-09-29 PROCEDURE — 86923 COMPATIBILITY TEST ELECTRIC: CPT

## 2024-09-29 PROCEDURE — 87799 DETECT AGENT NOS DNA QUANT: CPT

## 2024-09-29 PROCEDURE — 82435 ASSAY OF BLOOD CHLORIDE: CPT

## 2024-09-29 PROCEDURE — 87798 DETECT AGENT NOS DNA AMP: CPT

## 2024-09-29 PROCEDURE — 71250 CT THORAX DX C-: CPT | Mod: MC

## 2024-09-29 PROCEDURE — P9037: CPT

## 2024-09-29 PROCEDURE — 86255 FLUORESCENT ANTIBODY SCREEN: CPT

## 2024-09-29 PROCEDURE — 82784 ASSAY IGA/IGD/IGG/IGM EACH: CPT

## 2024-09-29 PROCEDURE — 93356 MYOCRD STRAIN IMG SPCKL TRCK: CPT

## 2024-09-29 PROCEDURE — 36415 COLL VENOUS BLD VENIPUNCTURE: CPT

## 2024-09-29 PROCEDURE — 86696 HERPES SIMPLEX TYPE 2 TEST: CPT

## 2024-09-29 PROCEDURE — 82977 ASSAY OF GGT: CPT

## 2024-09-29 PROCEDURE — 86666 EHRLICHIA ANTIBODY: CPT

## 2024-09-29 PROCEDURE — 86644 CMV ANTIBODY: CPT

## 2024-09-29 PROCEDURE — 82390 ASSAY OF CERULOPLASMIN: CPT

## 2024-09-29 PROCEDURE — 94002 VENT MGMT INPAT INIT DAY: CPT

## 2024-09-29 PROCEDURE — P9012: CPT

## 2024-09-29 PROCEDURE — 80053 COMPREHEN METABOLIC PANEL: CPT

## 2024-09-29 PROCEDURE — 85384 FIBRINOGEN ACTIVITY: CPT

## 2024-09-29 PROCEDURE — 93308 TTE F-UP OR LMTD: CPT

## 2024-09-29 PROCEDURE — 80061 LIPID PANEL: CPT

## 2024-09-29 PROCEDURE — P9047: CPT

## 2024-09-29 PROCEDURE — 94003 VENT MGMT INPAT SUBQ DAY: CPT

## 2024-09-29 PROCEDURE — 86618 LYME DISEASE ANTIBODY: CPT

## 2024-09-29 PROCEDURE — 88305 TISSUE EXAM BY PATHOLOGIST: CPT

## 2024-09-29 PROCEDURE — 94799 UNLISTED PULMONARY SVC/PX: CPT

## 2024-09-29 PROCEDURE — 84165 PROTEIN E-PHORESIS SERUM: CPT

## 2024-09-29 PROCEDURE — 83550 IRON BINDING TEST: CPT

## 2024-09-29 PROCEDURE — 84153 ASSAY OF PSA TOTAL: CPT

## 2024-09-29 PROCEDURE — 84439 ASSAY OF FREE THYROXINE: CPT

## 2024-09-29 PROCEDURE — 86038 ANTINUCLEAR ANTIBODIES: CPT

## 2024-09-29 PROCEDURE — 70450 CT HEAD/BRAIN W/O DYE: CPT | Mod: MC

## 2024-09-29 PROCEDURE — 80202 ASSAY OF VANCOMYCIN: CPT

## 2024-09-29 PROCEDURE — 0225U NFCT DS DNA&RNA 21 SARSCOV2: CPT

## 2024-09-29 PROCEDURE — 84443 ASSAY THYROID STIM HORMONE: CPT

## 2024-09-29 PROCEDURE — 85730 THROMBOPLASTIN TIME PARTIAL: CPT

## 2024-09-29 PROCEDURE — 87637 SARSCOV2&INF A&B&RSV AMP PRB: CPT

## 2024-09-29 PROCEDURE — 86617 LYME DISEASE ANTIBODY: CPT

## 2024-09-29 PROCEDURE — 85396 CLOTTING ASSAY WHOLE BLOOD: CPT

## 2024-09-29 PROCEDURE — 71260 CT THORAX DX C+: CPT | Mod: MC

## 2024-09-29 PROCEDURE — 86704 HEP B CORE ANTIBODY TOTAL: CPT

## 2024-09-29 PROCEDURE — 82803 BLOOD GASES ANY COMBINATION: CPT

## 2024-09-29 PROCEDURE — 89051 BODY FLUID CELL COUNT: CPT

## 2024-09-29 PROCEDURE — 86777 TOXOPLASMA ANTIBODY: CPT

## 2024-09-29 PROCEDURE — 86334 IMMUNOFIX E-PHORESIS SERUM: CPT

## 2024-09-29 PROCEDURE — 86735 MUMPS ANTIBODY: CPT

## 2024-09-29 PROCEDURE — 87640 STAPH A DNA AMP PROBE: CPT

## 2024-09-29 PROCEDURE — 97535 SELF CARE MNGMENT TRAINING: CPT

## 2024-09-29 PROCEDURE — 82042 OTHER SOURCE ALBUMIN QUAN EA: CPT

## 2024-09-29 PROCEDURE — 82746 ASSAY OF FOLIC ACID SERUM: CPT

## 2024-09-29 PROCEDURE — 87102 FUNGUS ISOLATION CULTURE: CPT

## 2024-09-29 PROCEDURE — 36430 TRANSFUSION BLD/BLD COMPNT: CPT

## 2024-09-29 PROCEDURE — 86753 PROTOZOA ANTIBODY NOS: CPT

## 2024-09-29 PROCEDURE — 86381 MITOCHONDRIAL ANTIBODY EACH: CPT

## 2024-09-29 PROCEDURE — 86900 BLOOD TYPING SEROLOGIC ABO: CPT

## 2024-09-29 PROCEDURE — 83605 ASSAY OF LACTIC ACID: CPT

## 2024-09-29 PROCEDURE — 82945 GLUCOSE OTHER FLUID: CPT

## 2024-09-29 PROCEDURE — 87015 SPECIMEN INFECT AGNT CONCNTJ: CPT

## 2024-09-29 PROCEDURE — 82607 VITAMIN B-12: CPT

## 2024-09-29 PROCEDURE — 84295 ASSAY OF SERUM SODIUM: CPT

## 2024-09-29 PROCEDURE — 85610 PROTHROMBIN TIME: CPT

## 2024-09-29 PROCEDURE — 87205 SMEAR GRAM STAIN: CPT

## 2024-09-29 PROCEDURE — 83735 ASSAY OF MAGNESIUM: CPT

## 2024-09-29 PROCEDURE — 87186 SC STD MICRODIL/AGAR DIL: CPT

## 2024-09-29 PROCEDURE — 97530 THERAPEUTIC ACTIVITIES: CPT

## 2024-09-29 PROCEDURE — 86376 MICROSOMAL ANTIBODY EACH: CPT

## 2024-09-29 PROCEDURE — 82962 GLUCOSE BLOOD TEST: CPT

## 2024-09-29 PROCEDURE — 85014 HEMATOCRIT: CPT

## 2024-09-29 PROCEDURE — 82565 ASSAY OF CREATININE: CPT

## 2024-09-29 PROCEDURE — 87077 CULTURE AEROBIC IDENTIFY: CPT

## 2024-09-29 PROCEDURE — 93306 TTE W/DOPPLER COMPLETE: CPT

## 2024-09-29 PROCEDURE — 97165 OT EVAL LOW COMPLEX 30 MIN: CPT

## 2024-09-29 PROCEDURE — 86780 TREPONEMA PALLIDUM: CPT

## 2024-09-29 PROCEDURE — 84155 ASSAY OF PROTEIN SERUM: CPT

## 2024-09-29 PROCEDURE — 84100 ASSAY OF PHOSPHORUS: CPT

## 2024-09-29 PROCEDURE — 82533 TOTAL CORTISOL: CPT

## 2024-09-29 PROCEDURE — 85018 HEMOGLOBIN: CPT

## 2024-09-29 PROCEDURE — 94640 AIRWAY INHALATION TREATMENT: CPT

## 2024-09-29 PROCEDURE — P9045: CPT

## 2024-09-29 PROCEDURE — 74177 CT ABD & PELVIS W/CONTRAST: CPT | Mod: MC

## 2024-09-29 PROCEDURE — 85027 COMPLETE CBC AUTOMATED: CPT

## 2024-09-29 PROCEDURE — 84550 ASSAY OF BLOOD/URIC ACID: CPT

## 2024-09-29 PROCEDURE — 84145 PROCALCITONIN (PCT): CPT

## 2024-09-29 PROCEDURE — 71045 X-RAY EXAM CHEST 1 VIEW: CPT

## 2024-09-29 PROCEDURE — P9059: CPT

## 2024-09-29 PROCEDURE — 82140 ASSAY OF AMMONIA: CPT

## 2024-09-29 PROCEDURE — 86803 HEPATITIS C AB TEST: CPT

## 2024-09-29 PROCEDURE — 86706 HEP B SURFACE ANTIBODY: CPT

## 2024-09-29 PROCEDURE — 97162 PT EVAL MOD COMPLEX 30 MIN: CPT

## 2024-09-29 PROCEDURE — 86769 SARS-COV-2 COVID-19 ANTIBODY: CPT

## 2024-09-29 PROCEDURE — 87150 DNA/RNA AMPLIFIED PROBE: CPT

## 2024-09-29 PROCEDURE — 86787 VARICELLA-ZOSTER ANTIBODY: CPT

## 2024-09-29 PROCEDURE — 86692 HEPATITIS DELTA AGENT ANTBDY: CPT

## 2024-09-29 PROCEDURE — 93005 ELECTROCARDIOGRAM TRACING: CPT

## 2024-09-29 PROCEDURE — 86965 POOLING BLOOD PLATELETS: CPT

## 2024-09-29 PROCEDURE — 86695 HERPES SIMPLEX TYPE 1 TEST: CPT

## 2024-09-29 PROCEDURE — 83615 LACTATE (LD) (LDH) ENZYME: CPT

## 2024-09-29 PROCEDURE — 83540 ASSAY OF IRON: CPT

## 2024-09-29 PROCEDURE — 86682 HELMINTH ANTIBODY: CPT

## 2024-09-29 PROCEDURE — 82947 ASSAY GLUCOSE BLOOD QUANT: CPT

## 2024-09-29 PROCEDURE — 86790 VIRUS ANTIBODY NOS: CPT

## 2024-09-29 PROCEDURE — 82247 BILIRUBIN TOTAL: CPT

## 2024-09-29 PROCEDURE — 87521 HEPATITIS C PROBE&RVRS TRNSC: CPT

## 2024-09-29 PROCEDURE — 86765 RUBEOLA ANTIBODY: CPT

## 2024-09-29 PROCEDURE — P9100: CPT

## 2024-09-29 PROCEDURE — 87641 MR-STAPH DNA AMP PROBE: CPT

## (undated) DEVICE — TUBING SUCTION 20FT

## (undated) DEVICE — PACK IV START WITH CHG

## (undated) DEVICE — ELCTR GROUNDING PAD ADULT COVIDIEN

## (undated) DEVICE — CATH IV SAFE BC 20G X 1.16" (PINK)

## (undated) DEVICE — FOLEY HOLDER STATLOCK 2 WAY ADULT

## (undated) DEVICE — SUCTION YANKAUER NO CONTROL VENT

## (undated) DEVICE — BIOPSY FORCEP RADIAL JAW 4 STANDARD WITH NEEDLE

## (undated) DEVICE — FORCEP RADIAL JAW 4 JUMBO 2.8MM 3.2MM 240CM ORANGE DISP

## (undated) DEVICE — CLAMP BX HOT RAD JAW 3

## (undated) DEVICE — SYR ALLIANCE II INFLATION 60ML

## (undated) DEVICE — POLY TRAP ETRAP

## (undated) DEVICE — IRRIGATOR BIO SHIELD

## (undated) DEVICE — TUBING IV SET GRAVITY 3Y 100" MACRO

## (undated) DEVICE — SYR LUER LOK 50CC

## (undated) DEVICE — TUBING SUCTION CONN 6FT STERILE

## (undated) DEVICE — BITE BLOCK ADULT 20 X 27MM (GREEN)

## (undated) DEVICE — SENSOR O2 FINGER ADULT

## (undated) DEVICE — BALLOON US ENDO

## (undated) DEVICE — SOL INJ NS 0.9% 500ML 2 PORT

## (undated) DEVICE — CATH IV SAFE BC 22G X 1" (BLUE)

## (undated) DEVICE — BRUSH COLONOSCOPY CYTOLOGY